# Patient Record
Sex: MALE | Race: WHITE | NOT HISPANIC OR LATINO | URBAN - METROPOLITAN AREA
[De-identification: names, ages, dates, MRNs, and addresses within clinical notes are randomized per-mention and may not be internally consistent; named-entity substitution may affect disease eponyms.]

---

## 2017-12-14 PROBLEM — Z00.00 ENCOUNTER FOR PREVENTIVE HEALTH EXAMINATION: Status: ACTIVE | Noted: 2017-12-14

## 2018-05-17 ENCOUNTER — EMERGENCY (EMERGENCY)
Facility: HOSPITAL | Age: 48
LOS: 0 days | Discharge: HOME | End: 2018-05-17
Attending: EMERGENCY MEDICINE
Payer: MEDICAID

## 2018-05-17 VITALS
WEIGHT: 129.85 LBS | HEART RATE: 82 BPM | SYSTOLIC BLOOD PRESSURE: 131 MMHG | HEIGHT: 70 IN | DIASTOLIC BLOOD PRESSURE: 94 MMHG | TEMPERATURE: 99 F

## 2018-05-17 VITALS
HEART RATE: 69 BPM | TEMPERATURE: 98 F | SYSTOLIC BLOOD PRESSURE: 116 MMHG | RESPIRATION RATE: 16 BRPM | OXYGEN SATURATION: 99 % | DIASTOLIC BLOOD PRESSURE: 85 MMHG

## 2018-05-17 DIAGNOSIS — K50.919 CROHN'S DISEASE, UNSPECIFIED, WITH UNSPECIFIED COMPLICATIONS: ICD-10-CM

## 2018-05-17 DIAGNOSIS — F43.21 ADJUSTMENT DISORDER WITH DEPRESSED MOOD: ICD-10-CM

## 2018-05-17 LAB
ALBUMIN SERPL ELPH-MCNC: 3.4 G/DL — SIGNIFICANT CHANGE UP (ref 3.3–5)
ALP SERPL-CCNC: 95 U/L — SIGNIFICANT CHANGE UP (ref 40–120)
ALT FLD-CCNC: 58 U/L — SIGNIFICANT CHANGE UP (ref 12–78)
ANION GAP SERPL CALC-SCNC: 7 MMOL/L — SIGNIFICANT CHANGE UP (ref 5–17)
APAP SERPL-MCNC: < 2 UG/ML (ref 10–30)
APPEARANCE UR: CLEAR — SIGNIFICANT CHANGE UP
APTT BLD: 28.3 SEC — SIGNIFICANT CHANGE UP (ref 27.5–37.4)
AST SERPL-CCNC: 147 U/L — HIGH (ref 15–37)
BILIRUB SERPL-MCNC: 1 MG/DL — SIGNIFICANT CHANGE UP (ref 0.2–1.2)
BILIRUB UR-MCNC: NEGATIVE — SIGNIFICANT CHANGE UP
BUN SERPL-MCNC: 9 MG/DL — SIGNIFICANT CHANGE UP (ref 7–23)
CALCIUM SERPL-MCNC: 8.7 MG/DL — SIGNIFICANT CHANGE UP (ref 8.5–10.1)
CHLORIDE SERPL-SCNC: 102 MMOL/L — SIGNIFICANT CHANGE UP (ref 96–108)
CK MB CFR SERPL CALC: 4.6 NG/ML — HIGH (ref 0.5–3.6)
CO2 SERPL-SCNC: 28 MMOL/L — SIGNIFICANT CHANGE UP (ref 22–31)
COLOR SPEC: YELLOW — SIGNIFICANT CHANGE UP
CREAT SERPL-MCNC: 0.71 MG/DL — SIGNIFICANT CHANGE UP (ref 0.5–1.3)
DIFF PNL FLD: ABNORMAL
ETHANOL SERPL-MCNC: <10 MG/DL — SIGNIFICANT CHANGE UP (ref 0–10)
GLUCOSE SERPL-MCNC: 101 MG/DL — HIGH (ref 70–99)
GLUCOSE UR QL: NEGATIVE MG/DL — SIGNIFICANT CHANGE UP
HCT VFR BLD CALC: 41.3 % — SIGNIFICANT CHANGE UP (ref 39–50)
HGB BLD-MCNC: 14.7 G/DL — SIGNIFICANT CHANGE UP (ref 13–17)
INR BLD: 0.96 RATIO — SIGNIFICANT CHANGE UP (ref 0.88–1.16)
KETONES UR-MCNC: ABNORMAL
LEUKOCYTE ESTERASE UR-ACNC: ABNORMAL
MCHC RBC-ENTMCNC: 33 PG — SIGNIFICANT CHANGE UP (ref 27–34)
MCHC RBC-ENTMCNC: 35.6 GM/DL — SIGNIFICANT CHANGE UP (ref 32–36)
MCV RBC AUTO: 92.6 FL — SIGNIFICANT CHANGE UP (ref 80–100)
NITRITE UR-MCNC: NEGATIVE — SIGNIFICANT CHANGE UP
NRBC # BLD: 0 /100 WBCS — SIGNIFICANT CHANGE UP (ref 0–0)
PCP SPEC-MCNC: SIGNIFICANT CHANGE UP
PH UR: 7 — SIGNIFICANT CHANGE UP (ref 5–8)
PLATELET # BLD AUTO: 169 K/UL — SIGNIFICANT CHANGE UP (ref 150–400)
POTASSIUM SERPL-MCNC: 3.9 MMOL/L — SIGNIFICANT CHANGE UP (ref 3.5–5.3)
POTASSIUM SERPL-SCNC: 3.9 MMOL/L — SIGNIFICANT CHANGE UP (ref 3.5–5.3)
PROT SERPL-MCNC: 7.1 GM/DL — SIGNIFICANT CHANGE UP (ref 6–8.3)
PROT UR-MCNC: NEGATIVE MG/DL — SIGNIFICANT CHANGE UP
PROTHROM AB SERPL-ACNC: 10.5 SEC — SIGNIFICANT CHANGE UP (ref 9.8–12.7)
RBC # BLD: 4.46 M/UL — SIGNIFICANT CHANGE UP (ref 4.2–5.8)
RBC # FLD: 13.1 % — SIGNIFICANT CHANGE UP (ref 10.3–14.5)
SALICYLATES SERPL-MCNC: 3.4 MG/DL — SIGNIFICANT CHANGE UP (ref 2.8–20)
SODIUM SERPL-SCNC: 137 MMOL/L — SIGNIFICANT CHANGE UP (ref 135–145)
SP GR SPEC: 1 — LOW (ref 1.01–1.02)
TROPONIN I SERPL-MCNC: <.015 NG/ML — SIGNIFICANT CHANGE UP (ref 0.01–0.04)
TSH SERPL-MCNC: 1.69 UU/ML — SIGNIFICANT CHANGE UP (ref 0.36–3.74)
UROBILINOGEN FLD QL: NEGATIVE MG/DL — SIGNIFICANT CHANGE UP
WBC # BLD: 7.72 K/UL — SIGNIFICANT CHANGE UP (ref 3.8–10.5)
WBC # FLD AUTO: 7.72 K/UL — SIGNIFICANT CHANGE UP (ref 3.8–10.5)

## 2018-05-17 PROCEDURE — 71045 X-RAY EXAM CHEST 1 VIEW: CPT | Mod: 26

## 2018-05-17 PROCEDURE — 90792 PSYCH DIAG EVAL W/MED SRVCS: CPT | Mod: GC,GT

## 2018-05-17 PROCEDURE — 99284 EMERGENCY DEPT VISIT MOD MDM: CPT | Mod: 25

## 2018-05-17 RX ORDER — MORPHINE SULFATE 50 MG/1
4 CAPSULE, EXTENDED RELEASE ORAL ONCE
Qty: 0 | Refills: 0 | Status: DISCONTINUED | OUTPATIENT
Start: 2018-05-17 | End: 2018-05-17

## 2018-05-17 RX ORDER — MESALAMINE 400 MG
4 TABLET, DELAYED RELEASE (ENTERIC COATED) ORAL
Qty: 480 | Refills: 0 | OUTPATIENT
Start: 2018-05-17 | End: 2018-06-15

## 2018-05-17 RX ORDER — SODIUM CHLORIDE 9 MG/ML
1000 INJECTION INTRAMUSCULAR; INTRAVENOUS; SUBCUTANEOUS ONCE
Qty: 0 | Refills: 0 | Status: COMPLETED | OUTPATIENT
Start: 2018-05-17 | End: 2018-05-17

## 2018-05-17 RX ORDER — ACETAMINOPHEN 500 MG
2 TABLET ORAL
Qty: 40 | Refills: 0 | OUTPATIENT
Start: 2018-05-17 | End: 2018-05-21

## 2018-05-17 RX ORDER — DIAZEPAM 5 MG
1 TABLET ORAL
Qty: 9 | Refills: 0 | OUTPATIENT
Start: 2018-05-17 | End: 2018-05-19

## 2018-05-17 RX ORDER — PANTOPRAZOLE SODIUM 20 MG/1
8 TABLET, DELAYED RELEASE ORAL
Qty: 80 | Refills: 0 | Status: DISCONTINUED | OUTPATIENT
Start: 2018-05-17 | End: 2018-05-17

## 2018-05-17 RX ORDER — MESALAMINE 400 MG
250 TABLET, DELAYED RELEASE (ENTERIC COATED) ORAL ONCE
Qty: 0 | Refills: 0 | Status: COMPLETED | OUTPATIENT
Start: 2018-05-17 | End: 2018-05-17

## 2018-05-17 RX ORDER — MESALAMINE 400 MG
250 TABLET, DELAYED RELEASE (ENTERIC COATED) ORAL ONCE
Qty: 0 | Refills: 0 | Status: DISCONTINUED | OUTPATIENT
Start: 2018-05-17 | End: 2018-05-17

## 2018-05-17 RX ADMIN — Medication 125 MILLIGRAM(S): at 13:29

## 2018-05-17 RX ADMIN — MORPHINE SULFATE 4 MILLIGRAM(S): 50 CAPSULE, EXTENDED RELEASE ORAL at 13:26

## 2018-05-17 RX ADMIN — Medication 250 MILLIGRAM(S): at 13:33

## 2018-05-17 RX ADMIN — SODIUM CHLORIDE 1000 MILLILITER(S): 9 INJECTION INTRAMUSCULAR; INTRAVENOUS; SUBCUTANEOUS at 09:54

## 2018-05-17 RX ADMIN — Medication 30 MILLILITER(S): at 09:55

## 2018-05-17 RX ADMIN — MORPHINE SULFATE 4 MILLIGRAM(S): 50 CAPSULE, EXTENDED RELEASE ORAL at 14:23

## 2018-05-17 RX ADMIN — PANTOPRAZOLE SODIUM 10 MG/HR: 20 TABLET, DELAYED RELEASE ORAL at 10:03

## 2018-05-17 NOTE — ED BEHAVIORAL HEALTH ASSESSMENT NOTE - DESCRIPTION
Calm and cooperative Crohn's disease Moved from [place] to [place] in 04/2017.  Currently single, recently breaking up with his girlfriend (one month ago, who remains in [place]).  Former emergency medical technician, now unemployed, yet seeking work in construction.  Lives with his mother in [place].  He describes his relationship with his mother as "good" yet his relationship with his sisters as estranged

## 2018-05-17 NOTE — ED ADULT NURSE REASSESSMENT NOTE - NS ED NURSE REASSESS COMMENT FT1
patient getting very upset stated " I want to live " , patient stated " I have a ringing in my head " Dr Marko amaro Md call by bed side evaluating patient at this time  , awaiting for orders

## 2018-05-17 NOTE — ED PROVIDER NOTE - PHYSICAL EXAMINATION
Vitals: WNL  Gen: AAOx3, NAD, sitting comfortably in stretcher, calm, cooperative, non-toxic  Head: ncat, perrla, eomi b/l  Neck: supple, no lymphadenopathy, no midline deviation  Heart: rrr, no m/r/g  Lungs: CTA b/l, no rales/ronchi/wheezes  Abd: soft, nontender, non-distended, no rebound or guarding  Ext: no clubbing/cyanosis/edema  Neuro: sensation and muscle strength intact b/l, steady gait, CN2-12 intact b/l

## 2018-05-17 NOTE — ED ADULT NURSE NOTE - OBJECTIVE STATEMENT
patient A&Ox3 c/o of abdominal pain and bilateral shoulder pain , started yesterday , patient stated " I had chest pain yesterday but no chest pain now " patient denied difficulty breathing , c/o of headache , c/o of nausea, patient denied SI/HI at this time , but stated he had SI three days ago , patient place under 1:1 observation , place on gown , place on cardiac monitor

## 2018-05-17 NOTE — ED PROVIDER NOTE - MEDICAL DECISION MAKING DETAILS
48 yo M with multiple complaints, r/o sequelae of overdose, medical screening, then psych consult  -basic labs, etoh, trop, ckmb, drug screen, lipase, asa and tylenol levels, coags, tsh, ua/cx, ekg, monitor, iv line NS hydration, 1:1 obs

## 2018-05-17 NOTE — ED ADULT NURSE REASSESSMENT NOTE - NS ED NURSE REASSESS COMMENT FT1
patient A&Ox3 in no acute distress, patient was clear by psych ,discharge as orders heplock remove left ER self ambulated with mother on his side , as per Md patient do not need ct scan at this time

## 2018-05-17 NOTE — ED PROVIDER NOTE - PROGRESS NOTE DETAILS
pt. complaining of worsening abd pain and headache, wants meds for crohn's and pain meds, will oblige and will order CT brain and abd/pel Results reported to patient--grossly benign, pt. cleared medically and psychiatrically   Pt. reports feeling better after meds  pt. agrees to f/u with primary care outpt. as well as outpt. psych, will give F/u for GI and care of Crohn's   pt. understands to return to ED if symptoms worsen; will d/c

## 2018-05-17 NOTE — ED BEHAVIORAL HEALTH ASSESSMENT NOTE - RISK ASSESSMENT
Risk factors: single marital status, race, unemployment, limited income, substance use, recent social loss, recent suicide attempt    Protective factors: Motivated to seek and engage in treatment, future oriented, housing stability, the presence of a social and familial support system

## 2018-05-17 NOTE — ED PROVIDER NOTE - OBJECTIVE STATEMENT
48 yo M with body aches and pains, ringing in the ears, s/p suicide attempt 3 nights prior.  Pt. took a "handful" of various pills including nortriptyline, tramadol, other unknown pills--but he denies aspirin/tylenol.  He was very depressed about recent breakup with ex-girlfriend, who had an  and didn't tell him about it.  Pt. says he woke up dizzy the next day with palpitations--he hasn't felt right the last few days.  He says anxiety has gotten much worse over the last month and he also has panic attacks.  His Crohn's is also out of control; he stopped taking his meds for it 6 months ago.  No other complaints.   ROS: negative for fever, cough, headache, shortness of breath, nausea, vomiting, diarrhea, rash, paresthesia, and weakness.   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

## 2018-05-17 NOTE — ED ADULT TRIAGE NOTE - CHIEF COMPLAINT QUOTE
Pt complaining of chest pain with palpitations, chills, ringing in the ears, pain in the shoulders bilaterally. Hx of Crohn's

## 2018-05-17 NOTE — ED BEHAVIORAL HEALTH ASSESSMENT NOTE - HPI (INCLUDE ILLNESS QUALITY, SEVERITY, DURATION, TIMING, CONTEXT, MODIFYING FACTORS, ASSOCIATED SIGNS AND SYMPTOMS)
47 year old single,  male, without formal past psychiatric history, a history of Crohns disease, self-presenting to the emergency room with a chief complaint of chest pain, yet on ED depression screen he revealed making an overdose on medications two days prior.  He reports an approximate two month history of depressed mood, with variable associated symptoms of anhedonia, guilt (for speaking negatively about his ex-girlfriend), decreased energy, and difficulty falling/staying asleep, depressive/anxious ruminations that when most severe, manifest as panic attacks (associated with chest pain, shortness of breath, and acute anxiety - he said the chest pain he presented with was secondary to anxiety).  He attributes his depression to two psychosocial stressors.  The first is the recent breakup with his ex-girlfriend, within the last month, in the setting of his move from [place] (where he lived prior to [place], and where they dated) and her recent  (within the last month).  He said they broke up initially in 2017, yet dated "off and on" until definitively breaking up last month.  Within this time period, she became pregnant (2018).  He said their communication quality declined since she became pregnant, culminating in her not informing him that she was getting an .  He said he found out approximately two weeks ago.  The duration of his depression was in largely to the declining quality of there communication, which he said was due to her reduced frequency of calling him.  Since the , he's felt depressed, because he thought they were going to raise a child together.  Another stressor is his current unemployment.  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  Another stressor is an active Crohns flair, which he said has been present for approximately two weeks, managed by only over the counter analgesics.  Additionally, his frequency of drinking alcohol has increased from approximately 2-3 beers, once per week, to 2-3 beers daily, used as a sleep aide.  He said that two days ago, in the setting of depressive ruminations, he drank 3/4th a bottle of liquor (151) and experienced a blackout.  When he woke up, he found an empty bottle of tramadol, and he believes he made an overdose.  He said he did not seek medical attention until today, when in the setting of Crohn's related abdominal pain and what he believes as anxiety related chest pain, he sought care in the emergency room.  He acknowledges feeling currently depressed yet adamantly denies suicidal ideation, intent, or plan.  He endorses future orientation, stating that he intends on remaining friends with his ex-girlfriend, eliciting emotional support from his [place] friends and mother, coping with his depressive symptoms by engaging in hobbies (e.g. playing the drums), and he said he would accept a referral to establish outpatient psychiatric treatment.  On this evaluation he denied other depressive symptoms, denies manic or psychotic symptoms, denies ideations to harm others, and denies other substance use.  We discussed treatment recommendations and he engaged readily in safety planning.  He identified various non-self harming coping strategies, people whom he can seek emotional support from, and he expressed awareness of crisis resources that he can engage in should he encounter recurrent suicidal ideation. 47 year old single,  male, without formal past psychiatric history, a history of Crohns disease, self-presenting to the emergency room with a chief complaint of chest pain, yet on ED depression screen he revealed making an overdose on medications two days prior.  He reports an approximate two month history of depressed mood, with variable associated symptoms of anhedonia, guilt (for speaking negatively about his ex-girlfriend), decreased energy, and difficulty falling/staying asleep, depressive/anxious ruminations that when most severe, manifest as panic attacks (associated with chest pain, shortness of breath, and acute anxiety - he said the chest pain he presented with was secondary to anxiety).  He attributes his depression to two psychosocial stressors.  The first is the recent breakup with his ex-girlfriend, within the last month, in the setting of his move from [place] (where he lived prior to [place], and where they dated) and her recent  (within the last month).  He said they broke up initially in 2017, yet dated "off and on" until definitively breaking up last month.  Within this time period, she became pregnant (2018).  He said their communication quality declined since she became pregnant, culminating in her not informing him that she was getting an .  He said he found out approximately two weeks ago.  The duration of his depression was in largely to the declining quality of there communication, which he said was due to her reduced frequency of calling him.  Since the , he's felt depressed, because he thought they were going to raise a child together.  Another stressor is his current unemployment.  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  Another stressor is an active Crohns flair, which he said has been present for approximately two weeks, managed by only over the counter analgesics.  Additionally, his frequency of drinking alcohol has increased from approximately 2-3 beers, once per week, to 2-3 beers daily, used as a sleep aide.  He said that two days ago, in the setting of depressive ruminations, he drank 3/4th a bottle of liquor (151) and experienced a blackout.  When he woke up, he found an empty bottle of tramadol, and he believes he made an overdose.  He said he did not seek medical attention until today, when in the setting of Crohn's related abdominal pain and what he believes as anxiety related chest pain, he sought care in the emergency room.  He acknowledges feeling currently depressed yet adamantly denies suicidal ideation, intent, or plan.  He endorses future orientation, stating that he intends on remaining friends with his ex-girlfriend, eliciting emotional support from his [place] friends and mother, coping with his depressive symptoms by engaging in hobbies (e.g. playing the drums), and he said he would accept a referral to establish outpatient psychiatric treatment.  On this evaluation he denied other depressive symptoms, denies manic or psychotic symptoms, denies ideations to harm others, and denies other substance use.  We discussed treatment recommendations and he engaged readily in safety planning.  He identified various non-self harming coping strategies, people whom he can seek emotional support from, and he expressed awareness of crisis resources that he can engage in should he encounter recurrent suicidal ideation.      An attempt was made to obtain collateral history from his mother (Cinda Smith [phone]) yet she was unavailable, therefore a voicemail message was left requesting a return call. 47 year old single,  male, without formal past psychiatric history, denies a history of self-harm, suicide attempts, substance use disorder, history of trauma, violence, or psychotropic medication use, endorses a history of Crohns disease, self-presenting to the emergency room with a chief complaint of chest pain, yet on ED depression screen he revealed making an overdose on medications two days prior.  He reports an approximate two month history of depressed mood, with variable associated symptoms of anhedonia, guilt (for speaking negatively about his ex-girlfriend), decreased energy, and difficulty falling/staying asleep, depressive/anxious ruminations that when most severe, manifest as panic attacks (associated with chest pain, shortness of breath, and acute anxiety - he said the chest pain he presented with was secondary to anxiety).  He attributes his depression to two psychosocial stressors.  The first is the recent breakup with his ex-girlfriend, within the last month, in the setting of his move from [place] (where he lived prior to [place], and where they dated) and her recent  (within the last month).  He said they broke up initially in 2017, yet dated "off and on" until definitively breaking up last month.  Within this time period, she became pregnant (2018).  He said their communication quality declined since she became pregnant, culminating in her not informing him that she was getting an .  He said he found out approximately two weeks ago.  The duration of his depression was in largely to the declining quality of there communication, which he said was due to her reduced frequency of calling him.  Since the , he's felt depressed, because he thought they were going to raise a child together.  Another stressor is his current unemployment.  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  Another stressor is an active Crohns flair, which he said has been present for approximately two weeks, managed by only over the counter analgesics.  Additionally, his frequency of drinking alcohol has increased from approximately 2-3 beers, once per week, to 2-3 beers daily, used as a sleep aide.  He said that two days ago, in the setting of depressive ruminations, he drank 3/4th a bottle of liquor (151) and experienced a blackout.  When he woke up, he found an empty bottle of tramadol, and he believes he made an overdose.  He said he did not seek medical attention until today, when in the setting of Crohn's related abdominal pain and what he believes as anxiety related chest pain, he sought care in the emergency room.  He acknowledges feeling currently depressed yet adamantly denies suicidal ideation, intent, or plan.  He endorses future orientation, stating that he intends on remaining friends with his ex-girlfriend, eliciting emotional support from his [place] friends and mother, coping with his depressive symptoms by engaging in hobbies (e.g. playing the drums), and he said he would accept a referral to establish outpatient psychiatric treatment.  On this evaluation he denied other depressive symptoms, denies manic or psychotic symptoms, denies ideations to harm others, and denies other substance use.  We discussed treatment recommendations and he engaged readily in safety planning.  He identified various non-self harming coping strategies, people whom he can seek emotional support from, and he expressed awareness of crisis resources that he can engage in should he encounter recurrent suicidal ideation.      An attempt was made to obtain collateral history from his mother (Cinda Smith [phone]) yet she was unavailable, therefore a voicemail message was left requesting a return call.

## 2018-05-17 NOTE — ED PROVIDER NOTE - INTERPRETATION
EKG performed in ED, NSR at _, No acute ischemic changes, normal intervals EKG performed in ED, NSR at 76, No acute ischemic changes, normal intervals

## 2018-05-17 NOTE — ED BEHAVIORAL HEALTH ASSESSMENT NOTE - SUMMARY
47 year old single,  male, without formal past psychiatric history, denies a history of self-harm, suicide attempts, substance use disorder, history of trauma, violence, or psychotropic medication use, endorses a history of Crohns disease, self-presenting to the emergency room with a chief complaint of chest pain, yet on ED depression screen he revealed making an overdose on medications two days prior.     On assessment he endorses depressed mood, resolution of anhedonia, and intermittently experiences associated symptoms of guilt (for arguments with his ex-girlfriend), decreased energy, and sleep quality, denying suicidal ideation, or any ideations, intent, or plan to harm himself.  He is easily engaged in assessment and safety planning, and doesn't demonstrates signs/symptoms suggestive of acute active mood instability, thought disorder, or psychosis.  His presentation is consistent with adjustment disorder with depressed mood, in the setting of active psychosocial stressors and acute medical illness (crohn's flair).  Emergency psychiatric admission is not indicated at this time, yet he would benefit from the establishment of outpatient psychiatric treatment and stabilization of his acute medical illness.

## 2018-05-17 NOTE — ED BEHAVIORAL HEALTH ASSESSMENT NOTE - DETAILS
patient self-presented Alcohol use disorder (father) emotional trauma (father disowned and disinherited him prior to his death two years ago)

## 2018-05-17 NOTE — ED BEHAVIORAL HEALTH ASSESSMENT NOTE - OTHER PAST PSYCHIATRIC HISTORY (INCLUDE DETAILS REGARDING ONSET, COURSE OF ILLNESS, INPATIENT/OUTPATIENT TREATMENT)
denies prior psychiatric history denies a history of inpatient admissions, current psychiatric outpatient treatment, self-harm, suicide attempts, substance use disorder, history of violence, or psychotropic medication use.  He endorses prior emotional trauma (his father who  two years ago, disowned and disinherited him).

## 2018-05-18 DIAGNOSIS — F43.21 ADJUSTMENT DISORDER WITH DEPRESSED MOOD: ICD-10-CM

## 2018-05-18 DIAGNOSIS — R07.9 CHEST PAIN, UNSPECIFIED: ICD-10-CM

## 2018-05-18 DIAGNOSIS — R45.851 SUICIDAL IDEATIONS: ICD-10-CM

## 2018-05-18 DIAGNOSIS — K50.919 CROHN'S DISEASE, UNSPECIFIED, WITH UNSPECIFIED COMPLICATIONS: ICD-10-CM

## 2018-07-19 ENCOUNTER — INPATIENT (INPATIENT)
Facility: HOSPITAL | Age: 48
LOS: 8 days | Discharge: HOME | End: 2018-07-28
Attending: INTERNAL MEDICINE | Admitting: INTERNAL MEDICINE
Payer: MEDICAID

## 2018-07-19 VITALS
HEIGHT: 70 IN | HEART RATE: 95 BPM | OXYGEN SATURATION: 100 % | SYSTOLIC BLOOD PRESSURE: 152 MMHG | WEIGHT: 130.07 LBS | DIASTOLIC BLOOD PRESSURE: 103 MMHG | TEMPERATURE: 98 F | RESPIRATION RATE: 18 BRPM

## 2018-07-19 PROCEDURE — 99285 EMERGENCY DEPT VISIT HI MDM: CPT | Mod: 25

## 2018-07-20 DIAGNOSIS — S83.519A SPRAIN OF ANTERIOR CRUCIATE LIGAMENT OF UNSPECIFIED KNEE, INITIAL ENCOUNTER: Chronic | ICD-10-CM

## 2018-07-20 DIAGNOSIS — K50.90 CROHN'S DISEASE, UNSPECIFIED, WITHOUT COMPLICATIONS: ICD-10-CM

## 2018-07-20 LAB
ALBUMIN SERPL ELPH-MCNC: 4 G/DL — SIGNIFICANT CHANGE UP (ref 3.3–5)
ALP SERPL-CCNC: 101 U/L — SIGNIFICANT CHANGE UP (ref 40–120)
ALT FLD-CCNC: 30 U/L — SIGNIFICANT CHANGE UP (ref 12–78)
AMYLASE P1 CFR SERPL: 65 U/L — SIGNIFICANT CHANGE UP (ref 25–115)
ANION GAP SERPL CALC-SCNC: 11 MMOL/L — SIGNIFICANT CHANGE UP (ref 5–17)
AST SERPL-CCNC: 28 U/L — SIGNIFICANT CHANGE UP (ref 15–37)
BASOPHILS # BLD AUTO: 0.05 K/UL — SIGNIFICANT CHANGE UP (ref 0–0.2)
BASOPHILS NFR BLD AUTO: 0.4 % — SIGNIFICANT CHANGE UP (ref 0–2)
BILIRUB SERPL-MCNC: 0.4 MG/DL — SIGNIFICANT CHANGE UP (ref 0.2–1.2)
BUN SERPL-MCNC: 7 MG/DL — SIGNIFICANT CHANGE UP (ref 7–23)
CALCIUM SERPL-MCNC: 9.4 MG/DL — SIGNIFICANT CHANGE UP (ref 8.5–10.1)
CHLORIDE SERPL-SCNC: 99 MMOL/L — SIGNIFICANT CHANGE UP (ref 96–108)
CO2 SERPL-SCNC: 27 MMOL/L — SIGNIFICANT CHANGE UP (ref 22–31)
CREAT SERPL-MCNC: 0.83 MG/DL — SIGNIFICANT CHANGE UP (ref 0.5–1.3)
EOSINOPHIL # BLD AUTO: 0.05 K/UL — SIGNIFICANT CHANGE UP (ref 0–0.5)
EOSINOPHIL NFR BLD AUTO: 0.4 % — SIGNIFICANT CHANGE UP (ref 0–6)
GLUCOSE SERPL-MCNC: 96 MG/DL — SIGNIFICANT CHANGE UP (ref 70–99)
HCT VFR BLD CALC: 46.6 % — SIGNIFICANT CHANGE UP (ref 39–50)
HGB BLD-MCNC: 16.4 G/DL — SIGNIFICANT CHANGE UP (ref 13–17)
IMM GRANULOCYTES NFR BLD AUTO: 0.3 % — SIGNIFICANT CHANGE UP (ref 0–1.5)
LACTATE SERPL-SCNC: 2 MMOL/L — SIGNIFICANT CHANGE UP (ref 0.7–2)
LIDOCAIN IGE QN: 64 U/L — LOW (ref 73–393)
LYMPHOCYTES # BLD AUTO: 17.5 % — SIGNIFICANT CHANGE UP (ref 13–44)
LYMPHOCYTES # BLD AUTO: 2.13 K/UL — SIGNIFICANT CHANGE UP (ref 1–3.3)
MCHC RBC-ENTMCNC: 32.7 PG — SIGNIFICANT CHANGE UP (ref 27–34)
MCHC RBC-ENTMCNC: 35.2 GM/DL — SIGNIFICANT CHANGE UP (ref 32–36)
MCV RBC AUTO: 93 FL — SIGNIFICANT CHANGE UP (ref 80–100)
MONOCYTES # BLD AUTO: 0.94 K/UL — HIGH (ref 0–0.9)
MONOCYTES NFR BLD AUTO: 7.7 % — SIGNIFICANT CHANGE UP (ref 2–14)
NEUTROPHILS # BLD AUTO: 8.97 K/UL — HIGH (ref 1.8–7.4)
NEUTROPHILS NFR BLD AUTO: 73.7 % — SIGNIFICANT CHANGE UP (ref 43–77)
NRBC # BLD: 0 /100 WBCS — SIGNIFICANT CHANGE UP (ref 0–0)
PLATELET # BLD AUTO: 283 K/UL — SIGNIFICANT CHANGE UP (ref 150–400)
POTASSIUM SERPL-MCNC: 3.8 MMOL/L — SIGNIFICANT CHANGE UP (ref 3.5–5.3)
POTASSIUM SERPL-SCNC: 3.8 MMOL/L — SIGNIFICANT CHANGE UP (ref 3.5–5.3)
PROT SERPL-MCNC: 8.2 GM/DL — SIGNIFICANT CHANGE UP (ref 6–8.3)
RBC # BLD: 5.01 M/UL — SIGNIFICANT CHANGE UP (ref 4.2–5.8)
RBC # FLD: 13.1 % — SIGNIFICANT CHANGE UP (ref 10.3–14.5)
SODIUM SERPL-SCNC: 137 MMOL/L — SIGNIFICANT CHANGE UP (ref 135–145)
WBC # BLD: 12.18 K/UL — HIGH (ref 3.8–10.5)
WBC # FLD AUTO: 12.18 K/UL — HIGH (ref 3.8–10.5)

## 2018-07-20 PROCEDURE — 74177 CT ABD & PELVIS W/CONTRAST: CPT | Mod: 26

## 2018-07-20 RX ORDER — ONDANSETRON 8 MG/1
4 TABLET, FILM COATED ORAL ONCE
Qty: 0 | Refills: 0 | Status: COMPLETED | OUTPATIENT
Start: 2018-07-20 | End: 2018-07-20

## 2018-07-20 RX ORDER — MESALAMINE 400 MG
1000 TABLET, DELAYED RELEASE (ENTERIC COATED) ORAL
Qty: 0 | Refills: 0 | Status: DISCONTINUED | OUTPATIENT
Start: 2018-07-20 | End: 2018-07-28

## 2018-07-20 RX ORDER — SODIUM CHLORIDE 9 MG/ML
1000 INJECTION, SOLUTION INTRAVENOUS
Qty: 0 | Refills: 0 | Status: DISCONTINUED | OUTPATIENT
Start: 2018-07-20 | End: 2018-07-28

## 2018-07-20 RX ORDER — HYDROCORTISONE 20 MG
100 TABLET ORAL EVERY 8 HOURS
Qty: 0 | Refills: 0 | Status: DISCONTINUED | OUTPATIENT
Start: 2018-07-20 | End: 2018-07-25

## 2018-07-20 RX ORDER — ACETAMINOPHEN 500 MG
650 TABLET ORAL EVERY 6 HOURS
Qty: 0 | Refills: 0 | Status: DISCONTINUED | OUTPATIENT
Start: 2018-07-20 | End: 2018-07-28

## 2018-07-20 RX ORDER — ONDANSETRON 8 MG/1
4 TABLET, FILM COATED ORAL EVERY 8 HOURS
Qty: 0 | Refills: 0 | Status: DISCONTINUED | OUTPATIENT
Start: 2018-07-20 | End: 2018-07-28

## 2018-07-20 RX ORDER — MORPHINE SULFATE 50 MG/1
6 CAPSULE, EXTENDED RELEASE ORAL ONCE
Qty: 0 | Refills: 0 | Status: DISCONTINUED | OUTPATIENT
Start: 2018-07-20 | End: 2018-07-20

## 2018-07-20 RX ORDER — ONDANSETRON 8 MG/1
4 TABLET, FILM COATED ORAL EVERY 8 HOURS
Qty: 0 | Refills: 0 | Status: DISCONTINUED | OUTPATIENT
Start: 2018-07-20 | End: 2018-07-20

## 2018-07-20 RX ORDER — SODIUM CHLORIDE 9 MG/ML
1000 INJECTION INTRAMUSCULAR; INTRAVENOUS; SUBCUTANEOUS ONCE
Qty: 0 | Refills: 0 | Status: COMPLETED | OUTPATIENT
Start: 2018-07-20 | End: 2018-07-20

## 2018-07-20 RX ORDER — MORPHINE SULFATE 50 MG/1
2 CAPSULE, EXTENDED RELEASE ORAL EVERY 6 HOURS
Qty: 0 | Refills: 0 | Status: DISCONTINUED | OUTPATIENT
Start: 2018-07-20 | End: 2018-07-26

## 2018-07-20 RX ORDER — PANTOPRAZOLE SODIUM 20 MG/1
40 TABLET, DELAYED RELEASE ORAL DAILY
Qty: 0 | Refills: 0 | Status: DISCONTINUED | OUTPATIENT
Start: 2018-07-20 | End: 2018-07-28

## 2018-07-20 RX ADMIN — Medication 1000 MILLIGRAM(S): at 17:52

## 2018-07-20 RX ADMIN — ONDANSETRON 4 MILLIGRAM(S): 8 TABLET, FILM COATED ORAL at 11:43

## 2018-07-20 RX ADMIN — MORPHINE SULFATE 2 MILLIGRAM(S): 50 CAPSULE, EXTENDED RELEASE ORAL at 20:01

## 2018-07-20 RX ADMIN — SODIUM CHLORIDE 100 MILLILITER(S): 9 INJECTION, SOLUTION INTRAVENOUS at 09:25

## 2018-07-20 RX ADMIN — MORPHINE SULFATE 6 MILLIGRAM(S): 50 CAPSULE, EXTENDED RELEASE ORAL at 04:00

## 2018-07-20 RX ADMIN — SODIUM CHLORIDE 1000 MILLILITER(S): 9 INJECTION INTRAMUSCULAR; INTRAVENOUS; SUBCUTANEOUS at 03:23

## 2018-07-20 RX ADMIN — MORPHINE SULFATE 2 MILLIGRAM(S): 50 CAPSULE, EXTENDED RELEASE ORAL at 11:43

## 2018-07-20 RX ADMIN — SODIUM CHLORIDE 100 MILLILITER(S): 9 INJECTION, SOLUTION INTRAVENOUS at 23:19

## 2018-07-20 RX ADMIN — Medication 1000 MILLIGRAM(S): at 12:00

## 2018-07-20 RX ADMIN — MORPHINE SULFATE 2 MILLIGRAM(S): 50 CAPSULE, EXTENDED RELEASE ORAL at 19:29

## 2018-07-20 RX ADMIN — MORPHINE SULFATE 2 MILLIGRAM(S): 50 CAPSULE, EXTENDED RELEASE ORAL at 12:00

## 2018-07-20 RX ADMIN — MORPHINE SULFATE 6 MILLIGRAM(S): 50 CAPSULE, EXTENDED RELEASE ORAL at 03:24

## 2018-07-20 RX ADMIN — ONDANSETRON 4 MILLIGRAM(S): 8 TABLET, FILM COATED ORAL at 03:23

## 2018-07-20 RX ADMIN — PANTOPRAZOLE SODIUM 40 MILLIGRAM(S): 20 TABLET, DELAYED RELEASE ORAL at 12:01

## 2018-07-20 RX ADMIN — Medication 100 MILLIGRAM(S): at 21:49

## 2018-07-20 RX ADMIN — Medication 40 MILLIGRAM(S): at 13:20

## 2018-07-20 RX ADMIN — Medication 1000 MILLIGRAM(S): at 23:14

## 2018-07-20 NOTE — H&P ADULT - NSHPLABSRESULTS_GEN_ALL_CORE
16.4   12.18 )-----------( 283      ( 20 Jul 2018 01:27 )             46.6     07-20    137  |  99  |  7   ----------------------------<  96  3.8   |  27  |  0.83    Ca    9.4      20 Jul 2018 01:27    TPro  8.2  /  Alb  4.0  /  TBili  0.4  /  DBili  x   /  AST  28  /  ALT  30  /  AlkPhos  101  07-20        CAPILLARY BLOOD GLUCOSE                Urine Culture:      Blood Culture:

## 2018-07-20 NOTE — PATIENT PROFILE ADULT. - FAMILY HISTORY
Father  Still living? Unknown  Family history of hypertension in father, Age at diagnosis: Age Unknown  Family history of colon cancer in father, Age at diagnosis: Age Unknown  Family history of COPD (chronic obstructive pulmonary disease), Age at diagnosis: Age Unknown     Mother  Still living? Unknown  Family history of hypertension in father, Age at diagnosis: Age Unknown  Family history of diabetes mellitus in maternal grandmother, Age at diagnosis: Age Unknown     Grandparent  Still living? Unknown  Family history of hypertension in father, Age at diagnosis: Age Unknown  Family history of diabetes mellitus in maternal grandmother, Age at diagnosis: Age Unknown

## 2018-07-20 NOTE — ED PROVIDER NOTE - CARE PLAN
Principal Discharge DX:	Crohn's disease without complication, unspecified gastrointestinal tract location

## 2018-07-20 NOTE — ED PROVIDER NOTE - MEDICAL DECISION MAKING DETAILS
Patient with abdominal pain, feels that he is having an acute Crohn's Flare.  VSS.  Labs, CT imaging reviewed, no findings which warrant immediate attention however patient still with pain and vomiting. Patient is to be admitted to the hospital and the case was discussed with the admitting physician.  Any changes in plan, additional imaging/labs, and further work up will be at the discretion of the admitting physician.

## 2018-07-20 NOTE — H&P ADULT - ASSESSMENT
Patient presents to the ED for what he believes is a Crohn's flare.  Patient reports pain in abdomen with nausea, vomiting, and bloody stool worsening over the last week.  Says that he has not been able to hold down any food or fluids or take his home medications.  AAOx3, normal insight, no S/H TIP, no command hallucinations.  Follows with Dr. Russ in GI.  Start back on [place]. GI consult.

## 2018-07-20 NOTE — CONSULT NOTE ADULT - SUBJECTIVE AND OBJECTIVE BOX
Chief Complaint:  Patient is a 47y old  Male who presents with a chief complaint of Pain abdomen with N/V (20 Jul 2018 15:20)      HPI:  · HPI   Patient presents to the ED for what he believes is a Crohn's flare.  Patient reports pain in abdomen with nausea, vomiting, and bloody stool worsening over the last week.  Says that he has not been able to hold down any food or fluids or take his home medications.  AAOx3, normal insight, no S/H TIP, no command hallucinations.  Follows with Dr. Russ in GI.    Relevant PMHx/SHx/SOCHx/FAMH:  Barretts esophagus, Crohns Disease, depression, colon resection  +Smoker, denies use of other illict drugs or h/o alchol abuse   ROS: No fever/chills, No headache/photophobia/eye pain/changes in vision, No ear pain/sore throat/dysphagia, No chest pain/palpitations, no SOB/cough/wheeze/stridor, no dysuria/frequency/discharge, No neck/back pain, no rash, no changes in neurological status/function. (20 Jul 2018 15:20)      PMH/PSH:PAST MEDICAL & SURGICAL HISTORY:  Alfaro's esophagus without dysplasia  Depression, unspecified depression type  Crohn's disease with complication, unspecified gastrointestinal tract location  ACL (anterior cruciate ligament) tear      Allergies:  No Known Allergies      Medications:  acetaminophen    Suspension 650 milliGRAM(s) Oral every 6 hours PRN  dextrose 5% + sodium chloride 0.9%. 1000 milliLiter(s) IV Continuous <Continuous>  mesalamine ER Capsule 1000 milliGRAM(s) Oral four times a day  methylPREDNISolone sodium succinate Injectable 40 milliGRAM(s) IV Push every 8 hours  morphine  - Injectable 2 milliGRAM(s) IV Push every 6 hours PRN  ondansetron Injectable 4 milliGRAM(s) IV Push every 8 hours PRN  pantoprazole  Injectable 40 milliGRAM(s) IV Push daily      Review of Systems:    General:  No weight loss, fevers, chills, night sweats, fatigue,   Eyes:  Good vision, no reported pain  ENT:  No sore throat, pain, runny nose, dysphagia  CV:  No pain, palpitations, hypo/hypertension  Resp:  No dyspnea, cough, tachypnea, wheezing  GI:  +pain, No nausea, No vomiting, No diarrhea, No constipation, No pruritis, No rectal bleeding, No tarry stools, No dysphagia,  :  No pain, bleeding, incontinence, nocturia  Muscle:  No pain, weakness  Breast:  No pain, abscess, mass, discharge  Neuro:  No weakness, tingling, memory problems  Psych:  No fatigue, insomnia, mood problems, depression  Endocrine:  No polyuria, polydipsia, cold/heat intolerance  Heme:  No petechiae, ecchymosis, easy bruisability  Skin:  No rash, tattoos, scars, edema    Relevant Family History:   FAMILY HISTORY:  Family history of diabetes mellitus in maternal grandmother (Mother, Grandparent)  Family history of COPD (chronic obstructive pulmonary disease) (Father)  Family history of colon cancer in father (Father)  Family history of hypertension in father (Father, Mother, Grandparent)      Relevant Social History: Alcohol ( -) , Tobacco ( -) , Illicit drugs (- )     Physical Exam:    Vital Signs:  Vital Signs Last 24 Hrs  T(C): 36.9 (20 Jul 2018 15:30), Max: 36.9 (20 Jul 2018 15:30)  T(F): 98.4 (20 Jul 2018 15:30), Max: 98.4 (20 Jul 2018 15:30)  HR: 80 (20 Jul 2018 15:30) (68 - 95)  BP: 120/70 (20 Jul 2018 15:30) (120/70 - 152/103)  BP(mean): --  RR: 18 (20 Jul 2018 15:30) (16 - 18)  SpO2: 98% (20 Jul 2018 15:30) (95% - 100%)  Daily Height in cm: 177.8 (20 Jul 2018 15:30)    Daily     General:  Appears stated age, well-groomed, well-nourished, no distress  HEENT:  NC/AT,  conjunctivae clear and pink, no thyromegaly, nodules, adenopathy, no JVD  Chest:  Full & symmetric excursion, no increased effort, breath sounds clear  Cardiovascular:  Regular rhythm, S1, S2, no murmur/rub/S3/S4, no abdominal bruit, no edema  Abdomen:  Soft, non-tender, non-distended, normoactive bowel sounds,  no masses , no hepatosplenomegaly, no signs of chronic liver disease  Extremities:  no cyanosis, clubbing or edema  Skin:  No rash/erythema/ecchymoses/petechiae/wounds/abscess/warm/dry  Neuro/Psych:  Alert, oriented, no asterixis, no tremor, no encephalopathy    Laboratory:                          16.4   12.18 )-----------( 283      ( 20 Jul 2018 01:27 )             46.6     07-20    137  |  99  |  7   ----------------------------<  96  3.8   |  27  |  0.83    Ca    9.4      20 Jul 2018 01:27    TPro  8.2  /  Alb  4.0  /  TBili  0.4  /  DBili  x   /  AST  28  /  ALT  30  /  AlkPhos  101  07-20    LIVER FUNCTIONS - ( 20 Jul 2018 01:27 )  Alb: 4.0 g/dL / Pro: 8.2 gm/dL / ALK PHOS: 101 U/L / ALT: 30 U/L / AST: 28 U/L / GGT: x               Amylase Serum65 U/L      Lipase serum64 U/L<L>      Intake and Output    07-20-18 @ 07:01  -  07-20-18 @ 17:45  --------------------------------------------------------  IN: 0 mL / OUT: 325 mL / NET: -325 mL    Imaging:  EXAM:  CT ABDOMEN AND PELVIS IC                        PROCEDURE DATE:  07/20/2018    INTERPRETATION:  CLINICAL INFORMATION: Crohn's flare.    COMPARISON: CT abdomen and pelvis 5/4/2011    PROCEDURE:   CT of the Abdomen and Pelvis was performed with intravenous contrast.   Intravenous contrast: 94 ml Omnipaque 350. 6 ml discarded.  Oral contrast: None.  Sagittal and coronal reformats were performed.    FINDINGS:    LOWER CHEST: Within normal limits.    LIVER: Within normal limits.  BILE DUCTS: Normal caliber.  GALLBLADDER: Within normal limits.  SPLEEN: Within normal limits.  PANCREAS: Within normal limits.  ADRENALS: Within normal limits.  KIDNEYS/URETERS: Small right renal cyst.    BLADDER: Within normal limits.  REPRODUCTIVE ORGANS: The prostate is within normal limits.     BOWEL: No bowel obstruction. Appendix normal.  PERITONEUM: No ascites.  VESSELS:  Within normal limits.  RETROPERITONEUM: No lymphadenopathy.    ABDOMINAL WALL: Within normal limits.  BONES: 11 mm degenerative anterolisthesis of L5 on S1..    IMPRESSION: No bowel obstruction or evidence for active bowel   inflammation.      MAGDI PETERSON M.D., ATTENDING RADIOLOGIST  This document has been electronically signed. Jul 20 2018  2:48AM

## 2018-07-20 NOTE — H&P ADULT - PMH
Alfaro's esophagus without dysplasia    Crohn's disease with complication, unspecified gastrointestinal tract location    Depression, unspecified depression type

## 2018-07-20 NOTE — H&P ADULT - HISTORY OF PRESENT ILLNESS
· HPI   Patient presents to the ED for what he believes is a Crohn's flare.  Patient reports pain in abdomen with nausea, vomiting, and bloody stool worsening over the last week.  Says that he has not been able to hold down any food or fluids or take his home medications.  AAOx3, normal insight, no S/H TIP, no command hallucinations.  Follows with Dr. Russ in GI.    Relevant PMHx/SHx/SOCHx/FAMH:  Barretts esophagus, Crohns Disease, depression, colon resection  +Smoker, denies use of other illict drugs or h/o alchol abuse   ROS: No fever/chills, No headache/photophobia/eye pain/changes in vision, No ear pain/sore throat/dysphagia, No chest pain/palpitations, no SOB/cough/wheeze/stridor, no dysuria/frequency/discharge, No neck/back pain, no rash, no changes in neurological status/function.

## 2018-07-20 NOTE — H&P ADULT - NSHPPHYSICALEXAM_GEN_ALL_CORE
GENERAL: NAD, well-groomed, Thin built  HEAD:  Atraumatic, Normocephalic  EYES: EOMI, PERRLA, conjunctiva and sclera clear  ENMT:  Moist mucous membranes, Good dentition, No lesions  NECK: Supple, No JVD, Normal thyroid  NERVOUS SYSTEM:  Alert & Oriented X3, Good concentration; Motor Strength 5/5 B/L upper and lower extremities; DTRs 2+ intact and symmetric  CHEST/LUNG: Clear to percussion bilaterally; No rales, rhonchi, wheezing, or rubs  HEART: Regular rate and rhythm; No murmurs, rubs, or gallops  ABDOMEN: Soft,Diffusley tender, Nondistended; Bowel sounds present  EXTREMITIES:  2+ Peripheral Pulses, No clubbing, cyanosis, or edema  LYMPH: No lymphadenopathy noted  SKIN: No rashes or lesions

## 2018-07-20 NOTE — ED PROVIDER NOTE - OBJECTIVE STATEMENT
Pertinent PMH/PSH/FHx/SHx and Review of Systems contained within:  Patient presents to the ED for what he believes is a Crohn's flare.  Patient reports pain in abdomen with nausea, vomiting, and bloody stool worsening over the last week.  Says that he has not been able to hold down any food or fluids or take his home medications.  AAOx3, normal insight, no S/H TIP, no command hallucinations.  Follows with Dr. Russ in GI.     Relevant PMHx/SHx/SOCHx/FAMH:  Barretts esophagus, Crohns Disease, depression, colon resection  +Smoker, denies use of other illict drugs or h/o alchol abuse    ROS: No fever/chills, No headache/photophobia/eye pain/changes in vision, No ear pain/sore throat/dysphagia, No chest pain/palpitations, no SOB/cough/wheeze/stridor, no dysuria/frequency/discharge, No neck/back pain, no rash, no changes in neurological status/function.

## 2018-07-21 DIAGNOSIS — K22.70 BARRETT'S ESOPHAGUS WITHOUT DYSPLASIA: ICD-10-CM

## 2018-07-21 RX ADMIN — SODIUM CHLORIDE 100 MILLILITER(S): 9 INJECTION, SOLUTION INTRAVENOUS at 15:17

## 2018-07-21 RX ADMIN — MORPHINE SULFATE 2 MILLIGRAM(S): 50 CAPSULE, EXTENDED RELEASE ORAL at 14:05

## 2018-07-21 RX ADMIN — Medication 1000 MILLIGRAM(S): at 17:42

## 2018-07-21 RX ADMIN — MORPHINE SULFATE 2 MILLIGRAM(S): 50 CAPSULE, EXTENDED RELEASE ORAL at 21:32

## 2018-07-21 RX ADMIN — Medication 1000 MILLIGRAM(S): at 13:48

## 2018-07-21 RX ADMIN — MORPHINE SULFATE 2 MILLIGRAM(S): 50 CAPSULE, EXTENDED RELEASE ORAL at 06:00

## 2018-07-21 RX ADMIN — MORPHINE SULFATE 2 MILLIGRAM(S): 50 CAPSULE, EXTENDED RELEASE ORAL at 21:22

## 2018-07-21 RX ADMIN — Medication 1000 MILLIGRAM(S): at 05:11

## 2018-07-21 RX ADMIN — Medication 100 MILLIGRAM(S): at 05:11

## 2018-07-21 RX ADMIN — Medication 100 MILLIGRAM(S): at 13:50

## 2018-07-21 RX ADMIN — Medication 100 MILLIGRAM(S): at 21:26

## 2018-07-21 RX ADMIN — MORPHINE SULFATE 2 MILLIGRAM(S): 50 CAPSULE, EXTENDED RELEASE ORAL at 13:47

## 2018-07-21 RX ADMIN — PANTOPRAZOLE SODIUM 40 MILLIGRAM(S): 20 TABLET, DELAYED RELEASE ORAL at 12:05

## 2018-07-21 RX ADMIN — SODIUM CHLORIDE 100 MILLILITER(S): 9 INJECTION, SOLUTION INTRAVENOUS at 23:52

## 2018-07-21 RX ADMIN — MORPHINE SULFATE 2 MILLIGRAM(S): 50 CAPSULE, EXTENDED RELEASE ORAL at 05:11

## 2018-07-21 RX ADMIN — Medication 1000 MILLIGRAM(S): at 23:50

## 2018-07-21 NOTE — PROGRESS NOTE ADULT - PROBLEM SELECTOR PLAN 1
solucortef 100mg TID  advance diet to soft if tolerating then oral steroids and f/u with dr Russ for future biologic therapy

## 2018-07-22 DIAGNOSIS — F32.9 MAJOR DEPRESSIVE DISORDER, SINGLE EPISODE, UNSPECIFIED: ICD-10-CM

## 2018-07-22 RX ORDER — ESCITALOPRAM OXALATE 10 MG/1
10 TABLET, FILM COATED ORAL DAILY
Qty: 0 | Refills: 0 | Status: DISCONTINUED | OUTPATIENT
Start: 2018-07-22 | End: 2018-07-28

## 2018-07-22 RX ADMIN — Medication 1000 MILLIGRAM(S): at 17:09

## 2018-07-22 RX ADMIN — MORPHINE SULFATE 2 MILLIGRAM(S): 50 CAPSULE, EXTENDED RELEASE ORAL at 13:21

## 2018-07-22 RX ADMIN — MORPHINE SULFATE 2 MILLIGRAM(S): 50 CAPSULE, EXTENDED RELEASE ORAL at 22:00

## 2018-07-22 RX ADMIN — ONDANSETRON 4 MILLIGRAM(S): 8 TABLET, FILM COATED ORAL at 21:13

## 2018-07-22 RX ADMIN — Medication 100 MILLIGRAM(S): at 05:12

## 2018-07-22 RX ADMIN — MORPHINE SULFATE 2 MILLIGRAM(S): 50 CAPSULE, EXTENDED RELEASE ORAL at 07:55

## 2018-07-22 RX ADMIN — ONDANSETRON 4 MILLIGRAM(S): 8 TABLET, FILM COATED ORAL at 05:15

## 2018-07-22 RX ADMIN — MORPHINE SULFATE 2 MILLIGRAM(S): 50 CAPSULE, EXTENDED RELEASE ORAL at 07:39

## 2018-07-22 RX ADMIN — PANTOPRAZOLE SODIUM 40 MILLIGRAM(S): 20 TABLET, DELAYED RELEASE ORAL at 11:23

## 2018-07-22 RX ADMIN — Medication 1000 MILLIGRAM(S): at 05:12

## 2018-07-22 RX ADMIN — Medication 1000 MILLIGRAM(S): at 23:39

## 2018-07-22 RX ADMIN — SODIUM CHLORIDE 100 MILLILITER(S): 9 INJECTION, SOLUTION INTRAVENOUS at 21:17

## 2018-07-22 RX ADMIN — ONDANSETRON 4 MILLIGRAM(S): 8 TABLET, FILM COATED ORAL at 13:10

## 2018-07-22 RX ADMIN — MORPHINE SULFATE 2 MILLIGRAM(S): 50 CAPSULE, EXTENDED RELEASE ORAL at 21:13

## 2018-07-22 RX ADMIN — SODIUM CHLORIDE 100 MILLILITER(S): 9 INJECTION, SOLUTION INTRAVENOUS at 11:25

## 2018-07-22 RX ADMIN — ESCITALOPRAM OXALATE 10 MILLIGRAM(S): 10 TABLET, FILM COATED ORAL at 16:45

## 2018-07-22 RX ADMIN — Medication 100 MILLIGRAM(S): at 13:10

## 2018-07-22 RX ADMIN — MORPHINE SULFATE 2 MILLIGRAM(S): 50 CAPSULE, EXTENDED RELEASE ORAL at 13:35

## 2018-07-22 RX ADMIN — Medication 100 MILLIGRAM(S): at 22:39

## 2018-07-22 RX ADMIN — Medication 1000 MILLIGRAM(S): at 12:42

## 2018-07-22 NOTE — PROGRESS NOTE ADULT - PROBLEM SELECTOR PLAN 1
solucortef 100mg TID  advance diet to soft if tolerating then oral steroids and f/u with dr Russ for future biologic therapy.

## 2018-07-23 DIAGNOSIS — B37.81 CANDIDAL ESOPHAGITIS: ICD-10-CM

## 2018-07-23 RX ORDER — FLUCONAZOLE 150 MG/1
100 TABLET ORAL ONCE
Qty: 0 | Refills: 0 | Status: COMPLETED | OUTPATIENT
Start: 2018-07-23 | End: 2018-07-23

## 2018-07-23 RX ORDER — FLUCONAZOLE 150 MG/1
TABLET ORAL
Qty: 0 | Refills: 0 | Status: DISCONTINUED | OUTPATIENT
Start: 2018-07-23 | End: 2018-07-26

## 2018-07-23 RX ORDER — FLUCONAZOLE 150 MG/1
100 TABLET ORAL EVERY 24 HOURS
Qty: 0 | Refills: 0 | Status: DISCONTINUED | OUTPATIENT
Start: 2018-07-24 | End: 2018-07-26

## 2018-07-23 RX ADMIN — ONDANSETRON 4 MILLIGRAM(S): 8 TABLET, FILM COATED ORAL at 21:49

## 2018-07-23 RX ADMIN — Medication 100 MILLIGRAM(S): at 05:42

## 2018-07-23 RX ADMIN — ONDANSETRON 4 MILLIGRAM(S): 8 TABLET, FILM COATED ORAL at 05:42

## 2018-07-23 RX ADMIN — SODIUM CHLORIDE 100 MILLILITER(S): 9 INJECTION, SOLUTION INTRAVENOUS at 11:21

## 2018-07-23 RX ADMIN — Medication 1000 MILLIGRAM(S): at 17:47

## 2018-07-23 RX ADMIN — MORPHINE SULFATE 2 MILLIGRAM(S): 50 CAPSULE, EXTENDED RELEASE ORAL at 23:07

## 2018-07-23 RX ADMIN — Medication 100 MILLIGRAM(S): at 15:39

## 2018-07-23 RX ADMIN — FLUCONAZOLE 50 MILLIGRAM(S): 150 TABLET ORAL at 15:39

## 2018-07-23 RX ADMIN — PANTOPRAZOLE SODIUM 40 MILLIGRAM(S): 20 TABLET, DELAYED RELEASE ORAL at 12:00

## 2018-07-23 RX ADMIN — Medication 100 MILLIGRAM(S): at 21:49

## 2018-07-23 RX ADMIN — Medication 1000 MILLIGRAM(S): at 23:07

## 2018-07-23 RX ADMIN — Medication 1000 MILLIGRAM(S): at 05:42

## 2018-07-23 RX ADMIN — MORPHINE SULFATE 2 MILLIGRAM(S): 50 CAPSULE, EXTENDED RELEASE ORAL at 23:50

## 2018-07-23 RX ADMIN — MORPHINE SULFATE 2 MILLIGRAM(S): 50 CAPSULE, EXTENDED RELEASE ORAL at 11:35

## 2018-07-23 RX ADMIN — ESCITALOPRAM OXALATE 10 MILLIGRAM(S): 10 TABLET, FILM COATED ORAL at 12:00

## 2018-07-23 RX ADMIN — Medication 1000 MILLIGRAM(S): at 11:15

## 2018-07-23 RX ADMIN — MORPHINE SULFATE 2 MILLIGRAM(S): 50 CAPSULE, EXTENDED RELEASE ORAL at 11:21

## 2018-07-23 NOTE — DIETITIAN INITIAL EVALUATION ADULT. - PERTINENT MEDS FT
escitalopram , mesalamine ER , pantoprazole , hydrocortisone sodium succinate injectable, ondansetron prn for N/V q 6 hours , morphine injectable prn q 6 hours severe pain, IVF dextrose 5%+ sodium chloride 0.9% @ 100 ml/hr

## 2018-07-23 NOTE — PROGRESS NOTE ADULT - PROBLEM SELECTOR PLAN 1
tolerating soft diet will start  oral steroids today will taper over next few weeks  prednisone 40mgx 5 days ,  30mg x 7 days 20 mg x 7 days and 10 mg 7 days  and f/u with dr Russ for future biologic therapy. tolerating full liquid diet will start  oral steroids today will taper over next few weeks  prednisone 40mgx 5 days ,  30mg x 7 days 20 mg x 7 days and 10 mg 7 days  and f/u with dr Russ for future biologic therapy.

## 2018-07-23 NOTE — DIETITIAN INITIAL EVALUATION ADULT. - ENERGY NEEDS
Height (cm): 177.8 (07-20)  Weight (kg): 57 (07-20)  BMI (kg/m2): 18 (07-20)  IBW: 75.2 kg       % IBW: 75%            UBW:              %UBW.  wt. gain of 2.5 kg/5.5 LBS(4.38%)noted since adm, no edema noted, wt. gain most likely related to IV hydration Height (cm): 177.8 (07-20)  Weight (kg): 57 (07-20)  BMI (kg/m2): 18 (07-20)  IBW: 75.2 kg       % IBW: 75%            UBW: 66.2 kg            %UBW: 86%   wt. gain of 2.5 kg/5.5 LBS(4.38%)noted since adm, no edema noted, wt. gain most likely related to IV hydration

## 2018-07-23 NOTE — CHART NOTE - NSCHARTNOTEFT_GEN_A_CORE
Upon Nutritional Assessment by the Registered Dietitian your patient was determined to meet criteria / has evidence of the following diagnosis/diagnoses:          [ ]  Mild Protein Calorie Malnutrition        [x ]  Moderate Protein Calorie Malnutrition        [ ] Severe Protein Calorie Malnutrition        [ ] Unspecified Protein Calorie Malnutrition        [x ] Underweight / BMI <19        [ ] Morbid Obesity / BMI > 40      Findings as based on:  •  Comprehensive nutrition assessment and consultation  •  Calorie counts (nutrient intake analysis)  •  Food acceptance and intake status from observations by staff  •  Follow up  •  Patient education  •  Intervention secondary to interdisciplinary rounds  •   concerns      Treatment:    The following diet has been recommended:  Recommend add Ensure Clear 8 oz 3x/day (720 wu, 24 gm pro) and advance diet when appropriate to low fiber diet.  Recommend add multivitamin c minerals daily   Recommend PPN/TPN if pt unable to tolerate oral feeding         PROVIDER Section:     By signing this assessment you are acknowledging and agree with the diagnosis/diagnoses assigned by the Registered Dietitian    Comments:

## 2018-07-23 NOTE — DIETITIAN INITIAL EVALUATION ADULT. - FACTORS AFF FOOD INTAKE
pain/abdominal pain, N/V for a week PTA reported PTA/persistent nausea/vomiting persistent nausea/abdominal pain, N/V for a week PTA reported PTA/pain/vomiting/persistent lack of appetite

## 2018-07-23 NOTE — DIETITIAN INITIAL EVALUATION ADULT. - NS AS NUTRI INTERV ED CONTENT
Purpose of the nutrition education/Educate pt on diet progression as per GI tolerance to low fiber diet

## 2018-07-23 NOTE — DIETITIAN INITIAL EVALUATION ADULT. - OTHER INFO
Pt seen due to BMI<19.  Pt reports not feeling hungry for weeks PTA.  As per pt, he stops eating when he has Crohn's flare ups.  Pt reports hx /dx of Crohn' s x 5 years.  Pt reports some intolerance to protein-energy supplements which he has tried like muscle milk.  Pt is willing to try Ensure Clear/ Ensure Enlive.  Pt reports some intolerance to milk, able to tolerate other dairy foods ie, cheese.  Pt reports dislike to lactose reduced products.  Pt c c/o abdominal pain, recommend Ensure Clear at present.  Pt reports dx of candidiasis PTA.

## 2018-07-23 NOTE — DIETITIAN INITIAL EVALUATION ADULT. - PHYSICAL APPEARANCE
BMI=18.0(07-20), Nutrition focused physical exam conducted; Subcutaneous fat Exam;  [ moderate ]  Orbital fat pads region,  [mild  ]Buccal fat region,  [moderate  ]triceps region, [ moderate ]ribs region.  Muscle Exam; [mild  ]temples region, [ moderate ]clavicle region, [ moderate ]shoulder region, [ mild ]Scapula region, [ moderate ]Interosseous region, [  moderate]thigh region, [ moderate ]Calf region/underweight

## 2018-07-24 LAB — CRP SERPL-MCNC: <0.1 MG/DL — SIGNIFICANT CHANGE UP (ref 0–0.4)

## 2018-07-24 RX ADMIN — PANTOPRAZOLE SODIUM 40 MILLIGRAM(S): 20 TABLET, DELAYED RELEASE ORAL at 12:46

## 2018-07-24 RX ADMIN — Medication 1000 MILLIGRAM(S): at 17:27

## 2018-07-24 RX ADMIN — MORPHINE SULFATE 2 MILLIGRAM(S): 50 CAPSULE, EXTENDED RELEASE ORAL at 09:14

## 2018-07-24 RX ADMIN — Medication 100 MILLIGRAM(S): at 13:47

## 2018-07-24 RX ADMIN — Medication 100 MILLIGRAM(S): at 21:04

## 2018-07-24 RX ADMIN — SODIUM CHLORIDE 100 MILLILITER(S): 9 INJECTION, SOLUTION INTRAVENOUS at 02:24

## 2018-07-24 RX ADMIN — Medication 1000 MILLIGRAM(S): at 05:00

## 2018-07-24 RX ADMIN — Medication 100 MILLIGRAM(S): at 05:00

## 2018-07-24 RX ADMIN — Medication 1000 MILLIGRAM(S): at 12:46

## 2018-07-24 RX ADMIN — FLUCONAZOLE 50 MILLIGRAM(S): 150 TABLET ORAL at 13:47

## 2018-07-24 RX ADMIN — MORPHINE SULFATE 2 MILLIGRAM(S): 50 CAPSULE, EXTENDED RELEASE ORAL at 09:30

## 2018-07-24 RX ADMIN — Medication 1000 MILLIGRAM(S): at 23:09

## 2018-07-24 RX ADMIN — ONDANSETRON 4 MILLIGRAM(S): 8 TABLET, FILM COATED ORAL at 09:14

## 2018-07-24 RX ADMIN — ESCITALOPRAM OXALATE 10 MILLIGRAM(S): 10 TABLET, FILM COATED ORAL at 12:46

## 2018-07-24 RX ADMIN — SODIUM CHLORIDE 100 MILLILITER(S): 9 INJECTION, SOLUTION INTRAVENOUS at 23:09

## 2018-07-25 RX ADMIN — MORPHINE SULFATE 2 MILLIGRAM(S): 50 CAPSULE, EXTENDED RELEASE ORAL at 01:02

## 2018-07-25 RX ADMIN — Medication 100 MILLIGRAM(S): at 05:09

## 2018-07-25 RX ADMIN — Medication 1000 MILLIGRAM(S): at 11:14

## 2018-07-25 RX ADMIN — MORPHINE SULFATE 2 MILLIGRAM(S): 50 CAPSULE, EXTENDED RELEASE ORAL at 13:00

## 2018-07-25 RX ADMIN — Medication 1000 MILLIGRAM(S): at 23:24

## 2018-07-25 RX ADMIN — SODIUM CHLORIDE 100 MILLILITER(S): 9 INJECTION, SOLUTION INTRAVENOUS at 15:01

## 2018-07-25 RX ADMIN — MORPHINE SULFATE 2 MILLIGRAM(S): 50 CAPSULE, EXTENDED RELEASE ORAL at 12:42

## 2018-07-25 RX ADMIN — Medication 40 MILLIGRAM(S): at 15:02

## 2018-07-25 RX ADMIN — MORPHINE SULFATE 2 MILLIGRAM(S): 50 CAPSULE, EXTENDED RELEASE ORAL at 23:28

## 2018-07-25 RX ADMIN — MORPHINE SULFATE 2 MILLIGRAM(S): 50 CAPSULE, EXTENDED RELEASE ORAL at 02:00

## 2018-07-25 RX ADMIN — ESCITALOPRAM OXALATE 10 MILLIGRAM(S): 10 TABLET, FILM COATED ORAL at 11:14

## 2018-07-25 RX ADMIN — Medication 1000 MILLIGRAM(S): at 17:17

## 2018-07-25 RX ADMIN — Medication 1000 MILLIGRAM(S): at 05:09

## 2018-07-25 RX ADMIN — ONDANSETRON 4 MILLIGRAM(S): 8 TABLET, FILM COATED ORAL at 12:39

## 2018-07-25 RX ADMIN — FLUCONAZOLE 50 MILLIGRAM(S): 150 TABLET ORAL at 15:02

## 2018-07-25 RX ADMIN — PANTOPRAZOLE SODIUM 40 MILLIGRAM(S): 20 TABLET, DELAYED RELEASE ORAL at 11:14

## 2018-07-25 NOTE — PROGRESS NOTE ADULT - PROBLEM SELECTOR PLAN 2
CRP normal  Steroids changed to PO. taper after 5 days  Has appointment for Colonoscopy next month as outpatient

## 2018-07-26 LAB
ANION GAP SERPL CALC-SCNC: 9 MMOL/L — SIGNIFICANT CHANGE UP (ref 5–17)
BUN SERPL-MCNC: 7 MG/DL — SIGNIFICANT CHANGE UP (ref 7–23)
CALCIUM SERPL-MCNC: 8.1 MG/DL — LOW (ref 8.5–10.1)
CHLORIDE SERPL-SCNC: 103 MMOL/L — SIGNIFICANT CHANGE UP (ref 96–108)
CO2 SERPL-SCNC: 30 MMOL/L — SIGNIFICANT CHANGE UP (ref 22–31)
CREAT SERPL-MCNC: 0.84 MG/DL — SIGNIFICANT CHANGE UP (ref 0.5–1.3)
GLUCOSE SERPL-MCNC: 110 MG/DL — HIGH (ref 70–99)
HCT VFR BLD CALC: 38.5 % — LOW (ref 39–50)
HGB BLD-MCNC: 13.1 G/DL — SIGNIFICANT CHANGE UP (ref 13–17)
MCHC RBC-ENTMCNC: 32.7 PG — SIGNIFICANT CHANGE UP (ref 27–34)
MCHC RBC-ENTMCNC: 34 GM/DL — SIGNIFICANT CHANGE UP (ref 32–36)
MCV RBC AUTO: 96 FL — SIGNIFICANT CHANGE UP (ref 80–100)
NRBC # BLD: 0 /100 WBCS — SIGNIFICANT CHANGE UP (ref 0–0)
PLATELET # BLD AUTO: 198 K/UL — SIGNIFICANT CHANGE UP (ref 150–400)
POTASSIUM SERPL-MCNC: 2.7 MMOL/L — CRITICAL LOW (ref 3.5–5.3)
POTASSIUM SERPL-SCNC: 2.7 MMOL/L — CRITICAL LOW (ref 3.5–5.3)
RBC # BLD: 4.01 M/UL — LOW (ref 4.2–5.8)
RBC # FLD: 12.8 % — SIGNIFICANT CHANGE UP (ref 10.3–14.5)
SODIUM SERPL-SCNC: 142 MMOL/L — SIGNIFICANT CHANGE UP (ref 135–145)
WBC # BLD: 8.74 K/UL — SIGNIFICANT CHANGE UP (ref 3.8–10.5)
WBC # FLD AUTO: 8.74 K/UL — SIGNIFICANT CHANGE UP (ref 3.8–10.5)

## 2018-07-26 RX ORDER — FLUCONAZOLE 150 MG/1
100 TABLET ORAL DAILY
Qty: 0 | Refills: 0 | Status: DISCONTINUED | OUTPATIENT
Start: 2018-07-26 | End: 2018-07-28

## 2018-07-26 RX ORDER — POTASSIUM CHLORIDE 20 MEQ
40 PACKET (EA) ORAL EVERY 4 HOURS
Qty: 0 | Refills: 0 | Status: DISCONTINUED | OUTPATIENT
Start: 2018-07-26 | End: 2018-07-26

## 2018-07-26 RX ORDER — POTASSIUM CHLORIDE 20 MEQ
40 PACKET (EA) ORAL EVERY 4 HOURS
Qty: 0 | Refills: 0 | Status: COMPLETED | OUTPATIENT
Start: 2018-07-26 | End: 2018-07-26

## 2018-07-26 RX ORDER — POTASSIUM CHLORIDE 20 MEQ
10 PACKET (EA) ORAL
Qty: 0 | Refills: 0 | Status: COMPLETED | OUTPATIENT
Start: 2018-07-26 | End: 2018-07-26

## 2018-07-26 RX ADMIN — MORPHINE SULFATE 2 MILLIGRAM(S): 50 CAPSULE, EXTENDED RELEASE ORAL at 10:32

## 2018-07-26 RX ADMIN — Medication 100 MILLIEQUIVALENT(S): at 11:56

## 2018-07-26 RX ADMIN — Medication 40 MILLIEQUIVALENT(S): at 10:06

## 2018-07-26 RX ADMIN — MORPHINE SULFATE 2 MILLIGRAM(S): 50 CAPSULE, EXTENDED RELEASE ORAL at 22:14

## 2018-07-26 RX ADMIN — FLUCONAZOLE 100 MILLIGRAM(S): 150 TABLET ORAL at 14:48

## 2018-07-26 RX ADMIN — MORPHINE SULFATE 2 MILLIGRAM(S): 50 CAPSULE, EXTENDED RELEASE ORAL at 23:00

## 2018-07-26 RX ADMIN — ESCITALOPRAM OXALATE 10 MILLIGRAM(S): 10 TABLET, FILM COATED ORAL at 11:24

## 2018-07-26 RX ADMIN — SODIUM CHLORIDE 100 MILLILITER(S): 9 INJECTION, SOLUTION INTRAVENOUS at 03:04

## 2018-07-26 RX ADMIN — Medication 1000 MILLIGRAM(S): at 06:03

## 2018-07-26 RX ADMIN — SODIUM CHLORIDE 100 MILLILITER(S): 9 INJECTION, SOLUTION INTRAVENOUS at 13:00

## 2018-07-26 RX ADMIN — Medication 1000 MILLIGRAM(S): at 11:25

## 2018-07-26 RX ADMIN — Medication 1000 MILLIGRAM(S): at 17:50

## 2018-07-26 RX ADMIN — MORPHINE SULFATE 2 MILLIGRAM(S): 50 CAPSULE, EXTENDED RELEASE ORAL at 15:22

## 2018-07-26 RX ADMIN — Medication 40 MILLIEQUIVALENT(S): at 14:49

## 2018-07-26 RX ADMIN — MORPHINE SULFATE 2 MILLIGRAM(S): 50 CAPSULE, EXTENDED RELEASE ORAL at 00:05

## 2018-07-26 RX ADMIN — MORPHINE SULFATE 2 MILLIGRAM(S): 50 CAPSULE, EXTENDED RELEASE ORAL at 15:45

## 2018-07-26 RX ADMIN — PANTOPRAZOLE SODIUM 40 MILLIGRAM(S): 20 TABLET, DELAYED RELEASE ORAL at 12:50

## 2018-07-26 RX ADMIN — ONDANSETRON 4 MILLIGRAM(S): 8 TABLET, FILM COATED ORAL at 09:27

## 2018-07-26 RX ADMIN — Medication 40 MILLIGRAM(S): at 06:03

## 2018-07-26 RX ADMIN — MORPHINE SULFATE 2 MILLIGRAM(S): 50 CAPSULE, EXTENDED RELEASE ORAL at 09:27

## 2018-07-26 RX ADMIN — Medication 100 MILLIEQUIVALENT(S): at 11:01

## 2018-07-26 RX ADMIN — Medication 100 MILLIEQUIVALENT(S): at 10:06

## 2018-07-27 RX ADMIN — SODIUM CHLORIDE 100 MILLILITER(S): 9 INJECTION, SOLUTION INTRAVENOUS at 00:07

## 2018-07-27 RX ADMIN — Medication 40 MILLIGRAM(S): at 06:06

## 2018-07-27 RX ADMIN — Medication 1000 MILLIGRAM(S): at 06:06

## 2018-07-27 RX ADMIN — ESCITALOPRAM OXALATE 10 MILLIGRAM(S): 10 TABLET, FILM COATED ORAL at 09:36

## 2018-07-27 RX ADMIN — SODIUM CHLORIDE 100 MILLILITER(S): 9 INJECTION, SOLUTION INTRAVENOUS at 21:24

## 2018-07-27 RX ADMIN — SODIUM CHLORIDE 100 MILLILITER(S): 9 INJECTION, SOLUTION INTRAVENOUS at 11:37

## 2018-07-27 RX ADMIN — PANTOPRAZOLE SODIUM 40 MILLIGRAM(S): 20 TABLET, DELAYED RELEASE ORAL at 11:30

## 2018-07-27 RX ADMIN — FLUCONAZOLE 100 MILLIGRAM(S): 150 TABLET ORAL at 11:30

## 2018-07-27 RX ADMIN — Medication 1000 MILLIGRAM(S): at 17:32

## 2018-07-27 RX ADMIN — Medication 1000 MILLIGRAM(S): at 23:08

## 2018-07-27 RX ADMIN — Medication 1000 MILLIGRAM(S): at 11:30

## 2018-07-27 RX ADMIN — Medication 1000 MILLIGRAM(S): at 00:24

## 2018-07-27 NOTE — PROGRESS NOTE ADULT - PROBLEM SELECTOR PLAN 2
CRP normal  Steroids changed to PO. taper after 5 days  Has appointment for Colonoscopy next month as outpatient.

## 2018-07-28 ENCOUNTER — TRANSCRIPTION ENCOUNTER (OUTPATIENT)
Age: 48
End: 2018-07-28

## 2018-07-28 VITALS
SYSTOLIC BLOOD PRESSURE: 104 MMHG | HEART RATE: 78 BPM | TEMPERATURE: 98 F | RESPIRATION RATE: 16 BRPM | OXYGEN SATURATION: 95 % | DIASTOLIC BLOOD PRESSURE: 72 MMHG

## 2018-07-28 LAB
ANION GAP SERPL CALC-SCNC: 9 MMOL/L — SIGNIFICANT CHANGE UP (ref 5–17)
BUN SERPL-MCNC: 11 MG/DL — SIGNIFICANT CHANGE UP (ref 7–23)
CALCIUM SERPL-MCNC: 8.2 MG/DL — LOW (ref 8.5–10.1)
CHLORIDE SERPL-SCNC: 102 MMOL/L — SIGNIFICANT CHANGE UP (ref 96–108)
CO2 SERPL-SCNC: 30 MMOL/L — SIGNIFICANT CHANGE UP (ref 22–31)
CREAT SERPL-MCNC: 0.83 MG/DL — SIGNIFICANT CHANGE UP (ref 0.5–1.3)
GLUCOSE SERPL-MCNC: 85 MG/DL — SIGNIFICANT CHANGE UP (ref 70–99)
HCT VFR BLD CALC: 42.9 % — SIGNIFICANT CHANGE UP (ref 39–50)
HGB BLD-MCNC: 14.3 G/DL — SIGNIFICANT CHANGE UP (ref 13–17)
MCHC RBC-ENTMCNC: 32.6 PG — SIGNIFICANT CHANGE UP (ref 27–34)
MCHC RBC-ENTMCNC: 33.3 GM/DL — SIGNIFICANT CHANGE UP (ref 32–36)
MCV RBC AUTO: 97.7 FL — SIGNIFICANT CHANGE UP (ref 80–100)
NRBC # BLD: 0 /100 WBCS — SIGNIFICANT CHANGE UP (ref 0–0)
PLATELET # BLD AUTO: 217 K/UL — SIGNIFICANT CHANGE UP (ref 150–400)
POTASSIUM SERPL-MCNC: 3.9 MMOL/L — SIGNIFICANT CHANGE UP (ref 3.5–5.3)
POTASSIUM SERPL-SCNC: 3.9 MMOL/L — SIGNIFICANT CHANGE UP (ref 3.5–5.3)
RBC # BLD: 4.39 M/UL — SIGNIFICANT CHANGE UP (ref 4.2–5.8)
RBC # FLD: 13.2 % — SIGNIFICANT CHANGE UP (ref 10.3–14.5)
SODIUM SERPL-SCNC: 141 MMOL/L — SIGNIFICANT CHANGE UP (ref 135–145)
WBC # BLD: 8.64 K/UL — SIGNIFICANT CHANGE UP (ref 3.8–10.5)
WBC # FLD AUTO: 8.64 K/UL — SIGNIFICANT CHANGE UP (ref 3.8–10.5)

## 2018-07-28 RX ORDER — OMEPRAZOLE 10 MG/1
1 CAPSULE, DELAYED RELEASE ORAL
Qty: 0 | Refills: 0 | COMMUNITY

## 2018-07-28 RX ORDER — OMEPRAZOLE 10 MG/1
1 CAPSULE, DELAYED RELEASE ORAL
Qty: 30 | Refills: 0 | OUTPATIENT
Start: 2018-07-28 | End: 2018-08-26

## 2018-07-28 RX ORDER — FLUCONAZOLE 150 MG/1
1 TABLET ORAL
Qty: 10 | Refills: 0 | OUTPATIENT
Start: 2018-07-28 | End: 2018-08-06

## 2018-07-28 RX ORDER — ONDANSETRON 8 MG/1
1 TABLET, FILM COATED ORAL
Qty: 42 | Refills: 0 | OUTPATIENT
Start: 2018-07-28 | End: 2018-08-10

## 2018-07-28 RX ORDER — ESCITALOPRAM OXALATE 10 MG/1
1 TABLET, FILM COATED ORAL
Qty: 30 | Refills: 0 | OUTPATIENT
Start: 2018-07-28 | End: 2018-08-26

## 2018-07-28 RX ORDER — ESCITALOPRAM OXALATE 10 MG/1
1 TABLET, FILM COATED ORAL
Qty: 0 | Refills: 0 | COMMUNITY

## 2018-07-28 RX ADMIN — Medication 1000 MILLIGRAM(S): at 11:32

## 2018-07-28 RX ADMIN — ESCITALOPRAM OXALATE 10 MILLIGRAM(S): 10 TABLET, FILM COATED ORAL at 09:30

## 2018-07-28 RX ADMIN — Medication 40 MILLIGRAM(S): at 05:26

## 2018-07-28 RX ADMIN — SODIUM CHLORIDE 100 MILLILITER(S): 9 INJECTION, SOLUTION INTRAVENOUS at 07:42

## 2018-07-28 RX ADMIN — FLUCONAZOLE 100 MILLIGRAM(S): 150 TABLET ORAL at 12:43

## 2018-07-28 RX ADMIN — Medication 1000 MILLIGRAM(S): at 05:27

## 2018-07-28 RX ADMIN — PANTOPRAZOLE SODIUM 40 MILLIGRAM(S): 20 TABLET, DELAYED RELEASE ORAL at 11:32

## 2018-07-28 NOTE — PROGRESS NOTE ADULT - PROVIDER SPECIALTY LIST ADULT
Gastroenterology
Internal Medicine

## 2018-07-28 NOTE — PROGRESS NOTE ADULT - PROBLEM SELECTOR PROBLEM 2
Alfaro's esophagus without dysplasia
Crohn's disease without complication, unspecified gastrointestinal tract location

## 2018-07-28 NOTE — DISCHARGE NOTE ADULT - PATIENT PORTAL LINK FT
You can access the [facility] Patient Portal, offered by [facility], by registering with the following website: [email or web address]

## 2018-07-28 NOTE — DISCHARGE NOTE ADULT - OTHER SIGNIFICANT FINDINGS
< from: CT Abdomen and Pelvis w/ IV Cont (07.20.18 @ 02:23) >    IMPRESSION: No bowel obstruction or evidence for active bowel   inflammation.    < end of copied text >

## 2018-07-28 NOTE — DISCHARGE NOTE ADULT - CARE PROVIDERS DIRECT ADDRESSES
[email or web address]

## 2018-07-28 NOTE — DISCHARGE NOTE ADULT - MEDICATION SUMMARY - MEDICATIONS TO STOP TAKING
I will STOP taking the medications listed below when I get home from the hospital:    Valium 5 mg oral tablet  -- 1 tab(s) by mouth 3 times a day prn anxiety MDD:3 tabs  -- Caution federal law prohibits the transfer of this drug to any person other  than the person for whom it was prescribed.  Do not take this drug if you are pregnant.  May cause drowsiness.  Alcohol may intensify this effect.  Use care when operating dangerous machinery.

## 2018-07-28 NOTE — PROGRESS NOTE ADULT - PROBLEM SELECTOR PROBLEM 1
Crohn's disease without complication, unspecified gastrointestinal tract location
Candida esophagitis
Crohn's disease without complication, unspecified gastrointestinal tract location

## 2018-07-28 NOTE — DISCHARGE NOTE ADULT - HOSPITAL COURSE
Patient presents to the ED for what he believes is a Crohn's flare.  Patient reports pain in abdomen with nausea, vomiting, and bloody stool worsening over the last week.  Says that he has not been able to hold down any food or fluids or take his home medications.  AAOx3, normal insight, no S/H TIP, no command hallucinations.  Follows with Dr. Russ in GI.  Start back on Pantasa. GI consult.  07/21/18 ; GI consult noted. On Solu cortef and Pentasa. Continue monitoring for Pain.  07/22/18 : Continues to have pain and nausea. Continue  Pentasa and steroids. GI follow up.  07/23/18 : N/V since this AM. Pt. had EGD by Dr. Marie on 07/05/18, diagnosed as Candida esophagitis. Was prescribed Diflucan which he never filled. Will treat with IV Diflucan. GI f/U. On steroids and Pentasa.  07/24/18 : GI f/u noted. Started on Rifaximin. On IV Flucanazole.  07/25/18 : Continue present. GI f/u.  07/26/18 ; Feels better. Still has the pain. On prednisone. Change Diflucan to oral. GI F/U.  07/28/18 : Pain abdomen off and on,.C/O being constipated. discharge planning.     Problem/Plan - 1:  ·  Problem: Crohn's disease without complication, unspecified gastrointestinal tract location.  Plan: Pentasa, Steroid, Rifaximin.      Problem/Plan - 2:  ·  Problem: Alfaro's esophagus without dysplasia.  Plan: PPI.      Problem/Plan - 3:  ·  Problem: Depression, unspecified depression type.  Plan: Lexapro.      Problem/Plan - 4:  ·  Problem: Candida esophagitis.  Plan: Diflucan.

## 2018-07-28 NOTE — DISCHARGE NOTE ADULT - CARE PLAN
Principal Discharge DX:	Crohn's disease with complication, unspecified gastrointestinal tract location  Goal:	Pain free  Assessment and plan of treatment:	Continue meds  Secondary Diagnosis:	Alfaro's esophagus without dysplasia  Assessment and plan of treatment:	PPI  Secondary Diagnosis:	Candida esophagitis  Assessment and plan of treatment:	Diflucan  Secondary Diagnosis:	IBD (inflammatory bowel disease)  Secondary Diagnosis:	Depression, unspecified depression type  Assessment and plan of treatment:	Lexapro

## 2018-07-28 NOTE — PROGRESS NOTE ADULT - SUBJECTIVE AND OBJECTIVE BOX
Patient is a 47y old  Male who presents with a chief complaint of Pain abdomen with N/V (20 Jul 2018 15:20)      INTERVAL HPI/OVERNIGHT EVENTS:    MEDICATIONS  (STANDING):  dextrose 5% + sodium chloride 0.9%. 1000 milliLiter(s) (100 mL/Hr) IV Continuous <Continuous>  escitalopram 10 milliGRAM(s) Oral daily  fluconAZOLE IVPB 100 milliGRAM(s) IV Intermittent every 24 hours  fluconAZOLE IVPB      mesalamine ER Capsule 1000 milliGRAM(s) Oral four times a day  pantoprazole  Injectable 40 milliGRAM(s) IV Push daily  potassium chloride    Tablet ER 40 milliEquivalent(s) Oral every 4 hours  predniSONE   Tablet 40 milliGRAM(s) Oral daily  rifaximin 550 milliGRAM(s) Oral two times a day    MEDICATIONS  (PRN):  acetaminophen    Suspension 650 milliGRAM(s) Oral every 6 hours PRN For Temp greater than 38.5 C (101.3 F)  morphine  - Injectable 2 milliGRAM(s) IV Push every 6 hours PRN Severe Pain (7 - 10)  ondansetron Injectable 4 milliGRAM(s) IV Push every 8 hours PRN Nausea      Allergies    No Known Allergies    Intolerances        REVIEW OF SYSTEMS:  CONSTITUTIONAL: No fever, weight loss, or fatigue  EYES: No eye pain, visual disturbances, or discharge  ENMT:  No difficulty hearing, tinnitus, vertigo; No sinus or throat pain  NECK: No pain or stiffness  BREASTS: No pain, masses, or nipple discharge  RESPIRATORY: No cough, wheezing, chills or hemoptysis; No shortness of breath  CARDIOVASCULAR: No chest pain, palpitations, dizziness, or leg swelling  GASTROINTESTINAL: No abdominal or epigastric pain. No nausea, vomiting, or hematemesis; No diarrhea or constipation. No melena or hematochezia.  GENITOURINARY: No dysuria, frequency, hematuria, or incontinence  NEUROLOGICAL: No headaches, memory loss, loss of strength, numbness, or tremors  SKIN: No itching, burning, rashes, or lesions   LYMPH NODES: No enlarged glands  ENDOCRINE: No heat or cold intolerance; No hair loss  MUSCULOSKELETAL: No joint pain or swelling; No muscle, back, or extremity pain  PSYCHIATRIC: No depression, anxiety, mood swings, or difficulty sleeping  HEME/LYMPH: No easy bruising, or bleeding gums  ALLERGY AND IMMUNOLOGIC: No hives or eczema    Vital Signs Last 24 Hrs  T(C): 36.5 (26 Jul 2018 10:58), Max: 36.8 (25 Jul 2018 17:35)  T(F): 97.7 (26 Jul 2018 10:58), Max: 98.2 (25 Jul 2018 17:35)  HR: 75 (26 Jul 2018 10:58) (61 - 75)  BP: 113/70 (26 Jul 2018 10:58) (107/72 - 134/86)  BP(mean): --  RR: 16 (26 Jul 2018 10:58) (16 - 18)  SpO2: 95% (26 Jul 2018 10:58) (95% - 98%)    PHYSICAL EXAM:  GENERAL: NAD, well-groomed, well-developed  HEAD:  Atraumatic, Normocephalic  EYES: EOMI, PERRL, conjunctiva and sclera clear  ENMT:  Moist mucous membranes, Good dentition, No lesions  NECK: Supple, No JVD, Normal thyroid  NERVOUS SYSTEM:  Alert & Oriented X3, Good concentration; Motor Strength 5/5 B/L upper and lower extremities; DTRs 2+ intact and symmetric  CHEST/LUNG: Clear to percussion bilaterally; No rales, rhonchi, wheezing, or rubs  HEART: Regular rate and rhythm; No murmurs, rubs, or gallops  ABDOMEN: Soft, Nontender, Nondistended; Bowel sounds present  EXTREMITIES:  2+ Peripheral Pulses, No clubbing, cyanosis, or edema  LYMPH: No lymphadenopathy noted  SKIN: No rashes or lesions    LABS:                        13.1   8.74  )-----------( 198      ( 26 Jul 2018 07:49 )             38.5     07-26    142  |  103  |  7   ----------------------------<  110<H>  2.7<LL>   |  30  |  0.84    Ca    8.1<L>      26 Jul 2018 07:49          CAPILLARY BLOOD GLUCOSE                        RADIOLOGY & ADDITIONAL TESTS:    Imaging Personally Reviewed:  [ ] YES  [ ] NO    Consultant(s) Notes Reviewed:  [ ] YES  [ ] NO    Care Discussed with Consultants/Other Providers [ ] YES  [ ] NO    PROBLEMS:  IZZY DISEASE WITHOUT COMPLICATION  ABDOMINAL PAIN WITH DIZZINESS  Crohn's disease without complication, unspecified gastrointestinal tract location  Candida esophagitis  Depression, unspecified depression type  Alfaro's esophagus without dysplasia  Crohn's disease without complication, unspecified gastrointestinal tract location      Care discussed with family,         [  ]   yes  [  ]  No    imp:    stable[ ]    unstable[  ]     improving [   ]       unchanged  [  ]                Plans:  Continue present plans  [  ]               New consult [  ]   specialty  .......               order judit[  ]    test name.                  Discharge Planning  [  ]
Coverage for Dr Munson    Pt seen at 10AM this morning - feeling much better  no BM but otherwise feeling good      MEDICATIONS  (STANDING):  dextrose 5% + sodium chloride 0.9%. 1000 milliLiter(s) (100 mL/Hr) IV Continuous <Continuous>  escitalopram 10 milliGRAM(s) Oral daily  fluconAZOLE   Tablet 100 milliGRAM(s) Oral daily  mesalamine ER Capsule 1000 milliGRAM(s) Oral four times a day  pantoprazole  Injectable 40 milliGRAM(s) IV Push daily  predniSONE   Tablet 40 milliGRAM(s) Oral daily    MEDICATIONS  (PRN):  acetaminophen    Suspension 650 milliGRAM(s) Oral every 6 hours PRN For Temp greater than 38.5 C (101.3 F)  ondansetron Injectable 4 milliGRAM(s) IV Push every 8 hours PRN Nausea      Allergies    No Known Allergies    Intolerances        Vital Signs Last 24 Hrs  T(C): 36.4 (28 Jul 2018 12:08), Max: 37.1 (27 Jul 2018 16:15)  T(F): 97.5 (28 Jul 2018 12:08), Max: 98.8 (27 Jul 2018 16:15)  HR: 78 (28 Jul 2018 12:08) (61 - 78)  BP: 104/72 (28 Jul 2018 12:08) (104/72 - 124/82)  BP(mean): --  RR: 16 (28 Jul 2018 12:08) (15 - 16)  SpO2: 95% (28 Jul 2018 12:08) (94% - 98%)    PHYSICAL EXAM:  General: NAD.  CVS: S1, S2  Chest: air entry bilaterally present  Abd: BS present, soft, non-tender      LABS:                        14.3   8.64  )-----------( 217      ( 28 Jul 2018 08:34 )             42.9     07-28    141  |  102  |  11  ----------------------------<  85  3.9   |  30  |  0.83    Ca    8.2<L>      28 Jul 2018 08:34      Agree with d/c plan  f/u with Dr Munson in office
Gastroenterolgy  Patient seen and examined bedside resting comfortably.  No complaints offered.   C/O lower abdominal pain  Denies nausea and vomiting. Tolerating diet.  Normal flatus    T(F): 98.7 (07-25-18 @ 11:48), Max: 98.7 (07-25-18 @ 11:48)  HR: 60 (07-25-18 @ 05:04) (60 - 66)  BP: 136/87 (07-25-18 @ 11:48) (107/71 - 137/89)  RR: 18 (07-25-18 @ 11:48) (16 - 18)  SpO2: 96% (07-25-18 @ 11:48) (96% - 98%)  Wt(kg): --  CAPILLARY BLOOD GLUCOSE    PHYSICAL EXAM:  General: NAD, WDWN.   Neuro:  Alert and responsive  HEENT: NCAT, EOMI, conjunctiva clear  CV: +S1+S2 regular rate and rhythm  Lung: clear to ausculation bilaterally, respirations nonlabored, good inspiratory effort  Abdomen: soft, NonTender, No distention Normal active BS  Extremities: no cyanosis, clubbing or edema    LABS:    I&O's Detail    24 Jul 2018 07:01  -  25 Jul 2018 07:00  --------------------------------------------------------  IN:    dextrose 5% + sodium chloride 0.9%.: 1200 mL    Oral Fluid: 517 mL  Total IN: 1717 mL    OUT:    Voided: 1000 mL  Total OUT: 1000 mL    Total NET: 717 mL
Gastroenterolgy  Patient seen and examined bedside resting comfortably.  Unable to hold food down   No abdominal pain        T(F): 97.8 (07-24-18 @ 05:00), Max: 98.1 (07-23-18 @ 18:29)  HR: 64 (07-24-18 @ 05:00) (59 - 70)  BP: 104/71 (07-24-18 @ 05:00) (104/71 - 143/93)  RR: 16 (07-24-18 @ 05:00) (16 - 18)  SpO2: 97% (07-24-18 @ 05:00) (97% - 98%)  Wt(kg): --  CAPILLARY BLOOD GLUCOSE        PHYSICAL EXAM:  General: NAD, WDWN.   Neuro:  Alert and responsive  HEENT: NCAT, EOMI, conjunctiva clear  CV: +S1+S2 regular rate and rhythm  Lung: clear to ausculation bilaterally, respirations nonlabored, good inspiratory effort  Abdomen: soft, NonTender, No distention Normal active BS  Extremities: no cyanosis, clubbing or edema    LABS:              I&O's Detail    23 Jul 2018 07:01  -  24 Jul 2018 07:00  --------------------------------------------------------  IN:    dextrose 5% + sodium chloride 0.9%.: 1200 mL    Oral Fluid: 777 mL  Total IN: 1977 mL    OUT:    Voided: 1450 mL  Total OUT: 1450 mL    Total NET: 527 mL
Gastroenterology  Patient seen and examined bedside resting comfortably.  No complaints offered.   No abdominal pain  Denies nausea and vomiting. Tolerating diet.  Normal flatus/BM.     T(F): 98 (07-23-18 @ 10:54), Max: 98 (07-23-18 @ 10:54)  HR: 70 (07-23-18 @ 10:54) (58 - 72)  BP: 113/67 (07-23-18 @ 10:54) (113/67 - 132/84)  RR: 16 (07-23-18 @ 10:54) (16 - 16)  SpO2: 98% (07-23-18 @ 10:54) (95% - 98%)  Wt(kg): --  CAPILLARY BLOOD GLUCOSE          PHYSICAL EXAM:  General: NAD, WDWN.   Neuro:  Alert & oriented x 3  HEENT: NCAT, EOMI, conjunctiva clear  CV: +S1+S2 regular rate and rhythm  Lung: clear to ausculation bilaterally, respirations nonlabored, good inspiratory effort  Abdomen: soft, NonTender, No distention Normal active BS  Extremities: no cyanosis, clubbing or edema    LABS:              I&O's Detail    22 Jul 2018 07:01  -  23 Jul 2018 07:00  --------------------------------------------------------  IN:    dextrose 5% + sodium chloride 0.9%.: 2400 mL  Total IN: 2400 mL    OUT:  Total OUT: 0 mL    Total NET: 2400 mL      23 Jul 2018 07:01  -  23 Jul 2018 11:17  --------------------------------------------------------  IN:    Oral Fluid: 240 mL  Total IN: 240 mL    OUT:  Total OUT: 0 mL    Total NET: 240 mL
Gastroenterology  Patient seen and examined bedside resting comfortably.  No complaints offered.   No abdominal pain  Denies nausea and vomiting. Tolerating soft  diet.  Normal flatus/BM.     T(F): 97.3 (07-27-18 @ 06:38), Max: 98.2 (07-26-18 @ 16:46)  HR: 64 (07-27-18 @ 06:38) (64 - 75)  BP: 113/76 (07-27-18 @ 06:38) (113/70 - 128/84)  RR: 16 (07-27-18 @ 06:38) (15 - 16)  SpO2: 97% (07-27-18 @ 06:38) (95% - 98%)  Wt(kg): --  CAPILLARY BLOOD GLUCOSE          PHYSICAL EXAM:  General: NAD, WDWN.   Neuro:  Alert & oriented x 3  HEENT: NCAT, EOMI, conjunctiva clear  CV: +S1+S2 regular rate and rhythm  Lung: clear to ausculation bilaterally, respirations nonlabored, good inspiratory effort  Abdomen: soft, NonTender, No distention Normal active BS  Extremities: no cyanosis, clubbing or edema    LABS:                        13.1   8.74  )-----------( 198      ( 26 Jul 2018 07:49 )             38.5     07-26    142  |  103  |  7   ----------------------------<  110<H>  2.7<LL>   |  30  |  0.84    Ca    8.1<L>      26 Jul 2018 07:49          I&O's Detail    26 Jul 2018 07:01  -  27 Jul 2018 07:00  --------------------------------------------------------  IN:    dextrose 5% + sodium chloride 0.9%.: 1100 mL    IV PiggyBack: 300 mL    Oral Fluid: 240 mL  Total IN: 1640 mL    OUT:    Voided: 4300 mL  Total OUT: 4300 mL    Total NET: -2660 mL        07-26 @ 07:49    142 | 103 | 7  /8.1 | -- | --  _______________________/  2.7 | 30 | 0.84                           \par
Gastroenterology  Patient seen and examined bedside resting comfortably.  No complaints offered.   No abdominal pain  but hears gurgling   Denies nausea and vomiting. Tolerating diet.  Normal flatus/BM.     T(F): 97.9 (07-22-18 @ 10:42), Max: 98.3 (07-21-18 @ 16:33)  HR: 72 (07-22-18 @ 10:42) (58 - 72)  BP: 108/71 (07-22-18 @ 10:42) (94/54 - 134/87)  RR: 16 (07-22-18 @ 10:42) (16 - 18)  SpO2: 97% (07-22-18 @ 10:42) (97% - 98%)  Wt(kg): --  CAPILLARY BLOOD GLUCOSE          PHYSICAL EXAM:  General: NAD, WDWN.   Neuro:  Alert & oriented x 3  HEENT: NCAT, EOMI, conjunctiva clear  CV: +S1+S2 regular rate and rhythm  Lung: clear to ausculation bilaterally, respirations nonlabored, good inspiratory effort  Abdomen: soft, NonTender, No distention Normal active BS  Extremities: no cyanosis, clubbing or edema    LABS:              I&O's Detail    21 Jul 2018 07:01  -  22 Jul 2018 07:00  --------------------------------------------------------  IN:    dextrose 5% + sodium chloride 0.9%.: 2400 mL    Oral Fluid: 720 mL  Total IN: 3120 mL    OUT:  Total OUT: 0 mL    Total NET: 3120 mL
Gastroenterology  Patient seen and examined bedside resting comfortably.  No complaints offered.   No abdominal pain but hears gurgling   Denies nausea and vomiting. Tolerating full diet.  +flatus/ no BM.     T(F): 97.6 (07-21-18 @ 11:06), Max: 98.4 (07-20-18 @ 15:30)  HR: 67 (07-21-18 @ 11:06) (59 - 80)  BP: 121/77 (07-21-18 @ 11:06) (120/70 - 128/85)  RR: 16 (07-21-18 @ 11:06) (16 - 18)  SpO2: 100% (07-21-18 @ 11:06) (98% - 100%)  Wt(kg): --  CAPILLARY BLOOD GLUCOSE          PHYSICAL EXAM:  General: NAD, WDWN.   Neuro:  Alert & oriented x 3  HEENT: NCAT, EOMI, conjunctiva clear  CV: +S1+S2 regular rate and rhythm  Lung: clear to ausculation bilaterally, respirations nonlabored, good inspiratory effort  Abdomen: soft, NonTender, No distention Normal active BS  Extremities: no cyanosis, clubbing or edema    LABS:                        16.4   12.18 )-----------( 283      ( 20 Jul 2018 01:27 )             46.6     07-20    137  |  99  |  7   ----------------------------<  96  3.8   |  27  |  0.83    Ca    9.4      20 Jul 2018 01:27    TPro  8.2  /  Alb  4.0  /  TBili  0.4  /  DBili  x   /  AST  28  /  ALT  30  /  AlkPhos  101  07-20    LIVER FUNCTIONS - ( 20 Jul 2018 01:27 )  Alb: 4.0 g/dL / Pro: 8.2 gm/dL / ALK PHOS: 101 U/L / ALT: 30 U/L / AST: 28 U/L / GGT: x             I&O's Detail    20 Jul 2018 07:01  -  21 Jul 2018 07:00  --------------------------------------------------------  IN:    dextrose 5% + sodium chloride 0.9%.: 1200 mL    Oral Fluid: 337 mL  Total IN: 1537 mL    OUT:    Voided: 325 mL  Total OUT: 325 mL    Total NET: 1212 mL      21 Jul 2018 07:01  -  21 Jul 2018 14:25  --------------------------------------------------------  IN:    Oral Fluid: 720 mL  Total IN: 720 mL    OUT:  Total OUT: 0 mL    Total NET: 720 mL        07-20 @ 01:27    137 | 99 | 7  /9.4 | -- | --  _______________________/  3.8 | 27 | 0.83                           \par   Amylase, Serum Total: 65 U/L (07-20 @ 01:27)
Gastroenterology  Patient seen and examined bedside resting comfortably.  No complaints offered. Abdominal pain is well controlled and improved.  Denies nausea and vomiting. Tolerating soft diet.  Normal flatus/BM.     T(F): 97.7 (07-26-18 @ 10:58), Max: 98.2 (07-25-18 @ 17:35)  HR: 75 (07-26-18 @ 10:58) (61 - 75)  BP: 113/70 (07-26-18 @ 10:58) (107/72 - 134/86)  RR: 16 (07-26-18 @ 10:58) (16 - 18)  SpO2: 95% (07-26-18 @ 10:58) (95% - 98%)  Wt(kg): --  CAPILLARY BLOOD GLUCOSE          PHYSICAL EXAM:  General: NAD, WDWN.   Neuro:  Alert & responsive  HEENT: NCAT, EOMI, conjunctiva clear  CV: +S1+S2 regular rate and rhythm  Lung: clear to ausculation bilaterally, respirations nonlabored, good inspiratory effort  Abdomen: soft, Non tender, No Distension. Normal active BS  Extremities: no pedal edema or calf tenderness noted     LABS:                        13.1   8.74  )-----------( 198      ( 26 Jul 2018 07:49 )             38.5     07-26    142  |  103  |  7   ----------------------------<  110<H>  2.7<LL>   |  30  |  0.84    Ca    8.1<L>      26 Jul 2018 07:49          I&O's Detail    25 Jul 2018 07:01  -  26 Jul 2018 07:00  --------------------------------------------------------  IN:    dextrose 5% + sodium chloride 0.9%.: 1200 mL  Total IN: 1200 mL    OUT:    Voided: 4000 mL  Total OUT: 4000 mL    Total NET: -2800 mL
Patient is a 47y old  Male who presents with a chief complaint of Pain abdomen with N/V (20 Jul 2018 15:20)      INTERVAL HPI/OVERNIGHT EVENTS:    MEDICATIONS  (STANDING):  dextrose 5% + sodium chloride 0.9%. 1000 milliLiter(s) (100 mL/Hr) IV Continuous <Continuous>  escitalopram 10 milliGRAM(s) Oral daily  fluconAZOLE   Tablet 100 milliGRAM(s) Oral daily  mesalamine ER Capsule 1000 milliGRAM(s) Oral four times a day  pantoprazole  Injectable 40 milliGRAM(s) IV Push daily  predniSONE   Tablet 40 milliGRAM(s) Oral daily    MEDICATIONS  (PRN):  acetaminophen    Suspension 650 milliGRAM(s) Oral every 6 hours PRN For Temp greater than 38.5 C (101.3 F)  ondansetron Injectable 4 milliGRAM(s) IV Push every 8 hours PRN Nausea      Allergies    No Known Allergies    Intolerances        REVIEW OF SYSTEMS:  CONSTITUTIONAL: No fever, weight loss, or fatigue  EYES: No eye pain, visual disturbances, or discharge  ENMT:  No difficulty hearing, tinnitus, vertigo; No sinus or throat pain  NECK: No pain or stiffness  BREASTS: No pain, masses, or nipple discharge  RESPIRATORY: No cough, wheezing, chills or hemoptysis; No shortness of breath  CARDIOVASCULAR: No chest pain, palpitations, dizziness, or leg swelling  GASTROINTESTINAL: No abdominal or epigastric pain. No nausea, vomiting, or hematemesis; No diarrhea o constipation. No melena or hematochezia.  GENITOURINARY: No dysuria, frequency, hematuria, or incontinence  NEUROLOGICAL: No headaches, memory loss, loss of strength, numbness, or tremors  SKIN: No itching, burning, rashes, or lesions   LYMPH NODES: No enlarged glands  ENDOCRINE: No heat or cold intolerance; No hair loss  MUSCULOSKELETAL: No joint pain or swelling; No muscle, back, or extremity pain  PSYCHIATRIC: No depression, anxiety, mood swings, or difficulty sleeping  HEME/LYMPH: No easy bruising, or bleeding gums  ALLERGY AND IMMUNOLOGIC: No hives or eczema    Vital Signs Last 24 Hrs  T(C): 36.2 (28 Jul 2018 05:01), Max: 37.1 (27 Jul 2018 16:15)  T(F): 97.2 (28 Jul 2018 05:01), Max: 98.8 (27 Jul 2018 16:15)  HR: 61 (28 Jul 2018 05:01) (61 - 76)  BP: 110/82 (28 Jul 2018 05:01) (108/69 - 124/82)  BP(mean): --  RR: 16 (28 Jul 2018 05:01) (15 - 16)  SpO2: 94% (28 Jul 2018 05:01) (94% - 98%)    PHYSICAL EXAM:  GENERAL: NAD, well-groomed, well-developed  HEAD:  Atraumatic, Normocephalic  EYES: EOMI, PERRL, conjunctiva and sclera clear  ENMT:  Moist mucous membranes, Good dentition, No lesions  NECK: Supple, No JVD, Normal thyroid  NERVOUS SYSTEM:  Alert & Oriented X3, Good concentration; Motor Strength 5/5 B/L upper and lower extremities; DTRs 2+ intact and symmetric  CHEST/LUNG: Clear to percussion bilaterally; No rales, rhonchi, wheezing, or rubs  HEART: Regular rate and rhythm; No murmurs, rubs, or gallops  ABDOMEN: Soft, Nontender, Nondistended; Bowel sounds present  EXTREMITIES:  2+ Peripheral Pulses, No clubbing, cyanosis, or edema  LYMPH: No lymphadenopathy noted  SKIN: No rashes or lesions    LABS:                        14.3   8.64  )-----------( 217      ( 28 Jul 2018 08:34 )             42.9     07-28    141  |  102  |  11  ----------------------------<  85  3.9   |  30  |  0.83    Ca    8.2<L>      28 Jul 2018 08:34          CAPILLARY BLOOD GLUCOSE                        RADIOLOGY & ADDITIONAL TESTS:    Imaging Personally Reviewed:  [ ] YES  [ ] NO    Consultant(s) Notes Reviewed:  [ ] YES  [ ] NO    Care Discussed with Consultants/Other Providers [ ] YES  [ ] NO    PROBLEMS:  IZZY DISEASE WITHOUT COMPLICATION  ABDOMINAL PAIN WITH DIZZINESS  Crohn's disease without complication, unspecified gastrointestinal tract location  Candida esophagitis  Depression, unspecified depression type  Alfaro's esophagus without dysplasia  Crohn's disease without complication, unspecified gastrointestinal tract location      Care discussed with family,         [  ]   yes  [  ]  No    imp:    stable[ ]    unstable[  ]     improving [   ]       unchanged  [  ]                Plans:  Continue present plans  [  ]               New consult [  ]   specialty  .......               order judit[  ]    test name.                  Discharge Planning  [  ]
Patient is a 47y old  Male who presents with a chief complaint of Pain abdomen with N/V (20 Jul 2018 15:20)      INTERVAL HPI/OVERNIGHT EVENTS:    MEDICATIONS  (STANDING):  dextrose 5% + sodium chloride 0.9%. 1000 milliLiter(s) (100 mL/Hr) IV Continuous <Continuous>  hydrocortisone sodium succinate Injectable 100 milliGRAM(s) IV Push every 8 hours  mesalamine ER Capsule 1000 milliGRAM(s) Oral four times a day  pantoprazole  Injectable 40 milliGRAM(s) IV Push daily    MEDICATIONS  (PRN):  acetaminophen    Suspension 650 milliGRAM(s) Oral every 6 hours PRN For Temp greater than 38.5 C (101.3 F)  morphine  - Injectable 2 milliGRAM(s) IV Push every 6 hours PRN Severe Pain (7 - 10)  ondansetron Injectable 4 milliGRAM(s) IV Push every 8 hours PRN Nausea      Allergies    No Known Allergies    Intolerances        REVIEW OF SYSTEMS:  CONSTITUTIONAL: No fever, weight loss, or fatigue  EYES: No eye pain, visual disturbances, or discharge  ENMT:  No difficulty hearing, tinnitus, vertigo; No sinus or throat pain  NECK: No pain or stiffness  BREASTS: No pain, masses, or nipple discharge  RESPIRATORY: No cough, wheezing, chills or hemoptysis; No shortness of breath  CARDIOVASCULAR: No chest pain, palpitations, dizziness, or leg swelling  GASTROINTESTINAL: No abdominal or epigastric pain. No nausea, vomiting, or hematemesis; No diarrhea or constipation. No melena or hematochezia.  GENITOURINARY: No dysuria, frequency, hematuria, or incontinence  NEUROLOGICAL: No headaches, memory loss, loss of strength, numbness, or tremors  SKIN: No itching, burning, rashes, or lesions   LYMPH NODES: No enlarged glands  ENDOCRINE: No heat or cold intolerance; No hair loss  MUSCULOSKELETAL: No joint pain or swelling; No muscle, back, or extremity pain  PSYCHIATRIC: No depression, anxiety, mood swings, or difficulty sleeping  HEME/LYMPH: No easy bruising, or bleeding gums  ALLERGY AND IMMUNOLOGIC: No hives or eczema    Vital Signs Last 24 Hrs  T(C): 36.6 (22 Jul 2018 10:42), Max: 36.8 (21 Jul 2018 16:33)  T(F): 97.9 (22 Jul 2018 10:42), Max: 98.3 (21 Jul 2018 16:33)  HR: 72 (22 Jul 2018 10:42) (58 - 72)  BP: 108/71 (22 Jul 2018 10:42) (94/54 - 134/87)  BP(mean): --  RR: 16 (22 Jul 2018 10:42) (16 - 18)  SpO2: 97% (22 Jul 2018 10:42) (97% - 98%)    PHYSICAL EXAM:  GENERAL: NAD, well-groomed, well-developed  HEAD:  Atraumatic, Normocephalic  EYES: EOMI, PERRL, conjunctiva and sclera clear  ENMT:  Moist mucous membranes, Good dentition, No lesions  NECK: Supple, No JVD, Normal thyroid  NERVOUS SYSTEM:  Alert & Oriented X3, Good concentration; Motor Strength 5/5 B/L upper and lower extremities; DTRs 2+ intact and symmetric  CHEST/LUNG: Clear to percussion bilaterally; No rales, rhonchi, wheezing, or rubs  HEART: Regular rate and rhythm; No murmurs, rubs, or gallops  ABDOMEN: Soft, Nontender, Nondistended; Bowel sounds present  EXTREMITIES:  2+ Peripheral Pulses, No clubbing, cyanosis, or edema  LYMPH: No lymphadenopathy noted  SKIN: No rashes or lesions    LABS:              CAPILLARY BLOOD GLUCOSE                        RADIOLOGY & ADDITIONAL TESTS:    Imaging Personally Reviewed:  [ ] YES  [ ] NO    Consultant(s) Notes Reviewed:  [ ] YES  [ ] NO    Care Discussed with Consultants/Other Providers [ ] YES  [ ] NO    PROBLEMS:  IZZY DISEASE WITHOUT COMPLICATION  ABDOMINAL PAIN WITH DIZZINESS  Crohn's disease without complication, unspecified gastrointestinal tract location  Alfaro's esophagus without dysplasia  Crohn's disease without complication, unspecified gastrointestinal tract location      Care discussed with family,         [  ]   yes  [  ]  No    imp:    stable[ ]    unstable[  ]     improving [   ]       unchanged  [  ]                Plans:  Continue present plans  [  ]               New consult [  ]   specialty  .......               order judit[  ]    test name.                  Discharge Planning  [  ]
Patient is a 47y old  Male who presents with a chief complaint of Pain abdomen with N/V (20 Jul 2018 15:20)      INTERVAL HPI/OVERNIGHT EVENTS: C/O pain abdomen.    MEDICATIONS  (STANDING):  dextrose 5% + sodium chloride 0.9%. 1000 milliLiter(s) (100 mL/Hr) IV Continuous <Continuous>  hydrocortisone sodium succinate Injectable 100 milliGRAM(s) IV Push every 8 hours  mesalamine ER Capsule 1000 milliGRAM(s) Oral four times a day  pantoprazole  Injectable 40 milliGRAM(s) IV Push daily    MEDICATIONS  (PRN):  acetaminophen    Suspension 650 milliGRAM(s) Oral every 6 hours PRN For Temp greater than 38.5 C (101.3 F)  morphine  - Injectable 2 milliGRAM(s) IV Push every 6 hours PRN Severe Pain (7 - 10)  ondansetron Injectable 4 milliGRAM(s) IV Push every 8 hours PRN Nausea      Allergies    No Known Allergies    Intolerances        REVIEW OF SYSTEMS:  CONSTITUTIONAL: No fever, weight loss, or fatigue  EYES: No eye pain, visual disturbances, or discharge  ENMT:  No difficulty hearing, tinnitus, vertigo; No sinus or throat pain  NECK: No pain or stiffness  BREASTS: No pain, masses, or nipple discharge  RESPIRATORY: No cough, wheezing, chills or hemoptysis; No shortness of breath  CARDIOVASCULAR: No chest pain, palpitations, dizziness, or leg swelling  GASTROINTESTINAL: Diffuse abdominal pain. No nausea, vomiting, or hematemesis; No diarrhea or constipation. No melena or hematochezia.  GENITOURINARY: No dysuria, frequency, hematuria, or incontinence  NEUROLOGICAL: No headaches, memory loss, loss of strength, numbness, or tremors  SKIN: No itching, burning, rashes, or lesions   LYMPH NODES: No enlarged glands  ENDOCRINE: No heat or cold intolerance; No hair loss  MUSCULOSKELETAL: No joint pain or swelling; No muscle, back, or extremity pain  PSYCHIATRIC: No depression, anxiety, mood swings, or difficulty sleeping  HEME/LYMPH: No easy bruising, or bleeding gums  ALLERGY AND IMMUNOLOGIC: No hives or eczema    Vital Signs Last 24 Hrs  T(C): 36.4 (21 Jul 2018 11:06), Max: 36.9 (20 Jul 2018 15:30)  T(F): 97.6 (21 Jul 2018 11:06), Max: 98.4 (20 Jul 2018 15:30)  HR: 67 (21 Jul 2018 11:06) (59 - 80)  BP: 121/77 (21 Jul 2018 11:06) (120/70 - 147/96)  BP(mean): --  RR: 16 (21 Jul 2018 11:06) (16 - 18)  SpO2: 100% (21 Jul 2018 11:06) (95% - 100%)    PHYSICAL EXAM:  GENERAL: NAD, well-groomed, well-developed  HEAD:  Atraumatic, Normocephalic  EYES: EOMI, PERRL, conjunctiva and sclera clear  ENMT:  Moist mucous membranes, Good dentition, No lesions  NECK: Supple, No JVD, Normal thyroid  NERVOUS SYSTEM:  Alert & Oriented X 3, Good concentration; Motor Strength 5/5 B/L upper and lower extremities; DTRs 2+ intact and symmetric  CHEST/LUNG: Clear to percussion bilaterally; No rales, rhonchi, wheezing, or rubs  HEART: Regular rate and rhythm; No murmurs, rubs, or gallops  ABDOMEN: Soft,  difuse tenderness. No rebound., Nondistended; Bowel sounds present  EXTREMITIES:  2+ Peripheral Pulses, No clubbing, cyanosis, or edema  LYMPH: No lymphadenopathy noted  SKIN: No rashes or lesions    LABS:                        16.4   12.18 )-----------( 283      ( 20 Jul 2018 01:27 )             46.6     07-20    137  |  99  |  7   ----------------------------<  96  3.8   |  27  |  0.83    Ca    9.4      20 Jul 2018 01:27    TPro  8.2  /  Alb  4.0  /  TBili  0.4  /  DBili  x   /  AST  28  /  ALT  30  /  AlkPhos  101  07-20        CAPILLARY BLOOD GLUCOSE      RADIOLOGY & ADDITIONAL TESTS:    Imaging Personally Reviewed:  [ ] YES  [ ] NO    Consultant(s) Notes Reviewed:  [ x] YES  [ ] NO    Care Discussed with Consultants/Other Providers [ ] YES  [ ] NO    PROBLEMS:  IZZY DISEASE WITHOUT COMPLICATION  ABDOMINAL PAIN WITH DIZZINESS  Crohn's disease without complication, unspecified gastrointestinal tract location  Crohn's disease without complication, unspecified gastrointestinal tract location      Care discussed with family,         [  ]   yes  [  ]  No    imp:    stable[ ]    unstable[  ]     improving [   ]       unchanged  [  ]                Plans:  Continue present plans  [  ]               New consult [  ]   specialty  .......               order judit[  ]    test name.                  Discharge Planning  [  ]
Patient is a 47y old  Male who presents with a chief complaint of Pain abdomen with N/V (20 Jul 2018 15:20)      INTERVAL HPI/OVERNIGHT EVENTS: N/V since this AM.     MEDICATIONS  (STANDING):  dextrose 5% + sodium chloride 0.9%. 1000 milliLiter(s) (100 mL/Hr) IV Continuous <Continuous>  escitalopram 10 milliGRAM(s) Oral daily  hydrocortisone sodium succinate Injectable 100 milliGRAM(s) IV Push every 8 hours  mesalamine ER Capsule 1000 milliGRAM(s) Oral four times a day  pantoprazole  Injectable 40 milliGRAM(s) IV Push daily    MEDICATIONS  (PRN):  acetaminophen    Suspension 650 milliGRAM(s) Oral every 6 hours PRN For Temp greater than 38.5 C (101.3 F)  morphine  - Injectable 2 milliGRAM(s) IV Push every 6 hours PRN Severe Pain (7 - 10)  ondansetron Injectable 4 milliGRAM(s) IV Push every 8 hours PRN Nausea      Allergies    No Known Allergies    Intolerances        REVIEW OF SYSTEMS:  CONSTITUTIONAL: No fever, weight loss, or fatigue  EYES: No eye pain, visual disturbances, or discharge  ENMT:  No difficulty hearing, tinnitus, vertigo; No sinus or throat pain  NECK: No pain or stiffness  BREASTS: No pain, masses, or nipple discharge  RESPIRATORY: No cough, wheezing, chills or hemoptysis; No shortness of breath  CARDIOVASCULAR: No chest pain, palpitations, dizziness, or leg swelling  GASTROINTESTINAL :LLQ pain, N/V since AM.  GENITOURINARY: No dysuria, frequency, hematuria, or incontinence  NEUROLOGICAL: No headaches, memory loss, loss of strength, numbness, or tremors  SKIN: No itching, burning, rashes, or lesions   LYMPH NODES: No enlarged glands  ENDOCRINE: No heat or cold intolerance; No hair loss  MUSCULOSKELETAL: No joint pain or swelling; No muscle, back, or extremity pain  PSYCHIATRIC: No depression, anxiety, mood swings, or difficulty sleeping  HEME/LYMPH: No easy bruising, or bleeding gums  ALLERGY AND IMMUNOLOGIC: No hives or eczema    Vital Signs Last 24 Hrs  T(C): 36.7 (23 Jul 2018 10:54), Max: 36.7 (23 Jul 2018 10:54)  T(F): 98 (23 Jul 2018 10:54), Max: 98 (23 Jul 2018 10:54)  HR: 70 (23 Jul 2018 10:54) (58 - 72)  BP: 113/67 (23 Jul 2018 10:54) (113/67 - 132/84)  BP(mean): --  RR: 16 (23 Jul 2018 10:54) (16 - 16)  SpO2: 98% (23 Jul 2018 10:54) (95% - 98%)    PHYSICAL EXAM:  GENERAL: NAD, well-groomed, well-developed  HEAD:  Atraumatic, Normocephalic  EYES: EOMI, PERRL, conjunctiva and sclera clear  ENMT:  Moist mucous membranes, Good dentition, No lesions  NECK: Supple, No JVD, Normal thyroid  NERVOUS SYSTEM:  Alert & Oriented X 3, Good concentration; Motor Strength 5/5 B/L upper and lower extremities; DTRs 2+ intact and symmetric  CHEST/LUNG: Clear to percussion bilaterally; No rales, rhonchi, wheezing, or rubs  HEART: Regular rate and rhythm; No murmurs, rubs, or gallops  ABDOMEN: Soft, LLQ tenderness. Hyperactive bowel sounds.  EXTREMITIES:  2+ Peripheral Pulses, No clubbing, cyanosis, or edema  LYMPH: No lymphadenopathy noted  SKIN: No rashes or lesions    LABS:              CAPILLARY BLOOD GLUCOSE        RADIOLOGY & ADDITIONAL TESTS:  < from: CT Abdomen and Pelvis w/ IV Cont (07.20.18 @ 02:23) >  IMPRESSION: No bowel obstruction or evidence for active bowel   inflammation.    < end of copied text >    Imaging Personally Reviewed:  [x ] YES  [ ] NO    Consultant(s) Notes Reviewed:  [x ] YES  [ ] NO    Care Discussed with Consultants/Other Providers [ ] YES  [ ] NO    PROBLEMS:  IZZY DISEASE WITHOUT COMPLICATION  ABDOMINAL PAIN WITH DIZZINESS  Crohn's disease without complication, unspecified gastrointestinal tract location  Depression, unspecified depression type  Alfaro's esophagus without dysplasia  Crohn's disease without complication, unspecified gastrointestinal tract location      Care discussed with family,         [  ]   yes  [  ]  No    imp:    stable[ ]    unstable[  ]     improving [   ]       unchanged  [  ]                Plans:  Continue present plans  [  ]               New consult [  ]   specialty  .......               order judit[  ]    test name.                  Discharge Planning  [  ]
Patient is a 47y old  Male who presents with a chief complaint of Pain abdomen with N/V (20 Jul 2018 15:20)      INTERVAL HPI/OVERNIGHT EVENTS: Nausea present. No Vomiting so far.    MEDICATIONS  (STANDING):  dextrose 5% + sodium chloride 0.9%. 1000 milliLiter(s) (100 mL/Hr) IV Continuous <Continuous>  escitalopram 10 milliGRAM(s) Oral daily  fluconAZOLE IVPB 100 milliGRAM(s) IV Intermittent every 24 hours  fluconAZOLE IVPB      hydrocortisone sodium succinate Injectable 100 milliGRAM(s) IV Push every 8 hours  mesalamine ER Capsule 1000 milliGRAM(s) Oral four times a day  pantoprazole  Injectable 40 milliGRAM(s) IV Push daily  rifaximin 550 milliGRAM(s) Oral two times a day    MEDICATIONS  (PRN):  acetaminophen    Suspension 650 milliGRAM(s) Oral every 6 hours PRN For Temp greater than 38.5 C (101.3 F)  morphine  - Injectable 2 milliGRAM(s) IV Push every 6 hours PRN Severe Pain (7 - 10)  ondansetron Injectable 4 milliGRAM(s) IV Push every 8 hours PRN Nausea      Allergies    No Known Allergies    Intolerances        REVIEW OF SYSTEMS:  CONSTITUTIONAL: No fever, weight loss, or fatigue  EYES: No eye pain, visual disturbances, or discharge  ENMT:  No difficulty hearing, tinnitus, vertigo; No sinus or throat pain  NECK: No pain or stiffness  BREASTS: No pain, masses, or nipple discharge  RESPIRATORY: No cough, wheezing, chills or hemoptysis; No shortness of breath  CARDIOVASCULAR: No chest pain, palpitations, dizziness, or leg swelling  GASTROINTESTINAL: No abdominal or epigastric pain. No nausea, vomiting, or hematemesis; No diarrhea or constipation. No melena or hematochezia.  GENITOURINARY: No dysuria, frequency, hematuria, or incontinence  NEUROLOGICAL: No headaches, memory loss, loss of strength, numbness, or tremors  SKIN: No itching, burning, rashes, or lesions   LYMPH NODES: No enlarged glands  ENDOCRINE: No heat or cold intolerance; No hair loss  MUSCULOSKELETAL: No joint pain or swelling; No muscle, back, or extremity pain  PSYCHIATRIC: No depression, anxiety, mood swings, or difficulty sleeping  HEME/LYMPH: No easy bruising, or bleeding gums  ALLERGY AND IMMUNOLOGIC: No hives or eczema    Vital Signs Last 24 Hrs  T(C): 36.3 (24 Jul 2018 11:19), Max: 36.7 (23 Jul 2018 18:29)  T(F): 97.3 (24 Jul 2018 11:19), Max: 98.1 (23 Jul 2018 18:29)  HR: 80 (24 Jul 2018 11:19) (59 - 80)  BP: 142/79 (24 Jul 2018 11:19) (104/71 - 143/93)  BP(mean): --  RR: 16 (24 Jul 2018 11:19) (16 - 18)  SpO2: 97% (24 Jul 2018 11:19) (97% - 98%)    PHYSICAL EXAM:  GENERAL: NAD, well-groomed, well-developed  HEAD:  Atraumatic, Normocephalic  EYES: EOMI, PERRL, conjunctiva and sclera clear  ENMT:  Moist mucous membranes, Good dentition, No lesions  NECK: Supple, No JVD, Normal thyroid  NERVOUS SYSTEM:  Alert & Oriented X3, Good concentration; Motor Strength 5/5 B/L upper and lower extremities; DTRs 2+ intact and symmetric  CHEST/LUNG: Clear to percussion bilaterally; No rales, rhonchi, wheezing, or rubs  HEART: Regular rate and rhythm; No murmurs, rubs, or gallops  ABDOMEN: Soft, Nontender, Nondistended; Bowel sounds present  EXTREMITIES:  2+ Peripheral Pulses, No clubbing, cyanosis, or edema  LYMPH: No lymphadenopathy noted  SKIN: No rashes or lesions    LABS:        CAPILLARY BLOOD GLUCOSE          RADIOLOGY & ADDITIONAL TESTS:    Imaging Personally Reviewed:  [ ] YES  [ ] NO    Consultant(s) Notes Reviewed:  [ ] YES  [ ] NO    Care Discussed with Consultants/Other Providers [ ] YES  [ ] NO    PROBLEMS:  IZZY DISEASE WITHOUT COMPLICATION  ABDOMINAL PAIN WITH DIZZINESS  Crohn's disease without complication, unspecified gastrointestinal tract location  Candida esophagitis  Depression, unspecified depression type  Alfaro's esophagus without dysplasia  Crohn's disease without complication, unspecified gastrointestinal tract location      Care discussed with family,         [  ]   yes  [  ]  No    imp:    stable[ ]    unstable[  ]     improving [   ]       unchanged  [  ]                Plans:  Continue present plans  [  ]               New consult [  ]   specialty  .......               order judit[  ]    test name.                  Discharge Planning  [  ]
Patient is a 47y old  Male who presents with a chief complaint of Pain abdomen with N/V (20 Jul 2018 15:20)      INTERVAL HPI/OVERNIGHT EVENTS: Pt. was asymptomatic untill this AM, when he developed severe pain across the abdomen. No vomiting so far. As per nursing, morphine not helping with the pain. Pain level 13/10.    MEDICATIONS  (STANDING):  dextrose 5% + sodium chloride 0.9%. 1000 milliLiter(s) (100 mL/Hr) IV Continuous <Continuous>  escitalopram 10 milliGRAM(s) Oral daily  fluconAZOLE IVPB 100 milliGRAM(s) IV Intermittent every 24 hours  fluconAZOLE IVPB      hydrocortisone sodium succinate Injectable 100 milliGRAM(s) IV Push every 8 hours  mesalamine ER Capsule 1000 milliGRAM(s) Oral four times a day  pantoprazole  Injectable 40 milliGRAM(s) IV Push daily  rifaximin 550 milliGRAM(s) Oral two times a day    MEDICATIONS  (PRN):  acetaminophen    Suspension 650 milliGRAM(s) Oral every 6 hours PRN For Temp greater than 38.5 C (101.3 F)  morphine  - Injectable 2 milliGRAM(s) IV Push every 6 hours PRN Severe Pain (7 - 10)  ondansetron Injectable 4 milliGRAM(s) IV Push every 8 hours PRN Nausea      Allergies    No Known Allergies    Intolerances        REVIEW OF SYSTEMS:  CONSTITUTIONAL: No fever, weight loss, or fatigue  EYES: No eye pain, visual disturbances, or discharge  ENMT:  No difficulty hearing, tinnitus, vertigo; No sinus or throat pain  NECK: No pain or stiffness  BREASTS: No pain, masses, or nipple discharge  RESPIRATORY: No cough, wheezing, chills or hemoptysis; No shortness of breath  CARDIOVASCULAR: No chest pain, palpitations, dizziness, or leg swelling  GASTROINTESTINAL: No abdominal or epigastric pain. No nausea, vomiting, or hematemesis; No diarrhea or constipation. No melena or hematochezia.  GENITOURINARY: No dysuria, frequency, hematuria, or incontinence  NEUROLOGICAL: No headaches, memory loss, loss of strength, numbness, or tremors  SKIN: No itching, burning, rashes, or lesions   LYMPH NODES: No enlarged glands  ENDOCRINE: No heat or cold intolerance; No hair loss  MUSCULOSKELETAL: No joint pain or swelling; No muscle, back, or extremity pain  PSYCHIATRIC: No depression, anxiety, mood swings, or difficulty sleeping  HEME/LYMPH: No easy bruising, or bleeding gums  ALLERGY AND IMMUNOLOGIC: No hives or eczema    Vital Signs Last 24 Hrs  T(C): 36.2 (25 Jul 2018 05:04), Max: 36.8 (24 Jul 2018 16:32)  T(F): 97.2 (25 Jul 2018 05:04), Max: 98.3 (24 Jul 2018 16:32)  HR: 60 (25 Jul 2018 05:04) (60 - 80)  BP: 107/71 (25 Jul 2018 05:04) (107/71 - 142/79)  BP(mean): --  RR: 18 (25 Jul 2018 05:04) (16 - 18)  SpO2: 98% (25 Jul 2018 05:04) (96% - 98%)    PHYSICAL EXAM:  GENERAL: NAD, well-groomed, well-developed  HEAD:  Atraumatic, Normocephalic  EYES: EOMI, PERRL, conjunctiva and sclera clear  ENMT:  Moist mucous membranes, Good dentition, No lesions  NECK: Supple, No JVD, Normal thyroid  NERVOUS SYSTEM:  Alert & Oriented X3, Good concentration; Motor Strength 5/5 B/L upper and lower extremities; DTRs 2+ intact and symmetric  CHEST/LUNG: Clear to percussion bilaterally; No rales, rhonchi, wheezing, or rubs  HEART: Regular rate and rhythm; No murmurs, rubs, or gallops  ABDOMEN: Soft, Tender diffusely,  Nondistended; Bowel sounds present  EXTREMITIES:  2+ Peripheral Pulses, No clubbing, cyanosis, or edema  LYMPH: No lymphadenopathy noted  SKIN: No rashes or lesions    LABS:              CAPILLARY BLOOD GLUCOSE                        RADIOLOGY & ADDITIONAL TESTS:    Imaging Personally Reviewed:  [ ] YES  [ ] NO    Consultant(s) Notes Reviewed:  [ ] YES  [ ] NO    Care Discussed with Consultants/Other Providers [ ] YES  [ ] NO    PROBLEMS:  IZZY DISEASE WITHOUT COMPLICATION  ABDOMINAL PAIN WITH DIZZINESS  Crohn's disease without complication, unspecified gastrointestinal tract location  Candida esophagitis  Depression, unspecified depression type  Alfaro's esophagus without dysplasia  Crohn's disease without complication, unspecified gastrointestinal tract location      Care discussed with family,         [  ]   yes  [  ]  No    imp:    stable[ ]    unstable[  ]     improving [   ]       unchanged  [  ]                Plans:  Continue present plans  [  ]               New consult [  ]   specialty  .......               order judit[  ]    test name.                  Discharge Planning  [  ]

## 2018-07-28 NOTE — PROGRESS NOTE ADULT - PROBLEM SELECTOR PROBLEM 3
Depression, unspecified depression type
Alfaro's esophagus without dysplasia
Depression, unspecified depression type

## 2018-07-28 NOTE — DISCHARGE NOTE ADULT - CARE PROVIDER_API CALL
Dewayne Munson), Medicine  [address] [facility]  [address] [place] [address]  Phone: [phone]  Fax: [phone]

## 2018-07-28 NOTE — DISCHARGE NOTE ADULT - MEDICATION SUMMARY - MEDICATIONS TO TAKE
I will START or STAY ON the medications listed below when I get home from the hospital:    mesalamine 250 mg oral capsule, extended release  -- 4 cap(s) by mouth 4 times a day   -- Swallow whole.  Do not crush.    -- Indication: For Crohn's disease with complication, unspecified gastrointestinal tract location    predniSONE 20 mg oral tablet  -- 2 tab(s) by mouth once a day  -- Indication: For Crohn's disease with complication, unspecified gastrointestinal tract location    predniSONE 20 mg oral tablet  -- 2 tab(s) by mouth once a day  -- Indication: For Dul=plicate    Percocet 5/325 oral tablet  -- 1 tab(s) by mouth every 6 hours MDD:3, as needed for breakthrough pain  -- Caution federal law prohibits the transfer of this drug to any person other  than the person for whom it was prescribed.  May cause drowsiness.  Alcohol may intensify this effect.  Use care when operating dangerous machinery.  This prescription cannot be refilled.  This product contains acetaminophen.  Do not use  with any other product containing acetaminophen to prevent possible liver damage.  Using more of this medication than prescribed may cause serious breathing problems.    -- Indication: For Pain    Tylenol 325 mg oral capsule  -- 2 cap(s) by mouth every 6 hours, as needed for pain  -- Indication: For Pain    Lexapro 10 mg oral tablet  -- 1 tab(s) by mouth once a day  -- Indication: For Depression, unspecified depression type    ondansetron 4 mg oral tablet  -- 1 tab(s) by mouth every 8 hours, As Needed  -- Indication: For N/V    fluconazole 100 mg oral tablet  -- 1 tab(s) by mouth once a day  -- Indication: For Candida esophagitis    rifAXIMin 550 mg oral tablet  -- 1 tab(s) by mouth 2 times a day  -- Indication: For IBD    omeprazole 40 mg oral delayed release capsule  -- 1 cap(s) by mouth once a day   -- Indication: For Alfaro's esophagus without dysplasia

## 2018-07-28 NOTE — DISCHARGE NOTE ADULT - SECONDARY DIAGNOSIS.
Candida esophagitis IBD (inflammatory bowel disease) Depression, unspecified depression type Alfaro's esophagus without dysplasia

## 2018-07-28 NOTE — PROGRESS NOTE ADULT - ASSESSMENT
Patient presents to the ED for what he believes is a Crohn's flare.  Patient reports pain in abdomen with nausea, vomiting, and bloody stool worsening over the last week.  Says that he has not been able to hold down any food or fluids or take his home medications.  AAOx3, normal insight, no S/H TIP, no command hallucinations.  Follows with Dr. Russ in GI.  Start back on Pantasa. GI consult.  07/21/18 ; GI consult noted. On Solu cortef and Pentasa. Continue monitoring for Pain.  07/22/18 : Continues to have pain and nausea. Continue  Pentasa and steroids. GI follow up.  07/23/18 : N/V since this AM. Pt. had EGD by Dr. Marie on 07/05/18, diagnosed as Candida esophagitis. Was prescribed Diflucan which he never filled. Will treat with IV Diflucan. GI f/U. On steroids and Pentasa.  07/24/18 : GI f/u noted. Started on Rifaximin. On IV Flucanazole.  07/25/18 : Continue present. GI f/u.  07/26/18 ; Feels better. Still has the pain. On prednisone. Change Diflucan to oral. GI F/U.
46 yo wm unable to eat.H/O candida esophagitis dxed by  upper gastrointestinal endoscopy. H/o Crohns disease .CT shows no obstruction or active inflammation. WBC elevated to 12.
46 yo wm unable to eat.H/O candida esophagitis dxed by  upper gastrointestinal endoscopy. H/o Crohns disease .CT shows no obstruction or active inflammation. elevated wbc likely secondary to steroids
46 yo wm unable to eat.H/O candida esophagitis dxed by  upper gastrointestinal endoscopy. H/o Crohns disease .CT shows no obstruction or active inflammation. elevated wbc likely secondary to steroids
48 yo wm unable to eat.H/O candida esophagitis dxed by  upper gastrointestinal endoscopy. H/o Crohns disease .CT shows no obstruction or active inflammation. elevated wbc likely secondary to steroids
CROHNS disease exacerbation
Patient presents to the ED for what he believes is a Crohn's flare.  Patient reports pain in abdomen with nausea, vomiting, and bloody stool worsening over the last week.  Says that he has not been able to hold down any food or fluids or take his home medications.  AAOx3, normal insight, no S/H TIP, no command hallucinations.  Follows with Dr. Russ in GI.  Start back on Pantasa. GI consult.  07/21/18 ; GI consult noted. On Solu cortef and Pentasa. Continue monitoring for Pain.
Patient presents to the ED for what he believes is a Crohn's flare.  Patient reports pain in abdomen with nausea, vomiting, and bloody stool worsening over the last week.  Says that he has not been able to hold down any food or fluids or take his home medications.  AAOx3, normal insight, no S/H TIP, no command hallucinations.  Follows with Dr. Russ in GI.  Start back on Pantasa. GI consult.  07/21/18 ; GI consult noted. On Solu cortef and Pentasa. Continue monitoring for Pain.  07/22/18 : Continues to have pain and nausea. Continue  Pentasa and steroids. GI follow up.
Patient presents to the ED for what he believes is a Crohn's flare.  Patient reports pain in abdomen with nausea, vomiting, and bloody stool worsening over the last week.  Says that he has not been able to hold down any food or fluids or take his home medications.  AAOx3, normal insight, no S/H TIP, no command hallucinations.  Follows with Dr. Russ in GI.  Start back on Pantasa. GI consult.  07/21/18 ; GI consult noted. On Solu cortef and Pentasa. Continue monitoring for Pain.  07/22/18 : Continues to have pain and nausea. Continue  Pentasa and steroids. GI follow up.  07/23/18 : N/V since this AM. Pt. had EGD by Dr. Marie on 07/05/18, diagnosed as Candida esophagitis. Was prescribed Diflucan which he never filled. Will treat with IV Diflucan. GI f/U. On steroids and Pentasa.
Patient presents to the ED for what he believes is a Crohn's flare.  Patient reports pain in abdomen with nausea, vomiting, and bloody stool worsening over the last week.  Says that he has not been able to hold down any food or fluids or take his home medications.  AAOx3, normal insight, no S/H TIP, no command hallucinations.  Follows with Dr. Russ in GI.  Start back on Pantasa. GI consult.  07/21/18 ; GI consult noted. On Solu cortef and Pentasa. Continue monitoring for Pain.  07/22/18 : Continues to have pain and nausea. Continue  Pentasa and steroids. GI follow up.  07/23/18 : N/V since this AM. Pt. had EGD by Dr. Marie on 07/05/18, diagnosed as Candida esophagitis. Was prescribed Diflucan which he never filled. Will treat with IV Diflucan. GI f/U. On steroids and Pentasa.  07/24/18 : GI f/u noted. Started on Rifaximin. On IV Flucanazole.
Patient presents to the ED for what he believes is a Crohn's flare.  Patient reports pain in abdomen with nausea, vomiting, and bloody stool worsening over the last week.  Says that he has not been able to hold down any food or fluids or take his home medications.  AAOx3, normal insight, no S/H TIP, no command hallucinations.  Follows with Dr. Russ in GI.  Start back on Pantasa. GI consult.  07/21/18 ; GI consult noted. On Solu cortef and Pentasa. Continue monitoring for Pain.  07/22/18 : Continues to have pain and nausea. Continue  Pentasa and steroids. GI follow up.  07/23/18 : N/V since this AM. Pt. had EGD by Dr. Marie on 07/05/18, diagnosed as Candida esophagitis. Was prescribed Diflucan which he never filled. Will treat with IV Diflucan. GI f/U. On steroids and Pentasa.  07/24/18 : GI f/u noted. Started on Rifaximin. On IV Flucanazole.  07/25/18 : Continue present. GI f/u.
Patient presents to the ED for what he believes is a Crohn's flare.  Patient reports pain in abdomen with nausea, vomiting, and bloody stool worsening over the last week.  Says that he has not been able to hold down any food or fluids or take his home medications.  AAOx3, normal insight, no S/H TIP, no command hallucinations.  Follows with Dr. Russ in GI.  Start back on Pantasa. GI consult.  07/21/18 ; GI consult noted. On Solu cortef and Pentasa. Continue monitoring for Pain.  07/22/18 : Continues to have pain and nausea. Continue  Pentasa and steroids. GI follow up.  07/23/18 : N/V since this AM. Pt. had EGD by Dr. Marie on 07/05/18, diagnosed as Candida esophagitis. Was prescribed Diflucan which he never filled. Will treat with IV Diflucan. GI f/U. On steroids and Pentasa.  07/24/18 : GI f/u noted. Started on Rifaximin. On IV Flucanazole.  07/25/18 : Continue present. GI f/u.  07/26/18 ; Feels better. Still has the pain. On prednisone. Change Diflucan to oral. GI F/U.  07/28/18 : Pain abdomen off and on,.C/O being constipated. discharge planning.

## 2018-08-02 DIAGNOSIS — B37.81 CANDIDAL ESOPHAGITIS: ICD-10-CM

## 2018-08-02 DIAGNOSIS — Z82.49 FAMILY HISTORY OF ISCHEMIC HEART DISEASE AND OTHER DISEASES OF THE CIRCULATORY SYSTEM: ICD-10-CM

## 2018-08-02 DIAGNOSIS — Z82.5 FAMILY HISTORY OF ASTHMA AND OTHER CHRONIC LOWER RESPIRATORY DISEASES: ICD-10-CM

## 2018-08-02 DIAGNOSIS — E44.0 MODERATE PROTEIN-CALORIE MALNUTRITION: ICD-10-CM

## 2018-08-02 DIAGNOSIS — K50.90 CROHN'S DISEASE, UNSPECIFIED, WITHOUT COMPLICATIONS: ICD-10-CM

## 2018-08-02 DIAGNOSIS — Z80.0 FAMILY HISTORY OF MALIGNANT NEOPLASM OF DIGESTIVE ORGANS: ICD-10-CM

## 2018-08-02 DIAGNOSIS — F32.9 MAJOR DEPRESSIVE DISORDER, SINGLE EPISODE, UNSPECIFIED: ICD-10-CM

## 2018-08-02 DIAGNOSIS — K22.70 BARRETT'S ESOPHAGUS WITHOUT DYSPLASIA: ICD-10-CM

## 2018-08-02 DIAGNOSIS — Z83.3 FAMILY HISTORY OF DIABETES MELLITUS: ICD-10-CM

## 2018-08-02 DIAGNOSIS — F17.200 NICOTINE DEPENDENCE, UNSPECIFIED, UNCOMPLICATED: ICD-10-CM

## 2018-08-02 DIAGNOSIS — K59.00 CONSTIPATION, UNSPECIFIED: ICD-10-CM

## 2018-08-27 ENCOUNTER — INPATIENT (INPATIENT)
Facility: HOSPITAL | Age: 48
LOS: 1 days | Discharge: HOME | End: 2018-08-29
Attending: INTERNAL MEDICINE | Admitting: INTERNAL MEDICINE
Payer: MEDICAID

## 2018-08-27 VITALS
RESPIRATION RATE: 22 BRPM | SYSTOLIC BLOOD PRESSURE: 139 MMHG | TEMPERATURE: 99 F | DIASTOLIC BLOOD PRESSURE: 113 MMHG | OXYGEN SATURATION: 97 % | WEIGHT: 130.07 LBS | HEIGHT: 70 IN | HEART RATE: 87 BPM

## 2018-08-27 DIAGNOSIS — S83.519A SPRAIN OF ANTERIOR CRUCIATE LIGAMENT OF UNSPECIFIED KNEE, INITIAL ENCOUNTER: Chronic | ICD-10-CM

## 2018-08-27 DIAGNOSIS — F32.9 MAJOR DEPRESSIVE DISORDER, SINGLE EPISODE, UNSPECIFIED: ICD-10-CM

## 2018-08-27 DIAGNOSIS — R10.9 UNSPECIFIED ABDOMINAL PAIN: ICD-10-CM

## 2018-08-27 DIAGNOSIS — K50.919 CROHN'S DISEASE, UNSPECIFIED, WITH UNSPECIFIED COMPLICATIONS: ICD-10-CM

## 2018-08-27 PROBLEM — K22.70 BARRETT'S ESOPHAGUS WITHOUT DYSPLASIA: Chronic | Status: ACTIVE | Noted: 2018-07-20

## 2018-08-27 LAB
ALBUMIN SERPL ELPH-MCNC: 3.4 G/DL — SIGNIFICANT CHANGE UP (ref 3.3–5)
ALP SERPL-CCNC: 129 U/L — HIGH (ref 40–120)
ALT FLD-CCNC: 30 U/L — SIGNIFICANT CHANGE UP (ref 12–78)
ANION GAP SERPL CALC-SCNC: 12 MMOL/L — SIGNIFICANT CHANGE UP (ref 5–17)
APPEARANCE UR: CLEAR — SIGNIFICANT CHANGE UP
AST SERPL-CCNC: 27 U/L — SIGNIFICANT CHANGE UP (ref 15–37)
BASOPHILS # BLD AUTO: 0.04 K/UL — SIGNIFICANT CHANGE UP (ref 0–0.2)
BASOPHILS NFR BLD AUTO: 0.4 % — SIGNIFICANT CHANGE UP (ref 0–2)
BILIRUB SERPL-MCNC: 0.4 MG/DL — SIGNIFICANT CHANGE UP (ref 0.2–1.2)
BILIRUB UR-MCNC: NEGATIVE — SIGNIFICANT CHANGE UP
BUN SERPL-MCNC: 6 MG/DL — LOW (ref 7–23)
CALCIUM SERPL-MCNC: 7.9 MG/DL — LOW (ref 8.5–10.1)
CHLORIDE SERPL-SCNC: 103 MMOL/L — SIGNIFICANT CHANGE UP (ref 96–108)
CO2 SERPL-SCNC: 26 MMOL/L — SIGNIFICANT CHANGE UP (ref 22–31)
COLOR SPEC: YELLOW — SIGNIFICANT CHANGE UP
CREAT SERPL-MCNC: 0.97 MG/DL — SIGNIFICANT CHANGE UP (ref 0.5–1.3)
DIFF PNL FLD: ABNORMAL
EOSINOPHIL # BLD AUTO: 0.06 K/UL — SIGNIFICANT CHANGE UP (ref 0–0.5)
EOSINOPHIL NFR BLD AUTO: 0.7 % — SIGNIFICANT CHANGE UP (ref 0–6)
GLUCOSE SERPL-MCNC: 94 MG/DL — SIGNIFICANT CHANGE UP (ref 70–99)
GLUCOSE UR QL: NEGATIVE MG/DL — SIGNIFICANT CHANGE UP
HCT VFR BLD CALC: 44.1 % — SIGNIFICANT CHANGE UP (ref 39–50)
HGB BLD-MCNC: 15.6 G/DL — SIGNIFICANT CHANGE UP (ref 13–17)
IMM GRANULOCYTES NFR BLD AUTO: 0.2 % — SIGNIFICANT CHANGE UP (ref 0–1.5)
KETONES UR-MCNC: NEGATIVE — SIGNIFICANT CHANGE UP
LEUKOCYTE ESTERASE UR-ACNC: NEGATIVE — SIGNIFICANT CHANGE UP
LIDOCAIN IGE QN: 97 U/L — SIGNIFICANT CHANGE UP (ref 73–393)
LYMPHOCYTES # BLD AUTO: 2.56 K/UL — SIGNIFICANT CHANGE UP (ref 1–3.3)
LYMPHOCYTES # BLD AUTO: 28.8 % — SIGNIFICANT CHANGE UP (ref 13–44)
MCHC RBC-ENTMCNC: 33.1 PG — SIGNIFICANT CHANGE UP (ref 27–34)
MCHC RBC-ENTMCNC: 35.4 GM/DL — SIGNIFICANT CHANGE UP (ref 32–36)
MCV RBC AUTO: 93.6 FL — SIGNIFICANT CHANGE UP (ref 80–100)
MONOCYTES # BLD AUTO: 1.01 K/UL — HIGH (ref 0–0.9)
MONOCYTES NFR BLD AUTO: 11.4 % — SIGNIFICANT CHANGE UP (ref 2–14)
NEUTROPHILS # BLD AUTO: 5.2 K/UL — SIGNIFICANT CHANGE UP (ref 1.8–7.4)
NEUTROPHILS NFR BLD AUTO: 58.5 % — SIGNIFICANT CHANGE UP (ref 43–77)
NITRITE UR-MCNC: NEGATIVE — SIGNIFICANT CHANGE UP
PH UR: 8 — SIGNIFICANT CHANGE UP (ref 5–8)
PLATELET # BLD AUTO: 280 K/UL — SIGNIFICANT CHANGE UP (ref 150–400)
POTASSIUM SERPL-MCNC: 3.6 MMOL/L — SIGNIFICANT CHANGE UP (ref 3.5–5.3)
POTASSIUM SERPL-SCNC: 3.6 MMOL/L — SIGNIFICANT CHANGE UP (ref 3.5–5.3)
PROT SERPL-MCNC: 7.2 GM/DL — SIGNIFICANT CHANGE UP (ref 6–8.3)
PROT UR-MCNC: 30 MG/DL
RBC # BLD: 4.71 M/UL — SIGNIFICANT CHANGE UP (ref 4.2–5.8)
RBC # FLD: 12.6 % — SIGNIFICANT CHANGE UP (ref 10.3–14.5)
SODIUM SERPL-SCNC: 141 MMOL/L — SIGNIFICANT CHANGE UP (ref 135–145)
SP GR SPEC: 1.01 — SIGNIFICANT CHANGE UP (ref 1.01–1.02)
UROBILINOGEN FLD QL: NEGATIVE MG/DL — SIGNIFICANT CHANGE UP
WBC # BLD: 8.89 K/UL — SIGNIFICANT CHANGE UP (ref 3.8–10.5)
WBC # FLD AUTO: 8.89 K/UL — SIGNIFICANT CHANGE UP (ref 3.8–10.5)

## 2018-08-27 PROCEDURE — 99284 EMERGENCY DEPT VISIT MOD MDM: CPT | Mod: 25

## 2018-08-27 PROCEDURE — 74177 CT ABD & PELVIS W/CONTRAST: CPT | Mod: 26

## 2018-08-27 PROCEDURE — 93010 ELECTROCARDIOGRAM REPORT: CPT

## 2018-08-27 RX ORDER — MORPHINE SULFATE 50 MG/1
4 CAPSULE, EXTENDED RELEASE ORAL ONCE
Qty: 0 | Refills: 0 | Status: DISCONTINUED | OUTPATIENT
Start: 2018-08-27 | End: 2018-08-27

## 2018-08-27 RX ORDER — ONDANSETRON 8 MG/1
4 TABLET, FILM COATED ORAL EVERY 6 HOURS
Qty: 0 | Refills: 0 | Status: DISCONTINUED | OUTPATIENT
Start: 2018-08-27 | End: 2018-08-29

## 2018-08-27 RX ORDER — NICOTINE POLACRILEX 2 MG
1 GUM BUCCAL DAILY
Qty: 0 | Refills: 0 | Status: DISCONTINUED | OUTPATIENT
Start: 2018-08-27 | End: 2018-08-29

## 2018-08-27 RX ORDER — ENOXAPARIN SODIUM 100 MG/ML
40 INJECTION SUBCUTANEOUS EVERY 24 HOURS
Qty: 0 | Refills: 0 | Status: DISCONTINUED | OUTPATIENT
Start: 2018-08-27 | End: 2018-08-29

## 2018-08-27 RX ORDER — ONDANSETRON 8 MG/1
4 TABLET, FILM COATED ORAL ONCE
Qty: 0 | Refills: 0 | Status: COMPLETED | OUTPATIENT
Start: 2018-08-27 | End: 2018-08-27

## 2018-08-27 RX ORDER — SODIUM CHLORIDE 9 MG/ML
2000 INJECTION INTRAMUSCULAR; INTRAVENOUS; SUBCUTANEOUS ONCE
Qty: 0 | Refills: 0 | Status: COMPLETED | OUTPATIENT
Start: 2018-08-27 | End: 2018-08-27

## 2018-08-27 RX ORDER — NICOTINE POLACRILEX 2 MG
4 GUM BUCCAL EVERY 8 HOURS
Qty: 0 | Refills: 0 | Status: DISCONTINUED | OUTPATIENT
Start: 2018-08-27 | End: 2018-08-27

## 2018-08-27 RX ORDER — OXYCODONE AND ACETAMINOPHEN 5; 325 MG/1; MG/1
1 TABLET ORAL EVERY 6 HOURS
Qty: 0 | Refills: 0 | Status: DISCONTINUED | OUTPATIENT
Start: 2018-08-27 | End: 2018-08-29

## 2018-08-27 RX ORDER — SODIUM CHLORIDE 9 MG/ML
1000 INJECTION INTRAMUSCULAR; INTRAVENOUS; SUBCUTANEOUS ONCE
Qty: 0 | Refills: 0 | Status: COMPLETED | OUTPATIENT
Start: 2018-08-27 | End: 2018-08-27

## 2018-08-27 RX ORDER — ESCITALOPRAM OXALATE 10 MG/1
10 TABLET, FILM COATED ORAL DAILY
Qty: 0 | Refills: 0 | Status: DISCONTINUED | OUTPATIENT
Start: 2018-08-27 | End: 2018-08-29

## 2018-08-27 RX ORDER — PANTOPRAZOLE SODIUM 20 MG/1
40 TABLET, DELAYED RELEASE ORAL DAILY
Qty: 0 | Refills: 0 | Status: DISCONTINUED | OUTPATIENT
Start: 2018-08-27 | End: 2018-08-29

## 2018-08-27 RX ORDER — NICOTINE POLACRILEX 2 MG
4 GUM BUCCAL EVERY 8 HOURS
Qty: 0 | Refills: 0 | Status: DISCONTINUED | OUTPATIENT
Start: 2018-08-27 | End: 2018-08-29

## 2018-08-27 RX ORDER — MORPHINE SULFATE 50 MG/1
2 CAPSULE, EXTENDED RELEASE ORAL ONCE
Qty: 0 | Refills: 0 | Status: DISCONTINUED | OUTPATIENT
Start: 2018-08-27 | End: 2018-08-27

## 2018-08-27 RX ADMIN — SODIUM CHLORIDE 1000 MILLILITER(S): 9 INJECTION INTRAMUSCULAR; INTRAVENOUS; SUBCUTANEOUS at 06:22

## 2018-08-27 RX ADMIN — MORPHINE SULFATE 4 MILLIGRAM(S): 50 CAPSULE, EXTENDED RELEASE ORAL at 03:29

## 2018-08-27 RX ADMIN — OXYCODONE AND ACETAMINOPHEN 1 TABLET(S): 5; 325 TABLET ORAL at 09:39

## 2018-08-27 RX ADMIN — Medication 1 PATCH: at 14:24

## 2018-08-27 RX ADMIN — MORPHINE SULFATE 2 MILLIGRAM(S): 50 CAPSULE, EXTENDED RELEASE ORAL at 06:25

## 2018-08-27 RX ADMIN — OXYCODONE AND ACETAMINOPHEN 1 TABLET(S): 5; 325 TABLET ORAL at 17:50

## 2018-08-27 RX ADMIN — Medication 100 MILLIGRAM(S): at 11:59

## 2018-08-27 RX ADMIN — MORPHINE SULFATE 4 MILLIGRAM(S): 50 CAPSULE, EXTENDED RELEASE ORAL at 06:22

## 2018-08-27 RX ADMIN — MORPHINE SULFATE 4 MILLIGRAM(S): 50 CAPSULE, EXTENDED RELEASE ORAL at 03:21

## 2018-08-27 RX ADMIN — MORPHINE SULFATE 4 MILLIGRAM(S): 50 CAPSULE, EXTENDED RELEASE ORAL at 00:48

## 2018-08-27 RX ADMIN — PANTOPRAZOLE SODIUM 40 MILLIGRAM(S): 20 TABLET, DELAYED RELEASE ORAL at 11:59

## 2018-08-27 RX ADMIN — ENOXAPARIN SODIUM 40 MILLIGRAM(S): 100 INJECTION SUBCUTANEOUS at 23:14

## 2018-08-27 RX ADMIN — SODIUM CHLORIDE 2000 MILLILITER(S): 9 INJECTION INTRAMUSCULAR; INTRAVENOUS; SUBCUTANEOUS at 05:26

## 2018-08-27 RX ADMIN — Medication 100 MILLIGRAM(S): at 23:13

## 2018-08-27 RX ADMIN — ESCITALOPRAM OXALATE 10 MILLIGRAM(S): 10 TABLET, FILM COATED ORAL at 11:59

## 2018-08-27 RX ADMIN — Medication 100 MILLIGRAM(S): at 17:23

## 2018-08-27 RX ADMIN — ONDANSETRON 4 MILLIGRAM(S): 8 TABLET, FILM COATED ORAL at 02:07

## 2018-08-27 RX ADMIN — ONDANSETRON 4 MILLIGRAM(S): 8 TABLET, FILM COATED ORAL at 20:39

## 2018-08-27 RX ADMIN — OXYCODONE AND ACETAMINOPHEN 1 TABLET(S): 5; 325 TABLET ORAL at 23:14

## 2018-08-27 RX ADMIN — SODIUM CHLORIDE 2000 MILLILITER(S): 9 INJECTION INTRAMUSCULAR; INTRAVENOUS; SUBCUTANEOUS at 03:21

## 2018-08-27 RX ADMIN — OXYCODONE AND ACETAMINOPHEN 1 TABLET(S): 5; 325 TABLET ORAL at 17:20

## 2018-08-27 RX ADMIN — SODIUM CHLORIDE 2000 MILLILITER(S): 9 INJECTION INTRAMUSCULAR; INTRAVENOUS; SUBCUTANEOUS at 00:48

## 2018-08-27 RX ADMIN — ONDANSETRON 4 MILLIGRAM(S): 8 TABLET, FILM COATED ORAL at 03:21

## 2018-08-27 RX ADMIN — ONDANSETRON 4 MILLIGRAM(S): 8 TABLET, FILM COATED ORAL at 14:24

## 2018-08-27 RX ADMIN — ONDANSETRON 4 MILLIGRAM(S): 8 TABLET, FILM COATED ORAL at 07:50

## 2018-08-27 RX ADMIN — Medication 125 MILLIGRAM(S): at 02:07

## 2018-08-27 NOTE — H&P ADULT - ASSESSMENT
IMPROVE VTE Individual Risk Assessment    RISK                                                                Points    [  ] Previous VTE                                                  3    [  ] Thrombophilia                                               2    [  ] Lower limb paralysis                                      2        (unable to hold up >15 seconds)      [  ] Current Cancer                                              2         (within 6 months)    [  ] Immobilization > 24 hrs                                1    [  ] ICU/CCU stay > 24 hours                              1     [  ] Age > 60                                                      1    IMPROVE VTE Score ____0_____    Admitted for N/V and pain abdomen. Had colonoscopy done recently, report pending. Will have GI follow the patient. Start on solu medrol IV.

## 2018-08-27 NOTE — ED ADULT NURSE NOTE - NSIMPLEMENTINTERV_GEN_ALL_ED
Implemented All Universal Safety Interventions:  [place] to call system. Call bell, personal items and telephone within reach. Instruct patient to call for assistance. Room bathroom lighting operational. Non-slip footwear when patient is off stretcher. Physically safe environment: no spills, clutter or unnecessary equipment. Stretcher in lowest position, wheels locked, appropriate side rails in place.

## 2018-08-27 NOTE — ED PROVIDER NOTE - PHYSICAL EXAMINATION
Gen: uncomfortable, non toxic.   HEENT: Mucous membranes moist, pink conjunctivae, EOMI  CV: RRR, nl s1/s2.  Resp: CTAB, normal rate and effort  GI: generalized abd ttp/voluntary guarding, no rigidity. more ttp on left.  : No CVAT  Neuro: A&O x 3, moving all 4 extremities  MSK: No spine or joint tenderness to palpation  Skin: No rashes. intact and perfused.

## 2018-08-27 NOTE — H&P ADULT - HISTORY OF PRESENT ILLNESS
· HPI : 46 y/o male hx crohn's disease, barretts esophagus, recent admission for crohn's flare last montha nd diagnosed with candida esophagitis s/p several weeks of diflucan that pt finished c/o 5 days of generalized abd pain more on left, with vomiting and diarrhea including some bloody stool yesterday, all typical of his flares but states this is a more severe flare. Has some mild weakness and some back discomfort. No fever, cp, sob or other symptoms.    ROS: No fever/chills. No eye pain/changes in vision, No ear pain/sore throat/dysphagia, No chest pain/palpitations. No SOB/cough/.   No headache. No Dizziness.    No rashes or breaks in skin. No numbness/tingling/weakness.

## 2018-08-27 NOTE — H&P ADULT - NSHPLABSRESULTS_GEN_ALL_CORE
15.6   8.89  )-----------( 280      ( 27 Aug 2018 01:00 )             44.1         141  |  103  |  6<L>  ----------------------------<  94  3.6   |  26  |  0.97    Ca    7.9<L>      27 Aug 2018 01:00    TPro  7.2  /  Alb  3.4  /  TBili  0.4  /  DBili  x   /  AST  27  /  ALT  30  /  AlkPhos  129<H>        Urinalysis Basic - ( 27 Aug 2018 03:32 )    Color: Yellow / Appearance: Clear / S.015 / pH: x  Gluc: x / Ketone: Negative  / Bili: Negative / Urobili: Negative mg/dL   Blood: x / Protein: 30 mg/dL / Nitrite: Negative   Leuk Esterase: Negative / RBC: 3-5 /HPF / WBC x   Sq Epi: x / Non Sq Epi: x / Bacteria: x      CAPILLARY BLOOD GLUCOSE                Urine Culture:      Blood Culture:    < from: CT Abdomen and Pelvis w/ IV Cont (18 @ 02:11) >      IMPRESSION:    No bowel obstruction or inflammation.      < end of copied text >

## 2018-08-27 NOTE — ED ADULT TRIAGE NOTE - CHIEF COMPLAINT QUOTE
BIBA,  pt c/o abd pain, n/v/d since tuesday,  blood in stool yesterday.   "no oral intake since tuesday",  PMH - Crohn's disease

## 2018-08-27 NOTE — H&P ADULT - NSHPPHYSICALEXAM_GEN_ALL_CORE
GENERAL: NAD, well-groomed, well-developed  HEAD:  Atraumatic, Normocephalic  EYES: EOMI, PERRLA, conjunctiva and sclera clear  ENMT:  Moist mucous membranes, Good dentition, No lesions  NECK: Supple, No JVD, Normal thyroid  NERVOUS SYSTEM:  Alert & Oriented X3, Good concentration; Motor Strength 5/5 B/L upper and lower extremities; DTRs 2+ intact and symmetric  CHEST/LUNG: Clear to percussion bilaterally; No rales, rhonchi, wheezing, or rubs  HEART: Regular rate and rhythm; No murmurs, rubs, or gallops  ABDOMEN: Soft, Diffuse tenderness. BS pos.  EXTREMITIES:  2+ Peripheral Pulses, No clubbing, cyanosis, or edema  LYMPH: No lymphadenopathy noted  SKIN: No rashes or lesions

## 2018-08-27 NOTE — ED PROVIDER NOTE - OBJECTIVE STATEMENT
48 y/o male hx crohn's disease, barretts esophagus, recent admission for crohn's flare last montha nd diagnosed with candida esophagitis s/p several weeks of diflucan that pt finished c/o 5 days of generalized abd pain more on left, with vomiting and diarrhea including some bloody stool yesterday, all typical of his flares but states this is a more severe flare. Has some mild weakness and some back discomfort. No fever, cp, sob or other symptoms.     ROS: No fever/chills. No eye pain/changes in vision, No ear pain/sore throat/dysphagia, No chest pain/palpitations. No SOB/cough/.   No headache. No Dizziness.    No rashes or breaks in skin. No numbness/tingling/weakness.

## 2018-08-27 NOTE — ED PROVIDER NOTE - MEDICAL DECISION MAKING DETAILS
likely crohn's flare, r/o abscess. ct abd/pel, labs, solumedrol and symptom control, hydration. likely admission for fluids, crohn's management and GI.

## 2018-08-28 ENCOUNTER — TRANSCRIPTION ENCOUNTER (OUTPATIENT)
Age: 48
End: 2018-08-28

## 2018-08-28 DIAGNOSIS — K58.9 IRRITABLE BOWEL SYNDROME WITHOUT DIARRHEA: ICD-10-CM

## 2018-08-28 DIAGNOSIS — K22.70 BARRETT'S ESOPHAGUS WITHOUT DYSPLASIA: ICD-10-CM

## 2018-08-28 LAB
ALBUMIN SERPL ELPH-MCNC: 3.4 G/DL — SIGNIFICANT CHANGE UP (ref 3.3–5)
ALP SERPL-CCNC: 118 U/L — SIGNIFICANT CHANGE UP (ref 40–120)
ALT FLD-CCNC: 29 U/L — SIGNIFICANT CHANGE UP (ref 12–78)
ANION GAP SERPL CALC-SCNC: 9 MMOL/L — SIGNIFICANT CHANGE UP (ref 5–17)
AST SERPL-CCNC: 30 U/L — SIGNIFICANT CHANGE UP (ref 15–37)
BILIRUB SERPL-MCNC: 0.7 MG/DL — SIGNIFICANT CHANGE UP (ref 0.2–1.2)
BUN SERPL-MCNC: 9 MG/DL — SIGNIFICANT CHANGE UP (ref 7–23)
CALCIUM SERPL-MCNC: 8.8 MG/DL — SIGNIFICANT CHANGE UP (ref 8.5–10.1)
CHLORIDE SERPL-SCNC: 101 MMOL/L — SIGNIFICANT CHANGE UP (ref 96–108)
CO2 SERPL-SCNC: 30 MMOL/L — SIGNIFICANT CHANGE UP (ref 22–31)
CREAT SERPL-MCNC: 0.9 MG/DL — SIGNIFICANT CHANGE UP (ref 0.5–1.3)
CULTURE RESULTS: NO GROWTH — SIGNIFICANT CHANGE UP
GLUCOSE SERPL-MCNC: 135 MG/DL — HIGH (ref 70–99)
HCT VFR BLD CALC: 43 % — SIGNIFICANT CHANGE UP (ref 39–50)
HGB BLD-MCNC: 14.7 G/DL — SIGNIFICANT CHANGE UP (ref 13–17)
MCHC RBC-ENTMCNC: 32.7 PG — SIGNIFICANT CHANGE UP (ref 27–34)
MCHC RBC-ENTMCNC: 34.2 GM/DL — SIGNIFICANT CHANGE UP (ref 32–36)
MCV RBC AUTO: 95.6 FL — SIGNIFICANT CHANGE UP (ref 80–100)
NRBC # BLD: 0 /100 WBCS — SIGNIFICANT CHANGE UP (ref 0–0)
PLATELET # BLD AUTO: 240 K/UL — SIGNIFICANT CHANGE UP (ref 150–400)
POTASSIUM SERPL-MCNC: 4.4 MMOL/L — SIGNIFICANT CHANGE UP (ref 3.5–5.3)
POTASSIUM SERPL-SCNC: 4.4 MMOL/L — SIGNIFICANT CHANGE UP (ref 3.5–5.3)
PROT SERPL-MCNC: 7 GM/DL — SIGNIFICANT CHANGE UP (ref 6–8.3)
RBC # BLD: 4.5 M/UL — SIGNIFICANT CHANGE UP (ref 4.2–5.8)
RBC # FLD: 12.4 % — SIGNIFICANT CHANGE UP (ref 10.3–14.5)
SODIUM SERPL-SCNC: 140 MMOL/L — SIGNIFICANT CHANGE UP (ref 135–145)
SPECIMEN SOURCE: SIGNIFICANT CHANGE UP
WBC # BLD: 11.55 K/UL — HIGH (ref 3.8–10.5)
WBC # FLD AUTO: 11.55 K/UL — HIGH (ref 3.8–10.5)

## 2018-08-28 RX ORDER — MESALAMINE 400 MG
1000 TABLET, DELAYED RELEASE (ENTERIC COATED) ORAL
Qty: 0 | Refills: 0 | Status: DISCONTINUED | OUTPATIENT
Start: 2018-08-28 | End: 2018-08-29

## 2018-08-28 RX ORDER — MESALAMINE 400 MG
1600 TABLET, DELAYED RELEASE (ENTERIC COATED) ORAL
Qty: 0 | Refills: 0 | Status: DISCONTINUED | OUTPATIENT
Start: 2018-08-28 | End: 2018-08-28

## 2018-08-28 RX ORDER — LANOLIN ALCOHOL/MO/W.PET/CERES
3 CREAM (GRAM) TOPICAL AT BEDTIME
Qty: 0 | Refills: 0 | Status: DISCONTINUED | OUTPATIENT
Start: 2018-08-28 | End: 2018-08-29

## 2018-08-28 RX ADMIN — OXYCODONE AND ACETAMINOPHEN 1 TABLET(S): 5; 325 TABLET ORAL at 00:14

## 2018-08-28 RX ADMIN — OXYCODONE AND ACETAMINOPHEN 1 TABLET(S): 5; 325 TABLET ORAL at 20:00

## 2018-08-28 RX ADMIN — Medication 100 MILLIGRAM(S): at 17:20

## 2018-08-28 RX ADMIN — OXYCODONE AND ACETAMINOPHEN 1 TABLET(S): 5; 325 TABLET ORAL at 13:07

## 2018-08-28 RX ADMIN — Medication 1000 MILLIGRAM(S): at 17:20

## 2018-08-28 RX ADMIN — ESCITALOPRAM OXALATE 10 MILLIGRAM(S): 10 TABLET, FILM COATED ORAL at 11:11

## 2018-08-28 RX ADMIN — ONDANSETRON 4 MILLIGRAM(S): 8 TABLET, FILM COATED ORAL at 19:30

## 2018-08-28 RX ADMIN — Medication 100 MILLIGRAM(S): at 11:11

## 2018-08-28 RX ADMIN — Medication 1000 MILLIGRAM(S): at 23:18

## 2018-08-28 RX ADMIN — OXYCODONE AND ACETAMINOPHEN 1 TABLET(S): 5; 325 TABLET ORAL at 14:07

## 2018-08-28 RX ADMIN — Medication 3 MILLIGRAM(S): at 22:44

## 2018-08-28 RX ADMIN — ONDANSETRON 4 MILLIGRAM(S): 8 TABLET, FILM COATED ORAL at 13:04

## 2018-08-28 RX ADMIN — Medication 100 MILLIGRAM(S): at 23:19

## 2018-08-28 RX ADMIN — OXYCODONE AND ACETAMINOPHEN 1 TABLET(S): 5; 325 TABLET ORAL at 19:30

## 2018-08-28 RX ADMIN — OXYCODONE AND ACETAMINOPHEN 1 TABLET(S): 5; 325 TABLET ORAL at 05:41

## 2018-08-28 RX ADMIN — OXYCODONE AND ACETAMINOPHEN 1 TABLET(S): 5; 325 TABLET ORAL at 06:41

## 2018-08-28 RX ADMIN — Medication 1 PATCH: at 11:11

## 2018-08-28 RX ADMIN — PANTOPRAZOLE SODIUM 40 MILLIGRAM(S): 20 TABLET, DELAYED RELEASE ORAL at 11:11

## 2018-08-28 RX ADMIN — ENOXAPARIN SODIUM 40 MILLIGRAM(S): 100 INJECTION SUBCUTANEOUS at 21:18

## 2018-08-28 RX ADMIN — Medication 100 MILLIGRAM(S): at 05:41

## 2018-08-28 RX ADMIN — ONDANSETRON 4 MILLIGRAM(S): 8 TABLET, FILM COATED ORAL at 05:41

## 2018-08-28 NOTE — DISCHARGE NOTE ADULT - CARE PROVIDER_API CALL
Dr. Ramsey,   PMD  Phone: (   )    -  Fax: (   )    -    Dewayne Munson (MD), [facility]  [address] [place] [address]  Phone: [phone]  Fax: [phone] Dewayne Munson (MD), Medicine  [address] [place] [address]  Phone: [phone]  Fax: [phone]    Kevan Brownlee (), Family Medicine  [address] [place] [address]  Phone: [phone]  Fax: [phone]

## 2018-08-28 NOTE — DISCHARGE NOTE ADULT - CARE PLAN
Principal Discharge DX:	Intractable abdominal pain  Goal:	Resolved  Assessment and plan of treatment:	Workup negative for Crohn's flare  Tolerating regular diet  Follow up with PMD, GI  Secondary Diagnosis:	Depression, unspecified depression type  Assessment and plan of treatment:	Stable  Continue medications as prescribed  Secondary Diagnosis:	Crohn's disease with complication, unspecified gastrointestinal tract location  Assessment and plan of treatment:	Stable  Continue medications as prescribed

## 2018-08-28 NOTE — DISCHARGE NOTE ADULT - PLAN OF CARE
Resolved Workup negative for Crohn's flare  Tolerating regular diet  Follow up with PMD, GI Stable  Continue medications as prescribed

## 2018-08-28 NOTE — CONSULT NOTE ADULT - ASSESSMENT
ABDOMINAL PAIN ? ETIOLOGY likely Irritable Bowel syndrome   no current evidence of Crohn's ( active or quiescent

## 2018-08-28 NOTE — DISCHARGE NOTE ADULT - NSTOBACCONEVERSMOKERY/N_GEN_A
Patient is a 61 year old male with a history of HFrEF 10-15% due to ischemic cardiomyopathy, s/p AICD, ?atrial fibrillation, hypertension, diabetes mellitus type 2, gout, and CKD for whom nephrology was called for [facility] from [facility] requiring hemodialysis. Yes

## 2018-08-28 NOTE — DISCHARGE NOTE ADULT - PROVIDER TOKENS
FREE:[LAST:[Dr. Ramsey],PHONE:[(   )    -],FAX:[(   )    -],ADDRESS:[PMD]],TOKEN:'[id number]' TOKEN:'[id number]',TOKEN:'[id number]'

## 2018-08-28 NOTE — DISCHARGE NOTE ADULT - SECONDARY DIAGNOSIS.
Depression, unspecified depression type Crohn's disease with complication, unspecified gastrointestinal tract location

## 2018-08-28 NOTE — DISCHARGE NOTE ADULT - NS AS DC FOLLOWUP STROKE INST
Take your medications exactly as prescribed. Having your pain under control will help increase activity, improve deep breathing and coughing and prevent complications like pneumonia and blood clots in your legs. Some of the common side effects of pain medications are nausea, vomiting, itching, rash and upset stomach. Contact your doctor if you develop these or any other unusual systoms. Eat a diet rich in fiber and drink plenty of oral fluids. Use other pain management methods like, cold and warm applications, elevation of affected body part, listening to music, watching TV, yoga, etc.

## 2018-08-28 NOTE — DISCHARGE NOTE ADULT - MEDICATION SUMMARY - MEDICATIONS TO STOP TAKING
I will STOP taking the medications listed below when I get home from the hospital:    predniSONE 20 mg oral tablet  -- 2 tab(s) by mouth once a day    fluconazole 100 mg oral tablet  -- 1 tab(s) by mouth once a day    ondansetron 4 mg oral tablet  -- 1 tab(s) by mouth every 8 hours, As Needed    predniSONE 20 mg oral tablet  -- 2 tab(s) by mouth once a day    Percocet 5/325 oral tablet  -- 1 tab(s) by mouth every 6 hours MDD:3, as needed for breakthrough pain  -- Caution federal law prohibits the transfer of this drug to any person other  than the person for whom it was prescribed.  May cause drowsiness.  Alcohol may intensify this effect.  Use care when operating dangerous machinery.  This prescription cannot be refilled.  This product contains acetaminophen.  Do not use  with any other product containing acetaminophen to prevent possible liver damage.  Using more of this medication than prescribed may cause serious breathing problems. I will STOP taking the medications listed below when I get home from the hospital:    predniSONE 20 mg oral tablet  -- 2 tab(s) by mouth once a day    fluconazole 100 mg oral tablet  -- 1 tab(s) by mouth once a day    ondansetron 4 mg oral tablet  -- 1 tab(s) by mouth every 8 hours, As Needed    predniSONE 20 mg oral tablet  -- 2 tab(s) by mouth once a day

## 2018-08-28 NOTE — CONSULT NOTE ADULT - SUBJECTIVE AND OBJECTIVE BOX
Chief Complaint:  Patient is a 47y old  Male who presents with a chief complaint of N/V Abdominal pain (28 Aug 2018 12:05)      HPI:  · HPI : 48 y/o male hx crohn's disease, barretts esophagus, recent admission for crohn's flare last montha nd diagnosed with candida esophagitis s/p several weeks of diflucan that pt finished c/o 5 days of generalized abd pain more on left, with vomiting and diarrhea including some bloody stool yesterday, all typical of his flares but states this is a more severe flare. Has some mild weakness and some back discomfort. No fever, cp, sob or other symptoms.    ROS: No fever/chills. No eye pain/changes in vision, No ear pain/sore throat/dysphagia, No chest pain/palpitations. No SOB/cough/.   No headache. No Dizziness.    No rashes or breaks in skin. No numbness/tingling/weakness. (27 Aug 2018 08:05)  s/p COLONOSCOPY with DR Russ Aug 9th  Negative for Crohns by pathology     PMH/PSH:PAST MEDICAL & SURGICAL HISTORY:  Alfaro's esophagus without dysplasia  Depression, unspecified depression type  Crohn's disease with complication, unspecified gastrointestinal tract location  ACL (anterior cruciate ligament) tear      Allergies:  No Known Allergies      Medications:  enoxaparin Injectable 40 milliGRAM(s) SubCutaneous every 24 hours  escitalopram 10 milliGRAM(s) Oral daily  mesalamine ER Capsule 1000 milliGRAM(s) Oral four times a day  methylPREDNISolone sodium succinate Injectable 100 milliGRAM(s) IV Push every 6 hours  nicotine  Polacrilex Gum 4 milliGRAM(s) Oral every 8 hours PRN  nicotine -  14 mG/24Hr(s) Patch 1 patch Transdermal daily  ondansetron Injectable 4 milliGRAM(s) IV Push every 6 hours PRN  oxyCODONE    5 mG/acetaminophen 325 mG 1 Tablet(s) Oral every 6 hours PRN  pantoprazole  Injectable 40 milliGRAM(s) IV Push daily  rifaximin 550 milliGRAM(s) Oral two times a day      Review of Systems:    General:  No weight loss, fevers, chills, night sweats, fatigue,   Eyes:  Good vision, no reported pain  ENT:  No sore throat, pain, runny nose, dysphagia  CV:  No pain, palpitations, hypo/hypertension  Resp:  No dyspnea, cough, tachypnea, wheezing  GI:  +pain, No nausea, No vomiting, No diarrhea, No constipation, No pruritis, No rectal bleeding, No tarry stools, No dysphagia,  :  No pain, bleeding, incontinence, nocturia  Muscle:  No pain, weakness  Breast:  No pain, abscess, mass, discharge  Neuro:  No weakness, tingling, memory problems  Psych:  No fatigue, insomnia, mood problems, depression  Endocrine:  No polyuria, polydipsia, cold/heat intolerance  Heme:  No petechiae, ecchymosis, easy bruisability  Skin:  No rash, tattoos, scars, edema    Relevant Family History:   FAMILY HISTORY:  Family history of diabetes mellitus in maternal grandmother (Mother, Grandparent)  Family history of COPD (chronic obstructive pulmonary disease) (Father)  Family history of colon cancer in father (Father)  Family history of hypertension in father (Father, Mother, Grandparent)      Relevant Social History: Alcohol ( -) , Tobacco ( -) , Illicit drugs (- )     Physical Exam:    Vital Signs:  Vital Signs Last 24 Hrs  T(C): 36.4 (28 Aug 2018 11:30), Max: 37.1 (27 Aug 2018 17:28)  T(F): 97.5 (28 Aug 2018 11:30), Max: 98.7 (27 Aug 2018 17:28)  HR: 87 (28 Aug 2018 11:30) (72 - 87)  BP: 137/84 (28 Aug 2018 11:30) (124/80 - 142/91)  BP(mean): --  RR: 18 (28 Aug 2018 11:30) (18 - 18)  SpO2: 95% (28 Aug 2018 11:30) (95% - 98%)  Daily     Daily Weight in k.6 (28 Aug 2018 05:39)    General:  Appears stated age, well-groomed, well-nourished, no distress  HEENT:  NC/AT,  conjunctivae clear and pink, no thyromegaly, nodules, adenopathy, no JVD, anicteric sclera  Chest:  Full & symmetric excursion, no increased effort, breath sounds clear  Cardiovascular:  Regular rhythm, S1, S2, no murmur/rub/S3/S4, no abdominal bruit, no edema  Abdomen:  Soft, non tender, non distended, normoactive bowel sounds,  no masses , no hepatosplenomegaly, no signs of chronic liver disease  Extremities:  no cyanosis, clubbing or edema  Skin:  No rash/erythema/ecchymoses/petechiae/wounds/abscess/warm/dry  Neuro/Psych:  Alert, oriented, no asterixis, no tremor, no encephalopathy  Rectal Guaiac (-)   Laboratory:                          14.7   11.55 )-----------( 240      ( 28 Aug 2018 06:21 )             43.0         140  |  101  |  9   ----------------------------<  135<H>  4.4   |  30  |  0.90    Ca    8.8      28 Aug 2018 06:21    TPro  7.0  /  Alb  3.4  /  TBili  0.7  /  DBili  x   /  AST  30  /  ALT  29  /  AlkPhos  118      LIVER FUNCTIONS - ( 28 Aug 2018 06:21 )  Alb: 3.4 g/dL / Pro: 7.0 gm/dL / ALK PHOS: 118 U/L / ALT: 29 U/L / AST: 30 U/L / GGT: x             Urinalysis Basic - ( 27 Aug 2018 03:32 )    Color: Yellow / Appearance: Clear / S.015 / pH: x  Gluc: x / Ketone: Negative  / Bili: Negative / Urobili: Negative mg/dL   Blood: x / Protein: 30 mg/dL / Nitrite: Negative   Leuk Esterase: Negative / RBC: 3-5 /HPF / WBC x   Sq Epi: x / Non Sq Epi: x / Bacteria: x        Lipase serum97 U/L       Intake and Output    18 @ 07:18 @ 07:00  --------------------------------------------------------  IN: 690 mL / OUT: 400 mL / NET: 290 mL    18 @ 07:01  -  18 @ 16:18  --------------------------------------------------------  IN: 420 mL / OUT: 0 mL / NET: 420 mL        Imaging:  EXAM:  CT ABDOMEN AND PELVIS IC                        PROCEDURE DATE:  2018    INTERPRETATION:  CT ABDOMEN AND PELVIS WITH CONTRAST    INDICATION: Abdominal pain. Possible Crohn's flare.    TECHNIQUE: Contrast enhanced CT of the abdomen and pelvis.     90 mL of Omnipaque 350 contrast material was injected IV.    COMPARISON: 2018     FINDINGS:    Lower Thorax: No consolidation or effusion.        Liver: No suspicious lesions.      Biliary: No dilatation.      Spleen: No suspicious lesions.      Pancreas: No inflammatory changes or ductal dilatation.      Adrenals: Normal.      Kidneys: No hydronephrosis or solid mass.      Vessels: Normal caliber.        GI tract: No evidence of small bowel obstruction. No wall thickening or   inflammatory changes.        Peritoneum/retroperitoneum and mesentery: No free air. No organized fluid   collection. No adenopathy.        Pelvic organs/Bladder: No pelvic masses. Bladder is normal.        Abdominal wall: Unremarkable.  Bones and soft tissues: No destructive lesion. Grade 1 anterolisthesis of   L5 on S1.    IMPRESSION:    No bowel obstruction or inflammation.      SAGRARIO CONCEPCION M.D., ATTENDING RADIOLOGIST  This document has been electronically signed. Aug 27 2018  2:23AM

## 2018-08-28 NOTE — DISCHARGE NOTE ADULT - MEDICATION SUMMARY - MEDICATIONS TO TAKE
I will START or STAY ON the medications listed below when I get home from the hospital:    mesalamine 250 mg oral capsule, extended release  -- 4 cap(s) by mouth 4 times a day   -- Swallow whole.  Do not crush.    -- Indication: For IBD    Tylenol 325 mg oral capsule  -- 2 cap(s) by mouth every 6 hours, as needed for pain  -- Indication: For Pain    Lexapro 10 mg oral tablet  -- 1 tab(s) by mouth once a day  -- Indication: For Depression, unspecified depression type    dicyclomine 10 mg oral capsule  -- 1 cap(s) by mouth 2 times a day (before meals)  -- Indication: For IBD    rifAXIMin 550 mg oral tablet  -- 1 tab(s) by mouth 2 times a day  -- Indication: For IBD    omeprazole 40 mg oral delayed release capsule  -- 1 cap(s) by mouth once a day   -- Indication: For esophagitis I will START or STAY ON the medications listed below when I get home from the hospital:    mesalamine 250 mg oral capsule, extended release  -- 4 cap(s) by mouth 4 times a day   -- Swallow whole.  Do not crush.    -- Indication: For IBD    Tylenol 325 mg oral capsule  -- 2 cap(s) by mouth every 6 hours, as needed for pain  -- Indication: For Pain    oxyCODONE-acetaminophen 5 mg-325 mg oral tablet  -- 1 tab(s) by mouth every 6 hours, As needed, Moderate Pain (4 - 6) MDD:4  -- Indication: For Pain    Lexapro 10 mg oral tablet  -- 1 tab(s) by mouth once a day  -- Indication: For Depression, unspecified depression type    dicyclomine 10 mg oral capsule  -- 1 cap(s) by mouth 2 times a day (before meals)  -- Indication: For IBD    rifAXIMin 550 mg oral tablet  -- 1 tab(s) by mouth 2 times a day  -- Indication: For IBD    omeprazole 40 mg oral delayed release capsule  -- 1 cap(s) by mouth once a day   -- Indication: For esophagitis

## 2018-08-28 NOTE — DISCHARGE NOTE ADULT - CARE PROVIDERS DIRECT ADDRESSES
,DirectAddress_Unknown,[email or web address]

## 2018-08-28 NOTE — DISCHARGE NOTE ADULT - NSTOBACCOHOTLINE_GEN_A_CS
[facility] Smokers Quitline ([phone]) [facility] Smokers Quitline ([phone])

## 2018-08-29 VITALS
TEMPERATURE: 98 F | DIASTOLIC BLOOD PRESSURE: 94 MMHG | OXYGEN SATURATION: 98 % | SYSTOLIC BLOOD PRESSURE: 136 MMHG | HEART RATE: 86 BPM | RESPIRATION RATE: 16 BRPM

## 2018-08-29 DIAGNOSIS — R10.9 UNSPECIFIED ABDOMINAL PAIN: ICD-10-CM

## 2018-08-29 DIAGNOSIS — R10.31 RIGHT LOWER QUADRANT PAIN: ICD-10-CM

## 2018-08-29 LAB
ALBUMIN SERPL ELPH-MCNC: 3.2 G/DL — LOW (ref 3.3–5)
ALP SERPL-CCNC: 102 U/L — SIGNIFICANT CHANGE UP (ref 40–120)
ALT FLD-CCNC: 31 U/L — SIGNIFICANT CHANGE UP (ref 12–78)
ANION GAP SERPL CALC-SCNC: 10 MMOL/L — SIGNIFICANT CHANGE UP (ref 5–17)
AST SERPL-CCNC: 30 U/L — SIGNIFICANT CHANGE UP (ref 15–37)
BILIRUB SERPL-MCNC: 0.5 MG/DL — SIGNIFICANT CHANGE UP (ref 0.2–1.2)
BUN SERPL-MCNC: 11 MG/DL — SIGNIFICANT CHANGE UP (ref 7–23)
CALCIUM SERPL-MCNC: 8.6 MG/DL — SIGNIFICANT CHANGE UP (ref 8.5–10.1)
CHLORIDE SERPL-SCNC: 103 MMOL/L — SIGNIFICANT CHANGE UP (ref 96–108)
CO2 SERPL-SCNC: 29 MMOL/L — SIGNIFICANT CHANGE UP (ref 22–31)
CREAT SERPL-MCNC: 0.86 MG/DL — SIGNIFICANT CHANGE UP (ref 0.5–1.3)
GLUCOSE SERPL-MCNC: 118 MG/DL — HIGH (ref 70–99)
HCT VFR BLD CALC: 42.8 % — SIGNIFICANT CHANGE UP (ref 39–50)
HGB BLD-MCNC: 14.5 G/DL — SIGNIFICANT CHANGE UP (ref 13–17)
MCHC RBC-ENTMCNC: 32.7 PG — SIGNIFICANT CHANGE UP (ref 27–34)
MCHC RBC-ENTMCNC: 33.9 GM/DL — SIGNIFICANT CHANGE UP (ref 32–36)
MCV RBC AUTO: 96.6 FL — SIGNIFICANT CHANGE UP (ref 80–100)
NRBC # BLD: 0 /100 WBCS — SIGNIFICANT CHANGE UP (ref 0–0)
PLATELET # BLD AUTO: 226 K/UL — SIGNIFICANT CHANGE UP (ref 150–400)
POTASSIUM SERPL-MCNC: 4.3 MMOL/L — SIGNIFICANT CHANGE UP (ref 3.5–5.3)
POTASSIUM SERPL-SCNC: 4.3 MMOL/L — SIGNIFICANT CHANGE UP (ref 3.5–5.3)
PROT SERPL-MCNC: 6.5 GM/DL — SIGNIFICANT CHANGE UP (ref 6–8.3)
RBC # BLD: 4.43 M/UL — SIGNIFICANT CHANGE UP (ref 4.2–5.8)
RBC # FLD: 12.5 % — SIGNIFICANT CHANGE UP (ref 10.3–14.5)
SODIUM SERPL-SCNC: 142 MMOL/L — SIGNIFICANT CHANGE UP (ref 135–145)
WBC # BLD: 12.62 K/UL — HIGH (ref 3.8–10.5)
WBC # FLD AUTO: 12.62 K/UL — HIGH (ref 3.8–10.5)

## 2018-08-29 RX ADMIN — Medication 100 MILLIGRAM(S): at 05:52

## 2018-08-29 RX ADMIN — Medication 100 MILLIGRAM(S): at 12:01

## 2018-08-29 RX ADMIN — Medication 1000 MILLIGRAM(S): at 12:02

## 2018-08-29 RX ADMIN — OXYCODONE AND ACETAMINOPHEN 1 TABLET(S): 5; 325 TABLET ORAL at 05:59

## 2018-08-29 RX ADMIN — Medication 1 PATCH: at 12:02

## 2018-08-29 RX ADMIN — ESCITALOPRAM OXALATE 10 MILLIGRAM(S): 10 TABLET, FILM COATED ORAL at 12:01

## 2018-08-29 RX ADMIN — Medication 1 PATCH: at 12:12

## 2018-08-29 RX ADMIN — OXYCODONE AND ACETAMINOPHEN 1 TABLET(S): 5; 325 TABLET ORAL at 11:59

## 2018-08-29 RX ADMIN — OXYCODONE AND ACETAMINOPHEN 1 TABLET(S): 5; 325 TABLET ORAL at 06:28

## 2018-08-29 RX ADMIN — PANTOPRAZOLE SODIUM 40 MILLIGRAM(S): 20 TABLET, DELAYED RELEASE ORAL at 12:00

## 2018-08-29 RX ADMIN — Medication 10 MILLIGRAM(S): at 06:03

## 2018-08-29 RX ADMIN — Medication 1000 MILLIGRAM(S): at 05:52

## 2018-08-29 RX ADMIN — OXYCODONE AND ACETAMINOPHEN 1 TABLET(S): 5; 325 TABLET ORAL at 12:30

## 2018-08-29 NOTE — PROGRESS NOTE ADULT - PROBLEM SELECTOR PROBLEM 1
Crohn's disease with complication, unspecified gastrointestinal tract location
Abdominal pain
Crohn's disease with complication, unspecified gastrointestinal tract location

## 2018-08-29 NOTE — PROGRESS NOTE ADULT - PROBLEM SELECTOR PROBLEM 2
Depression, unspecified depression type
Depression, unspecified depression type
Right lower quadrant abdominal pain

## 2018-08-29 NOTE — PROGRESS NOTE ADULT - ASSESSMENT
IMPROVE VTE Individual Risk Assessment    RISK                                                                Points    [  ] Previous VTE                                                  3    [  ] Thrombophilia                                               2    [  ] Lower limb paralysis                                      2        (unable to hold up >15 seconds)      [  ] Current Cancer                                              2         (within 6 months)    [  ] Immobilization > 24 hrs                                1    [  ] ICU/CCU stay > 24 hours                              1     [  ] Age > 60                                                      1    IMPROVE VTE Score ____0_____    Admitted for N/V and pain abdomen. Had colonoscopy done recently, report pending. Will have GI follow the patient. Start on solu medrol IV.   08/28/18 : No vomiting. Start back on Pentasa and Xinafax.
46 yo wm with abdominal pain. Recent colonoscopy negative for active Crohn's  disease, Normal CT
IMPROVE VTE Individual Risk Assessment    RISK                                                                Points    [  ] Previous VTE                                                  3    [  ] Thrombophilia                                               2    [  ] Lower limb paralysis                                      2        (unable to hold up >15 seconds)      [  ] Current Cancer                                              2         (within 6 months)    [  ] Immobilization > 24 hrs                                1    [  ] ICU/CCU stay > 24 hours                              1     [  ] Age > 60                                                      1    IMPROVE VTE Score ____0_____    Admitted for N/V and pain abdomen. Had colonoscopy done recently, report pending. Will have GI follow the patient. Start on solu medrol IV.   08/28/18 : No vomiting. Start back on Pentasa and Xinafax.  08/29/18 : GI consult noted. Started on Bentyl with pain relief. Does not want to eat because the food is not tasty enough. Cleared by GI for discharge. Discharge home, F/U with GI and PMD.

## 2018-08-29 NOTE — PROGRESS NOTE ADULT - PROBLEM SELECTOR PLAN 1
Solumedrol. Mesalamine when able to take PO meds. GI consult
No acute findings noted. Advance diet and continue antispasmotics
Solumedrol. Mesalamine when able to take PO meds. GI consult

## 2018-08-29 NOTE — PROGRESS NOTE ADULT - SUBJECTIVE AND OBJECTIVE BOX
Patient is a 47y old  Male who presents with a chief complaint of N/V Abdominal pain (28 Aug 2018 12:05)      INTERVAL HPI/OVERNIGHT EVENTS: Still has the pain LLQ. Nausea no vomiting    MEDICATIONS  (STANDING):  enoxaparin Injectable 40 milliGRAM(s) SubCutaneous every 24 hours  escitalopram 10 milliGRAM(s) Oral daily  methylPREDNISolone sodium succinate Injectable 100 milliGRAM(s) IV Push every 6 hours  nicotine -  14 mG/24Hr(s) Patch 1 patch Transdermal daily  pantoprazole  Injectable 40 milliGRAM(s) IV Push daily    MEDICATIONS  (PRN):  nicotine  Polacrilex Gum 4 milliGRAM(s) Oral every 8 hours PRN breakthrough cravings  ondansetron Injectable 4 milliGRAM(s) IV Push every 6 hours PRN Nausea and/or Vomiting  oxyCODONE    5 mG/acetaminophen 325 mG 1 Tablet(s) Oral every 6 hours PRN Moderate Pain (4 - 6)      Allergies    No Known Allergies    Intolerances        REVIEW OF SYSTEMS:  CONSTITUTIONAL: No fever, weight loss, or fatigue  EYES: No eye pain, visual disturbances, or discharge  ENMT:  No difficulty hearing, tinnitus, vertigo; No sinus or throat pain  NECK: No pain or stiffness  BREASTS: No pain, masses, or nipple discharge  RESPIRATORY: No cough, wheezing, chills or hemoptysis; No shortness of breath  CARDIOVASCULAR: No chest pain, palpitations, dizziness, or leg swelling  GASTROINTESTINAL: No abdominal or epigastric pain. No nausea, vomiting, or hematemesis; No diarrhea or constipation. No melena or hematochezia.  GENITOURINARY: No dysuria, frequency, hematuria, or incontinence  NEUROLOGICAL: No headaches, memory loss, loss of strength, numbness, or tremors  SKIN: No itching, burning, rashes, or lesions   LYMPH NODES: No enlarged glands  ENDOCRINE: No heat or cold intolerance; No hair loss  MUSCULOSKELETAL: No joint pain or swelling; No muscle, back, or extremity pain  PSYCHIATRIC: No depression, anxiety, mood swings, or difficulty sleeping  HEME/LYMPH: No easy bruising, or bleeding gums  ALLERGY AND IMMUNOLOGIC: No hives or eczema    Vital Signs Last 24 Hrs  T(C): 36.4 (28 Aug 2018 11:30), Max: 37.1 (27 Aug 2018 17:28)  T(F): 97.5 (28 Aug 2018 11:30), Max: 98.7 (27 Aug 2018 17:28)  HR: 87 (28 Aug 2018 11:30) (72 - 87)  BP: 137/84 (28 Aug 2018 11:30) (124/80 - 142/91)  BP(mean): --  RR: 18 (28 Aug 2018 11:30) (18 - 18)  SpO2: 95% (28 Aug 2018 11:30) (95% - 98%)    PHYSICAL EXAM:  GENERAL: NAD, well-groomed, well-developed  HEAD:  Atraumatic, Normocephalic  EYES: EOMI, PERRL, conjunctiva and sclera clear  ENMT:  Moist mucous membranes, Good dentition, No lesions  NECK: Supple, No JVD, Normal thyroid  NERVOUS SYSTEM:  Alert & Oriented X3, Good concentration; Motor Strength 5/5 B/L upper and lower extremities; DTRs 2+ intact and symmetric  CHEST/LUNG: Clear to percussion bilaterally; No rales, rhonchi, wheezing, or rubs  HEART: Regular rate and rhythm; No murmurs, rubs, or gallops  ABDOMEN: Soft, LLQ tenderness, Nondistended; Bowel sounds present  EXTREMITIES:  2+ Peripheral Pulses, No clubbing, cyanosis, or edema  LYMPH: No lymphadenopathy noted  SKIN: No rashes or lesions    LABS:                        14.7   11.55 )-----------( 240      ( 28 Aug 2018 06:21 )             43.0         140  |  101  |  9   ----------------------------<  135<H>  4.4   |  30  |  0.90    Ca    8.8      28 Aug 2018 06:21    TPro  7.0  /  Alb  3.4  /  TBili  0.7  /  DBili  x   /  AST  30  /  ALT  29  /  AlkPhos  118        Urinalysis Basic - ( 27 Aug 2018 03:32 )    Color: Yellow / Appearance: Clear / S.015 / pH: x  Gluc: x / Ketone: Negative  / Bili: Negative / Urobili: Negative mg/dL   Blood: x / Protein: 30 mg/dL / Nitrite: Negative   Leuk Esterase: Negative / RBC: 3-5 /HPF / WBC x   Sq Epi: x / Non Sq Epi: x / Bacteria: x      CAPILLARY BLOOD GLUCOSE          Culture - Urine (collected 18 @ 08:45)  Source: .Urine Clean Catch (Midstream)  Final Report (18 @ 07:18):    No growth          RADIOLOGY & ADDITIONAL TESTS:  < from: CT Abdomen and Pelvis w/ IV Cont (18 @ 02:11) >  IMPRESSION:    No bowel obstruction or inflammation.        < end of copied text >    Imaging Personally Reviewed:  [x ] YES  [ ] NO    Consultant(s) Notes Reviewed:  [ ] YES  [ ] NO    Care Discussed with Consultants/Other Providers [ ] YES  [ ] NO    PROBLEMS:  INTRACTABLE ABDOMINAL PAIN  ABDOMINAL PAIN  Intractable abdominal pain  Intractable abdominal pain  Depression, unspecified depression type  Crohn's disease with complication, unspecified gastrointestinal tract location      Care discussed with family,         [  ]   yes  [  ]  No    imp:    stable[ ]    unstable[  ]     improving [   ]       unchanged  [  ]                Plans:  Continue present plans  [  ]               New consult [  ]   specialty  .......               order judit[  ]    test name.                  Discharge Planning  [  ]
Gastroenterology  Patient seen and examined bedside resting comfortably.  c/o abdominal pain in rlq   pain better with dicyclomine  Denies nausea and vomiting. Tolerating diet.  Normal flatus/BM.     T(F): 97.2 (08-29-18 @ 05:26), Max: 97.5 (08-28-18 @ 11:30)  HR: 76 (08-29-18 @ 05:26) (73 - 87)  BP: 134/89 (08-29-18 @ 05:26) (125/82 - 150/99)  RR: 18 (08-29-18 @ 05:26) (18 - 18)  SpO2: 98% (08-29-18 @ 05:26) (95% - 98%)  Wt(kg): --  CAPILLARY BLOOD GLUCOSE        PHYSICAL EXAM:  General: NAD, WDWN.   Neuro:  Alert and responsive  HEENT: NCAT, EOMI, conjunctiva clear  CV: +S1+S2 regular rate and rhythm  Lung: clear to ausculation bilaterally, respirations nonlabored, good inspiratory effort  Abdomen: soft, NonTender, No distention Normal active BS  Extremities: no cyanosis, clubbing or edema    LABS:                        14.5   12.62 )-----------( 226      ( 29 Aug 2018 06:27 )             42.8     08-29    142  |  103  |  11  ----------------------------<  118<H>  4.3   |  29  |  0.86    Ca    8.6      29 Aug 2018 06:27    TPro  6.5  /  Alb  3.2<L>  /  TBili  0.5  /  DBili  x   /  AST  30  /  ALT  31  /  AlkPhos  102  08-29    LIVER FUNCTIONS - ( 29 Aug 2018 06:27 )  Alb: 3.2 g/dL / Pro: 6.5 gm/dL / ALK PHOS: 102 U/L / ALT: 31 U/L / AST: 30 U/L / GGT: x             I&O's Detail    28 Aug 2018 07:01  -  29 Aug 2018 07:00  --------------------------------------------------------  IN:    Oral Fluid: 660 mL  Total IN: 660 mL    OUT:    Voided: 400 mL  Total OUT: 400 mL    Total NET: 260 mL        08-29 @ 06:27    142 | 103 | 11  /8.6 | -- | --  _______________________/  4.3 | 29 | 0.86                           \par
Patient is a 47y old  Male who presents with a chief complaint of N/V Abdominal pain (28 Aug 2018 12:05)      INTERVAL HPI/OVERNIGHT EVENTS: Feels better    MEDICATIONS  (STANDING):  dicyclomine 10 milliGRAM(s) Oral two times a day before meals  enoxaparin Injectable 40 milliGRAM(s) SubCutaneous every 24 hours  escitalopram 10 milliGRAM(s) Oral daily  mesalamine ER Capsule 1000 milliGRAM(s) Oral four times a day  methylPREDNISolone sodium succinate Injectable 100 milliGRAM(s) IV Push every 6 hours  nicotine -  14 mG/24Hr(s) Patch 1 patch Transdermal daily  pantoprazole  Injectable 40 milliGRAM(s) IV Push daily  rifaximin 550 milliGRAM(s) Oral two times a day    MEDICATIONS  (PRN):  melatonin 3 milliGRAM(s) Oral at bedtime PRN Sleep  nicotine  Polacrilex Gum 4 milliGRAM(s) Oral every 8 hours PRN breakthrough cravings  ondansetron Injectable 4 milliGRAM(s) IV Push every 6 hours PRN Nausea and/or Vomiting  oxyCODONE    5 mG/acetaminophen 325 mG 1 Tablet(s) Oral every 6 hours PRN Moderate Pain (4 - 6)      Allergies    No Known Allergies    Intolerances        REVIEW OF SYSTEMS:  CONSTITUTIONAL: No fever, weight loss, or fatigue  EYES: No eye pain, visual disturbances, or discharge  ENMT:  No difficulty hearing, tinnitus, vertigo; No sinus or throat pain  NECK: No pain or stiffness  BREASTS: No pain, masses, or nipple discharge  RESPIRATORY: No cough, wheezing, chills or hemoptysis; No shortness of breath  CARDIOVASCULAR: No chest pain, palpitations, dizziness, or leg swelling  GASTROINTESTINAL: No abdominal or epigastric pain. No nausea, vomiting, or hematemesis; No diarrhea or constipation. No melena or hematochezia.  GENITOURINARY: No dysuria, frequency, hematuria, or incontinence  NEUROLOGICAL: No headaches, memory loss, loss of strength, numbness, or tremors  SKIN: No itching, burning, rashes, or lesions   LYMPH NODES: No enlarged glands  ENDOCRINE: No heat or cold intolerance; No hair loss  MUSCULOSKELETAL: No joint pain or swelling; No muscle, back, or extremity pain  PSYCHIATRIC: No depression, anxiety, mood swings, or difficulty sleeping  HEME/LYMPH: No easy bruising, or bleeding gums  ALLERGY AND IMMUNOLOGIC: No hives or eczema    Vital Signs Last 24 Hrs  T(C): 36.2 (29 Aug 2018 05:26), Max: 36.2 (28 Aug 2018 17:29)  T(F): 97.2 (29 Aug 2018 05:26), Max: 97.2 (28 Aug 2018 17:29)  HR: 76 (29 Aug 2018 05:26) (73 - 79)  BP: 134/89 (29 Aug 2018 05:26) (125/82 - 150/99)  BP(mean): --  RR: 18 (29 Aug 2018 05:26) (18 - 18)  SpO2: 98% (29 Aug 2018 05:26) (95% - 98%)    PHYSICAL EXAM:  GENERAL: NAD, well-groomed, well-developed  HEAD:  Atraumatic, Normocephalic  EYES: EOMI, PERRL, conjunctiva and sclera clear  ENMT:  Moist mucous membranes, Good dentition, No lesions  NECK: Supple, No JVD, Normal thyroid  NERVOUS SYSTEM:  Alert & Oriented X3, Good concentration; Motor Strength 5/5 B/L upper and lower extremities; DTRs 2+ intact and symmetric  CHEST/LUNG: Clear to percussion bilaterally; No rales, rhonchi, wheezing, or rubs  HEART: Regular rate and rhythm; No murmurs, rubs, or gallops  ABDOMEN: Soft, Nontender, Nondistended; Bowel sounds present  EXTREMITIES:  2+ Peripheral Pulses, No clubbing, cyanosis, or edema  LYMPH: No lymphadenopathy noted  SKIN: No rashes or lesions    LABS:                        14.5   12.62 )-----------( 226      ( 29 Aug 2018 06:27 )             42.8     08-29    142  |  103  |  11  ----------------------------<  118<H>  4.3   |  29  |  0.86    Ca    8.6      29 Aug 2018 06:27    TPro  6.5  /  Alb  3.2<L>  /  TBili  0.5  /  DBili  x   /  AST  30  /  ALT  31  /  AlkPhos  102  08-29        CAPILLARY BLOOD GLUCOSE    Culture - Urine (collected 08-27-18 @ 08:45)  Source: .Urine Clean Catch (Midstream)  Final Report (08-28-18 @ 07:18):    No growth  RADIOLOGY & ADDITIONAL TESTS:  < from: CT Abdomen and Pelvis w/ IV Cont (08.27.18 @ 02:11) >  IMPRESSION:    No bowel obstruction or inflammation.        < end of copied text >    Imaging Personally Reviewed:  [ ] YES  [ ] NO    Consultant(s) Notes Reviewed:  [ ] YES  [ ] NO    Care Discussed with Consultants/Other Providers [ ] YES  [ ] NO    PROBLEMS:  INTRACTABLE ABDOMINAL PAIN  ABDOMINAL PAIN  Intractable abdominal pain  Right lower quadrant abdominal pain  Abdominal pain  Irritable bowel syndrome, unspecified type  Alfaro's esophagus without dysplasia  Intractable abdominal pain  Depression, unspecified depression type  Crohn's disease with complication, unspecified gastrointestinal tract location      Care discussed with family,         [  ]   yes  [  ]  No    imp:    stable[ ]    unstable[  ]     improving [   ]       unchanged  [  ]                Plans:  Continue present plans  [  ]               New consult [  ]   specialty  .......               order judit[  ]    test name.                  Discharge Planning  [  ]

## 2018-09-04 DIAGNOSIS — Z79.1 LONG TERM (CURRENT) USE OF NON-STEROIDAL ANTI-INFLAMMATORIES (NSAID): ICD-10-CM

## 2018-09-04 DIAGNOSIS — Z79.891 LONG TERM (CURRENT) USE OF OPIATE ANALGESIC: ICD-10-CM

## 2018-09-04 DIAGNOSIS — Z79.2 LONG TERM (CURRENT) USE OF ANTIBIOTICS: ICD-10-CM

## 2018-09-04 DIAGNOSIS — F32.9 MAJOR DEPRESSIVE DISORDER, SINGLE EPISODE, UNSPECIFIED: ICD-10-CM

## 2018-09-04 DIAGNOSIS — K50.918 CROHN'S DISEASE, UNSPECIFIED, WITH OTHER COMPLICATION: ICD-10-CM

## 2018-09-04 DIAGNOSIS — K22.719 BARRETT'S ESOPHAGUS WITH DYSPLASIA, UNSPECIFIED: ICD-10-CM

## 2018-09-04 DIAGNOSIS — K58.9 IRRITABLE BOWEL SYNDROME WITHOUT DIARRHEA: ICD-10-CM

## 2018-09-04 DIAGNOSIS — R10.9 UNSPECIFIED ABDOMINAL PAIN: ICD-10-CM

## 2018-09-25 ENCOUNTER — INPATIENT (INPATIENT)
Facility: HOSPITAL | Age: 48
LOS: 2 days | Discharge: HOME | End: 2018-09-28
Attending: INTERNAL MEDICINE | Admitting: INTERNAL MEDICINE
Payer: MEDICAID

## 2018-09-25 VITALS
HEIGHT: 70 IN | TEMPERATURE: 98 F | OXYGEN SATURATION: 98 % | WEIGHT: 119.93 LBS | DIASTOLIC BLOOD PRESSURE: 108 MMHG | HEART RATE: 116 BPM | SYSTOLIC BLOOD PRESSURE: 152 MMHG | RESPIRATION RATE: 18 BRPM

## 2018-09-25 DIAGNOSIS — S83.519A SPRAIN OF ANTERIOR CRUCIATE LIGAMENT OF UNSPECIFIED KNEE, INITIAL ENCOUNTER: Chronic | ICD-10-CM

## 2018-09-25 DIAGNOSIS — E44.0 MODERATE PROTEIN-CALORIE MALNUTRITION: ICD-10-CM

## 2018-09-25 LAB
ALBUMIN SERPL ELPH-MCNC: 4.2 G/DL — SIGNIFICANT CHANGE UP (ref 3.3–5)
ALP SERPL-CCNC: 117 U/L — SIGNIFICANT CHANGE UP (ref 40–120)
ALT FLD-CCNC: 30 U/L — SIGNIFICANT CHANGE UP (ref 12–78)
ANION GAP SERPL CALC-SCNC: 12 MMOL/L — SIGNIFICANT CHANGE UP (ref 5–17)
AST SERPL-CCNC: 23 U/L — SIGNIFICANT CHANGE UP (ref 15–37)
BASOPHILS # BLD AUTO: 0.06 K/UL — SIGNIFICANT CHANGE UP (ref 0–0.2)
BASOPHILS NFR BLD AUTO: 0.4 % — SIGNIFICANT CHANGE UP (ref 0–2)
BILIRUB SERPL-MCNC: 0.5 MG/DL — SIGNIFICANT CHANGE UP (ref 0.2–1.2)
BUN SERPL-MCNC: 9 MG/DL — SIGNIFICANT CHANGE UP (ref 7–23)
CALCIUM SERPL-MCNC: 9 MG/DL — SIGNIFICANT CHANGE UP (ref 8.5–10.1)
CHLORIDE SERPL-SCNC: 101 MMOL/L — SIGNIFICANT CHANGE UP (ref 96–108)
CO2 SERPL-SCNC: 28 MMOL/L — SIGNIFICANT CHANGE UP (ref 22–31)
CREAT SERPL-MCNC: 0.98 MG/DL — SIGNIFICANT CHANGE UP (ref 0.5–1.3)
EOSINOPHIL # BLD AUTO: 0.05 K/UL — SIGNIFICANT CHANGE UP (ref 0–0.5)
EOSINOPHIL NFR BLD AUTO: 0.3 % — SIGNIFICANT CHANGE UP (ref 0–6)
GLUCOSE SERPL-MCNC: 91 MG/DL — SIGNIFICANT CHANGE UP (ref 70–99)
HCT VFR BLD CALC: 47.7 % — SIGNIFICANT CHANGE UP (ref 39–50)
HGB BLD-MCNC: 16.7 G/DL — SIGNIFICANT CHANGE UP (ref 13–17)
IMM GRANULOCYTES NFR BLD AUTO: 0.3 % — SIGNIFICANT CHANGE UP (ref 0–1.5)
LIDOCAIN IGE QN: 58 U/L — LOW (ref 73–393)
LYMPHOCYTES # BLD AUTO: 13.3 % — SIGNIFICANT CHANGE UP (ref 13–44)
LYMPHOCYTES # BLD AUTO: 2.02 K/UL — SIGNIFICANT CHANGE UP (ref 1–3.3)
MCHC RBC-ENTMCNC: 32.9 PG — SIGNIFICANT CHANGE UP (ref 27–34)
MCHC RBC-ENTMCNC: 35 GM/DL — SIGNIFICANT CHANGE UP (ref 32–36)
MCV RBC AUTO: 93.9 FL — SIGNIFICANT CHANGE UP (ref 80–100)
MONOCYTES # BLD AUTO: 1.24 K/UL — HIGH (ref 0–0.9)
MONOCYTES NFR BLD AUTO: 8.2 % — SIGNIFICANT CHANGE UP (ref 2–14)
NEUTROPHILS # BLD AUTO: 11.72 K/UL — HIGH (ref 1.8–7.4)
NEUTROPHILS NFR BLD AUTO: 77.5 % — HIGH (ref 43–77)
NRBC # BLD: 0 /100 WBCS — SIGNIFICANT CHANGE UP (ref 0–0)
PLATELET # BLD AUTO: 347 K/UL — SIGNIFICANT CHANGE UP (ref 150–400)
POTASSIUM SERPL-MCNC: 3.8 MMOL/L — SIGNIFICANT CHANGE UP (ref 3.5–5.3)
POTASSIUM SERPL-SCNC: 3.8 MMOL/L — SIGNIFICANT CHANGE UP (ref 3.5–5.3)
PROT SERPL-MCNC: 8.6 GM/DL — HIGH (ref 6–8.3)
RBC # BLD: 5.08 M/UL — SIGNIFICANT CHANGE UP (ref 4.2–5.8)
RBC # FLD: 13.2 % — SIGNIFICANT CHANGE UP (ref 10.3–14.5)
SODIUM SERPL-SCNC: 141 MMOL/L — SIGNIFICANT CHANGE UP (ref 135–145)
WBC # BLD: 15.14 K/UL — HIGH (ref 3.8–10.5)
WBC # FLD AUTO: 15.14 K/UL — HIGH (ref 3.8–10.5)

## 2018-09-25 PROCEDURE — 99285 EMERGENCY DEPT VISIT HI MDM: CPT

## 2018-09-25 NOTE — ED ADULT NURSE NOTE - OBJECTIVE STATEMENT
47M aaox4 ambulatory with h/o Crohns dse and Barretts esophagus p/w c/o Nausea and vomiting accompanied by abdominal pain, reports last drink of alcohol was 3 days ago.

## 2018-09-25 NOTE — ED ADULT NURSE NOTE - NSIMPLEMENTINTERV_GEN_ALL_ED
Implemented All Universal Safety Interventions:  [place] to call system. Call bell, personal items and telephone within reach. Instruct patient to call for assistance. Room bathroom lighting operational. Non-slip footwear when patient is off stretcher. Physically safe environment: no spills, clutter or unnecessary equipment. Stretcher in lowest position, wheels locked, appropriate side rails in place.

## 2018-09-25 NOTE — ED ADULT TRIAGE NOTE - CHIEF COMPLAINT QUOTE
c/o abdominal pain with n/v x 2 days, states hememesis noted last vomiting episode this evening, hx crohns

## 2018-09-26 ENCOUNTER — TRANSCRIPTION ENCOUNTER (OUTPATIENT)
Age: 48
End: 2018-09-26

## 2018-09-26 DIAGNOSIS — R10.9 UNSPECIFIED ABDOMINAL PAIN: ICD-10-CM

## 2018-09-26 DIAGNOSIS — R11.10 VOMITING, UNSPECIFIED: ICD-10-CM

## 2018-09-26 DIAGNOSIS — K58.9 IRRITABLE BOWEL SYNDROME WITHOUT DIARRHEA: ICD-10-CM

## 2018-09-26 DIAGNOSIS — K22.70 BARRETT'S ESOPHAGUS WITHOUT DYSPLASIA: ICD-10-CM

## 2018-09-26 DIAGNOSIS — F32.9 MAJOR DEPRESSIVE DISORDER, SINGLE EPISODE, UNSPECIFIED: ICD-10-CM

## 2018-09-26 LAB
ALBUMIN SERPL ELPH-MCNC: 3.5 G/DL — SIGNIFICANT CHANGE UP (ref 3.3–5)
ALBUMIN SERPL ELPH-MCNC: 3.8 G/DL — SIGNIFICANT CHANGE UP (ref 3.3–5)
ALP SERPL-CCNC: 107 U/L — SIGNIFICANT CHANGE UP (ref 40–120)
ALP SERPL-CCNC: 114 U/L — SIGNIFICANT CHANGE UP (ref 40–120)
ALT FLD-CCNC: 26 U/L — SIGNIFICANT CHANGE UP (ref 12–78)
ALT FLD-CCNC: 26 U/L — SIGNIFICANT CHANGE UP (ref 12–78)
AMYLASE P1 CFR SERPL: 55 U/L — SIGNIFICANT CHANGE UP (ref 25–115)
ANION GAP SERPL CALC-SCNC: 8 MMOL/L — SIGNIFICANT CHANGE UP (ref 5–17)
ANION GAP SERPL CALC-SCNC: 9 MMOL/L — SIGNIFICANT CHANGE UP (ref 5–17)
AST SERPL-CCNC: 22 U/L — SIGNIFICANT CHANGE UP (ref 15–37)
AST SERPL-CCNC: 23 U/L — SIGNIFICANT CHANGE UP (ref 15–37)
BASOPHILS # BLD AUTO: 0.04 K/UL — SIGNIFICANT CHANGE UP (ref 0–0.2)
BASOPHILS NFR BLD AUTO: 0.3 % — SIGNIFICANT CHANGE UP (ref 0–2)
BILIRUB SERPL-MCNC: 0.5 MG/DL — SIGNIFICANT CHANGE UP (ref 0.2–1.2)
BILIRUB SERPL-MCNC: 1 MG/DL — SIGNIFICANT CHANGE UP (ref 0.2–1.2)
BUN SERPL-MCNC: 8 MG/DL — SIGNIFICANT CHANGE UP (ref 7–23)
BUN SERPL-MCNC: 8 MG/DL — SIGNIFICANT CHANGE UP (ref 7–23)
CALCIUM SERPL-MCNC: 7.8 MG/DL — LOW (ref 8.5–10.1)
CALCIUM SERPL-MCNC: 8.1 MG/DL — LOW (ref 8.5–10.1)
CHLORIDE SERPL-SCNC: 104 MMOL/L — SIGNIFICANT CHANGE UP (ref 96–108)
CHLORIDE SERPL-SCNC: 105 MMOL/L — SIGNIFICANT CHANGE UP (ref 96–108)
CO2 SERPL-SCNC: 27 MMOL/L — SIGNIFICANT CHANGE UP (ref 22–31)
CO2 SERPL-SCNC: 28 MMOL/L — SIGNIFICANT CHANGE UP (ref 22–31)
CREAT SERPL-MCNC: 0.86 MG/DL — SIGNIFICANT CHANGE UP (ref 0.5–1.3)
CREAT SERPL-MCNC: 0.94 MG/DL — SIGNIFICANT CHANGE UP (ref 0.5–1.3)
EOSINOPHIL # BLD AUTO: 0.04 K/UL — SIGNIFICANT CHANGE UP (ref 0–0.5)
EOSINOPHIL NFR BLD AUTO: 0.3 % — SIGNIFICANT CHANGE UP (ref 0–6)
GLUCOSE SERPL-MCNC: 80 MG/DL — SIGNIFICANT CHANGE UP (ref 70–99)
GLUCOSE SERPL-MCNC: 91 MG/DL — SIGNIFICANT CHANGE UP (ref 70–99)
HCT VFR BLD CALC: 41.9 % — SIGNIFICANT CHANGE UP (ref 39–50)
HCT VFR BLD CALC: 42.8 % — SIGNIFICANT CHANGE UP (ref 39–50)
HGB BLD-MCNC: 14.6 G/DL — SIGNIFICANT CHANGE UP (ref 13–17)
HGB BLD-MCNC: 15.4 G/DL — SIGNIFICANT CHANGE UP (ref 13–17)
IMM GRANULOCYTES NFR BLD AUTO: 0.3 % — SIGNIFICANT CHANGE UP (ref 0–1.5)
LACTATE SERPL-SCNC: 1.4 MMOL/L — SIGNIFICANT CHANGE UP (ref 0.7–2)
LIDOCAIN IGE QN: 63 U/L — LOW (ref 73–393)
LYMPHOCYTES # BLD AUTO: 16.6 % — SIGNIFICANT CHANGE UP (ref 13–44)
LYMPHOCYTES # BLD AUTO: 2.38 K/UL — SIGNIFICANT CHANGE UP (ref 1–3.3)
MAGNESIUM SERPL-MCNC: 1.6 MG/DL — SIGNIFICANT CHANGE UP (ref 1.6–2.6)
MCHC RBC-ENTMCNC: 33.1 PG — SIGNIFICANT CHANGE UP (ref 27–34)
MCHC RBC-ENTMCNC: 33.7 PG — SIGNIFICANT CHANGE UP (ref 27–34)
MCHC RBC-ENTMCNC: 34.8 GM/DL — SIGNIFICANT CHANGE UP (ref 32–36)
MCHC RBC-ENTMCNC: 36 GM/DL — SIGNIFICANT CHANGE UP (ref 32–36)
MCV RBC AUTO: 93.7 FL — SIGNIFICANT CHANGE UP (ref 80–100)
MCV RBC AUTO: 95 FL — SIGNIFICANT CHANGE UP (ref 80–100)
MONOCYTES # BLD AUTO: 1.2 K/UL — HIGH (ref 0–0.9)
MONOCYTES NFR BLD AUTO: 8.4 % — SIGNIFICANT CHANGE UP (ref 2–14)
NEUTROPHILS # BLD AUTO: 10.62 K/UL — HIGH (ref 1.8–7.4)
NEUTROPHILS NFR BLD AUTO: 74.1 % — SIGNIFICANT CHANGE UP (ref 43–77)
NRBC # BLD: 0 /100 WBCS — SIGNIFICANT CHANGE UP (ref 0–0)
NRBC # BLD: 0 /100 WBCS — SIGNIFICANT CHANGE UP (ref 0–0)
PHOSPHATE SERPL-MCNC: 2.8 MG/DL — SIGNIFICANT CHANGE UP (ref 2.5–4.5)
PLATELET # BLD AUTO: 263 K/UL — SIGNIFICANT CHANGE UP (ref 150–400)
PLATELET # BLD AUTO: 284 K/UL — SIGNIFICANT CHANGE UP (ref 150–400)
POTASSIUM SERPL-MCNC: 3.8 MMOL/L — SIGNIFICANT CHANGE UP (ref 3.5–5.3)
POTASSIUM SERPL-MCNC: 4 MMOL/L — SIGNIFICANT CHANGE UP (ref 3.5–5.3)
POTASSIUM SERPL-SCNC: 3.8 MMOL/L — SIGNIFICANT CHANGE UP (ref 3.5–5.3)
POTASSIUM SERPL-SCNC: 4 MMOL/L — SIGNIFICANT CHANGE UP (ref 3.5–5.3)
PROT SERPL-MCNC: 6.9 GM/DL — SIGNIFICANT CHANGE UP (ref 6–8.3)
PROT SERPL-MCNC: 7.1 GM/DL — SIGNIFICANT CHANGE UP (ref 6–8.3)
RBC # BLD: 4.41 M/UL — SIGNIFICANT CHANGE UP (ref 4.2–5.8)
RBC # BLD: 4.57 M/UL — SIGNIFICANT CHANGE UP (ref 4.2–5.8)
RBC # FLD: 13.1 % — SIGNIFICANT CHANGE UP (ref 10.3–14.5)
RBC # FLD: 13.2 % — SIGNIFICANT CHANGE UP (ref 10.3–14.5)
SODIUM SERPL-SCNC: 140 MMOL/L — SIGNIFICANT CHANGE UP (ref 135–145)
SODIUM SERPL-SCNC: 141 MMOL/L — SIGNIFICANT CHANGE UP (ref 135–145)
WBC # BLD: 11.34 K/UL — HIGH (ref 3.8–10.5)
WBC # BLD: 14.33 K/UL — HIGH (ref 3.8–10.5)
WBC # FLD AUTO: 11.34 K/UL — HIGH (ref 3.8–10.5)
WBC # FLD AUTO: 14.33 K/UL — HIGH (ref 3.8–10.5)

## 2018-09-26 PROCEDURE — 74177 CT ABD & PELVIS W/CONTRAST: CPT | Mod: 26

## 2018-09-26 PROCEDURE — 90792 PSYCH DIAG EVAL W/MED SRVCS: CPT

## 2018-09-26 PROCEDURE — 71045 X-RAY EXAM CHEST 1 VIEW: CPT | Mod: 26

## 2018-09-26 RX ORDER — ESCITALOPRAM OXALATE 10 MG/1
10 TABLET, FILM COATED ORAL DAILY
Qty: 0 | Refills: 0 | Status: DISCONTINUED | OUTPATIENT
Start: 2018-09-26 | End: 2018-09-28

## 2018-09-26 RX ORDER — PANTOPRAZOLE SODIUM 20 MG/1
40 TABLET, DELAYED RELEASE ORAL DAILY
Qty: 0 | Refills: 0 | Status: DISCONTINUED | OUTPATIENT
Start: 2018-09-26 | End: 2018-09-28

## 2018-09-26 RX ORDER — NICOTINE POLACRILEX 2 MG
4 GUM BUCCAL EVERY 8 HOURS
Qty: 0 | Refills: 0 | Status: DISCONTINUED | OUTPATIENT
Start: 2018-09-26 | End: 2018-09-28

## 2018-09-26 RX ORDER — SODIUM CHLORIDE 9 MG/ML
2000 INJECTION INTRAMUSCULAR; INTRAVENOUS; SUBCUTANEOUS ONCE
Qty: 0 | Refills: 0 | Status: COMPLETED | OUTPATIENT
Start: 2018-09-26 | End: 2018-09-26

## 2018-09-26 RX ORDER — MESALAMINE 400 MG
1600 TABLET, DELAYED RELEASE (ENTERIC COATED) ORAL
Qty: 0 | Refills: 0 | Status: DISCONTINUED | OUTPATIENT
Start: 2018-09-26 | End: 2018-09-26

## 2018-09-26 RX ORDER — SODIUM CHLORIDE 9 MG/ML
1000 INJECTION INTRAMUSCULAR; INTRAVENOUS; SUBCUTANEOUS
Qty: 0 | Refills: 0 | Status: DISCONTINUED | OUTPATIENT
Start: 2018-09-26 | End: 2018-09-28

## 2018-09-26 RX ORDER — ONDANSETRON 8 MG/1
4 TABLET, FILM COATED ORAL ONCE
Qty: 0 | Refills: 0 | Status: COMPLETED | OUTPATIENT
Start: 2018-09-26 | End: 2018-09-26

## 2018-09-26 RX ORDER — ONDANSETRON 8 MG/1
8 TABLET, FILM COATED ORAL EVERY 8 HOURS
Qty: 0 | Refills: 0 | Status: DISCONTINUED | OUTPATIENT
Start: 2018-09-26 | End: 2018-09-28

## 2018-09-26 RX ORDER — MORPHINE SULFATE 50 MG/1
6 CAPSULE, EXTENDED RELEASE ORAL ONCE
Qty: 0 | Refills: 0 | Status: DISCONTINUED | OUTPATIENT
Start: 2018-09-26 | End: 2018-09-26

## 2018-09-26 RX ORDER — DIPHENHYDRAMINE HCL 50 MG
25 CAPSULE ORAL ONCE
Qty: 0 | Refills: 0 | Status: COMPLETED | OUTPATIENT
Start: 2018-09-26 | End: 2018-09-26

## 2018-09-26 RX ORDER — MESALAMINE 400 MG
1000 TABLET, DELAYED RELEASE (ENTERIC COATED) ORAL
Qty: 0 | Refills: 0 | Status: DISCONTINUED | OUTPATIENT
Start: 2018-09-26 | End: 2018-09-28

## 2018-09-26 RX ORDER — MORPHINE SULFATE 50 MG/1
2 CAPSULE, EXTENDED RELEASE ORAL EVERY 4 HOURS
Qty: 0 | Refills: 0 | Status: DISCONTINUED | OUTPATIENT
Start: 2018-09-26 | End: 2018-09-28

## 2018-09-26 RX ORDER — MORPHINE SULFATE 50 MG/1
1 CAPSULE, EXTENDED RELEASE ORAL ONCE
Qty: 0 | Refills: 0 | Status: DISCONTINUED | OUTPATIENT
Start: 2018-09-26 | End: 2018-09-26

## 2018-09-26 RX ORDER — NICOTINE POLACRILEX 2 MG
1 GUM BUCCAL DAILY
Qty: 0 | Refills: 0 | Status: DISCONTINUED | OUTPATIENT
Start: 2018-09-26 | End: 2018-09-28

## 2018-09-26 RX ORDER — LANOLIN ALCOHOL/MO/W.PET/CERES
3 CREAM (GRAM) TOPICAL ONCE
Qty: 0 | Refills: 0 | Status: COMPLETED | OUTPATIENT
Start: 2018-09-26 | End: 2018-09-26

## 2018-09-26 RX ORDER — POLYETHYLENE GLYCOL 3350 17 G/17G
17 POWDER, FOR SOLUTION ORAL DAILY
Qty: 0 | Refills: 0 | Status: DISCONTINUED | OUTPATIENT
Start: 2018-09-26 | End: 2018-09-28

## 2018-09-26 RX ADMIN — MORPHINE SULFATE 6 MILLIGRAM(S): 50 CAPSULE, EXTENDED RELEASE ORAL at 00:50

## 2018-09-26 RX ADMIN — ESCITALOPRAM OXALATE 10 MILLIGRAM(S): 10 TABLET, FILM COATED ORAL at 13:48

## 2018-09-26 RX ADMIN — Medication 1000 MILLIGRAM(S): at 13:47

## 2018-09-26 RX ADMIN — ONDANSETRON 4 MILLIGRAM(S): 8 TABLET, FILM COATED ORAL at 00:47

## 2018-09-26 RX ADMIN — Medication 3 MILLIGRAM(S): at 23:59

## 2018-09-26 RX ADMIN — Medication 1000 MILLIGRAM(S): at 23:59

## 2018-09-26 RX ADMIN — SODIUM CHLORIDE 75 MILLILITER(S): 9 INJECTION INTRAMUSCULAR; INTRAVENOUS; SUBCUTANEOUS at 06:41

## 2018-09-26 RX ADMIN — Medication 1000 MILLIGRAM(S): at 19:08

## 2018-09-26 RX ADMIN — ONDANSETRON 8 MILLIGRAM(S): 8 TABLET, FILM COATED ORAL at 22:22

## 2018-09-26 RX ADMIN — Medication 25 MILLIGRAM(S): at 19:07

## 2018-09-26 RX ADMIN — MORPHINE SULFATE 2 MILLIGRAM(S): 50 CAPSULE, EXTENDED RELEASE ORAL at 22:56

## 2018-09-26 RX ADMIN — SODIUM CHLORIDE 2000 MILLILITER(S): 9 INJECTION INTRAMUSCULAR; INTRAVENOUS; SUBCUTANEOUS at 00:51

## 2018-09-26 RX ADMIN — MORPHINE SULFATE 2 MILLIGRAM(S): 50 CAPSULE, EXTENDED RELEASE ORAL at 14:05

## 2018-09-26 RX ADMIN — MORPHINE SULFATE 2 MILLIGRAM(S): 50 CAPSULE, EXTENDED RELEASE ORAL at 13:54

## 2018-09-26 RX ADMIN — PANTOPRAZOLE SODIUM 40 MILLIGRAM(S): 20 TABLET, DELAYED RELEASE ORAL at 13:47

## 2018-09-26 RX ADMIN — ONDANSETRON 8 MILLIGRAM(S): 8 TABLET, FILM COATED ORAL at 13:53

## 2018-09-26 RX ADMIN — Medication 10 MILLIGRAM(S): at 19:09

## 2018-09-26 RX ADMIN — Medication 1 PATCH: at 19:09

## 2018-09-26 RX ADMIN — MORPHINE SULFATE 1 MILLIGRAM(S): 50 CAPSULE, EXTENDED RELEASE ORAL at 08:12

## 2018-09-26 RX ADMIN — MORPHINE SULFATE 2 MILLIGRAM(S): 50 CAPSULE, EXTENDED RELEASE ORAL at 23:15

## 2018-09-26 RX ADMIN — ONDANSETRON 8 MILLIGRAM(S): 8 TABLET, FILM COATED ORAL at 04:31

## 2018-09-26 RX ADMIN — MORPHINE SULFATE 1 MILLIGRAM(S): 50 CAPSULE, EXTENDED RELEASE ORAL at 07:49

## 2018-09-26 NOTE — DISCHARGE NOTE ADULT - PATIENT PORTAL LINK FT
You can access the [facility] Portal, offered by [facility], by registering with the following website: [email or web address]

## 2018-09-26 NOTE — BEHAVIORAL HEALTH ASSESSMENT NOTE - DETAILS
Alcohol use disorder (father) emotional trauma (father disowned and disinherited him prior to his death two years ago) tinnitus b/l abd pain and vomiting x 3 days

## 2018-09-26 NOTE — DISCHARGE NOTE ADULT - MEDICATION SUMMARY - MEDICATIONS TO TAKE
I will START or STAY ON the medications listed below when I get home from the hospital:    mesalamine 250 mg oral capsule, extended release  -- 4 cap(s) by mouth 4 times a day   -- Swallow whole.  Do not crush.    -- Indication: For Irritable bowel syndrome    oxyCODONE-acetaminophen 5 mg-325 mg oral tablet  -- 1 tab(s) by mouth every 6 hours, As needed, Moderate Pain (4 - 6) MDD:4  -- Indication: For Intractable abdominal pain    Lexapro 10 mg oral tablet  -- 1 tab(s) by mouth once a day  -- Indication: For Depression, unspecified depression type    ondansetron 4 mg oral tablet  -- 1 tab(s) by mouth every 4 hours x 3 days MDD:3 prn  -- Indication: For Intractable vomiting    dicyclomine 10 mg oral capsule  -- 1 cap(s) by mouth 2 times a day (before meals)  -- Indication: For Irritable bowel syndrome    rifAXIMin 550 mg oral tablet  -- 1 tab(s) by mouth 2 times a day  -- Indication: For Irritable bowel syndrome    omeprazole 40 mg oral delayed release capsule  -- 1 cap(s) by mouth once a day   -- Indication: For GERD

## 2018-09-26 NOTE — BEHAVIORAL HEALTH ASSESSMENT NOTE - SUMMARY
47 year old single, white male w/ PPHx of depression x 8 months, occasional panic attacks, with 1 suicide attempt last May via pill ingestion, no past psych hospitalizations or family hx, PMHx of Crohn's disease presents to ED c/o vomiting x3 days. Psychiatry consulted for depression.   Pt endorses h/o depressed mood, anhedonia, guilt, and anergia after his girlfriend had an  against his wishes 8 months ago and they broke up 6 months later. Pt attempted suicide in May via pill ingestion s/p alcohol binge, which he interrupted himself by telling his mother and going to the hospital, which he was medically hospitalized for w/ resulting tinnitus b/l, but was not admitted psychiatrically. Pt endorses h/o insomnia x years w/ trouble falling and staying asleep. Pt has h/o chronic pain 2/2 crohn's which he is frequently hospitalized for.  Pt endorses that his mood has improved, his anhedonia has subsided, and he has more energy x 1-2 months now and is currently managed by a psychiatric NP and a  ([phone]) who provide therapy and prescribe him lexapro 10mg qd which he has been taking x 1 month and believes has improved his mood and given him more energy. Admits continues sleep problems for which he takes melatonin 10mg qhs. 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 He is future oriented and wants to get out of the hospital.  Denies any current low mood, anhedonia, difficulty concentrating, appetite changes, anergia, psychomotor retardation, auditory/visual hallucinations, delusions, paranoia, or current SI/HI/I/P.     No acute depression or suicidality. Continue on lexapro 10mg PO qd, d/c as per medical team w/ outpt psychiatric f/u.

## 2018-09-26 NOTE — CONSULT NOTE ADULT - ASSESSMENT
48 yo wm with abdominal pain. No pathology supporting Crohn's disease though he did have a SBO attributed to an areain the Ileum not normally seen in Crohns. 2015. Upper endoscopy showed Barretts but no lesions that would cause recurrent vomiting. Not clear whether he ever received Remicade but that does not mean he had Crohns disease.

## 2018-09-26 NOTE — ED PROVIDER NOTE - PHYSICAL EXAMINATION
Gen: Alert, moderate distress, ill appearing  Head: NC, AT, PERRL, EOMI, normal lids/conjunctiva  ENT: normal hearing, patent oropharynx without erythema/exudate, uvula midline, dry mucus membranes  Neck: +supple, no tenderness/meningismus/JVD, +Trachea midline  Pulm: Bilateral BS, normal resp effort, no wheeze/stridor/retractions  CV: tachycardia, no M/R/G, +dist pulses  Abd: soft, +midabdominal tenderness, ND, +BS, no palpable masses  Mskel: no edema/erythema/cyanosis  Skin: no rash, warm/dry  Neuro: AAOx3, no apparent sensory/motor deficits, coordination intact

## 2018-09-26 NOTE — ED PROVIDER NOTE - MEDICAL DECISION MAKING DETAILS
Patient with Crohn's flare.  VSS.  labs, CT findings reviewed.  Patient still with PO intolerance and intractable abdominal pain.  Dr. Russ GI to be paged in the morning.  Patient is to be admitted to the hospital and the case was discussed with the admitting physician.  Any changes in plan, additional imaging/labs, and further work up will be at the discretion of the admitting physician.

## 2018-09-26 NOTE — BEHAVIORAL HEALTH ASSESSMENT NOTE - NSBHCHARTREVIEWVS_PSY_A_CORE FT
Vital Signs Last 24 Hrs  T(C): 36.8 (26 Sep 2018 09:06), Max: 36.9 (25 Sep 2018 21:56)  T(F): 98.2 (26 Sep 2018 09:06), Max: 98.5 (25 Sep 2018 21:56)  HR: 85 (26 Sep 2018 09:06) (72 - 116)  BP: 143/102 (26 Sep 2018 09:06) (133/95 - 152/108)  BP(mean): --  RR: 17 (26 Sep 2018 09:06) (16 - 18)  SpO2: 98% (26 Sep 2018 09:06) (95% - 98%)

## 2018-09-26 NOTE — DISCHARGE NOTE ADULT - CARE PROVIDER_API CALL
Dewayne Munson), Medicine  [address] [facility]  [address] [place] [address]  Phone: [phone]  Fax: [phone]

## 2018-09-26 NOTE — DISCHARGE NOTE ADULT - PRINCIPAL DIAGNOSIS
Crohn's disease without complication, unspecified gastrointestinal tract location Intractable vomiting

## 2018-09-26 NOTE — BEHAVIORAL HEALTH ASSESSMENT NOTE - NSBHSUICPROTECTFACT_PSY_A_CORE
Future oriented/Engaged in work or school/Supportive social network or family/Fear of death or dying due to pain/suffering/Positive therapeutic relationships/Identifies reasons for living

## 2018-09-26 NOTE — H&P ADULT - NSHPPHYSICALEXAM_GEN_ALL_CORE
PHYSICAL EXAM:    GENERAL: NAD, well-groomed, well-developed  HEAD:  Atraumatic, Normocephalic  EYES: EOMI, PERRLA, conjunctiva and sclera clear  ENMT: No tonsillar erythema, exudates, or enlargement; Moist mucous membranes, , No lesions  NECK: Supple, No JVD, Normal thyroid  NERVOUS SYSTEM:  Alert & Oriented X4, ; Motor Strength 5/5 B/L upper and lower extremities; DTRs 2+ intact and symmetric  CHEST/LUNG: Clear  bilaterally; No rales, rhonchi, wheezing, or rubs  HEART: Regular rate and rhythm; No murmurs, rubs, or gallops  ABDOMEN: Soft,  tenderness  LLQ  no  guarding  no  rebound Nondistended; no  masses Bowel sounds present  EXTREMITIES:  + Peripheral Pulses, No clubbing, cyanosis, or edema  LYMPH: No lymphadenopathy noted   RECTAL: deferred    SKIN: No rashes or lesions

## 2018-09-26 NOTE — DISCHARGE NOTE ADULT - CARE PLAN
Principal Discharge DX:	Crohn's disease without complication, unspecified gastrointestinal tract location  Goal:	Stable  Assessment and plan of treatment:	CT negative  Secondary Diagnosis:	Alfaro's esophagus without dysplasia  Assessment and plan of treatment:	Follow up with GI  Secondary Diagnosis:	Depression, unspecified depression type  Assessment and plan of treatment:	Stable  Continue medications as prescribed  Secondary Diagnosis:	Other irritable bowel syndrome  Assessment and plan of treatment:	Stable  Continue medications as prescribed  Secondary Diagnosis:	Intractable vomiting  Assessment and plan of treatment:	Resolved Principal Discharge DX:	Intractable vomiting  Goal:	Stable  Assessment and plan of treatment:	CT negative  Secondary Diagnosis:	Alfaro's esophagus without dysplasia  Assessment and plan of treatment:	Follow up with GI  Secondary Diagnosis:	Depression, unspecified depression type  Assessment and plan of treatment:	Stable  Continue medications as prescribed  Secondary Diagnosis:	Other irritable bowel syndrome  Assessment and plan of treatment:	Stable  Continue medications as prescribed  Secondary Diagnosis:	Intractable abdominal pain  Assessment and plan of treatment:	Resolved

## 2018-09-26 NOTE — BEHAVIORAL HEALTH ASSESSMENT NOTE - NSBHCHARTREVIEWLAB_PSY_A_CORE FT
14.6   11.34 )-----------( 263      ( 26 Sep 2018 07:28 )             41.9   09-26    141  |  105  |  8   ----------------------------<  80  3.8   |  28  |  0.94    Ca    7.8<L>      26 Sep 2018 07:28  Phos  2.8     09-26  Mg     1.6     09-26    TPro  6.9  /  Alb  3.5  /  TBili  1.0  /  DBili  x   /  AST  22  /  ALT  26  /  AlkPhos  114  09-26

## 2018-09-26 NOTE — DISCHARGE NOTE ADULT - HOSPITAL COURSE
patient  with  hx  oc  Crohn's  recurrant  episodes  of  abd  pain  presents  with  abd  pain  vomiting x  3  days   evaluated  in  ER  continued  with  symptoms  in  spite  of treatement.  admitted  for  further  w/u  and  treatment     Patient symptoms controlled, medically optimized, and stable for discharge. Patient to follow up with PMD and GI. patient  with  hx    recurrant  episodes  of  abd  pain  presents  with  abd  pain  vomiting x  3  days   evaluated  in  ER  continued  with  symptoms  in  spite  of treatement.  admitted  for  further  w/u  and  treatment     Patient symptoms controlled, medically optimized, and stable for discharge. Patient to follow up with PMD and GI.  no  clincal  evidence  ot  Crohn's

## 2018-09-26 NOTE — H&P ADULT - HISTORY OF PRESENT ILLNESS
patient  with  hx  oc  Crohn's  recurrant  episodes  of  abd  pain  presents  with  abd  pain  vomiting x  3  days   evaluated  in  ER  continued  with  symptoms  in  spite  of treatement.  admitted  for  further  w/u  and  treatment

## 2018-09-26 NOTE — CONSULT NOTE ADULT - SUBJECTIVE AND OBJECTIVE BOX
Chief Complaint:  Patient is a 47y old  Male who presents with a chief complaint of intractable  vomiting abd  pain      HPI:  patient  claims  hx  of  Crohn's , recurrent  episodes  of  abd  pain  presents  with  abd  pain  vomiting x  3  days   evaluated  in  ER  continued  with  symptoms  in  spite  of treatement.  Had Laparoscopy 3/15 for sbo showing an inflammatory area several cm from the TI .No pathology  avaiable. Prior colonoscopies negative for Crohn's Terminal ileum on each colonoscopy normal. Prior EGD shows  Alfaro's .      PMH/PSH:PAST MEDICAL & SURGICAL HISTORY:  Alfaro's esophagus without dysplasia  Depression, unspecified depression type  Suspected Crohn's disease with complication, unspecified gastrointestinal tract location  ACL (anterior cruciate ligament) tear      Allergies:  No Known Allergies      Medications:  dicyclomine 10 milliGRAM(s) Oral two times a day before meals  escitalopram 10 milliGRAM(s) Oral daily  mesalamine ER Capsule 1000 milliGRAM(s) Oral four times a day  morphine  - Injectable 2 milliGRAM(s) IV Push every 4 hours PRN  ondansetron Injectable 8 milliGRAM(s) IV Push every 8 hours PRN  pantoprazole  Injectable 40 milliGRAM(s) IV Push daily  polyethylene glycol 3350 17 Gram(s) Oral daily PRN  sodium chloride 0.9%. 1000 milliLiter(s) IV Continuous <Continuous>      REVIEW OF SYSTEMS:    CONSTITUTIONAL: No weakness, fevers or chills  EYES/ENT: No visual changes;  No vertigo or throat pain   NECK: No pain or stiffness  RESPIRATORY: No cough, wheezing, hemoptysis; No shortness of breath  CARDIOVASCULAR: No chest pain or palpitations  GASTROINTESTINAL: No abdominal or epigastric pain. No nausea, vomiting, or hematemesis; No diarrhea or constipation. No melena or hematochezia.  GENITOURINARY: No dysuria, frequency or hematuria  NEUROLOGICAL: No numbness or weakness  SKIN: No itching, burning, rashes, or lesions   All other review of systems is negative unless indicated above.  Relevant Family History:   FAMILY HISTORY:  Family history of diabetes mellitus in maternal grandmother (Mother, Grandparent)  Family history of COPD (chronic obstructive pulmonary disease) (Father)  Family history of colon cancer in father (Father)  Family history of hypertension in father (Father, Mother, Grandparent)      Relevant Social History:  Denies ETOH or tobacco history    Physical Exam:    Vital Signs:  Vital Signs Last 24 Hrs  T(C): 36.8 (26 Sep 2018 09:06), Max: 36.9 (25 Sep 2018 21:56)  T(F): 98.2 (26 Sep 2018 09:06), Max: 98.5 (25 Sep 2018 21:56)  HR: 85 (26 Sep 2018 09:06) (72 - 116)  BP: 143/102 (26 Sep 2018 09:06) (133/95 - 152/108)  BP(mean): --  RR: 17 (26 Sep 2018 09:06) (16 - 18)  SpO2: 98% (26 Sep 2018 09:06) (95% - 98%)  Daily Height in cm: 177.8 (25 Sep 2018 21:56)    Daily Weight in k.5 (26 Sep 2018 09:06)    Constitutional: WDWN resting comfortably in bed; NAD  HEENT: NC/AT, PERRL, EOMI, anicteric sclera, no nasal discharge; uvula midline, no oropharyngeal erythema or exudates  Neck: supple; no JVD or thyromegaly  Respiratory: CTA B/L; no W/R/R, no retractions  Cardiac: +S1/S2; RRR; no M/R/G; PMI non-displaced  Gastrointestinal: soft, NT/ND; no rebound or guarding; +BS   Extremities: , no clubbing or cyanosis; no peripheral edema  Musculoskeletal:  no joint swelling, tenderness or erythema  Vascular: 2+ radial, femoral, DP/PT pulses B/L  Skin: warm, dry and intact; no rashes, wounds, or scars  Neurologic: AAOx3; CNS grossly intact; no focal deficits no asterixis, no tremor, no encephalopathy    Laboratory:                          14.6   11.34 )-----------( 263      ( 26 Sep 2018 07:28 )             41.9         141  |  105  |  8   ----------------------------<  80  3.8   |  28  |  0.94    Ca    7.8<L>      26 Sep 2018 07:28  Phos  2.8       Mg     1.6         TPro  6.9  /  Alb  3.5  /  TBili  1.0  /  DBili  x   /  AST  22  /  ALT  26  /  AlkPhos  114      LIVER FUNCTIONS - ( 26 Sep 2018 07:28 )  Alb: 3.5 g/dL / Pro: 6.9 gm/dL / ALK PHOS: 114 U/L / ALT: 26 U/L / AST: 22 U/L / GGT: x               Amylase Serum55 U/L      Lipase serum63 U/L<L>       Ammonia--  Amylase Serum--      Lipase serum58 U/L<L>                   Imaging:  < from: CT Abdomen and Pelvis w/ IV Cont (18 @ 01:11) >  EXAM:  CT ABDOMEN AND PELVIS IC                            PROCEDURE DATE:  2018          INTERPRETATION:  CT ABDOMEN AND PELVIS DATED 2018.    CLINICAL INFORMATION:  Abdominal pain.    TECHNIQUE:  Axial CT images through the abdomen andpelvis are acquired   with administration of intravenous contrast. Images are reformatted in   the sagittal and coronal planes. 80cc of Omnipaque 350 were administered   without event.  20cc were discarded.    The study is correlated with a prior exam from 2018.    FINDINGS:    The lung bases are clear.    There is mild diffuse hepatic steatosis and a subcentimeter   low-attenuation lesion in the left hepatic lobe which is too small to   characterize. The gallbladder is normally distended. There are no   radiodense gallstones. There is no biliary ductal dilatation. The   pancreas, spleen, adrenal glands, and kidneys are unremarkable. The   urinary bladder is unremarkable in appearance. The prostate gland and   seminal vesicles are unremarkable.    There is no bowel obstruction, overt bowel wall thickening, or mesenteric   inflammatory change. A normal appendix is identified. There is no   pneumoperitoneum, ascites, or loculated collection.    There is no significant abdominal or pelvic lymphadenopathy. The   abdominal aorta is normal in caliber. There is mild atherosclerotic   calcification of the aortoiliac tree.    The osseous structures are unremarkable apart from bilateral   spondylolysis at L5 with grade 1 spondylolisthesis and degenerative   changes at L5-S1.    IMPRESSION:    No acute findings on CT the abdomen and pelvis explain patient's   abdominal pain.    < end of copied text >

## 2018-09-26 NOTE — CHART NOTE - NSCHARTNOTEFT_GEN_A_CORE
Medicine Hospitalist PA    Followed up on list, and saw that no bridge orders were placed. So pt was seen and examined c/o abdominal pain. Located left lower quadrant, states similar to his Crohn's flare ups. Follows up with Dr. Russ in GI.  Unable to tolerate PO for the last 3 days, complaining of vomiting, and dark tarry stools with constipation. Otherwise denies cp, sob, palpitations, headache, blurry vision.     Vital Signs Last 24 Hrs  T(C): 36.9 (26 Sep 2018 03:39), Max: 36.9 (25 Sep 2018 21:56)  T(F): 98.4 (26 Sep 2018 03:39), Max: 98.5 (25 Sep 2018 21:56)  HR: 94 (26 Sep 2018 03:39) (94 - 116)  BP: 133/95 (26 Sep 2018 03:39) (133/95 - 152/108)  RR: 16 (26 Sep 2018 03:39) (16 - 18)  SpO2: 95% (26 Sep 2018 03:39) (95% - 98%)    General: awake, alert & oriented x 3, NAD  HEENT: EOM intact, ERICA  CV: S1 S2  Resp: Good air entry b/l, no wheezing, no rhonchi  Abd: Soft, LLQ mild TTP, BS+  Ext: pulses grossly intact  Neuro: good finger , no facial droop     A/P: 47YOM with PMHx of Alfaro's esophagus, Crohn's disease and depression now with Crohn flare.   - Admit to Med/Surg  - NPO  - GI Consult Dr Montgomery's  - Zofran PRN  - Fecal Occult  - Labs  - Protonix  - IVF  - CT abd negative for acute findings  - Continue to monitor    Dr. Alcazar to do H&P as per ED attending.   Dr. Parkinson aware. Medicine Hospitalist PA    Followed up on list, and saw that no bridge orders were placed. So pt was seen and examined c/o abdominal pain. Located left lower quadrant, states similar to his Crohn's flare ups. Follows up with Dr. Russ in GI.  Unable to tolerate PO for the last 3 days, complaining of vomiting, and dark tarry stools with constipation. Otherwise denies cp, sob, palpitations, headache, blurry vision.     Vital Signs Last 24 Hrs  T(C): 36.9 (26 Sep 2018 03:39), Max: 36.9 (25 Sep 2018 21:56)  T(F): 98.4 (26 Sep 2018 03:39), Max: 98.5 (25 Sep 2018 21:56)  HR: 94 (26 Sep 2018 03:39) (94 - 116)  BP: 133/95 (26 Sep 2018 03:39) (133/95 - 152/108)  RR: 16 (26 Sep 2018 03:39) (16 - 18)  SpO2: 95% (26 Sep 2018 03:39) (95% - 98%)    General: awake, alert & oriented x 3, NAD  HEENT: EOM intact, ERICA  CV: S1 S2  Resp: Good air entry b/l, no wheezing, no rhonchi  Abd: Soft, LLQ mild TTP, BS+  Ext: pulses grossly intact  Neuro: good finger , no facial droop     A/P: 47YOM with PMHx of Alfaro's esophagus, Crohn's disease and depression now with Crohn flare.   - Admit to Med/Surg  - NPO  - GI Consult Dr Montgomery's  - Zofran PRN  - Fecal Occult  - Labs  - Protonix  - IVF  - CT abd negative for acute findings  - Continue to monitor    Dr. Alcazar to do H&P as per ED attending.   Dr. Parkinson aware.    D/w Dr. Alcazar, on the way.

## 2018-09-26 NOTE — DISCHARGE NOTE ADULT - CARE PROVIDERS DIRECT ADDRESSES
[email or web address]

## 2018-09-26 NOTE — H&P ADULT - ASSESSMENT
IMPROVE VTE Individual Risk Assessment    RISK                                                                Points    [  ] Previous VTE                                                  3    [  ] Thrombophilia                                               2    [  ] Lower limb paralysis                                      2        (unable to hold up >15 seconds)      [  ] Current Cancer                                              2         (within 6 months)    [  ] Immobilization > 24 hrs                                1    [  ] ICU/CCU stay > 24 hours                              1    [  ] Age > 60                                                      1    IMPROVE VTE Score ______0___

## 2018-09-26 NOTE — DISCHARGE NOTE ADULT - MEDICATION SUMMARY - MEDICATIONS TO STOP TAKING
I will STOP taking the medications listed below when I get home from the hospital:    Tylenol 325 mg oral capsule  -- 2 cap(s) by mouth every 6 hours, as needed for pain
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
## 2018-09-26 NOTE — ED PROVIDER NOTE - CARE PLAN
Principal Discharge DX:	Crohn's disease without complication, unspecified gastrointestinal tract location  Secondary Diagnosis:	Intractable abdominal pain

## 2018-09-26 NOTE — DISCHARGE NOTE ADULT - SECONDARY DIAGNOSIS.
Alfaro's esophagus without dysplasia Depression, unspecified depression type Other irritable bowel syndrome Intractable vomiting Intractable abdominal pain

## 2018-09-26 NOTE — ED PROVIDER NOTE - OBJECTIVE STATEMENT
Pertinent PMH/PSH/FHx/SHx and Review of Systems contained within:  Patient presents to the ED for abdominal pain.  Patient believes that he is having a Crohn's flare.  Follows with Dr. Russ in GI.  Unable to tolerate PO for the last 3 days, complaining of nonbloody vomiting, denies any diarrhea, has diffuse abdominal pain.  Says that he has been compliant with his medications.      ROS: No fever/chills, No headache/photophobia/eye pain/changes in vision, No ear pain/sore throat/dysphagia, No chest pain/palpitations, no SOB/cough/wheeze/stridor, No melena, no dysuria/frequency/discharge, No neck/back pain, no rash, no changes in neurological status/function.

## 2018-09-26 NOTE — H&P ADULT - NSHPLABSRESULTS_GEN_ALL_CORE
LABS:                        14.6   11.34 )-----------( 263      ( 26 Sep 2018 07:28 )             41.9     09-26    140  |  104  |  8   ----------------------------<  91  4.0   |  27  |  0.86    Ca    8.1<L>      26 Sep 2018 00:56    TPro  7.1  /  Alb  3.8  /  TBili  0.5  /  DBili  x   /  AST  23  /  ALT  26  /  AlkPhos  107  09-26        < from: CT Abdomen and Pelvis w/ IV Cont (09.26.18 @ 01:11) >    EXAM:  CT ABDOMEN AND PELVIS IC                            PROCEDURE DATE:  09/26/2018          INTERPRETATION:  CT ABDOMEN AND PELVIS DATED 9/26/2018.    CLINICAL INFORMATION:  Abdominal pain.    TECHNIQUE:  Axial CT images through the abdomen andpelvis are acquired   with administration of intravenous contrast. Images are reformatted in   the sagittal and coronal planes. 80cc of Omnipaque 350 were administered   without event.  20cc were discarded.    The study is correlated with a prior exam from 8/27/2018.    FINDINGS:    The lung bases are clear.    There is mild diffuse hepatic steatosis and a subcentimeter   low-attenuation lesion in the left hepatic lobe which is too small to   characterize. The gallbladder is normally distended. There are no   radiodense gallstones. There is no biliary ductal dilatation. The   pancreas, spleen, adrenal glands, and kidneys are unremarkable. The   urinary bladder is unremarkable in appearance. The prostate gland and   seminal vesicles are unremarkable.    There is no bowel obstruction, overt bowel wall thickening, or mesenteric   inflammatory change. A normal appendix is identified. There is no   pneumoperitoneum, ascites, or loculated collection.    There is no significant abdominal or pelvic lymphadenopathy. The   abdominal aorta is normal in caliber. There is mild atherosclerotic   calcification of the aortoiliac tree.    The osseous structures are unremarkable apart from bilateral   spondylolysis at L5 with grade 1 spondylolisthesis and degenerative   changes at L5-S1.    IMPRESSION:    No acute findings on CT the abdomen and pelvis explain patient's   abdominal pain.                  < end of copied text >

## 2018-09-26 NOTE — BEHAVIORAL HEALTH ASSESSMENT NOTE - RISK ASSESSMENT
Risk factors: single marital status, race, unemployment, limited income, recent social loss, recent suicide attempt    Protective factors: Motivated to seek and engage in treatment, future oriented, housing stability, the presence of a social and familial support system

## 2018-09-27 LAB
ALBUMIN SERPL ELPH-MCNC: 2.9 G/DL — LOW (ref 3.3–5)
ALP SERPL-CCNC: 115 U/L — SIGNIFICANT CHANGE UP (ref 40–120)
ALT FLD-CCNC: 24 U/L — SIGNIFICANT CHANGE UP (ref 12–78)
ANION GAP SERPL CALC-SCNC: 7 MMOL/L — SIGNIFICANT CHANGE UP (ref 5–17)
AST SERPL-CCNC: 32 U/L — SIGNIFICANT CHANGE UP (ref 15–37)
BILIRUB SERPL-MCNC: 1.2 MG/DL — SIGNIFICANT CHANGE UP (ref 0.2–1.2)
BUN SERPL-MCNC: 5 MG/DL — LOW (ref 7–23)
CALCIUM SERPL-MCNC: 8.2 MG/DL — LOW (ref 8.5–10.1)
CHLORIDE SERPL-SCNC: 107 MMOL/L — SIGNIFICANT CHANGE UP (ref 96–108)
CO2 SERPL-SCNC: 28 MMOL/L — SIGNIFICANT CHANGE UP (ref 22–31)
CREAT SERPL-MCNC: 0.89 MG/DL — SIGNIFICANT CHANGE UP (ref 0.5–1.3)
CRP SERPL-MCNC: 0.85 MG/DL — HIGH (ref 0–0.4)
ERYTHROCYTE [SEDIMENTATION RATE] IN BLOOD: 7 MM/HR — SIGNIFICANT CHANGE UP (ref 0–15)
GLUCOSE SERPL-MCNC: 70 MG/DL — SIGNIFICANT CHANGE UP (ref 70–99)
HCT VFR BLD CALC: 39.1 % — SIGNIFICANT CHANGE UP (ref 39–50)
HGB BLD-MCNC: 13.4 G/DL — SIGNIFICANT CHANGE UP (ref 13–17)
MCHC RBC-ENTMCNC: 33.4 PG — SIGNIFICANT CHANGE UP (ref 27–34)
MCHC RBC-ENTMCNC: 34.3 GM/DL — SIGNIFICANT CHANGE UP (ref 32–36)
MCV RBC AUTO: 97.5 FL — SIGNIFICANT CHANGE UP (ref 80–100)
NRBC # BLD: 0 /100 WBCS — SIGNIFICANT CHANGE UP (ref 0–0)
PLATELET # BLD AUTO: 207 K/UL — SIGNIFICANT CHANGE UP (ref 150–400)
POTASSIUM SERPL-MCNC: 3.6 MMOL/L — SIGNIFICANT CHANGE UP (ref 3.5–5.3)
POTASSIUM SERPL-SCNC: 3.6 MMOL/L — SIGNIFICANT CHANGE UP (ref 3.5–5.3)
PREALB SERPL-MCNC: 29 MG/DL — SIGNIFICANT CHANGE UP (ref 20–40)
PROT SERPL-MCNC: 5.9 GM/DL — LOW (ref 6–8.3)
RBC # BLD: 4.01 M/UL — LOW (ref 4.2–5.8)
RBC # FLD: 13 % — SIGNIFICANT CHANGE UP (ref 10.3–14.5)
SODIUM SERPL-SCNC: 142 MMOL/L — SIGNIFICANT CHANGE UP (ref 135–145)
WBC # BLD: 6.46 K/UL — SIGNIFICANT CHANGE UP (ref 3.8–10.5)
WBC # FLD AUTO: 6.46 K/UL — SIGNIFICANT CHANGE UP (ref 3.8–10.5)

## 2018-09-27 RX ADMIN — ONDANSETRON 8 MILLIGRAM(S): 8 TABLET, FILM COATED ORAL at 11:05

## 2018-09-27 RX ADMIN — Medication 1 PATCH: at 12:24

## 2018-09-27 RX ADMIN — Medication 10 MILLIGRAM(S): at 06:06

## 2018-09-27 RX ADMIN — ESCITALOPRAM OXALATE 10 MILLIGRAM(S): 10 TABLET, FILM COATED ORAL at 11:05

## 2018-09-27 RX ADMIN — Medication 1000 MILLIGRAM(S): at 06:06

## 2018-09-27 RX ADMIN — MORPHINE SULFATE 2 MILLIGRAM(S): 50 CAPSULE, EXTENDED RELEASE ORAL at 11:22

## 2018-09-27 RX ADMIN — SODIUM CHLORIDE 75 MILLILITER(S): 9 INJECTION INTRAMUSCULAR; INTRAVENOUS; SUBCUTANEOUS at 17:05

## 2018-09-27 RX ADMIN — Medication 1000 MILLIGRAM(S): at 17:05

## 2018-09-27 RX ADMIN — MORPHINE SULFATE 2 MILLIGRAM(S): 50 CAPSULE, EXTENDED RELEASE ORAL at 11:07

## 2018-09-27 RX ADMIN — PANTOPRAZOLE SODIUM 40 MILLIGRAM(S): 20 TABLET, DELAYED RELEASE ORAL at 11:05

## 2018-09-27 RX ADMIN — Medication 10 MILLIGRAM(S): at 17:05

## 2018-09-27 RX ADMIN — MORPHINE SULFATE 2 MILLIGRAM(S): 50 CAPSULE, EXTENDED RELEASE ORAL at 22:00

## 2018-09-27 RX ADMIN — MORPHINE SULFATE 2 MILLIGRAM(S): 50 CAPSULE, EXTENDED RELEASE ORAL at 21:34

## 2018-09-27 RX ADMIN — Medication 1000 MILLIGRAM(S): at 11:06

## 2018-09-27 RX ADMIN — SODIUM CHLORIDE 75 MILLILITER(S): 9 INJECTION INTRAMUSCULAR; INTRAVENOUS; SUBCUTANEOUS at 06:06

## 2018-09-27 RX ADMIN — ONDANSETRON 8 MILLIGRAM(S): 8 TABLET, FILM COATED ORAL at 21:34

## 2018-09-27 NOTE — DIETITIAN INITIAL EVALUATION ADULT. - OTHER INFO
Pt seen for nutrition consult (low BMI); Pt PMHx indicated Crohn's disease, however, recent GI notes indicate no evidence of disease; Pt denies C/D or chewing/swallowing difficulty; Wt loss as noted reported by patient; When Pt is feeling well, reports consuming high protein/high calorie diet in order to gain/maintain weight and retain lean muscle mass; Medical chart indicates ETOH abuse and history of psychiatric problems

## 2018-09-27 NOTE — DIETITIAN INITIAL EVALUATION ADULT. - NS FNS REASON FOR WEIGHT CHANG
altered GI function (specify)/decreased po intake/Pt reported intermittent episodes of nausea/vomiting, abdominal pain

## 2018-09-27 NOTE — PROGRESS NOTE ADULT - SUBJECTIVE AND OBJECTIVE BOX
INTERVAL HPI/OVERNIGHT EVENTS:        REVIEW OF SYSTEMS:  CONSTITUTIONAL:  continues  with  abd  pain  nausea    NECK: No pain or stiffnes  RESPIRATORY: No SOB   CARDIOVASCULAR: No chest pain, palpitations, dizziness   NEUROLOGICAL: No headaches, no  blurry  vision no  dizziness  SKIN: No itching,   MUSCULOSKELETAL: No pain    MEDICATION:  dicyclomine 10 milliGRAM(s) Oral two times a day before meals  escitalopram 10 milliGRAM(s) Oral daily  LORazepam   Injectable 2 milliGRAM(s) IV Push every 2 hours PRN  LORazepam   Injectable 2 milliGRAM(s) IV Push every 1 hour PRN  mesalamine ER Capsule 1000 milliGRAM(s) Oral four times a day  morphine  - Injectable 2 milliGRAM(s) IV Push every 4 hours PRN  nicotine  Polacrilex Gum 4 milliGRAM(s) Oral every 8 hours PRN  nicotine -  14 mG/24Hr(s) Patch 1 patch Transdermal daily  ondansetron Injectable 8 milliGRAM(s) IV Push every 8 hours PRN  pantoprazole  Injectable 40 milliGRAM(s) IV Push daily  polyethylene glycol 3350 17 Gram(s) Oral daily PRN  sodium chloride 0.9%. 1000 milliLiter(s) IV Continuous <Continuous>    Vital Signs Last 24 Hrs  T(C): 36.8 (27 Sep 2018 05:00), Max: 36.8 (27 Sep 2018 05:00)  T(F): 98.2 (27 Sep 2018 05:00), Max: 98.2 (27 Sep 2018 05:00)  HR: 72 (27 Sep 2018 05:00) (69 - 75)  BP: 125/85 (27 Sep 2018 05:00) (123/79 - 133/70)  BP(mean): --  RR: 18 (27 Sep 2018 05:00) (17 - 18)  SpO2: 95% (27 Sep 2018 05:00) (95% - 98%)    PHYSICAL EXAM:  GENERAL: NAD, well-groomed, well-developed  EYES:  conjunctiva and sclera clear  ENMT:  Moist mucous membranes,   NECK: Supple, No JVD, Normal thyroid  NERVOUS SYSTEM:  Alert oriented   no  focal  deficits;   CHEST/LUNG: Clear    HEART: Regular rate and rhythm; No murmurs, rubs, or gallops  ABDOMEN: Soft,mild  difuuse  upper  abd  tenderness, Nondistended; Bowel sounds present  EXTREMITIES:  no  edema no  tenderness  SKIN: No rashes   LABS:                        13.4   6.46  )-----------( 207      ( 27 Sep 2018 06:37 )             39.1     09-27    142  |  107  |  5<L>  ----------------------------<  70  3.6   |  28  |  0.89    Ca    8.2<L>      27 Sep 2018 06:37  Phos  2.8     09-26  Mg     1.6     09-26    TPro  5.9<L>  /  Alb  2.9<L>  /  TBili  1.2  /  DBili  x   /  AST  32  /  ALT  24  /  AlkPhos  115  09-27        CAPILLARY BLOOD GLUCOSE          RADIOLOGY & ADDITIONAL TESTS:    Imaging reports  Personally Reviewed:  [x ] YES  [ ] NO    Consultant(s) Notes Reviewed:  [x ] YES  [ ] NO    Care Discussed with Consultants/Other Providers [x ] YES  [ ] NO  Problem/Plan - 1:  ·  Problem: Intractable vomiting.  Plan: ivf  zofran.     Problem/Plan - 2:  ·  Problem: Intractable abdominal pain.  Plan: morphine  NPO  except  for  meds.     Problem/Plan - 3:  ·  Problem: Depression, unspecified depression type.  Plan: paxil. for  psychiatry  eval    Problem/Plan - 4:  ·  Problem: Irritable bowel syndrome.  Plan: bentyl.     Problem/Plan - 5:  ·  Problem: Alfaro's esophagus without dysplasia.  Plan: protonix.

## 2018-09-27 NOTE — DIETITIAN INITIAL EVALUATION ADULT. - ENERGY NEEDS
Height (cm): 177.8 (09-25)  Weight (kg): 54.4 (09-25)  BMI (kg/m2): 17.2 (09-25)  IBW: 75.5 +/- 10%    % IBW: 81%  UBW: 65kg   % UBW: 94.3%

## 2018-09-27 NOTE — PROGRESS NOTE ADULT - ASSESSMENT
46 yo wm with abdominal pain. No pathology supporting Crohn's disease though he did have a SBO attributed to an areain the Ileum not normally seen in Crohns. 2015. Upper endoscopy showed Barretts but no lesions that would cause recurrent vomiting. Not clear whether he ever received Remicade but that does not mean he had Crohns disease.

## 2018-09-27 NOTE — CHART NOTE - NSCHARTNOTEFT_GEN_A_CORE
Upon Nutritional Assessment by the Registered Dietitian your patient was determined to meet criteria / has evidence of the following diagnosis/diagnoses:          [ ]  Mild Protein Calorie Malnutrition        [X ]  Moderate Protein Calorie Malnutrition        [ ] Severe Protein Calorie Malnutrition        [ ] Unspecified Protein Calorie Malnutrition        [ ] Underweight / BMI <19        [ ] Morbid Obesity / BMI > 40      Findings as based on:  •  Comprehensive nutrition assessment and consultation  •  Calorie counts (nutrient intake analysis)  •  Food acceptance and intake status from observations by staff  •  Follow up  •  Patient education  •  Intervention secondary to interdisciplinary rounds  •   concerns      Treatment:    The following diet has been recommended:  Pt currently on Full Liquid diet add Ensure Enlive x 2/day (provides 700 kcal, 40 g protein) for additional nutrition support.   As medically appropriate, advance to solid diet; recommend Low Fiber c Ensure Enlive x 2/day.        PROVIDER Section:     By signing this assessment you are acknowledging and agree with the diagnosis/diagnoses assigned by the Registered Dietitian    Comments:

## 2018-09-27 NOTE — DIETITIAN INITIAL EVALUATION ADULT. - NS AS NUTRI INTERV MEDICAL AND FOOD SUPPLEMENTS
Commercial beverage/As medically appropriate, add ensure enlive 2x/day (provides 700kcal, 40g PRO) Commercial beverage/Add ensure enlive 2x/day (provides 700kcal, 40g PRO)

## 2018-09-27 NOTE — PROGRESS NOTE ADULT - SUBJECTIVE AND OBJECTIVE BOX
Gastroenterology  Patient seen and examined bedside resting comfortably.  No complaints offered.   c/o abdominal pain  Denies nausea and vomiting. Tolerating diet.  Normal flatus/BM.     T(F): 98.2 (09-27-18 @ 05:00), Max: 98.2 (09-26-18 @ 09:06)  HR: 72 (09-27-18 @ 05:00) (69 - 85)  BP: 125/85 (09-27-18 @ 05:00) (123/79 - 143/102)  RR: 18 (09-27-18 @ 05:00) (17 - 18)  SpO2: 95% (09-27-18 @ 05:00) (95% - 98%)  Wt(kg): --  CAPILLARY BLOOD GLUCOSE          PHYSICAL EXAM:  General: NAD, WDWN.   Neuro:  Alert & oriented x 3  HEENT: NCAT, EOMI, conjunctiva clear  CV: +S1+S2 regular rate and rhythm  Lung: clear to ausculation bilaterally, respirations nonlabored, good inspiratory effort  Abdomen: soft, flat Tender, No distention Normal active BS  Extremities: no cyanosis, clubbing or edema    LABS:                        13.4   6.46  )-----------( 207      ( 27 Sep 2018 06:37 )             39.1     09-27    142  |  107  |  5<L>  ----------------------------<  70  3.6   |  28  |  0.89    Ca    8.2<L>      27 Sep 2018 06:37  Phos  2.8     09-26  Mg     1.6     09-26    TPro  5.9<L>  /  Alb  2.9<L>  /  TBili  1.2  /  DBili  x   /  AST  32  /  ALT  24  /  AlkPhos  115  09-27    LIVER FUNCTIONS - ( 27 Sep 2018 06:37 )  Alb: 2.9 g/dL / Pro: 5.9 gm/dL / ALK PHOS: 115 U/L / ALT: 24 U/L / AST: 32 U/L / GGT: x             I&O's Detail    26 Sep 2018 07:01  -  27 Sep 2018 07:00  --------------------------------------------------------  IN:    Oral Fluid: 510 mL    sodium chloride 0.9%.: 900 mL  Total IN: 1410 mL    OUT:    Voided: 1700 mL  Total OUT: 1700 mL    Total NET: -290 mL        09-27 @ 06:37    142 | 107 | 5  /8.2 | -- | --  _______________________/  3.6 | 28 | 0.89                           \par   Amylase, Serum Total: 55 U/L (09-26 @ 00:56)

## 2018-09-27 NOTE — DIETITIAN INITIAL EVALUATION ADULT. - PERTINENT LABORATORY DATA
09-27 Na 142 mmol/L Glu 70 mg/dL K+ 3.6 mmol/L Cr  0.89 mg/dL BUN 5 mg/dL<L> Phos n/a   Alb 2.9 g/dL<L> PAB n/a   Hgb 13.4 g/dL Hct 39.1 %

## 2018-09-27 NOTE — DIETITIAN INITIAL EVALUATION ADULT. - PHYSICAL APPEARANCE
BMI: 19.4; No edema noted; Nutrition focused physical exam conducted; Subcutaneous fat loss: [WDL] orbital fat pads region, [WDL] buccal fat region, [mild] triceps region,  [mild] ribs region.  Muscle wasting: [mild] temples region, [WDL] clavicle region, [WDL] shoulder region, [WDL] scapula region, [WDL] interosseous region,  [mild] thigh region, [mild] calf region/well nourished

## 2018-09-28 VITALS
RESPIRATION RATE: 18 BRPM | TEMPERATURE: 98 F | OXYGEN SATURATION: 97 % | SYSTOLIC BLOOD PRESSURE: 119 MMHG | HEART RATE: 66 BPM | DIASTOLIC BLOOD PRESSURE: 62 MMHG

## 2018-09-28 DIAGNOSIS — K58.8 OTHER IRRITABLE BOWEL SYNDROME: ICD-10-CM

## 2018-09-28 LAB
HCT VFR BLD CALC: 41.1 % — SIGNIFICANT CHANGE UP (ref 39–50)
HGB BLD-MCNC: 13.9 G/DL — SIGNIFICANT CHANGE UP (ref 13–17)
MCHC RBC-ENTMCNC: 32.8 PG — SIGNIFICANT CHANGE UP (ref 27–34)
MCHC RBC-ENTMCNC: 33.8 GM/DL — SIGNIFICANT CHANGE UP (ref 32–36)
MCV RBC AUTO: 96.9 FL — SIGNIFICANT CHANGE UP (ref 80–100)
NRBC # BLD: 0 /100 WBCS — SIGNIFICANT CHANGE UP (ref 0–0)
PLATELET # BLD AUTO: 209 K/UL — SIGNIFICANT CHANGE UP (ref 150–400)
RBC # BLD: 4.24 M/UL — SIGNIFICANT CHANGE UP (ref 4.2–5.8)
RBC # FLD: 13 % — SIGNIFICANT CHANGE UP (ref 10.3–14.5)
WBC # BLD: 6.57 K/UL — SIGNIFICANT CHANGE UP (ref 3.8–10.5)
WBC # FLD AUTO: 6.57 K/UL — SIGNIFICANT CHANGE UP (ref 3.8–10.5)

## 2018-09-28 PROCEDURE — 99232 SBSQ HOSP IP/OBS MODERATE 35: CPT

## 2018-09-28 RX ORDER — ONDANSETRON 8 MG/1
1 TABLET, FILM COATED ORAL
Qty: 18 | Refills: 0 | OUTPATIENT
Start: 2018-09-28 | End: 2018-09-30

## 2018-09-28 RX ADMIN — Medication 1000 MILLIGRAM(S): at 06:14

## 2018-09-28 RX ADMIN — ESCITALOPRAM OXALATE 10 MILLIGRAM(S): 10 TABLET, FILM COATED ORAL at 12:25

## 2018-09-28 RX ADMIN — Medication 1000 MILLIGRAM(S): at 12:25

## 2018-09-28 RX ADMIN — PANTOPRAZOLE SODIUM 40 MILLIGRAM(S): 20 TABLET, DELAYED RELEASE ORAL at 12:25

## 2018-09-28 RX ADMIN — ONDANSETRON 8 MILLIGRAM(S): 8 TABLET, FILM COATED ORAL at 08:48

## 2018-09-28 RX ADMIN — Medication 10 MILLIGRAM(S): at 06:14

## 2018-09-28 RX ADMIN — Medication 1 PATCH: at 12:25

## 2018-09-28 RX ADMIN — Medication 1 PATCH: at 12:00

## 2018-09-28 RX ADMIN — MORPHINE SULFATE 2 MILLIGRAM(S): 50 CAPSULE, EXTENDED RELEASE ORAL at 08:48

## 2018-09-28 RX ADMIN — SODIUM CHLORIDE 75 MILLILITER(S): 9 INJECTION INTRAMUSCULAR; INTRAVENOUS; SUBCUTANEOUS at 06:14

## 2018-09-28 RX ADMIN — Medication 1000 MILLIGRAM(S): at 00:03

## 2018-09-28 NOTE — PROGRESS NOTE BEHAVIORAL HEALTH - SUMMARY
47 year old single, white male w/ PPHx of depression x 8 months, occasional panic attacks, with 1 suicide attempt last May via pill ingestion, no past psych hospitalizations or family hx, PMHx of Crohn's disease presents to ED c/o vomiting x3 days. Psychiatry consulted for depression.   Pt endorses h/o depressed mood, anhedonia, guilt, and anergia after his girlfriend had an  against his wishes 8 months ago and they broke up 6 months later. Pt attempted suicide in May via pill ingestion s/p alcohol binge, which he interrupted himself by telling his mother and going to the hospital, which he was medically hospitalized for but was not admitted psychiatrically. Pt endorses h/o insomnia and chronic pain x years.   Pt endorses that his mood has improved, his anhedonia has subsided, and he has more energy x 1-2 months since put on lexapro and is currently managed by a psychiatric NP. Admits continues sleep problems for which he takes melatonin 10mg qhs. Endorses several protective factors and is future oriented. Denies any current low mood, anhedonia, difficulty concentrating, appetite changes, anergia, psychomotor retardation, auditory/visual hallucinations, delusions, paranoia, or current SI/HI/I/P.

## 2018-09-28 NOTE — PROGRESS NOTE BEHAVIORAL HEALTH - NSBHFUPINTERVALHXFT_PSY_A_CORE
Pt lying comfortably in bed. No significant events overnight. Pt expresses that he believes his abd will return upon d/c but is future oriented and has made Doctor/s appointments to f/u upon d/c to manage his GI discomfort as well as his depression. Pt is not acutely depressed, denies sx of psychosis, depression, SI/HI/I/P.

## 2018-09-28 NOTE — PROGRESS NOTE ADULT - REASON FOR ADMISSION
intractable  vomiting abd  pain  crohn's disease

## 2018-09-28 NOTE — PROGRESS NOTE ADULT - SUBJECTIVE AND OBJECTIVE BOX
Gastroenterology  Patient seen and examined bedside resting comfortably.  No complaints offered.   c/o abdominal pain  Denies nausea and vomiting. Tolerating diet.  Normal flatus/BM.     T(F): 97 (09-28-18 @ 05:07), Max: 98.8 (09-27-18 @ 17:10)  HR: 73 (09-28-18 @ 05:07) (72 - 78)  BP: 114/69 (09-28-18 @ 05:07) (114/69 - 143/95)  RR: 18 (09-28-18 @ 05:07) (17 - 18)  SpO2: 97% (09-28-18 @ 05:07) (96% - 100%)  Wt(kg): --  CAPILLARY BLOOD GLUCOSE          PHYSICAL EXAM:  General: NAD, WDWN.   Neuro:  Alert & oriented x 3  HEENT: NCAT, EOMI, conjunctiva clear  CV: +S1+S2 regular rate and rhythm  Lung: clear to ausculation bilaterally, respirations nonlabored, good inspiratory effort  Abdomen: soft, Non Tender, No distention Normal active BS  Extremities: no cyanosis, clubbing or edema    LABS:                        13.9   6.57  )-----------( 209      ( 28 Sep 2018 07:26 )             41.1     09-27    142  |  107  |  5<L>  ----------------------------<  70  3.6   |  28  |  0.89    Ca    8.2<L>      27 Sep 2018 06:37    TPro  5.9<L>  /  Alb  2.9<L>  /  TBili  1.2  /  DBili  x   /  AST  32  /  ALT  24  /  AlkPhos  115  09-27    LIVER FUNCTIONS - ( 27 Sep 2018 06:37 )  Alb: 2.9 g/dL / Pro: 5.9 gm/dL / ALK PHOS: 115 U/L / ALT: 24 U/L / AST: 32 U/L / GGT: x             I&O's Detail    27 Sep 2018 07:01  -  28 Sep 2018 07:00  --------------------------------------------------------  IN:    Oral Fluid: 360 mL  Total IN: 360 mL    OUT:  Total OUT: 0 mL    Total NET: 360 mL        09-27 @ 06:37    142 | 107 | 5  /8.2 | -- | --  _______________________/  3.6 | 28 | 0.89                           \par

## 2018-09-28 NOTE — PROGRESS NOTE BEHAVIORAL HEALTH - NSBHCHARTREVIEWVS_PSY_A_CORE FT
Vital Signs Last 24 Hrs  T(C): 36.1 (28 Sep 2018 05:07), Max: 37.1 (27 Sep 2018 17:10)  T(F): 97 (28 Sep 2018 05:07), Max: 98.8 (27 Sep 2018 17:10)  HR: 73 (28 Sep 2018 05:07) (73 - 78)  BP: 114/69 (28 Sep 2018 05:07) (114/69 - 125/86)  BP(mean): --  RR: 18 (28 Sep 2018 05:07) (17 - 18)  SpO2: 97% (28 Sep 2018 05:07) (97% - 100%)

## 2018-09-28 NOTE — PROGRESS NOTE BEHAVIORAL HEALTH - NSBHFUPSUICINTERVALFT_PSY_A_CORE
suicide attempt 6 months ago via pill ingestion but has not had suicidal ideation since and believes Lexapro treats his mood well.

## 2018-09-28 NOTE — PROGRESS NOTE ADULT - SUBJECTIVE AND OBJECTIVE BOX
INTERVAL HPI/OVERNIGHT EVENTS:        REVIEW OF SYSTEMS:  CONSTITUTIONAL:  c/o  "reflux  pain  "  after  am  meds  was  able  to  tolerate  breakfest    NECK: No pain or stiffnes  RESPIRATORY: No SOB   CARDIOVASCULAR: No chest pain, palpitations, dizziness,   GASTROINTESTINAL: No abdominal pain. No nausea, vomiting,   NEUROLOGICAL: No headaches, no  blurry  vision no  dizziness  SKIN: No itching,   MUSCULOSKELETAL: No pain    MEDICATION:  dicyclomine 10 milliGRAM(s) Oral two times a day before meals  escitalopram 10 milliGRAM(s) Oral daily  LORazepam   Injectable 2 milliGRAM(s) IV Push every 2 hours PRN  LORazepam   Injectable 2 milliGRAM(s) IV Push every 1 hour PRN  mesalamine ER Capsule 1000 milliGRAM(s) Oral four times a day  morphine  - Injectable 2 milliGRAM(s) IV Push every 4 hours PRN  nicotine  Polacrilex Gum 4 milliGRAM(s) Oral every 8 hours PRN  nicotine -  14 mG/24Hr(s) Patch 1 patch Transdermal daily  ondansetron Injectable 8 milliGRAM(s) IV Push every 8 hours PRN  pantoprazole  Injectable 40 milliGRAM(s) IV Push daily  polyethylene glycol 3350 17 Gram(s) Oral daily PRN  sodium chloride 0.9%. 1000 milliLiter(s) IV Continuous <Continuous>    Vital Signs Last 24 Hrs  T(C): 36.1 (28 Sep 2018 05:07), Max: 37.1 (27 Sep 2018 17:10)  T(F): 97 (28 Sep 2018 05:07), Max: 98.8 (27 Sep 2018 17:10)  HR: 73 (28 Sep 2018 05:07) (72 - 78)  BP: 114/69 (28 Sep 2018 05:07) (114/69 - 143/95)  BP(mean): --  RR: 18 (28 Sep 2018 05:07) (17 - 18)  SpO2: 97% (28 Sep 2018 05:07) (96% - 100%)    PHYSICAL EXAM:  GENERAL: NAD, well-groomed, well-developed  EYES:  conjunctiva and sclera clear  ENMT:  Moist mucous membranes,   NECK: Supple, No JVD, Normal thyroid  NERVOUS SYSTEM:  Alert oriented   no  focal  deficits;   CHEST/LUNG: Clear    HEART: Regular rate and rhythm; No murmurs, rubs, or gallops  ABDOMEN: Soft, Nontender, Nondistended; Bowel sounds present  EXTREMITIES:  no  edema no  tenderness  SKIN: No rashes   LABS:                        13.9   6.57  )-----------( 209      ( 28 Sep 2018 07:26 )             41.1     09-27    142  |  107  |  5<L>  ----------------------------<  70  3.6   |  28  |  0.89    Ca    8.2<L>      27 Sep 2018 06:37    TPro  5.9<L>  /  Alb  2.9<L>  /  TBili  1.2  /  DBili  x   /  AST  32  /  ALT  24  /  AlkPhos  115  09-27        CAPILLARY BLOOD GLUCOSE          RADIOLOGY & ADDITIONAL TESTS:    Imaging reports  Personally Reviewed:  [ x] YES  [ ] NO    Consultant(s) Notes Reviewed:  [x ] YES  [ ] NO    Care Discussed with Consultants/Other Providers [ x] YES  [ ] NO  Problem/Plan - 1:  ·  Problem: Intractable vomiting.  Plan:   resolved  discharge  pt    Problem/Plan - 2:  ·  Problem: Intractable abdominal pain.  Plan: morphine  NPO  except  for  meds.     Problem/Plan - 3:  ·  Problem: Depression, unspecified depression type.  Plan: paxil.     Problem/Plan - 4:  ·  Problem: Irritable bowel syndrome.  Plan: bentyl. mesalamine    Problem/Plan - 5:  ·  Problem: Alfaro's esophagus without dysplasia.  Plan: protonix.   discharge  patient  home  to  follow  with  GI  and  PMD

## 2018-10-09 DIAGNOSIS — Z83.3 FAMILY HISTORY OF DIABETES MELLITUS: ICD-10-CM

## 2018-10-09 DIAGNOSIS — K21.9 GASTRO-ESOPHAGEAL REFLUX DISEASE WITHOUT ESOPHAGITIS: ICD-10-CM

## 2018-10-09 DIAGNOSIS — Z80.0 FAMILY HISTORY OF MALIGNANT NEOPLASM OF DIGESTIVE ORGANS: ICD-10-CM

## 2018-10-09 DIAGNOSIS — K22.70 BARRETT'S ESOPHAGUS WITHOUT DYSPLASIA: ICD-10-CM

## 2018-10-09 DIAGNOSIS — K58.8 OTHER IRRITABLE BOWEL SYNDROME: ICD-10-CM

## 2018-10-09 DIAGNOSIS — Z82.49 FAMILY HISTORY OF ISCHEMIC HEART DISEASE AND OTHER DISEASES OF THE CIRCULATORY SYSTEM: ICD-10-CM

## 2018-10-09 DIAGNOSIS — R11.10 VOMITING, UNSPECIFIED: ICD-10-CM

## 2018-10-09 DIAGNOSIS — Z82.5 FAMILY HISTORY OF ASTHMA AND OTHER CHRONIC LOWER RESPIRATORY DISEASES: ICD-10-CM

## 2018-10-09 DIAGNOSIS — K50.90 CROHN'S DISEASE, UNSPECIFIED, WITHOUT COMPLICATIONS: ICD-10-CM

## 2018-10-09 DIAGNOSIS — F32.9 MAJOR DEPRESSIVE DISORDER, SINGLE EPISODE, UNSPECIFIED: ICD-10-CM

## 2019-01-14 ENCOUNTER — EMERGENCY (EMERGENCY)
Facility: HOSPITAL | Age: 49
LOS: 0 days | Discharge: HOME | End: 2019-01-14
Attending: EMERGENCY MEDICINE
Payer: MEDICAID

## 2019-01-14 VITALS
DIASTOLIC BLOOD PRESSURE: 96 MMHG | SYSTOLIC BLOOD PRESSURE: 147 MMHG | RESPIRATION RATE: 18 BRPM | OXYGEN SATURATION: 100 % | HEART RATE: 74 BPM | TEMPERATURE: 97 F

## 2019-01-14 VITALS
DIASTOLIC BLOOD PRESSURE: 88 MMHG | SYSTOLIC BLOOD PRESSURE: 121 MMHG | HEART RATE: 73 BPM | OXYGEN SATURATION: 95 % | WEIGHT: 134.48 LBS | HEIGHT: 70 IN | TEMPERATURE: 99 F | RESPIRATION RATE: 18 BRPM

## 2019-01-14 DIAGNOSIS — K22.70 BARRETT'S ESOPHAGUS WITHOUT DYSPLASIA: ICD-10-CM

## 2019-01-14 DIAGNOSIS — R10.9 UNSPECIFIED ABDOMINAL PAIN: ICD-10-CM

## 2019-01-14 DIAGNOSIS — S83.519A SPRAIN OF ANTERIOR CRUCIATE LIGAMENT OF UNSPECIFIED KNEE, INITIAL ENCOUNTER: Chronic | ICD-10-CM

## 2019-01-14 DIAGNOSIS — F32.9 MAJOR DEPRESSIVE DISORDER, SINGLE EPISODE, UNSPECIFIED: ICD-10-CM

## 2019-01-14 DIAGNOSIS — K50.90 CROHN'S DISEASE, UNSPECIFIED, WITHOUT COMPLICATIONS: ICD-10-CM

## 2019-01-14 LAB
ALBUMIN SERPL ELPH-MCNC: 3.7 G/DL — SIGNIFICANT CHANGE UP (ref 3.3–5)
ALP SERPL-CCNC: 81 U/L — SIGNIFICANT CHANGE UP (ref 40–120)
ALT FLD-CCNC: 22 U/L — SIGNIFICANT CHANGE UP (ref 12–78)
AMYLASE P1 CFR SERPL: 54 U/L — SIGNIFICANT CHANGE UP (ref 25–115)
ANION GAP SERPL CALC-SCNC: 8 MMOL/L — SIGNIFICANT CHANGE UP (ref 5–17)
APTT BLD: 32.6 SEC — SIGNIFICANT CHANGE UP (ref 28.5–37)
AST SERPL-CCNC: 23 U/L — SIGNIFICANT CHANGE UP (ref 15–37)
BASOPHILS # BLD AUTO: 0.04 K/UL — SIGNIFICANT CHANGE UP (ref 0–0.2)
BASOPHILS NFR BLD AUTO: 0.5 % — SIGNIFICANT CHANGE UP (ref 0–2)
BILIRUB SERPL-MCNC: 0.5 MG/DL — SIGNIFICANT CHANGE UP (ref 0.2–1.2)
BLD GP AB SCN SERPL QL: SIGNIFICANT CHANGE UP
BUN SERPL-MCNC: 8 MG/DL — SIGNIFICANT CHANGE UP (ref 7–23)
CALCIUM SERPL-MCNC: 8.9 MG/DL — SIGNIFICANT CHANGE UP (ref 8.5–10.1)
CHLORIDE SERPL-SCNC: 104 MMOL/L — SIGNIFICANT CHANGE UP (ref 96–108)
CO2 SERPL-SCNC: 27 MMOL/L — SIGNIFICANT CHANGE UP (ref 22–31)
CREAT SERPL-MCNC: 0.9 MG/DL — SIGNIFICANT CHANGE UP (ref 0.5–1.3)
EOSINOPHIL # BLD AUTO: 0.09 K/UL — SIGNIFICANT CHANGE UP (ref 0–0.5)
EOSINOPHIL NFR BLD AUTO: 1.1 % — SIGNIFICANT CHANGE UP (ref 0–6)
GLUCOSE SERPL-MCNC: 82 MG/DL — SIGNIFICANT CHANGE UP (ref 70–99)
HCT VFR BLD CALC: 43.8 % — SIGNIFICANT CHANGE UP (ref 39–50)
HGB BLD-MCNC: 15.1 G/DL — SIGNIFICANT CHANGE UP (ref 13–17)
IMM GRANULOCYTES NFR BLD AUTO: 0.2 % — SIGNIFICANT CHANGE UP (ref 0–1.5)
INR BLD: 0.99 RATIO — SIGNIFICANT CHANGE UP (ref 0.88–1.16)
LACTATE SERPL-SCNC: 0.8 MMOL/L — SIGNIFICANT CHANGE UP (ref 0.7–2)
LIDOCAIN IGE QN: 61 U/L — LOW (ref 73–393)
LYMPHOCYTES # BLD AUTO: 1.61 K/UL — SIGNIFICANT CHANGE UP (ref 1–3.3)
LYMPHOCYTES # BLD AUTO: 18.9 % — SIGNIFICANT CHANGE UP (ref 13–44)
MCHC RBC-ENTMCNC: 32.5 PG — SIGNIFICANT CHANGE UP (ref 27–34)
MCHC RBC-ENTMCNC: 34.5 GM/DL — SIGNIFICANT CHANGE UP (ref 32–36)
MCV RBC AUTO: 94.2 FL — SIGNIFICANT CHANGE UP (ref 80–100)
MONOCYTES # BLD AUTO: 0.93 K/UL — HIGH (ref 0–0.9)
MONOCYTES NFR BLD AUTO: 10.9 % — SIGNIFICANT CHANGE UP (ref 2–14)
NEUTROPHILS # BLD AUTO: 5.81 K/UL — SIGNIFICANT CHANGE UP (ref 1.8–7.4)
NEUTROPHILS NFR BLD AUTO: 68.4 % — SIGNIFICANT CHANGE UP (ref 43–77)
NRBC # BLD: 0 /100 WBCS — SIGNIFICANT CHANGE UP (ref 0–0)
PLATELET # BLD AUTO: 254 K/UL — SIGNIFICANT CHANGE UP (ref 150–400)
POTASSIUM SERPL-MCNC: 4.3 MMOL/L — SIGNIFICANT CHANGE UP (ref 3.5–5.3)
POTASSIUM SERPL-SCNC: 4.3 MMOL/L — SIGNIFICANT CHANGE UP (ref 3.5–5.3)
PROT SERPL-MCNC: 7.4 GM/DL — SIGNIFICANT CHANGE UP (ref 6–8.3)
PROTHROM AB SERPL-ACNC: 11.1 SEC — SIGNIFICANT CHANGE UP (ref 10–12.9)
RBC # BLD: 4.65 M/UL — SIGNIFICANT CHANGE UP (ref 4.2–5.8)
RBC # FLD: 13.1 % — SIGNIFICANT CHANGE UP (ref 10.3–14.5)
SODIUM SERPL-SCNC: 139 MMOL/L — SIGNIFICANT CHANGE UP (ref 135–145)
WBC # BLD: 8.5 K/UL — SIGNIFICANT CHANGE UP (ref 3.8–10.5)
WBC # FLD AUTO: 8.5 K/UL — SIGNIFICANT CHANGE UP (ref 3.8–10.5)

## 2019-01-14 PROCEDURE — 71045 X-RAY EXAM CHEST 1 VIEW: CPT | Mod: 26

## 2019-01-14 PROCEDURE — 74177 CT ABD & PELVIS W/CONTRAST: CPT | Mod: 26

## 2019-01-14 PROCEDURE — 99285 EMERGENCY DEPT VISIT HI MDM: CPT

## 2019-01-14 RX ORDER — IOHEXOL 300 MG/ML
30 INJECTION, SOLUTION INTRAVENOUS ONCE
Qty: 0 | Refills: 0 | Status: COMPLETED | OUTPATIENT
Start: 2019-01-14 | End: 2019-01-14

## 2019-01-14 RX ORDER — ONDANSETRON 8 MG/1
1 TABLET, FILM COATED ORAL
Qty: 14 | Refills: 0 | OUTPATIENT
Start: 2019-01-14 | End: 2019-01-20

## 2019-01-14 RX ORDER — FAMOTIDINE 10 MG/ML
20 INJECTION INTRAVENOUS ONCE
Qty: 0 | Refills: 0 | Status: DISCONTINUED | OUTPATIENT
Start: 2019-01-14 | End: 2019-01-14

## 2019-01-14 RX ORDER — FAMOTIDINE 10 MG/ML
20 INJECTION INTRAVENOUS ONCE
Qty: 0 | Refills: 0 | Status: COMPLETED | OUTPATIENT
Start: 2019-01-14 | End: 2019-01-14

## 2019-01-14 RX ORDER — MORPHINE SULFATE 50 MG/1
4 CAPSULE, EXTENDED RELEASE ORAL ONCE
Qty: 0 | Refills: 0 | Status: DISCONTINUED | OUTPATIENT
Start: 2019-01-14 | End: 2019-01-14

## 2019-01-14 RX ORDER — ONDANSETRON 8 MG/1
4 TABLET, FILM COATED ORAL ONCE
Qty: 0 | Refills: 0 | Status: COMPLETED | OUTPATIENT
Start: 2019-01-14 | End: 2019-01-14

## 2019-01-14 RX ADMIN — IOHEXOL 30 MILLILITER(S): 300 INJECTION, SOLUTION INTRAVENOUS at 14:00

## 2019-01-14 RX ADMIN — MORPHINE SULFATE 4 MILLIGRAM(S): 50 CAPSULE, EXTENDED RELEASE ORAL at 13:42

## 2019-01-14 RX ADMIN — ONDANSETRON 4 MILLIGRAM(S): 8 TABLET, FILM COATED ORAL at 13:42

## 2019-01-14 RX ADMIN — FAMOTIDINE 20 MILLIGRAM(S): 10 INJECTION INTRAVENOUS at 13:42

## 2019-01-14 NOTE — ED PROVIDER NOTE - MEDICAL DECISION MAKING DETAILS
Patient with crohn's disease with abdominal pain Patient with crohn's disease with abdominal pain; case discussed with Dr. Munson, patient without history of crohn's disease, patient to f/u as outpatient

## 2019-01-14 NOTE — ED ADULT NURSE NOTE - NSIMPLEMENTINTERV_GEN_ALL_ED
Implemented All Fall Risk Interventions:  [place] to call system. Call bell, personal items and telephone within reach. Instruct patient to call for assistance. Room bathroom lighting operational. Non-slip footwear when patient is off stretcher. Physically safe environment: no spills, clutter or unnecessary equipment. Stretcher in lowest position, wheels locked, appropriate side rails in place. Provide visual cue, wrist band, yellow gown, etc. Monitor gait and stability. Monitor for mental status changes and reorient to person, place, and time. Review medications for side effects contributing to fall risk. Reinforce activity limits and safety measures with patient and family.

## 2019-01-14 NOTE — ED ADULT TRIAGE NOTE - CHIEF COMPLAINT QUOTE
left upper abdominal pain with nausea, left shoulder pains radiating down to left hand with pins and needles to fingers, sob with exertion on and off and heart burns. H/O acid reflux and Crohn's disease.

## 2019-01-14 NOTE — ED PROVIDER NOTE - CONSTITUTIONAL, MLM
normal... Well appearing, well nourished, awake, alert, oriented to person, place, time/situation and in no apparent distress, thin

## 2019-01-14 NOTE — ED PROVIDER NOTE - OBJECTIVE STATEMENT
47 yo male with history of Crohn's disease x5 years, s/p ex-lap 5 years ago presents with abdominal pain for two weeks. Pain 10/10 and located in LUQ. Patient with decreased po intake. Denies fever, chills, diarrhea. +flatulence.

## 2019-03-16 ENCOUNTER — INPATIENT (INPATIENT)
Facility: HOSPITAL | Age: 49
LOS: 4 days | Discharge: HOME | End: 2019-03-21
Attending: INTERNAL MEDICINE | Admitting: INTERNAL MEDICINE
Payer: MEDICAID

## 2019-03-16 VITALS
DIASTOLIC BLOOD PRESSURE: 117 MMHG | RESPIRATION RATE: 22 BRPM | WEIGHT: 128.09 LBS | OXYGEN SATURATION: 100 % | HEART RATE: 109 BPM | TEMPERATURE: 98 F | HEIGHT: 70 IN | SYSTOLIC BLOOD PRESSURE: 136 MMHG

## 2019-03-16 DIAGNOSIS — K50.90 CROHN'S DISEASE, UNSPECIFIED, WITHOUT COMPLICATIONS: ICD-10-CM

## 2019-03-16 DIAGNOSIS — S83.519A SPRAIN OF ANTERIOR CRUCIATE LIGAMENT OF UNSPECIFIED KNEE, INITIAL ENCOUNTER: Chronic | ICD-10-CM

## 2019-03-16 LAB
ALBUMIN SERPL ELPH-MCNC: 4.1 G/DL — SIGNIFICANT CHANGE UP (ref 3.3–5)
ALP SERPL-CCNC: 92 U/L — SIGNIFICANT CHANGE UP (ref 40–120)
ALT FLD-CCNC: 75 U/L — SIGNIFICANT CHANGE UP (ref 12–78)
ANION GAP SERPL CALC-SCNC: 10 MMOL/L — SIGNIFICANT CHANGE UP (ref 5–17)
AST SERPL-CCNC: 141 U/L — HIGH (ref 15–37)
BASOPHILS # BLD AUTO: 0.05 K/UL — SIGNIFICANT CHANGE UP (ref 0–0.2)
BASOPHILS NFR BLD AUTO: 0.5 % — SIGNIFICANT CHANGE UP (ref 0–2)
BILIRUB SERPL-MCNC: 1.9 MG/DL — HIGH (ref 0.2–1.2)
BUN SERPL-MCNC: 12 MG/DL — SIGNIFICANT CHANGE UP (ref 7–23)
CALCIUM SERPL-MCNC: 9.2 MG/DL — SIGNIFICANT CHANGE UP (ref 8.5–10.1)
CHLORIDE SERPL-SCNC: 97 MMOL/L — SIGNIFICANT CHANGE UP (ref 96–108)
CO2 SERPL-SCNC: 26 MMOL/L — SIGNIFICANT CHANGE UP (ref 22–31)
CREAT SERPL-MCNC: 1.19 MG/DL — SIGNIFICANT CHANGE UP (ref 0.5–1.3)
EOSINOPHIL # BLD AUTO: 0.16 K/UL — SIGNIFICANT CHANGE UP (ref 0–0.5)
EOSINOPHIL NFR BLD AUTO: 1.6 % — SIGNIFICANT CHANGE UP (ref 0–6)
GLUCOSE SERPL-MCNC: 92 MG/DL — SIGNIFICANT CHANGE UP (ref 70–99)
HCT VFR BLD CALC: 44.4 % — SIGNIFICANT CHANGE UP (ref 39–50)
HGB BLD-MCNC: 15.5 G/DL — SIGNIFICANT CHANGE UP (ref 13–17)
IMM GRANULOCYTES NFR BLD AUTO: 0.3 % — SIGNIFICANT CHANGE UP (ref 0–1.5)
LACTATE SERPL-SCNC: 2 MMOL/L — SIGNIFICANT CHANGE UP (ref 0.7–2)
LIDOCAIN IGE QN: 75 U/L — SIGNIFICANT CHANGE UP (ref 73–393)
LYMPHOCYTES # BLD AUTO: 1.36 K/UL — SIGNIFICANT CHANGE UP (ref 1–3.3)
LYMPHOCYTES # BLD AUTO: 13.6 % — SIGNIFICANT CHANGE UP (ref 13–44)
MCHC RBC-ENTMCNC: 32.2 PG — SIGNIFICANT CHANGE UP (ref 27–34)
MCHC RBC-ENTMCNC: 34.9 GM/DL — SIGNIFICANT CHANGE UP (ref 32–36)
MCV RBC AUTO: 92.1 FL — SIGNIFICANT CHANGE UP (ref 80–100)
MONOCYTES # BLD AUTO: 1.15 K/UL — HIGH (ref 0–0.9)
MONOCYTES NFR BLD AUTO: 11.5 % — SIGNIFICANT CHANGE UP (ref 2–14)
NEUTROPHILS # BLD AUTO: 7.22 K/UL — SIGNIFICANT CHANGE UP (ref 1.8–7.4)
NEUTROPHILS NFR BLD AUTO: 72.5 % — SIGNIFICANT CHANGE UP (ref 43–77)
NRBC # BLD: 0 /100 WBCS — SIGNIFICANT CHANGE UP (ref 0–0)
PLATELET # BLD AUTO: 294 K/UL — SIGNIFICANT CHANGE UP (ref 150–400)
POTASSIUM SERPL-MCNC: 3.9 MMOL/L — SIGNIFICANT CHANGE UP (ref 3.5–5.3)
POTASSIUM SERPL-SCNC: 3.9 MMOL/L — SIGNIFICANT CHANGE UP (ref 3.5–5.3)
PROT SERPL-MCNC: 8.2 GM/DL — SIGNIFICANT CHANGE UP (ref 6–8.3)
RBC # BLD: 4.82 M/UL — SIGNIFICANT CHANGE UP (ref 4.2–5.8)
RBC # FLD: 13.1 % — SIGNIFICANT CHANGE UP (ref 10.3–14.5)
SODIUM SERPL-SCNC: 133 MMOL/L — LOW (ref 135–145)
WBC # BLD: 9.97 K/UL — SIGNIFICANT CHANGE UP (ref 3.8–10.5)
WBC # FLD AUTO: 9.97 K/UL — SIGNIFICANT CHANGE UP (ref 3.8–10.5)

## 2019-03-16 PROCEDURE — 99285 EMERGENCY DEPT VISIT HI MDM: CPT

## 2019-03-16 PROCEDURE — 74177 CT ABD & PELVIS W/CONTRAST: CPT | Mod: 26

## 2019-03-16 RX ORDER — IOHEXOL 300 MG/ML
30 INJECTION, SOLUTION INTRAVENOUS ONCE
Qty: 0 | Refills: 0 | Status: COMPLETED | OUTPATIENT
Start: 2019-03-16 | End: 2019-03-16

## 2019-03-16 RX ORDER — ENOXAPARIN SODIUM 100 MG/ML
40 INJECTION SUBCUTANEOUS EVERY 24 HOURS
Qty: 0 | Refills: 0 | Status: DISCONTINUED | OUTPATIENT
Start: 2019-03-16 | End: 2019-03-21

## 2019-03-16 RX ORDER — NICOTINE POLACRILEX 2 MG
1 GUM BUCCAL DAILY
Qty: 0 | Refills: 0 | Status: DISCONTINUED | OUTPATIENT
Start: 2019-03-16 | End: 2019-03-21

## 2019-03-16 RX ORDER — ONDANSETRON 8 MG/1
4 TABLET, FILM COATED ORAL EVERY 6 HOURS
Qty: 0 | Refills: 0 | Status: DISCONTINUED | OUTPATIENT
Start: 2019-03-16 | End: 2019-03-21

## 2019-03-16 RX ORDER — MORPHINE SULFATE 50 MG/1
2 CAPSULE, EXTENDED RELEASE ORAL EVERY 6 HOURS
Qty: 0 | Refills: 0 | Status: DISCONTINUED | OUTPATIENT
Start: 2019-03-16 | End: 2019-03-21

## 2019-03-16 RX ORDER — METOCLOPRAMIDE HCL 10 MG
10 TABLET ORAL ONCE
Qty: 0 | Refills: 0 | Status: COMPLETED | OUTPATIENT
Start: 2019-03-16 | End: 2019-03-16

## 2019-03-16 RX ORDER — ONDANSETRON 8 MG/1
8 TABLET, FILM COATED ORAL ONCE
Qty: 0 | Refills: 0 | Status: COMPLETED | OUTPATIENT
Start: 2019-03-16 | End: 2019-03-16

## 2019-03-16 RX ORDER — PANTOPRAZOLE SODIUM 20 MG/1
40 TABLET, DELAYED RELEASE ORAL DAILY
Qty: 0 | Refills: 0 | Status: DISCONTINUED | OUTPATIENT
Start: 2019-03-16 | End: 2019-03-21

## 2019-03-16 RX ORDER — MORPHINE SULFATE 50 MG/1
4 CAPSULE, EXTENDED RELEASE ORAL ONCE
Qty: 0 | Refills: 0 | Status: DISCONTINUED | OUTPATIENT
Start: 2019-03-16 | End: 2019-03-16

## 2019-03-16 RX ORDER — SODIUM CHLORIDE 9 MG/ML
2000 INJECTION INTRAMUSCULAR; INTRAVENOUS; SUBCUTANEOUS ONCE
Qty: 0 | Refills: 0 | Status: COMPLETED | OUTPATIENT
Start: 2019-03-16 | End: 2019-03-16

## 2019-03-16 RX ORDER — SODIUM CHLORIDE 9 MG/ML
1000 INJECTION, SOLUTION INTRAVENOUS
Qty: 0 | Refills: 0 | Status: DISCONTINUED | OUTPATIENT
Start: 2019-03-16 | End: 2019-03-21

## 2019-03-16 RX ADMIN — ENOXAPARIN SODIUM 40 MILLIGRAM(S): 100 INJECTION SUBCUTANEOUS at 13:26

## 2019-03-16 RX ADMIN — Medication 1 PATCH: at 20:02

## 2019-03-16 RX ADMIN — Medication 40 MILLIGRAM(S): at 21:32

## 2019-03-16 RX ADMIN — Medication 10 MILLIGRAM(S): at 12:10

## 2019-03-16 RX ADMIN — SODIUM CHLORIDE 9999 MILLILITER(S): 9 INJECTION INTRAMUSCULAR; INTRAVENOUS; SUBCUTANEOUS at 09:51

## 2019-03-16 RX ADMIN — Medication 1 TABLET(S): at 15:25

## 2019-03-16 RX ADMIN — Medication 1 PATCH: at 17:47

## 2019-03-16 RX ADMIN — Medication 125 MILLIGRAM(S): at 12:32

## 2019-03-16 RX ADMIN — ONDANSETRON 8 MILLIGRAM(S): 8 TABLET, FILM COATED ORAL at 09:54

## 2019-03-16 RX ADMIN — MORPHINE SULFATE 4 MILLIGRAM(S): 50 CAPSULE, EXTENDED RELEASE ORAL at 09:54

## 2019-03-16 RX ADMIN — MORPHINE SULFATE 2 MILLIGRAM(S): 50 CAPSULE, EXTENDED RELEASE ORAL at 21:32

## 2019-03-16 RX ADMIN — MORPHINE SULFATE 2 MILLIGRAM(S): 50 CAPSULE, EXTENDED RELEASE ORAL at 21:50

## 2019-03-16 RX ADMIN — SODIUM CHLORIDE 80 MILLILITER(S): 9 INJECTION, SOLUTION INTRAVENOUS at 13:11

## 2019-03-16 RX ADMIN — PANTOPRAZOLE SODIUM 40 MILLIGRAM(S): 20 TABLET, DELAYED RELEASE ORAL at 13:24

## 2019-03-16 RX ADMIN — IOHEXOL 30 MILLILITER(S): 300 INJECTION, SOLUTION INTRAVENOUS at 09:58

## 2019-03-16 RX ADMIN — Medication 10 MILLIGRAM(S): at 17:47

## 2019-03-16 RX ADMIN — MORPHINE SULFATE 2 MILLIGRAM(S): 50 CAPSULE, EXTENDED RELEASE ORAL at 15:23

## 2019-03-16 RX ADMIN — SODIUM CHLORIDE 2000 MILLILITER(S): 9 INJECTION INTRAMUSCULAR; INTRAVENOUS; SUBCUTANEOUS at 11:08

## 2019-03-16 RX ADMIN — MORPHINE SULFATE 4 MILLIGRAM(S): 50 CAPSULE, EXTENDED RELEASE ORAL at 09:58

## 2019-03-16 NOTE — H&P ADULT - HISTORY OF PRESENT ILLNESS
49yo, WM with PMH of Crohn's disease not on meds, presenting due to persistent diffuse abd pain, spasms.  Associated with difficulty tolerating oral intake for past 3-4 days, unable to keep liquid or solid down.  Also notes n/v.   Denies CP, SOB, cough, palpitation, diarrhea,  fever, chills, weakness ,dysuria.

## 2019-03-16 NOTE — ED PROVIDER NOTE - OBJECTIVE STATEMENT
48 year old male with PMH of Crohn's disease not on meds, presenting due to persistent diffuse abd pain, difficulty tolerating oral intake for past 3-4 days, unable to keep liquid or solid down - otherwise no fever/chills no diarrhea.

## 2019-03-16 NOTE — H&P ADULT - NSICDXFAMILYHX_GEN_ALL_CORE_FT
FAMILY HISTORY:  Father  Still living? Unknown  Family history of colon cancer in father, Age at diagnosis: Age Unknown  Family history of COPD (chronic obstructive pulmonary disease), Age at diagnosis: Age Unknown  Family history of hypertension in father, Age at diagnosis: Age Unknown    Mother  Still living? Unknown  Family history of diabetes mellitus in maternal grandmother, Age at diagnosis: Age Unknown  Family history of hypertension in father, Age at diagnosis: Age Unknown    Grandparent  Still living? Unknown  Family history of diabetes mellitus in maternal grandmother, Age at diagnosis: Age Unknown  Family history of hypertension in father, Age at diagnosis: Age Unknown

## 2019-03-16 NOTE — H&P ADULT - NSICDXPASTMEDICALHX_GEN_ALL_CORE_FT
PAST MEDICAL HISTORY:  Alfaro's esophagus without dysplasia     Crohn's disease with complication, unspecified gastrointestinal tract location     Depression, unspecified depression type

## 2019-03-16 NOTE — ED PROVIDER NOTE - CLINICAL SUMMARY MEDICAL DECISION MAKING FREE TEXT BOX
pt with Crohn's exacerbation  will admit for further care as pt without ability to tolerate PO intake - will admit under Dr. Machuca and Dr. Russ notified for consultation.

## 2019-03-16 NOTE — ED ADULT NURSE NOTE - NSIMPLEMENTINTERV_GEN_ALL_ED
Implemented All Fall Risk Interventions:  [place] to call system. Call bell, personal items and telephone within reach. Instruct patient to call for assistance. Room bathroom lighting operational. Non-slip footwear when patient is off stretcher. Physically safe environment: no spills, clutter or unnecessary equipment. Stretcher in lowest position, wheels locked, appropriate side rails in place. Provide visual cue, wrist band, yellow gown, etc. Monitor gait and stability. Monitor for mental status changes and reorient to person, place, and time. Review medications for side effects contributing to fall risk. Reinforce activity limits and safety measures with patient and family.

## 2019-03-16 NOTE — H&P ADULT - NSHPLABSRESULTS_GEN_ALL_CORE
LABS:                        15.5   9.97  )-----------( 294      ( 16 Mar 2019 09:51 )             44.4     03-16    133<L>  |  97  |  12  ----------------------------<  92  3.9   |  26  |  1.19    Ca    9.2      16 Mar 2019 09:51    TPro  8.2  /  Alb  4.1  /  TBili  1.9<H>  /  DBili  x   /  AST  141<H>  /  ALT  75  /  AlkPhos  92  03-16        CAPILLARY BLOOD GLUCOSE        < from: CT Abdomen and Pelvis w/ Oral Cont and w/ IV Cont (03.16.19 @ 11:50) >    COMPARISON: January 14, 2019.    PROCEDURE:   CT of the Abdomen and Pelvis was performed with intravenous contrast.   Intravenous contrast: 96 mL Omnipaque 350. 4 mL discarded.  Oral contrast: positive contrast was administered.  Sagittal and coronal reformats were performed.    FINDINGS:    LOWER CHEST: Within normal limits.    LIVER: Within normal limits.  BILE DUCTS: Normal caliber.   GALLBLADDER: Within normal limits.  SPLEEN: Within normal limits.  PANCREAS: Within normal limits.  ADRENALS: Within normal limits.  KIDNEYS/URETERS: Within normal limits.     BLADDER: Within normal limits.  REPRODUCTIVE ORGANS: The prostate is enlarged.    BOWEL: No bowel obstruction. Normal appendix.    PERITONEUM: No ascites.  VESSELS:  Within normal limits.  RETROPERITONEUM: No lymphadenopathy.    ABDOMINAL WALL: Small left inguinal hernia containing unobstructed   sigmoid colon.  BONES: Degenerative changes of the spine. Bilateral L5 pars defects with   grade 1 anterolisthesis of L5 on S1.    IMPRESSION: No acute intra-abdominal pathology.            < end of copied text >

## 2019-03-16 NOTE — ED ADULT NURSE NOTE - OBJECTIVE STATEMENT
patient A&Ox3 in no acute distress c/o of abdominal pain started 5 days ago , c/o of N/V , c/o of constipation , c/o of epigastric pain , c/o of lower back pain , started 2 days ago , denied dysuria , no blood in urine , denied chest pain , c/o of dizziness and SOB , Md aware , EKG was done , patient had a fall 3 days ago , denied hitting head no loc

## 2019-03-16 NOTE — ED ADULT TRIAGE NOTE - CHIEF COMPLAINT QUOTE
Patient reports "Major abdominal pain,  I think it is a Crohn's attack; difficultly breathing, and cramping legs."  + nausea, vomiting denies diarrhea. Patient reports a history of Alfaro esophagus. Pain for 3-4 days

## 2019-03-16 NOTE — H&P ADULT - NSICDXPROBLEM_GEN_ALL_CORE_FT
PROBLEM DIAGNOSES  Problem: Exacerbation of Crohn's disease, without complications  Assessment and Plan: GI consult  IVF  IV Steroids   Monitoring

## 2019-03-16 NOTE — ED ADULT NURSE REASSESSMENT NOTE - NS ED NURSE REASSESS COMMENT FT1
patient sleeping at this time , easy to arouse , no pain no N/V at this time give report to Mason Bradley

## 2019-03-16 NOTE — H&P ADULT - NSHPPHYSICALEXAM_GEN_ALL_CORE
PHYSICAL EXAM:  GENERAL: NAD, well-groomed, well-developed  HEAD:  Atraumatic, Normocephalic  EYES: EOMI, PERRLA, conjunctiva and sclera clear  ENMT: No tonsillar erythema, exudates, or enlargement; Moist mucous membranes, No lesions  NECK: Supple, No JVD, Normal thyroid  NERVOUS SYSTEM:  Alert & Oriented X3; Motor Strength 5/5 B/L upper and lower extremities; DTRs 2+ intact and symmetric  CHEST/LUNG: Clear bilaterally; No rales, rhonchi, wheezing, or rubs  HEART: Regular rate and rhythm; No murmurs, rubs, or gallops  ABDOMEN: Soft,  Diffuse tenderness, Nondistended; Bowel sounds present  EXTREMITIES:  2+ Peripheral Pulses, No clubbing, cyanosis, or edema  LYMPH: No lymphadenopathy noted  SKIN: No rashes or lesions

## 2019-03-17 LAB
ANION GAP SERPL CALC-SCNC: 6 MMOL/L — SIGNIFICANT CHANGE UP (ref 5–17)
BASOPHILS # BLD AUTO: 0 K/UL — SIGNIFICANT CHANGE UP (ref 0–0.2)
BASOPHILS NFR BLD AUTO: 0 % — SIGNIFICANT CHANGE UP (ref 0–2)
BUN SERPL-MCNC: 8 MG/DL — SIGNIFICANT CHANGE UP (ref 7–23)
CALCIUM SERPL-MCNC: 8.2 MG/DL — LOW (ref 8.5–10.1)
CHLORIDE SERPL-SCNC: 106 MMOL/L — SIGNIFICANT CHANGE UP (ref 96–108)
CO2 SERPL-SCNC: 26 MMOL/L — SIGNIFICANT CHANGE UP (ref 22–31)
CREAT SERPL-MCNC: 0.74 MG/DL — SIGNIFICANT CHANGE UP (ref 0.5–1.3)
EOSINOPHIL # BLD AUTO: 0 K/UL — SIGNIFICANT CHANGE UP (ref 0–0.5)
EOSINOPHIL NFR BLD AUTO: 0 % — SIGNIFICANT CHANGE UP (ref 0–6)
GLUCOSE SERPL-MCNC: 122 MG/DL — HIGH (ref 70–99)
HCT VFR BLD CALC: 38.1 % — LOW (ref 39–50)
HGB BLD-MCNC: 12.8 G/DL — LOW (ref 13–17)
IMM GRANULOCYTES NFR BLD AUTO: 0.4 % — SIGNIFICANT CHANGE UP (ref 0–1.5)
LYMPHOCYTES # BLD AUTO: 0.83 K/UL — LOW (ref 1–3.3)
LYMPHOCYTES # BLD AUTO: 10.7 % — LOW (ref 13–44)
MCHC RBC-ENTMCNC: 32.2 PG — SIGNIFICANT CHANGE UP (ref 27–34)
MCHC RBC-ENTMCNC: 33.6 GM/DL — SIGNIFICANT CHANGE UP (ref 32–36)
MCV RBC AUTO: 96 FL — SIGNIFICANT CHANGE UP (ref 80–100)
MONOCYTES # BLD AUTO: 0.56 K/UL — SIGNIFICANT CHANGE UP (ref 0–0.9)
MONOCYTES NFR BLD AUTO: 7.2 % — SIGNIFICANT CHANGE UP (ref 2–14)
NEUTROPHILS # BLD AUTO: 6.34 K/UL — SIGNIFICANT CHANGE UP (ref 1.8–7.4)
NEUTROPHILS NFR BLD AUTO: 81.7 % — HIGH (ref 43–77)
NRBC # BLD: 0 /100 WBCS — SIGNIFICANT CHANGE UP (ref 0–0)
PLATELET # BLD AUTO: 216 K/UL — SIGNIFICANT CHANGE UP (ref 150–400)
POTASSIUM SERPL-MCNC: 3.9 MMOL/L — SIGNIFICANT CHANGE UP (ref 3.5–5.3)
POTASSIUM SERPL-SCNC: 3.9 MMOL/L — SIGNIFICANT CHANGE UP (ref 3.5–5.3)
RBC # BLD: 3.97 M/UL — LOW (ref 4.2–5.8)
RBC # FLD: 13.1 % — SIGNIFICANT CHANGE UP (ref 10.3–14.5)
SODIUM SERPL-SCNC: 138 MMOL/L — SIGNIFICANT CHANGE UP (ref 135–145)
WBC # BLD: 7.76 K/UL — SIGNIFICANT CHANGE UP (ref 3.8–10.5)
WBC # FLD AUTO: 7.76 K/UL — SIGNIFICANT CHANGE UP (ref 3.8–10.5)

## 2019-03-17 RX ORDER — LANOLIN ALCOHOL/MO/W.PET/CERES
3 CREAM (GRAM) TOPICAL ONCE
Qty: 0 | Refills: 0 | Status: COMPLETED | OUTPATIENT
Start: 2019-03-17 | End: 2019-03-17

## 2019-03-17 RX ADMIN — PANTOPRAZOLE SODIUM 40 MILLIGRAM(S): 20 TABLET, DELAYED RELEASE ORAL at 13:25

## 2019-03-17 RX ADMIN — Medication 40 MILLIGRAM(S): at 22:00

## 2019-03-17 RX ADMIN — Medication 1 PATCH: at 13:20

## 2019-03-17 RX ADMIN — MORPHINE SULFATE 2 MILLIGRAM(S): 50 CAPSULE, EXTENDED RELEASE ORAL at 23:15

## 2019-03-17 RX ADMIN — SODIUM CHLORIDE 80 MILLILITER(S): 9 INJECTION, SOLUTION INTRAVENOUS at 05:40

## 2019-03-17 RX ADMIN — Medication 40 MILLIGRAM(S): at 13:36

## 2019-03-17 RX ADMIN — Medication 1 PATCH: at 13:24

## 2019-03-17 RX ADMIN — ENOXAPARIN SODIUM 40 MILLIGRAM(S): 100 INJECTION SUBCUTANEOUS at 13:25

## 2019-03-17 RX ADMIN — MORPHINE SULFATE 2 MILLIGRAM(S): 50 CAPSULE, EXTENDED RELEASE ORAL at 09:21

## 2019-03-17 RX ADMIN — MORPHINE SULFATE 2 MILLIGRAM(S): 50 CAPSULE, EXTENDED RELEASE ORAL at 17:20

## 2019-03-17 RX ADMIN — ONDANSETRON 4 MILLIGRAM(S): 8 TABLET, FILM COATED ORAL at 08:58

## 2019-03-17 RX ADMIN — Medication 40 MILLIGRAM(S): at 05:39

## 2019-03-17 RX ADMIN — Medication 1 PATCH: at 22:03

## 2019-03-17 RX ADMIN — ONDANSETRON 4 MILLIGRAM(S): 8 TABLET, FILM COATED ORAL at 23:15

## 2019-03-17 RX ADMIN — Medication 1 TABLET(S): at 13:24

## 2019-03-17 RX ADMIN — MORPHINE SULFATE 2 MILLIGRAM(S): 50 CAPSULE, EXTENDED RELEASE ORAL at 16:57

## 2019-03-17 RX ADMIN — Medication 10 MILLIGRAM(S): at 17:01

## 2019-03-17 RX ADMIN — Medication 10 MILLIGRAM(S): at 08:27

## 2019-03-17 RX ADMIN — ONDANSETRON 4 MILLIGRAM(S): 8 TABLET, FILM COATED ORAL at 16:57

## 2019-03-17 RX ADMIN — Medication 1 PATCH: at 08:24

## 2019-03-17 RX ADMIN — SODIUM CHLORIDE 80 MILLILITER(S): 9 INJECTION, SOLUTION INTRAVENOUS at 16:57

## 2019-03-17 RX ADMIN — Medication 3 MILLIGRAM(S): at 23:15

## 2019-03-17 RX ADMIN — MORPHINE SULFATE 2 MILLIGRAM(S): 50 CAPSULE, EXTENDED RELEASE ORAL at 08:57

## 2019-03-17 RX ADMIN — MORPHINE SULFATE 2 MILLIGRAM(S): 50 CAPSULE, EXTENDED RELEASE ORAL at 23:30

## 2019-03-17 NOTE — PROGRESS NOTE ADULT - NSICDXPROBLEM_GEN_ALL_CORE_FT
PROBLEM DIAGNOSES  Problem: Exacerbation of Crohn's disease, without complications  Assessment and Plan: GI consult  IVF  IV Steroids   Continue IV Zofran prn  Continue Clear liquid diet.  Monitoring

## 2019-03-18 DIAGNOSIS — K58.9 IRRITABLE BOWEL SYNDROME WITHOUT DIARRHEA: ICD-10-CM

## 2019-03-18 DIAGNOSIS — K22.70 BARRETT'S ESOPHAGUS WITHOUT DYSPLASIA: ICD-10-CM

## 2019-03-18 LAB
ANION GAP SERPL CALC-SCNC: 4 MMOL/L — LOW (ref 5–17)
BUN SERPL-MCNC: 6 MG/DL — LOW (ref 7–23)
CALCIUM SERPL-MCNC: 8.3 MG/DL — LOW (ref 8.5–10.1)
CHLORIDE SERPL-SCNC: 109 MMOL/L — HIGH (ref 96–108)
CO2 SERPL-SCNC: 28 MMOL/L — SIGNIFICANT CHANGE UP (ref 22–31)
CREAT SERPL-MCNC: 0.83 MG/DL — SIGNIFICANT CHANGE UP (ref 0.5–1.3)
GLUCOSE SERPL-MCNC: 122 MG/DL — HIGH (ref 70–99)
HCT VFR BLD CALC: 37.5 % — LOW (ref 39–50)
HGB BLD-MCNC: 12.3 G/DL — LOW (ref 13–17)
MCHC RBC-ENTMCNC: 31.8 PG — SIGNIFICANT CHANGE UP (ref 27–34)
MCHC RBC-ENTMCNC: 32.8 GM/DL — SIGNIFICANT CHANGE UP (ref 32–36)
MCV RBC AUTO: 96.9 FL — SIGNIFICANT CHANGE UP (ref 80–100)
NRBC # BLD: 0 /100 WBCS — SIGNIFICANT CHANGE UP (ref 0–0)
PLATELET # BLD AUTO: 204 K/UL — SIGNIFICANT CHANGE UP (ref 150–400)
POTASSIUM SERPL-MCNC: 4 MMOL/L — SIGNIFICANT CHANGE UP (ref 3.5–5.3)
POTASSIUM SERPL-SCNC: 4 MMOL/L — SIGNIFICANT CHANGE UP (ref 3.5–5.3)
RBC # BLD: 3.87 M/UL — LOW (ref 4.2–5.8)
RBC # FLD: 13 % — SIGNIFICANT CHANGE UP (ref 10.3–14.5)
SODIUM SERPL-SCNC: 141 MMOL/L — SIGNIFICANT CHANGE UP (ref 135–145)
WBC # BLD: 9.53 K/UL — SIGNIFICANT CHANGE UP (ref 3.8–10.5)
WBC # FLD AUTO: 9.53 K/UL — SIGNIFICANT CHANGE UP (ref 3.8–10.5)

## 2019-03-18 RX ORDER — DOCUSATE SODIUM 100 MG
100 CAPSULE ORAL
Qty: 0 | Refills: 0 | Status: DISCONTINUED | OUTPATIENT
Start: 2019-03-18 | End: 2019-03-21

## 2019-03-18 RX ADMIN — Medication 1 PATCH: at 13:33

## 2019-03-18 RX ADMIN — PANTOPRAZOLE SODIUM 40 MILLIGRAM(S): 20 TABLET, DELAYED RELEASE ORAL at 13:04

## 2019-03-18 RX ADMIN — Medication 1 PATCH: at 08:28

## 2019-03-18 RX ADMIN — MORPHINE SULFATE 2 MILLIGRAM(S): 50 CAPSULE, EXTENDED RELEASE ORAL at 23:28

## 2019-03-18 RX ADMIN — SODIUM CHLORIDE 80 MILLILITER(S): 9 INJECTION, SOLUTION INTRAVENOUS at 06:41

## 2019-03-18 RX ADMIN — Medication 5 MILLIGRAM(S): at 14:48

## 2019-03-18 RX ADMIN — Medication 100 MILLIGRAM(S): at 14:48

## 2019-03-18 RX ADMIN — MORPHINE SULFATE 2 MILLIGRAM(S): 50 CAPSULE, EXTENDED RELEASE ORAL at 07:03

## 2019-03-18 RX ADMIN — Medication 1 PATCH: at 13:05

## 2019-03-18 RX ADMIN — MORPHINE SULFATE 2 MILLIGRAM(S): 50 CAPSULE, EXTENDED RELEASE ORAL at 13:08

## 2019-03-18 RX ADMIN — MORPHINE SULFATE 2 MILLIGRAM(S): 50 CAPSULE, EXTENDED RELEASE ORAL at 14:11

## 2019-03-18 RX ADMIN — Medication 1 TABLET(S): at 13:04

## 2019-03-18 RX ADMIN — MORPHINE SULFATE 2 MILLIGRAM(S): 50 CAPSULE, EXTENDED RELEASE ORAL at 06:42

## 2019-03-18 RX ADMIN — Medication 10 MILLIGRAM(S): at 17:48

## 2019-03-18 RX ADMIN — MORPHINE SULFATE 2 MILLIGRAM(S): 50 CAPSULE, EXTENDED RELEASE ORAL at 23:50

## 2019-03-18 RX ADMIN — Medication 40 MILLIGRAM(S): at 06:42

## 2019-03-18 RX ADMIN — ENOXAPARIN SODIUM 40 MILLIGRAM(S): 100 INJECTION SUBCUTANEOUS at 13:05

## 2019-03-18 RX ADMIN — ONDANSETRON 4 MILLIGRAM(S): 8 TABLET, FILM COATED ORAL at 06:42

## 2019-03-18 RX ADMIN — ONDANSETRON 4 MILLIGRAM(S): 8 TABLET, FILM COATED ORAL at 23:47

## 2019-03-18 RX ADMIN — Medication 10 MILLIGRAM(S): at 08:26

## 2019-03-18 RX ADMIN — ONDANSETRON 4 MILLIGRAM(S): 8 TABLET, FILM COATED ORAL at 16:16

## 2019-03-18 NOTE — CONSULT NOTE ADULT - SUBJECTIVE AND OBJECTIVE BOX
Chief Complaint:  Patient is a 48y old  Male who presents with a chief complaint of Abdominal pain. (18 Mar 2019 11:06)      HPI:  49yo, WM with PMH of Crohn's disease not on meds, presenting due to persistent diffuse abd pain, spasms.  Associated with difficulty tolerating oral intake for past 3-4 days, unable to keep liquid or solid down.  Also notes n/v.   Denies CP, SOB, cough, palpitation, diarrhea,  fever, chills, weakness ,dysuria. (16 Mar 2019 15:56) We were asked to evaluate patient for above         PMH/PSH:PAST MEDICAL & SURGICAL HISTORY:  Alfaro's esophagus without dysplasia  Depression, unspecified depression type  Crohn's disease with complication, unspecified gastrointestinal tract location( NO CLINICAL EVIDENCE AFTER REPEAT WORK UP)  ACL (anterior cruciate ligament) tear      Allergies:  No Known Allergies      Medications:  dextrose 5% + sodium chloride 0.45%. 1000 milliLiter(s) IV Continuous <Continuous>  dicyclomine 10 milliGRAM(s) Oral two times a day before meals  enoxaparin Injectable 40 milliGRAM(s) SubCutaneous every 24 hours  methylPREDNISolone sodium succinate Injectable 40 milliGRAM(s) IV Push daily  morphine  - Injectable 2 milliGRAM(s) IV Push every 6 hours PRN  multivitamin 1 Tablet(s) Oral daily  nicotine -  14 mG/24Hr(s) Patch 1 patch Transdermal daily  ondansetron Injectable 4 milliGRAM(s) IV Push every 6 hours PRN  pantoprazole  Injectable 40 milliGRAM(s) IV Push daily      Review of Systems:    General:  No weight loss, fevers, chills, night sweats, fatigue,   Eyes:  Good vision, no reported pain  ENT:  No sore throat, pain, runny nose, dysphagia  CV:  No pain, palpitations, hypo/hypertension  Resp:  No dyspnea, cough, tachypnea, wheezing  GI:  +pain, No nausea, No vomiting, No diarrhea, No constipation, No pruritis, No rectal bleeding, No tarry stools, No dysphagia,  :  No pain, bleeding, incontinence, nocturia  Muscle:  No pain, weakness  Breast:  No pain, abscess, mass, discharge  Neuro:  No weakness, tingling, memory problems  Psych:  No fatigue, insomnia, mood problems, depression  Endocrine:  No polyuria, polydipsia, cold/heat intolerance  Heme:  No petechiae, ecchymosis, easy bruisability  Skin:  No rash, tattoos, scars, edema    Relevant Family History:   FAMILY HISTORY:  Family history of diabetes mellitus in maternal grandmother (Mother, Grandparent)  Family history of COPD (chronic obstructive pulmonary disease) (Father)  Family history of colon cancer in father (Father)  Family history of hypertension in father (Father, Mother, Grandparent)      Relevant Social History: Alcohol ( -) , Tobacco ( -) , Illicit drugs (- )     Physical Exam:    Vital Signs:  Vital Signs Last 24 Hrs  T(C): 36.1 (18 Mar 2019 05:58), Max: 36.6 (17 Mar 2019 17:13)  T(F): 97 (18 Mar 2019 05:58), Max: 97.9 (17 Mar 2019 23:34)  HR: 66 (18 Mar 2019 05:58) (64 - 94)  BP: 118/79 (18 Mar 2019 05:58) (106/64 - 123/70)  BP(mean): --  RR: 18 (18 Mar 2019 05:58) (18 - 18)  SpO2: 100% (18 Mar 2019 05:58) (93% - 100%)  Daily     Daily Weight in k.5 (18 Mar 2019 05:58)    General:  Appears stated age, well-groomed, well-nourished, no distress  HEENT:  NC/AT,  conjunctivae clear and pink, no thyromegaly, nodules, adenopathy, no JVD, anicteric sclera  Chest:  Full & symmetric excursion, no increased effort, breath sounds clear  Cardiovascular:  Regular rhythm, S1, S2, no murmur/rub/S3/S4, no abdominal bruit, no edema  Abdomen:  Soft, non tender, non distended, normoactive bowel sounds,  no masses , no hepatosplenomegaly, no signs of chronic liver disease  Extremities:  no cyanosis, clubbing or edema  Skin:  No rash/erythema/ecchymoses/petechiae/wounds/abscess/warm/dry  Neuro/Psych:  Alert, oriented, no asterixis, no tremor, no encephalopathy    Laboratory:                          12.3   9.53  )-----------( 204      ( 18 Mar 2019 06:25 )             37.5     03-18    141  |  109<H>  |  6<L>  ----------------------------<  122<H>  4.0   |  28  |  0.83    Ca    8.3<L>      18 Mar 2019 06:25                Intake and Output    19 @ 07:01  -  19 @ 07:00  --------------------------------------------------------  IN: 1920 mL / OUT: 2800 mL / NET: -880 mL        Imaging:

## 2019-03-18 NOTE — CONSULT NOTE ADULT - ASSESSMENT
46 yo wm with abdominal pain. No pathology supporting Crohn's disease though he did have a SBO attributed to an areain the Ileum not normally seen in Crohns. 2015. Upper endoscopy showed Barretts but no lesions that would cause recurrent vomiting. No evidence of  Crohns disease.

## 2019-03-18 NOTE — CHART NOTE - NSCHARTNOTEFT_GEN_A_CORE
[facility]    HPI:  47yo, WM with PMH of Crohn's disease not on meds, presenting due to persistent diffuse abd pain, spasms.  Associated with difficulty tolerating oral intake for past 3-4 days, unable to keep liquid or solid down.  Also notes n/v.   Denies CP, SOB, cough, palpitation, diarrhea,  fever, chills, weakness ,dysuria. (16 Mar 2019 15:56)    Patient seen and examined at bedside. Patient states that he began having medical problems after his SBO 5 years ago, and has been in and out of hospitals since. He is complaining of difficulty swallowing solids, but that he can drink the ensure protein shakes. Patient complaining of constipation, that he is without bowel movement for 7 days, and states that he feels he comes to the hospital gets IV fluids, gets better, then gets discharged and gets worse. Discussed importance of following up with GI Dr. Montgomery's out patient for EGD and discussed in depth the risk of smoking cigarettes and the importance of quitting tobacco to preserve his life. Patient states he understands, but it is difficult.    - Start stool softeners and laxatives  - Encourage PO intake  - Add Ensure to diet  - DC planning tomorrow, and Follow up with GI [facility]    HPI:  49yo, WM with PMH of Crohn's disease not on meds, presenting due to persistent diffuse abd pain, spasms.  Associated with difficulty tolerating oral intake for past 3-4 days, unable to keep liquid or solid down.  Also notes n/v.   Denies CP, SOB, cough, palpitation, diarrhea,  fever, chills, weakness ,dysuria. (16 Mar 2019 15:56)    Patient seen and examined at bedside. Patient states that he began having medical problems after his SBO 5 years ago, and has been in and out of hospitals since. He is complaining of difficulty swallowing solids, but that he can drink the ensure protein shakes. Patient complaining of constipation, that he is without bowel movement for 7 days, and states that he feels he comes to the hospital gets IV fluids, gets better, then gets discharged and gets worse. Discussed importance of following up with GI Dr. Montgomery's out patient for EGD and discussed in depth the risk of smoking cigarettes and the importance of quitting tobacco to preserve his life. Patient states he understands, but it is difficult. Additionally, patient noted that he has troubles financially and that a lock has been placed on his door, that he doesn't know where he will go once he gets discharged.    - Start stool softeners and laxatives  - Encourage PO intake  - Add Ensure to diet  - Social work consult before discharge  - DC planning tomorrow, and Follow up with GI

## 2019-03-18 NOTE — CONSULT NOTE ADULT - NSICDXPROBLEM_GEN_ALL_CORE_FT
PROBLEM DIAGNOSES  Problem: Irritable bowel syndrome (IBS)  Recommendation: prn bentyl     Problem: Alfaro's esophagus without dysplasia  Recommendation: follow up for EGD as outpatient   PATIENT HAS NO EVIDENCE OF COHNS DISEASE AND REQUIRES NO TREATMENT AS SUCH

## 2019-03-19 ENCOUNTER — TRANSCRIPTION ENCOUNTER (OUTPATIENT)
Age: 49
End: 2019-03-19

## 2019-03-19 DIAGNOSIS — K50.90 CROHN'S DISEASE, UNSPECIFIED, WITHOUT COMPLICATIONS: ICD-10-CM

## 2019-03-19 DIAGNOSIS — R13.10 DYSPHAGIA, UNSPECIFIED: ICD-10-CM

## 2019-03-19 DIAGNOSIS — K59.00 CONSTIPATION, UNSPECIFIED: ICD-10-CM

## 2019-03-19 RX ORDER — ONDANSETRON 8 MG/1
1 TABLET, FILM COATED ORAL
Qty: 14 | Refills: 0
Start: 2019-03-19 | End: 2019-03-25

## 2019-03-19 RX ORDER — NICOTINE POLACRILEX 2 MG
1 GUM BUCCAL
Qty: 30 | Refills: 0
Start: 2019-03-19 | End: 2019-04-17

## 2019-03-19 RX ORDER — DIPHENHYDRAMINE HYDROCHLORIDE AND LIDOCAINE HYDROCHLORIDE AND ALUMINUM HYDROXIDE AND MAGNESIUM HYDRO
5 KIT
Qty: 0 | Refills: 0 | Status: DISCONTINUED | OUTPATIENT
Start: 2019-03-19 | End: 2019-03-21

## 2019-03-19 RX ADMIN — Medication 100 MILLIGRAM(S): at 06:20

## 2019-03-19 RX ADMIN — Medication 5 MILLIGRAM(S): at 07:06

## 2019-03-19 RX ADMIN — SODIUM CHLORIDE 80 MILLILITER(S): 9 INJECTION, SOLUTION INTRAVENOUS at 17:49

## 2019-03-19 RX ADMIN — MORPHINE SULFATE 2 MILLIGRAM(S): 50 CAPSULE, EXTENDED RELEASE ORAL at 22:14

## 2019-03-19 RX ADMIN — Medication 10 MILLIGRAM(S): at 09:30

## 2019-03-19 RX ADMIN — Medication 5 MILLIGRAM(S): at 22:23

## 2019-03-19 RX ADMIN — DIPHENHYDRAMINE HYDROCHLORIDE AND LIDOCAINE HYDROCHLORIDE AND ALUMINUM HYDROXIDE AND MAGNESIUM HYDRO 5 MILLILITER(S): KIT at 17:49

## 2019-03-19 RX ADMIN — MORPHINE SULFATE 2 MILLIGRAM(S): 50 CAPSULE, EXTENDED RELEASE ORAL at 06:13

## 2019-03-19 RX ADMIN — Medication 40 MILLIGRAM(S): at 06:21

## 2019-03-19 RX ADMIN — Medication 1 PATCH: at 22:05

## 2019-03-19 RX ADMIN — Medication 10 MILLIGRAM(S): at 16:49

## 2019-03-19 RX ADMIN — MORPHINE SULFATE 2 MILLIGRAM(S): 50 CAPSULE, EXTENDED RELEASE ORAL at 22:29

## 2019-03-19 RX ADMIN — PANTOPRAZOLE SODIUM 40 MILLIGRAM(S): 20 TABLET, DELAYED RELEASE ORAL at 12:58

## 2019-03-19 RX ADMIN — ENOXAPARIN SODIUM 40 MILLIGRAM(S): 100 INJECTION SUBCUTANEOUS at 12:57

## 2019-03-19 RX ADMIN — Medication 1 TABLET(S): at 12:58

## 2019-03-19 RX ADMIN — Medication 1 PATCH: at 13:00

## 2019-03-19 RX ADMIN — ONDANSETRON 4 MILLIGRAM(S): 8 TABLET, FILM COATED ORAL at 22:14

## 2019-03-19 RX ADMIN — Medication 100 MILLIGRAM(S): at 17:48

## 2019-03-19 RX ADMIN — Medication 1 PATCH: at 12:58

## 2019-03-19 NOTE — DISCHARGE NOTE PROVIDER - NSDCCPGOAL_GEN_ALL_CORE_FT
To get better and follow your care plan as instructed.    Please follow up with your primary care physician regarding your hospitalization in 1 week

## 2019-03-19 NOTE — DISCHARGE NOTE PROVIDER - NSDCCPCAREPLAN_GEN_ALL_CORE_FT
PRINCIPAL DISCHARGE DIAGNOSIS  Diagnosis: Alfaro's esophagus without dysplasia  Assessment and Plan of Treatment: GI followup for EGD      SECONDARY DISCHARGE DIAGNOSES  Diagnosis: Irritable bowel syndrome (IBS)  Assessment and Plan of Treatment: Medcation    Diagnosis: Exacerbation of Crohn's disease, without complications  Assessment and Plan of Treatment: RULED OUT

## 2019-03-19 NOTE — DISCHARGE NOTE PROVIDER - HOSPITAL COURSE
49yo, WM with PMH of Crohn's disease not on meds, presenting due to persistent diffuse abd pain, spasms.  Associated with difficulty tolerating oral intake for past 3-4 days, unable to keep liquid or solid down.  Also notes n/v.   Denies CP, SOB, cough, palpitation, diarrhea,  fever, chills, weakness ,dysuria.             Admitted for Crohn exacerbation, received IVF, steroids, pain management, seen by GI, noted;    No pathology supporting Crohn's disease though he did have a SBO attributed to an area in the Ileum not normally seen in Crohns. 2015. Upper endoscopy showed Barretts but no lesions that would cause recurrent vomiting. No evidence of  Crohns disease. Notes IBS and Alfaro Esophagitis.

## 2019-03-19 NOTE — PROGRESS NOTE ADULT - NSICDXPROBLEM_GEN_ALL_CORE_FT
PROBLEM DIAGNOSES  Problem: Odynophagia  Assessment and Plan: Start magic mouthwash    Problem: Irritable bowel syndrome (IBS)  Assessment and Plan: Continue Bentyl    Problem: Alfaro's esophagus without dysplasia  Assessment and Plan: Continue protonix, outpatient EGD    Problem: Constipation  Assessment and Plan: Prn suppository    Problem: Exacerbation of Crohn's disease, without complications  Assessment and Plan: PROBLEM DIAGNOSES  Problem: Odynophagia  Assessment and Plan: Start magic mouthwash    Problem: Irritable bowel syndrome (IBS)  Assessment and Plan: Continue Bentyl    Problem: Alfaro's esophagus without dysplasia  Assessment and Plan: Continue protonix, outpatient EGD    Problem: Constipation  Assessment and Plan: Prn suppository    Problem: Exacerbation of Crohn's disease, without complications  Assessment and Plan:     Problem: Exacerbation of Crohn's disease, without complications  Assessment and Plan: Ruled out, no evidence of crohns disease

## 2019-03-20 DIAGNOSIS — K58.9 IRRITABLE BOWEL SYNDROME WITHOUT DIARRHEA: ICD-10-CM

## 2019-03-20 RX ORDER — POLYETHYLENE GLYCOL 3350 17 G/17G
17 POWDER, FOR SOLUTION ORAL DAILY
Qty: 0 | Refills: 0 | Status: DISCONTINUED | OUTPATIENT
Start: 2019-03-20 | End: 2019-03-21

## 2019-03-20 RX ORDER — LACTULOSE 10 G/15ML
10 SOLUTION ORAL
Qty: 0 | Refills: 0 | Status: DISCONTINUED | OUTPATIENT
Start: 2019-03-20 | End: 2019-03-21

## 2019-03-20 RX ADMIN — MORPHINE SULFATE 2 MILLIGRAM(S): 50 CAPSULE, EXTENDED RELEASE ORAL at 22:34

## 2019-03-20 RX ADMIN — PANTOPRAZOLE SODIUM 40 MILLIGRAM(S): 20 TABLET, DELAYED RELEASE ORAL at 11:59

## 2019-03-20 RX ADMIN — MORPHINE SULFATE 2 MILLIGRAM(S): 50 CAPSULE, EXTENDED RELEASE ORAL at 07:37

## 2019-03-20 RX ADMIN — MORPHINE SULFATE 2 MILLIGRAM(S): 50 CAPSULE, EXTENDED RELEASE ORAL at 23:32

## 2019-03-20 RX ADMIN — Medication 1 PATCH: at 11:57

## 2019-03-20 RX ADMIN — ONDANSETRON 4 MILLIGRAM(S): 8 TABLET, FILM COATED ORAL at 22:04

## 2019-03-20 RX ADMIN — DIPHENHYDRAMINE HYDROCHLORIDE AND LIDOCAINE HYDROCHLORIDE AND ALUMINUM HYDROXIDE AND MAGNESIUM HYDRO 5 MILLILITER(S): KIT at 18:11

## 2019-03-20 RX ADMIN — Medication 100 MILLIGRAM(S): at 18:11

## 2019-03-20 RX ADMIN — Medication 1 PATCH: at 12:00

## 2019-03-20 RX ADMIN — LACTULOSE 10 GRAM(S): 10 SOLUTION ORAL at 11:59

## 2019-03-20 RX ADMIN — Medication 1 PATCH: at 19:42

## 2019-03-20 RX ADMIN — POLYETHYLENE GLYCOL 3350 17 GRAM(S): 17 POWDER, FOR SOLUTION ORAL at 11:58

## 2019-03-20 RX ADMIN — DIPHENHYDRAMINE HYDROCHLORIDE AND LIDOCAINE HYDROCHLORIDE AND ALUMINUM HYDROXIDE AND MAGNESIUM HYDRO 5 MILLILITER(S): KIT at 05:35

## 2019-03-20 RX ADMIN — Medication 10 MILLIGRAM(S): at 16:23

## 2019-03-20 RX ADMIN — LACTULOSE 10 GRAM(S): 10 SOLUTION ORAL at 18:12

## 2019-03-20 RX ADMIN — Medication 40 MILLIGRAM(S): at 05:35

## 2019-03-20 RX ADMIN — MORPHINE SULFATE 2 MILLIGRAM(S): 50 CAPSULE, EXTENDED RELEASE ORAL at 05:47

## 2019-03-20 RX ADMIN — Medication 10 MILLIGRAM(S): at 09:16

## 2019-03-20 RX ADMIN — ONDANSETRON 4 MILLIGRAM(S): 8 TABLET, FILM COATED ORAL at 05:47

## 2019-03-20 RX ADMIN — Medication 1 TABLET(S): at 11:58

## 2019-03-20 RX ADMIN — SODIUM CHLORIDE 80 MILLILITER(S): 9 INJECTION, SOLUTION INTRAVENOUS at 05:34

## 2019-03-20 NOTE — PROGRESS NOTE ADULT - ATTENDING COMMENTS
Advancing diet  Tapering steroids   Discharge planning  Continue current care and treatment.
Continue current care and treatment.
For discharge, waiting for Shelter approval

## 2019-03-21 ENCOUNTER — TRANSCRIPTION ENCOUNTER (OUTPATIENT)
Age: 49
End: 2019-03-21

## 2019-03-21 VITALS
RESPIRATION RATE: 16 BRPM | HEART RATE: 81 BPM | SYSTOLIC BLOOD PRESSURE: 112 MMHG | TEMPERATURE: 98 F | OXYGEN SATURATION: 95 % | DIASTOLIC BLOOD PRESSURE: 70 MMHG

## 2019-03-21 RX ORDER — POLYETHYLENE GLYCOL 3350 17 G/17G
17 POWDER, FOR SOLUTION ORAL
Qty: 0 | Refills: 0 | DISCHARGE
Start: 2019-03-21

## 2019-03-21 RX ADMIN — Medication 10 MILLIGRAM(S): at 06:39

## 2019-03-21 RX ADMIN — Medication 1 PATCH: at 07:53

## 2019-03-21 RX ADMIN — DIPHENHYDRAMINE HYDROCHLORIDE AND LIDOCAINE HYDROCHLORIDE AND ALUMINUM HYDROXIDE AND MAGNESIUM HYDRO 5 MILLILITER(S): KIT at 06:38

## 2019-03-21 RX ADMIN — LACTULOSE 10 GRAM(S): 10 SOLUTION ORAL at 06:39

## 2019-03-21 RX ADMIN — Medication 100 MILLIGRAM(S): at 06:39

## 2019-03-21 RX ADMIN — Medication 40 MILLIGRAM(S): at 06:39

## 2019-03-21 NOTE — PROGRESS NOTE ADULT - ASSESSMENT
48 yo wm with abdominal pain. No pathology supporting Crohn's disease though he did have a SBO attributed to an areain the Ileum not normally seen in Crohns. 2015. Upper endoscopy showed Barretts but no lesions that would cause recurrent vomiting. No evidence of  Crohns disease. Now with c/o odynophagia
Admitted for Crohn's exacerbation

## 2019-03-21 NOTE — PROGRESS NOTE ADULT - PROBLEM SELECTOR PROBLEM 4
R/O Exacerbation of Crohn's disease, without complications
R/O Exacerbation of Crohn's disease, without complications

## 2019-03-21 NOTE — PROGRESS NOTE ADULT - PROBLEM SELECTOR PROBLEM 1
Irritable bowel syndrome without diarrhea

## 2019-03-21 NOTE — DISCHARGE NOTE NURSING/CASE MANAGEMENT/SOCIAL WORK - NSDCPEEMAIL_GEN_ALL_CORE
[facility] for Tobacco Control email [email or web address]

## 2019-03-21 NOTE — PROGRESS NOTE ADULT - PROBLEM SELECTOR PROBLEM 2
Alfaro's esophagus without dysplasia

## 2019-03-21 NOTE — DISCHARGE NOTE NURSING/CASE MANAGEMENT/SOCIAL WORK - NSDCDPATPORTLINK_GEN_ALL_CORE
You can access the [facility], offered by [facility], by registering with the following website: [email or web address]

## 2019-03-21 NOTE — PROGRESS NOTE ADULT - PROVIDER SPECIALTY LIST ADULT
Gastroenterology
Gastroenterology
Internal Medicine
Gastroenterology

## 2019-03-21 NOTE — DISCHARGE NOTE NURSING/CASE MANAGEMENT/SOCIAL WORK - NSDCPEWEB_GEN_ALL_CORE
[place] website --- [email or web address] for Tobacco Control website --- [email or web address]

## 2019-03-21 NOTE — DISCHARGE NOTE NURSING/CASE MANAGEMENT/SOCIAL WORK - NSTRANSFERBELONGINGSDISPO_GEN_A_NUR
Emergency Department    [address] [place] [address]    Phone:  [phone]    Fax:  [phone]                                       Bradley Quintero   MRN: [id number]    Department:   Emergency Department   Date of Visit:  9/18/2018           After Visit Summary Signature Page     I have received my discharge instructions, and my questions have been answered. I have discussed any challenges I see with this plan with the nurse or doctor.    ..........................................................................................................................................  Patient/Patient Representative Signature      ..........................................................................................................................................  Patient Representative Print Name and Relationship to Patient    ..................................................               ................................................  Date                                   Time    ..........................................................................................................................................  Reviewed by Signature/Title    ...................................................              ..............................................  Date                                               Time          22EPIC Rev 08/18         with patient/not applicable

## 2019-03-26 DIAGNOSIS — K58.9 IRRITABLE BOWEL SYNDROME WITHOUT DIARRHEA: ICD-10-CM

## 2019-03-26 DIAGNOSIS — K56.609 UNSPECIFIED INTESTINAL OBSTRUCTION, UNSPECIFIED AS TO PARTIAL VERSUS COMPLETE OBSTRUCTION: ICD-10-CM

## 2019-03-26 DIAGNOSIS — K59.00 CONSTIPATION, UNSPECIFIED: ICD-10-CM

## 2019-03-26 DIAGNOSIS — F32.9 MAJOR DEPRESSIVE DISORDER, SINGLE EPISODE, UNSPECIFIED: ICD-10-CM

## 2019-03-26 DIAGNOSIS — K22.70 BARRETT'S ESOPHAGUS WITHOUT DYSPLASIA: ICD-10-CM

## 2019-03-26 DIAGNOSIS — N40.0 BENIGN PROSTATIC HYPERPLASIA WITHOUT LOWER URINARY TRACT SYMPTOMS: ICD-10-CM

## 2019-05-01 ENCOUNTER — OUTPATIENT (OUTPATIENT)
Dept: OUTPATIENT SERVICES | Facility: HOSPITAL | Age: 49
LOS: 1 days | End: 2019-05-01

## 2019-05-01 DIAGNOSIS — S83.519A SPRAIN OF ANTERIOR CRUCIATE LIGAMENT OF UNSPECIFIED KNEE, INITIAL ENCOUNTER: Chronic | ICD-10-CM

## 2019-05-23 DIAGNOSIS — Z71.89 OTHER SPECIFIED COUNSELING: ICD-10-CM

## 2019-06-20 ENCOUNTER — INPATIENT (INPATIENT)
Facility: HOSPITAL | Age: 49
LOS: 1 days | Discharge: HOME | End: 2019-06-22
Attending: INTERNAL MEDICINE | Admitting: INTERNAL MEDICINE
Payer: MEDICAID

## 2019-06-20 VITALS
DIASTOLIC BLOOD PRESSURE: 96 MMHG | RESPIRATION RATE: 18 BRPM | HEART RATE: 104 BPM | SYSTOLIC BLOOD PRESSURE: 148 MMHG | WEIGHT: 134.92 LBS | HEIGHT: 70 IN | OXYGEN SATURATION: 98 % | TEMPERATURE: 98 F

## 2019-06-20 DIAGNOSIS — S83.519A SPRAIN OF ANTERIOR CRUCIATE LIGAMENT OF UNSPECIFIED KNEE, INITIAL ENCOUNTER: Chronic | ICD-10-CM

## 2019-06-20 PROCEDURE — 99285 EMERGENCY DEPT VISIT HI MDM: CPT

## 2019-06-20 RX ORDER — PANTOPRAZOLE SODIUM 20 MG/1
40 TABLET, DELAYED RELEASE ORAL ONCE
Refills: 0 | Status: COMPLETED | OUTPATIENT
Start: 2019-06-20 | End: 2019-06-20

## 2019-06-20 RX ORDER — SODIUM CHLORIDE 9 MG/ML
1000 INJECTION INTRAMUSCULAR; INTRAVENOUS; SUBCUTANEOUS ONCE
Refills: 0 | Status: COMPLETED | OUTPATIENT
Start: 2019-06-20 | End: 2019-06-20

## 2019-06-20 RX ORDER — ONDANSETRON 8 MG/1
4 TABLET, FILM COATED ORAL ONCE
Refills: 0 | Status: COMPLETED | OUTPATIENT
Start: 2019-06-20 | End: 2019-06-21

## 2019-06-20 NOTE — ED ADULT NURSE NOTE - OBJECTIVE STATEMENT
pt received to bed 5 c/o LLQ abdominal pain starting 6 days ago. c/o n/v and blood in stool starting 2 days ago. pt states "my stool was red and dark."  pt states "I had about 12 ox of schnapps today thinking it would help the pain, but it made it worse."  abdomen tender to touch. c/o increased frequency with urination, dysuria, decreased eating and drinking, unintended weight loss over the past month. hx of Crohn's, and spasms in abdomen. denies: headache, dizzy, fever, chills. chest pain, burning on urination.  pt states "I drank alcohol today because I was feeling depressed, I see a psychiatrist weekly, last visit 1 week ago. but I would like to see a  here." denies: SI/HI. pt states he vaped marijuana about 2 week ago.

## 2019-06-20 NOTE — ED ADULT TRIAGE NOTE - CHIEF COMPLAINT QUOTE
c/o left lower abdominal pain with vomiting x 2 today bloody stools x 2 days hx chron' s states chrons exacerbation states drank 1 glass of schnapps today with symptoms worse after

## 2019-06-20 NOTE — ED ADULT NURSE NOTE - NSIMPLEMENTINTERV_GEN_ALL_ED
Implemented All Universal Safety Interventions:  [place] to call system. Call bell, personal items and telephone within reach. Instruct patient to call for assistance. Room bathroom lighting operational. Non-slip footwear when patient is off stretcher. Physically safe environment: no spills, clutter or unnecessary equipment. Stretcher in lowest position, wheels locked, appropriate side rails in place.

## 2019-06-20 NOTE — ED ADULT NURSE NOTE - ED STAT RN HANDOFF DETAILS 3
Endorsed to holding nurse. Federico Wood Pt is alert and oriented on monitor NSR. Admit to telemetry pending bed assignment

## 2019-06-20 NOTE — ED ADULT NURSE NOTE - ED STAT RN HANDOFF DETAILS 2
Received report from off going nurse. Pt is admitted to telemetry service orders and bed assignment pending

## 2019-06-21 DIAGNOSIS — K92.2 GASTROINTESTINAL HEMORRHAGE, UNSPECIFIED: ICD-10-CM

## 2019-06-21 DIAGNOSIS — Z29.9 ENCOUNTER FOR PROPHYLACTIC MEASURES, UNSPECIFIED: ICD-10-CM

## 2019-06-21 DIAGNOSIS — K22.70 BARRETT'S ESOPHAGUS WITHOUT DYSPLASIA: ICD-10-CM

## 2019-06-21 DIAGNOSIS — K58.1 IRRITABLE BOWEL SYNDROME WITH CONSTIPATION: ICD-10-CM

## 2019-06-21 LAB
ALBUMIN SERPL ELPH-MCNC: 3.3 G/DL — SIGNIFICANT CHANGE UP (ref 3.3–5)
ALP SERPL-CCNC: 76 U/L — SIGNIFICANT CHANGE UP (ref 40–120)
ALT FLD-CCNC: 20 U/L — SIGNIFICANT CHANGE UP (ref 12–78)
ANION GAP SERPL CALC-SCNC: 7 MMOL/L — SIGNIFICANT CHANGE UP (ref 5–17)
APTT BLD: 29.9 SEC — SIGNIFICANT CHANGE UP (ref 28.5–37)
AST SERPL-CCNC: 25 U/L — SIGNIFICANT CHANGE UP (ref 15–37)
BASOPHILS # BLD AUTO: 0.05 K/UL — SIGNIFICANT CHANGE UP (ref 0–0.2)
BASOPHILS NFR BLD AUTO: 0.6 % — SIGNIFICANT CHANGE UP (ref 0–2)
BILIRUB SERPL-MCNC: 0.3 MG/DL — SIGNIFICANT CHANGE UP (ref 0.2–1.2)
BLD GP AB SCN SERPL QL: SIGNIFICANT CHANGE UP
BUN SERPL-MCNC: 7 MG/DL — SIGNIFICANT CHANGE UP (ref 7–23)
CALCIUM SERPL-MCNC: 8.1 MG/DL — LOW (ref 8.5–10.1)
CHLORIDE SERPL-SCNC: 110 MMOL/L — HIGH (ref 96–108)
CO2 SERPL-SCNC: 27 MMOL/L — SIGNIFICANT CHANGE UP (ref 22–31)
CREAT SERPL-MCNC: 0.93 MG/DL — SIGNIFICANT CHANGE UP (ref 0.5–1.3)
EOSINOPHIL # BLD AUTO: 0.24 K/UL — SIGNIFICANT CHANGE UP (ref 0–0.5)
EOSINOPHIL NFR BLD AUTO: 2.7 % — SIGNIFICANT CHANGE UP (ref 0–6)
GLUCOSE SERPL-MCNC: 80 MG/DL — SIGNIFICANT CHANGE UP (ref 70–99)
HCT VFR BLD CALC: 42.1 % — SIGNIFICANT CHANGE UP (ref 39–50)
HCT VFR BLD CALC: 43.2 % — SIGNIFICANT CHANGE UP (ref 39–50)
HGB BLD-MCNC: 14.3 G/DL — SIGNIFICANT CHANGE UP (ref 13–17)
HGB BLD-MCNC: 14.8 G/DL — SIGNIFICANT CHANGE UP (ref 13–17)
IMM GRANULOCYTES NFR BLD AUTO: 0.2 % — SIGNIFICANT CHANGE UP (ref 0–1.5)
INR BLD: 0.97 RATIO — SIGNIFICANT CHANGE UP (ref 0.88–1.16)
LACTATE SERPL-SCNC: 0.9 MMOL/L — SIGNIFICANT CHANGE UP (ref 0.7–2)
LIDOCAIN IGE QN: 69 U/L — LOW (ref 73–393)
LYMPHOCYTES # BLD AUTO: 2.56 K/UL — SIGNIFICANT CHANGE UP (ref 1–3.3)
LYMPHOCYTES # BLD AUTO: 29.3 % — SIGNIFICANT CHANGE UP (ref 13–44)
MCHC RBC-ENTMCNC: 32.1 PG — SIGNIFICANT CHANGE UP (ref 27–34)
MCHC RBC-ENTMCNC: 32.6 PG — SIGNIFICANT CHANGE UP (ref 27–34)
MCHC RBC-ENTMCNC: 34 GM/DL — SIGNIFICANT CHANGE UP (ref 32–36)
MCHC RBC-ENTMCNC: 34.3 GM/DL — SIGNIFICANT CHANGE UP (ref 32–36)
MCV RBC AUTO: 94.6 FL — SIGNIFICANT CHANGE UP (ref 80–100)
MCV RBC AUTO: 95.2 FL — SIGNIFICANT CHANGE UP (ref 80–100)
MONOCYTES # BLD AUTO: 0.75 K/UL — SIGNIFICANT CHANGE UP (ref 0–0.9)
MONOCYTES NFR BLD AUTO: 8.6 % — SIGNIFICANT CHANGE UP (ref 2–14)
NEUTROPHILS # BLD AUTO: 5.13 K/UL — SIGNIFICANT CHANGE UP (ref 1.8–7.4)
NEUTROPHILS NFR BLD AUTO: 58.6 % — SIGNIFICANT CHANGE UP (ref 43–77)
NRBC # BLD: 0 /100 WBCS — SIGNIFICANT CHANGE UP (ref 0–0)
NRBC # BLD: 0 /100 WBCS — SIGNIFICANT CHANGE UP (ref 0–0)
PLATELET # BLD AUTO: 229 K/UL — SIGNIFICANT CHANGE UP (ref 150–400)
PLATELET # BLD AUTO: 241 K/UL — SIGNIFICANT CHANGE UP (ref 150–400)
POTASSIUM SERPL-MCNC: 4.4 MMOL/L — SIGNIFICANT CHANGE UP (ref 3.5–5.3)
POTASSIUM SERPL-SCNC: 4.4 MMOL/L — SIGNIFICANT CHANGE UP (ref 3.5–5.3)
PROT SERPL-MCNC: 6.7 GM/DL — SIGNIFICANT CHANGE UP (ref 6–8.3)
PROTHROM AB SERPL-ACNC: 10.9 SEC — SIGNIFICANT CHANGE UP (ref 10–12.9)
RBC # BLD: 4.45 M/UL — SIGNIFICANT CHANGE UP (ref 4.2–5.8)
RBC # BLD: 4.54 M/UL — SIGNIFICANT CHANGE UP (ref 4.2–5.8)
RBC # FLD: 12.6 % — SIGNIFICANT CHANGE UP (ref 10.3–14.5)
RBC # FLD: 12.8 % — SIGNIFICANT CHANGE UP (ref 10.3–14.5)
SODIUM SERPL-SCNC: 144 MMOL/L — SIGNIFICANT CHANGE UP (ref 135–145)
WBC # BLD: 8.16 K/UL — SIGNIFICANT CHANGE UP (ref 3.8–10.5)
WBC # BLD: 8.75 K/UL — SIGNIFICANT CHANGE UP (ref 3.8–10.5)
WBC # FLD AUTO: 8.16 K/UL — SIGNIFICANT CHANGE UP (ref 3.8–10.5)
WBC # FLD AUTO: 8.75 K/UL — SIGNIFICANT CHANGE UP (ref 3.8–10.5)

## 2019-06-21 PROCEDURE — 99223 1ST HOSP IP/OBS HIGH 75: CPT

## 2019-06-21 PROCEDURE — 74174 CTA ABD&PLVS W/CONTRAST: CPT | Mod: 26

## 2019-06-21 PROCEDURE — 74178 CT ABD&PLV WO CNTR FLWD CNTR: CPT | Mod: 26

## 2019-06-21 RX ORDER — MESALAMINE 400 MG
1000 TABLET, DELAYED RELEASE (ENTERIC COATED) ORAL
Refills: 0 | Status: DISCONTINUED | OUTPATIENT
Start: 2019-06-21 | End: 2019-06-21

## 2019-06-21 RX ORDER — PANTOPRAZOLE SODIUM 20 MG/1
40 TABLET, DELAYED RELEASE ORAL
Refills: 0 | Status: DISCONTINUED | OUTPATIENT
Start: 2019-06-21 | End: 2019-06-22

## 2019-06-21 RX ORDER — SODIUM CHLORIDE 9 MG/ML
1000 INJECTION INTRAMUSCULAR; INTRAVENOUS; SUBCUTANEOUS
Refills: 0 | Status: DISCONTINUED | OUTPATIENT
Start: 2019-06-21 | End: 2019-06-22

## 2019-06-21 RX ORDER — NYSTATIN CREAM 100000 [USP'U]/G
1 CREAM TOPICAL
Refills: 0 | Status: DISCONTINUED | OUTPATIENT
Start: 2019-06-21 | End: 2019-06-22

## 2019-06-21 RX ORDER — SODIUM CHLORIDE 9 MG/ML
1000 INJECTION INTRAMUSCULAR; INTRAVENOUS; SUBCUTANEOUS ONCE
Refills: 0 | Status: COMPLETED | OUTPATIENT
Start: 2019-06-21 | End: 2019-06-21

## 2019-06-21 RX ORDER — MESALAMINE 400 MG
250 TABLET, DELAYED RELEASE (ENTERIC COATED) ORAL
Refills: 0 | Status: DISCONTINUED | OUTPATIENT
Start: 2019-06-21 | End: 2019-06-21

## 2019-06-21 RX ORDER — MORPHINE SULFATE 50 MG/1
2 CAPSULE, EXTENDED RELEASE ORAL EVERY 6 HOURS
Refills: 0 | Status: DISCONTINUED | OUTPATIENT
Start: 2019-06-21 | End: 2019-06-22

## 2019-06-21 RX ORDER — NICOTINE POLACRILEX 2 MG
1 GUM BUCCAL DAILY
Refills: 0 | Status: DISCONTINUED | OUTPATIENT
Start: 2019-06-21 | End: 2019-06-22

## 2019-06-21 RX ORDER — ONDANSETRON 8 MG/1
4 TABLET, FILM COATED ORAL EVERY 6 HOURS
Refills: 0 | Status: DISCONTINUED | OUTPATIENT
Start: 2019-06-21 | End: 2019-06-22

## 2019-06-21 RX ADMIN — PANTOPRAZOLE SODIUM 40 MILLIGRAM(S): 20 TABLET, DELAYED RELEASE ORAL at 10:22

## 2019-06-21 RX ADMIN — SODIUM CHLORIDE 75 MILLILITER(S): 9 INJECTION INTRAMUSCULAR; INTRAVENOUS; SUBCUTANEOUS at 10:37

## 2019-06-21 RX ADMIN — MORPHINE SULFATE 2 MILLIGRAM(S): 50 CAPSULE, EXTENDED RELEASE ORAL at 17:35

## 2019-06-21 RX ADMIN — ONDANSETRON 4 MILLIGRAM(S): 8 TABLET, FILM COATED ORAL at 16:54

## 2019-06-21 RX ADMIN — PANTOPRAZOLE SODIUM 40 MILLIGRAM(S): 20 TABLET, DELAYED RELEASE ORAL at 17:33

## 2019-06-21 RX ADMIN — ONDANSETRON 4 MILLIGRAM(S): 8 TABLET, FILM COATED ORAL at 00:42

## 2019-06-21 RX ADMIN — SODIUM CHLORIDE 1000 MILLILITER(S): 9 INJECTION INTRAMUSCULAR; INTRAVENOUS; SUBCUTANEOUS at 05:31

## 2019-06-21 RX ADMIN — SODIUM CHLORIDE 1000 MILLILITER(S): 9 INJECTION INTRAMUSCULAR; INTRAVENOUS; SUBCUTANEOUS at 07:00

## 2019-06-21 RX ADMIN — ONDANSETRON 4 MILLIGRAM(S): 8 TABLET, FILM COATED ORAL at 10:22

## 2019-06-21 RX ADMIN — PANTOPRAZOLE SODIUM 40 MILLIGRAM(S): 20 TABLET, DELAYED RELEASE ORAL at 00:42

## 2019-06-21 RX ADMIN — SODIUM CHLORIDE 1000 MILLILITER(S): 9 INJECTION INTRAMUSCULAR; INTRAVENOUS; SUBCUTANEOUS at 01:42

## 2019-06-21 RX ADMIN — MORPHINE SULFATE 2 MILLIGRAM(S): 50 CAPSULE, EXTENDED RELEASE ORAL at 22:53

## 2019-06-21 RX ADMIN — MORPHINE SULFATE 2 MILLIGRAM(S): 50 CAPSULE, EXTENDED RELEASE ORAL at 23:05

## 2019-06-21 RX ADMIN — Medication 10 MILLIGRAM(S): at 17:33

## 2019-06-21 RX ADMIN — MORPHINE SULFATE 2 MILLIGRAM(S): 50 CAPSULE, EXTENDED RELEASE ORAL at 10:20

## 2019-06-21 RX ADMIN — MORPHINE SULFATE 2 MILLIGRAM(S): 50 CAPSULE, EXTENDED RELEASE ORAL at 16:54

## 2019-06-21 RX ADMIN — SODIUM CHLORIDE 1000 MILLILITER(S): 9 INJECTION INTRAMUSCULAR; INTRAVENOUS; SUBCUTANEOUS at 00:42

## 2019-06-21 RX ADMIN — NYSTATIN CREAM 1 APPLICATION(S): 100000 CREAM TOPICAL at 17:33

## 2019-06-21 RX ADMIN — MORPHINE SULFATE 2 MILLIGRAM(S): 50 CAPSULE, EXTENDED RELEASE ORAL at 11:39

## 2019-06-21 RX ADMIN — Medication 1 PATCH: at 12:02

## 2019-06-21 RX ADMIN — Medication 1 PATCH: at 18:11

## 2019-06-21 RX ADMIN — ONDANSETRON 4 MILLIGRAM(S): 8 TABLET, FILM COATED ORAL at 22:51

## 2019-06-21 RX ADMIN — SODIUM CHLORIDE 75 MILLILITER(S): 9 INJECTION INTRAMUSCULAR; INTRAVENOUS; SUBCUTANEOUS at 22:22

## 2019-06-21 NOTE — H&P ADULT - NSHPPHYSICALEXAM_GEN_ALL_CORE
GENERAL: NAD, well-groomed, well-developed  HEAD:  Atraumatic, Normocephalic  EYES: EOMI, PERRLA, conjunctiva and sclera clear  ENMT: Moist mucous membranes, Good dentition, No lesions  NECK: Supple, No JVD, Normal thyroid  NERVOUS SYSTEM:  Alert & Oriented X3, Good concentration; Motor Strength 5/5 B/L upper and lower extremities; DTRs 2+ intact and symmetric  CHEST/LUNG: Clear to ascultation bilaterally; No rales, rhonchi, wheezing, or rubs  HEART: Regular rate and rhythm; No murmurs, rubs, or gallops  ABDOMEN: Soft, LLQ tenderness, Nondistended; Bowel sounds present  EXTREMITIES:  2+ Peripheral Pulses, No clubbing, cyanosis, or edema  LYMPH: No lymphadenopathy   SKIN: Right upper thigh skin fungal infection

## 2019-06-21 NOTE — ED PROVIDER NOTE - OBJECTIVE STATEMENT
Pertinent PMH/PSH/FHx/SHx and Review of Systems contained within:  47 yo m with pmh fo croh's presents in ED c/o abdominal pain associated with bloody stool tonight. No aggravating or relieving factors, No fever/chills, No photophobia/eye pain/changes in vision, No ear pain/sore throat/dysphagia, No chest pain/palpitations, no SOB/cough/wheeze/stridor, No N/V/D, no dysuria/frequency/discharge, No neck/back pain, no rash, no changes in neurological status/function.

## 2019-06-21 NOTE — CONSULT NOTE ADULT - ASSESSMENT
48 yo wm with abdominal pain.  WITHOUT EVIDENCE OF CROHN'S disease  Irritable Bowel syndrome likely    GERD with Alfaro's

## 2019-06-21 NOTE — CONSULT NOTE ADULT - SUBJECTIVE AND OBJECTIVE BOX
Chief Complaint:  Patient is a 48y old  Male who presents with a chief complaint of Bloody stools. (21 Jun 2019 09:34)      HPI:  47 y/o Male Smoker with PMH of Alfaro's Esophagus, Irritable Bowel Syndrome and ?Chron's Disease presents with Abdominal pain and Bloody stools. Pt states that he had been constipated for about 8 days so he began to take lactulose. After taking Lactulose he began to have intermittent stabbing 10/10 Left lower quadrant abdominal pain. When he started having bowel movements he first noticed large bright red blood followed by black Tarry stools and then bright red blood again. He admits to indigestion with cough and belching. He admits to nausea/vomiting, poor appetite and 25 lb weight loss in last 4 months. He denies Fever, coughing, dysuria and diarrhea. No Cp, SOB or palpitations. (21 Jun 2019 09:34)      PMH/PSH:PAST MEDICAL & SURGICAL HISTORY:  Alfaro's esophagus without dysplasia  Depression, unspecified depression type  (NO CLINICAL EVIDENCE AFTER NUMEROUS REPEAT WORK UP) Crohn's disease with complication, unspecified gastrointestinal tract location  ACL (anterior cruciate ligament) tear      Allergies:  No Known Allergies      Medications:  dicyclomine 10 milliGRAM(s) Oral two times a day before meals  morphine  - Injectable 2 milliGRAM(s) IV Push every 6 hours PRN  nicotine  14 mG/24 Hr(s) Transdermal Patch - Peds 1 Patch Transdermal daily  nystatin Ointment 1 Application(s) Topical two times a day  ondansetron Injectable 4 milliGRAM(s) IV Push every 6 hours PRN  pantoprazole  Injectable 40 milliGRAM(s) IV Push two times a day  sodium chloride 0.9%. 1000 milliLiter(s) IV Continuous <Continuous>      Review of Systems:    General:  No weight loss, fevers, chills, night sweats, fatigue,   Eyes:  Good vision, no reported pain  ENT:  No sore throat, pain, runny nose, dysphagia  CV:  No pain, palpitations, hypo/hypertension  Resp:  No dyspnea, cough, tachypnea, wheezing  GI:  No pain, No nausea, No vomiting, No diarrhea, No constipation, No pruritis, No rectal bleeding, No tarry stools, No dysphagia,  :  No pain, bleeding, incontinence, nocturia  Muscle:  No pain, weakness  Breast:  No pain, abscess, mass, discharge  Neuro:  No weakness, tingling, memory problems  Psych:  No fatigue, insomnia, mood problems, depression  Endocrine:  No polyuria, polydipsia, cold/heat intolerance  Heme:  No petechiae, ecchymosis, easy bruisability  Skin:  No rash, tattoos, scars, edema    Relevant Family History:   FAMILY HISTORY:  Family history of diabetes mellitus in maternal grandmother (Mother, Grandparent)  Family history of COPD (chronic obstructive pulmonary disease) (Father)  Family history of colon cancer in father (Father)  Family history of hypertension in father (Father, Mother, Grandparent)      Relevant Social History: Alcohol ( -) , Tobacco ( -) , Illicit drugs (- )     Physical Exam:    Vital Signs:  Vital Signs Last 24 Hrs  T(C): 36.9 (21 Jun 2019 10:04), Max: 36.9 (21 Jun 2019 10:04)  T(F): 98.5 (21 Jun 2019 10:04), Max: 98.5 (21 Jun 2019 10:04)  HR: 80 (21 Jun 2019 10:04) (70 - 104)  BP: 136/72 (21 Jun 2019 10:04) (95/73 - 148/96)  BP(mean): --  RR: 17 (21 Jun 2019 10:04) (15 - 22)  SpO2: 98% (21 Jun 2019 10:04) (94% - 98%)  Daily Height in cm: 177.8 (20 Jun 2019 20:32)    Daily     General:  Appears stated age, well-groomed, well-nourished, no distress  HEENT:  NC/AT,  conjunctivae clear and pink, no thyromegaly, nodules, adenopathy, no JVD, anicteric sclera  Chest:  Full & symmetric excursion, no increased effort, breath sounds clear  Cardiovascular:  Regular rhythm, S1, S2, no murmur/rub/S3/S4, no abdominal bruit, no edema  Abdomen:  Soft, non tender, non distended, normoactive bowel sounds,  no masses , no hepatosplenomegaly, no signs of chronic liver disease  Extremities:  no cyanosis, clubbing or edema  Skin:  No rash/erythema/ecchymoses/petechiae/wounds/abscess/warm/dry  Neuro/Psych:  Alert, oriented, no asterixis, no tremor, no encephalopathy    Laboratory:                          14.8   8.16  )-----------( 229      ( 21 Jun 2019 09:52 )             43.2     06-21    144  |  110<H>  |  7   ----------------------------<  80  4.4   |  27  |  0.93    Ca    8.1<L>      21 Jun 2019 00:58    TPro  6.7  /  Alb  3.3  /  TBili  0.3  /  DBili  x   /  AST  25  /  ALT  20  /  AlkPhos  76  06-21    LIVER FUNCTIONS - ( 21 Jun 2019 00:58 )  Alb: 3.3 g/dL / Pro: 6.7 gm/dL / ALK PHOS: 76 U/L / ALT: 20 U/L / AST: 25 U/L / GGT: x           PT/INR - ( 21 Jun 2019 00:58 )   PT: 10.9 sec;   INR: 0.97 ratio         PTT - ( 21 Jun 2019 00:58 )  PTT:29.9 sec      Lipase serum 69 U/L<L>         Intake and Output    06-20-19 @ 07:01  -  06-21-19 @ 07:00  --------------------------------------------------------  IN: 1000 mL / OUT: 0 mL / NET: 1000 mL        Imaging:

## 2019-06-21 NOTE — H&P ADULT - PROBLEM SELECTOR PLAN 1
Hemoglobin Stable   CT abdomen: Diverticular disease, no active bleeding.   Continue with IV protonix, NPO, IVF, IV Zofran   serial H/H monitoring, transfuse pRBC prn   f/u GI consult

## 2019-06-21 NOTE — H&P ADULT - NSHPLABSRESULTS_GEN_ALL_CORE
14.3   8.75  )-----------( 241      ( 21 Jun 2019 00:58 )             42.1     06-21    144  |  110<H>  |  7   ----------------------------<  80  4.4   |  27  |  0.93    Ca    8.1<L>      21 Jun 2019 00:58    TPro  6.7  /  Alb  3.3  /  TBili  0.3  /  DBili  x   /  AST  25  /  ALT  20  /  AlkPhos  76  06-21    PT/INR - ( 21 Jun 2019 00:58 )   PT: 10.9 sec;   INR: 0.97 ratio         PTT - ( 21 Jun 2019 00:58 )  PTT:29.9 sec    < from: CT Angio Abdomen and Pelvis w/ IV Cont (06.21.19 @ 03:03) >    FINDINGS:    LOWER CHEST: Within normal limits.    LIVER: Subcentimeter hypodensity in the left hepatic lobe.  BILE DUCTS: Normal caliber.  GALLBLADDER: Within normal limits.  SPLEEN: Within normal limits.  PANCREAS: Within normal limits.  ADRENALS: Within normal limits.  KIDNEYS/URETERS: Within normal limits.    BLADDER: Within normal limits.  REPRODUCTIVE ORGANS: Prostate gland is mildly enlarged..    BOWEL: No bowel obstruction. Scattered colonic diverticulosis. Appendix   is normal. Moderate amount of stool throughout the colon.  PERITONEUM: No ascites.  VESSELS:  Within normal limits.  RETROPERITONEUM: No lymphadenopathy.    ABDOMINAL WALL: Within normal limits.  BONES: Bilateral L5 spondylolysis resulting in anterolisthesis of L5 on   S1.    IMPRESSION:     Scattered colonic diverticulosis. No active GI bleed identified.    Moderate amount of stool throughout the colon.    < end of copied text >

## 2019-06-21 NOTE — H&P ADULT - NSHPREVIEWOFSYSTEMS_GEN_ALL_CORE
REVIEW OF SYSTEMS:    CONSTITUTIONAL: No fever, NO generalized weakness/Fatigue, positive weight loss  EYES: No eye pain, visual disturbances, or discharge  ENMT:  No difficulty hearing, tinnitus, vertigo; No sinus or throat pain  NECK: No pain or stiffness  RESPIRATORY: positive cough, No shortness of breath, wheezing, sputum or hemoptysis   CARDIOVASCULAR: No chest pain, palpitations, or leg swelling  GASTROINTESTINAL: Positive abdominal pain,  nausea, vomiting, constipation, melena, and hematochezia, NO diarrhea   GENITOURINARY: No dysuria, frequency, hematuria, or incontinence  SKIN: "ringworms"  MUSCULOSKELETAL: chronic left knee pain, No swelling; No muscle, back, or extremity pain  HEME/LYMPH: No easy bruising, or bleeding gums  NEUROLOGICAL: No headaches, Dizziness, memory loss, loss of strength, numbness, or tremors  PSYCHIATRIC: No depression, anxiety, mood swings, or difficulty sleeping

## 2019-06-22 ENCOUNTER — EMERGENCY (EMERGENCY)
Facility: HOSPITAL | Age: 49
LOS: 0 days | Discharge: ANOTHER HOSPITAL | End: 2019-06-23
Attending: EMERGENCY MEDICINE
Payer: MEDICAID

## 2019-06-22 ENCOUNTER — TRANSCRIPTION ENCOUNTER (OUTPATIENT)
Age: 49
End: 2019-06-22

## 2019-06-22 VITALS
SYSTOLIC BLOOD PRESSURE: 115 MMHG | TEMPERATURE: 98 F | OXYGEN SATURATION: 95 % | HEART RATE: 74 BPM | RESPIRATION RATE: 17 BRPM | DIASTOLIC BLOOD PRESSURE: 84 MMHG

## 2019-06-22 VITALS
SYSTOLIC BLOOD PRESSURE: 141 MMHG | TEMPERATURE: 98 F | HEIGHT: 70 IN | RESPIRATION RATE: 17 BRPM | WEIGHT: 134.92 LBS | HEART RATE: 91 BPM | DIASTOLIC BLOOD PRESSURE: 88 MMHG | OXYGEN SATURATION: 100 %

## 2019-06-22 DIAGNOSIS — F32.9 MAJOR DEPRESSIVE DISORDER, SINGLE EPISODE, UNSPECIFIED: ICD-10-CM

## 2019-06-22 DIAGNOSIS — F10.129 ALCOHOL ABUSE WITH INTOXICATION, UNSPECIFIED: ICD-10-CM

## 2019-06-22 DIAGNOSIS — F33.2 MAJOR DEPRESSIVE DISORDER, RECURRENT SEVERE WITHOUT PSYCHOTIC FEATURES: ICD-10-CM

## 2019-06-22 DIAGNOSIS — K22.70 BARRETT'S ESOPHAGUS WITHOUT DYSPLASIA: ICD-10-CM

## 2019-06-22 DIAGNOSIS — K50.90 CROHN'S DISEASE, UNSPECIFIED, WITHOUT COMPLICATIONS: ICD-10-CM

## 2019-06-22 DIAGNOSIS — S83.519A SPRAIN OF ANTERIOR CRUCIATE LIGAMENT OF UNSPECIFIED KNEE, INITIAL ENCOUNTER: Chronic | ICD-10-CM

## 2019-06-22 DIAGNOSIS — R45.851 SUICIDAL IDEATIONS: ICD-10-CM

## 2019-06-22 LAB
ALBUMIN SERPL ELPH-MCNC: 3.8 G/DL — SIGNIFICANT CHANGE UP (ref 3.3–5)
ALP SERPL-CCNC: 87 U/L — SIGNIFICANT CHANGE UP (ref 40–120)
ALT FLD-CCNC: 25 U/L — SIGNIFICANT CHANGE UP (ref 12–78)
AMPHET UR-MCNC: NEGATIVE — SIGNIFICANT CHANGE UP
ANION GAP SERPL CALC-SCNC: 10 MMOL/L — SIGNIFICANT CHANGE UP (ref 5–17)
ANION GAP SERPL CALC-SCNC: 6 MMOL/L — SIGNIFICANT CHANGE UP (ref 5–17)
APAP SERPL-MCNC: < 2 UG/ML (ref 10–30)
AST SERPL-CCNC: 22 U/L — SIGNIFICANT CHANGE UP (ref 15–37)
BARBITURATES UR SCN-MCNC: NEGATIVE — SIGNIFICANT CHANGE UP
BASOPHILS # BLD AUTO: 0.05 K/UL — SIGNIFICANT CHANGE UP (ref 0–0.2)
BASOPHILS NFR BLD AUTO: 0.5 % — SIGNIFICANT CHANGE UP (ref 0–2)
BENZODIAZ UR-MCNC: NEGATIVE — SIGNIFICANT CHANGE UP
BILIRUB SERPL-MCNC: 0.3 MG/DL — SIGNIFICANT CHANGE UP (ref 0.2–1.2)
BUN SERPL-MCNC: 8 MG/DL — SIGNIFICANT CHANGE UP (ref 7–23)
BUN SERPL-MCNC: 9 MG/DL — SIGNIFICANT CHANGE UP (ref 7–23)
CALCIUM SERPL-MCNC: 8.2 MG/DL — LOW (ref 8.5–10.1)
CALCIUM SERPL-MCNC: 8.9 MG/DL — SIGNIFICANT CHANGE UP (ref 8.5–10.1)
CHLORIDE SERPL-SCNC: 108 MMOL/L — SIGNIFICANT CHANGE UP (ref 96–108)
CHLORIDE SERPL-SCNC: 109 MMOL/L — HIGH (ref 96–108)
CO2 SERPL-SCNC: 24 MMOL/L — SIGNIFICANT CHANGE UP (ref 22–31)
CO2 SERPL-SCNC: 27 MMOL/L — SIGNIFICANT CHANGE UP (ref 22–31)
COCAINE METAB.OTHER UR-MCNC: NEGATIVE — SIGNIFICANT CHANGE UP
CREAT SERPL-MCNC: 0.97 MG/DL — SIGNIFICANT CHANGE UP (ref 0.5–1.3)
CREAT SERPL-MCNC: 1.11 MG/DL — SIGNIFICANT CHANGE UP (ref 0.5–1.3)
EOSINOPHIL # BLD AUTO: 0.12 K/UL — SIGNIFICANT CHANGE UP (ref 0–0.5)
EOSINOPHIL NFR BLD AUTO: 1.2 % — SIGNIFICANT CHANGE UP (ref 0–6)
ETHANOL SERPL-MCNC: 206 MG/DL — HIGH (ref 0–10)
GLUCOSE SERPL-MCNC: 119 MG/DL — HIGH (ref 70–99)
GLUCOSE SERPL-MCNC: 74 MG/DL — SIGNIFICANT CHANGE UP (ref 70–99)
HCT VFR BLD CALC: 40.6 % — SIGNIFICANT CHANGE UP (ref 39–50)
HCT VFR BLD CALC: 45.4 % — SIGNIFICANT CHANGE UP (ref 39–50)
HGB BLD-MCNC: 13.7 G/DL — SIGNIFICANT CHANGE UP (ref 13–17)
HGB BLD-MCNC: 15.5 G/DL — SIGNIFICANT CHANGE UP (ref 13–17)
IMM GRANULOCYTES NFR BLD AUTO: 0.4 % — SIGNIFICANT CHANGE UP (ref 0–1.5)
LIDOCAIN IGE QN: 57 U/L — LOW (ref 73–393)
LYMPHOCYTES # BLD AUTO: 1.84 K/UL — SIGNIFICANT CHANGE UP (ref 1–3.3)
LYMPHOCYTES # BLD AUTO: 18 % — SIGNIFICANT CHANGE UP (ref 13–44)
MCHC RBC-ENTMCNC: 31.9 PG — SIGNIFICANT CHANGE UP (ref 27–34)
MCHC RBC-ENTMCNC: 31.9 PG — SIGNIFICANT CHANGE UP (ref 27–34)
MCHC RBC-ENTMCNC: 33.7 GM/DL — SIGNIFICANT CHANGE UP (ref 32–36)
MCHC RBC-ENTMCNC: 34.1 GM/DL — SIGNIFICANT CHANGE UP (ref 32–36)
MCV RBC AUTO: 93.4 FL — SIGNIFICANT CHANGE UP (ref 80–100)
MCV RBC AUTO: 94.6 FL — SIGNIFICANT CHANGE UP (ref 80–100)
METHADONE UR-MCNC: NEGATIVE — SIGNIFICANT CHANGE UP
MONOCYTES # BLD AUTO: 0.63 K/UL — SIGNIFICANT CHANGE UP (ref 0–0.9)
MONOCYTES NFR BLD AUTO: 6.2 % — SIGNIFICANT CHANGE UP (ref 2–14)
NEUTROPHILS # BLD AUTO: 7.56 K/UL — HIGH (ref 1.8–7.4)
NEUTROPHILS NFR BLD AUTO: 73.7 % — SIGNIFICANT CHANGE UP (ref 43–77)
NRBC # BLD: 0 /100 WBCS — SIGNIFICANT CHANGE UP (ref 0–0)
NRBC # BLD: 0 /100 WBCS — SIGNIFICANT CHANGE UP (ref 0–0)
OPIATES UR-MCNC: NEGATIVE — SIGNIFICANT CHANGE UP
PCP SPEC-MCNC: SIGNIFICANT CHANGE UP
PCP UR-MCNC: NEGATIVE — SIGNIFICANT CHANGE UP
PLATELET # BLD AUTO: 213 K/UL — SIGNIFICANT CHANGE UP (ref 150–400)
PLATELET # BLD AUTO: 268 K/UL — SIGNIFICANT CHANGE UP (ref 150–400)
POTASSIUM SERPL-MCNC: 3.9 MMOL/L — SIGNIFICANT CHANGE UP (ref 3.5–5.3)
POTASSIUM SERPL-MCNC: 3.9 MMOL/L — SIGNIFICANT CHANGE UP (ref 3.5–5.3)
POTASSIUM SERPL-SCNC: 3.9 MMOL/L — SIGNIFICANT CHANGE UP (ref 3.5–5.3)
POTASSIUM SERPL-SCNC: 3.9 MMOL/L — SIGNIFICANT CHANGE UP (ref 3.5–5.3)
PROT SERPL-MCNC: 7.4 GM/DL — SIGNIFICANT CHANGE UP (ref 6–8.3)
RBC # BLD: 4.29 M/UL — SIGNIFICANT CHANGE UP (ref 4.2–5.8)
RBC # BLD: 4.86 M/UL — SIGNIFICANT CHANGE UP (ref 4.2–5.8)
RBC # FLD: 12.6 % — SIGNIFICANT CHANGE UP (ref 10.3–14.5)
RBC # FLD: 12.8 % — SIGNIFICANT CHANGE UP (ref 10.3–14.5)
SALICYLATES SERPL-MCNC: 5 MG/DL — SIGNIFICANT CHANGE UP (ref 2.8–20)
SODIUM SERPL-SCNC: 142 MMOL/L — SIGNIFICANT CHANGE UP (ref 135–145)
SODIUM SERPL-SCNC: 142 MMOL/L — SIGNIFICANT CHANGE UP (ref 135–145)
THC UR QL: NEGATIVE — SIGNIFICANT CHANGE UP
WBC # BLD: 10.24 K/UL — SIGNIFICANT CHANGE UP (ref 3.8–10.5)
WBC # BLD: 7.43 K/UL — SIGNIFICANT CHANGE UP (ref 3.8–10.5)
WBC # FLD AUTO: 10.24 K/UL — SIGNIFICANT CHANGE UP (ref 3.8–10.5)
WBC # FLD AUTO: 7.43 K/UL — SIGNIFICANT CHANGE UP (ref 3.8–10.5)

## 2019-06-22 PROCEDURE — 93010 ELECTROCARDIOGRAM REPORT: CPT

## 2019-06-22 PROCEDURE — 99239 HOSP IP/OBS DSCHRG MGMT >30: CPT

## 2019-06-22 PROCEDURE — 99285 EMERGENCY DEPT VISIT HI MDM: CPT

## 2019-06-22 RX ORDER — MESALAMINE 400 MG
4 TABLET, DELAYED RELEASE (ENTERIC COATED) ORAL
Qty: 0 | Refills: 0 | DISCHARGE

## 2019-06-22 RX ORDER — OXYCODONE AND ACETAMINOPHEN 5; 325 MG/1; MG/1
1 TABLET ORAL ONCE
Refills: 0 | Status: DISCONTINUED | OUTPATIENT
Start: 2019-06-22 | End: 2019-06-22

## 2019-06-22 RX ORDER — NICOTINE POLACRILEX 2 MG
1 GUM BUCCAL
Qty: 30 | Refills: 0
Start: 2019-06-22 | End: 2019-07-21

## 2019-06-22 RX ORDER — OMEPRAZOLE 10 MG/1
1 CAPSULE, DELAYED RELEASE ORAL
Qty: 0 | Refills: 0 | DISCHARGE

## 2019-06-22 RX ORDER — OMEPRAZOLE 10 MG/1
1 CAPSULE, DELAYED RELEASE ORAL
Qty: 30 | Refills: 0
Start: 2019-06-22 | End: 2019-07-21

## 2019-06-22 RX ORDER — SODIUM CHLORIDE 9 MG/ML
1000 INJECTION INTRAMUSCULAR; INTRAVENOUS; SUBCUTANEOUS ONCE
Refills: 0 | Status: COMPLETED | OUTPATIENT
Start: 2019-06-22 | End: 2019-06-22

## 2019-06-22 RX ORDER — MESALAMINE 400 MG
4 TABLET, DELAYED RELEASE (ENTERIC COATED) ORAL
Qty: 480 | Refills: 0
Start: 2019-06-22 | End: 2019-07-21

## 2019-06-22 RX ORDER — DOCUSATE SODIUM 100 MG
1 CAPSULE ORAL
Qty: 90 | Refills: 0
Start: 2019-06-22 | End: 2019-07-21

## 2019-06-22 RX ORDER — LACTULOSE 10 G/15ML
20 SOLUTION ORAL ONCE
Refills: 0 | Status: COMPLETED | OUTPATIENT
Start: 2019-06-22 | End: 2019-06-22

## 2019-06-22 RX ADMIN — Medication 1 PATCH: at 11:24

## 2019-06-22 RX ADMIN — ONDANSETRON 4 MILLIGRAM(S): 8 TABLET, FILM COATED ORAL at 08:34

## 2019-06-22 RX ADMIN — PANTOPRAZOLE SODIUM 40 MILLIGRAM(S): 20 TABLET, DELAYED RELEASE ORAL at 07:33

## 2019-06-22 RX ADMIN — SODIUM CHLORIDE 1000 MILLILITER(S): 9 INJECTION INTRAMUSCULAR; INTRAVENOUS; SUBCUTANEOUS at 21:19

## 2019-06-22 RX ADMIN — Medication 10 MILLIGRAM(S): at 07:32

## 2019-06-22 RX ADMIN — OXYCODONE AND ACETAMINOPHEN 1 TABLET(S): 5; 325 TABLET ORAL at 10:08

## 2019-06-22 RX ADMIN — Medication 1 PATCH: at 07:40

## 2019-06-22 RX ADMIN — Medication 1 PATCH: at 11:25

## 2019-06-22 RX ADMIN — OXYCODONE AND ACETAMINOPHEN 1 TABLET(S): 5; 325 TABLET ORAL at 09:08

## 2019-06-22 RX ADMIN — NYSTATIN CREAM 1 APPLICATION(S): 100000 CREAM TOPICAL at 07:31

## 2019-06-22 NOTE — DISCHARGE NOTE PROVIDER - CARE PROVIDERS DIRECT ADDRESSES
[email or web address]

## 2019-06-22 NOTE — PROGRESS NOTE ADULT - ASSESSMENT
46 y/o wm with abdominal pain. No pathology supporting Crohn's disease though he did have a SBO attributed to an areain the Ileum not normally seen in Crohns. 2015. Upper endoscopy showed Barretts but no lesions that would cause recurrent vomiting. No evidence of  Crohns disease. Now with c/o odynophagia

## 2019-06-22 NOTE — DISCHARGE NOTE NURSING/CASE MANAGEMENT/SOCIAL WORK - NSDCPEEMAIL_GEN_ALL_CORE
[facility] for Tobacco Control email [email or web address]

## 2019-06-22 NOTE — DISCHARGE NOTE NURSING/CASE MANAGEMENT/SOCIAL WORK - NSDCPEWEB_GEN_ALL_CORE
[facility] for Tobacco Control website --- [email or web address] website --- [email or web address]

## 2019-06-22 NOTE — DISCHARGE NOTE NURSING/CASE MANAGEMENT/SOCIAL WORK - NSDCDPATPORTLINK_GEN_ALL_CORE
You can access the [facility] Patient Portal, offered by [facility], by registering with the following website: [email or web address]

## 2019-06-22 NOTE — ED ADULT NURSE NOTE - NSIMPLEMENTINTERV_GEN_ALL_ED
Implemented All Fall Risk Interventions:  [place] to call system. Call bell, personal items and telephone within reach. Instruct patient to call for assistance. Room bathroom lighting operational. Non-slip footwear when patient is off stretcher. Physically safe environment: no spills, clutter or unnecessary equipment. Stretcher in lowest position, wheels locked, appropriate side rails in place. Provide visual cue, wrist band, yellow gown, etc. Monitor gait and stability. Monitor for mental status changes and reorient to person, place, and time. Review medications for side effects contributing to fall risk. Reinforce activity limits and safety measures with patient and family.

## 2019-06-22 NOTE — ED PROVIDER NOTE - PROGRESS NOTE DETAILS
pt has been clinically sober since 7am, walking in straight line doing well other than complaining of some cramping discomfort to abd which is typical of his usual Crohn's disease - given dose of mesalamine and dicyclomine and tramadol.

## 2019-06-22 NOTE — DISCHARGE NOTE PROVIDER - NSDCCPCAREPLAN_GEN_ALL_CORE_FT
PRINCIPAL DISCHARGE DIAGNOSIS  Diagnosis: UGIB (upper gastrointestinal bleed)  Assessment and Plan of Treatment: Hemoglobin stable, no active bleeding.   Follow up with Gastroenterology with Dr. Munson   Outpatient EGD recommended.      SECONDARY DISCHARGE DIAGNOSES  Diagnosis: Fungal skin infection  Assessment and Plan of Treatment: Resolved with Nystatin ointment   No further intervention required.    Diagnosis: Alfaro's esophagus without dysplasia  Assessment and Plan of Treatment: continue with Omeprazole    Diagnosis: Irritable bowel syndrome with constipation  Assessment and Plan of Treatment: Irritable bowel syndrome with constipation  continue with Bentyl, Pentasa and colace.   Follow up with Gastroenterology    Diagnosis: Tobacco use  Assessment and Plan of Treatment: Nicotine patch  [facility] smoking cessation: [phone]  Smoking cessation is recommended

## 2019-06-22 NOTE — ED ADULT NURSE NOTE - ED STAT RN HANDOFF DETAILS
Report received from RN at 5am. Assessment available on [facility]. will continue to monitor

## 2019-06-22 NOTE — DISCHARGE NOTE PROVIDER - HOSPITAL COURSE
47 y/o Male Smoker with PMH of Alfaro's Esophagus, and Irritable Bowel Syndrome presents with Abdominal pain and Bloody stools. Pt states that he had been constipated for about 8 days so he began to take lactulose. After taking Lactulose he began to have intermittent stabbing 10/10 Left lower quadrant abdominal pain. When he started having bowel movements he first noticed large bright red blood followed by black Tarry stools and then bright red blood again. He admits to indigestion with cough and belching. He admits to nausea/vomiting, poor appetite and 25 lb weight loss in last 4 months. He denies Fever, coughing, dysuria and diarrhea. No Cp, SOB or palpitations.         CT abdomen/pelvis: No GI bleeding, moderate stools.         The patient was admitted to medical bed. Gastroenterology was consulted and followed the patient. The patient was given IVF and Protonix. No bleeding was witness during hospitalization and  the patient's hemoglobin remained stable. The patient is recommended to have an EGD; will be discharged to follow up with outpatient Gastroenterology.

## 2019-06-22 NOTE — ED PROVIDER NOTE - PHYSICAL EXAMINATION
Gen: Alert, crying  Head: NC, AT, PERRL, EOMI, normal lids/conjunctiva  ENT: normal hearing, patent oropharynx without erythema/exudate, uvula midline  Neck: +supple, no tenderness/meningismus/JVD, +Trachea midline  Pulm: Bilateral BS, normal resp effort, no wheeze/stridor/retractions  CV: RRR, no M/R/G, +dist pulses  Abd: soft, NT/ND, +BS, no palpable masses  Mskel: no edema/erythema/cyanosis  Skin: no rash, warm/dry  Neuro: AAOx3, no apparent sensory/motor deficits, coordination intact

## 2019-06-22 NOTE — ED ADULT NURSE NOTE - OBJECTIVE STATEMENT
PT CAME IN BY EMS AS ALCOHOL INTOX AND STATES HE WAS D/C FROM HERE AROUND 2PM FOR CHRON'S DISEASE AND WENT TO A DINER AND STARTED DRINKING AND THE  WERE CALLED AND HE WAS BROUGHT HERE.

## 2019-06-22 NOTE — ED ADULT TRIAGE NOTE - CHIEF COMPLAINT QUOTE
intoxicated , found behind a close dinner, denies S/I H /I, as per ems pt wants to take pills and kill himself

## 2019-06-22 NOTE — ED PROVIDER NOTE - OBJECTIVE STATEMENT
Pertinent PMH/PSH/FHx/SHx and Review of Systems contained within:  Patient presents to the ED for suicidal ideation. Patient was recent admission here for Crohns, discharged today at 2 pm, says that he started drinking although denies history of alcoholism.  Called mother and girlfriend, stated that he wanted to die.  Had been drinking with intent of self harm, says that he wanted to overdose on his meds and alcohol, but someone had called 911.  Has 3 prior suicide attempts.  Denies additional drug or med ingestion.  Patient is alert and oriented x3.     ROS: No fever/chills, No headache/photophobia/eye pain/changes in vision, No ear pain/sore throat/dysphagia, No chest pain/palpitations, no SOB/cough/wheeze/stridor, No abdominal pain, No N/V/D/melena, no dysuria/frequency/discharge, No neck/back pain, no rash, no changes in neurological status/function.

## 2019-06-22 NOTE — ED PROVIDER NOTE - CLINICAL SUMMARY MEDICAL DECISION MAKING FREE TEXT BOX
Patient with suicidal ideation and threats.  VSS.  Assessed by psych and will require admission per discussion with family due to active attempts.  Patient is disagreeing at this time because he has no access to his laptop.  Patient's mother tried to convince patient to sign in voluntarily but he adamnataly refuses.  Morning meds ordered.  Patient will be admitted voluntarily.  PEnding bed search on sign out to Dr. Gomez this morning. Patient with suicidal ideation and threats.  VSS.  Assessed by psych and will require admission per discussion with family due to active attempts.  Patient is disagreeing at this time because he has no access to his laptop.  Patient's mother tried to convince patient to sign in voluntarily but he adamnataly refuses.  Morning meds ordered.  Patient will be admitted involuntarily.  Requested reevalaution by psych prior to transfer, they will be speaking with patient again.  PEnding bed search on sign out to Dr. Gomez this morning.

## 2019-06-22 NOTE — PROGRESS NOTE ADULT - SUBJECTIVE AND OBJECTIVE BOX
Gastroenterology  Patient seen and examined bedside resting comfortably.  C/O vague Lower abdominal pain  Denies nausea and vomiting. Tolerating diet.  Normal flatus/BM.     T(F): 96.8 (06-22-19 @ 04:53), Max: 97.4 (06-21-19 @ 16:59)  HR: 66 (06-22-19 @ 04:53) (64 - 66)  BP: 98/53 (06-22-19 @ 04:53) (98/53 - 102/69)  RR: 16 (06-22-19 @ 04:53) (16 - 17)  SpO2: 97% (06-22-19 @ 04:53) (95% - 97%)  Wt(kg): --  CAPILLARY BLOOD GLUCOSE      PHYSICAL EXAM:  General: NAD, WDWN.   Neuro:  Alert & responsive  HEENT: NCAT, EOMI, conjunctiva clear  CV: +S1+S2 regular rate and rhythm  Lung: clear to ausculation bilaterally, respirations nonlabored, good inspiratory effort  Abdomen: soft, Non tender, No Distension. Normal active BS  Extremities: no pedal edema or calf tenderness noted     LABS:                        13.7   7.43  )-----------( 213      ( 22 Jun 2019 07:10 )             40.6     06-22    142  |  109<H>  |  9   ----------------------------<  74  3.9   |  27  |  0.97    Ca    8.2<L>      22 Jun 2019 07:10    TPro  6.7  /  Alb  3.3  /  TBili  0.3  /  DBili  x   /  AST  25  /  ALT  20  /  AlkPhos  76  06-21    LIVER FUNCTIONS - ( 21 Jun 2019 00:58 )  Alb: 3.3 g/dL / Pro: 6.7 gm/dL / ALK PHOS: 76 U/L / ALT: 20 U/L / AST: 25 U/L / GGT: x           PT/INR - ( 21 Jun 2019 00:58 )   PT: 10.9 sec;   INR: 0.97 ratio         PTT - ( 21 Jun 2019 00:58 )  PTT:29.9 sec  I&O's Detail    21 Jun 2019 07:01  -  22 Jun 2019 07:00  --------------------------------------------------------  IN:    Oral Fluid: 420 mL    sodium chloride 0.9%.: 900 mL  Total IN: 1320 mL    OUT:    Voided: 350 mL  Total OUT: 350 mL    Total NET: 970 mL

## 2019-06-22 NOTE — DISCHARGE NOTE PROVIDER - CARE PROVIDER_API CALL
Dewayne Munson)  Medicine  [address], [address]  [place] [address]  Phone: [phone]  Fax: [phone]  Follow Up Time: 1 week

## 2019-06-23 VITALS
DIASTOLIC BLOOD PRESSURE: 82 MMHG | RESPIRATION RATE: 18 BRPM | TEMPERATURE: 98 F | OXYGEN SATURATION: 98 % | SYSTOLIC BLOOD PRESSURE: 135 MMHG | HEART RATE: 76 BPM

## 2019-06-23 DIAGNOSIS — R69 ILLNESS, UNSPECIFIED: ICD-10-CM

## 2019-06-23 DIAGNOSIS — F33.2 MAJOR DEPRESSIVE DISORDER, RECURRENT SEVERE WITHOUT PSYCHOTIC FEATURES: ICD-10-CM

## 2019-06-23 PROCEDURE — 90792 PSYCH DIAG EVAL W/MED SRVCS: CPT | Mod: GT

## 2019-06-23 RX ORDER — TRAZODONE HCL 50 MG
50 TABLET ORAL ONCE
Refills: 0 | Status: COMPLETED | OUTPATIENT
Start: 2019-06-23 | End: 2019-06-23

## 2019-06-23 RX ORDER — PANTOPRAZOLE SODIUM 20 MG/1
40 TABLET, DELAYED RELEASE ORAL
Refills: 0 | Status: DISCONTINUED | OUTPATIENT
Start: 2019-06-23 | End: 2019-06-23

## 2019-06-23 RX ORDER — TRAMADOL HYDROCHLORIDE 50 MG/1
50 TABLET ORAL ONCE
Refills: 0 | Status: DISCONTINUED | OUTPATIENT
Start: 2019-06-23 | End: 2019-06-23

## 2019-06-23 RX ORDER — ONDANSETRON 8 MG/1
4 TABLET, FILM COATED ORAL ONCE
Refills: 0 | Status: COMPLETED | OUTPATIENT
Start: 2019-06-23 | End: 2019-06-23

## 2019-06-23 RX ORDER — MESALAMINE 400 MG
1000 TABLET, DELAYED RELEASE (ENTERIC COATED) ORAL ONCE
Refills: 0 | Status: COMPLETED | OUTPATIENT
Start: 2019-06-23 | End: 2019-06-23

## 2019-06-23 RX ADMIN — TRAMADOL HYDROCHLORIDE 50 MILLIGRAM(S): 50 TABLET ORAL at 13:47

## 2019-06-23 RX ADMIN — ONDANSETRON 4 MILLIGRAM(S): 8 TABLET, FILM COATED ORAL at 08:36

## 2019-06-23 RX ADMIN — Medication 1000 MILLIGRAM(S): at 13:47

## 2019-06-23 RX ADMIN — PANTOPRAZOLE SODIUM 40 MILLIGRAM(S): 20 TABLET, DELAYED RELEASE ORAL at 08:36

## 2019-06-23 RX ADMIN — Medication 50 MILLIGRAM(S): at 08:36

## 2019-06-23 RX ADMIN — Medication 1000 MILLIGRAM(S): at 08:36

## 2019-06-23 RX ADMIN — Medication 20 MILLIGRAM(S): at 13:47

## 2019-06-23 NOTE — ED BEHAVIORAL HEALTH ASSESSMENT NOTE - DESCRIPTION
3yr old  male, , father of one 23 yr old daughter he has no contact with, unemployed, homeless Pre-Hospital Course:    Per EMS (Source of information – EMS or PD run sheets, 2nd hand information given from EMS/PD to Triage/Charge/Primary RN. ) bystander activated the call and they found pt. behind a closed dinner intoxicated and reporting to them he wanted to take pills and kill himself. No issues during transportation.    Pt in ED for ~8 hrs prior to Telepsych consult at 4:04. Per Triage RN, provider (verbally conveyed to primary RN Vidya) current nurse received pt. a few minutes ago. Pt arrived at ~19:44 dressed appropriately for the weather, with fair hygiene/grooming. Pt was compliant with good cooperation for entire process of wanding/search/ and gowning and was escorted to the  area with no issues. Nothing of note in pt’s belongings. RN reports patient provided both urine specimen and routine bloodwork willingly. Pt arrived intoxicated with a BAL of 206 at 21:06. Pt has not requested any food and/or drink.  As per RN, pt has not been agitated or irritable during his stay. Pt has spent his time sleeping in the hospital with no one at bedside. Pt’s eye contact is good, pt’s speech volume/rate/articulation is normal and clear. Pt’s affect is euthymic and his thought process is logical and linear. Pt reports to RN/provider that he was discharged from [facility] at 2am for Crohn’s disease and began to drink at a dinner and someone called the police on him and he was brought back to the ED. Pt reported he had been drinking with the intent of self-harm stated, “he wanted to OD on his meds and alcohol but someone called 911.”  Unknown if pt. continues to endorse SI and pt. denied HI and any delusions. RN states he is A/Ox 3 and does not appear cognitively impaired. Per RN, pt. has not required psychiatric or medical medications or any security interventions. Pt has no visitors at bedside. Pt is placed on a 1:1 observation and in a private room ready for telepsychiatry evaluation. crohns, barretts esophagus

## 2019-06-23 NOTE — ED BEHAVIORAL HEALTH ASSESSMENT NOTE - REASON FOR REFERRAL
patient was BIB EMS after he called his mother telling her he was suicidal and was planning on overdosing on all his medications

## 2019-06-23 NOTE — ED BEHAVIORAL HEALTH ASSESSMENT NOTE - RISK ASSESSMENT
Acute Suicide Risk    ( X ) High    (  ) Moderate    (  ) Low    (  ) Unable to determine    Rationale: see below         Elevated Chronic Risk    ( x ) Yes , patient is a mid age  male, , no family support, and has binge alcohol drinking which all increase his risk for suicide.    Details ___________    (  ) No   ___________         Safety Plan    Details: patient to be admitted to inpatient psychiatry on an involuntary basis.    [ x ] Safety plan discussed with patient    [x  ] Education provided regarding environmental safety / lethal means restriction    [ x ] Provision of National Suicide Prevention Lifeline 2-079-257-TALK (9408)

## 2019-06-23 NOTE — ED BEHAVIORAL HEALTH ASSESSMENT NOTE - HPI (INCLUDE ILLNESS QUALITY, SEVERITY, DURATION, TIMING, CONTEXT, MODIFYING FACTORS, ASSOCIATED SIGNS AND SYMPTOMS)
Patient is a 48 yr old  male, , unemployed, with a history of depression, chrons,, barretts esophagus, who was BIB EMS for suicidal ideation with plan to OD on his medications.   Patient was recently admitted to the hospital recently for crohns and was discharged on 6/22/19. He reports that he was given a follow up appointment with GI and was told to go to [place] so he can hind a shelter to stay at. reports that he was very upset because of that and started feeling very depressed and decided to drink alcohol. After he was already intoxicated he called his mother who lives in [place] and his girlfriend and told them that he was planning on overdosing on all his medication and ending his life and his mother called the police who found him and brought him to the hospital for an evaluation.   During evaluation patient reported that he has been homeless for the past 3 months, has been disabled, lost his job and has nothing to live for. He reported his mother is the only person from his family who talks to him sometimes but she lives in [place], His 3 sisters don't talk to him and his 23 yr old daughter who is giving birth next month wants nothing to do with him. Reports he has not been sleeping well, he has poor concentration, poor motivation, poor energy, lack of interest and has been having suicidal ideations. He states : " I just want to give up". He has lost 45 lbs in the past couple of months and has been feeling very weak. States that he started to see a psychiatrist and therapist about a year ago for the first time. He was given initially Lexapro which he took for a couple months but was not helpful and his psychiatrist changed hinm to trazodone which also has not been helpful. He reports in 4/19 he had one SA when he OD on medications when he was intoxicated and ended up in the hospital but was not admitted. He than tried this one more time but he just fell asleep and woke up a day later and did not seek medical attention. He also started to starve himself for 3 weeks in an attempt to end his life but it also did not work. He just feels "numb" and does not know what to do. Patient initially was agreeable with inpatient psychiatric admission but than asked for his personal belongings so he can have his wallet and his laptop on the unit and when writer explained that he wont be able to have these on the unit patient stated he did not want to be admitted anymore. He reported that nothing will change anyway so he should be let go. Stated he wont harm himself and he will just be fine. Patient reported writer should call his mother and ask her for more information. Writer did try to contact patients mother, but she did not  and a message was left.  Mother called back writer and reported that patient has been depressed for over a year. He has tried to end his life multiple times and has not been successful. He feels that everything he does backfires to him and that none cares about him. He is not able to work because of the pain he is experiencing from Crohns and has been trying to get disability but has not been able to because one doctor says he has the disease and another does not and no one will sign off on his disability papers. He has tried at least twice to overdose on medications [place] out on the way there. Mother agrees that he needs help and support and is not safe at this time to be alone in the community.

## 2019-06-23 NOTE — ED ADULT NURSE REASSESSMENT NOTE - NS ED NURSE REASSESS COMMENT FT1
1100 = pt spoken by tele psych became agitated when told he needs admission to in patient psych . spoken to by md Gomez and was abler to calm pt down . Transfer initiated .

## 2019-06-23 NOTE — ED ADULT NURSE REASSESSMENT NOTE - NS ED NURSE REASSESS COMMENT FT1
received pt awake , alert ox3 , calm . denies having any thoughts of hurting or killing self at this time . states " im not drunk anymore so I don't have thoughts anymore " . continued on enhanced  supervision .

## 2019-06-23 NOTE — ED BEHAVIORAL HEALTH ASSESSMENT NOTE - SUICIDE RISK FACTORS
Substance abuse/dependence/Hopelessness/Anhedonia/Chronic pain or acute medical issue/Access to means (pills, firearms, etc.)

## 2019-06-23 NOTE — ED BEHAVIORAL HEALTH ASSESSMENT NOTE - SUMMARY
Patient is a 48 yr old  male, , unemployed, with a history of depression, chrons,, barretts esophagus, who was BIB EMS for suicidal ideation with plan to OD on his medications.   Patient was recently admitted to the hospital recently for crohns and was discharged on 6/22/19. He reports that he was given a follow up appointment with GI and was told to go to [place] so he can hind a shelter to stay at. reports that he was very upset because of that and started feeling very depressed and decided to drink alcohol. After he was already intoxicated he called his mother who lives in [place] and his girlfriend and told them that he was planning on overdosing on all his medication and ending his life and his mother called the police who found him and brought him to the hospital for an evaluation. Patient initially agreeable with inpatient psychiatric admission but than after told he was unable to have his laptop with him in the unit he became angry and wanted to leave the ED. Spoke with patients mother who also agrees that patient is not safe and requires psychiatric treatment.

## 2019-06-23 NOTE — ED BEHAVIORAL HEALTH ASSESSMENT NOTE - NS ED BHA PLAN PSYCHIATRIC ISSUES2 FT
defer restarting psychiatric medication to primary team; prn haldol 5mg/ativan 2mg/benadryl 50mg q8hr for acute agitation

## 2019-06-23 NOTE — ED BEHAVIORAL HEALTH ASSESSMENT NOTE - DETAILS
abdominal pain alternating constipation/loose stools spoke with ED attending involuntary admisison daughter will give birth next month but wants nothing to do with him Patient has tried to overdose on his medications twice and he has also tried to starve himself

## 2019-06-23 NOTE — ED BEHAVIORAL HEALTH ASSESSMENT NOTE - NS ED BHA PLAN MEDICAL ISSUES2 FT
defer standing medication to primary team; check lipids, HbA1C; patient has history of Crohn's disease

## 2019-06-23 NOTE — ED BEHAVIORAL HEALTH ASSESSMENT NOTE - NS ED BHA PLAN SUBSTANCE ISSUES2 FT
given hx of alcohol abuse/use, will order CIWA q 4 hours, vitals q 4 hours, Symptom-triggered Ativan 2mg q 4 hours for CIWA score over 8. Provide Folic Acid 1mg qd, Multivitamin with minerals qd, Thiamine 1 tablet qd x 3 days.

## 2019-06-24 NOTE — ED PROVIDER NOTE - PSYCHIATRIC, MLM
Statement Selected
Alert and oriented to person, place, time/situation. normal mood and affect. no apparent risk to self or others.

## 2019-06-28 DIAGNOSIS — F12.99 CANNABIS USE, UNSPECIFIED WITH UNSPECIFIED CANNABIS-INDUCED DISORDER: ICD-10-CM

## 2019-06-28 DIAGNOSIS — K59.00 CONSTIPATION, UNSPECIFIED: ICD-10-CM

## 2019-06-28 DIAGNOSIS — K21.9 GASTRO-ESOPHAGEAL REFLUX DISEASE WITHOUT ESOPHAGITIS: ICD-10-CM

## 2019-06-28 DIAGNOSIS — B36.9 SUPERFICIAL MYCOSIS, UNSPECIFIED: ICD-10-CM

## 2019-06-28 DIAGNOSIS — F17.210 NICOTINE DEPENDENCE, CIGARETTES, UNCOMPLICATED: ICD-10-CM

## 2019-06-28 DIAGNOSIS — K22.70 BARRETT'S ESOPHAGUS WITHOUT DYSPLASIA: ICD-10-CM

## 2019-06-28 DIAGNOSIS — F32.9 MAJOR DEPRESSIVE DISORDER, SINGLE EPISODE, UNSPECIFIED: ICD-10-CM

## 2019-06-28 DIAGNOSIS — K58.1 IRRITABLE BOWEL SYNDROME WITH CONSTIPATION: ICD-10-CM

## 2019-06-28 DIAGNOSIS — K57.91 DIVERTICULOSIS OF INTESTINE, PART UNSPECIFIED, WITHOUT PERFORATION OR ABSCESS WITH BLEEDING: ICD-10-CM

## 2019-06-28 DIAGNOSIS — R63.4 ABNORMAL WEIGHT LOSS: ICD-10-CM

## 2019-08-15 ENCOUNTER — EMERGENCY (EMERGENCY)
Facility: HOSPITAL | Age: 49
LOS: 0 days | Discharge: HOME | End: 2019-08-16
Attending: EMERGENCY MEDICINE
Payer: MEDICAID

## 2019-08-15 VITALS
TEMPERATURE: 98 F | RESPIRATION RATE: 17 BRPM | HEIGHT: 70 IN | SYSTOLIC BLOOD PRESSURE: 120 MMHG | WEIGHT: 139.99 LBS | DIASTOLIC BLOOD PRESSURE: 78 MMHG | HEART RATE: 91 BPM | OXYGEN SATURATION: 100 %

## 2019-08-15 DIAGNOSIS — K59.00 CONSTIPATION, UNSPECIFIED: ICD-10-CM

## 2019-08-15 DIAGNOSIS — R30.0 DYSURIA: ICD-10-CM

## 2019-08-15 DIAGNOSIS — K50.90 CROHN'S DISEASE, UNSPECIFIED, WITHOUT COMPLICATIONS: ICD-10-CM

## 2019-08-15 DIAGNOSIS — S83.519A SPRAIN OF ANTERIOR CRUCIATE LIGAMENT OF UNSPECIFIED KNEE, INITIAL ENCOUNTER: Chronic | ICD-10-CM

## 2019-08-15 DIAGNOSIS — R10.9 UNSPECIFIED ABDOMINAL PAIN: ICD-10-CM

## 2019-08-15 DIAGNOSIS — F32.9 MAJOR DEPRESSIVE DISORDER, SINGLE EPISODE, UNSPECIFIED: ICD-10-CM

## 2019-08-15 PROCEDURE — 99285 EMERGENCY DEPT VISIT HI MDM: CPT

## 2019-08-15 NOTE — ED PROVIDER NOTE - PHYSICAL EXAMINATION
Gen: Alert, NAD  Head: NC, AT, EOMI, normal lids/conjunctiva  ENT: normal hearing, patent oropharynx, MMM  Neck: supple, no tenderness/meningismus, FROM, Trachea midline  Pulm: Bilateral clear BS, normal resp effort, no wheeze/stridor/retractions  CV: RRR, no M/R/G, +dist pulses  Abd: soft, +mild L sided TTP, ND, +BS, no guarding/rebound tenderness, no CVAT  Mskel: no edema/erythema/cyanosis  Skin: no rash  Neuro: no sensory/motor deficits

## 2019-08-15 NOTE — ED PROVIDER NOTE - CLINICAL SUMMARY MEDICAL DECISION MAKING FREE TEXT BOX
Pt in NAD, VSS.  Labs & imaging neg for acute pathology.  Discussed results and outcome of testing with the patient, given copy as well.  Patient advised to please follow up with their primary care doctor within the next 24 hours and return to the Emergency Department for worsening symptoms or any other concerns.  Patient advised that their doctor may call  to follow up on the specific results of the tests performed today in the emergency department.

## 2019-08-15 NOTE — ED ADULT TRIAGE NOTE - CHIEF COMPLAINT QUOTE
left upper abdominal pain , also c/o bladder discomfort just came from [place] by bus, pain x 2 days, also c/o constipation. LBM Monday

## 2019-08-16 VITALS
OXYGEN SATURATION: 97 % | SYSTOLIC BLOOD PRESSURE: 115 MMHG | RESPIRATION RATE: 16 BRPM | TEMPERATURE: 98 F | HEART RATE: 68 BPM | DIASTOLIC BLOOD PRESSURE: 70 MMHG

## 2019-08-16 LAB
ALBUMIN SERPL ELPH-MCNC: 3.5 G/DL — SIGNIFICANT CHANGE UP (ref 3.3–5)
ALP SERPL-CCNC: 81 U/L — SIGNIFICANT CHANGE UP (ref 40–120)
ALT FLD-CCNC: 17 U/L — SIGNIFICANT CHANGE UP (ref 12–78)
ANION GAP SERPL CALC-SCNC: 5 MMOL/L — SIGNIFICANT CHANGE UP (ref 5–17)
APPEARANCE UR: CLEAR — SIGNIFICANT CHANGE UP
APTT BLD: 30.2 SEC — SIGNIFICANT CHANGE UP (ref 27.5–36.3)
AST SERPL-CCNC: 22 U/L — SIGNIFICANT CHANGE UP (ref 15–37)
BACTERIA # UR AUTO: NEGATIVE — SIGNIFICANT CHANGE UP
BASOPHILS # BLD AUTO: 0.04 K/UL — SIGNIFICANT CHANGE UP (ref 0–0.2)
BASOPHILS NFR BLD AUTO: 0.5 % — SIGNIFICANT CHANGE UP (ref 0–2)
BILIRUB SERPL-MCNC: 0.5 MG/DL — SIGNIFICANT CHANGE UP (ref 0.2–1.2)
BILIRUB UR-MCNC: NEGATIVE — SIGNIFICANT CHANGE UP
BLD GP AB SCN SERPL QL: SIGNIFICANT CHANGE UP
BUN SERPL-MCNC: 9 MG/DL — SIGNIFICANT CHANGE UP (ref 7–23)
CALCIUM SERPL-MCNC: 8.7 MG/DL — SIGNIFICANT CHANGE UP (ref 8.5–10.1)
CHLORIDE SERPL-SCNC: 107 MMOL/L — SIGNIFICANT CHANGE UP (ref 96–108)
CO2 SERPL-SCNC: 30 MMOL/L — SIGNIFICANT CHANGE UP (ref 22–31)
COLOR SPEC: YELLOW — SIGNIFICANT CHANGE UP
CREAT SERPL-MCNC: 1.01 MG/DL — SIGNIFICANT CHANGE UP (ref 0.5–1.3)
DIFF PNL FLD: NEGATIVE — SIGNIFICANT CHANGE UP
EOSINOPHIL # BLD AUTO: 0.27 K/UL — SIGNIFICANT CHANGE UP (ref 0–0.5)
EOSINOPHIL NFR BLD AUTO: 3.5 % — SIGNIFICANT CHANGE UP (ref 0–6)
EPI CELLS # UR: NEGATIVE — SIGNIFICANT CHANGE UP
GLUCOSE SERPL-MCNC: 87 MG/DL — SIGNIFICANT CHANGE UP (ref 70–99)
GLUCOSE UR QL: NEGATIVE MG/DL — SIGNIFICANT CHANGE UP
HCT VFR BLD CALC: 41.9 % — SIGNIFICANT CHANGE UP (ref 39–50)
HGB BLD-MCNC: 14.3 G/DL — SIGNIFICANT CHANGE UP (ref 13–17)
IMM GRANULOCYTES NFR BLD AUTO: 0.1 % — SIGNIFICANT CHANGE UP (ref 0–1.5)
INR BLD: 0.97 RATIO — SIGNIFICANT CHANGE UP (ref 0.88–1.16)
KETONES UR-MCNC: NEGATIVE — SIGNIFICANT CHANGE UP
LACTATE SERPL-SCNC: 0.6 MMOL/L — LOW (ref 0.7–2)
LEUKOCYTE ESTERASE UR-ACNC: ABNORMAL
LIDOCAIN IGE QN: 78 U/L — SIGNIFICANT CHANGE UP (ref 73–393)
LYMPHOCYTES # BLD AUTO: 2.73 K/UL — SIGNIFICANT CHANGE UP (ref 1–3.3)
LYMPHOCYTES # BLD AUTO: 35.3 % — SIGNIFICANT CHANGE UP (ref 13–44)
MCHC RBC-ENTMCNC: 32.8 PG — SIGNIFICANT CHANGE UP (ref 27–34)
MCHC RBC-ENTMCNC: 34.1 GM/DL — SIGNIFICANT CHANGE UP (ref 32–36)
MCV RBC AUTO: 96.1 FL — SIGNIFICANT CHANGE UP (ref 80–100)
MONOCYTES # BLD AUTO: 0.81 K/UL — SIGNIFICANT CHANGE UP (ref 0–0.9)
MONOCYTES NFR BLD AUTO: 10.5 % — SIGNIFICANT CHANGE UP (ref 2–14)
NEUTROPHILS # BLD AUTO: 3.88 K/UL — SIGNIFICANT CHANGE UP (ref 1.8–7.4)
NEUTROPHILS NFR BLD AUTO: 50.1 % — SIGNIFICANT CHANGE UP (ref 43–77)
NITRITE UR-MCNC: NEGATIVE — SIGNIFICANT CHANGE UP
NRBC # BLD: 0 /100 WBCS — SIGNIFICANT CHANGE UP (ref 0–0)
PH UR: 6 — SIGNIFICANT CHANGE UP (ref 5–8)
PLATELET # BLD AUTO: 248 K/UL — SIGNIFICANT CHANGE UP (ref 150–400)
POTASSIUM SERPL-MCNC: 4.4 MMOL/L — SIGNIFICANT CHANGE UP (ref 3.5–5.3)
POTASSIUM SERPL-SCNC: 4.4 MMOL/L — SIGNIFICANT CHANGE UP (ref 3.5–5.3)
PROT SERPL-MCNC: 6.8 GM/DL — SIGNIFICANT CHANGE UP (ref 6–8.3)
PROT UR-MCNC: NEGATIVE MG/DL — SIGNIFICANT CHANGE UP
PROTHROM AB SERPL-ACNC: 10.8 SEC — SIGNIFICANT CHANGE UP (ref 10–12.9)
RBC # BLD: 4.36 M/UL — SIGNIFICANT CHANGE UP (ref 4.2–5.8)
RBC # FLD: 13.2 % — SIGNIFICANT CHANGE UP (ref 10.3–14.5)
RBC CASTS # UR COMP ASSIST: SIGNIFICANT CHANGE UP /HPF (ref 0–4)
SODIUM SERPL-SCNC: 142 MMOL/L — SIGNIFICANT CHANGE UP (ref 135–145)
SP GR SPEC: 1 — LOW (ref 1.01–1.02)
UROBILINOGEN FLD QL: NEGATIVE MG/DL — SIGNIFICANT CHANGE UP
WBC # BLD: 7.74 K/UL — SIGNIFICANT CHANGE UP (ref 3.8–10.5)
WBC # FLD AUTO: 7.74 K/UL — SIGNIFICANT CHANGE UP (ref 3.8–10.5)
WBC UR QL: SIGNIFICANT CHANGE UP

## 2019-08-16 PROCEDURE — 74177 CT ABD & PELVIS W/CONTRAST: CPT | Mod: 26

## 2019-08-16 RX ORDER — SODIUM CHLORIDE 9 MG/ML
1000 INJECTION INTRAMUSCULAR; INTRAVENOUS; SUBCUTANEOUS ONCE
Refills: 0 | Status: COMPLETED | OUTPATIENT
Start: 2019-08-16 | End: 2019-08-16

## 2019-08-16 RX ORDER — MORPHINE SULFATE 50 MG/1
2 CAPSULE, EXTENDED RELEASE ORAL ONCE
Refills: 0 | Status: DISCONTINUED | OUTPATIENT
Start: 2019-08-16 | End: 2019-08-16

## 2019-08-16 RX ADMIN — SODIUM CHLORIDE 1000 MILLILITER(S): 9 INJECTION INTRAMUSCULAR; INTRAVENOUS; SUBCUTANEOUS at 01:26

## 2019-08-16 RX ADMIN — MORPHINE SULFATE 2 MILLIGRAM(S): 50 CAPSULE, EXTENDED RELEASE ORAL at 02:56

## 2019-08-16 RX ADMIN — SODIUM CHLORIDE 2000 MILLILITER(S): 9 INJECTION INTRAMUSCULAR; INTRAVENOUS; SUBCUTANEOUS at 04:58

## 2019-08-16 RX ADMIN — SODIUM CHLORIDE 1000 MILLILITER(S): 9 INJECTION INTRAMUSCULAR; INTRAVENOUS; SUBCUTANEOUS at 05:43

## 2019-08-16 RX ADMIN — SODIUM CHLORIDE 1000 MILLILITER(S): 9 INJECTION INTRAMUSCULAR; INTRAVENOUS; SUBCUTANEOUS at 00:26

## 2019-08-16 RX ADMIN — MORPHINE SULFATE 2 MILLIGRAM(S): 50 CAPSULE, EXTENDED RELEASE ORAL at 02:41

## 2019-08-16 NOTE — ED ADULT NURSE NOTE - NSIMPLEMENTINTERV_GEN_ALL_ED
Implemented All Universal Safety Interventions:  [place] to call system. Call bell, personal items and telephone within reach. Instruct patient to call for assistance. Room bathroom lighting operational. Non-slip footwear when patient is off stretcher. Physically safe environment: no spills, clutter or unnecessary equipment. Stretcher in lowest position, wheels locked, appropriate side rails in place.

## 2019-08-16 NOTE — ED ADULT NURSE NOTE - CAS EDN DISCHARGE INTERVENTIONS
December 8, 2018     Patient: Danny Mann   YOB: 1965   Date of Visit: 12/8/2018       To Whom it May Concern:    Danny Mann was seen in my clinic on 12/8/2018 at 9:40 am. He may return to work 12/10/18 with restrictions of: no lifting greater than 20 lbs, no pulling/ pushing greater than 20 lbs. No above the shoulder lifting. He will have a re-evaluation 1/12/19  If you have any questions or concerns, please don't hesitate to call.       Sincerely,         Jg Herrera, CNP IV discontinued, cath removed intact

## 2019-08-16 NOTE — ED ADULT NURSE NOTE - OBJECTIVE STATEMENT
pt received to bed 28 c/o pain in ULQ of abdomen. hx crohns disease. ULQ tender upon palpation. denies: chest pain, SOB, dizzy, headache, n/v/d, recent bowel movement, blood in stool, dysuria, burning on urination. pt states he was on a bus 2 days ago and was nauseous.

## 2019-08-17 LAB
CULTURE RESULTS: SIGNIFICANT CHANGE UP
SPECIMEN SOURCE: SIGNIFICANT CHANGE UP

## 2020-01-07 ENCOUNTER — INPATIENT (INPATIENT)
Facility: HOSPITAL | Age: 50
LOS: 14 days | Discharge: HOME | End: 2020-01-22
Attending: INTERNAL MEDICINE | Admitting: INTERNAL MEDICINE
Payer: MEDICAID

## 2020-01-07 VITALS
OXYGEN SATURATION: 95 % | TEMPERATURE: 98 F | SYSTOLIC BLOOD PRESSURE: 124 MMHG | HEART RATE: 78 BPM | DIASTOLIC BLOOD PRESSURE: 78 MMHG | RESPIRATION RATE: 19 BRPM | WEIGHT: 136.03 LBS | HEIGHT: 70 IN

## 2020-01-07 DIAGNOSIS — S83.519A SPRAIN OF ANTERIOR CRUCIATE LIGAMENT OF UNSPECIFIED KNEE, INITIAL ENCOUNTER: Chronic | ICD-10-CM

## 2020-01-07 LAB
ALBUMIN SERPL ELPH-MCNC: 3.7 G/DL — SIGNIFICANT CHANGE UP (ref 3.3–5)
ALP SERPL-CCNC: 84 U/L — SIGNIFICANT CHANGE UP (ref 40–120)
ALT FLD-CCNC: 31 U/L — SIGNIFICANT CHANGE UP (ref 12–78)
ANION GAP SERPL CALC-SCNC: 8 MMOL/L — SIGNIFICANT CHANGE UP (ref 5–17)
APTT BLD: 30.8 SEC — SIGNIFICANT CHANGE UP (ref 27.5–36.3)
AST SERPL-CCNC: 27 U/L — SIGNIFICANT CHANGE UP (ref 15–37)
BASOPHILS # BLD AUTO: 0.04 K/UL — SIGNIFICANT CHANGE UP (ref 0–0.2)
BASOPHILS NFR BLD AUTO: 0.4 % — SIGNIFICANT CHANGE UP (ref 0–2)
BILIRUB SERPL-MCNC: 0.5 MG/DL — SIGNIFICANT CHANGE UP (ref 0.2–1.2)
BUN SERPL-MCNC: 9 MG/DL — SIGNIFICANT CHANGE UP (ref 7–23)
CALCIUM SERPL-MCNC: 8.9 MG/DL — SIGNIFICANT CHANGE UP (ref 8.5–10.1)
CHLORIDE SERPL-SCNC: 108 MMOL/L — SIGNIFICANT CHANGE UP (ref 96–108)
CO2 SERPL-SCNC: 25 MMOL/L — SIGNIFICANT CHANGE UP (ref 22–31)
CREAT SERPL-MCNC: 0.86 MG/DL — SIGNIFICANT CHANGE UP (ref 0.5–1.3)
EOSINOPHIL # BLD AUTO: 0.09 K/UL — SIGNIFICANT CHANGE UP (ref 0–0.5)
EOSINOPHIL NFR BLD AUTO: 0.9 % — SIGNIFICANT CHANGE UP (ref 0–6)
GLUCOSE SERPL-MCNC: 82 MG/DL — SIGNIFICANT CHANGE UP (ref 70–99)
HCT VFR BLD CALC: 43.8 % — SIGNIFICANT CHANGE UP (ref 39–50)
HGB BLD-MCNC: 15.1 G/DL — SIGNIFICANT CHANGE UP (ref 13–17)
IMM GRANULOCYTES NFR BLD AUTO: 0.3 % — SIGNIFICANT CHANGE UP (ref 0–1.5)
INR BLD: 0.97 RATIO — SIGNIFICANT CHANGE UP (ref 0.88–1.16)
LYMPHOCYTES # BLD AUTO: 1.77 K/UL — SIGNIFICANT CHANGE UP (ref 1–3.3)
LYMPHOCYTES # BLD AUTO: 17 % — SIGNIFICANT CHANGE UP (ref 13–44)
MCHC RBC-ENTMCNC: 33 PG — SIGNIFICANT CHANGE UP (ref 27–34)
MCHC RBC-ENTMCNC: 34.5 GM/DL — SIGNIFICANT CHANGE UP (ref 32–36)
MCV RBC AUTO: 95.8 FL — SIGNIFICANT CHANGE UP (ref 80–100)
MONOCYTES # BLD AUTO: 0.74 K/UL — SIGNIFICANT CHANGE UP (ref 0–0.9)
MONOCYTES NFR BLD AUTO: 7.1 % — SIGNIFICANT CHANGE UP (ref 2–14)
NEUTROPHILS # BLD AUTO: 7.75 K/UL — HIGH (ref 1.8–7.4)
NEUTROPHILS NFR BLD AUTO: 74.3 % — SIGNIFICANT CHANGE UP (ref 43–77)
NRBC # BLD: 0 /100 WBCS — SIGNIFICANT CHANGE UP (ref 0–0)
NT-PROBNP SERPL-SCNC: 68 PG/ML — SIGNIFICANT CHANGE UP (ref 0–125)
PLATELET # BLD AUTO: 265 K/UL — SIGNIFICANT CHANGE UP (ref 150–400)
POTASSIUM SERPL-MCNC: 4.1 MMOL/L — SIGNIFICANT CHANGE UP (ref 3.5–5.3)
POTASSIUM SERPL-SCNC: 4.1 MMOL/L — SIGNIFICANT CHANGE UP (ref 3.5–5.3)
PROT SERPL-MCNC: 6.9 GM/DL — SIGNIFICANT CHANGE UP (ref 6–8.3)
PROTHROM AB SERPL-ACNC: 10.8 SEC — SIGNIFICANT CHANGE UP (ref 10–12.9)
RBC # BLD: 4.57 M/UL — SIGNIFICANT CHANGE UP (ref 4.2–5.8)
RBC # FLD: 12.6 % — SIGNIFICANT CHANGE UP (ref 10.3–14.5)
SODIUM SERPL-SCNC: 141 MMOL/L — SIGNIFICANT CHANGE UP (ref 135–145)
TROPONIN I SERPL-MCNC: <.015 NG/ML — SIGNIFICANT CHANGE UP (ref 0.01–0.04)
TSH SERPL-MCNC: 1.22 UIU/ML — SIGNIFICANT CHANGE UP (ref 0.36–3.74)
WBC # BLD: 10.42 K/UL — SIGNIFICANT CHANGE UP (ref 3.8–10.5)
WBC # FLD AUTO: 10.42 K/UL — SIGNIFICANT CHANGE UP (ref 3.8–10.5)

## 2020-01-07 PROCEDURE — 99285 EMERGENCY DEPT VISIT HI MDM: CPT

## 2020-01-07 PROCEDURE — 88312 SPECIAL STAINS GROUP 1: CPT | Mod: 26

## 2020-01-07 PROCEDURE — 88305 TISSUE EXAM BY PATHOLOGIST: CPT | Mod: 26

## 2020-01-07 PROCEDURE — 99222 1ST HOSP IP/OBS MODERATE 55: CPT

## 2020-01-07 PROCEDURE — 71250 CT THORAX DX C-: CPT | Mod: 26

## 2020-01-07 PROCEDURE — 93010 ELECTROCARDIOGRAM REPORT: CPT

## 2020-01-07 PROCEDURE — 99223 1ST HOSP IP/OBS HIGH 75: CPT

## 2020-01-07 PROCEDURE — 71045 X-RAY EXAM CHEST 1 VIEW: CPT | Mod: 26

## 2020-01-07 RX ORDER — ALPRAZOLAM 0.25 MG
0.25 TABLET ORAL THREE TIMES A DAY
Refills: 0 | Status: DISCONTINUED | OUTPATIENT
Start: 2020-01-07 | End: 2020-01-07

## 2020-01-07 RX ORDER — ONDANSETRON 8 MG/1
4 TABLET, FILM COATED ORAL
Refills: 0 | Status: DISCONTINUED | OUTPATIENT
Start: 2020-01-07 | End: 2020-01-10

## 2020-01-07 RX ORDER — RISPERIDONE 4 MG/1
1 TABLET ORAL
Qty: 0 | Refills: 0 | DISCHARGE

## 2020-01-07 RX ORDER — OXYBUTYNIN CHLORIDE 5 MG
5 TABLET ORAL
Refills: 0 | Status: DISCONTINUED | OUTPATIENT
Start: 2020-01-07 | End: 2020-01-22

## 2020-01-07 RX ORDER — TAMSULOSIN HYDROCHLORIDE 0.4 MG/1
0.8 CAPSULE ORAL AT BEDTIME
Refills: 0 | Status: DISCONTINUED | OUTPATIENT
Start: 2020-01-07 | End: 2020-01-22

## 2020-01-07 RX ORDER — TRAZODONE HCL 50 MG
1 TABLET ORAL
Qty: 0 | Refills: 0 | DISCHARGE

## 2020-01-07 RX ORDER — PANTOPRAZOLE SODIUM 20 MG/1
40 TABLET, DELAYED RELEASE ORAL
Refills: 0 | Status: DISCONTINUED | OUTPATIENT
Start: 2020-01-07 | End: 2020-01-07

## 2020-01-07 RX ORDER — ONDANSETRON 8 MG/1
4 TABLET, FILM COATED ORAL ONCE
Refills: 0 | Status: COMPLETED | OUTPATIENT
Start: 2020-01-07 | End: 2020-01-07

## 2020-01-07 RX ORDER — NICOTINE POLACRILEX 2 MG
1 GUM BUCCAL DAILY
Refills: 0 | Status: DISCONTINUED | OUTPATIENT
Start: 2020-01-07 | End: 2020-01-22

## 2020-01-07 RX ORDER — ALPRAZOLAM 0.25 MG
0.25 TABLET ORAL ONCE
Refills: 0 | Status: DISCONTINUED | OUTPATIENT
Start: 2020-01-07 | End: 2020-01-07

## 2020-01-07 RX ORDER — LANOLIN ALCOHOL/MO/W.PET/CERES
6 CREAM (GRAM) TOPICAL AT BEDTIME
Refills: 0 | Status: DISCONTINUED | OUTPATIENT
Start: 2020-01-07 | End: 2020-01-07

## 2020-01-07 RX ORDER — ALPRAZOLAM 0.25 MG
2 TABLET ORAL DAILY
Refills: 0 | Status: DISCONTINUED | OUTPATIENT
Start: 2020-01-07 | End: 2020-01-09

## 2020-01-07 RX ORDER — ENOXAPARIN SODIUM 100 MG/ML
40 INJECTION SUBCUTANEOUS DAILY
Refills: 0 | Status: COMPLETED | OUTPATIENT
Start: 2020-01-07 | End: 2020-01-12

## 2020-01-07 RX ORDER — MESALAMINE 400 MG
1000 TABLET, DELAYED RELEASE (ENTERIC COATED) ORAL
Refills: 0 | Status: DISCONTINUED | OUTPATIENT
Start: 2020-01-07 | End: 2020-01-22

## 2020-01-07 RX ORDER — ACETAMINOPHEN 500 MG
650 TABLET ORAL EVERY 6 HOURS
Refills: 0 | Status: DISCONTINUED | OUTPATIENT
Start: 2020-01-07 | End: 2020-01-22

## 2020-01-07 RX ORDER — PANTOPRAZOLE SODIUM 20 MG/1
40 TABLET, DELAYED RELEASE ORAL
Refills: 0 | Status: DISCONTINUED | OUTPATIENT
Start: 2020-01-07 | End: 2020-01-22

## 2020-01-07 RX ORDER — LACTULOSE 10 G/15ML
30 SOLUTION ORAL DAILY
Refills: 0 | Status: DISCONTINUED | OUTPATIENT
Start: 2020-01-07 | End: 2020-01-17

## 2020-01-07 RX ADMIN — Medication 10 MILLIGRAM(S): at 22:20

## 2020-01-07 RX ADMIN — Medication 10 MILLIGRAM(S): at 22:36

## 2020-01-07 RX ADMIN — ONDANSETRON 4 MILLIGRAM(S): 8 TABLET, FILM COATED ORAL at 15:41

## 2020-01-07 RX ADMIN — Medication 0.25 MILLIGRAM(S): at 20:19

## 2020-01-07 NOTE — ED PROVIDER NOTE - OBJECTIVE STATEMENT
50 yo male with PMH depression, anxiety, advanced Barretts, Crohn's, presents to ED for evaluation of chest pain, shoulder pain  x 1 weeks. Chest pain - pressure, worsened with changes in position, occasionally sharp, radiating into left shoulder and left upper back pain. Denies shortness of breath, vomiting, lightheadedness. +nausea, improved with zofran. Denies fevers, chills, cough. Denies recent travel, immobilization, surgery.   Sx: small bowel resection 2014  Urology: Dr Pineda    PMD: Dr. Brownlee

## 2020-01-07 NOTE — ED ADULT NURSE NOTE - OBJECTIVE STATEMENT
50 yo male  presents to ED for evaluation of chest pain, shoulder pain  x 1 weeks. Chest pain - pressure, worsened with changes in position, occasionally sharp, radiating into left shoulder and left upper back pain. Denies shortness of breath, vomiting, lightheadedness. +nausea, improved with zofran.

## 2020-01-07 NOTE — CONSULT NOTE ADULT - ASSESSMENT
49 yr old male w advanced Alfaro, Crohn disease on Pentasa, IBS c/o L sided chest pain w radiation to  back      #chest pain  #RUL spiculated lesion w cavity: neoplastic vs inflammatory or infectious  1. admit to med  2. TB precautions as pt lives in homeless shelter  3. sputum for AFB  4. quantiferon  5. labs for histo    GI diseases  #Barretts  1. omeprazole therapeutic to protonix 40 mg    #Pt states crohn  1. pentasa 250 mg 4 tabs    #IBS  1. bentyl      #OAB  1. tolte  2. flomax 0.8 mg    #anxiety/depression      #Tobacco use  allergic to patches prescribed in hospital  denied effectiveness of lozenge or gum          IMPROVE VTE Individual Risk Assessment    RISK                                                                Points    [  ] Previous VTE                                                  3    [  ] Thrombophilia                                               2    [  ] Lower limb paralysis                                      2        (unable to hold up >15 seconds)      [  ] Current Cancer                                              2         (within 6 months)    [  ] Immobilization > 24 hrs                                1    [  ] ICU/CCU stay > 24 hours                              1    [  ] Age > 60                                                      1    IMPROVE VTE Score _____0____    IMPROVE Score 0-1: Low Risk, No VTE prophylaxis required for most patients, encourage ambulation.   IMPROVE Score 2-3: At risk, pharmacologic VTE prophylaxis is indicated for most patients (in the absence of a contraindication)  IMPROVE Score > or = 4: High Risk, pharmacologic VTE prophylaxis is indicated for most patients (in the absence of a contraindication) 49 yr old male w advanced Alfaro, Crohn disease on Pentasa, IBS c/o L sided chest pain w radiation to  back      #chest pain  #RUL spiculated lesion w cavity: neoplastic vs inflammatory or infectious  1. admit to med  2. TB precautions as pt lives in homeless shelter  3. sputum for AFB  4. quantiferon  5. labs for histo    GI diseases  #Barretts  1. omeprazole therapeutic to protonix 40 mg    #Pt states crohn  1. pentasa 250 mg 4 tabs; need to verify    #IBS  1. bentyl 10 mg BID      #OAB  1. tolte interch w oxybutynin 5 mg   2. flomax 0.8 mg    #anxiety/depression  1. buspierone 10 mg BID  2. xanax 0.25 mg TID prn  3. pt states risperidone 10 mg BID;  I cannot verify      #Tobacco use  allergic to patches prescribed in hospital  denied effectiveness of lozenge or gum          IMPROVE VTE Individual Risk Assessment    RISK                                                                Points    [  ] Previous VTE                                                  3    [  ] Thrombophilia                                               2    [  ] Lower limb paralysis                                      2        (unable to hold up >15 seconds)      [  ] Current Cancer                                              2         (within 6 months)    [  ] Immobilization > 24 hrs                                1    [  ] ICU/CCU stay > 24 hours                              1    [  ] Age > 60                                                      1    IMPROVE VTE Score _____0____    IMPROVE Score 0-1: Low Risk, No VTE prophylaxis required for most patients, encourage ambulation.   IMPROVE Score 2-3: At risk, pharmacologic VTE prophylaxis is indicated for most patients (in the absence of a contraindication)  IMPROVE Score > or = 4: High Risk, pharmacologic VTE prophylaxis is indicated for most patients (in the absence of a contraindication)

## 2020-01-07 NOTE — ED PROVIDER NOTE - NS ED ROS FT
Constitutional: no fevers or chills  Cardiac: no palpitations, +chest pain  Lungs: no shortness of breath, wheezes  Abd: no abd pain, nausea, vomiting, diarrhea  Genitourinary: no dysuria, increased urinary frequency, hematuria  Neurology: no sensorimotor deficits, + dizziness, no headache, no visual changes  Skin: no rashes  All other ROS negative except as per HPI

## 2020-01-07 NOTE — ED ADULT NURSE REASSESSMENT NOTE - NS ED NURSE REASSESS COMMENT FT1
pt expressing desire to leave AMA, I listened to patients concerns and escalated to Assistant Nurse Manager Alexandre Arnett. [facility] doctor page.  Dr. Juan Miguel Mclean on unit and called to bedside

## 2020-01-07 NOTE — ED ADULT TRIAGE NOTE - CHIEF COMPLAINT QUOTE
patient BIBA c/o of chest pain , c/o of abdominal pain , c/o of  N/V , denied headache , patient denied difficulty breathing , patient last drink 2 days ago , EMS give patietn Aspirin 81 x2 and Nitro ,

## 2020-01-07 NOTE — H&P ADULT - PROBLEM SELECTOR PLAN 1
R/o TB, Pulm on board - sending sputums for AFB, Will call ID in the [facility]  Isolation precautions, QuantiFeron drawn, results pending

## 2020-01-07 NOTE — H&P ADULT - ASSESSMENT
49M with multiple comorbidities presents for eval of L sided chest pain, pleuritic in nature.  CT shows 4x2.5 spiculated mass with central cavity.  Seen by pulm, will admit to floor.  r/o TB.

## 2020-01-07 NOTE — H&P ADULT - NSHPLABSRESULTS_GEN_ALL_CORE
Recent Vitals  T(C): 36.7 (01-07-20 @ 23:56), Max: 36.7 (01-07-20 @ 15:46)  HR: 61 (01-07-20 @ 23:56) (61 - 82)  BP: 111/82 (01-07-20 @ 23:56) (111/82 - 124/78)  RR: 26 (01-07-20 @ 23:56) (19 - 26)  SpO2: 97% (01-07-20 @ 23:56) (95% - 97%)                        15.1   10.42 )-----------( 265      ( 07 Jan 2020 13:24 )             43.8     01-07    141  |  108  |  9   ----------------------------<  82  4.1   |  25  |  0.86    Ca    8.9      07 Jan 2020 13:24    TPro  6.9  /  Alb  3.7  /  TBili  0.5  /  DBili  x   /  AST  27  /  ALT  31  /  AlkPhos  84  01-07    PT/INR - ( 07 Jan 2020 13:24 )   PT: 10.8 sec;   INR: 0.97 ratio         PTT - ( 07 Jan 2020 13:24 )  PTT:30.8 sec  LIVER FUNCTIONS - ( 07 Jan 2020 13:24 )  Alb: 3.7 g/dL / Pro: 6.9 gm/dL / ALK PHOS: 84 U/L / ALT: 31 U/L / AST: 27 U/L / GGT: x               Home Medications:  busPIRone 10 mg oral tablet: 1 tab(s) orally 2 times a day (07 Jan 2020 20:24)  Flomax 0.4 mg oral capsule: 2 cap(s) orally once a day (07 Jan 2020 20:24)  risperiDONE: 10 milligram(s) orally 2 times a day (07 Jan 2020 20:24)  tolterodine 2 mg oral tablet: orally once a day (at bedtime) (07 Jan 2020 20:24)

## 2020-01-07 NOTE — CONSULT NOTE ADULT - ASSESSMENT
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     #Cavitary lung lesion - ddx includes infectious and neoplastic processes inflammatory less likely  -- would isolate patient (airborne) and r/o for TB with AFB x 3, check quant gold  -- patient traveled to [place] in august - would check for Histoplasma   -- given hx of vomiting/reflux/poor dental condition and immunosuppression- lung abscess is also possible, however I would have expected patient to exhibit more s/s of systemic infection  -- neoplastic process such as squamous cell ca (which commonly cavitate is also a concerning possibility)  -- once TB is ruled out, would recommend IR guided bx - would send for cultures (bacterial, AFB and fungal), cytology, cell count with diff  -- check for HIV, aspergillus galactomannan    #Emphysema - on CT chest   -- will need formal PFTs  -- would start Spiriva and Albuterol prn for now    #Smoker - discussed smoking cessation. Not interested in NRT. Can start Chantix

## 2020-01-07 NOTE — ED PROVIDER NOTE - PHYSICAL EXAMINATION
Gen: no acute distress, well appearing, awake, alert and oriented x 3  Cardiac: regular rate and rhythm, +S1S2  Pulm: Clear to auscultation bilaterally  Abd: soft, nondistended, no guarding  Back: neg CVA ttp, nontender spine  Extremity: no edema, no deformity, warm and well perfused, FROM all extremities    Neuro: awake, alert, oriented x 3, sensorimotor intact

## 2020-01-07 NOTE — H&P ADULT - HISTORY OF PRESENT ILLNESS
49M with a PMH of Barretts Disease, Crohn's on Pentasya, IBS presents to ED for L sided chest pain.  Patient severely agitated and anxious when seen.  Reports that the pain is worse upon deep breaths, sometimes radiates to his back/ L shoulder.  Reports chronic cough but attributes that to his smoking history.  Also reports lost weight.  No hx of TB however has been living in homeless shelters for 8 months.  Severely anxious about continuing his medications.  Reportedly had a PPD done in August that was negative.

## 2020-01-07 NOTE — CONSULT NOTE ADULT - SUBJECTIVE AND OBJECTIVE BOX
CHIEF COMPLAINT: chest pain    HPI:  48 yo with Crohn's disease (on Pentasa) and Alfaro's esophagus presents with one week history of left sided chest pain radiating to the back.   On imaging, was found to have RUL cavitary mass with irregular borders. Pulmonary was consulted.   Overall, patient reports not feeling well for the past 6 months or so - has malaise, fatigue, wt loss, he is not sure if he has fevers, denies night sweats. He has cough for the past several months, worse in am, productive of greenish sputum. Denies recent dx of PNA. He has very poor dentation and notes frequent episodes of vomiting/reflux. Patient currently lives in a homeless shelter since August, denies known exposures to TB, reports negative PPD in the past. He traveled to [place] in August but has not been out of the country in the past 20 years.     PAST MEDICAL & SURGICAL HISTORY:  Alfaro's esophagus without dysplasia  Depression, unspecified depression type  Crohn's disease with complication, unspecified gastrointestinal tract location  ACL (anterior cruciate ligament) tear      FAMILY HISTORY:  Family history of diabetes mellitus in maternal grandmother (Mother, Grandparent)  Family history of COPD (chronic obstructive pulmonary disease) (Father)  Family history of colon cancer in father (Father)  Family history of hypertension in father (Father, Mother, Grandparent)      SOCIAL HISTORY:  Lives in a homeless shelter  current smoker, smoked 1 ppd since age 19    Allergies    No Known Allergies    Intolerances        HOME MEDICATIONS:  mesalamine    REVIEW OF SYSTEMS:    CONSTITUTIONAL: wt loss, fatigue  HEENT: chronic postnasal gtt  SKIN:  No rash or itching.  CARDIOVASCULAR:  chest pain  RESPIRATORY: +cough, sputum, no SOB  GASTROINTESTINAL:  Crohn's, reflux  GENITOURINARY:  BPH  NEUROLOGICAL:  No headache, dizziness, syncope.   MUSCULOSKELETAL:  No muscle, back pain, joint pain or stiffness.  HEMATOLOGIC:  No anemia, bleeding or bruising.  PSYCHIATRIC:  depression  ENDOCRINOLOGIC:  No reports of sweating, cold or heat intolerance. No polyuria or polydipsia.      [x ] All other systems negative  [ ] Unable to assess ROS because ________    OBJECTIVE:  ICU Vital Signs Last 24 Hrs  T(C): 36.7 (07 Jan 2020 15:46), Max: 36.7 (07 Jan 2020 15:46)  T(F): 98 (07 Jan 2020 15:46), Max: 98 (07 Jan 2020 15:46)  HR: 82 (07 Jan 2020 15:46) (78 - 82)  BP: 119/80 (07 Jan 2020 15:46) (119/80 - 124/78)  BP(mean): --  ABP: --  ABP(mean): --  RR: 19 (07 Jan 2020 15:46) (19 - 19)  SpO2: 96% (07 Jan 2020 15:46) (95% - 96%)        CAPILLARY BLOOD GLUCOSE          PHYSICAL EXAM:  General:  No apparent distress  HEENT: Normocephalic, atraumatic.  No sinus tenderness. Mucous membranes are moist. POOR DENTATION  Lymph Nodes: No cervical, axillar, supra- or infra-clavicular lymphadenopathy appreciated  Neck: Supple. No JVD  Respiratory: poor air movement, No wheezing, crackles, rhonchi appreciated   Cardiovascular: Regular rate and rhythm. S1, S2. No murmurs, rubs of gallops appreciated.   Abdomen: Soft, non-tender, non-distended.   Extremities: No lower extremity edema.  Skin: Warm, dry, no rash.   Neurological: CNII-XII grossly intact.   Psychiatry: appropriate mood and affect.     HOSPITAL MEDICATIONS:  MEDICATIONS  (STANDING):    MEDICATIONS  (PRN):      LABS:                        15.1   10.42 )-----------( 265      ( 07 Jan 2020 13:24 )             43.8     01-07    141  |  108  |  9   ----------------------------<  82  4.1   |  25  |  0.86    Ca    8.9      07 Jan 2020 13:24    TPro  6.9  /  Alb  3.7  /  TBili  0.5  /  DBili  x   /  AST  27  /  ALT  31  /  AlkPhos  84  01-07    PT/INR - ( 07 Jan 2020 13:24 )   PT: 10.8 sec;   INR: 0.97 ratio         PTT - ( 07 Jan 2020 13:24 )  PTT:30.8 sec      RADIOLOGY:  [ x] Reviewed and interpreted by me    < from: CT Chest No Cont (01.07.20 @ 15:04) >  4.4 x 2.5 cm right upper lobe irregular spiculated mass with central cavitation. Finding may represent a neoplasm surrounding a bulla or cavitary infection such as tuberculosis. Recommend pulmonary consultation.    Emphysema.    Stable mild nodular thickening of the left adrenal gland.    < end of copied text >

## 2020-01-07 NOTE — CONSULT NOTE ADULT - SUBJECTIVE AND OBJECTIVE BOX
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 Kem Jung  Urology: Dr Pineda    PAST MEDICAL HX  Alfaro's esophagus without dysplasia  Depression, unspecified depression type  Crohn's disease with complication, unspecified gastrointestinal tract location(NO CLINICAL EVIDENCE AFTER NUMEROUS REPEAT WORK UP) No pathology supporting Crohn's disease though  he did have a SBO attributed to an area in the Ileum not normally seen in  Crohns. 2015.   GI bleed   IBS irritable bowel w constipation  OAB overactive bladder  ACL (anterior cruciate ligament) tear      FAMILY HX  Family history of diabetes mellitus in maternal grandmother (Mother, Grandparent)  Family history of COPD (chronic obstructive pulmonary disease) (Father)  Family history of colon cancer in father (Father)  Family history of hypertension in father (Father, Mother, Grandparent)      SOCIAL HX  Lives in a homeless shelter  current smoker, smoked 1 ppd since age 19    ROS  as above      Allergy to some adhesives      T(C): 36.7 (01-07-20 @ 15:46), Max: 36.7 (01-07-20 @ 15:46)  HR: 82 (01-07-20 @ 15:46) (78 - 82)  BP: 119/80 (01-07-20 @ 15:46) (119/80 - 124/78)  RR: 19 (01-07-20 @ 15:46) (19 - 19)  SpO2: 96% (01-07-20 @ 15:46) (95% - 96%)    PHYSICAL EXAM: evaluation precludes physical exam. Pertinent physical exam findings as per video conference with  teleNA at bedside is as follows:    pleasant male wearing surgical face mask  feels anxious given the result of CT scan and requests med    CARDIAC MARKERS ( 07 Jan 2020 13:24 )  <.015 ng/mL / x     / x     / x     / x                    15.1   10.42 )-----------( 265      ( 07 Jan 2020 13:24 )             43.8     07 Jan 2020 13:24    141    |  108    |  9      ----------------------------<  82     4.1     |  25     |  0.86     Ca    8.9        07 Jan 2020 13:24    TPro  6.9    /  Alb  3.7    /  TBili  0.5    /  DBili  x      /  AST  27     /  ALT  31     /  AlkPhos  84     07 Jan 2020 13:24    PT/INR - ( 07 Jan 2020 13:24 )   PT: 10.8 sec;   INR: 0.97 ratio         PTT - ( 07 Jan 2020 13:24 )  PTT:30.8 sec  CAPILLARY BLOOD GLUCOSE    LIVER FUNCTIONS - ( 07 Jan 2020 13:24 )  Alb: 3.7 g/dL / Pro: 6.9 gm/dL / ALK PHOS: 84 U/L / ALT: 31 U/L / AST: 27 U/L / GGT: x               RADIOLOGY    INTERPRETATION: CLINICAL INDICATION: 49 years Male with abn chest xray.     COMPARISON: CT abdomen and pelvis 1/14/2019     PROCEDURE:   CT of the Chest was performed without intravenous contrast.   Sagittal and coronal reformats were performed.       FINDINGS:     LUNGS AND AIRWAYS: Patent central airways. Moderate bilateral centrilobular   emphysema. 4.4 x 2.5 cm irregular spiculated mass in the apical segment of   the right upper lobes with a 1.1 cm central cavity. Mild bilateral lower   lobe dependent atelectasis.     PLEURA: No pleural effusion.     MEDIASTINUM AND DEAN: No lymphadenopathy. Thickened esophagus.     VESSELS: Within normal limits.     HEART: Heart size is normal. No pericardial effusion.     CHEST WALL AND LOWER NECK: Within normal limits.     VISUALIZED UPPER ABDOMEN: Mild nodular thickening of left adrenal gland,   unchanged from CT abdomen and pelvis 1/14/2019. Normal right adrenal gland.     BONES: Mild thoracic degenerative changes.     IMPRESSION:     4.4 x 2.5 cm right upper lobe irregular spiculated mass with central   cavitation. Finding may represent a neoplasm surrounding a bulla or cavitary   infection such as tuberculosis. Recommend pulmonary consultation.     Emphysema.     Stable mild nodular thickening of the left adrenal gland.     Findings were discussed with Dr. STEVEN PRATT 1/7/2020 3:46 PM by Dr. Chand with read back confirmation.         ***Please see the addendum at the top of this report. 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 Kem Jung  Urology: Dr Pineda    PAST MEDICAL HX  Alfaro's esophagus without dysplasia  Depression, unspecified depression type  Crohn's disease with complication, unspecified gastrointestinal tract location(NO CLINICAL EVIDENCE AFTER NUMEROUS REPEAT WORK UP) No pathology supporting Crohn's disease though  he did have a SBO attributed to an area in the Ileum not normally seen in Crohns. 2015.   GI bleed   IBS irritable bowel w constipation  OAB overactive bladder  ACL (anterior cruciate ligament) tear      FAMILY HX  Family history of diabetes mellitus in maternal grandmother (Mother, Grandparent)  Family history of COPD (chronic obstructive pulmonary disease) (Father)  Family history of colon cancer in father (Father)  Family history of hypertension in father (Father, Mother, Grandparent)      SOCIAL HX  Lives in a homeless shelter  current smoker, smoked 1 ppd since age 19    ROS  as above  GI states IBS and Crohn altho last GI note questioned dx; pt states still takes pentasa and eats mechan soft diet      Allergy to some adhesives      T(C): 36.7 (01-07-20 @ 15:46), Max: 36.7 (01-07-20 @ 15:46)  HR: 82 (01-07-20 @ 15:46) (78 - 82)  BP: 119/80 (01-07-20 @ 15:46) (119/80 - 124/78)  RR: 19 (01-07-20 @ 15:46) (19 - 19)  SpO2: 96% (01-07-20 @ 15:46) (95% - 96%)    PHYSICAL EXAM: evaluation precludes physical exam. Pertinent physical exam findings as per video conference with  teleNA at bedside is as follows:    pleasant male wearing surgical face mask  feels anxious given the result of CT scan and requests med    CARDIAC MARKERS ( 07 Jan 2020 13:24 )  <.015 ng/mL / x     / x     / x     / x                    15.1   10.42 )-----------( 265      ( 07 Jan 2020 13:24 )             43.8     07 Jan 2020 13:24    141    |  108    |  9      ----------------------------<  82     4.1     |  25     |  0.86     Ca    8.9        07 Jan 2020 13:24    TPro  6.9    /  Alb  3.7    /  TBili  0.5    /  DBili  x      /  AST  27     /  ALT  31     /  AlkPhos  84     07 Jan 2020 13:24    PT/INR - ( 07 Jan 2020 13:24 )   PT: 10.8 sec;   INR: 0.97 ratio         PTT - ( 07 Jan 2020 13:24 )  PTT:30.8 sec  CAPILLARY BLOOD GLUCOSE    LIVER FUNCTIONS - ( 07 Jan 2020 13:24 )  Alb: 3.7 g/dL / Pro: 6.9 gm/dL / ALK PHOS: 84 U/L / ALT: 31 U/L / AST: 27 U/L / GGT: x               RADIOLOGY    INTERPRETATION: CLINICAL INDICATION: 49 years Male with abn chest xray.     COMPARISON: CT abdomen and pelvis 1/14/2019     PROCEDURE:   CT of the Chest was performed without intravenous contrast.   Sagittal and coronal reformats were performed.       FINDINGS:     LUNGS AND AIRWAYS: Patent central airways. Moderate bilateral centrilobular   emphysema. 4.4 x 2.5 cm irregular spiculated mass in the apical segment of   the right upper lobes with a 1.1 cm central cavity. Mild bilateral lower   lobe dependent atelectasis.     PLEURA: No pleural effusion.     MEDIASTINUM AND DEAN: No lymphadenopathy. Thickened esophagus.     VESSELS: Within normal limits.     HEART: Heart size is normal. No pericardial effusion.     CHEST WALL AND LOWER NECK: Within normal limits.     VISUALIZED UPPER ABDOMEN: Mild nodular thickening of left adrenal gland,   unchanged from CT abdomen and pelvis 1/14/2019. Normal right adrenal gland.     BONES: Mild thoracic degenerative changes.     IMPRESSION:     4.4 x 2.5 cm right upper lobe irregular spiculated mass with central   cavitation. Finding may represent a neoplasm surrounding a bulla or cavitary   infection such as tuberculosis. Recommend pulmonary consultation.     Emphysema.     Stable mild nodular thickening of the left adrenal gland.     Findings were discussed with Dr. STEVEN PRATT 1/7/2020 3:46 PM by Dr. Chand with read back confirmation.         ***Please see the addendum at the top of this report. It may contain   additional important information or changes.****

## 2020-01-07 NOTE — CONSULT NOTE ADULT - ATTENDING COMMENTS
VTE should ambilate  Med limited; called [facility] listed on EMR  no meds filled x yr      Follow up  check EKG as I cannot view  please check script for pentasa and risperidone

## 2020-01-07 NOTE — ED PROVIDER NOTE - PROGRESS NOTE DETAILS
I discussed the case with pulmonologist, who recommends airborne precautions and admit for further workup

## 2020-01-08 DIAGNOSIS — K58.9 IRRITABLE BOWEL SYNDROME WITHOUT DIARRHEA: ICD-10-CM

## 2020-01-08 DIAGNOSIS — K50.919 CROHN'S DISEASE, UNSPECIFIED, WITH UNSPECIFIED COMPLICATIONS: ICD-10-CM

## 2020-01-08 DIAGNOSIS — F41.9 ANXIETY DISORDER, UNSPECIFIED: ICD-10-CM

## 2020-01-08 DIAGNOSIS — K22.70 BARRETT'S ESOPHAGUS WITHOUT DYSPLASIA: ICD-10-CM

## 2020-01-08 DIAGNOSIS — N32.81 OVERACTIVE BLADDER: ICD-10-CM

## 2020-01-08 DIAGNOSIS — R91.8 OTHER NONSPECIFIC ABNORMAL FINDING OF LUNG FIELD: ICD-10-CM

## 2020-01-08 DIAGNOSIS — F17.209 NICOTINE DEPENDENCE, UNSPECIFIED, WITH UNSPECIFIED NICOTINE-INDUCED DISORDERS: ICD-10-CM

## 2020-01-08 DIAGNOSIS — N40.0 BENIGN PROSTATIC HYPERPLASIA WITHOUT LOWER URINARY TRACT SYMPTOMS: ICD-10-CM

## 2020-01-08 PROCEDURE — 99221 1ST HOSP IP/OBS SF/LOW 40: CPT

## 2020-01-08 PROCEDURE — 99233 SBSQ HOSP IP/OBS HIGH 50: CPT

## 2020-01-08 PROCEDURE — 99232 SBSQ HOSP IP/OBS MODERATE 35: CPT

## 2020-01-08 RX ORDER — ONDANSETRON 8 MG/1
4 TABLET, FILM COATED ORAL ONCE
Refills: 0 | Status: COMPLETED | OUTPATIENT
Start: 2020-01-08 | End: 2020-01-10

## 2020-01-08 RX ORDER — IPRATROPIUM/ALBUTEROL SULFATE 18-103MCG
3 AEROSOL WITH ADAPTER (GRAM) INHALATION EVERY 6 HOURS
Refills: 0 | Status: DISCONTINUED | OUTPATIENT
Start: 2020-01-08 | End: 2020-01-22

## 2020-01-08 RX ORDER — TIOTROPIUM BROMIDE 18 UG/1
1 CAPSULE ORAL; RESPIRATORY (INHALATION) DAILY
Refills: 0 | Status: DISCONTINUED | OUTPATIENT
Start: 2020-01-08 | End: 2020-01-22

## 2020-01-08 RX ADMIN — TAMSULOSIN HYDROCHLORIDE 0.8 MILLIGRAM(S): 0.4 CAPSULE ORAL at 00:20

## 2020-01-08 RX ADMIN — TAMSULOSIN HYDROCHLORIDE 0.8 MILLIGRAM(S): 0.4 CAPSULE ORAL at 22:58

## 2020-01-08 RX ADMIN — Medication 5 MILLIGRAM(S): at 06:31

## 2020-01-08 RX ADMIN — Medication 5 MILLIGRAM(S): at 22:58

## 2020-01-08 RX ADMIN — PANTOPRAZOLE SODIUM 40 MILLIGRAM(S): 20 TABLET, DELAYED RELEASE ORAL at 18:22

## 2020-01-08 RX ADMIN — ONDANSETRON 4 MILLIGRAM(S): 8 TABLET, FILM COATED ORAL at 10:55

## 2020-01-08 RX ADMIN — PANTOPRAZOLE SODIUM 40 MILLIGRAM(S): 20 TABLET, DELAYED RELEASE ORAL at 06:31

## 2020-01-08 RX ADMIN — Medication 1000 MILLIGRAM(S): at 22:58

## 2020-01-08 RX ADMIN — ONDANSETRON 4 MILLIGRAM(S): 8 TABLET, FILM COATED ORAL at 18:23

## 2020-01-08 RX ADMIN — LACTULOSE 30 GRAM(S): 10 SOLUTION ORAL at 16:09

## 2020-01-08 RX ADMIN — Medication 10 MILLIGRAM(S): at 06:29

## 2020-01-08 RX ADMIN — Medication 10 MILLIGRAM(S): at 18:21

## 2020-01-08 RX ADMIN — Medication 1000 MILLIGRAM(S): at 18:21

## 2020-01-08 RX ADMIN — Medication 2 MILLIGRAM(S): at 01:03

## 2020-01-08 RX ADMIN — Medication 1 PATCH: at 01:07

## 2020-01-08 RX ADMIN — Medication 10 MILLIGRAM(S): at 18:19

## 2020-01-08 RX ADMIN — Medication 1000 MILLIGRAM(S): at 10:55

## 2020-01-08 RX ADMIN — ENOXAPARIN SODIUM 40 MILLIGRAM(S): 100 INJECTION SUBCUTANEOUS at 15:13

## 2020-01-08 RX ADMIN — Medication 1 PATCH: at 15:14

## 2020-01-08 RX ADMIN — Medication 1000 MILLIGRAM(S): at 15:13

## 2020-01-08 NOTE — PROGRESS NOTE ADULT - SUBJECTIVE AND OBJECTIVE BOX
Patient is a 49y old  Male who presents with a chief complaint of cp (08 Jan 2020 17:00)      OVERNIGHT EVENTS: chest pain      MEDICATIONS  (STANDING):  busPIRone 10 milliGRAM(s) Oral two times a day  dicyclomine 10 milliGRAM(s) Oral two times a day before meals  enoxaparin Injectable 40 milliGRAM(s) SubCutaneous daily  lactulose Syrup 30 Gram(s) Oral daily  mesalamine ER Capsule 1000 milliGRAM(s) Oral four times a day with meals  nicotine - 21 mG/24Hr(s) Patch 1 patch Transdermal daily  ondansetron    Tablet 4 milliGRAM(s) Oral two times a day  ondansetron Injectable 4 milliGRAM(s) IV Push once  oxybutynin 5 milliGRAM(s) Oral two times a day  pantoprazole    Tablet 40 milliGRAM(s) Oral two times a day  tamsulosin 0.8 milliGRAM(s) Oral at bedtime  tiotropium 18 MICROgram(s) Capsule 1 Capsule(s) Inhalation daily    MEDICATIONS  (PRN):  acetaminophen   Tablet .. 650 milliGRAM(s) Oral every 6 hours PRN Temp greater or equal to 38C (100.4F), Mild Pain (1 - 3)  albuterol/ipratropium for Nebulization. 3 milliLiter(s) Nebulizer every 6 hours PRN Shortness of Breath  ALPRAZolam 2 milliGRAM(s) Oral daily PRN anxiety      Allergies    adhesives (Rash)  No Known Drug Allergies    Intolerances        SUBJECTIVE: in bed in NAD, no acute events overnight     T(F): 97.4 (01-08-20 @ 08:09), Max: 98.7 (01-08-20 @ 03:22)  HR: 74 (01-08-20 @ 08:09) (61 - 74)  BP: 94/63 (01-08-20 @ 08:09) (94/63 - 111/82)  RR: 25 (01-08-20 @ 03:22) (25 - 26)  SpO2: 94% (01-08-20 @ 08:09) (94% - 97%)  Wt(kg): --    PHYSICAL EXAM:  GENERAL: NAD, well-groomed, well-developed  HEAD:  Atraumatic, Normocephalic  EYES: EOMI, PERRLA, conjunctiva and sclera clear  ENMT: No tonsillar erythema, exudates, or enlargement; Moist mucous membranes, Good dentition, No lesions  NECK: Supple, No JVD, Normal thyroid  CHEST/LUNG: Clear to  auscultation bilaterally; No rales, rhonchi, wheezing, or rubs  bilaterally  HEART: Regular rate and rhythm; No murmurs, rubs, or gallops  ABDOMEN: Soft, Nontender, Nondistended; Bowel sounds present  EXTREMITIES:  2+ Peripheral Pulses, No clubbing, cyanosis, or edema BL LE  SKIN: No rashes or lesions  NERVOUS SYSTEM:  Alert & Oriented X3, Good concentration; Motor Strength 5/5 B/L upper and lower extremities;   DTRs 2+ intact and symmetric, sensation intact BL    LABS:                                           15.1   10.42 )-----------( 265      ( 07 Jan 2020 13:24 )             43.8   01-07    141  |  108  |  9   ----------------------------<  82  4.1   |  25  |  0.86    Ca    8.9      07 Jan 2020 13:24    TPro  6.9  /  Alb  3.7  /  TBili  0.5  /  DBili  x   /  AST  27  /  ALT  31  /  AlkPhos  84  01-07    Cultures;   CAPILLARY BLOOD GLUCOSE        Lipid panel:     CARDIAC MARKERS ( 07 Jan 2020 13:24 )  <.015 ng/mL / x     / x     / x     / x            RADIOLOGY & ADDITIONAL TESTS:      Imaging Personally Reviewed:  [ x] YES      Consultant(s) Notes Reviewed:  [x ] YES     Care Discussed with [x ] Consultants [X ] Patient [x ] Family  [x ]    [x ]  Other; RN

## 2020-01-08 NOTE — PROGRESS NOTE ADULT - PROBLEM/PLAN-7
DISPLAY PLAN FREE TEXT Manual Repair Warning Statement: We plan on removing the manually selected variable below in favor of our much easier automatic structured text blocks found in the previous tab. We decided to do this to help make the flow better and give you the full power of structured data. Manual selection is never going to be ideal in our platform and I would encourage you to avoid using manual selection from this point on, especially since I will be sunsetting this feature. It is important that you do one of two things with the customized text below. First, you can save all of the text in a word file so you can have it for future reference. Second, transfer the text to the appropriate area in the Library tab. Lastly, if there is a flap or graft type which we do not have you need to let us know right away so I can add it in before the variable is hidden. No need to panic, we plan to give you roughly 6 months to make the change.

## 2020-01-08 NOTE — PROGRESS NOTE ADULT - PROBLEM SELECTOR PLAN 1
R/o TB, Pulm on board - sending sputums for AFB,   appreciate ID  consult   Isolation precautions, QuantiFeron drawn, results pending

## 2020-01-08 NOTE — CONSULT NOTE ADULT - SUBJECTIVE AND OBJECTIVE BOX
Infectious Diseases - Attending at Dr. Ya    HPI:  49M with a PMH of Barretts Disease, Crohn's on Pentasya, IBS presents to ED for L sided chest pain since 12/29/19.  Patient severely agitated and anxious when seen.  Reports that the pain is worse upon deep breaths, sometimes radiates to his back/ L shoulder.  Reports chronic cough but attributes that to his smoking history.  Also reports lost weight.  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  Severely anxious about continuing his medications.  Reportedly had a PPD done in August that was negative. (07 Jan 2020 23:54)      PAST MEDICAL & SURGICAL HISTORY:  Alfaro's esophagus without dysplasia  Depression, unspecified depression type  Crohn's disease with complication, unspecified gastrointestinal tract location  ACL (anterior cruciate ligament) tear      Allergies    adhesives (Rash)  No Known Drug Allergies    Intolerances        FAMILY HISTORY:  Family history of diabetes mellitus in maternal grandmother (Mother, Grandparent)  Family history of COPD (chronic obstructive pulmonary disease) (Father)  Family history of colon cancer in father (Father)  Family history of hypertension in father (Father, Mother, Grandparent)    SOCIAL HISTORY : smoker, lives in homeless shelter    REVIEW OF SYSTEMS:    Constitutional: No fever, weight loss or fatigue  Eyes: No eye pain, visual disturbances, or discharge  ENT:  No difficulty hearing, tinnitus, vertigo; No sinus or throat pain  Neck: No pain or stiffness  Respiratory: + occasional cough,  no wheezing, chills or hemoptysis  Cardiovascular: + chest pain, no  palpitations, shortness of breath, dizziness or leg swelling  Gastrointestinal: No abdominal or epigastric pain. No nausea, vomiting or hematemesis; No diarrhea or constipation. No melena or hematochezia.  Genitourinary: No dysuria, frequency, hematuria or incontinence  Neurological: No headaches, memory loss, loss of strength, numbness or tremors  Skin: No itching, burning, rashes or lesions       MEDICATIONS  (STANDING):  busPIRone 10 milliGRAM(s) Oral two times a day  dicyclomine 10 milliGRAM(s) Oral two times a day before meals  enoxaparin Injectable 40 milliGRAM(s) SubCutaneous daily  lactulose Syrup 30 Gram(s) Oral daily  mesalamine ER Capsule 1000 milliGRAM(s) Oral four times a day with meals  nicotine - 21 mG/24Hr(s) Patch 1 patch Transdermal daily  ondansetron    Tablet 4 milliGRAM(s) Oral two times a day  ondansetron Injectable 4 milliGRAM(s) IV Push once  oxybutynin 5 milliGRAM(s) Oral two times a day  pantoprazole    Tablet 40 milliGRAM(s) Oral two times a day  tamsulosin 0.8 milliGRAM(s) Oral at bedtime    MEDICATIONS  (PRN):  acetaminophen   Tablet .. 650 milliGRAM(s) Oral every 6 hours PRN Temp greater or equal to 38C (100.4F), Mild Pain (1 - 3)  ALPRAZolam 2 milliGRAM(s) Oral daily PRN anxiety      Vital Signs Last 24 Hrs  T(C): 36.3 (08 Jan 2020 08:09), Max: 37.1 (08 Jan 2020 03:22)  T(F): 97.4 (08 Jan 2020 08:09), Max: 98.7 (08 Jan 2020 03:22)  HR: 74 (08 Jan 2020 08:09) (61 - 74)  BP: 94/63 (08 Jan 2020 08:09) (94/63 - 111/82)  BP(mean): --  RR: 25 (08 Jan 2020 03:22) (25 - 26)  SpO2: 94% (08 Jan 2020 08:09) (94% - 97%)    PHYSICAL EXAM:    Constitutional: NAD, well-groomed, well-developed  HEENT: PERRLA, EOMI, Normal Hearing, MMM  Neck: No LAD, No JVD  Back: Normal spine flexure, No CVA tenderness  Respiratory: CTAB/L  Cardiovascular: S1 and S2, RRR, no M/G/R  Gastrointestinal: BS+, soft, NT/ND  Extremities: No peripheral edema  Vascular: 2+ peripheral pulses  Neurological: A/O x 3, no focal deficits  Skin: No rashes      LABS:                        15.1   10.42 )-----------( 265      ( 07 Jan 2020 13:24 )             43.8     01-07    141  |  108  |  9   ----------------------------<  82  4.1   |  25  |  0.86    Ca    8.9      07 Jan 2020 13:24    TPro  6.9  /  Alb  3.7  /  TBili  0.5  /  DBili  x   /  AST  27  /  ALT  31  /  AlkPhos  84  01-07    PT/INR - ( 07 Jan 2020 13:24 )   PT: 10.8 sec;   INR: 0.97 ratio         PTT - ( 07 Jan 2020 13:24 )  PTT:30.8 sec      MICROBIOLOGY:  RECENT CULTURES:        RADIOLOGY & ADDITIONAL STUDIES:    CT chest     IMPRESSION:     4.4 x 2.5 cm right upper lobe irregular spiculated mass with central cavitation. Finding may represent a neoplasm surrounding a bulla or cavitary infection such as tuberculosis. Recommend pulmonary consultation.    Emphysema.    Stable mild nodular thickening of the left adrenal gland.

## 2020-01-08 NOTE — CONSULT NOTE ADULT - ASSESSMENT
49M with a PMH of Alfaro's Disease, Crohn's on Pentasya, IBS presents to ED for L sided chest pain since 12/29/19.Seen with :  1.Right lung mass; Pt is on Pentasya (Immunomodulator ) and live sin homeless shelter so definitely will r/o TB ? fungal infection   as smoker if that is neg will need lung mass biopsy to r/o malignancy  AFBX3 with saline induction by RT ,pt not able to bring up phlegm  Quantiferon gold   airborne isolation     2.Abdo pain : h/o crohn's dis   says its chronic

## 2020-01-08 NOTE — PROGRESS NOTE ADULT - ASSESSMENT
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     #Cavitary lung lesion - ddx includes infectious (fungal vs TB) and neoplastic processes inflammatory less likely  --c/w airborne isolation. Will need sputum induction. f/u quant gold  -- patient traveled to [place] in august - would check for Histoplasma   -- given hx of vomiting/reflux/poor dental condition and immunosuppression- lung abscess is also possible, however I would have expected patient to exhibit more s/s of systemic infection. No need for systemic Abx at this time. Would check sputum cultures  -- neoplastic process such as squamous cell ca (which commonly cavitate is also a concerning possibility)  -- once TB is ruled out, would recommend IR guided bx - would send for cultures (bacterial, AFB and fungal), cytology, cell count with diff  -- also check for HIV, aspergillus galactomannan, histo    #Emphysema - on CT chest   -- will need formal PFTs  -- would start Spiriva and Albuterol prn for now    #Smoker - discussed smoking cessation.   -- c/w NRT    Thank you for this consult. I will follow with you. Please call my cell with any questions.     Ileana Disla MD  Cell: [phone]    [facility]//Pulmonary Medicine  [facility]; [place] [address]  tel: [phone]; fax: [phone]

## 2020-01-08 NOTE — PROGRESS NOTE ADULT - SUBJECTIVE AND OBJECTIVE BOX
Interval Events:  Patient seen and examined at bedside. Unable to bring up sputum.      REVIEW OF SYSTEMS:  Constitutional: no fever  CV:  no chest pain  Resp:  no cough  GI: crohn's      [x ] All other systems negative  [ ] Unable to assess ROS because ________    OBJECTIVE:  T(C): 36.3 (01-08-20 @ 08:09), Max: 37.1 (01-08-20 @ 03:22)  HR: 74 (01-08-20 @ 08:09) (61 - 74)  BP: 94/63 (01-08-20 @ 08:09) (94/63 - 111/82)  RR: 25 (01-08-20 @ 03:22) (25 - 26)  SpO2: 94% (01-08-20 @ 08:09) (94% - 97%)        PHYSICAL EXAM:  General: Well appearing Male. No apparent distress  HEENT:   Mucous membranes are moist.  Neck: Supple. No JVD  Respiratory:   Cardiovascular: RRR, no m/r/g  Abdomen: Soft, non-tender, non-distended.   Extremities: No lower extremity edema.  Skin: Warm, dry, no rash.   Neurological: CNII-XII grossly intact.   Psychiatry: appropriate mood and affect.     HOSPITAL MEDICATIONS:  MEDICATIONS  (STANDING):  busPIRone 10 milliGRAM(s) Oral two times a day  dicyclomine 10 milliGRAM(s) Oral two times a day before meals  enoxaparin Injectable 40 milliGRAM(s) SubCutaneous daily  lactulose Syrup 30 Gram(s) Oral daily  mesalamine ER Capsule 1000 milliGRAM(s) Oral four times a day with meals  nicotine - 21 mG/24Hr(s) Patch 1 patch Transdermal daily  ondansetron    Tablet 4 milliGRAM(s) Oral two times a day  ondansetron Injectable 4 milliGRAM(s) IV Push once  oxybutynin 5 milliGRAM(s) Oral two times a day  pantoprazole    Tablet 40 milliGRAM(s) Oral two times a day  tamsulosin 0.8 milliGRAM(s) Oral at bedtime    MEDICATIONS  (PRN):  acetaminophen   Tablet .. 650 milliGRAM(s) Oral every 6 hours PRN Temp greater or equal to 38C (100.4F), Mild Pain (1 - 3)  ALPRAZolam 2 milliGRAM(s) Oral daily PRN anxiety      LABS:                        15.1   10.42 )-----------( 265      ( 07 Jan 2020 13:24 )             43.8     01-07    141  |  108  |  9   ----------------------------<  82  4.1   |  25  |  0.86    Ca    8.9      07 Jan 2020 13:24    TPro  6.9  /  Alb  3.7  /  TBili  0.5  /  DBili  x   /  AST  27  /  ALT  31  /  AlkPhos  84  01-07    PT/INR - ( 07 Jan 2020 13:24 )   PT: 10.8 sec;   INR: 0.97 ratio         PTT - ( 07 Jan 2020 13:24 )  PTT:30.8 sec        RADIOLOGY:  [ x ] Reviewed and interpreted by me  < from: CT Chest No Cont (01.07.20 @ 15:04) >  4.4 x 2.5 cm right upper lobe irregular spiculated mass with central cavitation. Finding may represent a neoplasm surrounding a bulla or cavitary infection such as tuberculosis. Recommend pulmonary consultation.    Emphysema.    Stable mild nodular thickening of the left adrenal gland.    < end of copied text >

## 2020-01-09 LAB
ANION GAP SERPL CALC-SCNC: 6 MMOL/L — SIGNIFICANT CHANGE UP (ref 5–17)
BUN SERPL-MCNC: 16 MG/DL — SIGNIFICANT CHANGE UP (ref 7–23)
CALCIUM SERPL-MCNC: 8.7 MG/DL — SIGNIFICANT CHANGE UP (ref 8.5–10.1)
CHLORIDE SERPL-SCNC: 106 MMOL/L — SIGNIFICANT CHANGE UP (ref 96–108)
CO2 SERPL-SCNC: 28 MMOL/L — SIGNIFICANT CHANGE UP (ref 22–31)
CREAT SERPL-MCNC: 0.97 MG/DL — SIGNIFICANT CHANGE UP (ref 0.5–1.3)
GLUCOSE SERPL-MCNC: 83 MG/DL — SIGNIFICANT CHANGE UP (ref 70–99)
HCT VFR BLD CALC: 42.5 % — SIGNIFICANT CHANGE UP (ref 39–50)
HGB BLD-MCNC: 14.2 G/DL — SIGNIFICANT CHANGE UP (ref 13–17)
HIV 1+2 AB+HIV1 P24 AG SERPL QL IA: SIGNIFICANT CHANGE UP
MAGNESIUM SERPL-MCNC: 2 MG/DL — SIGNIFICANT CHANGE UP (ref 1.6–2.6)
MCHC RBC-ENTMCNC: 32.6 PG — SIGNIFICANT CHANGE UP (ref 27–34)
MCHC RBC-ENTMCNC: 33.4 GM/DL — SIGNIFICANT CHANGE UP (ref 32–36)
MCV RBC AUTO: 97.7 FL — SIGNIFICANT CHANGE UP (ref 80–100)
NRBC # BLD: 0 /100 WBCS — SIGNIFICANT CHANGE UP (ref 0–0)
PHOSPHATE SERPL-MCNC: 4 MG/DL — SIGNIFICANT CHANGE UP (ref 2.5–4.5)
PLATELET # BLD AUTO: 236 K/UL — SIGNIFICANT CHANGE UP (ref 150–400)
POTASSIUM SERPL-MCNC: 3.8 MMOL/L — SIGNIFICANT CHANGE UP (ref 3.5–5.3)
POTASSIUM SERPL-SCNC: 3.8 MMOL/L — SIGNIFICANT CHANGE UP (ref 3.5–5.3)
RBC # BLD: 4.35 M/UL — SIGNIFICANT CHANGE UP (ref 4.2–5.8)
RBC # FLD: 12.6 % — SIGNIFICANT CHANGE UP (ref 10.3–14.5)
SODIUM SERPL-SCNC: 140 MMOL/L — SIGNIFICANT CHANGE UP (ref 135–145)
WBC # BLD: 6.36 K/UL — SIGNIFICANT CHANGE UP (ref 3.8–10.5)
WBC # FLD AUTO: 6.36 K/UL — SIGNIFICANT CHANGE UP (ref 3.8–10.5)

## 2020-01-09 PROCEDURE — 99232 SBSQ HOSP IP/OBS MODERATE 35: CPT

## 2020-01-09 PROCEDURE — 99233 SBSQ HOSP IP/OBS HIGH 50: CPT

## 2020-01-09 RX ORDER — MORPHINE SULFATE 50 MG/1
2 CAPSULE, EXTENDED RELEASE ORAL EVERY 6 HOURS
Refills: 0 | Status: DISCONTINUED | OUTPATIENT
Start: 2020-01-09 | End: 2020-01-16

## 2020-01-09 RX ORDER — ONDANSETRON 8 MG/1
4 TABLET, FILM COATED ORAL EVERY 6 HOURS
Refills: 0 | Status: DISCONTINUED | OUTPATIENT
Start: 2020-01-09 | End: 2020-01-22

## 2020-01-09 RX ORDER — ALPRAZOLAM 0.25 MG
1 TABLET ORAL EVERY 8 HOURS
Refills: 0 | Status: DISCONTINUED | OUTPATIENT
Start: 2020-01-09 | End: 2020-01-16

## 2020-01-09 RX ADMIN — Medication 1 PATCH: at 01:00

## 2020-01-09 RX ADMIN — Medication 2 MILLIGRAM(S): at 00:02

## 2020-01-09 RX ADMIN — MORPHINE SULFATE 2 MILLIGRAM(S): 50 CAPSULE, EXTENDED RELEASE ORAL at 22:35

## 2020-01-09 RX ADMIN — TAMSULOSIN HYDROCHLORIDE 0.8 MILLIGRAM(S): 0.4 CAPSULE ORAL at 22:15

## 2020-01-09 RX ADMIN — Medication 1 PATCH: at 19:50

## 2020-01-09 RX ADMIN — Medication 1000 MILLIGRAM(S): at 09:24

## 2020-01-09 RX ADMIN — Medication 1 PATCH: at 12:40

## 2020-01-09 RX ADMIN — Medication 1000 MILLIGRAM(S): at 12:41

## 2020-01-09 RX ADMIN — LACTULOSE 30 GRAM(S): 10 SOLUTION ORAL at 13:46

## 2020-01-09 RX ADMIN — MORPHINE SULFATE 2 MILLIGRAM(S): 50 CAPSULE, EXTENDED RELEASE ORAL at 22:15

## 2020-01-09 RX ADMIN — PANTOPRAZOLE SODIUM 40 MILLIGRAM(S): 20 TABLET, DELAYED RELEASE ORAL at 06:24

## 2020-01-09 RX ADMIN — Medication 5 MILLIGRAM(S): at 06:27

## 2020-01-09 RX ADMIN — TIOTROPIUM BROMIDE 1 CAPSULE(S): 18 CAPSULE ORAL; RESPIRATORY (INHALATION) at 12:41

## 2020-01-09 RX ADMIN — Medication 1000 MILLIGRAM(S): at 22:15

## 2020-01-09 RX ADMIN — ENOXAPARIN SODIUM 40 MILLIGRAM(S): 100 INJECTION SUBCUTANEOUS at 12:41

## 2020-01-09 RX ADMIN — ONDANSETRON 4 MILLIGRAM(S): 8 TABLET, FILM COATED ORAL at 06:24

## 2020-01-09 RX ADMIN — Medication 10 MILLIGRAM(S): at 06:24

## 2020-01-09 RX ADMIN — Medication 3 MILLILITER(S): at 01:25

## 2020-01-09 RX ADMIN — Medication 10 MILLIGRAM(S): at 09:24

## 2020-01-09 NOTE — PROGRESS NOTE ADULT - SUBJECTIVE AND OBJECTIVE BOX
Interval Events:  Patient seen and examined at bedside. Still inable to bring up sputum.      REVIEW OF SYSTEMS:  Constitutional: no fever  CV:  no chest pain  Resp:  no cough  GI: crohn's      [x ] All other systems negative  [ ] Unable to assess ROS because ________    OBJECTIVE:    ICU Vital Signs Last 24 Hrs  T(C): 35.7 (09 Jan 2020 09:58), Max: 36.9 (08 Jan 2020 21:08)  T(F): 96.2 (09 Jan 2020 09:58), Max: 98.4 (08 Jan 2020 21:08)  HR: 72 (09 Jan 2020 09:58) (56 - 79)  BP: 104/61 (09 Jan 2020 09:58) (89/59 - 133/92)  BP(mean): --  ABP: --  ABP(mean): --  RR: 18 (09 Jan 2020 09:58) (16 - 18)  SpO2: 97% (09 Jan 2020 09:58) (94% - 98%)        PHYSICAL EXAM:  General: Well appearing Male. No apparent distress  HEENT:   Mucous membranes are moist.  Neck: Supple. No JVD  Respiratory: clear lungs  Cardiovascular: RRR, no m/r/g  Abdomen: Soft, non-tender, non-distended.   Extremities: No lower extremity edema.  Skin: Warm, dry, no rash.   Neurological: CNII-XII grossly intact.   Psychiatry: appropriate mood and affect.         HOSPITAL MEDICATIONS:  MEDICATIONS  (STANDING):  busPIRone 10 milliGRAM(s) Oral two times a day  dicyclomine 10 milliGRAM(s) Oral two times a day before meals  enoxaparin Injectable 40 milliGRAM(s) SubCutaneous daily  lactulose Syrup 30 Gram(s) Oral daily  mesalamine ER Capsule 1000 milliGRAM(s) Oral four times a day with meals  nicotine - 21 mG/24Hr(s) Patch 1 patch Transdermal daily  ondansetron    Tablet 4 milliGRAM(s) Oral two times a day  ondansetron Injectable 4 milliGRAM(s) IV Push once  oxybutynin 5 milliGRAM(s) Oral two times a day  pantoprazole    Tablet 40 milliGRAM(s) Oral two times a day  tamsulosin 0.8 milliGRAM(s) Oral at bedtime  tiotropium 18 MICROgram(s) Capsule 1 Capsule(s) Inhalation daily    MEDICATIONS  (PRN):  acetaminophen   Tablet .. 650 milliGRAM(s) Oral every 6 hours PRN Temp greater or equal to 38C (100.4F), Mild Pain (1 - 3)  albuterol/ipratropium for Nebulization. 3 milliLiter(s) Nebulizer every 6 hours PRN Shortness of Breath  ALPRAZolam 2 milliGRAM(s) Oral daily PRN anxiety      LABS:                        14.2   6.36  )-----------( 236      ( 09 Jan 2020 07:51 )             42.5     01-09    140  |  106  |  16  ----------------------------<  83  3.8   |  28  |  0.97    Ca    8.7      09 Jan 2020 07:51  Phos  4.0     01-09  Mg     2.0     01-09      RADIOLOGY:  [ x ] Reviewed and interpreted by me  < from: CT Chest No Cont (01.07.20 @ 15:04) >  4.4 x 2.5 cm right upper lobe irregular spiculated mass with central cavitation. Finding may represent a neoplasm surrounding a bulla or cavitary infection such as tuberculosis. Recommend pulmonary consultation.    Emphysema.    Stable mild nodular thickening of the left adrenal gland.    < end of copied text >

## 2020-01-09 NOTE — PROGRESS NOTE ADULT - ASSESSMENT
49M with a PMH of Alfaro's Disease, Crohn's on Pentasya, IBS presents to ED for L sided chest pain since 12/29/19.Seen with :  1.Right lung mass; Pt is on Pentasya (Immunomodulator ) and lives in homeless shelter so definitely will r/o TB ? fungal infection   as smoker if that is neg will need lung mass biopsy to r/o malignancy  pt unable to bring up phlegm for AFB even with sputum induction so will speak with IR about lung biopsy  pending fungal work up   Quantiferon gold pending  airborne isolation     2.Abdo pain : h/o crohn's dis   says its chronic

## 2020-01-09 NOTE — PROGRESS NOTE ADULT - SUBJECTIVE AND OBJECTIVE BOX
Patient is a 49y old  Male who presents with a chief complaint of cp (08 Jan 2020 17:00)      OVERNIGHT EVENTS: none    MEDICATIONS  (STANDING):  busPIRone 10 milliGRAM(s) Oral two times a day  dicyclomine 10 milliGRAM(s) Oral two times a day before meals  enoxaparin Injectable 40 milliGRAM(s) SubCutaneous daily  lactulose Syrup 30 Gram(s) Oral daily  mesalamine ER Capsule 1000 milliGRAM(s) Oral four times a day with meals  nicotine - 21 mG/24Hr(s) Patch 1 patch Transdermal daily  ondansetron    Tablet 4 milliGRAM(s) Oral two times a day  ondansetron Injectable 4 milliGRAM(s) IV Push once  oxybutynin 5 milliGRAM(s) Oral two times a day  pantoprazole    Tablet 40 milliGRAM(s) Oral two times a day  tamsulosin 0.8 milliGRAM(s) Oral at bedtime  tiotropium 18 MICROgram(s) Capsule 1 Capsule(s) Inhalation daily    MEDICATIONS  (PRN):  acetaminophen   Tablet .. 650 milliGRAM(s) Oral every 6 hours PRN Temp greater or equal to 38C (100.4F), Mild Pain (1 - 3)  albuterol/ipratropium for Nebulization. 3 milliLiter(s) Nebulizer every 6 hours PRN Shortness of Breath  ALPRAZolam 2 milliGRAM(s) Oral daily PRN anxiety    Allergies    adhesives (Rash)  No Known Drug Allergies    Intolerances        SUBJECTIVE: in bed in NAD, no acute events overnight     Vital Signs Last 24 Hrs  T(C): 36.7 (09 Jan 2020 17:37), Max: 36.9 (08 Jan 2020 21:08)  T(F): 98 (09 Jan 2020 17:37), Max: 98.4 (08 Jan 2020 21:08)  HR: 71 (09 Jan 2020 17:37) (56 - 79)  BP: 124/68 (09 Jan 2020 17:37) (89/59 - 133/92)  BP(mean): --  RR: 17 (09 Jan 2020 17:37) (16 - 18)  SpO2: 95% (09 Jan 2020 17:37) (94% - 98%)    PHYSICAL EXAM:  GENERAL: NAD, well-groomed, well-developed  HEAD:  Atraumatic, Normocephalic  EYES: EOMI, PERRLA, conjunctiva and sclera clear  ENMT: No tonsillar erythema, exudates, or enlargement; Moist mucous membranes, Good dentition, No lesions  NECK: Supple, No JVD, Normal thyroid  CHEST/LUNG: Clear to  auscultation bilaterally; No rales, rhonchi, wheezing, or rubs  bilaterally  HEART: Regular rate and rhythm; No murmurs, rubs, or gallops  ABDOMEN: Soft, Nontender, Nondistended; Bowel sounds present  EXTREMITIES:  2+ Peripheral Pulses, No clubbing, cyanosis, or edema BL LE  SKIN: No rashes or lesions  NERVOUS SYSTEM:  Alert & Oriented X3, Good concentration; Motor Strength 5/5 B/L upper and lower extremities;   DTRs 2+ intact and symmetric, sensation intact BL    LABS:                                          14.2   6.36  )-----------( 236      ( 09 Jan 2020 07:51 )             42.5   01-09    140  |  106  |  16  ----------------------------<  83  3.8   |  28  |  0.97    Ca    8.7      09 Jan 2020 07:51  Phos  4.0     01-09  Mg     2.0     01-09      Cultures;   CAPILLARY BLOOD GLUCOSE        Lipid panel:     CARDIAC MARKERS ( 07 Jan 2020 13:24 )  <.015 ng/mL / x     / x     / x     / x            RADIOLOGY & ADDITIONAL TESTS:      Imaging Personally Reviewed:  [ x] YES      Consultant(s) Notes Reviewed:  [x ] YES     Care Discussed with [x ] Consultants [X ] Patient [x ] Family  [x ]    [x ]  Other; RN

## 2020-01-09 NOTE — PROGRESS NOTE ADULT - SUBJECTIVE AND OBJECTIVE BOX
Patient is a 49y old  Male who presents with a chief complaint of cp (09 Jan 2020 17:42)      INTERVAL HPI / OVERNIGHT EVENTS: doing ok ,unable to bring up sputum    MEDICATIONS  (STANDING):  ALPRAZolam 1 milliGRAM(s) Oral every 8 hours  busPIRone 10 milliGRAM(s) Oral two times a day  dicyclomine 10 milliGRAM(s) Oral two times a day before meals  enoxaparin Injectable 40 milliGRAM(s) SubCutaneous daily  lactulose Syrup 30 Gram(s) Oral daily  mesalamine ER Capsule 1000 milliGRAM(s) Oral four times a day with meals  nicotine - 21 mG/24Hr(s) Patch 1 patch Transdermal daily  ondansetron    Tablet 4 milliGRAM(s) Oral two times a day  ondansetron Injectable 4 milliGRAM(s) IV Push once  oxybutynin 5 milliGRAM(s) Oral two times a day  pantoprazole    Tablet 40 milliGRAM(s) Oral two times a day  tamsulosin 0.8 milliGRAM(s) Oral at bedtime  tiotropium 18 MICROgram(s) Capsule 1 Capsule(s) Inhalation daily    MEDICATIONS  (PRN):  acetaminophen   Tablet .. 650 milliGRAM(s) Oral every 6 hours PRN Temp greater or equal to 38C (100.4F), Mild Pain (1 - 3)  albuterol/ipratropium for Nebulization. 3 milliLiter(s) Nebulizer every 6 hours PRN Shortness of Breath  ALPRAZolam 2 milliGRAM(s) Oral daily PRN anxiety  morphine  - Injectable 2 milliGRAM(s) IV Push every 6 hours PRN Severe Pain (7 - 10)  ondansetron Injectable 4 milliGRAM(s) IV Push every 6 hours PRN Nausea and/or Vomiting      Vital Signs Last 24 Hrs  T max :afebrile    Review of systems:  General : no fever /chills,fatigue  CVS : no chest pain, palpitations  Lungs : no shortness of breath,+ dry cough  GI : no abdominal pain,vomiting, diarrhea   : no dysuria,hematuria        PHYSICAL EXAM:  General :NAD  Constitutional:  well-groomed, well-developed  Respiratory: CTAB/L  Cardiovascular: S1 and S2, RRR, no M/G/R  Gastrointestinal: BS+, soft, NT/ND  Extremities: No peripheral edema  Vascular: 2+ peripheral pulses  Skin: No rashes      LABS:                        13.8   6.63  )-----------( 231      ( 10 Omid 2020 08:26 )             41.4     01-10    137  |  106  |  11  ----------------------------<  152<H>  3.5   |  26  |  1.02    Ca    8.2<L>      10 Omid 2020 08:26  Phos  3.2     01-10  Mg     2.0     01-10            MICROBIOLOGY:  RECENT CULTURES:        RADIOLOGY & ADDITIONAL STUDIES:

## 2020-01-09 NOTE — PROGRESS NOTE ADULT - ASSESSMENT
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     #Cavitary lung lesion - ddx includes infectious (fungal vs TB vs less likely lung abscess) and neoplastic processes inflammatory less likely  --c/w airborne isolation. Continue attempting sputum induction f/u quant gold  -- patient traveled to [place] in august - would check for Histoplasma   -- given hx of vomiting/reflux/poor dental condition and immunosuppression- lung abscess is also possible, however I would have expected patient to exhibit more s/s of systemic infection. No need for systemic Abx at this time. Would check sputum cultures  -- neoplastic process such as squamous cell ca (which commonly cavitate is also a concerning possibility)  -- since pt unable to produce sputum, will need IR guided bx - would send for cultures (bacterial, AFB and fungal), cytology, cell count with diff  -- f/u  aspergillus galactomannan, histo, qunt gold, hosto ag    #Emphysema - on CT chest   -- will need formal PFTs  -- c/w  Spiriva and Albuterol prn for now    #Smoker - discussed smoking cessation.   -- c/w NRT    Thank you for this consult. I will follow with you. Please call my cell with any questions.     Ileana Disla MD  Cell: [phone]    [facility]//Pulmonary Medicine  [facility]; [place] [address]  tel: [phone]; fax: [phone]

## 2020-01-09 NOTE — PROGRESS NOTE ADULT - PROBLEM SELECTOR PLAN 1
R/o TB, Pulm on board - sending sputums for AFB,   appreciate ID  consult   Isolation precautions, QuantiFeron drawn, results pending  resp tried to indue sputum unsuccessful and I contacted IR today for definitive biopsy

## 2020-01-10 DIAGNOSIS — K59.09 OTHER CONSTIPATION: ICD-10-CM

## 2020-01-10 DIAGNOSIS — R10.9 UNSPECIFIED ABDOMINAL PAIN: ICD-10-CM

## 2020-01-10 DIAGNOSIS — I95.9 HYPOTENSION, UNSPECIFIED: ICD-10-CM

## 2020-01-10 LAB
ANION GAP SERPL CALC-SCNC: 5 MMOL/L — SIGNIFICANT CHANGE UP (ref 5–17)
APTT BLD: 33.6 SEC — SIGNIFICANT CHANGE UP (ref 28.5–37)
BUN SERPL-MCNC: 11 MG/DL — SIGNIFICANT CHANGE UP (ref 7–23)
CALCIUM SERPL-MCNC: 8.2 MG/DL — LOW (ref 8.5–10.1)
CHLORIDE SERPL-SCNC: 106 MMOL/L — SIGNIFICANT CHANGE UP (ref 96–108)
CO2 SERPL-SCNC: 26 MMOL/L — SIGNIFICANT CHANGE UP (ref 22–31)
CREAT SERPL-MCNC: 1.02 MG/DL — SIGNIFICANT CHANGE UP (ref 0.5–1.3)
ERYTHROCYTE [SEDIMENTATION RATE] IN BLOOD: 4 MM/HR — SIGNIFICANT CHANGE UP (ref 0–20)
GALACTOMANNAN AG SERPL-ACNC: <0.5 INDEX — SIGNIFICANT CHANGE UP
GAMMA INTERFERON BACKGROUND BLD IA-ACNC: 0.02 IU/ML — SIGNIFICANT CHANGE UP
GLUCOSE SERPL-MCNC: 152 MG/DL — HIGH (ref 70–99)
HCT VFR BLD CALC: 41.4 % — SIGNIFICANT CHANGE UP (ref 39–50)
HGB BLD-MCNC: 13.8 G/DL — SIGNIFICANT CHANGE UP (ref 13–17)
INR BLD: 1.03 RATIO — SIGNIFICANT CHANGE UP (ref 0.88–1.16)
M TB IFN-G BLD-IMP: NEGATIVE — SIGNIFICANT CHANGE UP
M TB IFN-G CD4+ BCKGRND COR BLD-ACNC: -0.01 IU/ML — SIGNIFICANT CHANGE UP
M TB IFN-G CD4+CD8+ BCKGRND COR BLD-ACNC: -0.01 IU/ML — SIGNIFICANT CHANGE UP
MAGNESIUM SERPL-MCNC: 2 MG/DL — SIGNIFICANT CHANGE UP (ref 1.6–2.6)
MCHC RBC-ENTMCNC: 32.8 PG — SIGNIFICANT CHANGE UP (ref 27–34)
MCHC RBC-ENTMCNC: 33.3 GM/DL — SIGNIFICANT CHANGE UP (ref 32–36)
MCV RBC AUTO: 98.3 FL — SIGNIFICANT CHANGE UP (ref 80–100)
NRBC # BLD: 0 /100 WBCS — SIGNIFICANT CHANGE UP (ref 0–0)
PHOSPHATE SERPL-MCNC: 3.2 MG/DL — SIGNIFICANT CHANGE UP (ref 2.5–4.5)
PLATELET # BLD AUTO: 231 K/UL — SIGNIFICANT CHANGE UP (ref 150–400)
POTASSIUM SERPL-MCNC: 3.5 MMOL/L — SIGNIFICANT CHANGE UP (ref 3.5–5.3)
POTASSIUM SERPL-SCNC: 3.5 MMOL/L — SIGNIFICANT CHANGE UP (ref 3.5–5.3)
PROCALCITONIN SERPL-MCNC: 0.02 NG/ML — SIGNIFICANT CHANGE UP (ref 0.02–0.1)
PROTHROM AB SERPL-ACNC: 11.5 SEC — SIGNIFICANT CHANGE UP (ref 10–12.9)
QUANT TB PLUS MITOGEN MINUS NIL: >10 IU/ML — SIGNIFICANT CHANGE UP
RBC # BLD: 4.21 M/UL — SIGNIFICANT CHANGE UP (ref 4.2–5.8)
RBC # FLD: 12.6 % — SIGNIFICANT CHANGE UP (ref 10.3–14.5)
SODIUM SERPL-SCNC: 137 MMOL/L — SIGNIFICANT CHANGE UP (ref 135–145)
WBC # BLD: 6.63 K/UL — SIGNIFICANT CHANGE UP (ref 3.8–10.5)
WBC # FLD AUTO: 6.63 K/UL — SIGNIFICANT CHANGE UP (ref 3.8–10.5)

## 2020-01-10 PROCEDURE — 99233 SBSQ HOSP IP/OBS HIGH 50: CPT

## 2020-01-10 PROCEDURE — 99232 SBSQ HOSP IP/OBS MODERATE 35: CPT

## 2020-01-10 PROCEDURE — 99221 1ST HOSP IP/OBS SF/LOW 40: CPT

## 2020-01-10 RX ORDER — POLYETHYLENE GLYCOL 3350 17 G/17G
17 POWDER, FOR SOLUTION ORAL DAILY
Refills: 0 | Status: DISCONTINUED | OUTPATIENT
Start: 2020-01-10 | End: 2020-01-15

## 2020-01-10 RX ORDER — MULTIVIT WITH MIN/MFOLATE/K2 340-15/3 G
1 POWDER (GRAM) ORAL ONCE
Refills: 0 | Status: COMPLETED | OUTPATIENT
Start: 2020-01-10 | End: 2020-01-10

## 2020-01-10 RX ORDER — SODIUM CHLORIDE 9 MG/ML
500 INJECTION INTRAMUSCULAR; INTRAVENOUS; SUBCUTANEOUS ONCE
Refills: 0 | Status: COMPLETED | OUTPATIENT
Start: 2020-01-10 | End: 2020-01-10

## 2020-01-10 RX ADMIN — Medication 1 MILLIGRAM(S): at 06:28

## 2020-01-10 RX ADMIN — Medication 5 MILLIGRAM(S): at 06:27

## 2020-01-10 RX ADMIN — Medication 1 MILLIGRAM(S): at 14:32

## 2020-01-10 RX ADMIN — POLYETHYLENE GLYCOL 3350 17 GRAM(S): 17 POWDER, FOR SOLUTION ORAL at 23:13

## 2020-01-10 RX ADMIN — Medication 1 PATCH: at 12:05

## 2020-01-10 RX ADMIN — Medication 1000 MILLIGRAM(S): at 12:38

## 2020-01-10 RX ADMIN — Medication 5 MILLIGRAM(S): at 17:52

## 2020-01-10 RX ADMIN — Medication 1000 MILLIGRAM(S): at 17:51

## 2020-01-10 RX ADMIN — ONDANSETRON 4 MILLIGRAM(S): 8 TABLET, FILM COATED ORAL at 17:51

## 2020-01-10 RX ADMIN — Medication 10 MILLIGRAM(S): at 06:27

## 2020-01-10 RX ADMIN — TAMSULOSIN HYDROCHLORIDE 0.8 MILLIGRAM(S): 0.4 CAPSULE ORAL at 23:06

## 2020-01-10 RX ADMIN — SODIUM CHLORIDE 1000 MILLILITER(S): 9 INJECTION INTRAMUSCULAR; INTRAVENOUS; SUBCUTANEOUS at 18:53

## 2020-01-10 RX ADMIN — Medication 10 MILLIGRAM(S): at 17:52

## 2020-01-10 RX ADMIN — Medication 1000 MILLIGRAM(S): at 09:49

## 2020-01-10 RX ADMIN — Medication 1 PATCH: at 19:31

## 2020-01-10 RX ADMIN — PANTOPRAZOLE SODIUM 40 MILLIGRAM(S): 20 TABLET, DELAYED RELEASE ORAL at 06:27

## 2020-01-10 RX ADMIN — Medication 1 PATCH: at 12:39

## 2020-01-10 RX ADMIN — Medication 1000 MILLIGRAM(S): at 23:06

## 2020-01-10 RX ADMIN — Medication 1 PATCH: at 09:45

## 2020-01-10 RX ADMIN — ONDANSETRON 4 MILLIGRAM(S): 8 TABLET, FILM COATED ORAL at 09:48

## 2020-01-10 RX ADMIN — TIOTROPIUM BROMIDE 1 CAPSULE(S): 18 CAPSULE ORAL; RESPIRATORY (INHALATION) at 12:48

## 2020-01-10 RX ADMIN — SODIUM CHLORIDE 500 MILLILITER(S): 9 INJECTION INTRAMUSCULAR; INTRAVENOUS; SUBCUTANEOUS at 12:34

## 2020-01-10 RX ADMIN — PANTOPRAZOLE SODIUM 40 MILLIGRAM(S): 20 TABLET, DELAYED RELEASE ORAL at 17:52

## 2020-01-10 RX ADMIN — Medication 1 BOTTLE: at 16:24

## 2020-01-10 RX ADMIN — Medication 1 MILLIGRAM(S): at 00:13

## 2020-01-10 RX ADMIN — Medication 10 MILLIGRAM(S): at 16:24

## 2020-01-10 RX ADMIN — ONDANSETRON 4 MILLIGRAM(S): 8 TABLET, FILM COATED ORAL at 21:10

## 2020-01-10 RX ADMIN — Medication 1 MILLIGRAM(S): at 23:06

## 2020-01-10 RX ADMIN — LACTULOSE 30 GRAM(S): 10 SOLUTION ORAL at 12:37

## 2020-01-10 RX ADMIN — ENOXAPARIN SODIUM 40 MILLIGRAM(S): 100 INJECTION SUBCUTANEOUS at 12:41

## 2020-01-10 NOTE — CONSULT NOTE ADULT - ASSESSMENT
49M with a PMH of Barretts Disease, ?Crohn's on Pentasa, IBS presents to ED for L sided chest pain.     CT on presentation revealed 4.4 x 2.5 cm upper lobe irregular spiculated mass with central cavitation; malignancy, TB?    He reports chronic intermittent abdominal discomfort. Outpatient work-up has been negative for Crohns however he reports previous pathology about small bowel resection noted Crohns (was not able to obtain pathology). Work-up including EGD/colonoscopy otherwise negative for IBD.     Would recommend treatment of constipation as most likely etiology of abdominal discomfort is secondary to IBS and constipation.

## 2020-01-10 NOTE — PROGRESS NOTE ADULT - ASSESSMENT
50 yo smoker (>30 pack year hx) with Crohn's disease (on Pentasa), Alfaro's esophagus male who currently lives in homeless shelter admitted with RUL cavitary lesion.     #Cavitary lung lesion - ddx includes infectious (fungal vs TB vs less likely lung abscess) and neoplastic processes inflammatory less likely  --c/w airborne isolation. Continue attempting sputum induction f/u quant gold  -- patient traveled to [place] in august - would check for Histoplasma   -- given hx of vomiting/reflux/poor dental condition and immunosuppression- lung abscess is also possible, however I would have expected patient to exhibit more s/s of systemic infection. No need for systemic Abx at this time. Would check sputum cultures  -- neoplastic process such as squamous cell ca (which commonly cavitate is also a concerning possibility)  -- since pt unable to produce sputum, will need IR guided bx - would send for cultures (bacterial, AFB and fungal), cytology, cell count with diff  -- f/u  aspergillus galactomannan, histo, hosto ag    #Emphysema - on CT chest   -- will need formal PFTs  -- c/w  Spiriva and Albuterol prn for now    #Smoker - discussed smoking cessation.   -- c/w NRT    Thank you for this consult. I will follow with you. I am away till 1/21. Dr Dequan galvez.     Ileana Disla MD  Cell: [phone]    [facility]//Pulmonary Medicine  [facility]; [place] [address]  tel: [phone]; fax: [phone]

## 2020-01-10 NOTE — PROGRESS NOTE ADULT - SUBJECTIVE AND OBJECTIVE BOX
Patient is a 49y old  Male who presents with a chief complaint of cp (08 Jan 2020 17:00)      OVERNIGHT EVENTS: none    MEDICATIONS  (STANDING):  ALPRAZolam 1 milliGRAM(s) Oral every 8 hours  busPIRone 10 milliGRAM(s) Oral two times a day  dicyclomine 10 milliGRAM(s) Oral two times a day before meals  enoxaparin Injectable 40 milliGRAM(s) SubCutaneous daily  lactulose Syrup 30 Gram(s) Oral daily  mesalamine ER Capsule 1000 milliGRAM(s) Oral four times a day with meals  nicotine - 21 mG/24Hr(s) Patch 1 patch Transdermal daily  ondansetron    Tablet 4 milliGRAM(s) Oral two times a day  ondansetron Injectable 4 milliGRAM(s) IV Push once  oxybutynin 5 milliGRAM(s) Oral two times a day  pantoprazole    Tablet 40 milliGRAM(s) Oral two times a day  tamsulosin 0.8 milliGRAM(s) Oral at bedtime  tiotropium 18 MICROgram(s) Capsule 1 Capsule(s) Inhalation daily    MEDICATIONS  (PRN):  acetaminophen   Tablet .. 650 milliGRAM(s) Oral every 6 hours PRN Temp greater or equal to 38C (100.4F), Mild Pain (1 - 3)  albuterol/ipratropium for Nebulization. 3 milliLiter(s) Nebulizer every 6 hours PRN Shortness of Breath  ALPRAZolam 2 milliGRAM(s) Oral daily PRN anxiety  morphine  - Injectable 2 milliGRAM(s) IV Push every 6 hours PRN Severe Pain (7 - 10)  ondansetron Injectable 4 milliGRAM(s) IV Push every 6 hours PRN Nausea and/or Vomiting    Allergies    adhesives (Rash)  No Known Drug Allergies    Intolerances        SUBJECTIVE: in bed in NAD, no acute events overnight     Vital Signs Last 24 Hrs  T(C): 36.2 (10 Omid 2020 10:50), Max: 36.7 (09 Jan 2020 17:37)  T(F): 97.2 (10 Omid 2020 10:50), Max: 98 (09 Jan 2020 17:37)  HR: 56 (10 Omid 2020 10:50) (56 - 71)  BP: 88/57 (10 Omid 2020 10:50) (87/46 - 124/68)  BP(mean): 63 (10 Omid 2020 10:50) (63 - 63)  RR: 18 (10 Omid 2020 10:50) (17 - 18)  SpO2: 97% (10 Omid 2020 10:50) (95% - 100%)      PHYSICAL EXAM:  GENERAL: NAD, well-groomed, well-developed  HEAD:  Atraumatic, Normocephalic  EYES: EOMI, PERRLA, conjunctiva and sclera clear  ENMT: No tonsillar erythema, exudates, or enlargement; Moist mucous membranes, Good dentition, No lesions  NECK: Supple, No JVD, Normal thyroid  CHEST/LUNG: Clear to  auscultation bilaterally; No rales, rhonchi, wheezing, or rubs  bilaterally  HEART: Regular rate and rhythm; No murmurs, rubs, or gallops  ABDOMEN: Soft, Nontender, Nondistended; Bowel sounds present  EXTREMITIES:  2+ Peripheral Pulses, No clubbing, cyanosis, or edema BL LE  SKIN: No rashes or lesions  NERVOUS SYSTEM:  Alert & Oriented X3, Good concentration; Motor Strength 5/5 B/L upper and lower extremities;   DTRs 2+ intact and symmetric, sensation intact BL    LABS:                                                         13.8   6.63  )-----------( 231      ( 10 Omid 2020 08:26 )             41.4   01-10    137  |  106  |  11  ----------------------------<  152<H>  3.5   |  26  |  1.02    Ca    8.2<L>      10 Omid 2020 08:26  Phos  3.2     01-10  Mg     2.0     01-10      Cultures;   CAPILLARY BLOOD GLUCOSE        Lipid panel:     CARDIAC MARKERS ( 07 Jan 2020 13:24 )  <.015 ng/mL / x     / x     / x     / x            RADIOLOGY & ADDITIONAL TESTS:      Imaging Personally Reviewed:  [ x] YES      Consultant(s) Notes Reviewed:  [x ] YES     Care Discussed with [x ] Consultants [X ] Patient [x ] Family  [x ]    [x ]  Other; RN Patient is a 49y old  Male who presents with a chief complaint of cp (08 Jan 2020 17:00)      OVERNIGHT EVENTS: none    MEDICATIONS  (STANDING):  ALPRAZolam 1 milliGRAM(s) Oral every 8 hours  busPIRone 10 milliGRAM(s) Oral two times a day  dicyclomine 10 milliGRAM(s) Oral two times a day before meals  enoxaparin Injectable 40 milliGRAM(s) SubCutaneous daily  lactulose Syrup 30 Gram(s) Oral daily  mesalamine ER Capsule 1000 milliGRAM(s) Oral four times a day with meals  nicotine - 21 mG/24Hr(s) Patch 1 patch Transdermal daily  ondansetron    Tablet 4 milliGRAM(s) Oral two times a day  ondansetron Injectable 4 milliGRAM(s) IV Push once  oxybutynin 5 milliGRAM(s) Oral two times a day  pantoprazole    Tablet 40 milliGRAM(s) Oral two times a day  tamsulosin 0.8 milliGRAM(s) Oral at bedtime  tiotropium 18 MICROgram(s) Capsule 1 Capsule(s) Inhalation daily    MEDICATIONS  (PRN):  acetaminophen   Tablet .. 650 milliGRAM(s) Oral every 6 hours PRN Temp greater or equal to 38C (100.4F), Mild Pain (1 - 3)  albuterol/ipratropium for Nebulization. 3 milliLiter(s) Nebulizer every 6 hours PRN Shortness of Breath  ALPRAZolam 2 milliGRAM(s) Oral daily PRN anxiety  morphine  - Injectable 2 milliGRAM(s) IV Push every 6 hours PRN Severe Pain (7 - 10)  ondansetron Injectable 4 milliGRAM(s) IV Push every 6 hours PRN Nausea and/or Vomiting    Allergies    adhesives (Rash)  No Known Drug Allergies    Intolerances        SUBJECTIVE: in bed in NAD, no acute events overnight     Vital Signs Last 24 Hrs  T(C): 36.2 (10 Omid 2020 10:50), Max: 36.7 (09 Jan 2020 17:37)  T(F): 97.2 (10 Omid 2020 10:50), Max: 98 (09 Jan 2020 17:37)  HR: 56 (10 Omid 2020 10:50) (56 - 71)  BP: 88/57 (10 Omid 2020 10:50) (87/46 - 124/68)  BP(mean): 63 (10 Omid 2020 10:50) (63 - 63)  RR: 18 (10 Omid 2020 10:50) (17 - 18)  SpO2: 97% (10 Omid 2020 10:50) (95% - 100%)      PHYSICAL EXAM:  GENERAL: NAD, well-groomed, well-developed  HEAD:  Atraumatic, Normocephalic  EYES: EOMI, PERRLA, conjunctiva and sclera clear  ENMT: No tonsillar erythema, exudates, or enlargement; Moist mucous membranes, Good dentition, No lesions  NECK: Supple, No JVD, Normal thyroid  CHEST/LUNG: Clear to  auscultation bilaterally; No rales, rhonchi, wheezing, or rubs  bilaterally  HEART: Regular rate and rhythm; No murmurs, rubs, or gallops  ABDOMEN: Soft,  left lower quadrant , Nondistended; Bowel sounds present  EXTREMITIES:  2+ Peripheral Pulses, No clubbing, cyanosis, or edema BL LE  SKIN: No rashes or lesions  NERVOUS SYSTEM:  Alert & Oriented X3, Good concentration; Motor Strength 5/5 B/L upper and lower extremities;   DTRs 2+ intact and symmetric, sensation intact BL    LABS:                                                         13.8   6.63  )-----------( 231      ( 10 Omid 2020 08:26 )             41.4   01-10    137  |  106  |  11  ----------------------------<  152<H>  3.5   |  26  |  1.02    Ca    8.2<L>      10 Omid 2020 08:26  Phos  3.2     01-10  Mg     2.0     01-10      Cultures;   CAPILLARY BLOOD GLUCOSE        Lipid panel:     CARDIAC MARKERS ( 07 Jan 2020 13:24 )  <.015 ng/mL / x     / x     / x     / x            RADIOLOGY & ADDITIONAL TESTS:      Imaging Personally Reviewed:  [ x] YES      Consultant(s) Notes Reviewed:  [x ] YES     Care Discussed with [x ] Consultants [X ] Patient [x ] Family  [x ]    [x ]  Other; RN

## 2020-01-10 NOTE — PROGRESS NOTE ADULT - PROBLEM SELECTOR PLAN 1
R/o TB, Pulm on board - sending sputums for AFB,   appreciate ID  consult   Isolation precautions, QuantiFeron drawn, results pending  resp tried to induce sputum unsuccessful    for right fish biopsy by IR

## 2020-01-10 NOTE — CONSULT NOTE ADULT - SUBJECTIVE AND OBJECTIVE BOX
Chief Complaint:  Patient is a 49y old  Male who presents with a chief complaint of cp (10 Omid 2020 12:14)      HPI:  49M with a PMH of Barretts Disease, ?Crohn's on Pentasa, IBS presents to ED for L sided chest pain.  Patient severely agitated and anxious when seen.  Reports that the pain is worse upon deep breaths, sometimes radiates to his back/ L shoulder.  Reports chronic cough but attributes that to his smoking history.  Also reports lost weight.  No hx of TB however has been living in homeless shelters for 8 months.  Severely anxious about continuing his medications.  Reportedly had a PPD done in August that was negative. (2020 23:54). CT on presentation showed spiculated mass (see below), currently being worked up. He reports sharp stabbing abdominal discomfort, a few days prior to presentation. He notes that the pain improved a few hours after starting. He reports that currently there is a dull discomfort, 2/10. Of note he reports that pain started with gurgling after he reported significant "anxiety". He notes that pain improves with bowel movements. Of note abdominal pain did not bring him to the hospital due to chronic intermittent nature of discomfort. He denies any weight loss, fevers, chills, dysphagia, odynophagia, GI bleeding.       PMH/PSH:PAST MEDICAL & SURGICAL HISTORY:  Alfaro's esophagus without dysplasia  Depression, unspecified depression type  Crohn's disease with complication, unspecified gastrointestinal tract location  ACL (anterior cruciate ligament) tear      Allergies:  adhesives (Rash)  No Known Drug Allergies      Medications:  acetaminophen   Tablet .. 650 milliGRAM(s) Oral every 6 hours PRN  albuterol/ipratropium for Nebulization. 3 milliLiter(s) Nebulizer every 6 hours PRN  ALPRAZolam 1 milliGRAM(s) Oral every 8 hours  ALPRAZolam 2 milliGRAM(s) Oral daily PRN  busPIRone 10 milliGRAM(s) Oral two times a day  dicyclomine 10 milliGRAM(s) Oral two times a day before meals  enoxaparin Injectable 40 milliGRAM(s) SubCutaneous daily  lactulose Syrup 30 Gram(s) Oral daily  mesalamine ER Capsule 1000 milliGRAM(s) Oral four times a day with meals  morphine  - Injectable 2 milliGRAM(s) IV Push every 6 hours PRN  nicotine - 21 mG/24Hr(s) Patch 1 patch Transdermal daily  ondansetron    Tablet 4 milliGRAM(s) Oral two times a day  ondansetron Injectable 4 milliGRAM(s) IV Push once  ondansetron Injectable 4 milliGRAM(s) IV Push every 6 hours PRN  oxybutynin 5 milliGRAM(s) Oral two times a day  pantoprazole    Tablet 40 milliGRAM(s) Oral two times a day  sodium chloride 0.9% Bolus 500 milliLiter(s) IV Bolus once  tamsulosin 0.8 milliGRAM(s) Oral at bedtime  tiotropium 18 MICROgram(s) Capsule 1 Capsule(s) Inhalation daily      Review of Systems:    General:  No weight loss, fevers, chills, night sweats, fatigue,   Eyes:  Good vision, no reported pain  ENT:  No sore throat, pain, runny nose, dysphagia  CV:  No pain, palpitations, hypo/hypertension  Resp:  No dyspnea, cough, tachypnea, wheezing  GI:  as per HPI   :  No pain, bleeding, incontinence, nocturia  Muscle:  No pain, weakness  Breast:  No pain, abscess, mass, discharge  Neuro:  No weakness, tingling, memory problems  Psych:  No fatigue, insomnia, mood problems, depression  Endocrine:  No polyuria, polydipsia, cold/heat intolerance  Heme:  No petechiae, ecchymosis, easy bruisability  Skin:  No rash, tattoos, scars, edema    Relevant Family History:   FAMILY HISTORY:  Family history of diabetes mellitus in maternal grandmother (Mother, Grandparent)  Family history of COPD (chronic obstructive pulmonary disease) (Father)  Family history of colon cancer in father (Father)  Family history of hypertension in father (Father, Mother, Grandparent)      Relevant Social History: Alcohol ( -) , Tobacco ( -) , Illicit drugs (- )     Physical Exam:    Vital Signs:  Vital Signs Last 24 Hrs  T(C): 36.2 (10 Omid 2020 10:50), Max: 36.7 (2020 17:37)  T(F): 97.2 (10 Omid 2020 10:50), Max: 98 (2020 17:37)  HR: 56 (10 Omid 2020 10:50) (56 - 71)  BP: 88/57 (10 Omid 2020 10:50) (87/46 - 124/68)  BP(mean): 63 (10 Omid 2020 10:50) (63 - 63)  RR: 18 (10 Omid 2020 10:50) (17 - 18)  SpO2: 97% (10 Omid 2020 10:50) (95% - 100%)  Daily     Daily Weight in k.8 (10 Omid 2020 04:40)    General:  NAD  HEENT:  NC/AT,  conjunctivae clear and pink, no thyromegaly, nodules, adenopathy, no JVD, anicteric sclera  Chest:  Full & symmetric excursion, no increased effort, breath sounds clear  Cardiovascular:  Regular rhythm, S1, S2, no murmur/rub/S3/S4, no abdominal bruit, no edema  Abdomen:  Soft, non tender, non distended, normoactive bowel sounds,  no masses  Extremities:  no cyanosis, clubbing or edema  Skin:  No rash/erythema/ecchymoses/petechiae/wounds/abscess/warm/dry  Neuro/Psych:  Alert, oriented, no asterixis, no tremor, no encephalopathy    Laboratory:                          13.8   6.63  )-----------( 231      ( 10 Omid 2020 08:26 )             41.4     01-10    137  |  106  |  11  ----------------------------<  152<H>  3.5   |  26  |  1.02    Ca    8.2<L>      10 Omid 2020 08:26  Phos  3.2     01-10  Mg     2.0     10        PT/INR - ( 10 Omid 2020 08:26 )   PT: 11.5 sec;   INR: 1.03 ratio         PTT - ( 10 Omid 2020 08:26 )  PTT:33.6 sec        Intake and Output    20 @ 07:01  -  01-10-20 @ 07:00  --------------------------------------------------------  IN: 360 mL / OUT: 0 mL / NET: 360 mL        Imaging:  < from: CT Chest No Cont (20 @ 15:04) >    EXAM:  CT CHEST                            *** ADDENDUM 2020  ***    Add to   impression:    Thickened esophagus. Consider endoscopy.      *** END OF ADDENDUM 2020  ***      PROCEDURE DATE:  2020          INTERPRETATION:  CLINICALINDICATION: 49 years  Male with abn chest xray.     COMPARISON: CT abdomen and pelvis 2019    PROCEDURE:   CT of the Chest was performed without intravenous contrast.  Sagittal and coronal reformats were performed.      FINDINGS:    LUNGS AND AIRWAYS: Patent central airways.  Moderate bilateral centrilobular emphysema. 4.4 x 2.5 cm irregular spiculated mass in the apical segment of the right upper lobes with a 1.1 cm central cavity. Mild bilateral lower lobe dependent atelectasis.    PLEURA:No pleural effusion.    MEDIASTINUM AND DEAN: No lymphadenopathy. Thickened esophagus.    VESSELS: Within normal limits.    HEART: Heart size is normal. No pericardial effusion.    CHEST WALL AND LOWER NECK: Within normal limits.    VISUALIZED UPPER ABDOMEN: Mild nodular thickening of left adrenal gland, unchanged from CT abdomen and pelvis 2019. Normal right adrenal gland.    BONES: Mild thoracic degenerative changes.    IMPRESSION:     4.4 x 2.5 cm right upper lobe irregular spiculated mass with central cavitation. Finding may represent a neoplasm surrounding a bulla or cavitary infection such as tuberculosis. Recommend pulmonary consultation.    Emphysema.    Stable mild nodular thickening of the left adrenal gland.    Findings were discussed with Dr. STEVEN PRATT 2020 3:46 PM by Dr. Chand with read back confirmation.      ***Please see the addendum at the top of this report. It may contain additional important information or changes.****    ANASTASIYA CHAND M.D., ATTENDING RADIOLOGIST  This document has been electronically signed. 2020  3:49PM  Addend:  ANASTASIYA CHAND M.D., ATTENDING RADIOLOGIST  This addendum was electronically signed on: 2020  4:08PM.

## 2020-01-10 NOTE — PROGRESS NOTE ADULT - SUBJECTIVE AND OBJECTIVE BOX
Interval Events:  Patient seen and examined at bedside. Still inable to bring up sputum.      REVIEW OF SYSTEMS:  Constitutional: no fever  CV:  no chest pain  Resp:  no cough  GI: crohn's      [x ] All other systems negative  [ ] Unable to assess ROS because ________    OBJECTIVE:      ICU Vital Signs Last 24 Hrs  T(C): 36.2 (10 Omid 2020 10:50), Max: 36.7 (09 Jan 2020 17:37)  T(F): 97.2 (10 Omid 2020 10:50), Max: 98 (09 Jan 2020 17:37)  HR: 67 (10 Omid 2020 14:07) (56 - 71)  BP: 108/74 (10 Omid 2020 14:07) (87/46 - 124/68)  BP(mean): 63 (10 Omid 2020 10:50) (63 - 63)  ABP: --  ABP(mean): --  RR: 18 (10 Omid 2020 10:50) (17 - 18)  SpO2: 97% (10 Omid 2020 10:50) (95% - 100%)        01-09 @ 07:01  -  01-10 @ 07:00  --------------------------------------------------------  IN: 360 mL / OUT: 0 mL / NET: 360 mL      CAPILLARY BLOOD GLUCOSE              PHYSICAL EXAM:  General: Well appearing Male. No apparent distress  HEENT:   Mucous membranes are moist.  Neck: Supple. No JVD  Respiratory: clear lungs  Cardiovascular: RRR, no m/r/g  Abdomen: Soft, non-tender, non-distended.   Extremities: No lower extremity edema.  Skin: Warm, dry, no rash.   Neurological: CNII-XII grossly intact.   Psychiatry: appropriate mood and affect.         HOSPITAL MEDICATIONS:  MEDICATIONS  (STANDING):  ALPRAZolam 1 milliGRAM(s) Oral every 8 hours  busPIRone 10 milliGRAM(s) Oral two times a day  dicyclomine 10 milliGRAM(s) Oral two times a day before meals  enoxaparin Injectable 40 milliGRAM(s) SubCutaneous daily  lactulose Syrup 30 Gram(s) Oral daily  magnesium citrate Oral Solution 1 Bottle Oral once  mesalamine ER Capsule 1000 milliGRAM(s) Oral four times a day with meals  nicotine - 21 mG/24Hr(s) Patch 1 patch Transdermal daily  ondansetron    Tablet 4 milliGRAM(s) Oral two times a day  ondansetron Injectable 4 milliGRAM(s) IV Push once  oxybutynin 5 milliGRAM(s) Oral two times a day  pantoprazole    Tablet 40 milliGRAM(s) Oral two times a day  polyethylene glycol 3350 17 Gram(s) Oral daily  tamsulosin 0.8 milliGRAM(s) Oral at bedtime  tiotropium 18 MICROgram(s) Capsule 1 Capsule(s) Inhalation daily    MEDICATIONS  (PRN):  acetaminophen   Tablet .. 650 milliGRAM(s) Oral every 6 hours PRN Temp greater or equal to 38C (100.4F), Mild Pain (1 - 3)  albuterol/ipratropium for Nebulization. 3 milliLiter(s) Nebulizer every 6 hours PRN Shortness of Breath  ALPRAZolam 2 milliGRAM(s) Oral daily PRN anxiety  morphine  - Injectable 2 milliGRAM(s) IV Push every 6 hours PRN Severe Pain (7 - 10)  ondansetron Injectable 4 milliGRAM(s) IV Push every 6 hours PRN Nausea and/or Vomiting      LABS:                        13.8   6.63  )-----------( 231      ( 10 Omid 2020 08:26 )             41.4     01-10    137  |  106  |  11  ----------------------------<  152<H>  3.5   |  26  |  1.02    Ca    8.2<L>      10 Omid 2020 08:26  Phos  3.2     01-10  Mg     2.0     01-10      PT/INR - ( 10 Omid 2020 08:26 )   PT: 11.5 sec;   INR: 1.03 ratio         PTT - ( 10 Omid 2020 08:26 )  PTT:33.6 sec        Quant gold - negative      RADIOLOGY:  [ x ] Reviewed and interpreted by me  < from: CT Chest No Cont (01.07.20 @ 15:04) >  4.4 x 2.5 cm right upper lobe irregular spiculated mass with central cavitation. Finding may represent a neoplasm surrounding a bulla or cavitary infection such as tuberculosis. Recommend pulmonary consultation.    Emphysema.    Stable mild nodular thickening of the left adrenal gland.    < end of copied text >

## 2020-01-10 NOTE — CONSULT NOTE ADULT - PROBLEM SELECTOR RECOMMENDATION 9
-Ok to continue pentasa  -Close outpatient follow up (will likely recommend outpatient capsule endoscopy to evaluate for Crohns given negative work-up thus far)   -Hyoscyamine as needed   -Avoid NSAIDs -Ok to continue pentasa  -Continue dicyclomine   -Avoid NSAIDs  -Close outpatient follow up (will likely recommend outpatient capsule endoscopy to evaluate for Crohns given negative work-up thus far)

## 2020-01-10 NOTE — CONSULT NOTE ADULT - SUBJECTIVE AND OBJECTIVE BOX
Patient is a 49y old  Male who presents with a chief complaint of cp (10 Omid 2020 12:30)    IR consulted for right lung bx/aspiration  49M with a PMH of Barretts Disease, Crohn's on Pentasya, IBS presents to ED for L sided chest pain (found to have negative troponins).  Patient severely agitated and anxious when seen.  Reports that the pain is worse upon deep breaths, sometimes radiates to his back/ L shoulder.  Reports chronic cough but attributes that to his smoking history.  Also reports lost weight.  No hx of TB however has been living in homeless shelters for 8 months.  Severely anxious about continuing his medications.  Reportedly had a PPD done in August that was negative.  -CT Chest No Cont (01.07.20 @ 15:04) >  4.4 x 2.5 cm right upper lobe irregular spiculated mass with central cavitation.          PAST MEDICAL & SURGICAL HISTORY:  Alfaro's esophagus without dysplasia  Depression, unspecified depression type  Crohn's disease with complication, unspecified gastrointestinal tract location  ACL (anterior cruciate ligament) tear      Allergies    adhesives (Rash)  No Known Drug Allergies    Intolerances        MEDICATIONS  (STANDING):  ALPRAZolam 1 milliGRAM(s) Oral every 8 hours  busPIRone 10 milliGRAM(s) Oral two times a day  dicyclomine 10 milliGRAM(s) Oral two times a day before meals  enoxaparin Injectable 40 milliGRAM(s) SubCutaneous daily  lactulose Syrup 30 Gram(s) Oral daily  magnesium citrate Oral Solution 1 Bottle Oral once  mesalamine ER Capsule 1000 milliGRAM(s) Oral four times a day with meals  nicotine - 21 mG/24Hr(s) Patch 1 patch Transdermal daily  ondansetron    Tablet 4 milliGRAM(s) Oral two times a day  ondansetron Injectable 4 milliGRAM(s) IV Push once  oxybutynin 5 milliGRAM(s) Oral two times a day  pantoprazole    Tablet 40 milliGRAM(s) Oral two times a day  polyethylene glycol 3350 17 Gram(s) Oral daily  tamsulosin 0.8 milliGRAM(s) Oral at bedtime  tiotropium 18 MICROgram(s) Capsule 1 Capsule(s) Inhalation daily    MEDICATIONS  (PRN):  acetaminophen   Tablet .. 650 milliGRAM(s) Oral every 6 hours PRN Temp greater or equal to 38C (100.4F), Mild Pain (1 - 3)  albuterol/ipratropium for Nebulization. 3 milliLiter(s) Nebulizer every 6 hours PRN Shortness of Breath  ALPRAZolam 2 milliGRAM(s) Oral daily PRN anxiety  morphine  - Injectable 2 milliGRAM(s) IV Push every 6 hours PRN Severe Pain (7 - 10)  ondansetron Injectable 4 milliGRAM(s) IV Push every 6 hours PRN Nausea and/or Vomiting        SOCIAL HISTORY:    FAMILY HISTORY:  Family history of diabetes mellitus in maternal grandmother (Mother, Grandparent)  Family history of COPD (chronic obstructive pulmonary disease) (Father)  Family history of colon cancer in father (Father)  Family history of hypertension in father (Father, Mother, Grandparent)        PHYSICAL EXAM:    Vital Signs Last 24 Hrs  T(C): 36.2 (10 Omid 2020 10:50), Max: 36.7 (09 Jan 2020 17:37)  T(F): 97.2 (10 Omid 2020 10:50), Max: 98 (09 Jan 2020 17:37)  HR: 67 (10 Omid 2020 14:07) (56 - 71)  BP: 108/74 (10 Omid 2020 14:07) (87/46 - 124/68)  BP(mean): 63 (10 Omid 2020 10:50) (63 - 63)  RR: 18 (10 Omid 2020 10:50) (17 - 18)  SpO2: 97% (10 Omid 2020 10:50) (95% - 100%)    General:  Appears stated age, well-groomed, well-nourished, no distress  Lungs:  CTAB  Cardiovascular:  good S1, S2,   Abdomen:  Soft, non-tender, non-distended,   Extremities:  no calf tenderness/swelling b/l  Neuro/Psych:  A &O x 3    LABS:                        13.8   6.63  )-----------( 231      ( 10 Omid 2020 08:26 )             41.4     01-10    137  |  106  |  11  ----------------------------<  152<H>  3.5   |  26  |  1.02    Ca    8.2<L>      10 Omid 2020 08:26  Phos  3.2     01-10  Mg     2.0     01-10      PT/INR - ( 10 Omid 2020 08:26 )   PT: 11.5 sec;   INR: 1.03 ratio         PTT - ( 10 Omid 2020 08:26 )  PTT:33.6 sec      RADIOLOGY & ADDITIONAL STUDIES:  < from: CT Abdomen and Pelvis w/ IV Cont (08.16.19 @ 01:44) >  No acute intra-abdominal abnormality.    < end of copied text >

## 2020-01-10 NOTE — CONSULT NOTE ADULT - ASSESSMENT
50 yo smoker (>30 pack year hx) with Crohn's disease (on Pentasa), Alfaro's esophagus male who currently lives in homeless shelter admitted with RUL cavitary lesion.  Pt is a r/o TB  -IR consulted for right cavitary lesion bx/aspiration    Plan:  -will perform Monday with anesthesia, NPO midnight  -lovenox to be held starting Monday  -Consent obtained from patient

## 2020-01-11 LAB
ANION GAP SERPL CALC-SCNC: 6 MMOL/L — SIGNIFICANT CHANGE UP (ref 5–17)
BUN SERPL-MCNC: 9 MG/DL — SIGNIFICANT CHANGE UP (ref 7–23)
CALCIUM SERPL-MCNC: 8.5 MG/DL — SIGNIFICANT CHANGE UP (ref 8.5–10.1)
CHLORIDE SERPL-SCNC: 111 MMOL/L — HIGH (ref 96–108)
CO2 SERPL-SCNC: 26 MMOL/L — SIGNIFICANT CHANGE UP (ref 22–31)
CREAT SERPL-MCNC: 0.8 MG/DL — SIGNIFICANT CHANGE UP (ref 0.5–1.3)
GLUCOSE SERPL-MCNC: 79 MG/DL — SIGNIFICANT CHANGE UP (ref 70–99)
HCT VFR BLD CALC: 42.4 % — SIGNIFICANT CHANGE UP (ref 39–50)
HGB BLD-MCNC: 14.3 G/DL — SIGNIFICANT CHANGE UP (ref 13–17)
MAGNESIUM SERPL-MCNC: 2.4 MG/DL — SIGNIFICANT CHANGE UP (ref 1.6–2.6)
MCHC RBC-ENTMCNC: 32.6 PG — SIGNIFICANT CHANGE UP (ref 27–34)
MCHC RBC-ENTMCNC: 33.7 GM/DL — SIGNIFICANT CHANGE UP (ref 32–36)
MCV RBC AUTO: 96.8 FL — SIGNIFICANT CHANGE UP (ref 80–100)
NRBC # BLD: 0 /100 WBCS — SIGNIFICANT CHANGE UP (ref 0–0)
PHOSPHATE SERPL-MCNC: 3.1 MG/DL — SIGNIFICANT CHANGE UP (ref 2.5–4.5)
PLATELET # BLD AUTO: 235 K/UL — SIGNIFICANT CHANGE UP (ref 150–400)
POTASSIUM SERPL-MCNC: 3.6 MMOL/L — SIGNIFICANT CHANGE UP (ref 3.5–5.3)
POTASSIUM SERPL-SCNC: 3.6 MMOL/L — SIGNIFICANT CHANGE UP (ref 3.5–5.3)
RBC # BLD: 4.38 M/UL — SIGNIFICANT CHANGE UP (ref 4.2–5.8)
RBC # FLD: 12.5 % — SIGNIFICANT CHANGE UP (ref 10.3–14.5)
SODIUM SERPL-SCNC: 143 MMOL/L — SIGNIFICANT CHANGE UP (ref 135–145)
WBC # BLD: 8.03 K/UL — SIGNIFICANT CHANGE UP (ref 3.8–10.5)
WBC # FLD AUTO: 8.03 K/UL — SIGNIFICANT CHANGE UP (ref 3.8–10.5)

## 2020-01-11 PROCEDURE — 99232 SBSQ HOSP IP/OBS MODERATE 35: CPT

## 2020-01-11 PROCEDURE — 99233 SBSQ HOSP IP/OBS HIGH 50: CPT

## 2020-01-11 PROCEDURE — 74019 RADEX ABDOMEN 2 VIEWS: CPT | Mod: 26

## 2020-01-11 RX ORDER — SENNA PLUS 8.6 MG/1
2 TABLET ORAL AT BEDTIME
Refills: 0 | Status: DISCONTINUED | OUTPATIENT
Start: 2020-01-11 | End: 2020-01-22

## 2020-01-11 RX ORDER — SODIUM CHLORIDE 9 MG/ML
500 INJECTION INTRAMUSCULAR; INTRAVENOUS; SUBCUTANEOUS ONCE
Refills: 0 | Status: COMPLETED | OUTPATIENT
Start: 2020-01-11 | End: 2020-01-11

## 2020-01-11 RX ORDER — OXYCODONE AND ACETAMINOPHEN 5; 325 MG/1; MG/1
1 TABLET ORAL EVERY 6 HOURS
Refills: 0 | Status: DISCONTINUED | OUTPATIENT
Start: 2020-01-11 | End: 2020-01-16

## 2020-01-11 RX ORDER — MINERAL OIL
133 OIL (ML) MISCELLANEOUS ONCE
Refills: 0 | Status: DISCONTINUED | OUTPATIENT
Start: 2020-01-11 | End: 2020-01-11

## 2020-01-11 RX ORDER — SODIUM CHLORIDE 9 MG/ML
1000 INJECTION INTRAMUSCULAR; INTRAVENOUS; SUBCUTANEOUS
Refills: 0 | Status: DISCONTINUED | OUTPATIENT
Start: 2020-01-11 | End: 2020-01-21

## 2020-01-11 RX ORDER — MINERAL OIL
133 OIL (ML) MISCELLANEOUS ONCE
Refills: 0 | Status: COMPLETED | OUTPATIENT
Start: 2020-01-11 | End: 2020-01-11

## 2020-01-11 RX ADMIN — SENNA PLUS 2 TABLET(S): 8.6 TABLET ORAL at 22:06

## 2020-01-11 RX ADMIN — Medication 1000 MILLIGRAM(S): at 09:30

## 2020-01-11 RX ADMIN — SODIUM CHLORIDE 75 MILLILITER(S): 9 INJECTION INTRAMUSCULAR; INTRAVENOUS; SUBCUTANEOUS at 22:08

## 2020-01-11 RX ADMIN — PANTOPRAZOLE SODIUM 40 MILLIGRAM(S): 20 TABLET, DELAYED RELEASE ORAL at 17:17

## 2020-01-11 RX ADMIN — TAMSULOSIN HYDROCHLORIDE 0.8 MILLIGRAM(S): 0.4 CAPSULE ORAL at 22:07

## 2020-01-11 RX ADMIN — Medication 1 PATCH: at 07:44

## 2020-01-11 RX ADMIN — Medication 1 PATCH: at 12:08

## 2020-01-11 RX ADMIN — Medication 1 MILLIGRAM(S): at 23:32

## 2020-01-11 RX ADMIN — OXYCODONE AND ACETAMINOPHEN 1 TABLET(S): 5; 325 TABLET ORAL at 22:06

## 2020-01-11 RX ADMIN — Medication 133 MILLILITER(S): at 09:30

## 2020-01-11 RX ADMIN — Medication 10 MILLIGRAM(S): at 17:17

## 2020-01-11 RX ADMIN — MORPHINE SULFATE 2 MILLIGRAM(S): 50 CAPSULE, EXTENDED RELEASE ORAL at 10:51

## 2020-01-11 RX ADMIN — Medication 1000 MILLIGRAM(S): at 17:17

## 2020-01-11 RX ADMIN — Medication 10 MILLIGRAM(S): at 05:29

## 2020-01-11 RX ADMIN — Medication 1 MILLIGRAM(S): at 05:29

## 2020-01-11 RX ADMIN — Medication 5 MILLIGRAM(S): at 05:29

## 2020-01-11 RX ADMIN — MORPHINE SULFATE 2 MILLIGRAM(S): 50 CAPSULE, EXTENDED RELEASE ORAL at 10:39

## 2020-01-11 RX ADMIN — OXYCODONE AND ACETAMINOPHEN 1 TABLET(S): 5; 325 TABLET ORAL at 23:06

## 2020-01-11 RX ADMIN — Medication 1 PATCH: at 19:30

## 2020-01-11 RX ADMIN — Medication 1000 MILLIGRAM(S): at 22:07

## 2020-01-11 RX ADMIN — Medication 1 PATCH: at 12:26

## 2020-01-11 RX ADMIN — Medication 10 MILLIGRAM(S): at 05:28

## 2020-01-11 RX ADMIN — SODIUM CHLORIDE 500 MILLILITER(S): 9 INJECTION INTRAMUSCULAR; INTRAVENOUS; SUBCUTANEOUS at 20:24

## 2020-01-11 RX ADMIN — PANTOPRAZOLE SODIUM 40 MILLIGRAM(S): 20 TABLET, DELAYED RELEASE ORAL at 05:29

## 2020-01-11 RX ADMIN — ENOXAPARIN SODIUM 40 MILLIGRAM(S): 100 INJECTION SUBCUTANEOUS at 12:25

## 2020-01-11 RX ADMIN — Medication 1 MILLIGRAM(S): at 15:01

## 2020-01-11 RX ADMIN — Medication 1000 MILLIGRAM(S): at 12:24

## 2020-01-11 RX ADMIN — TIOTROPIUM BROMIDE 1 CAPSULE(S): 18 CAPSULE ORAL; RESPIRATORY (INHALATION) at 12:28

## 2020-01-11 RX ADMIN — Medication 10 MILLIGRAM(S): at 07:58

## 2020-01-11 RX ADMIN — Medication 5 MILLIGRAM(S): at 17:17

## 2020-01-11 NOTE — PROGRESS NOTE ADULT - ASSESSMENT
PLAN      LUNG MASS   1/9 Asperg galactomannan antig below 0.5 1/9 HIV n   1/14 ct ch 4.4 x 2.5 cm irregular spiculated mass r upper lobe with 1.1 cm central cavity   Most likely diagnosis clinically is lung ca Possibly squamous although squamous is usually central   Other possibilities include Tb Histo   Check qft gold test   needle bx planned for 1/13   COPD duoneb.4p (1/8) spiriva (1/8)   SMOKER Nicoderm 21 (1/7)   CROHNS Mescalamine 1.4 (1/7)   BPH tamsulosin .4 (1/7)       TIME SPENT Over 25 minutes aggregate care time spent on encounter; activities included   direct pt care, counseling and/or coordinating care reviewing notes, lab data/ imaging , discussion with multidisciplinary team/ pt /family. Risks, benefits, alternatives  discussed in detail.

## 2020-01-11 NOTE — PROGRESS NOTE ADULT - SUBJECTIVE AND OBJECTIVE BOX
REVIEW OF SYMPTOMS      Able to give ROS  Yes     RELIABLE No   CONSTITUTIONAL Weakness Yes  Chills No Vision changes No  ENDOCRINE No unexplained hair loss No heat or cold intolerance    ALLERGY No hives  Sore throat No   RESP Coughing blood no  Shortness of breath YES   NEURO No Headache  Confusion Pain neck No   CARDIAC No Chest pain No Palpitations   GI No Pain abdomen NO   Vomiting NO     PHYSICAL EXAM    HEENT Unremarkable PERRLA atraumatic   RESP Fair air entry EXP prolonged    Harsh breath sound Resp distres mild   CARDIAC S1 S2 No S3     NO JVD    ABDOMEN SOFT BS PRESENT NOT DISTENDED No hepatosplenomegaly PEDAL EDEMA present No calf tenderness  NO rash   GENERAL Not TOXIC looking    VITALS/LABS       1/11/2020 afeb 55 94/58 100%  1/11/2020 W 8 Hb 14.3 Plt 235 Na 143 K 3.6 CO2 26 Cr .8     PT DATA/BEST PRACTICE  ALLERGY adhesives  ADVANCED DIRECTIVE       Goals of care discussion  WT  62 (1/11/2020)    BMI  19 (1/11/2020)                                                                                                           HEAD OF BED ELEVATION Yes  DYSPHAGIA EVAL Ordered 1/11/2020   DIET   mech soft (1/10)                 DVT PROPHYLAXIS    lvnx 40 (1/7)            STRESS ULCER PROPHYLAXIS   protonix 40 (1/7)            INFECTION PPLX  ECHO                ABIO       MICROBIO     1/9 Asperg galactomannan antig below 0.5 1/9 HIV n    GLYCEMIC CONTROL    PT SUMMARY  48 yo smoker (>30 pack year hx) with Crohn's disease (on Pentasa), Alfaro's esophagus male who currently lives in homeless shelter admitted with RUL cavitary lesion  Has ho travel [place]

## 2020-01-11 NOTE — PROGRESS NOTE ADULT - PROBLEM SELECTOR PLAN 1
R/o TB, Pulm on board - attempted sending sputums for AF[employer],   appreciate ID  consult   Isolation precautions, QuantiFeron drawn, results pending  resp tried to induce sputum unsuccessful    for right lung biopsy by IR on monday

## 2020-01-11 NOTE — PROGRESS NOTE ADULT - PROBLEM SELECTOR PLAN 3
Pentasa 1000mg qid with meals   GI consult [facility]  reviewed pt known to them   continue with  supportive care

## 2020-01-11 NOTE — PROGRESS NOTE ADULT - SUBJECTIVE AND OBJECTIVE BOX
Patient is a 49y old  Male who presents with a chief complaint of cp (08 Jan 2020 17:00)      OVERNIGHT EVENTS: none    MEDICATIONS  (STANDING):  ALPRAZolam 1 milliGRAM(s) Oral every 8 hours  busPIRone 10 milliGRAM(s) Oral two times a day  dicyclomine 10 milliGRAM(s) Oral two times a day before meals  enoxaparin Injectable 40 milliGRAM(s) SubCutaneous daily  lactulose Syrup 30 Gram(s) Oral daily  mesalamine ER Capsule 1000 milliGRAM(s) Oral four times a day with meals  nicotine - 21 mG/24Hr(s) Patch 1 patch Transdermal daily  oxybutynin 5 milliGRAM(s) Oral two times a day  pantoprazole    Tablet 40 milliGRAM(s) Oral two times a day  polyethylene glycol 3350 17 Gram(s) Oral daily  senna 2 Tablet(s) Oral at bedtime  tamsulosin 0.8 milliGRAM(s) Oral at bedtime  tiotropium 18 MICROgram(s) Capsule 1 Capsule(s) Inhalation daily    MEDICATIONS  (PRN):  acetaminophen   Tablet .. 650 milliGRAM(s) Oral every 6 hours PRN Temp greater or equal to 38C (100.4F), Mild Pain (1 - 3)  albuterol/ipratropium for Nebulization. 3 milliLiter(s) Nebulizer every 6 hours PRN Shortness of Breath  ALPRAZolam 2 milliGRAM(s) Oral daily PRN anxiety  bisacodyl 5 milliGRAM(s) Oral every 12 hours PRN Constipation  bisacodyl Suppository 10 milliGRAM(s) Rectal daily PRN Constipation  morphine  - Injectable 2 milliGRAM(s) IV Push every 6 hours PRN Severe Pain (7 - 10)  ondansetron Injectable 4 milliGRAM(s) IV Push every 6 hours PRN Nausea and/or Vomiting    Allergies    adhesives (Rash)  No Known Drug Allergies    Intolerances        SUBJECTIVE: in bed in NAD, no acute events overnight     Vital Signs Last 24 Hrs  T(C): 35.9 (11 Jan 2020 10:16), Max: 35.9 (10 Omid 2020 15:57)  T(F): 96.6 (11 Jan 2020 10:16), Max: 96.7 (10 Omid 2020 15:57)  HR: 60 (11 Jan 2020 10:16) (55 - 71)  BP: 104/68 (11 Jan 2020 10:16) (89/58 - 108/74)  BP(mean): --  RR: 18 (11 Jan 2020 10:16) (16 - 18)  SpO2: 100% (11 Jan 2020 10:16) (95% - 100%)      PHYSICAL EXAM:  GENERAL: NAD, well-groomed, well-developed  HEAD:  Atraumatic, Normocephalic  EYES: EOMI, PERRLA, conjunctiva and sclera clear  ENMT: No tonsillar erythema, exudates, or enlargement; Moist mucous membranes, Good dentition, No lesions  NECK: Supple, No JVD, Normal thyroid  CHEST/LUNG: Clear to  auscultation bilaterally; No rales, rhonchi, wheezing, or rubs  bilaterally  HEART: Regular rate and rhythm; No murmurs, rubs, or gallops  ABDOMEN: Soft,  left lower quadrant pain , Nondistended; Bowel sounds present  EXTREMITIES:  2+ Peripheral Pulses, No clubbing, cyanosis, or edema BL LE  SKIN: No rashes or lesions  NERVOUS SYSTEM:  Alert & Oriented X3, Good concentration; Motor Strength 5/5 B/L upper and lower extremities;   DTRs 2+ intact and symmetric, sensation intact BL    LABS:                                                            14.3   8.03  )-----------( 235      ( 11 Jan 2020 07:57 )             42.4   01-11    143  |  111<H>  |  9   ----------------------------<  79  3.6   |  26  |  0.80    Ca    8.5      11 Jan 2020 07:57  Phos  3.1     01-11  Mg     2.4     01-11      Cultures;   CAPILLARY BLOOD GLUCOSE        Lipid panel:     CARDIAC MARKERS ( 07 Jan 2020 13:24 )  <.015 ng/mL / x     / x     / x     / x            RADIOLOGY & ADDITIONAL TESTS:      Imaging Personally Reviewed:  [ x] YES      Consultant(s) Notes Reviewed:  [x ] YES     Care Discussed with [x ] Consultants [X ] Patient [x ] Family  [x ]    [x ]  Other; RN

## 2020-01-12 DIAGNOSIS — R07.9 CHEST PAIN, UNSPECIFIED: ICD-10-CM

## 2020-01-12 LAB
ALBUMIN SERPL ELPH-MCNC: 3.1 G/DL — LOW (ref 3.3–5)
ALP SERPL-CCNC: 76 U/L — SIGNIFICANT CHANGE UP (ref 40–120)
ALT FLD-CCNC: 31 U/L — SIGNIFICANT CHANGE UP (ref 12–78)
ANION GAP SERPL CALC-SCNC: 7 MMOL/L — SIGNIFICANT CHANGE UP (ref 5–17)
ANION GAP SERPL CALC-SCNC: 9 MMOL/L — SIGNIFICANT CHANGE UP (ref 5–17)
AST SERPL-CCNC: 19 U/L — SIGNIFICANT CHANGE UP (ref 15–37)
BILIRUB SERPL-MCNC: 0.3 MG/DL — SIGNIFICANT CHANGE UP (ref 0.2–1.2)
BUN SERPL-MCNC: 9 MG/DL — SIGNIFICANT CHANGE UP (ref 7–23)
BUN SERPL-MCNC: 9 MG/DL — SIGNIFICANT CHANGE UP (ref 7–23)
CALCIUM SERPL-MCNC: 8.3 MG/DL — LOW (ref 8.5–10.1)
CALCIUM SERPL-MCNC: 8.4 MG/DL — LOW (ref 8.5–10.1)
CHLORIDE SERPL-SCNC: 109 MMOL/L — HIGH (ref 96–108)
CHLORIDE SERPL-SCNC: 109 MMOL/L — HIGH (ref 96–108)
CK MB BLD-MCNC: <1.3 % — SIGNIFICANT CHANGE UP (ref 0–3.5)
CK MB CFR SERPL CALC: <1 NG/ML — SIGNIFICANT CHANGE UP (ref 0.5–3.6)
CK SERPL-CCNC: 76 U/L — SIGNIFICANT CHANGE UP (ref 26–308)
CO2 SERPL-SCNC: 23 MMOL/L — SIGNIFICANT CHANGE UP (ref 22–31)
CO2 SERPL-SCNC: 25 MMOL/L — SIGNIFICANT CHANGE UP (ref 22–31)
CREAT SERPL-MCNC: 0.87 MG/DL — SIGNIFICANT CHANGE UP (ref 0.5–1.3)
CREAT SERPL-MCNC: 0.98 MG/DL — SIGNIFICANT CHANGE UP (ref 0.5–1.3)
GLUCOSE SERPL-MCNC: 80 MG/DL — SIGNIFICANT CHANGE UP (ref 70–99)
GLUCOSE SERPL-MCNC: 83 MG/DL — SIGNIFICANT CHANGE UP (ref 70–99)
HCT VFR BLD CALC: 41.8 % — SIGNIFICANT CHANGE UP (ref 39–50)
HGB BLD-MCNC: 14.2 G/DL — SIGNIFICANT CHANGE UP (ref 13–17)
INR BLD: 1.03 RATIO — SIGNIFICANT CHANGE UP (ref 0.88–1.16)
MAGNESIUM SERPL-MCNC: 1.8 MG/DL — SIGNIFICANT CHANGE UP (ref 1.6–2.6)
MCHC RBC-ENTMCNC: 32.8 PG — SIGNIFICANT CHANGE UP (ref 27–34)
MCHC RBC-ENTMCNC: 34 GM/DL — SIGNIFICANT CHANGE UP (ref 32–36)
MCV RBC AUTO: 96.5 FL — SIGNIFICANT CHANGE UP (ref 80–100)
NRBC # BLD: 0 /100 WBCS — SIGNIFICANT CHANGE UP (ref 0–0)
PHOSPHATE SERPL-MCNC: 3.4 MG/DL — SIGNIFICANT CHANGE UP (ref 2.5–4.5)
PLATELET # BLD AUTO: 210 K/UL — SIGNIFICANT CHANGE UP (ref 150–400)
POTASSIUM SERPL-MCNC: 3.7 MMOL/L — SIGNIFICANT CHANGE UP (ref 3.5–5.3)
POTASSIUM SERPL-MCNC: 4.4 MMOL/L — SIGNIFICANT CHANGE UP (ref 3.5–5.3)
POTASSIUM SERPL-SCNC: 3.7 MMOL/L — SIGNIFICANT CHANGE UP (ref 3.5–5.3)
POTASSIUM SERPL-SCNC: 4.4 MMOL/L — SIGNIFICANT CHANGE UP (ref 3.5–5.3)
PROT SERPL-MCNC: 6.2 GM/DL — SIGNIFICANT CHANGE UP (ref 6–8.3)
PROTHROM AB SERPL-ACNC: 11.5 SEC — SIGNIFICANT CHANGE UP (ref 10–12.9)
RBC # BLD: 4.33 M/UL — SIGNIFICANT CHANGE UP (ref 4.2–5.8)
RBC # FLD: 12.2 % — SIGNIFICANT CHANGE UP (ref 10.3–14.5)
SODIUM SERPL-SCNC: 141 MMOL/L — SIGNIFICANT CHANGE UP (ref 135–145)
SODIUM SERPL-SCNC: 141 MMOL/L — SIGNIFICANT CHANGE UP (ref 135–145)
TROPONIN I SERPL-MCNC: <.015 NG/ML — SIGNIFICANT CHANGE UP (ref 0.01–0.04)
TROPONIN I SERPL-MCNC: <.015 NG/ML — SIGNIFICANT CHANGE UP (ref 0.01–0.04)
WBC # BLD: 6.8 K/UL — SIGNIFICANT CHANGE UP (ref 3.8–10.5)
WBC # FLD AUTO: 6.8 K/UL — SIGNIFICANT CHANGE UP (ref 3.8–10.5)

## 2020-01-12 PROCEDURE — 99233 SBSQ HOSP IP/OBS HIGH 50: CPT

## 2020-01-12 PROCEDURE — 99232 SBSQ HOSP IP/OBS MODERATE 35: CPT

## 2020-01-12 PROCEDURE — 93010 ELECTROCARDIOGRAM REPORT: CPT | Mod: 76

## 2020-01-12 RX ADMIN — MORPHINE SULFATE 2 MILLIGRAM(S): 50 CAPSULE, EXTENDED RELEASE ORAL at 16:48

## 2020-01-12 RX ADMIN — SODIUM CHLORIDE 75 MILLILITER(S): 9 INJECTION INTRAMUSCULAR; INTRAVENOUS; SUBCUTANEOUS at 21:33

## 2020-01-12 RX ADMIN — ONDANSETRON 4 MILLIGRAM(S): 8 TABLET, FILM COATED ORAL at 21:41

## 2020-01-12 RX ADMIN — ENOXAPARIN SODIUM 40 MILLIGRAM(S): 100 INJECTION SUBCUTANEOUS at 11:35

## 2020-01-12 RX ADMIN — Medication 1000 MILLIGRAM(S): at 21:32

## 2020-01-12 RX ADMIN — Medication 1000 MILLIGRAM(S): at 08:19

## 2020-01-12 RX ADMIN — Medication 1 MILLIGRAM(S): at 21:32

## 2020-01-12 RX ADMIN — TAMSULOSIN HYDROCHLORIDE 0.8 MILLIGRAM(S): 0.4 CAPSULE ORAL at 21:32

## 2020-01-12 RX ADMIN — Medication 10 MILLIGRAM(S): at 16:48

## 2020-01-12 RX ADMIN — Medication 10 MILLIGRAM(S): at 08:19

## 2020-01-12 RX ADMIN — Medication 1 PATCH: at 11:33

## 2020-01-12 RX ADMIN — TIOTROPIUM BROMIDE 1 CAPSULE(S): 18 CAPSULE ORAL; RESPIRATORY (INHALATION) at 11:35

## 2020-01-12 RX ADMIN — OXYCODONE AND ACETAMINOPHEN 1 TABLET(S): 5; 325 TABLET ORAL at 12:40

## 2020-01-12 RX ADMIN — Medication 5 MILLIGRAM(S): at 16:49

## 2020-01-12 RX ADMIN — Medication 10 MILLIGRAM(S): at 05:53

## 2020-01-12 RX ADMIN — SODIUM CHLORIDE 75 MILLILITER(S): 9 INJECTION INTRAMUSCULAR; INTRAVENOUS; SUBCUTANEOUS at 11:41

## 2020-01-12 RX ADMIN — Medication 1000 MILLIGRAM(S): at 16:48

## 2020-01-12 RX ADMIN — Medication 1 PATCH: at 00:32

## 2020-01-12 RX ADMIN — Medication 1 PATCH: at 07:19

## 2020-01-12 RX ADMIN — PANTOPRAZOLE SODIUM 40 MILLIGRAM(S): 20 TABLET, DELAYED RELEASE ORAL at 16:49

## 2020-01-12 RX ADMIN — Medication 1 MILLIGRAM(S): at 08:19

## 2020-01-12 RX ADMIN — OXYCODONE AND ACETAMINOPHEN 1 TABLET(S): 5; 325 TABLET ORAL at 11:42

## 2020-01-12 RX ADMIN — OXYCODONE AND ACETAMINOPHEN 1 TABLET(S): 5; 325 TABLET ORAL at 21:40

## 2020-01-12 RX ADMIN — LACTULOSE 30 GRAM(S): 10 SOLUTION ORAL at 11:35

## 2020-01-12 RX ADMIN — Medication 5 MILLIGRAM(S): at 05:53

## 2020-01-12 RX ADMIN — POLYETHYLENE GLYCOL 3350 17 GRAM(S): 17 POWDER, FOR SOLUTION ORAL at 11:33

## 2020-01-12 RX ADMIN — PANTOPRAZOLE SODIUM 40 MILLIGRAM(S): 20 TABLET, DELAYED RELEASE ORAL at 05:53

## 2020-01-12 RX ADMIN — Medication 1 PATCH: at 11:36

## 2020-01-12 RX ADMIN — Medication 1 MILLIGRAM(S): at 13:54

## 2020-01-12 RX ADMIN — Medication 1000 MILLIGRAM(S): at 11:35

## 2020-01-12 RX ADMIN — OXYCODONE AND ACETAMINOPHEN 1 TABLET(S): 5; 325 TABLET ORAL at 22:40

## 2020-01-12 RX ADMIN — SENNA PLUS 2 TABLET(S): 8.6 TABLET ORAL at 21:32

## 2020-01-12 RX ADMIN — MORPHINE SULFATE 2 MILLIGRAM(S): 50 CAPSULE, EXTENDED RELEASE ORAL at 17:00

## 2020-01-12 RX ADMIN — SODIUM CHLORIDE 75 MILLILITER(S): 9 INJECTION INTRAMUSCULAR; INTRAVENOUS; SUBCUTANEOUS at 05:53

## 2020-01-12 RX ADMIN — Medication 10 MILLIGRAM(S): at 16:49

## 2020-01-12 NOTE — PROGRESS NOTE ADULT - SUBJECTIVE AND OBJECTIVE BOX
REVIEW OF SYMPTOMS      Able to give ROS  Yes     RELIABLE No   CONSTITUTIONAL Weakness Yes  Chills No Vision changes No  ENDOCRINE No unexplained hair loss No heat or cold intolerance    ALLERGY No hives  Sore throat No   RESP Coughing blood no  Shortness of breath YES   NEURO No Headache  Confusion Pain neck No   CARDIAC No Chest pain No Palpitations   GI No Pain abdomen NO   Vomiting NO     PHYSICAL EXAM    HEENT Unremarkable PERRLA atraumatic   RESP Fair air entry EXP prolonged    Harsh breath sound Resp distres mild   CARDIAC S1 S2 No S3     NO JVD    ABDOMEN SOFT BS PRESENT NOT DISTENDED No hepatosplenomegaly PEDAL EDEMA present No calf tenderness  NO rash   GENERAL Not TOXIC looking    VITALS/LABS       1/12/2020 afeb 56 105/71   1/12/2020 W 6.8 Hb 14.2 Plt 210 Na 141 K 4.4 CO2 23 Cr .9     PT DATA/BEST PRACTICE  ALLERGY adhesives  ADVANCED DIRECTIVE       Goals of care discussion  WT  62 (1/11/2020)    BMI  19 (1/11/2020)                                                                                                           HEAD OF BED ELEVATION Yes  DYSPHAGIA EVAL Ordered 1/11/2020   DIET   mech soft (1/10)                 DVT PROPHYLAXIS    lvnx 40 (1/7)            STRESS ULCER PROPHYLAXIS   protonix 40 (1/7)            INFECTION PPLX  ECHO                ABIO       MICROBIO     1/9 Asperg galactomannan antig below 0.5 1/9 HIV n    GLYCEMIC CONTROL  PROCEDURE                                                                PT SUMMARY  50 yo smoker (>30 pack year hx) with Crohn's disease (on Pentasa), Alfaro's esophagus male who currently lives in homeless shelter admitted with RUL cavitary lesion  Has ho travel [place]

## 2020-01-12 NOTE — PROGRESS NOTE ADULT - SUBJECTIVE AND OBJECTIVE BOX
Patient is a 49y old  Male who presents with a chief complaint of cp (08 Jan 2020 17:00)      OVERNIGHT EVENTS: none    MEDICATIONS  (STANDING):  ALPRAZolam 1 milliGRAM(s) Oral every 8 hours  busPIRone 10 milliGRAM(s) Oral two times a day  dicyclomine 10 milliGRAM(s) Oral two times a day before meals  enoxaparin Injectable 40 milliGRAM(s) SubCutaneous daily  lactulose Syrup 30 Gram(s) Oral daily  mesalamine ER Capsule 1000 milliGRAM(s) Oral four times a day with meals  nicotine - 21 mG/24Hr(s) Patch 1 patch Transdermal daily  oxybutynin 5 milliGRAM(s) Oral two times a day  pantoprazole    Tablet 40 milliGRAM(s) Oral two times a day  polyethylene glycol 3350 17 Gram(s) Oral daily  senna 2 Tablet(s) Oral at bedtime  sodium chloride 0.9%. 1000 milliLiter(s) (75 mL/Hr) IV Continuous <Continuous>  tamsulosin 0.8 milliGRAM(s) Oral at bedtime  tiotropium 18 MICROgram(s) Capsule 1 Capsule(s) Inhalation daily    MEDICATIONS  (PRN):  acetaminophen   Tablet .. 650 milliGRAM(s) Oral every 6 hours PRN Temp greater or equal to 38C (100.4F), Mild Pain (1 - 3)  albuterol/ipratropium for Nebulization. 3 milliLiter(s) Nebulizer every 6 hours PRN Shortness of Breath  ALPRAZolam 2 milliGRAM(s) Oral daily PRN anxiety  bisacodyl 5 milliGRAM(s) Oral every 12 hours PRN Constipation  bisacodyl Suppository 10 milliGRAM(s) Rectal daily PRN Constipation  morphine  - Injectable 2 milliGRAM(s) IV Push every 6 hours PRN Severe Pain (7 - 10)  ondansetron Injectable 4 milliGRAM(s) IV Push every 6 hours PRN Nausea and/or Vomiting  oxycodone    5 mG/acetaminophen 325 mG 1 Tablet(s) Oral every 6 hours PRN Moderate Pain (4 - 6)    Allergies    adhesives (Rash)  No Known Drug Allergies    Intolerances        SUBJECTIVE: in bed in NAD, no acute events overnight       Vital Signs Last 24 Hrs  T(C): 36.2 (12 Jan 2020 04:48), Max: 36.6 (11 Jan 2020 23:22)  T(F): 97.1 (12 Jan 2020 04:48), Max: 97.8 (11 Jan 2020 23:22)  HR: 60 (12 Jan 2020 04:48) (60 - 80)  BP: 91/47 (12 Jan 2020 04:48) (86/66 - 111/72)  BP(mean): --  RR: 18 (12 Jan 2020 04:48) (15 - 18)  SpO2: 98% (12 Jan 2020 04:48) (97% - 98%)      PHYSICAL EXAM:  GENERAL: NAD, well-groomed, well-developed  HEAD:  Atraumatic, Normocephalic  EYES: EOMI, PERRLA, conjunctiva and sclera clear  ENMT: No tonsillar erythema, exudates, or enlargement; Moist mucous membranes, Good dentition, No lesions  NECK: Supple, No JVD, Normal thyroid  CHEST/LUNG: Clear to  auscultation bilaterally; No rales, rhonchi, wheezing, or rubs  bilaterally  HEART: Regular rate and rhythm; No murmurs, rubs, or gallops  ABDOMEN: Soft,  left lower quadrant pain , Nondistended; Bowel sounds present  EXTREMITIES:  2+ Peripheral Pulses, No clubbing, cyanosis, or edema BL LE  SKIN: No rashes or lesions  NERVOUS SYSTEM:  Alert & Oriented X3, Good concentration; Motor Strength 5/5 B/L upper and lower extremities;   DTRs 2+ intact and symmetric, sensation intact BL    LABS:                                                            14.3   8.03  )-----------( 235      ( 11 Jan 2020 07:57 )             42.4   01-11    143  |  111<H>  |  9   ----------------------------<  79  3.6   |  26  |  0.80    Ca    8.5      11 Jan 2020 07:57  Phos  3.1     01-11  Mg     2.4     01-11      Cultures;   CAPILLARY BLOOD GLUCOSE        Lipid panel:     CARDIAC MARKERS ( 07 Jan 2020 13:24 )  <.015 ng/mL / x     / x     / x     / x            RADIOLOGY & ADDITIONAL TESTS:      Imaging Personally Reviewed:  [ x] YES      Consultant(s) Notes Reviewed:  [x ] YES     Care Discussed with [x ] Consultants [X ] Patient [x ] Family  [x ]    [x ]  Other; RN

## 2020-01-12 NOTE — PROGRESS NOTE ADULT - PROBLEM SELECTOR PLAN 10
c/o left side chest pain on and off for last 2 weeks but just telling me this ont [facility]   stat  telemetry   stat ekg, echo. cardiac enzymes, oxygen . check hgba1c and lipid , hold asa for now as going for lung biopsy in am for right lung mass  unless cardiac enzymes elvated  , give morphine , BP low normal will surely become hypotensive with nitro

## 2020-01-12 NOTE — PROGRESS NOTE ADULT - PROBLEM SELECTOR PLAN 1
R/o TB, Pulm on board - attempted sending sputums for [employer],   appreciate ID  consult   Isolation precautions, QuantiFeron  negative   resp tried to induce sputum unsuccessful    for right lung biopsy by IR on monday npo midnight R/o TB, Pulm on board - attempted sending sputums for [employer],   appreciate ID  consult   Isolation precautions, QuantiFeron, HIv and aspergillosis are all   negative   resp tried to induce sputum unsuccessful    for right lung biopsy by IR on monday npo midnight

## 2020-01-13 ENCOUNTER — RESULT REVIEW (OUTPATIENT)
Age: 50
End: 2020-01-13

## 2020-01-13 LAB
BLD GP AB SCN SERPL QL: SIGNIFICANT CHANGE UP
CHOLEST SERPL-MCNC: 153 MG/DL — SIGNIFICANT CHANGE UP (ref 10–199)
GAMMA INTERFERON BACKGROUND BLD IA-ACNC: 0.01 IU/ML — SIGNIFICANT CHANGE UP
GRAM STN FLD: SIGNIFICANT CHANGE UP
H CAPSUL AG SPEC-ACNC: SIGNIFICANT CHANGE UP
H CAPSUL AG UR QL IA: SIGNIFICANT CHANGE UP
HBA1C BLD-MCNC: 5.1 % — SIGNIFICANT CHANGE UP (ref 4–5.6)
HDLC SERPL-MCNC: 42 MG/DL — SIGNIFICANT CHANGE UP
LIPID PNL WITH DIRECT LDL SERPL: 83 MG/DL — SIGNIFICANT CHANGE UP
M TB IFN-G BLD-IMP: NEGATIVE — SIGNIFICANT CHANGE UP
M TB IFN-G CD4+ BCKGRND COR BLD-ACNC: 0 IU/ML — SIGNIFICANT CHANGE UP
M TB IFN-G CD4+CD8+ BCKGRND COR BLD-ACNC: 0 IU/ML — SIGNIFICANT CHANGE UP
QUANT TB PLUS MITOGEN MINUS NIL: 8.7 IU/ML — SIGNIFICANT CHANGE UP
SPECIMEN SOURCE: SIGNIFICANT CHANGE UP
TOTAL CHOLESTEROL/HDL RATIO MEASUREMENT: 3.6 RATIO — SIGNIFICANT CHANGE UP (ref 3.4–9.6)
TRIGL SERPL-MCNC: 138 MG/DL — SIGNIFICANT CHANGE UP (ref 10–149)
TROPONIN I SERPL-MCNC: <.015 NG/ML — SIGNIFICANT CHANGE UP (ref 0.01–0.04)

## 2020-01-13 PROCEDURE — 77012 CT SCAN FOR NEEDLE BIOPSY: CPT | Mod: 26

## 2020-01-13 PROCEDURE — 32405: CPT

## 2020-01-13 PROCEDURE — 99232 SBSQ HOSP IP/OBS MODERATE 35: CPT

## 2020-01-13 PROCEDURE — 71045 X-RAY EXAM CHEST 1 VIEW: CPT | Mod: 26

## 2020-01-13 PROCEDURE — 99233 SBSQ HOSP IP/OBS HIGH 50: CPT

## 2020-01-13 RX ORDER — SODIUM CHLORIDE 9 MG/ML
500 INJECTION INTRAMUSCULAR; INTRAVENOUS; SUBCUTANEOUS ONCE
Refills: 0 | Status: COMPLETED | OUTPATIENT
Start: 2020-01-13 | End: 2020-01-13

## 2020-01-13 RX ADMIN — TAMSULOSIN HYDROCHLORIDE 0.8 MILLIGRAM(S): 0.4 CAPSULE ORAL at 21:27

## 2020-01-13 RX ADMIN — SENNA PLUS 2 TABLET(S): 8.6 TABLET ORAL at 21:27

## 2020-01-13 RX ADMIN — Medication 1000 MILLIGRAM(S): at 17:30

## 2020-01-13 RX ADMIN — Medication 10 MILLIGRAM(S): at 07:02

## 2020-01-13 RX ADMIN — Medication 1 PATCH: at 07:45

## 2020-01-13 RX ADMIN — TIOTROPIUM BROMIDE 1 CAPSULE(S): 18 CAPSULE ORAL; RESPIRATORY (INHALATION) at 17:32

## 2020-01-13 RX ADMIN — Medication 5 MILLIGRAM(S): at 07:02

## 2020-01-13 RX ADMIN — Medication 1 MILLIGRAM(S): at 07:01

## 2020-01-13 RX ADMIN — Medication 1 PATCH: at 17:28

## 2020-01-13 RX ADMIN — Medication 1000 MILLIGRAM(S): at 21:27

## 2020-01-13 RX ADMIN — Medication 1 PATCH: at 19:58

## 2020-01-13 RX ADMIN — OXYCODONE AND ACETAMINOPHEN 1 TABLET(S): 5; 325 TABLET ORAL at 21:27

## 2020-01-13 RX ADMIN — MORPHINE SULFATE 2 MILLIGRAM(S): 50 CAPSULE, EXTENDED RELEASE ORAL at 17:51

## 2020-01-13 RX ADMIN — Medication 10 MILLIGRAM(S): at 17:31

## 2020-01-13 RX ADMIN — SODIUM CHLORIDE 500 MILLILITER(S): 9 INJECTION INTRAMUSCULAR; INTRAVENOUS; SUBCUTANEOUS at 05:44

## 2020-01-13 RX ADMIN — Medication 5 MILLIGRAM(S): at 17:31

## 2020-01-13 RX ADMIN — SODIUM CHLORIDE 75 MILLILITER(S): 9 INJECTION INTRAMUSCULAR; INTRAVENOUS; SUBCUTANEOUS at 17:51

## 2020-01-13 RX ADMIN — Medication 1 MILLIGRAM(S): at 21:27

## 2020-01-13 RX ADMIN — Medication 10 MILLIGRAM(S): at 17:30

## 2020-01-13 RX ADMIN — PANTOPRAZOLE SODIUM 40 MILLIGRAM(S): 20 TABLET, DELAYED RELEASE ORAL at 07:01

## 2020-01-13 RX ADMIN — Medication 1 PATCH: at 17:32

## 2020-01-13 RX ADMIN — PANTOPRAZOLE SODIUM 40 MILLIGRAM(S): 20 TABLET, DELAYED RELEASE ORAL at 17:31

## 2020-01-13 RX ADMIN — OXYCODONE AND ACETAMINOPHEN 1 TABLET(S): 5; 325 TABLET ORAL at 22:25

## 2020-01-13 RX ADMIN — Medication 10 MILLIGRAM(S): at 08:38

## 2020-01-13 NOTE — PROGRESS NOTE ADULT - PROBLEM SELECTOR PLAN 1
R/o TB, Pulm on board - attempted sending sputums for [employer],   appreciate ID  consult   Isolation precautions, QuantiFeron, HIv and aspergillosis are all negative   resp tried to induce sputum unsuccessful    for right lung biopsy by IR on  today f/u results

## 2020-01-13 NOTE — PROGRESS NOTE ADULT - PROBLEM SELECTOR PLAN 1
-Ok to continue pentasa  -Continue dicyclomine   -Avoid NSAIDs  -Close outpatient follow up (will likely recommend outpatient capsule endoscopy to evaluate for Crohns given negative work-up thus far).

## 2020-01-13 NOTE — SWALLOW BEDSIDE ASSESSMENT ADULT - SWALLOW EVAL: DIAGNOSIS
Pt presents with adequate oral musculature for feeding purposes. Pt demonstrated adequate mastication, AP transport, oral transit time with min lingual residue s/p swallow with regular solids. Pt with timely pharyngeal swallow trigger and adequate laryngeal elevation with no overt s/s of aspiration/penetration noted. D/t pt's dx of Crohn's Disease, pt was tolerating a soft solid diet PTA; recommend dysphagia 3 soft solids/thin liquids. No s/s of dysphagia.

## 2020-01-13 NOTE — PROGRESS NOTE ADULT - SUBJECTIVE AND OBJECTIVE BOX
Patient is a 49y old  Male who presents with a chief complaint of cp (13 Jan 2020 13:39)      INTERVAL HPI / OVERNIGHT EVENTS: doing ok ,pain in left chest still present       MEDICATIONS  (STANDING):  ALPRAZolam 1 milliGRAM(s) Oral every 8 hours  busPIRone 10 milliGRAM(s) Oral two times a day  dicyclomine 10 milliGRAM(s) Oral two times a day before meals  lactulose Syrup 30 Gram(s) Oral daily  mesalamine ER Capsule 1000 milliGRAM(s) Oral four times a day with meals  nicotine - 21 mG/24Hr(s) Patch 1 patch Transdermal daily  oxybutynin 5 milliGRAM(s) Oral two times a day  pantoprazole    Tablet 40 milliGRAM(s) Oral two times a day  polyethylene glycol 3350 17 Gram(s) Oral daily  senna 2 Tablet(s) Oral at bedtime  sodium chloride 0.9%. 1000 milliLiter(s) (75 mL/Hr) IV Continuous <Continuous>  tamsulosin 0.8 milliGRAM(s) Oral at bedtime  tiotropium 18 MICROgram(s) Capsule 1 Capsule(s) Inhalation daily    MEDICATIONS  (PRN):  acetaminophen   Tablet .. 650 milliGRAM(s) Oral every 6 hours PRN Temp greater or equal to 38C (100.4F), Mild Pain (1 - 3)  albuterol/ipratropium for Nebulization. 3 milliLiter(s) Nebulizer every 6 hours PRN Shortness of Breath  ALPRAZolam 2 milliGRAM(s) Oral daily PRN anxiety  bisacodyl 5 milliGRAM(s) Oral every 12 hours PRN Constipation  bisacodyl Suppository 10 milliGRAM(s) Rectal daily PRN Constipation  morphine  - Injectable 2 milliGRAM(s) IV Push every 6 hours PRN Severe Pain (7 - 10)  ondansetron Injectable 4 milliGRAM(s) IV Push every 6 hours PRN Nausea and/or Vomiting  oxycodone    5 mG/acetaminophen 325 mG 1 Tablet(s) Oral every 6 hours PRN Moderate Pain (4 - 6)      Vital Signs Last 24 Hrs  T(C): 35.8 (13 Jan 2020 14:14), Max: 36.3 (12 Jan 2020 23:30)  T(F): 96.5 (13 Jan 2020 14:14), Max: 97.4 (12 Jan 2020 23:30)  HR: 67 (13 Jan 2020 14:14) (56 - 76)  BP: 112/73 (13 Jan 2020 14:14) (86/60 - 112/76)  BP(mean): --  RR: 17 (13 Jan 2020 14:14) (16 - 18)  SpO2: 97% (13 Jan 2020 14:14) (94% - 98%)    Review of systems:  General : no fever /chills,fatigue  CVS :+ chest pain, no palpitations  Lungs : no shortness of breath, cough  GI : no abdominal pain,vomiting, diarrhea   : no dysuria,hematuria        PHYSICAL EXAM:  General :NAD  Constitutional:  well-groomed, well-developed  Respiratory: CTAB/L  Cardiovascular: S1 and S2, RRR, no M/G/R  Gastrointestinal: BS+, soft, NT/ND  Extremities: No peripheral edema  Vascular: 2+ peripheral pulses  Skin: No rashes      LABS:    ESR 4                        14.2   6.80  )-----------( 210      ( 12 Jan 2020 07:24 )             41.8     01-12    141  |  109<H>  |  9   ----------------------------<  80  4.4   |  23  |  0.98    Ca    8.3<L>      12 Jan 2020 15:45  Phos  3.4     01-12  Mg     1.8     01-12    TPro  6.2  /  Alb  3.1<L>  /  TBili  0.3  /  DBili  x   /  AST  19  /  ALT  31  /  AlkPhos  76  01-12    Quantiferon gold:  Neg      MICROBIOLOGY:  RECENT CULTURES:        RADIOLOGY & ADDITIONAL STUDIES:

## 2020-01-13 NOTE — PROGRESS NOTE ADULT - SUBJECTIVE AND OBJECTIVE BOX
Gastroenterology  Patient seen and examined bedside resting comfortably.  Reports intermittent abdominal discomfort and chest pain.   Currently denies any abdominal pain, nausea/vomiting.   Reports that he was very anxious, improved after meds.     T(F): 97.2 (01-13-20 @ 05:20), Max: 97.5 (01-12-20 @ 10:45)  HR: 68 (01-13-20 @ 05:20) (56 - 76)  BP: 96/56 (01-13-20 @ 06:28) (86/60 - 112/76)  RR: 17 (01-13-20 @ 05:20) (16 - 18)  SpO2: 94% (01-13-20 @ 05:20) (94% - 98%)  Wt(kg): --  CAPILLARY BLOOD GLUCOSE          PHYSICAL EXAM:  General: NAD, WDWN.   Neuro:  Alert & responsive  HEENT: NCAT, EOMI, conjunctiva clear  CV: +S1+S2 regular rate and rhythm  Lung: clear to ausculation bilaterally, respirations nonlabored, good inspiratory effort  Abdomen: soft, Non Tender, No distention Normal active BS  Extremities: no cyanosis, clubbing or edema    LABS:                        14.2   6.80  )-----------( 210      ( 12 Jan 2020 07:24 )             41.8     01-12    141  |  109<H>  |  9   ----------------------------<  80  4.4   |  23  |  0.98    Ca    8.3<L>      12 Jan 2020 15:45  Phos  3.4     01-12  Mg     1.8     01-12    TPro  6.2  /  Alb  3.1<L>  /  TBili  0.3  /  DBili  x   /  AST  19  /  ALT  31  /  AlkPhos  76  01-12    LIVER FUNCTIONS - ( 12 Jan 2020 07:24 )  Alb: 3.1 g/dL / Pro: 6.2 gm/dL / ALK PHOS: 76 U/L / ALT: 31 U/L / AST: 19 U/L / GGT: x           PT/INR - ( 12 Jan 2020 07:24 )   PT: 11.5 sec;   INR: 1.03 ratio           I&O's Detail    12 Jan 2020 07:01  -  13 Jan 2020 07:00  --------------------------------------------------------  IN:    Oral Fluid: 437 mL  Total IN: 437 mL    OUT:  Total OUT: 0 mL    Total NET: 437 mL        01-12 @ 15:45    141 | 109 | 9  /8.3 | 1.8 | --  _______________________/  4.4 | 23 | 0.98                           \par

## 2020-01-13 NOTE — PROGRESS NOTE ADULT - ASSESSMENT
49M with a PMH of Barretts Disease, ?Crohn's on Pentasa, IBS presents to ED for L sided chest pain.     CT on presentation revealed 4.4 x 2.5 cm upper lobe irregular spiculated mass with central cavitation; malignancy, TB?    He reports chronic intermittent abdominal discomfort. Outpatient work-up has been negative for Crohns however he reports previous pathology about small bowel resection noted Crohns (was not able to obtain pathology). Work-up including EGD/colonoscopy otherwise negative for IBD.     Would recommend treatment of constipation as most likely etiology of abdominal discomfort is secondary to IBS and constipation.     *Plan for lung biopsy today.

## 2020-01-13 NOTE — PROGRESS NOTE ADULT - ASSESSMENT
49M with a PMH of Alfaro's Disease, Crohn's on Pentasya, IBS presents to ED for L sided chest pain since 12/29/19.Seen with :  1.Right lung mass; Pt is on Pentasya (Immunomodulator ) and lives in homeless shelter so definitely will r/o TB ? fungal infection   as smoker if that is neg will need lung mass biopsy to r/o malignancy  f/u on IR FNA pathology and AFB results   cont airborne isolation till then though with quantiferon  gold neg less likely to be TB    2.left sided chest pain : CT chest on left appears normal     2.Abdo pain : h/o Crohn's dis   says its chronic

## 2020-01-13 NOTE — CHART NOTE - NSCHARTNOTEFT_GEN_A_CORE
Vascular & Interventional Radiology Post-Procedure Note    Pre-Procedure Diagnosis: Right lung mass  Post-Procedure Diagnosis: Same as pre.  Indications for Procedure: right lung mass    : Natanael  Assistant(s): na    Procedure Details/Findings: right lung mass s/p biopsy. moderate emphysema, small parenchymal hemorrhage  Access (if applicable): na    Complications: small parenchymal hemorrhage, no hemoptysis or airway compromise  Estimated Blood Loss: Minimal  Specimen: 2 cores in formalin. 2 cores in culture cup with aspirate. sent for gram stain culture, fungal, AFB, cell count  Contrast: none  Sedation: anesthesia  Patient Condition/Disposition: stable, 1 hour recovery, then floor    Plan: f/u labs

## 2020-01-13 NOTE — SWALLOW BEDSIDE ASSESSMENT ADULT - H & P REVIEW
yes/49M with a PMH of Barretts Disease, Crohn's on Pentasya, IBS presents to ED for L sided chest pain.  Patient severely agitated and anxious when seen.  Reports that the pain is worse upon deep breaths, sometimes radiates to his back/ L shoulder.  Reports chronic cough but attributes that to his smoking history.  Also reports lost weight.  No hx of TB however has been living in homeless shelters for 8 months.  Severely anxious about continuing his medications.  Reportedly had a PPD done in August that was negative.

## 2020-01-13 NOTE — SWALLOW BEDSIDE ASSESSMENT ADULT - ORAL PHASE
Within functional limits mild lingual stasis, cleared with liquid wash independently/Lingual stasis/Within functional limits

## 2020-01-13 NOTE — SWALLOW BEDSIDE ASSESSMENT ADULT - SLP GENERAL OBSERVATIONS
Pt seen bedside, alert and oriented x3, following multistep commands. Pt answered all autobiographical/orientation questions with good accuracy.

## 2020-01-13 NOTE — PROGRESS NOTE ADULT - SUBJECTIVE AND OBJECTIVE BOX
REVIEW OF SYMPTOMS      Able to give ROS  Yes     RELIABLE No   CONSTITUTIONAL Weakness Yes  Chills No Vision changes No  ENDOCRINE No unexplained hair loss No heat or cold intolerance    ALLERGY No hives  Sore throat No   RESP Coughing blood no  Shortness of breath YES   NEURO No Headache  Confusion Pain neck No   CARDIAC No Chest pain No Palpitations   GI No Pain abdomen NO   Vomiting NO     PHYSICAL EXAM    HEENT Unremarkable PERRLA atraumatic   RESP Fair air entry EXP prolonged    Harsh breath sound Resp distres mild   CARDIAC S1 S2 No S3     NO JVD    ABDOMEN SOFT BS PRESENT NOT DISTENDED No hepatosplenomegaly PEDAL EDEMA present No calf tenderness  NO rash   GENERAL Not TOXIC looking    VITALS/LABS       1/13/2020 96f 71 115/59   1/12/2020 afeb 56 105/71   1/12/2020 W 6.8 Hb 14.2 Plt 210 Na 141 K 4.4 CO2 23 Cr .9     PT DATA/BEST PRACTICE  ALLERGY adhesives  ADVANCED DIRECTIVE       Goals of care discussion  WT  62 (1/11/2020)    BMI  19 (1/11/2020)                                                                                                           HEAD OF BED ELEVATION Yes  DYSPHAGIA EVAL Ordered 1/11/2020   DIET   mech soft (1/10)                 DVT PROPHYLAXIS    lvnx 40 (1/7)            STRESS ULCER PROPHYLAXIS   protonix 40 (1/7)            INFECTION PPLX  ECHO                ABIO       MICROBIO     1/9 Asperg galactomannan antig below 0.5 1/9 HIV n    GLYCEMIC CONTROL  PROCEDURE                                                            1/13/2020 needle rul mass     PT SUMMARY  50 yo smoker (>30 pack year hx) with Crohn's disease (on Pentasa), Alfaro's esophagus male who currently lives in homeless shelter admitted with RUL cavitary lesion  Has ho travel [place]

## 2020-01-13 NOTE — PROGRESS NOTE ADULT - ASSESSMENT
PLAN      LUNG MASS   1/9 Asperg galactomannan antig below 0.5 1/9 HIV n   1/14 ct ch 4.4 x 2.5 cm irregular spiculated mass r upper lobe with 1.1 cm central cavity   Most likely diagnosis clinically is lung ca Possibly squamous although squamous is usually central   Other possibilities include Tb Histo   Check qft gold test   needle was done on 1/13   COPD duoneb.4p (1/8) spiriva (1/8)   SMOKER Nicoderm 21 (1/7)   CROHNS Mescalamine 1.4 (1/7)   BPH tamsulosin .4 (1/7)       TIME SPENT Over 25 minutes aggregate care time spent on encounter; activities included   direct pt care, counseling and/or coordinating care reviewing notes, lab data/ imaging , discussion with multidisciplinary team/ pt /family. Risks, benefits, alternatives  discussed in detail.

## 2020-01-13 NOTE — PROGRESS NOTE ADULT - SUBJECTIVE AND OBJECTIVE BOX
Patient is a 49y old  Male who presents with a chief complaint of cp (08 Jan 2020 17:00)      OVERNIGHT EVENTS: none    MEDICATIONS  (STANDING):  ALPRAZolam 1 milliGRAM(s) Oral every 8 hours  busPIRone 10 milliGRAM(s) Oral two times a day  dicyclomine 10 milliGRAM(s) Oral two times a day before meals  lactulose Syrup 30 Gram(s) Oral daily  mesalamine ER Capsule 1000 milliGRAM(s) Oral four times a day with meals  nicotine - 21 mG/24Hr(s) Patch 1 patch Transdermal daily  oxybutynin 5 milliGRAM(s) Oral two times a day  pantoprazole    Tablet 40 milliGRAM(s) Oral two times a day  polyethylene glycol 3350 17 Gram(s) Oral daily  senna 2 Tablet(s) Oral at bedtime  sodium chloride 0.9%. 1000 milliLiter(s) (75 mL/Hr) IV Continuous <Continuous>  tamsulosin 0.8 milliGRAM(s) Oral at bedtime  tiotropium 18 MICROgram(s) Capsule 1 Capsule(s) Inhalation daily    MEDICATIONS  (PRN):  acetaminophen   Tablet .. 650 milliGRAM(s) Oral every 6 hours PRN Temp greater or equal to 38C (100.4F), Mild Pain (1 - 3)  albuterol/ipratropium for Nebulization. 3 milliLiter(s) Nebulizer every 6 hours PRN Shortness of Breath  ALPRAZolam 2 milliGRAM(s) Oral daily PRN anxiety  bisacodyl 5 milliGRAM(s) Oral every 12 hours PRN Constipation  bisacodyl Suppository 10 milliGRAM(s) Rectal daily PRN Constipation  morphine  - Injectable 2 milliGRAM(s) IV Push every 6 hours PRN Severe Pain (7 - 10)  ondansetron Injectable 4 milliGRAM(s) IV Push every 6 hours PRN Nausea and/or Vomiting  oxycodone    5 mG/acetaminophen 325 mG 1 Tablet(s) Oral every 6 hours PRN Moderate Pain (4 - 6)      Allergies    adhesives (Rash)  No Known Drug Allergies    Intolerances        SUBJECTIVE: in bed in NAD, no acute events overnight       Vital Signs Last 24 Hrs  T(C): 36.2 (13 Jan 2020 05:20), Max: 36.3 (12 Jan 2020 23:30)  T(F): 97.2 (13 Jan 2020 05:20), Max: 97.4 (12 Jan 2020 23:30)  HR: 68 (13 Jan 2020 05:20) (56 - 76)  BP: 96/56 (13 Jan 2020 06:28) (86/60 - 112/76)  BP(mean): --  RR: 17 (13 Jan 2020 05:20) (16 - 18)  SpO2: 94% (13 Jan 2020 05:20) (94% - 98%)    PHYSICAL EXAM:  GENERAL: NAD, well-groomed, well-developed  HEAD:  Atraumatic, Normocephalic  EYES: EOMI, PERRLA, conjunctiva and sclera clear  ENMT: No tonsillar erythema, exudates, or enlargement; Moist mucous membranes, Good dentition, No lesions  NECK: Supple, No JVD, Normal thyroid  CHEST/LUNG: Clear to  auscultation bilaterally; No rales, rhonchi, wheezing, or rubs  bilaterally  HEART: Regular rate and rhythm; No murmurs, rubs, or gallops  ABDOMEN: Soft,  left lower quadrant pain , Nondistended; Bowel sounds present  EXTREMITIES:  2+ Peripheral Pulses, No clubbing, cyanosis, or edema BL LE  SKIN: No rashes or lesions  NERVOUS SYSTEM:  Alert & Oriented X3, Good concentration; Motor Strength 5/5 B/L upper and lower extremities;   DTRs 2+ intact and symmetric, sensation intact BL    LABS:                                                          14.2   6.80  )-----------( 210      ( 12 Jan 2020 07:24 )             41.8   01-12    141  |  109<H>  |  9   ----------------------------<  80  4.4   |  23  |  0.98    Ca    8.3<L>      12 Jan 2020 15:45  Phos  3.4     01-12  Mg     1.8     01-12    TPro  6.2  /  Alb  3.1<L>  /  TBili  0.3  /  DBili  x   /  AST  19  /  ALT  31  /  AlkPhos  76  01-12  Hemoglobin A1C, Whole Blood in AM (01.13.20 @ 10:06)    Hemoglobin A1C, Whole Blood: 5.1: Method: Immunoassay       Reference Range                4.0-5.6%       High risk (prediabetic)        5.7-6.4%       Diabetic, diagnostic             >=6.5%       ADA diabetic treatment goal       <7.0%  The Hemoglobin A1c testing is NGSP-certified.Reference ranges are based  upon the 2010 recommendations of  the American Diabetes Association.  Interpretation may vary for children  and adolescents. %        Cultures;   CAPILLARY BLOOD GLUCOSE        Lipid panel:     CARDIAC MARKERS ( 07 Jan 2020 13:24 )  <.015 ng/mL / x     / x     / x     / x            RADIOLOGY & ADDITIONAL TESTS:      Imaging Personally Reviewed:  [ x] YES      Consultant(s) Notes Reviewed:  [x ] YES     Care Discussed with [x ] Consultants [X ] Patient [x ] Family  [x ]    [x ]  Other; RN

## 2020-01-14 DIAGNOSIS — J95.811 POSTPROCEDURAL PNEUMOTHORAX: ICD-10-CM

## 2020-01-14 LAB
HISTONE AB SER-ACNC: 1.1 UNITS — HIGH (ref 0–0.9)
NIGHT BLUE STAIN TISS: SIGNIFICANT CHANGE UP
SPECIMEN SOURCE: SIGNIFICANT CHANGE UP

## 2020-01-14 PROCEDURE — 99232 SBSQ HOSP IP/OBS MODERATE 35: CPT

## 2020-01-14 PROCEDURE — 71046 X-RAY EXAM CHEST 2 VIEWS: CPT | Mod: 26

## 2020-01-14 PROCEDURE — 99233 SBSQ HOSP IP/OBS HIGH 50: CPT

## 2020-01-14 PROCEDURE — 73030 X-RAY EXAM OF SHOULDER: CPT | Mod: 26,LT

## 2020-01-14 RX ADMIN — SODIUM CHLORIDE 75 MILLILITER(S): 9 INJECTION INTRAMUSCULAR; INTRAVENOUS; SUBCUTANEOUS at 05:18

## 2020-01-14 RX ADMIN — LACTULOSE 30 GRAM(S): 10 SOLUTION ORAL at 13:10

## 2020-01-14 RX ADMIN — ONDANSETRON 4 MILLIGRAM(S): 8 TABLET, FILM COATED ORAL at 05:17

## 2020-01-14 RX ADMIN — Medication 1 PATCH: at 07:45

## 2020-01-14 RX ADMIN — PANTOPRAZOLE SODIUM 40 MILLIGRAM(S): 20 TABLET, DELAYED RELEASE ORAL at 05:18

## 2020-01-14 RX ADMIN — Medication 1 PATCH: at 13:10

## 2020-01-14 RX ADMIN — Medication 1000 MILLIGRAM(S): at 13:10

## 2020-01-14 RX ADMIN — OXYCODONE AND ACETAMINOPHEN 1 TABLET(S): 5; 325 TABLET ORAL at 13:25

## 2020-01-14 RX ADMIN — SODIUM CHLORIDE 75 MILLILITER(S): 9 INJECTION INTRAMUSCULAR; INTRAVENOUS; SUBCUTANEOUS at 21:38

## 2020-01-14 RX ADMIN — TAMSULOSIN HYDROCHLORIDE 0.8 MILLIGRAM(S): 0.4 CAPSULE ORAL at 21:38

## 2020-01-14 RX ADMIN — Medication 1000 MILLIGRAM(S): at 08:37

## 2020-01-14 RX ADMIN — Medication 5 MILLIGRAM(S): at 05:18

## 2020-01-14 RX ADMIN — TIOTROPIUM BROMIDE 1 CAPSULE(S): 18 CAPSULE ORAL; RESPIRATORY (INHALATION) at 17:44

## 2020-01-14 RX ADMIN — Medication 5 MILLIGRAM(S): at 17:44

## 2020-01-14 RX ADMIN — Medication 1 PATCH: at 21:14

## 2020-01-14 RX ADMIN — Medication 10 MILLIGRAM(S): at 17:44

## 2020-01-14 RX ADMIN — OXYCODONE AND ACETAMINOPHEN 1 TABLET(S): 5; 325 TABLET ORAL at 14:25

## 2020-01-14 RX ADMIN — Medication 1 MILLIGRAM(S): at 21:38

## 2020-01-14 RX ADMIN — Medication 10 MILLIGRAM(S): at 05:18

## 2020-01-14 RX ADMIN — PANTOPRAZOLE SODIUM 40 MILLIGRAM(S): 20 TABLET, DELAYED RELEASE ORAL at 17:44

## 2020-01-14 RX ADMIN — ONDANSETRON 4 MILLIGRAM(S): 8 TABLET, FILM COATED ORAL at 17:50

## 2020-01-14 RX ADMIN — Medication 1000 MILLIGRAM(S): at 17:44

## 2020-01-14 RX ADMIN — SENNA PLUS 2 TABLET(S): 8.6 TABLET ORAL at 21:38

## 2020-01-14 RX ADMIN — Medication 1 MILLIGRAM(S): at 05:18

## 2020-01-14 RX ADMIN — Medication 1 MILLIGRAM(S): at 13:25

## 2020-01-14 RX ADMIN — Medication 10 MILLIGRAM(S): at 08:37

## 2020-01-14 RX ADMIN — Medication 1000 MILLIGRAM(S): at 21:38

## 2020-01-14 NOTE — CONSULT NOTE ADULT - SUBJECTIVE AND OBJECTIVE BOX
48 yo with Crohn's disease (on Pentasa) and Alfaro's esophagus presents with one week history of left sided chest pain radiating to the back.   On imaging, was found to have RUL cavitary mass with irregular borders. Pulmonary was consulted.   Overall, patient reports not feeling well for the past 6 months or so - has malaise, fatigue, wt loss, he is not sure if he has fevers, denies night sweats. He has cough for the past several months, worse in am, productive of greenish sputum. Denies recent dx of PNA. He has very poor dentation and notes frequent episodes of vomiting/reflux. Patient currently lives in a homeless shelter since August, denies known exposures to TB, reports negative PPD in the past. He traveled to [place] in August but has not been out of the country in the past 20 years.     PAST MEDICAL & SURGICAL HISTORY:  Alfaro's esophagus without dysplasia  Depression, unspecified depression type  Crohn's disease with complication, unspecified gastrointestinal tract location  ACL (anterior cruciate ligament) tear      FAMILY HISTORY:  Family history of diabetes mellitus in maternal grandmother (Mother, Grandparent)  Family history of COPD (chronic obstructive pulmonary disease) (Father)  Family history of colon cancer in father (Father)  Family history of hypertension in father (Father, Mother, Grandparent)      SOCIAL HISTORY:  Lives in a homeless shelter  current smoker, smoked 1 ppd since age 19    Allergies    No Known Allergies    Intolerances        HOME MEDICATIONS:  mesalamine    REVIEW OF SYSTEMS:    CONSTITUTIONAL: wt loss, fatigue  HEENT: chronic postnasal gtt  SKIN:  No rash or itching.  CARDIOVASCULAR:  chest pain  RESPIRATORY: +cough, sputum, no SOB  GASTROINTESTINAL:  Crohn's, reflux  GENITOURINARY:  BPH  NEUROLOGICAL:  No headache, dizziness, syncope.   MUSCULOSKELETAL:  No muscle, back pain, joint pain or stiffness.  HEMATOLOGIC:  No anemia, bleeding or bruising.  PSYCHIATRIC:  depression  ENDOCRINOLOGIC:  No reports of sweating, cold or heat intolerance. No polyuria or polydipsia.      Vital Signs Last 24 Hrs  T(C): 36 (14 Jan 2020 05:10), Max: 36.1 (13 Jan 2020 23:17)  T(F): 96.8 (14 Jan 2020 05:10), Max: 97 (13 Jan 2020 23:17)  HR: 67 (14 Jan 2020 05:10) (66 - 71)  BP: 90/54 (14 Jan 2020 05:10) (90/54 - 115/59)  BP(mean): --  RR: 16 (14 Jan 2020 05:10) (15 - 17)  SpO2: 95% (14 Jan 2020 05:10) (95% - 99%)    General:  A & O x3, NAD  Lungs:  CTAB  Cardiovascular:  good S1, S2,   Abdomen:  Soft, non-tender, non-distended, normoactive bowel sounds, no HSM  Extremities:  no calf swelling/tenderness b/l        LABS:    01-12    141  |  109<H>  |  9   ----------------------------<  80  4.4   |  23  |  0.98    Ca    8.3<L>      12 Jan 2020 15:45  Mg     1.8     01-12            RADIOLOGY & ADDITIONAL STUDIES:  < from: Xray Chest 2 Views PA/Lat (01.14.20 @ 12:37) >  INTERPRETATION:  PA and lateral views of the chest. There are no prior studies available for comparison.     CLINICAL INFORMATION: Status post right lung biopsy, with increasing right-sided pain    FINDINGS: The heart size is normal. There is a small right apical pneumothorax. Left lung is within normal limits. Questionable trace right pleural effusion. Faint patchy opacities at the right lung apex. The bony structures are unremarkable.    IMPRESSION: New small right apical pneumothorax compared to 01/13/2020.

## 2020-01-14 NOTE — DIETITIAN INITIAL EVALUATION ADULT. - ENERGY NEEDS
Height (cm): 177.8 (01-08)  Weight (kg): 62.9 (01-08)  BMI (kg/m2): 19.9 (01-08)  IBW:  75.2 kg        % IBW:  83%           UBW: ?         %UBW: ?

## 2020-01-14 NOTE — PROGRESS NOTE ADULT - PROBLEM SELECTOR PLAN 1
R/o TB, Pulm on board - attempted sending sputums for AFB,   appreciate ID  consult   Isolation precautions, QuantiFeron, HIV, histoplasma and aspergillosis are all negative   resp tried to induce sputum unsuccessful    s/p  right lung biopsy by IR on  1/13/2020 f/u results

## 2020-01-14 NOTE — DIETITIAN INITIAL EVALUATION ADULT. - PROBLEM SELECTOR PLAN 1
R/o TB, Pulm on board - sending sputums for AFB, Will call ID in the [facility]  Isolation precautions, QuantiFeron drawn, results pending

## 2020-01-14 NOTE — DIETITIAN INITIAL EVALUATION ADULT. - OTHER INFO
Pt reports depressed oral intake PTA as he was living in homeless shelter & dislike to food, wt. loss reported, but pt was unable to give detailed wt. history.  Pt stated that he likes to drink Gatorade.  Pt requested Ensure Clear.  Pt does not eat chicken, c some intolerance to milk, and likes soups, food dislike reported, alternative menu choices explained.  Pt is on isolation, menu selection to be reviewed c pt over the phone.

## 2020-01-14 NOTE — PROGRESS NOTE ADULT - SUBJECTIVE AND OBJECTIVE BOX
Patient is a 49y old  Male who presents with a chief complaint of cp (08 Jan 2020 17:00)      OVERNIGHT EVENTS: none    MEDICATIONS  (STANDING):  ALPRAZolam 1 milliGRAM(s) Oral every 8 hours  busPIRone 10 milliGRAM(s) Oral two times a day  dicyclomine 10 milliGRAM(s) Oral two times a day before meals  lactulose Syrup 30 Gram(s) Oral daily  mesalamine ER Capsule 1000 milliGRAM(s) Oral four times a day with meals  nicotine - 21 mG/24Hr(s) Patch 1 patch Transdermal daily  oxybutynin 5 milliGRAM(s) Oral two times a day  pantoprazole    Tablet 40 milliGRAM(s) Oral two times a day  polyethylene glycol 3350 17 Gram(s) Oral daily  senna 2 Tablet(s) Oral at bedtime  sodium chloride 0.9%. 1000 milliLiter(s) (75 mL/Hr) IV Continuous <Continuous>  tamsulosin 0.8 milliGRAM(s) Oral at bedtime  tiotropium 18 MICROgram(s) Capsule 1 Capsule(s) Inhalation daily    MEDICATIONS  (PRN):  acetaminophen   Tablet .. 650 milliGRAM(s) Oral every 6 hours PRN Temp greater or equal to 38C (100.4F), Mild Pain (1 - 3)  albuterol/ipratropium for Nebulization. 3 milliLiter(s) Nebulizer every 6 hours PRN Shortness of Breath  ALPRAZolam 2 milliGRAM(s) Oral daily PRN anxiety  bisacodyl 5 milliGRAM(s) Oral every 12 hours PRN Constipation  bisacodyl Suppository 10 milliGRAM(s) Rectal daily PRN Constipation  morphine  - Injectable 2 milliGRAM(s) IV Push every 6 hours PRN Severe Pain (7 - 10)  ondansetron Injectable 4 milliGRAM(s) IV Push every 6 hours PRN Nausea and/or Vomiting  oxycodone    5 mG/acetaminophen 325 mG 1 Tablet(s) Oral every 6 hours PRN Moderate Pain (4 - 6)      Allergies    adhesives (Rash)  No Known Drug Allergies    Intolerances        SUBJECTIVE: in bed in NAD, no acute events overnight     Vital Signs Last 24 Hrs  T(C): 36 (14 Jan 2020 05:10), Max: 36.2 (13 Jan 2020 12:30)  T(F): 96.8 (14 Jan 2020 05:10), Max: 97.2 (13 Jan 2020 12:30)  HR: 67 (14 Jan 2020 05:10) (62 - 71)  BP: 90/54 (14 Jan 2020 05:10) (90/54 - 115/59)  BP(mean): --  RR: 16 (14 Jan 2020 05:10) (15 - 17)  SpO2: 95% (14 Jan 2020 05:10) (95% - 99%)    PHYSICAL EXAM:  GENERAL: NAD, well-groomed, well-developed  HEAD:  Atraumatic, Normocephalic  EYES: EOMI, PERRLA, conjunctiva and sclera clear  ENMT: No tonsillar erythema, exudates, or enlargement; Moist mucous membranes, Good dentition, No lesions  NECK: Supple, No JVD, Normal thyroid  CHEST/LUNG: Clear to  auscultation bilaterally; No rales, rhonchi, wheezing, or rubs  bilaterally  HEART: Regular rate and rhythm; No murmurs, rubs, or gallops  ABDOMEN: Soft,  left lower quadrant pain , Nondistended; Bowel sounds present  EXTREMITIES:  2+ Peripheral Pulses, No clubbing, cyanosis, or edema BL LE  SKIN: No rashes or lesions  NERVOUS SYSTEM:  Alert & Oriented X3, Good concentration; Motor Strength 5/5 B/L upper and lower extremities;   DTRs 2+ intact and symmetric, sensation intact BL    LABS:                                  Lipid Profile in AM (01.13.20 @ 13:51)    Total Cholesterol/HDL Ratio Measurement: 3.6 RATIO    Cholesterol, Serum: 153 mg/dL    Triglycerides, Serum: 138 mg/dL    HDL Cholesterol, Serum: 42: HDL Levels >/= 60 mg/dL are considered beneficial and a "negative" risk  factor.  Effective 08/15/2018: New reference range and interpretive comment. mg/dL    Direct LDL: 83: LDL Cholesterol (mg/dL) --- Interpretive Comment (for adults 18 and over)  Optimal LDL Level may vary based on clinical situation  Below 70                   Ideal for people at very high risk of heart  disease  Below 100                  Ideal for people at risk of heart disease  100 - 129                    Near [place]  130 - 159                    Borderline high  160 - 189                    High  190 and Above           Very high mg/dL                    Hemoglobin A1C, Whole Blood in AM (01.13.20 @ 10:06)    Hemoglobin A1C, Whole Blood: 5.1: Method: Immunoassay       Reference Range                4.0-5.6%       High risk (prediabetic)        5.7-6.4%       Diabetic, diagnostic             >=6.5%       ADA diabetic treatment goal       <7.0%  The Hemoglobin A1c testing is NGSP-certified.Reference ranges are based  upon the 2010 recommendations of  the American Diabetes Association.  Interpretation may vary for children  and adolescents. %        Cultures;   CAPILLARY BLOOD GLUCOSE        Lipid panel:     CARDIAC MARKERS ( 07 Jan 2020 13:24 )  <.015 ng/mL / x     / x     / x     / x            RADIOLOGY & ADDITIONAL TESTS:      Imaging Personally Reviewed:  [ x] YES      Consultant(s) Notes Reviewed:  [x ] YES     Care Discussed with [x ] Consultants [X ] Patient [x ] Family  [x ]    [x ]  Other; RN Patient is a 49y old  Male who presents with a chief complaint of cp (08 Jan 2020 17:00)      OVERNIGHT EVENTS: none    MEDICATIONS  (STANDING):  ALPRAZolam 1 milliGRAM(s) Oral every 8 hours  busPIRone 10 milliGRAM(s) Oral two times a day  dicyclomine 10 milliGRAM(s) Oral two times a day before meals  lactulose Syrup 30 Gram(s) Oral daily  mesalamine ER Capsule 1000 milliGRAM(s) Oral four times a day with meals  nicotine - 21 mG/24Hr(s) Patch 1 patch Transdermal daily  oxybutynin 5 milliGRAM(s) Oral two times a day  pantoprazole    Tablet 40 milliGRAM(s) Oral two times a day  polyethylene glycol 3350 17 Gram(s) Oral daily  senna 2 Tablet(s) Oral at bedtime  sodium chloride 0.9%. 1000 milliLiter(s) (75 mL/Hr) IV Continuous <Continuous>  tamsulosin 0.8 milliGRAM(s) Oral at bedtime  tiotropium 18 MICROgram(s) Capsule 1 Capsule(s) Inhalation daily    MEDICATIONS  (PRN):  acetaminophen   Tablet .. 650 milliGRAM(s) Oral every 6 hours PRN Temp greater or equal to 38C (100.4F), Mild Pain (1 - 3)  albuterol/ipratropium for Nebulization. 3 milliLiter(s) Nebulizer every 6 hours PRN Shortness of Breath  ALPRAZolam 2 milliGRAM(s) Oral daily PRN anxiety  bisacodyl 5 milliGRAM(s) Oral every 12 hours PRN Constipation  bisacodyl Suppository 10 milliGRAM(s) Rectal daily PRN Constipation  morphine  - Injectable 2 milliGRAM(s) IV Push every 6 hours PRN Severe Pain (7 - 10)  ondansetron Injectable 4 milliGRAM(s) IV Push every 6 hours PRN Nausea and/or Vomiting  oxycodone    5 mG/acetaminophen 325 mG 1 Tablet(s) Oral every 6 hours PRN Moderate Pain (4 - 6)      Allergies    adhesives (Rash)  No Known Drug Allergies    Intolerances        SUBJECTIVE: in bed in NAD, no acute events overnight     Vital Signs Last 24 Hrs  T(C): 36 (14 Jan 2020 05:10), Max: 36.2 (13 Jan 2020 12:30)  T(F): 96.8 (14 Jan 2020 05:10), Max: 97.2 (13 Jan 2020 12:30)  HR: 67 (14 Jan 2020 05:10) (62 - 71)  BP: 90/54 (14 Jan 2020 05:10) (90/54 - 115/59)  BP(mean): --  RR: 16 (14 Jan 2020 05:10) (15 - 17)  SpO2: 95% (14 Jan 2020 05:10) (95% - 99%)    PHYSICAL EXAM:  GENERAL: NAD, well-groomed, well-developed  HEAD:  Atraumatic, Normocephalic  EYES: EOMI, PERRLA, conjunctiva and sclera clear  ENMT: No tonsillar erythema, exudates, or enlargement; Moist mucous membranes, Good dentition, No lesions  NECK: Supple, No JVD, Normal thyroid  CHEST/LUNG: Clear to  auscultation bilaterally; No rales, rhonchi, wheezing, or rubs  bilaterally  HEART: Regular rate and rhythm; No murmurs, rubs, or gallops  ABDOMEN: Soft,  left lower quadrant pain , Nondistended; Bowel sounds present  EXTREMITIES:  2+ Peripheral Pulses, No clubbing, cyanosis, or edema BL LE  SKIN: No rashes or lesions  NERVOUS SYSTEM:  Alert & Oriented X3, Good concentration; Motor Strength 5/5 B/L upper and lower extremities;   DTRs 2+ intact and symmetric, sensation intact BL    LABS:                                  Lipid Profile in AM (01.13.20 @ 13:51)    Total Cholesterol/HDL Ratio Measurement: 3.6 RATIO    Cholesterol, Serum: 153 mg/dL    Triglycerides, Serum: 138 mg/dL    HDL Cholesterol, Serum: 42: HDL Levels >/= 60 mg/dL are considered beneficial and a "negative" risk  factor.  Effective 08/15/2018: New reference range and interpretive comment. mg/dL    Direct LDL: 83: LDL Cholesterol (mg/dL) --- Interpretive Comment (for adults 18 and over)  Optimal LDL Level may vary based on clinical situation  Below 70                   Ideal for people at very high risk of heart  disease  Below 100                  Ideal for people at risk of heart disease  100 - 129                    Near [place]  130 - 159                    Borderline high  160 - 189                    High  190 and Above           Very high mg/dL                    Hemoglobin A1C, Whole Blood in AM (01.13.20 @ 10:06)    Hemoglobin A1C, Whole Blood: 5.1: Method: Immunoassay       Reference Range                4.0-5.6%       High risk (prediabetic)        5.7-6.4%       Diabetic, diagnostic             >=6.5%       ADA diabetic treatment goal       <7.0%  The Hemoglobin A1c testing is NGSP-certified.Reference ranges are based  upon the 2010 recommendations of  the American Diabetes Association.  Interpretation may vary for children  and adolescents. %        Cultures;   CAPILLARY BLOOD GLUCOSE        Lipid panel:     CARDIAC MARKERS ( 07 Jan 2020 13:24 )  <.015 ng/mL / x     / x     / x     / x            RADIOLOGY & ADDITIONAL TESTS:      < from: Xray Shoulder Transcapula View, Left (01.14.20 @ 12:38) >  INTERPRETATION:  Left shoulder with transscapular view. Patient has pain with lifting arm behind back.    Total of 4 views obtained.    The shoulder itself is rather unremarkable.    There is a fairly significant old fracture deformity of the distal two third shaft of the clavicle with exuberant new bone formation that may relate to patient'ssymptoms.    Again noted is a right upper lobe mass medially just below the medial right clavicle which was recently biopsied.    IMPRESSION: Old left fracture deformity of the left clavicle possibly relating to patient's symptoms.      < from: Xray Chest 2 Views PA/Lat (01.14.20 @ 12:37) >    IMPRESSION: New small right apical pneumothorax compared to 01/13/2020.        < end of copied text >      < end of copied text >      Imaging Personally Reviewed:  [ x] YES      Consultant(s) Notes Reviewed:  [x ] YES     Care Discussed with [x ] Consultants [X ] Patient [x ] Family  [x ]    [x ]  Other; RN

## 2020-01-14 NOTE — PROGRESS NOTE ADULT - SUBJECTIVE AND OBJECTIVE BOX
Patient s/p right upper lobe lung lesion aspiration/biopsy.  Todays CXR shows small apical PTX.  Pt c/o right sided chest pain.  Insertion site is CDI.  Pt satting 95% on RA,  BP is low, nurse aware and will repeat.  No new labs today.  Specimen gram stain is showing no growth, cx, fungal, cytology, pathology and other labs still pending.  NEG AFB        PHYSICAL EXAM:    Vital Signs Last 24 Hrs  T(C): 36 (14 Jan 2020 05:10), Max: 36.1 (13 Jan 2020 23:17)  T(F): 96.8 (14 Jan 2020 05:10), Max: 97 (13 Jan 2020 23:17)  HR: 67 (14 Jan 2020 05:10) (66 - 71)  BP: 90/54 (14 Jan 2020 05:10) (90/54 - 115/59)  BP(mean): --  RR: 16 (14 Jan 2020 05:10) (15 - 17)  SpO2: 95% (14 Jan 2020 05:10) (95% - 99%)    General:  A & O x3, NAD  Lungs:  CTAB  Cardiovascular:  good S1, S2,   Abdomen:  Soft, non-tender, non-distended, normoactive bowel sounds, no HSM  Extremities:  no calf swelling/tenderness b/l        LABS:    01-12    141  |  109<H>  |  9   ----------------------------<  80  4.4   |  23  |  0.98    Ca    8.3<L>      12 Jan 2020 15:45  Mg     1.8     01-12            RADIOLOGY & ADDITIONAL STUDIES:  < from: Xray Chest 2 Views PA/Lat (01.14.20 @ 12:37) >  INTERPRETATION:  PA and lateral views of the chest. There are no prior studies available for comparison.     CLINICAL INFORMATION: Status post right lung biopsy, with increasing right-sided pain    FINDINGS: The heart size is normal. There is a small right apical pneumothorax. Left lung is within normal limits. Questionable trace right pleural effusion. Faint patchy opacities at the right lung apex. The bony structures are unremarkable.    IMPRESSION: New small right apical pneumothorax compared to 01/13/2020.    < end of copied text > Patient s/p right upper lobe lung lesion aspiration/biopsy.          PHYSICAL EXAM:    Vital Signs Last 24 Hrs  T(C): 36 (14 Jan 2020 05:10), Max: 36.1 (13 Jan 2020 23:17)  T(F): 96.8 (14 Jan 2020 05:10), Max: 97 (13 Jan 2020 23:17)  HR: 67 (14 Jan 2020 05:10) (66 - 71)  BP: 90/54 (14 Jan 2020 05:10) (90/54 - 115/59)  BP(mean): --  RR: 16 (14 Jan 2020 05:10) (15 - 17)  SpO2: 95% (14 Jan 2020 05:10) (95% - 99%)    General:  A & O x3, NAD  Lungs:  CTAB  Cardiovascular:  good S1, S2,   Abdomen:  Soft, non-tender, non-distended, normoactive bowel sounds, no HSM  Extremities:  no calf swelling/tenderness b/l        LABS:    01-12    141  |  109<H>  |  9   ----------------------------<  80  4.4   |  23  |  0.98    Ca    8.3<L>      12 Jan 2020 15:45  Mg     1.8     01-12            RADIOLOGY & ADDITIONAL STUDIES:  < from: Xray Chest 2 Views PA/Lat (01.14.20 @ 12:37) >  INTERPRETATION:  PA and lateral views of the chest. There are no prior studies available for comparison.     CLINICAL INFORMATION: Status post right lung biopsy, with increasing right-sided pain    FINDINGS: The heart size is normal. There is a small right apical pneumothorax. Left lung is within normal limits. Questionable trace right pleural effusion. Faint patchy opacities at the right lung apex. The bony structures are unremarkable.    IMPRESSION: New small right apical pneumothorax compared to 01/13/2020.    < end of copied text >

## 2020-01-14 NOTE — DIETITIAN INITIAL EVALUATION ADULT. - PHYSICAL APPEARANCE
other (specify)/BMI=19.9(01/09/20), no edema noted/underweight Nutrition focused physical exam conducted; Subcutaneous fat Exam;  [ Moderate  ]  Orbital fat pads region,  [ Moderate  ]Buccal fat region,  [ Mild  ]triceps region, [ Moderate  ]ribs region.  Muscle Exam; [  Moderate ]temples region, [ Moderate  ]clavicle region, [ Moderate   ]shoulder region, [ Mild  ]Scapula region, [ Mild  ]Interosseous region, [ Moderate  ]thigh region, [ Mild   ]Calf region

## 2020-01-14 NOTE — PROGRESS NOTE ADULT - ASSESSMENT
49M with a PMH of Barretts Disease, ?Crohn's on Pentasa, IBS presents to ED for L sided chest pain.     CT on presentation revealed 4.4 x 2.5 cm upper lobe irregular spiculated mass with central cavitation; malignancy, TB?    He reports chronic intermittent abdominal discomfort. Outpatient work-up has been negative for Crohns however he reports previous pathology about small bowel resection noted Crohns (was not able to obtain pathology). Work-up including EGD/colonoscopy otherwise negative for IBD. Would recommend treatment of constipation as most likely etiology of abdominal discomfort is secondary to IBS and constipation.     *S/p lung biopsy 1/13

## 2020-01-14 NOTE — DIETITIAN INITIAL EVALUATION ADULT. - PERTINENT MEDS FT
MEDICATIONS  (STANDING):  ALPRAZolam 1 milliGRAM(s) Oral every 8 hours  busPIRone 10 milliGRAM(s) Oral two times a day  dicyclomine 10 milliGRAM(s) Oral two times a day before meals  lactulose Syrup 30 Gram(s) Oral daily  mesalamine ER Capsule 1000 milliGRAM(s) Oral four times a day with meals  nicotine - 21 mG/24Hr(s) Patch 1 patch Transdermal daily  oxybutynin 5 milliGRAM(s) Oral two times a day  pantoprazole    Tablet 40 milliGRAM(s) Oral two times a day  polyethylene glycol 3350 17 Gram(s) Oral daily  senna 2 Tablet(s) Oral at bedtime  sodium chloride 0.9%. 1000 milliLiter(s) (75 mL/Hr) IV Continuous <Continuous>  tamsulosin 0.8 milliGRAM(s) Oral at bedtime  tiotropium 18 MICROgram(s) Capsule 1 Capsule(s) Inhalation daily    MEDICATIONS  (PRN):  acetaminophen   Tablet .. 650 milliGRAM(s) Oral every 6 hours PRN Temp greater or equal to 38C (100.4F), Mild Pain (1 - 3)  albuterol/ipratropium for Nebulization. 3 milliLiter(s) Nebulizer every 6 hours PRN Shortness of Breath  ALPRAZolam 2 milliGRAM(s) Oral daily PRN anxiety  bisacodyl 5 milliGRAM(s) Oral every 12 hours PRN Constipation  bisacodyl Suppository 10 milliGRAM(s) Rectal daily PRN Constipation  morphine  - Injectable 2 milliGRAM(s) IV Push every 6 hours PRN Severe Pain (7 - 10)  ondansetron Injectable 4 milliGRAM(s) IV Push every 6 hours PRN Nausea and/or Vomiting  oxycodone    5 mG/acetaminophen 325 mG 1 Tablet(s) Oral every 6 hours PRN Moderate Pain (4 - 6)

## 2020-01-14 NOTE — DIETITIAN INITIAL EVALUATION ADULT. - PERTINENT LABORATORY DATA
01-12 Na141 mmol/L Glu 80 mg/dL K+ 4.4 mmol/L Cr  0.98 mg/dL BUN 9 mg/dL 01-12 Phos 3.4 mg/dL 01-12 Alb 3.1 g/dL<L> 01-13 [id number] 5.1 % 01-13 Chol 153 mg/dL LDL 83 mg/dL HDL 42 mg/dL Trig 138 mg/dL01-12 ALT 31 U/L AST 19 U/L Alkaline Phosphatase 76 U/L

## 2020-01-14 NOTE — CONSULT NOTE ADULT - PROVIDER SPECIALTY LIST ADULT
Pulmonology
Infectious Disease
Intervent Radiology
TeleHospitalist
Thoracic Surgery
Gastroenterology

## 2020-01-14 NOTE — PROGRESS NOTE ADULT - PROBLEM SELECTOR PLAN 4
bentyl Pentasa 1000mg qid with meals   GI consult yanelys  reviewed pt known to them   continue with  supportive care

## 2020-01-14 NOTE — PROGRESS NOTE ADULT - ASSESSMENT
50 yo male ex-smoker s/p right upper lobe lung lesion aspiration/biopsy, now with resultant PTX 2/2 biopsy.     Pt c/o right sided chest pain.  Insertion site is CDI.  Pt satting 95% on RA,  BP is low, nurse aware and will repeat.  No new labs today.  Specimen gram stain is showing no growth, cx, fungal, cytology, pathology and other labs still pending.  NEG [employer]  Pt stable at this time    Plan  -CT surgery consulted  -Nurse to repeat blood pressure  -pain management  -f/u final specimen results 48 yo male ex-smoker s/p right upper lobe lung lesion aspiration/biopsy, now with resultant small apical PTX 2/2 biopsy.     Pt c/o right sided chest pain.  Insertion site is CDI.  Pt satting 95% on RA,  BP is low, nurse aware and will repeat.  No new labs today.  Specimen gram stain is showing no growth, cx, fungal, cytology, pathology and other labs still pending.  NEG [employer]  Pt stable at this time    Plan  -CT surgery consulted  -Nurse to repeat blood pressure  -pain management  -f/u final specimen results

## 2020-01-14 NOTE — PROGRESS NOTE ADULT - ASSESSMENT
PLAN      LUNG MASS   1/9 Asperg galactomannan antig below 0.5 1/9 HIV n   1/14 ct ch 4.4 x 2.5 cm irregular spiculated mass r upper lobe with 1.1 cm central cavity   Most likely diagnosis clinically is lung ca Possibly squamous although squamous is usually central   Other possibilities include Tb Histo   Check qft gold test   needle was done on 1/13   Susp for Tb is low Consider dc resp isolation if ID agrees  PNEUMOTHORAX   1/14/2020 CXR sm apical r pntx   Monitor serially   COPD duoneb.4p (1/8) spiriva (1/8)   SMOKER Nicoderm 21 (1/7)   CROHNS Mescalamine 1.4 (1/7)   BPH tamsulosin .4 (1/7)       TIME SPENT Over 25 minutes aggregate care time spent on encounter; activities included   direct pt care, counseling and/or coordinating care reviewing notes, lab data/ imaging , discussion with multidisciplinary team/ pt /family. Risks, benefits, alternatives  discussed in detail.

## 2020-01-14 NOTE — PROGRESS NOTE ADULT - SUBJECTIVE AND OBJECTIVE BOX
Gastroenterology  Patient seen and examined bedside resting comfortably.  No complaints offered.   S/p lung biopsy yesterday     T(F): 96.8 (01-14-20 @ 05:10), Max: 97.2 (01-13-20 @ 12:30)  HR: 67 (01-14-20 @ 05:10) (62 - 71)  BP: 90/54 (01-14-20 @ 05:10) (90/54 - 115/59)  RR: 16 (01-14-20 @ 05:10) (15 - 17)  SpO2: 95% (01-14-20 @ 05:10) (95% - 99%)  Wt(kg): --  CAPILLARY BLOOD GLUCOSE        PHYSICAL EXAM:  General: NAD, WDWN.   Neuro:  Alert & responsive  HEENT: NCAT, EOMI, conjunctiva clear  CV: +S1+S2 regular rate and rhythm  Lung: clear to ausculation bilaterally, respirations nonlabored, good inspiratory effort  Abdomen: soft, mild tenderness to deep palpation diffusely, No distention Normal active BS  Extremities: no cyanosis, clubbing or edema    LABS:    01-12    141  |  109<H>  |  9   ----------------------------<  80  4.4   |  23  |  0.98    Ca    8.3<L>      12 Jan 2020 15:45  Mg     1.8     01-12          I&O's Detail    13 Jan 2020 07:01  -  14 Jan 2020 07:00  --------------------------------------------------------  IN:    Oral Fluid: 100 mL    sodium chloride 0.9%.: 900 mL  Total IN: 1000 mL    OUT:  Total OUT: 0 mL    Total NET: 1000 mL        01-12 @ 15:45    141 | 109 | 9  /8.3 | 1.8 | --  _______________________/  4.4 | 23 | 0.98                           \par

## 2020-01-14 NOTE — CHART NOTE - NSCHARTNOTEFT_GEN_A_CORE
Upon Nutritional Assessment by the Registered Dietitian your patient was determined to meet criteria / has evidence of the following diagnosis/diagnoses:          [ ]  Mild Protein Calorie Malnutrition        [x ]  Moderate Protein Calorie Malnutrition        [ ] Severe Protein Calorie Malnutrition        [ ] Unspecified Protein Calorie Malnutrition        [ ] Underweight / BMI <19        [ ] Morbid Obesity / BMI > 40      Findings as based on:  •  Comprehensive nutrition assessment and consultation  •  Calorie counts (nutrient intake analysis)  •  Food acceptance and intake status from observations by staff  •  Follow up  •  Patient education  •  Intervention secondary to interdisciplinary rounds  •   concerns      Treatment:    The following diet has been recommended:  soft, Ensure Clear 8 oz 3x/day (720 wu, 24 gm pro) , Magic Cup(thickened supplement) 4 oz 2 x daily=580 calories, 18 grams protein       PROVIDER Section:     By signing this assessment you are acknowledging and agree with the diagnosis/diagnoses assigned by the Registered Dietitian    Comments:

## 2020-01-14 NOTE — PROGRESS NOTE ADULT - PROBLEM SELECTOR PROBLEM 4
Irritable bowel syndrome, unspecified type Crohn's disease with complication, unspecified gastrointestinal tract location

## 2020-01-14 NOTE — DIETITIAN INITIAL EVALUATION ADULT. - FACTORS AFF FOOD INTAKE
other (specify)/persistent constipation/persistent nausea/swallowing difficulty @ times, disliked mechanical soft diet, tolerating soft foods

## 2020-01-14 NOTE — PROGRESS NOTE ADULT - PROBLEM SELECTOR PROBLEM 3
Crohn's disease with complication, unspecified gastrointestinal tract location Alfaro's esophagus without dysplasia

## 2020-01-14 NOTE — PROGRESS NOTE ADULT - PROBLEM SELECTOR PLAN 10
c/o left side chest pain on and off for last 2 weeks but just telling me this on 1/12/2020   monitor on   telemetry    ekg noted wnl n acute st or t wave changes  f/u  echo   cardiac enzymes negative times 3   hgba1c wnl   lipid profile noted wnl    f/u shoulder xray may be musculoskeletal c/o left side chest pain on and off for last 2 weeks but just telling me this on 1/12/2020   monitor on   telemetry    ekg noted wnl n acute st or t wave changes  f/u  echo   cardiac enzymes negative times 3   hgba1c wnl   lipid profile noted wnl    shoulder xray  as above shows old left clavicle fracture

## 2020-01-14 NOTE — PROGRESS NOTE ADULT - SUBJECTIVE AND OBJECTIVE BOX
REVIEW OF SYMPTOMS      Able to give ROS  Yes     RELIABLE No   CONSTITUTIONAL Weakness Yes  Chills No Vision changes No  ENDOCRINE No unexplained hair loss No heat or cold intolerance    ALLERGY No hives  Sore throat No   RESP Coughing blood no  Shortness of breath YES   NEURO No Headache  Confusion Pain neck No   CARDIAC No Chest pain No Palpitations   GI No Pain abdomen NO   Vomiting NO     PHYSICAL EXAM    HEENT Unremarkable PERRLA atraumatic   RESP Fair air entry EXP prolonged    Harsh breath sound Resp distres mild   CARDIAC S1 S2 No S3     NO JVD    ABDOMEN SOFT BS PRESENT NOT DISTENDED No hepatosplenomegaly PEDAL EDEMA present No calf tenderness  NO rash   GENERAL Not TOXIC looking    VITALS/LABS       1/14/2020 afeb 67 90/58  1/13/2020 96f 71 115/59   1/12/2020 afeb 56 105/71   1/12/2020 W 6.8 Hb 14.2 Plt 210 Na 141 K 4.4 CO2 23 Cr .9     PT DATA/BEST PRACTICE  ALLERGY adhesives  ADVANCED DIRECTIVE       Goals of care discussion  WT  62 (1/11/2020)    BMI  19 (1/11/2020)                                                                                                           HEAD OF BED ELEVATION Yes  DYSPHAGIA EVAL Ordered 1/11/2020   DIET   mech soft (1/10)     IV F                                                 DVT PROPHYLAXIS    lvnx 40 (1/7)            STRESS ULCER PROPHYLAXIS   protonix 40 (1/7)            INFECTION PPLX  ECHO                ABIO       MICROBIO     1/9 Asperg galactomannan antig below 0.5 1/9 HIV n    GLYCEMIC CONTROL  PROCEDURE                                                            1/13/2020 needle rul mass     PT SUMMARY  50 yo smoker (>30 pack year hx) with Crohn's disease (on Pentasa), Alfaro's esophagus male who currently lives in homeless shelter admitted with RUL cavitary lesion  Has ho travel [place]

## 2020-01-14 NOTE — PROGRESS NOTE ADULT - PROBLEM SELECTOR PLAN 3
Pentasa 1000mg qid with meals   GI consult yanelys  reviewed pt known to them   continue with  supportive care PPI BID

## 2020-01-14 NOTE — DIETITIAN INITIAL EVALUATION ADULT. - ALTERNATE MEANS OF NUTRITION
+ Ensure Clear 8 oz 3x/day (720 wu, 24 gm pro) , Magic Cup(thickened supplement) 4 oz 2 x daily=580 calories, 18 grams protein

## 2020-01-15 PROCEDURE — 71045 X-RAY EXAM CHEST 1 VIEW: CPT | Mod: 26

## 2020-01-15 PROCEDURE — 99233 SBSQ HOSP IP/OBS HIGH 50: CPT

## 2020-01-15 PROCEDURE — 99232 SBSQ HOSP IP/OBS MODERATE 35: CPT

## 2020-01-15 RX ORDER — SENNA PLUS 8.6 MG/1
2 TABLET ORAL AT BEDTIME
Refills: 0 | Status: DISCONTINUED | OUTPATIENT
Start: 2020-01-15 | End: 2020-01-15

## 2020-01-15 RX ORDER — POLYETHYLENE GLYCOL 3350 17 G/17G
17 POWDER, FOR SOLUTION ORAL
Refills: 0 | Status: DISCONTINUED | OUTPATIENT
Start: 2020-01-15 | End: 2020-01-22

## 2020-01-15 RX ORDER — ENOXAPARIN SODIUM 100 MG/ML
40 INJECTION SUBCUTANEOUS DAILY
Refills: 0 | Status: DISCONTINUED | OUTPATIENT
Start: 2020-01-15 | End: 2020-01-22

## 2020-01-15 RX ORDER — SODIUM CHLORIDE 9 MG/ML
500 INJECTION INTRAMUSCULAR; INTRAVENOUS; SUBCUTANEOUS ONCE
Refills: 0 | Status: COMPLETED | OUTPATIENT
Start: 2020-01-15 | End: 2020-01-15

## 2020-01-15 RX ORDER — LACTULOSE 10 G/15ML
10 SOLUTION ORAL DAILY
Refills: 0 | Status: DISCONTINUED | OUTPATIENT
Start: 2020-01-15 | End: 2020-01-15

## 2020-01-15 RX ADMIN — Medication 5 MILLIGRAM(S): at 18:03

## 2020-01-15 RX ADMIN — SODIUM CHLORIDE 75 MILLILITER(S): 9 INJECTION INTRAMUSCULAR; INTRAVENOUS; SUBCUTANEOUS at 18:02

## 2020-01-15 RX ADMIN — ONDANSETRON 4 MILLIGRAM(S): 8 TABLET, FILM COATED ORAL at 13:31

## 2020-01-15 RX ADMIN — Medication 1 MILLIGRAM(S): at 13:31

## 2020-01-15 RX ADMIN — PANTOPRAZOLE SODIUM 40 MILLIGRAM(S): 20 TABLET, DELAYED RELEASE ORAL at 18:03

## 2020-01-15 RX ADMIN — OXYCODONE AND ACETAMINOPHEN 1 TABLET(S): 5; 325 TABLET ORAL at 19:07

## 2020-01-15 RX ADMIN — Medication 1 PATCH: at 13:22

## 2020-01-15 RX ADMIN — Medication 1 MILLIGRAM(S): at 22:54

## 2020-01-15 RX ADMIN — ONDANSETRON 4 MILLIGRAM(S): 8 TABLET, FILM COATED ORAL at 22:54

## 2020-01-15 RX ADMIN — SODIUM CHLORIDE 75 MILLILITER(S): 9 INJECTION INTRAMUSCULAR; INTRAVENOUS; SUBCUTANEOUS at 13:21

## 2020-01-15 RX ADMIN — Medication 1000 MILLIGRAM(S): at 13:25

## 2020-01-15 RX ADMIN — Medication 5 MILLIGRAM(S): at 06:33

## 2020-01-15 RX ADMIN — Medication 1000 MILLIGRAM(S): at 22:54

## 2020-01-15 RX ADMIN — PANTOPRAZOLE SODIUM 40 MILLIGRAM(S): 20 TABLET, DELAYED RELEASE ORAL at 06:33

## 2020-01-15 RX ADMIN — TIOTROPIUM BROMIDE 1 CAPSULE(S): 18 CAPSULE ORAL; RESPIRATORY (INHALATION) at 13:24

## 2020-01-15 RX ADMIN — ENOXAPARIN SODIUM 40 MILLIGRAM(S): 100 INJECTION SUBCUTANEOUS at 13:31

## 2020-01-15 RX ADMIN — OXYCODONE AND ACETAMINOPHEN 1 TABLET(S): 5; 325 TABLET ORAL at 18:09

## 2020-01-15 RX ADMIN — SODIUM CHLORIDE 1000 MILLILITER(S): 9 INJECTION INTRAMUSCULAR; INTRAVENOUS; SUBCUTANEOUS at 06:33

## 2020-01-15 RX ADMIN — OXYCODONE AND ACETAMINOPHEN 1 TABLET(S): 5; 325 TABLET ORAL at 09:15

## 2020-01-15 RX ADMIN — Medication 10 MILLIGRAM(S): at 18:03

## 2020-01-15 RX ADMIN — POLYETHYLENE GLYCOL 3350 17 GRAM(S): 17 POWDER, FOR SOLUTION ORAL at 13:22

## 2020-01-15 RX ADMIN — SODIUM CHLORIDE 75 MILLILITER(S): 9 INJECTION INTRAMUSCULAR; INTRAVENOUS; SUBCUTANEOUS at 08:20

## 2020-01-15 RX ADMIN — POLYETHYLENE GLYCOL 3350 17 GRAM(S): 17 POWDER, FOR SOLUTION ORAL at 18:03

## 2020-01-15 RX ADMIN — Medication 1000 MILLIGRAM(S): at 18:03

## 2020-01-15 RX ADMIN — Medication 10 MILLIGRAM(S): at 08:20

## 2020-01-15 RX ADMIN — OXYCODONE AND ACETAMINOPHEN 1 TABLET(S): 5; 325 TABLET ORAL at 08:19

## 2020-01-15 RX ADMIN — LACTULOSE 30 GRAM(S): 10 SOLUTION ORAL at 13:32

## 2020-01-15 RX ADMIN — SENNA PLUS 2 TABLET(S): 8.6 TABLET ORAL at 22:54

## 2020-01-15 RX ADMIN — TAMSULOSIN HYDROCHLORIDE 0.8 MILLIGRAM(S): 0.4 CAPSULE ORAL at 22:54

## 2020-01-15 RX ADMIN — Medication 1 PATCH: at 19:30

## 2020-01-15 RX ADMIN — Medication 10 MILLIGRAM(S): at 13:21

## 2020-01-15 RX ADMIN — Medication 1 PATCH: at 08:05

## 2020-01-15 RX ADMIN — Medication 1 PATCH: at 13:37

## 2020-01-15 RX ADMIN — Medication 1000 MILLIGRAM(S): at 08:20

## 2020-01-15 NOTE — PROGRESS NOTE ADULT - PROBLEM SELECTOR PLAN 1
- TB was ruled out,    - Isolation precautions: removed, QuantiFeron, HIV, histoplasma and aspergillosis are all negative   - s/p  right lung biopsy by IR on  1/13/2020    - F/u lung bx result.  - CTS is following.

## 2020-01-15 NOTE — PROGRESS NOTE ADULT - SUBJECTIVE AND OBJECTIVE BOX
Gastroenterology  Patient seen and examined bedside resting comfortably.  Reports constipation despite lactulose, miralax   Abdominal discomfort, relieved with bowel movements.     T(F): 97.2 (01-15-20 @ 11:20), Max: 98.5 (01-14-20 @ 17:34)  HR: 76 (01-15-20 @ 11:20) (63 - 78)  BP: 95/64 (01-15-20 @ 11:20) (88/56 - 98/69)  RR: 17 (01-15-20 @ 11:20) (15 - 17)  SpO2: 96% (01-15-20 @ 11:20) (96% - 99%)  Wt(kg): --  CAPILLARY BLOOD GLUCOSE        PHYSICAL EXAM:  General: NAD, WDWN.   Neuro:  Alert & responsive  HEENT: NCAT, EOMI, conjunctiva clear  CV: +S1+S2 regular rate and rhythm  Lung: clear to ausculation bilaterally, respirations nonlabored, good inspiratory effort  Abdomen: soft, Non Tender, No distention Normal active BS  Extremities: no cyanosis, clubbing or edema    LABS:              I&O's Detail    14 Jan 2020 07:01  -  15 Omid 2020 07:00  --------------------------------------------------------  IN:    Oral Fluid: 737 mL    Sodium Chloride 0.9% IV Bolus: 500 mL    sodium chloride 0.9%.: 900 mL  Total IN: 2137 mL    OUT:  Total OUT: 0 mL    Total NET: 2137 mL      15 Omid 2020 07:01  -  15 Omid 2020 15:51  --------------------------------------------------------  IN:    Oral Fluid: 480 mL  Total IN: 480 mL    OUT:  Total OUT: 0 mL    Total NET: 480 mL

## 2020-01-15 NOTE — PROGRESS NOTE ADULT - SUBJECTIVE AND OBJECTIVE BOX
INTERVAL HPI/OVERNIGHT EVENTS:  Pt. seen and examined at bedside resting comfortably. Patient denies current SOB, but admits to sob with exertion and some overnight, was placed on 3L NC, feeling "better" now. Admits to chest pain when he takes a deep breath.  Denies fever/chills, abdominal pain, N/V, dyspnea, cough, dizziness.     Vital Signs Last 24 Hrs  T(C): 36.2 (15 Omid 2020 04:50), Max: 36.9 (14 Jan 2020 17:34)  T(F): 97.2 (15 Omid 2020 04:50), Max: 98.5 (14 Jan 2020 17:34)  HR: 72 (15 Omid 2020 07:59) (64 - 86)  BP: 92/58 (15 Omid 2020 07:59) (88/56 - 116/83)  RR: 15 (15 Omid 2020 04:50) (15 - 18)  SpO2: 98% (15 Omid 2020 04:50) (96% - 99%)    PHYSICAL EXAM:    GENERAL: NAD, on supplemental O2 laying in bed (3L NC)  CHEST/LUNG: Clear to ausculation, bilaterally, good inspiratory effort.  Nonlabored respirations    HEART: S1S2, RRR  ABDOMEN: non distended, +BS, soft, non tender, no guarding  EXTREMITIES:  calf soft, non tender b/l. 2+ distal pulses b/l.     I&O's Detail    14 Jan 2020 07:01  -  15 Omid 2020 07:00  --------------------------------------------------------  IN:    Oral Fluid: 737 mL    Sodium Chloride 0.9% IV Bolus: 500 mL    sodium chloride 0.9%.: 900 mL  Total IN: 2137 mL    OUT:  Total OUT: 0 mL    Total NET: 2137 mL    LABS:    No new labs    Culture Results:   No growth (01-13 @ 17:08)  Culture Results:   Testing in progress (01-13 @ 17:08)    RADIOLOGY & ADDITIONAL STUDIES: CXR pending     Impression: 48yo Male with PMH Crohn's disease (on Pentasa) and Alfaro's esophagus presents with one week history of left sided chest pain radiating to the back.   On imaging, was found to have RUL cavitary mass with irregular borders s/p IR biopsy of RUL mass, now with small Right apical PTX.    Plan:  Serial CXRs, f/u AM CXR - pending   Supplemental O2 as needed, monitor vitals/O2 sats closely  Pulmonology follow up  continue medical management and supportive care per primary team  IR follow up  F/u Path  Will d/w Dr. Marley

## 2020-01-15 NOTE — PROGRESS NOTE ADULT - ASSESSMENT
49M with a PMH of Alfaro's Disease, Crohn's on Pentasya, IBS presents to ED for L sided chest pain since 12/29/19.Seen with :  1.Right lung mass; Pt is on Pentasya (Immunomodulator ) and lives in homeless shelter so definitely will r/o TB ? fungal infection   as smoker if that is neg will need lung mass biopsy to r/o malignancy  AFB  neg from tissue biopsy c/s and  QuantiFeron  gold neg less likely to be TB  f/u on IR FNA pathology  d/c airborne isolation    2.left sided chest pain : CT chest on left appears normal     2.Abdo pain : h/o Crohn's dis   says its chronic 49M with a PMH of Alfaro's Disease, Crohn's on Pentasya, IBS presents to ED for L sided chest pain since 12/29/19.Seen with :  1.Right lung mass; Pt is on Pentasya (Immunomodulator ) and lives in homeless shelter so definitely will r/o TB ? fungal infection   as smoker if that is neg will need lung mass biopsy to r/o malignancy  AFB  neg from tissue biopsy c/s and  QuantiFeron  gold neg less likely to be TB  f/u on IR FNA pathology  d/c airborne isolation    2.left sided chest pain : CXR shows pneumonothorax stable on repeat CXR     2.Abdo pain : h/o Crohn's dis   says its chronic

## 2020-01-15 NOTE — PROGRESS NOTE ADULT - PROBLEM SELECTOR PLAN 10
- c/o left side chest pain on and off for last 2 weeks.  - CE negative times 3  - hgba1c wnl  - lipid profile noted wnl  - shoulder xray  as above shows old left clavicle fracture

## 2020-01-15 NOTE — CHART NOTE - NSCHARTNOTEFT_GEN_A_CORE
CXR frrom this AM:    < from: Xray Chest 1 View- PORTABLE-Routine (01.15.20 @ 09:16) >      EXAM:  XR CHEST PORTABLE ROUTINE 1V                            PROCEDURE DATE:  01/15/2020          INTERPRETATION:  AP erect chest on January 15, 2020 at 9:02 AM. Patient is being followed for small right apical pneumothorax. 2 images.    Heart size is within normal limits. Hyperexpanded lungs consistent with COPD again noted.    Rather mild right apical pneumothorax again noted.    Chest is similar to January 14.    IMPRESSION: Persistent small right apical pneumothorax. COPD again noted.    ANTONIO GOMEZ M.D., ATTENDING RADIOLOGIST  This document has been electronically signed. Omid 15 2020  2:47PM                Discussed with Dr. Marley, via phone: repeat CXR in AM to ensure pneumo is stable & has not enlarged

## 2020-01-15 NOTE — PROGRESS NOTE ADULT - SUBJECTIVE AND OBJECTIVE BOX
PATIENT     REVIEW OF SYMPTOMS      Able to give ROS  NO     PHYSICAL EXAM    HEENT Unremarkable PERRLA atraumatic   RESP Fair air entry EXP prolonged    Harsh breath sound Resp distres mild   CARDIAC S1 S2 No S3     NO JVD    ABDOMEN SOFT BS PRESENT NOT DISTENDED No hepatosplenomegaly PEDAL EDEMA present No calf tenderness  NO rash   GENERAL Not TOXIC looking    VITALS/LABS       1/15/2020 afeb 76 95/64   1/14/2020 afeb 67 90/58  1/13/2020 96f 71 115/59   1/12/2020 afeb 56 105/71   1/12/2020 W 6.8 Hb 14.2 Plt 210 Na 141 K 4.4 CO2 23 Cr .9     PT DATA/BEST PRACTICE  ALLERGY adhesives  ADVANCED DIRECTIVE       Goals of care discussion  WT  62 (1/11/2020)    BMI  19 (1/11/2020)                                                                                                           HEAD OF BED ELEVATION Yes  DYSPHAGIA EVAL Ordered 1/11/2020   DIET   mech soft (1/10)                 DVT PROPHYLAXIS    lvnx 40 (1/7)            STRESS ULCER PROPHYLAXIS   protonix 40 (1/7)            INFECTION PPLX  ECHO  1/14/2020 ef 55%               ABIO       MICROBIO     1/9 Asperg galactomannan antig below 0.5 1/9 HIV n    GLYCEMIC CONTROL  PROCEDURE                                                            1/13/2020 needle rul mass     PT SUMMARY  50 yo smoker (>30 pack year hx) with Crohn's disease (on Pentasa), Alfaro's esophagus male who currently lives in homeless shelter admitted with RUL cavitary lesion  Has ho travel [place]

## 2020-01-15 NOTE — PROGRESS NOTE ADULT - PROBLEM SELECTOR PLAN 4
- Pentasa 1000mg qid with meals  - GI consult rieders  reviewed pt known to them   - continue with  supportive care

## 2020-01-15 NOTE — PROGRESS NOTE ADULT - PROBLEM SELECTOR PLAN 1
-Miralax increased to twice daily   -Lactulose daily  -Ok to continue pentasa  -Continue dicyclomine   -Avoid NSAIDs  -Close outpatient follow up (will likely recommend outpatient capsule endoscopy to evaluate for Crohns given negative work-up thus far).

## 2020-01-15 NOTE — PROGRESS NOTE ADULT - ASSESSMENT
PLAN      LUNG MASS   1/9 Asperg galactomannan antig below 0.5 1/9 HIV n   1/14 ct ch 4.4 x 2.5 cm irregular spiculated mass r upper lobe with 1.1 cm central cavity   Most likely diagnosis clinically is lung ca Possibly squamous although squamous is usually central   Other possibilities include Tb Histo   needle was done on 1/13   Await cyto  TB ISOLATION   1/12 QFT gold Tb test n   1/13 needle No afb seen   1/15/2020 Susp for Tb is low Consider dc resp isolation if ID agrees  PNEUMOTHORAX   1/15/2020 {ersistent small apical pntx   1/14/2020 CXR sm apical r pntx   Monitor serially   CTS on case   COPD duoneb.4p (1/8) spiriva (1/8)   SMOKER Nicoderm 21 (1/7) Counseled to stop   CROHNS Mescalamine 1.4 (1/7)   BPH tamsulosin .4 (1/7)       TIME SPENT Over 25 minutes aggregate care time spent on encounter; activities included   direct pt care, counseling and/or coordinating care reviewing notes, lab data/ imaging , discussion with multidisciplinary team/ pt /family. Risks, benefits, alternatives  discussed in detail.

## 2020-01-15 NOTE — PROGRESS NOTE ADULT - ASSESSMENT
49M with a PMH of Barretts Disease, ?Crohn's on Pentasa, IBS presents to ED for L sided chest pain.     CT on presentation revealed 4.4 x 2.5 cm upper lobe irregular spiculated mass with central cavitation; malignancy, TB?    He reports chronic intermittent abdominal discomfort. Outpatient work-up has been negative for Crohns however he reports previous pathology about small bowel resection noted Crohns (was not able to obtain pathology). Work-up including EGD/colonoscopy otherwise negative for IBD. Would recommend treatment of constipation as most likely etiology of abdominal discomfort is secondary to IBS and constipation.     *S/p lung biopsy 1/13, small apical ptx

## 2020-01-15 NOTE — PROGRESS NOTE ADULT - SUBJECTIVE AND OBJECTIVE BOX
Patient is a 49y old  Male who presents with a chief complaint of cp (15 Omid 2020 08:17), has no chest pain, no SOB.       OVERNIGHT EVENTS: none    MEDICATIONS  (STANDING):  ALPRAZolam 1 milliGRAM(s) Oral every 8 hours  busPIRone 10 milliGRAM(s) Oral two times a day  dicyclomine 10 milliGRAM(s) Oral two times a day before meals  lactulose Syrup 30 Gram(s) Oral daily  mesalamine ER Capsule 1000 milliGRAM(s) Oral four times a day with meals  nicotine - 21 mG/24Hr(s) Patch 1 patch Transdermal daily  oxybutynin 5 milliGRAM(s) Oral two times a day  pantoprazole    Tablet 40 milliGRAM(s) Oral two times a day  polyethylene glycol 3350 17 Gram(s) Oral daily  senna 2 Tablet(s) Oral at bedtime  sodium chloride 0.9%. 1000 milliLiter(s) (75 mL/Hr) IV Continuous <Continuous>  tamsulosin 0.8 milliGRAM(s) Oral at bedtime  tiotropium 18 MICROgram(s) Capsule 1 Capsule(s) Inhalation daily    MEDICATIONS  (PRN):  acetaminophen   Tablet .. 650 milliGRAM(s) Oral every 6 hours PRN Temp greater or equal to 38C (100.4F), Mild Pain (1 - 3)  albuterol/ipratropium for Nebulization. 3 milliLiter(s) Nebulizer every 6 hours PRN Shortness of Breath  bisacodyl 5 milliGRAM(s) Oral every 12 hours PRN Constipation  bisacodyl Suppository 10 milliGRAM(s) Rectal daily PRN Constipation  morphine  - Injectable 2 milliGRAM(s) IV Push every 6 hours PRN Severe Pain (7 - 10)  ondansetron Injectable 4 milliGRAM(s) IV Push every 6 hours PRN Nausea and/or Vomiting  oxycodone    5 mG/acetaminophen 325 mG 1 Tablet(s) Oral every 6 hours PRN Moderate Pain (4 - 6)        REVIEW OF SYSTEMS:  CONSTITUTIONAL: No fever, w   EYES: No eye pain,    ENMT:   No sinus or throat pain  NECK: No pain or stiffness  RESPIRATORY:   No shortness of breath  CARDIOVASCULAR: No chest pain,    GASTROINTESTINAL: No abdominal or epigastric pain.    GENITOURINARY: No dysuria, frequency, hematuria, or incontinence  NEUROLOGICAL: No headaches.  SKIN: No itching,       Vital Signs Last 24 Hrs  T(C): 36.2 (15 Omid 2020 11:20), Max: 36.9 (14 Jan 2020 17:34)  T(F): 97.2 (15 Omid 2020 11:20), Max: 98.5 (14 Jan 2020 17:34)  HR: 76 (15 Omid 2020 11:20) (64 - 86)  BP: 95/64 (15 Omid 2020 11:20) (88/56 - 116/83)  BP(mean): --  RR: 17 (15 Omid 2020 11:20) (15 - 18)  SpO2: 96% (15 Omid 2020 11:20) (96% - 99%)    PHYSICAL EXAM:  GENERAL: NAD, well-groomed, well-developed  HEAD:  Atraumatic, Normocephalic  EYES: EOMI, PERRLA, conjunctiva and sclera clear  ENMT: No tonsillar erythema, exudates, or enlargement; Moist mucous membranes   NECK: Supple, No JVD   NERVOUS SYSTEM:  Alert & Oriented X3,    CHEST/LUNG: Clear to auscultation  bilaterally; No rales, rhonchi, wheezing, or rubs  HEART: Regular rate and rhythm; No murmurs, rubs, or gallops  ABDOMEN: Soft, Nontender, Nondistended; Bowel sounds present  EXTREMITIES:  2+ Peripheral Pulses, No clubbing, cyanosis, or edema  LYMPH: No lymphadenopathy noted  SKIN: No rashes or lesions    LABS:             cardiac markers     CAPILLARY BLOOD GLUCOSE        Cultures    RADIOLOGY & ADDITIONAL TESTS:    Imaging Personally Reviewed:  [ ] YES  [ ] NO    Consultant(s) Notes Reviewed:  [x ] YES  [ ] NO    Care Discussed with Consultants/Other Providers [ ] YES  [ ] NO

## 2020-01-15 NOTE — PROGRESS NOTE ADULT - SUBJECTIVE AND OBJECTIVE BOX
Patient is a 49y old  Male who presents with a chief complaint of cp (15 Omid 2020 15:51)      INTERVAL HPI / OVERNIGHT EVENTS:    MEDICATIONS  (STANDING):  ALPRAZolam 1 milliGRAM(s) Oral every 8 hours  busPIRone 10 milliGRAM(s) Oral two times a day  dicyclomine 10 milliGRAM(s) Oral two times a day before meals  enoxaparin Injectable 40 milliGRAM(s) SubCutaneous daily  lactulose Syrup 30 Gram(s) Oral daily  mesalamine ER Capsule 1000 milliGRAM(s) Oral four times a day with meals  nicotine - 21 mG/24Hr(s) Patch 1 patch Transdermal daily  oxybutynin 5 milliGRAM(s) Oral two times a day  pantoprazole    Tablet 40 milliGRAM(s) Oral two times a day  polyethylene glycol 3350 17 Gram(s) Oral two times a day  senna 2 Tablet(s) Oral at bedtime  sodium chloride 0.9%. 1000 milliLiter(s) (75 mL/Hr) IV Continuous <Continuous>  tamsulosin 0.8 milliGRAM(s) Oral at bedtime  tiotropium 18 MICROgram(s) Capsule 1 Capsule(s) Inhalation daily    MEDICATIONS  (PRN):  acetaminophen   Tablet .. 650 milliGRAM(s) Oral every 6 hours PRN Temp greater or equal to 38C (100.4F), Mild Pain (1 - 3)  albuterol/ipratropium for Nebulization. 3 milliLiter(s) Nebulizer every 6 hours PRN Shortness of Breath  bisacodyl Suppository 10 milliGRAM(s) Rectal daily PRN Constipation  morphine  - Injectable 2 milliGRAM(s) IV Push every 6 hours PRN Severe Pain (7 - 10)  ondansetron Injectable 4 milliGRAM(s) IV Push every 6 hours PRN Nausea and/or Vomiting  oxycodone    5 mG/acetaminophen 325 mG 1 Tablet(s) Oral every 6 hours PRN Moderate Pain (4 - 6)      Vital Signs Last 24 Hrs  T(C): 36.2 (15 Omid 2020 11:20), Max: 36.9 (14 Jan 2020 17:34)  T(F): 97.2 (15 Omid 2020 11:20), Max: 98.5 (14 Jan 2020 17:34)  HR: 76 (15 Omid 2020 11:20) (63 - 78)  BP: 95/64 (15 Omid 2020 11:20) (88/56 - 98/69)  BP(mean): --  RR: 17 (15 Omid 2020 11:20) (15 - 17)  SpO2: 96% (15 Omid 2020 11:20) (96% - 99%)    Review of systems:  General : no fever /chills,fatigue  CVS : no chest pain, palpitations  Lungs : no shortness of breath, cough  GI : no abdominal pain,vomiting, diarrhea   : no dysuria,hematuria        PHYSICAL EXAM:  General :NAD  Constitutional:  well-groomed, well-developed  Respiratory: CTAB/L  Cardiovascular: S1 and S2, RRR, no M/G/R  Gastrointestinal: BS+, soft, NT/ND  Extremities: No peripheral edema  Vascular: 2+ peripheral pulses  Skin: No rashes      LABS:                MICROBIOLOGY:  RECENT CULTURES:  01-13 .Body Fluid XXXX   polymorphonuclear leukocytes seen  No organisms seen  by cytocentrifuge   No growth          RADIOLOGY & ADDITIONAL STUDIES: Patient is a 49y old  Male who presents with a chief complaint of cp (15 Omid 2020 15:51)      INTERVAL HPI / OVERNIGHT EVENTS: left sided chest pain continues    MEDICATIONS  (STANDING):  ALPRAZolam 1 milliGRAM(s) Oral every 8 hours  busPIRone 10 milliGRAM(s) Oral two times a day  dicyclomine 10 milliGRAM(s) Oral two times a day before meals  enoxaparin Injectable 40 milliGRAM(s) SubCutaneous daily  lactulose Syrup 30 Gram(s) Oral daily  mesalamine ER Capsule 1000 milliGRAM(s) Oral four times a day with meals  nicotine - 21 mG/24Hr(s) Patch 1 patch Transdermal daily  oxybutynin 5 milliGRAM(s) Oral two times a day  pantoprazole    Tablet 40 milliGRAM(s) Oral two times a day  polyethylene glycol 3350 17 Gram(s) Oral two times a day  senna 2 Tablet(s) Oral at bedtime  sodium chloride 0.9%. 1000 milliLiter(s) (75 mL/Hr) IV Continuous <Continuous>  tamsulosin 0.8 milliGRAM(s) Oral at bedtime  tiotropium 18 MICROgram(s) Capsule 1 Capsule(s) Inhalation daily    MEDICATIONS  (PRN):  acetaminophen   Tablet .. 650 milliGRAM(s) Oral every 6 hours PRN Temp greater or equal to 38C (100.4F), Mild Pain (1 - 3)  albuterol/ipratropium for Nebulization. 3 milliLiter(s) Nebulizer every 6 hours PRN Shortness of Breath  bisacodyl Suppository 10 milliGRAM(s) Rectal daily PRN Constipation  morphine  - Injectable 2 milliGRAM(s) IV Push every 6 hours PRN Severe Pain (7 - 10)  ondansetron Injectable 4 milliGRAM(s) IV Push every 6 hours PRN Nausea and/or Vomiting  oxycodone    5 mG/acetaminophen 325 mG 1 Tablet(s) Oral every 6 hours PRN Moderate Pain (4 - 6)      Vital Signs Last 24 Hrs  T(C): 36.2 (15 Omid 2020 11:20), Max: 36.9 (14 Jan 2020 17:34)  T(F): 97.2 (15 Omid 2020 11:20), Max: 98.5 (14 Jan 2020 17:34)  HR: 76 (15 Omid 2020 11:20) (63 - 78)  BP: 95/64 (15 Omid 2020 11:20) (88/56 - 98/69)  BP(mean): --  RR: 17 (15 Omid 2020 11:20) (15 - 17)  SpO2: 96% (15 Omid 2020 11:20) (96% - 99%)    Review of systems:  General : no fever /chills,fatigue  CVS : + chest pain, no palpitations  Lungs : no shortness of breath, cough  GI : no abdominal pain, vomiting, diarrhea   : no dysuria, hematuria        PHYSICAL EXAM:  General :NAD  Constitutional: well-groomed, well-developed  Respiratory: CTAB/L  Cardiovascular: S1 and S2, RRR, no M/G/R  Gastrointestinal: BS+, soft, NT/ND  Extremities: No peripheral edema  Vascular: 2+ peripheral pulses  Skin: No rashes      LABS:                MICROBIOLOGY:  RECENT CULTURES:  01-13 .Body Fluid XXXX   polymorphonuclear leukocytes seen  No organisms seen  by cytocentrifuge   No growth          RADIOLOGY & ADDITIONAL STUDIES:

## 2020-01-16 LAB
ANION GAP SERPL CALC-SCNC: 4 MMOL/L — LOW (ref 5–17)
BUN SERPL-MCNC: 10 MG/DL — SIGNIFICANT CHANGE UP (ref 7–23)
CALCIUM SERPL-MCNC: 8.9 MG/DL — SIGNIFICANT CHANGE UP (ref 8.5–10.1)
CHLORIDE SERPL-SCNC: 105 MMOL/L — SIGNIFICANT CHANGE UP (ref 96–108)
CO2 SERPL-SCNC: 31 MMOL/L — SIGNIFICANT CHANGE UP (ref 22–31)
CREAT SERPL-MCNC: 0.96 MG/DL — SIGNIFICANT CHANGE UP (ref 0.5–1.3)
GLUCOSE SERPL-MCNC: 121 MG/DL — HIGH (ref 70–99)
HCT VFR BLD CALC: 41.1 % — SIGNIFICANT CHANGE UP (ref 39–50)
HGB BLD-MCNC: 13.7 G/DL — SIGNIFICANT CHANGE UP (ref 13–17)
MCHC RBC-ENTMCNC: 32.8 PG — SIGNIFICANT CHANGE UP (ref 27–34)
MCHC RBC-ENTMCNC: 33.3 GM/DL — SIGNIFICANT CHANGE UP (ref 32–36)
MCV RBC AUTO: 98.3 FL — SIGNIFICANT CHANGE UP (ref 80–100)
NRBC # BLD: 0 /100 WBCS — SIGNIFICANT CHANGE UP (ref 0–0)
PLATELET # BLD AUTO: 215 K/UL — SIGNIFICANT CHANGE UP (ref 150–400)
POTASSIUM SERPL-MCNC: 4 MMOL/L — SIGNIFICANT CHANGE UP (ref 3.5–5.3)
POTASSIUM SERPL-SCNC: 4 MMOL/L — SIGNIFICANT CHANGE UP (ref 3.5–5.3)
RBC # BLD: 4.18 M/UL — LOW (ref 4.2–5.8)
RBC # FLD: 12.3 % — SIGNIFICANT CHANGE UP (ref 10.3–14.5)
SODIUM SERPL-SCNC: 140 MMOL/L — SIGNIFICANT CHANGE UP (ref 135–145)
WBC # BLD: 6.19 K/UL — SIGNIFICANT CHANGE UP (ref 3.8–10.5)
WBC # FLD AUTO: 6.19 K/UL — SIGNIFICANT CHANGE UP (ref 3.8–10.5)

## 2020-01-16 PROCEDURE — 99232 SBSQ HOSP IP/OBS MODERATE 35: CPT

## 2020-01-16 PROCEDURE — 99233 SBSQ HOSP IP/OBS HIGH 50: CPT

## 2020-01-16 PROCEDURE — 71045 X-RAY EXAM CHEST 1 VIEW: CPT | Mod: 26

## 2020-01-16 RX ORDER — ACETAMINOPHEN 500 MG
1000 TABLET ORAL ONCE
Refills: 0 | Status: COMPLETED | OUTPATIENT
Start: 2020-01-16 | End: 2020-01-16

## 2020-01-16 RX ORDER — MIDODRINE HYDROCHLORIDE 2.5 MG/1
5 TABLET ORAL THREE TIMES A DAY
Refills: 0 | Status: DISCONTINUED | OUTPATIENT
Start: 2020-01-16 | End: 2020-01-17

## 2020-01-16 RX ORDER — SODIUM CHLORIDE 9 MG/ML
500 INJECTION INTRAMUSCULAR; INTRAVENOUS; SUBCUTANEOUS ONCE
Refills: 0 | Status: COMPLETED | OUTPATIENT
Start: 2020-01-16 | End: 2020-01-16

## 2020-01-16 RX ADMIN — TAMSULOSIN HYDROCHLORIDE 0.8 MILLIGRAM(S): 0.4 CAPSULE ORAL at 22:53

## 2020-01-16 RX ADMIN — MIDODRINE HYDROCHLORIDE 5 MILLIGRAM(S): 2.5 TABLET ORAL at 17:18

## 2020-01-16 RX ADMIN — Medication 5 MILLIGRAM(S): at 05:56

## 2020-01-16 RX ADMIN — Medication 1000 MILLIGRAM(S): at 22:53

## 2020-01-16 RX ADMIN — PANTOPRAZOLE SODIUM 40 MILLIGRAM(S): 20 TABLET, DELAYED RELEASE ORAL at 05:57

## 2020-01-16 RX ADMIN — LACTULOSE 30 GRAM(S): 10 SOLUTION ORAL at 12:08

## 2020-01-16 RX ADMIN — Medication 10 MILLIGRAM(S): at 17:19

## 2020-01-16 RX ADMIN — ENOXAPARIN SODIUM 40 MILLIGRAM(S): 100 INJECTION SUBCUTANEOUS at 12:05

## 2020-01-16 RX ADMIN — Medication 400 MILLIGRAM(S): at 23:32

## 2020-01-16 RX ADMIN — Medication 1 PATCH: at 19:00

## 2020-01-16 RX ADMIN — ONDANSETRON 4 MILLIGRAM(S): 8 TABLET, FILM COATED ORAL at 09:13

## 2020-01-16 RX ADMIN — Medication 10 MILLIGRAM(S): at 09:05

## 2020-01-16 RX ADMIN — SODIUM CHLORIDE 75 MILLILITER(S): 9 INJECTION INTRAMUSCULAR; INTRAVENOUS; SUBCUTANEOUS at 05:57

## 2020-01-16 RX ADMIN — MIDODRINE HYDROCHLORIDE 5 MILLIGRAM(S): 2.5 TABLET ORAL at 05:57

## 2020-01-16 RX ADMIN — SENNA PLUS 2 TABLET(S): 8.6 TABLET ORAL at 22:53

## 2020-01-16 RX ADMIN — OXYCODONE AND ACETAMINOPHEN 1 TABLET(S): 5; 325 TABLET ORAL at 00:05

## 2020-01-16 RX ADMIN — Medication 1 MILLIGRAM(S): at 09:10

## 2020-01-16 RX ADMIN — Medication 5 MILLIGRAM(S): at 17:18

## 2020-01-16 RX ADMIN — POLYETHYLENE GLYCOL 3350 17 GRAM(S): 17 POWDER, FOR SOLUTION ORAL at 05:57

## 2020-01-16 RX ADMIN — MORPHINE SULFATE 2 MILLIGRAM(S): 50 CAPSULE, EXTENDED RELEASE ORAL at 16:02

## 2020-01-16 RX ADMIN — Medication 1 PATCH: at 12:09

## 2020-01-16 RX ADMIN — Medication 1000 MILLIGRAM(S): at 09:05

## 2020-01-16 RX ADMIN — Medication 1 MILLIGRAM(S): at 22:53

## 2020-01-16 RX ADMIN — MORPHINE SULFATE 2 MILLIGRAM(S): 50 CAPSULE, EXTENDED RELEASE ORAL at 16:18

## 2020-01-16 RX ADMIN — Medication 1 PATCH: at 12:05

## 2020-01-16 RX ADMIN — Medication 1000 MILLIGRAM(S): at 17:18

## 2020-01-16 RX ADMIN — OXYCODONE AND ACETAMINOPHEN 1 TABLET(S): 5; 325 TABLET ORAL at 01:05

## 2020-01-16 RX ADMIN — SODIUM CHLORIDE 75 MILLILITER(S): 9 INJECTION INTRAMUSCULAR; INTRAVENOUS; SUBCUTANEOUS at 17:21

## 2020-01-16 RX ADMIN — Medication 10 MILLIGRAM(S): at 09:10

## 2020-01-16 RX ADMIN — TIOTROPIUM BROMIDE 1 CAPSULE(S): 18 CAPSULE ORAL; RESPIRATORY (INHALATION) at 12:09

## 2020-01-16 RX ADMIN — Medication 1 PATCH: at 08:10

## 2020-01-16 RX ADMIN — POLYETHYLENE GLYCOL 3350 17 GRAM(S): 17 POWDER, FOR SOLUTION ORAL at 17:25

## 2020-01-16 RX ADMIN — MIDODRINE HYDROCHLORIDE 5 MILLIGRAM(S): 2.5 TABLET ORAL at 12:04

## 2020-01-16 RX ADMIN — Medication 1000 MILLIGRAM(S): at 12:04

## 2020-01-16 RX ADMIN — Medication 1 MILLIGRAM(S): at 14:44

## 2020-01-16 RX ADMIN — PANTOPRAZOLE SODIUM 40 MILLIGRAM(S): 20 TABLET, DELAYED RELEASE ORAL at 17:19

## 2020-01-16 RX ADMIN — SODIUM CHLORIDE 500 MILLILITER(S): 9 INJECTION INTRAMUSCULAR; INTRAVENOUS; SUBCUTANEOUS at 23:32

## 2020-01-16 NOTE — PROGRESS NOTE ADULT - SUBJECTIVE AND OBJECTIVE BOX
Patient is a 49y old  Male who presents with a chief complaint of cp (15 Omid 2020 18:19), no chest pain, no SOB.       OVERNIGHT EVENTS: none    MEDICATIONS  (STANDING):  ALPRAZolam 1 milliGRAM(s) Oral every 8 hours  busPIRone 10 milliGRAM(s) Oral two times a day  dicyclomine 10 milliGRAM(s) Oral two times a day before meals  enoxaparin Injectable 40 milliGRAM(s) SubCutaneous daily  lactulose Syrup 30 Gram(s) Oral daily  mesalamine ER Capsule 1000 milliGRAM(s) Oral four times a day with meals  midodrine. 5 milliGRAM(s) Oral three times a day  nicotine - 21 mG/24Hr(s) Patch 1 patch Transdermal daily  oxybutynin 5 milliGRAM(s) Oral two times a day  pantoprazole    Tablet 40 milliGRAM(s) Oral two times a day  polyethylene glycol 3350 17 Gram(s) Oral two times a day  senna 2 Tablet(s) Oral at bedtime  sodium chloride 0.9%. 1000 milliLiter(s) (75 mL/Hr) IV Continuous <Continuous>  tamsulosin 0.8 milliGRAM(s) Oral at bedtime  tiotropium 18 MICROgram(s) Capsule 1 Capsule(s) Inhalation daily    MEDICATIONS  (PRN):  acetaminophen   Tablet .. 650 milliGRAM(s) Oral every 6 hours PRN Temp greater or equal to 38C (100.4F), Mild Pain (1 - 3)  albuterol/ipratropium for Nebulization. 3 milliLiter(s) Nebulizer every 6 hours PRN Shortness of Breath  bisacodyl Suppository 10 milliGRAM(s) Rectal daily PRN Constipation  morphine  - Injectable 2 milliGRAM(s) IV Push every 6 hours PRN Severe Pain (7 - 10)  ondansetron Injectable 4 milliGRAM(s) IV Push every 6 hours PRN Nausea and/or Vomiting  oxycodone    5 mG/acetaminophen 325 mG 1 Tablet(s) Oral every 6 hours PRN Moderate Pain (4 - 6)        REVIEW OF SYSTEMS:  CONSTITUTIONAL: No fever, weight loss, or fatigue  EYES: No eye pain, visual disturbances, or discharge  ENMT:  No difficulty hearing, tinnitus, vertigo; No sinus or throat pain  NECK: No pain or stiffness  RESPIRATORY: No cough,    CARDIOVASCULAR: No chest pain, palpitations, dizziness, or leg swelling  GASTROINTESTINAL: No abdominal or epigastric pain.    GENITOURINARY: No dysuria, frequency, hematuria, or incontinence  NEUROLOGICAL: No headaches, memory loss, loss of strength, numbness, or tremors  SKIN: No itching, burning, rashes, or lesions      Vital Signs Last 24 Hrs  T(C): 35.9 (16 Jan 2020 10:49), Max: 36.6 (16 Jan 2020 04:55)  T(F): 96.7 (16 Jan 2020 10:49), Max: 97.8 (16 Jan 2020 04:55)  HR: 79 (16 Jan 2020 10:49) (60 - 79)  BP: 109/61 (16 Jan 2020 10:49) (89/59 - 109/61)  BP(mean): --  RR: 17 (16 Jan 2020 10:49) (15 - 18)  SpO2: 97% (16 Jan 2020 10:49) (92% - 98%)    PHYSICAL EXAM:  GENERAL: NAD,   well-developed  HEAD:  Atraumatic, Normocephalic  EYES: EOMI, PERRLA, conjunctiva and sclera clear  ENMT: No tonsillar erythema, exudates, or enlargement; Moist mucous membranes   NECK: Supple, No JVD   NERVOUS SYSTEM:  Alert & Oriented X3,    CHEST/LUNG: Clear to auscultation  bilaterally; No rales, rhonchi, wheezing, or rubs  HEART: Regular rate and rhythm; No murmurs, rubs, or gallops  ABDOMEN: Soft, Nontender, Nondistended; Bowel sounds present  EXTREMITIES:  2+ Peripheral Pulses, No clubbing, cyanosis, or edema  LYMPH: No lymphadenopathy noted  SKIN: No petechiae    LABS:                        13.7   6.19  )-----------( 215      ( 16 Jan 2020 07:28 )             41.1     01-16    140  |  105  |  10  ----------------------------<  121<H>  4.0   |  31  |  0.96    Ca    8.9      16 Jan 2020 07:28         cardiac markers     CAPILLARY BLOOD GLUCOSE        Cultures    RADIOLOGY & ADDITIONAL TESTS:    Imaging Personally Reviewed:  [ ] YES  [ ] NO    Consultant(s) Notes Reviewed:  [ x ] YES  [ ] NO    Care Discussed with Consultants/Other Providers [ ] YES  [ ] NO

## 2020-01-16 NOTE — PROGRESS NOTE ADULT - SUBJECTIVE AND OBJECTIVE BOX
REVIEW OF SYMPTOMS      Able to give ROS  Yes     RELIABLE No   CONSTITUTIONAL Weakness Yes  Chills No Vision changes No  ENDOCRINE No unexplained hair loss No heat or cold intolerance    ALLERGY No hives  Sore throat No   RESP Coughing blood no  Shortness of breath YES   NEURO No Headache  Confusion Pain neck No   CARDIAC No Chest pain No Palpitations   GI No Pain abdomen NO   Vomiting NO     PHYSICAL EXAM    HEENT Unremarkable PERRLA atraumatic   RESP Fair air entry EXP prolonged    Harsh breath sound Resp distres mild   CARDIAC S1 S2 No S3     NO JVD    ABDOMEN SOFT BS PRESENT NOT DISTENDED No hepatosplenomegaly PEDAL EDEMA present No calf tenderness  NO rash   GENERAL Not TOXIC looking    VITALS/LABS       1/16/2020 afeb 83 112/71   1/16/2020 W 6.1 Hb 13.7 Plt 219 Na 140 K 4 CO2 31 Cr .9     PT DATA/BEST PRACTICE  ALLERGY adhesives  ADVANCED DIRECTIVE       Goals of care discussion  WT  62 (1/11/2020)    BMI  19 (1/11/2020)                                                                                                           HEAD OF BED ELEVATION Yes  DYSPHAGIA EVAL Ordered 1/11/2020   DIET   mech soft (1/10)                 DVT PROPHYLAXIS    lvnx 40 (1/7)            STRESS ULCER PROPHYLAXIS   protonix 40 (1/7)            INFECTION PPLX  ECHO  1/14/2020 ef 55%               ABIO       MICROBIO     1/9 Asperg galactomannan antig below 0.5 1/9 HIV n    GLYCEMIC CONTROL  PROCEDURE                                                            1/13/2020 needle rul mass     PT SUMMARY  48 yo smoker (>30 pack year hx) with Crohn's disease (on Pentasa), Alfaro's esophagus male who currently lives in homeless shelter admitted with RUL cavitary lesion  Has ho travel [place]

## 2020-01-16 NOTE — PROGRESS NOTE ADULT - PROBLEM SELECTOR PLAN 1
- TB was ruled out,    - Isolation precautions: removed, QuantiFeron, HIV, histoplasma and aspergillosis are all negative   - s/p  right lung biopsy by IR on  1/13/2020    - CTS is following.  - Follow up lung bx result
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
## 2020-01-17 PROCEDURE — 99233 SBSQ HOSP IP/OBS HIGH 50: CPT

## 2020-01-17 PROCEDURE — 99232 SBSQ HOSP IP/OBS MODERATE 35: CPT

## 2020-01-17 PROCEDURE — 71045 X-RAY EXAM CHEST 1 VIEW: CPT | Mod: 26

## 2020-01-17 RX ORDER — ALPRAZOLAM 0.25 MG
0.5 TABLET ORAL ONCE
Refills: 0 | Status: DISCONTINUED | OUTPATIENT
Start: 2020-01-17 | End: 2020-01-17

## 2020-01-17 RX ORDER — LACTULOSE 10 G/15ML
20 SOLUTION ORAL
Refills: 0 | Status: DISCONTINUED | OUTPATIENT
Start: 2020-01-17 | End: 2020-01-22

## 2020-01-17 RX ORDER — LACTULOSE 10 G/15ML
30 SOLUTION ORAL
Refills: 0 | Status: DISCONTINUED | OUTPATIENT
Start: 2020-01-17 | End: 2020-01-17

## 2020-01-17 RX ORDER — MIDODRINE HYDROCHLORIDE 2.5 MG/1
10 TABLET ORAL THREE TIMES A DAY
Refills: 0 | Status: DISCONTINUED | OUTPATIENT
Start: 2020-01-17 | End: 2020-01-22

## 2020-01-17 RX ORDER — MORPHINE SULFATE 50 MG/1
2 CAPSULE, EXTENDED RELEASE ORAL EVERY 6 HOURS
Refills: 0 | Status: DISCONTINUED | OUTPATIENT
Start: 2020-01-17 | End: 2020-01-22

## 2020-01-17 RX ADMIN — Medication 10 MILLIGRAM(S): at 00:50

## 2020-01-17 RX ADMIN — Medication 10 MILLIGRAM(S): at 05:47

## 2020-01-17 RX ADMIN — MIDODRINE HYDROCHLORIDE 10 MILLIGRAM(S): 2.5 TABLET ORAL at 17:07

## 2020-01-17 RX ADMIN — MIDODRINE HYDROCHLORIDE 5 MILLIGRAM(S): 2.5 TABLET ORAL at 12:51

## 2020-01-17 RX ADMIN — SENNA PLUS 2 TABLET(S): 8.6 TABLET ORAL at 21:31

## 2020-01-17 RX ADMIN — Medication 10 MILLIGRAM(S): at 17:06

## 2020-01-17 RX ADMIN — Medication 1 PATCH: at 07:34

## 2020-01-17 RX ADMIN — Medication 1 PATCH: at 20:09

## 2020-01-17 RX ADMIN — MORPHINE SULFATE 2 MILLIGRAM(S): 50 CAPSULE, EXTENDED RELEASE ORAL at 02:42

## 2020-01-17 RX ADMIN — SODIUM CHLORIDE 75 MILLILITER(S): 9 INJECTION INTRAMUSCULAR; INTRAVENOUS; SUBCUTANEOUS at 17:11

## 2020-01-17 RX ADMIN — Medication 1000 MILLIGRAM(S): at 12:51

## 2020-01-17 RX ADMIN — Medication 1000 MILLIGRAM(S): at 09:10

## 2020-01-17 RX ADMIN — PANTOPRAZOLE SODIUM 40 MILLIGRAM(S): 20 TABLET, DELAYED RELEASE ORAL at 17:06

## 2020-01-17 RX ADMIN — TIOTROPIUM BROMIDE 1 CAPSULE(S): 18 CAPSULE ORAL; RESPIRATORY (INHALATION) at 12:52

## 2020-01-17 RX ADMIN — MORPHINE SULFATE 2 MILLIGRAM(S): 50 CAPSULE, EXTENDED RELEASE ORAL at 02:12

## 2020-01-17 RX ADMIN — Medication 1000 MILLIGRAM(S): at 17:07

## 2020-01-17 RX ADMIN — LACTULOSE 30 GRAM(S): 10 SOLUTION ORAL at 12:53

## 2020-01-17 RX ADMIN — Medication 1000 MILLIGRAM(S): at 21:28

## 2020-01-17 RX ADMIN — MIDODRINE HYDROCHLORIDE 5 MILLIGRAM(S): 2.5 TABLET ORAL at 05:47

## 2020-01-17 RX ADMIN — POLYETHYLENE GLYCOL 3350 17 GRAM(S): 17 POWDER, FOR SOLUTION ORAL at 05:47

## 2020-01-17 RX ADMIN — Medication 5 MILLIGRAM(S): at 05:47

## 2020-01-17 RX ADMIN — Medication 1 PATCH: at 12:56

## 2020-01-17 RX ADMIN — LACTULOSE 20 GRAM(S): 10 SOLUTION ORAL at 17:08

## 2020-01-17 RX ADMIN — Medication 5 MILLIGRAM(S): at 17:08

## 2020-01-17 RX ADMIN — ENOXAPARIN SODIUM 40 MILLIGRAM(S): 100 INJECTION SUBCUTANEOUS at 12:51

## 2020-01-17 RX ADMIN — Medication 0.5 MILLIGRAM(S): at 22:56

## 2020-01-17 RX ADMIN — Medication 1 PATCH: at 12:49

## 2020-01-17 RX ADMIN — PANTOPRAZOLE SODIUM 40 MILLIGRAM(S): 20 TABLET, DELAYED RELEASE ORAL at 05:47

## 2020-01-17 RX ADMIN — TAMSULOSIN HYDROCHLORIDE 0.8 MILLIGRAM(S): 0.4 CAPSULE ORAL at 21:32

## 2020-01-17 NOTE — PROGRESS NOTE ADULT - PROBLEM SELECTOR PLAN 4
- Pentasa 1000mg qid with meals  - GI eval noted, Dr Russ  - continue with  supportive care  - Close outpt follow up

## 2020-01-17 NOTE — PROGRESS NOTE ADULT - SUBJECTIVE AND OBJECTIVE BOX
Patient seen and examined at bedside lying comfortably on room air in no acute distress.    Pt reports occasional chest pain when he takes a deep breath, which has not changed over the last few days.  Pt denies shortness of breath, cough, sputum, fever, chills.      Vital Signs Last 24 Hrs  T(F): 96.8 (01-17-20 @ 05:01), Max: 97.8 (01-16-20 @ 16:20)  HR: 53 (01-17-20 @ 05:01)  BP: 90/58 (01-17-20 @ 05:01)  RR: 19 (01-17-20 @ 05:01)  SpO2: 95% (01-17-20 @ 05:01)        GENERAL: Alert, NAD  CHEST/LUNG: Clear to auscultation bilaterally, no wheezing, rales, or rhonchi.  Respirations nonlabored.  Good inspiratory effort b/l.    HEART: S1S2, Regular rate and rhythm;   ABDOMEN: + Bowel sounds, soft, Nontender, Nondistended  EXTREMITIES:  no calf tenderness, No edema    I&O's Detail    16 Jan 2020 07:01  -  17 Jan 2020 07:00  --------------------------------------------------------  IN:    Oral Fluid: 600 mL  Total IN: 600 mL    OUT:  Total OUT: 0 mL    Total NET: 600 mL          LABS:                        13.7   6.19  )-----------( 215      ( 16 Jan 2020 07:28 )             41.1     01-16    140  |  105  |  10  ----------------------------<  121<H>  4.0   |  31  |  0.96    Ca    8.9      16 Jan 2020 07:28  < from: Xray Chest 1 View- PORTABLE-Routine (01.16.20 @ 07:40) >    EXAM:  XR CHEST PORTABLE ROUTINE 1V                            PROCEDURE DATE:  01/16/2020          INTERPRETATION:  AP erect chest on January 16, 2020 at 6:58 AM. Patient is being followed for right pneumothorax. Heart size is within normal limits.    Hyperexpanded lungs consistent with COPD again noted.    Rather small right apical pneumothorax is similar to January 15.    Extensive posttraumatic deformity of the mid left clavicle again noted.    Study is similar to January 15.    IMPRESSION:Unchanged chest showing COPD and a small right apical pneumothorax.          ANTONIO GOMEZ M.D., ATTENDING RADIOLOGIST  This document has been electronically signed. Jan 16 2020 11:10AM    < end of copied text >    	    Impression: 50yo Male with PMH Crohn's disease (on Pentasa) and Alfaro's esophagus presents with one week history of left sided chest pain radiating to the back.   On imaging, was found to have RUL cavitary mass with irregular borders s/p IR biopsy of RUL mass, now with small Right apical PTX.  CXR yesterday showing "unchanged chest showing COPD and a small right apical pneumothorax."  Stable.      Plan:  -Supplemental O2 as needed, monitor vitals/O2 sats closely  -Awaiting pathology f/u   -Continue medical management and supportive care  -Will discuss with Dr. Marley Patient seen and examined at bedside lying comfortably on room air in no acute distress.    Pt reports occasional chest pain when he takes a deep breath, which has not changed over the last few days.  Pt denies shortness of breath, cough, sputum, fever, chills.      Vital Signs Last 24 Hrs  T(F): 96.8 (01-17-20 @ 05:01), Max: 97.8 (01-16-20 @ 16:20)  HR: 53 (01-17-20 @ 05:01)  BP: 90/58 (01-17-20 @ 05:01)  RR: 19 (01-17-20 @ 05:01)  SpO2: 95% (01-17-20 @ 05:01)        GENERAL: Alert, NAD  CHEST/LUNG: Clear to auscultation bilaterally, no wheezing, rales, or rhonchi.  Respirations nonlabored.  Good inspiratory effort b/l.    HEART: S1S2, Regular rate and rhythm;   ABDOMEN: + Bowel sounds, soft, Nontender, Nondistended  EXTREMITIES:  no calf tenderness, No edema    I&O's Detail    16 Jan 2020 07:01  -  17 Jan 2020 07:00  --------------------------------------------------------  IN:    Oral Fluid: 600 mL  Total IN: 600 mL    OUT:  Total OUT: 0 mL    Total NET: 600 mL          LABS:                        13.7   6.19  )-----------( 215      ( 16 Jan 2020 07:28 )             41.1     01-16    140  |  105  |  10  ----------------------------<  121<H>  4.0   |  31  |  0.96    Ca    8.9      16 Jan 2020 07:28  < from: Xray Chest 1 View- PORTABLE-Routine (01.16.20 @ 07:40) >    EXAM:  XR CHEST PORTABLE ROUTINE 1V                            PROCEDURE DATE:  01/16/2020          INTERPRETATION:  AP erect chest on January 16, 2020 at 6:58 AM. Patient is being followed for right pneumothorax. Heart size is within normal limits.    Hyperexpanded lungs consistent with COPD again noted.    Rather small right apical pneumothorax is similar to January 15.    Extensive posttraumatic deformity of the mid left clavicle again noted.    Study is similar to January 15.    IMPRESSION:Unchanged chest showing COPD and a small right apical pneumothorax.          ANTONIO GOMEZ M.D., ATTENDING RADIOLOGIST  This document has been electronically signed. Jan 16 2020 11:10AM    < end of copied text >    	    Impression: 48yo Male with PMH Crohn's disease (on Pentasa) and Alfaro's esophagus presents with one week history of left sided chest pain radiating to the back.   On imaging, was found to have RUL cavitary mass with irregular borders s/p IR biopsy of RUL mass, now with small Right apical PTX.  CXR yesterday showing "unchanged chest showing COPD and a small right apical pneumothorax."  Stable.      Plan:  -Supplemental O2 as needed, monitor vitals/O2 sats closely  -Awaiting pathology  -Continue medical management and supportive care  -Will discuss with Dr. Marley

## 2020-01-17 NOTE — PROGRESS NOTE ADULT - ASSESSMENT
cont rx cont rx          REVIEW OF SYMPTOMS      Able to give ROS  Yes     RELIABLE No   CONSTITUTIONAL Weakness Yes  Chills No Vision changes No  ENDOCRINE No unexplained hair loss No heat or cold intolerance    ALLERGY No hives  Sore throat No   RESP Coughing blood no  Shortness of breath YES   NEURO No Headache  Confusion Pain neck No   CARDIAC No Chest pain No Palpitations   GI No Pain abdomen NO   Vomiting NO     PHYSICAL EXAM    HEENT Unremarkable PERRLA atraumatic   RESP Fair air entry EXP prolonged    Harsh breath sound Resp distres mild   CARDIAC S1 S2 No S3     NO JVD    ABDOMEN SOFT BS PRESENT NOT DISTENDED No hepatosplenomegaly PEDAL EDEMA present No calf tenderness  NO rash   GENERAL Not TOXIC looking    VITALS/LABS       1/17/2020 afeb 78 107/70   1/16/2020 afeb 83 112/71   1/16/2020 W 6.1 Hb 13.7 Plt 219 Na 140 K 4 CO2 31 Cr .9     PT DATA/BEST PRACTICE  ALLERGY adhesives  ADVANCED DIRECTIVE       Goals of care discussion  WT  62 (1/11/2020)    BMI  19 (1/11/2020)                                                                                                           HEAD OF BED ELEVATION Yes  DYSPHAGIA EVAL Ordered 1/11/2020   DIET   mech soft (1/10)                 DVT PROPHYLAXIS    lvnx 40 (1/7)            STRESS ULCER PROPHYLAXIS   protonix 40 (1/7)            INFECTION PPLX  ECHO  1/14/2020 ef 55%               ABIO       MICROBIO     1/9 Asperg galactomannan antig below 0.5 1/9 HIV n    GLYCEMIC CONTROL  PROCEDURE                                                            1/13/2020 needle rul mass     PT SUMMARY  48 yo smoker (>30 pack year hx) with Crohn's disease (on Pentasa), Alfaro's esophagus male who currently lives in homeless shelter admitted with RUL cavitary lesion  Has ho travel [place]     PLAN      LUNG MASS   1/9 Asperg galactomannan antig below 0.5 1/9 HIV n   1/14 ct ch 4.4 x 2.5 cm irregular spiculated mass r upper lobe with 1.1 cm central cavity   Most likely diagnosis clinically is lung ca Possibly squamous although squamous is usually central   Other possibilities include Tb Histo   needle was done on 1/13   Await cyto  TB ISOLATION   1/12 QFT gold Tb test n   1/13 needle No afb seen   1/15/2020 Susp for Tb is low Consider dc resp isolation if ID agrees  PNEUMOTHORAX   1/17/2020 CXR decr in r apical pnx   1/15/2020 {ersistent small apical pntx   1/14/2020 CXR sm apical r pntx   Monitor serially   CTS on case   COPD duoneb.4p (1/8) spiriva (1/8)   SMOKER Nicoderm 21 (1/7) Counseled to stop   CROHNS Mescalamine 1.4 (1/7)   BPH tamsulosin .4 (1/7)       TIME SPENT Over 25 minutes aggregate care time spent on encounter; activities included   direct pt care, counseling and/or coordinating care reviewing notes, lab data/ imaging , discussion with multidisciplinary team/ pt /family. Risks, benefits, alternatives  discussed in detail.

## 2020-01-17 NOTE — PROGRESS NOTE ADULT - SUBJECTIVE AND OBJECTIVE BOX
Patient is a 49y old  Male who presents with a chief complaint of cp (17 Jan 2020 11:28), no chest pain, no SOB       OVERNIGHT EVENTS: none    MEDICATIONS  (STANDING):  busPIRone 10 milliGRAM(s) Oral two times a day  dicyclomine 10 milliGRAM(s) Oral two times a day before meals  enoxaparin Injectable 40 milliGRAM(s) SubCutaneous daily  lactulose Syrup 30 Gram(s) Oral daily  mesalamine ER Capsule 1000 milliGRAM(s) Oral four times a day with meals  midodrine. 5 milliGRAM(s) Oral three times a day  nicotine - 21 mG/24Hr(s) Patch 1 patch Transdermal daily  oxybutynin 5 milliGRAM(s) Oral two times a day  pantoprazole    Tablet 40 milliGRAM(s) Oral two times a day  polyethylene glycol 3350 17 Gram(s) Oral two times a day  senna 2 Tablet(s) Oral at bedtime  sodium chloride 0.9%. 1000 milliLiter(s) (75 mL/Hr) IV Continuous <Continuous>  tamsulosin 0.8 milliGRAM(s) Oral at bedtime  tiotropium 18 MICROgram(s) Capsule 1 Capsule(s) Inhalation daily    MEDICATIONS  (PRN):  acetaminophen   Tablet .. 650 milliGRAM(s) Oral every 6 hours PRN Temp greater or equal to 38C (100.4F), Mild Pain (1 - 3)  albuterol/ipratropium for Nebulization. 3 milliLiter(s) Nebulizer every 6 hours PRN Shortness of Breath  bisacodyl Suppository 10 milliGRAM(s) Rectal daily PRN Constipation  morphine  - Injectable 2 milliGRAM(s) IV Push every 6 hours PRN Severe Pain (7 - 10)  ondansetron Injectable 4 milliGRAM(s) IV Push every 6 hours PRN Nausea and/or Vomiting  oxycodone    5 mG/acetaminophen 325 mG 1 Tablet(s) Oral every 6 hours PRN Moderate Pain (4 - 6)      REVIEW OF SYSTEMS:  reviewed and negative.      Vital Signs Last 24 Hrs  T(C): 36.7 (17 Jan 2020 11:54), Max: 36.7 (17 Jan 2020 11:54)  T(F): 98 (17 Jan 2020 11:54), Max: 98 (17 Jan 2020 11:54)  HR: 78 (17 Jan 2020 11:54) (53 - 83)  BP: 107/73 (17 Jan 2020 11:54) (90/58 - 112/71)  BP(mean): --  RR: 18 (17 Jan 2020 11:54) (16 - 19)  SpO2: 96% (17 Jan 2020 11:54) (95% - 97%)    PHYSICAL EXAM:  GENERAL: NAD, well-groomed, well-developed  HEAD:  Atraumatic, Normocephalic  EYES: EOMI, PERRLA, conjunctiva and sclera clear  ENMT: No tonsillar erythema, exudates, or enlargement;    NECK: Supple, No JVD   NERVOUS SYSTEM:  Alert & Oriented X3, Good concentration;   CHEST/LUNG: Good air enty bilaterally; No rales, rhonchi, wheezing, or rubs  HEART: Regular rate and rhythm; No murmurs, rubs, or gallops  ABDOMEN: Soft, Nontender, Nondistended; Bowel sounds present  EXTREMITIES:  2+ Peripheral Pulses, No clubbing, cyanosis, or edema  LYMPH: No lymphadenopathy noted  SKIN: No rashes or lesions    LABS:                        13.7   6.19  )-----------( 215      ( 16 Jan 2020 07:28 )             41.1     01-16    140  |  105  |  10  ----------------------------<  121<H>  4.0   |  31  |  0.96    Ca    8.9      16 Jan 2020 07:28         cardiac markers     CAPILLARY BLOOD GLUCOSE        Cultures    RADIOLOGY & ADDITIONAL TESTS:    Imaging Personally Reviewed:  [ ] YES  [ ] NO    Consultant(s) Notes Reviewed:  [ ] YES  [ ] NO    Care Discussed with Consultants/Other Providers [ ] YES  [ ] NO

## 2020-01-17 NOTE — PROGRESS NOTE ADULT - PROBLEM SELECTOR PLAN 1
- TB was ruled out,    - QuantiFeron, HIV, histoplasma and aspergillosis are all negative.  - Isolation precautions: removed,    - s/p  right lung biopsy by IR on  1/13/2020    - CTS is following.  - Follow up lung bx result

## 2020-01-17 NOTE — PROGRESS NOTE ADULT - SUBJECTIVE AND OBJECTIVE BOX
Gastroenterology  Patient seen and examined bedside resting comfortably.  No complaints offered.   Reported painful firm bowel movement during the night. Denies any blood    T(F): 98 (01-17-20 @ 11:54), Max: 98 (01-17-20 @ 11:54)  HR: 78 (01-17-20 @ 11:54) (53 - 83)  BP: 107/73 (01-17-20 @ 11:54) (90/58 - 112/71)  RR: 18 (01-17-20 @ 11:54) (16 - 19)  SpO2: 96% (01-17-20 @ 11:54) (95% - 97%)  Wt(kg): --  CAPILLARY BLOOD GLUCOSE          PHYSICAL EXAM:  General: NAD, WDWN.   Neuro:  Alert & responsive  HEENT: NCAT, EOMI, conjunctiva clear  CV: +S1+S2 regular rate and rhythm  Lung: clear to ausculation bilaterally, respirations nonlabored, good inspiratory effort  Abdomen: soft, Non Tender, No distention Normal active BS  Extremities: no cyanosis, clubbing or edema    LABS:                        13.7   6.19  )-----------( 215      ( 16 Jan 2020 07:28 )             41.1     01-16    140  |  105  |  10  ----------------------------<  121<H>  4.0   |  31  |  0.96    Ca    8.9      16 Jan 2020 07:28          I&O's Detail    16 Jan 2020 07:01  -  17 Jan 2020 07:00  --------------------------------------------------------  IN:    Oral Fluid: 600 mL  Total IN: 600 mL    OUT:  Total OUT: 0 mL    Total NET: 600 mL        01-16 @ 07:28    140 | 105 | 10  /8.9 | -- | --  _______________________/  4.0 | 31 | 0.96                           \par

## 2020-01-17 NOTE — PROGRESS NOTE ADULT - PROBLEM SELECTOR PLAN 1
-Miralax twice daily   -Lactulose 20 g (10 g/15 ml) twice daily   -Ok to continue pentasa  -Continue dicyclomine   -Avoid NSAIDs  -Close outpatient follow up (will likely recommend outpatient capsule endoscopy to evaluate for Crohns given negative work-up thus far).

## 2020-01-17 NOTE — PROGRESS NOTE ADULT - SUBJECTIVE AND OBJECTIVE BOX
JEOVANY DAVE    S 1E 194 D    Patient is a 49y old  Male who presents with a chief complaint of cp (17 Jan 2020 15:16)       Allergies    adhesives (Rash)  No Known Drug Allergies    Intolerances        HPI:  49M with a PMH of Barretts Disease, Crohn's on Pentasya, IBS presents to ED for L sided chest pain.  Patient severely agitated and anxious when seen.  Reports that the pain is worse upon deep breaths, sometimes radiates to his back/ L shoulder.  Reports chronic cough but attributes that to his smoking history.  Also reports lost weight.  No hx of TB however has been living in homeless shelters for 8 months.  Severely anxious about continuing his medications.  Reportedly had a PPD done in August that was negative. (07 Jan 2020 23:54)      PAST MEDICAL & SURGICAL HISTORY:  Alfaro's esophagus without dysplasia  Depression, unspecified depression type  Crohn's disease with complication, unspecified gastrointestinal tract location  ACL (anterior cruciate ligament) tear      FAMILY HISTORY:  Family history of diabetes mellitus in maternal grandmother (Mother, Grandparent)  Family history of COPD (chronic obstructive pulmonary disease) (Father)  Family history of colon cancer in father (Father)  Family history of hypertension in father (Father, Mother, Grandparent)        MEDICATIONS   acetaminophen   Tablet .. 650 milliGRAM(s) Oral every 6 hours PRN  albuterol/ipratropium for Nebulization. 3 milliLiter(s) Nebulizer every 6 hours PRN  bisacodyl Suppository 10 milliGRAM(s) Rectal daily PRN  busPIRone 10 milliGRAM(s) Oral two times a day  dicyclomine 10 milliGRAM(s) Oral two times a day before meals  enoxaparin Injectable 40 milliGRAM(s) SubCutaneous daily  lactulose Syrup 20 Gram(s) Oral two times a day  mesalamine ER Capsule 1000 milliGRAM(s) Oral four times a day with meals  midodrine. 10 milliGRAM(s) Oral three times a day  morphine  - Injectable 2 milliGRAM(s) IV Push every 6 hours PRN  nicotine - 21 mG/24Hr(s) Patch 1 patch Transdermal daily  ondansetron Injectable 4 milliGRAM(s) IV Push every 6 hours PRN  oxybutynin 5 milliGRAM(s) Oral two times a day  oxycodone    5 mG/acetaminophen 325 mG 1 Tablet(s) Oral every 6 hours PRN  pantoprazole    Tablet 40 milliGRAM(s) Oral two times a day  polyethylene glycol 3350 17 Gram(s) Oral two times a day  senna 2 Tablet(s) Oral at bedtime  sodium chloride 0.9%. 1000 milliLiter(s) IV Continuous <Continuous>  tamsulosin 0.8 milliGRAM(s) Oral at bedtime  tiotropium 18 MICROgram(s) Capsule 1 Capsule(s) Inhalation daily      Vital Signs Last 24 Hrs  T(C): 36.7 (17 Jan 2020 11:54), Max: 36.7 (17 Jan 2020 11:54)  T(F): 98 (17 Jan 2020 11:54), Max: 98 (17 Jan 2020 11:54)  HR: 76 (17 Jan 2020 16:49) (53 - 78)  BP: 104/65 (17 Jan 2020 16:49) (90/58 - 108/76)  BP(mean): --  RR: 17 (17 Jan 2020 16:49) (16 - 19)  SpO2: 96% (17 Jan 2020 16:49) (95% - 97%)      01-16-20 @ 07:01  -  01-17-20 @ 07:00  --------------------------------------------------------  IN: 600 mL / OUT: 0 mL / NET: 600 mL            LABS:                        13.7   6.19  )-----------( 215      ( 16 Jan 2020 07:28 )             41.1     01-16    140  |  105  |  10  ----------------------------<  121<H>  4.0   |  31  |  0.96    Ca    8.9      16 Jan 2020 07:28                WBC:  WBC Count: 6.19 K/uL (01-16 @ 07:28)      MICROBIOLOGY:  RECENT CULTURES:  01-13 .Body Fluid XXXX   polymorphonuclear leukocytes seen  No organisms seen  by cytocentrifuge   No growth                    Sodium:  Sodium, Serum: 140 mmol/L (01-16 @ 07:28)      0.96 mg/dL 01-16 @ 07:28      Hemoglobin:  Hemoglobin: 13.7 g/dL (01-16 @ 07:28)      Platelets: Platelet Count - Automated: 215 K/uL (01-16 @ 07:28)              RADIOLOGY & ADDITIONAL STUDIES:

## 2020-01-18 LAB
CULTURE RESULTS: SIGNIFICANT CHANGE UP
GRAM STN FLD: SIGNIFICANT CHANGE UP
SPECIMEN SOURCE: SIGNIFICANT CHANGE UP

## 2020-01-18 PROCEDURE — 71045 X-RAY EXAM CHEST 1 VIEW: CPT | Mod: 26

## 2020-01-18 PROCEDURE — 99233 SBSQ HOSP IP/OBS HIGH 50: CPT

## 2020-01-18 PROCEDURE — 99232 SBSQ HOSP IP/OBS MODERATE 35: CPT

## 2020-01-18 RX ADMIN — Medication 1000 MILLIGRAM(S): at 22:10

## 2020-01-18 RX ADMIN — MIDODRINE HYDROCHLORIDE 10 MILLIGRAM(S): 2.5 TABLET ORAL at 11:37

## 2020-01-18 RX ADMIN — Medication 10 MILLIGRAM(S): at 06:29

## 2020-01-18 RX ADMIN — Medication 1 PATCH: at 07:37

## 2020-01-18 RX ADMIN — TIOTROPIUM BROMIDE 1 CAPSULE(S): 18 CAPSULE ORAL; RESPIRATORY (INHALATION) at 11:39

## 2020-01-18 RX ADMIN — Medication 1000 MILLIGRAM(S): at 17:03

## 2020-01-18 RX ADMIN — Medication 5 MILLIGRAM(S): at 06:29

## 2020-01-18 RX ADMIN — PANTOPRAZOLE SODIUM 40 MILLIGRAM(S): 20 TABLET, DELAYED RELEASE ORAL at 17:06

## 2020-01-18 RX ADMIN — Medication 10 MILLIGRAM(S): at 17:03

## 2020-01-18 RX ADMIN — ENOXAPARIN SODIUM 40 MILLIGRAM(S): 100 INJECTION SUBCUTANEOUS at 11:37

## 2020-01-18 RX ADMIN — SODIUM CHLORIDE 75 MILLILITER(S): 9 INJECTION INTRAMUSCULAR; INTRAVENOUS; SUBCUTANEOUS at 16:00

## 2020-01-18 RX ADMIN — Medication 1 PATCH: at 11:37

## 2020-01-18 RX ADMIN — SODIUM CHLORIDE 75 MILLILITER(S): 9 INJECTION INTRAMUSCULAR; INTRAVENOUS; SUBCUTANEOUS at 06:27

## 2020-01-18 RX ADMIN — MIDODRINE HYDROCHLORIDE 10 MILLIGRAM(S): 2.5 TABLET ORAL at 17:03

## 2020-01-18 RX ADMIN — Medication 1 PATCH: at 22:09

## 2020-01-18 RX ADMIN — TAMSULOSIN HYDROCHLORIDE 0.8 MILLIGRAM(S): 0.4 CAPSULE ORAL at 22:10

## 2020-01-18 RX ADMIN — Medication 5 MILLIGRAM(S): at 17:04

## 2020-01-18 RX ADMIN — Medication 1 PATCH: at 11:43

## 2020-01-18 RX ADMIN — MIDODRINE HYDROCHLORIDE 10 MILLIGRAM(S): 2.5 TABLET ORAL at 06:29

## 2020-01-18 RX ADMIN — Medication 1000 MILLIGRAM(S): at 11:37

## 2020-01-18 RX ADMIN — PANTOPRAZOLE SODIUM 40 MILLIGRAM(S): 20 TABLET, DELAYED RELEASE ORAL at 06:29

## 2020-01-18 NOTE — PROGRESS NOTE ADULT - SUBJECTIVE AND OBJECTIVE BOX
Patient seen and examined at bedside with no complaints.   Denies shortness of breath, chest pain, fever, chills, nausea, vomiting.       Vital Signs Last 24 Hrs  T(F): 98.2 (01-18-20 @ 16:37), Max: 98.2 (01-18-20 @ 16:37)  HR: 63 (01-18-20 @ 16:37)  BP: 110/75 (01-18-20 @ 16:37)  RR: 17 (01-18-20 @ 16:37)  SpO2: 95% (01-18-20 @ 16:37)        GENERAL: Alert, NAD  CHEST/LUNG: Clear to auscultation bilaterally, no wheezing, rales, or rhonchi, respirations nonlabored  HEART: S1S2, Regular rate and rhythm  ABDOMEN: + Bowel sounds, soft, Nontender, Nondistended  EXTREMITIES:  no calf tenderness, No edema    I&O's Detail    17 Jan 2020 07:01  -  18 Jan 2020 07:00  --------------------------------------------------------  IN:    Oral Fluid: 420 mL  Total IN: 420 mL    OUT:  Total OUT: 0 mL    Total NET: 420 mL      18 Jan 2020 07:01  -  18 Jan 2020 19:46  --------------------------------------------------------  IN:    Oral Fluid: 900 mL  Total IN: 900 mL    OUT:  Total OUT: 0 mL    Total NET: 900 mL      < from: Xray Chest 1 View-PORTABLE IMMEDIATE (01.18.20 @ 18:02) >  EXAM:  XR CHEST PORTABLE IMMED 1V                            PROCEDURE DATE:  01/18/2020          INTERPRETATION:  AP semierect chest on January 18, 2020 5:49 PM.    Heart size is within normal limits.    Posttraumatic deformity of the mid left clavicle again noted.    Vaguely defined irregular density over the right upper paramediastinal area is again noted unchanged.    On January 17 there was a tiny right apical pneumothorax.    This is not definitely seen at this time.    IMPRESSION: Right apical pneumothorax may have disappeared.        ANTONIO GOMEZ M.D., ATTENDING RADIOLOGIST  This document has been electronically signed. Jan 18 2020  6:05PM    Impression: 50 yo Male with PMH Crohn's disease (on Pentasa) and Alfaro's esophagus presents with one week history of left sided chest pain radiating to the back.   On imaging, was found to have RUL cavitary mass with irregular borders s/p IR biopsy of RUL mass, now with small Right apical PTX.  CXR today: "right apical pneumothorax may have disappeared."        Plan:  -Supplemental O2 as needed, monitor vitals/O2 sats closely  -Awaiting pathology  -Continue medical management and supportive care  -Will discuss with Dr. Marley

## 2020-01-18 NOTE — PROGRESS NOTE ADULT - SUBJECTIVE AND OBJECTIVE BOX
Patient is a 49y old  Male who presents with a chief complaint of cp (17 Jan 2020 16:59), he has no pain, no SOB.       OVERNIGHT EVENTS: none    MEDICATIONS  (STANDING):  busPIRone 10 milliGRAM(s) Oral two times a day  dicyclomine 10 milliGRAM(s) Oral two times a day before meals  enoxaparin Injectable 40 milliGRAM(s) SubCutaneous daily  lactulose Syrup 20 Gram(s) Oral two times a day  mesalamine ER Capsule 1000 milliGRAM(s) Oral four times a day with meals  midodrine. 10 milliGRAM(s) Oral three times a day  nicotine - 21 mG/24Hr(s) Patch 1 patch Transdermal daily  oxybutynin 5 milliGRAM(s) Oral two times a day  pantoprazole    Tablet 40 milliGRAM(s) Oral two times a day  polyethylene glycol 3350 17 Gram(s) Oral two times a day  senna 2 Tablet(s) Oral at bedtime  sodium chloride 0.9%. 1000 milliLiter(s) (75 mL/Hr) IV Continuous <Continuous>  tamsulosin 0.8 milliGRAM(s) Oral at bedtime  tiotropium 18 MICROgram(s) Capsule 1 Capsule(s) Inhalation daily    MEDICATIONS  (PRN):  acetaminophen   Tablet .. 650 milliGRAM(s) Oral every 6 hours PRN Temp greater or equal to 38C (100.4F), Mild Pain (1 - 3)  albuterol/ipratropium for Nebulization. 3 milliLiter(s) Nebulizer every 6 hours PRN Shortness of Breath  bisacodyl Suppository 10 milliGRAM(s) Rectal daily PRN Constipation  morphine  - Injectable 2 milliGRAM(s) IV Push every 6 hours PRN Severe Pain (7 - 10)  ondansetron Injectable 4 milliGRAM(s) IV Push every 6 hours PRN Nausea and/or Vomiting  oxycodone    5 mG/acetaminophen 325 mG 1 Tablet(s) Oral every 6 hours PRN Moderate Pain (4 - 6)        REVIEW OF SYSTEMS:  CONSTITUTIONAL: No fever, weight loss, or fatigue  EYES: No eye pain, visual disturbances, or discharge  ENMT:  No difficulty hearing, tinnitus, vertigo; No sinus or throat pain  NECK: No pain or stiffness  RESPIRATORY: No cough, wheezing, chills or hemoptysis; No shortness of breath  CARDIOVASCULAR: No chest pain, palpitations, dizziness, or leg swelling  GASTROINTESTINAL: No abdominal  pain.    GENITOURINARY: No dysuria.  NEUROLOGICAL: No headaches,   SKIN: No itching,        Vital Signs Last 24 Hrs  T(C): 36.4 (18 Jan 2020 11:11), Max: 36.8 (17 Jan 2020 16:49)  T(F): 97.5 (18 Jan 2020 11:11), Max: 98.2 (17 Jan 2020 16:49)  HR: 75 (18 Jan 2020 11:11) (63 - 76)  BP: 95/53 (18 Jan 2020 11:11) (94/52 - 108/67)  BP(mean): --  RR: 18 (18 Jan 2020 11:11) (16 - 18)  SpO2: 95% (18 Jan 2020 11:11) (95% - 98%)    PHYSICAL EXAM:  GENERAL: NAD, well-groomed, well-developed  HEAD:  Atraumatic, Normocephalic  EYES: EOMI, PERRLA, conjunctiva and sclera clear  ENMT: No tonsillar erythema, exudates, or enlargement; Moist mucous membranes   NECK: Supple, No JVD   NERVOUS SYSTEM:  Alert & Oriented X3, Good concentration; Motor Strength 5/5 B/L upper and lower extremities; DTRs 2+ intact and symmetric  CHEST/LUNG: Clear to auscultation  bilaterally; No rales, rhonchi, wheezing, or rubs  HEART: Regular rate and rhythm; No murmurs, rubs, or gallops  ABDOMEN: Soft, Nontender, Nondistended; Bowel sounds present  EXTREMITIES:  2+ Peripheral Pulses, No clubbing, cyanosis, or edema  LYMPH: No lymphadenopathy noted  SKIN: No rashes or lesions    LABS:             cardiac markers     CAPILLARY BLOOD GLUCOSE        Cultures    RADIOLOGY & ADDITIONAL TESTS:    Imaging Personally Reviewed:  [ ] YES  [ ] NO    Consultant(s) Notes Reviewed:  [ ] YES  [ ] NO    Care Discussed with Consultants/Other Providers [ ] YES  [ ] NO

## 2020-01-18 NOTE — PROGRESS NOTE ADULT - SUBJECTIVE AND OBJECTIVE BOX
JEOVANY ACOSTA    S [address]    Patient is a 49y old  Male who presents with a chief complaint of cp (18 Jan 2020 12:27)       Allergies    adhesives (Rash)  No Known Drug Allergies    Intolerances        HPI:  49M with a PMH of Barretts Disease, Crohn's on Pentasya, IBS presents to ED for L sided chest pain.  Patient severely agitated and anxious when seen.  Reports that the pain is worse upon deep breaths, sometimes radiates to his back/ L shoulder.  Reports chronic cough but attributes that to his smoking history.  Also reports lost weight.  No hx of TB however has been living in homeless shelters for 8 months.  Severely anxious about continuing his medications.  Reportedly had a PPD done in August that was negative. (07 Jan 2020 23:54)      PAST MEDICAL & SURGICAL HISTORY:  Alfaro's esophagus without dysplasia  Depression, unspecified depression type  Crohn's disease with complication, unspecified gastrointestinal tract location  ACL (anterior cruciate ligament) tear      FAMILY HISTORY:  Family history of diabetes mellitus in maternal grandmother (Mother, Grandparent)  Family history of COPD (chronic obstructive pulmonary disease) (Father)  Family history of colon cancer in father (Father)  Family history of hypertension in father (Father, Mother, Grandparent)        MEDICATIONS   acetaminophen   Tablet .. 650 milliGRAM(s) Oral every 6 hours PRN  albuterol/ipratropium for Nebulization. 3 milliLiter(s) Nebulizer every 6 hours PRN  bisacodyl Suppository 10 milliGRAM(s) Rectal daily PRN  busPIRone 10 milliGRAM(s) Oral two times a day  dicyclomine 10 milliGRAM(s) Oral two times a day before meals  enoxaparin Injectable 40 milliGRAM(s) SubCutaneous daily  lactulose Syrup 20 Gram(s) Oral two times a day  mesalamine ER Capsule 1000 milliGRAM(s) Oral four times a day with meals  midodrine. 10 milliGRAM(s) Oral three times a day  morphine  - Injectable 2 milliGRAM(s) IV Push every 6 hours PRN  nicotine - 21 mG/24Hr(s) Patch 1 patch Transdermal daily  ondansetron Injectable 4 milliGRAM(s) IV Push every 6 hours PRN  oxybutynin 5 milliGRAM(s) Oral two times a day  oxycodone    5 mG/acetaminophen 325 mG 1 Tablet(s) Oral every 6 hours PRN  pantoprazole    Tablet 40 milliGRAM(s) Oral two times a day  polyethylene glycol 3350 17 Gram(s) Oral two times a day  senna 2 Tablet(s) Oral at bedtime  sodium chloride 0.9%. 1000 milliLiter(s) IV Continuous <Continuous>  tamsulosin 0.8 milliGRAM(s) Oral at bedtime  tiotropium 18 MICROgram(s) Capsule 1 Capsule(s) Inhalation daily      Vital Signs Last 24 Hrs  T(C): 36.4 (18 Jan 2020 11:11), Max: 36.8 (17 Jan 2020 16:49)  T(F): 97.5 (18 Jan 2020 11:11), Max: 98.2 (17 Jan 2020 16:49)  HR: 75 (18 Jan 2020 11:11) (63 - 76)  BP: 95/53 (18 Jan 2020 11:11) (94/52 - 108/67)  BP(mean): --  RR: 18 (18 Jan 2020 11:11) (16 - 18)  SpO2: 95% (18 Jan 2020 11:11) (95% - 98%)      01-17-20 @ 07:01  -  01-18-20 @ 07:00  --------------------------------------------------------  IN: 420 mL / OUT: 0 mL / NET: 420 mL    01-18-20 @ 07:01  -  01-18-20 @ 13:04  --------------------------------------------------------  IN: 240 mL / OUT: 0 mL / NET: 240 mL            LABS:                    WBC:  WBC Count: 6.19 K/uL (01-16 @ 07:28)      MICROBIOLOGY:  RECENT CULTURES:  01-13 .Body Fluid XXXX   polymorphonuclear leukocytes seen  No organisms seen  by cytocentrifuge   No growth at 5 days                    Sodium:  Sodium, Serum: 140 mmol/L (01-16 @ 07:28)      0.96 mg/dL 01-16 @ 07:28      Hemoglobin:  Hemoglobin: 13.7 g/dL (01-16 @ 07:28)      Platelets: Platelet Count - Automated: 215 K/uL (01-16 @ 07:28)              RADIOLOGY & ADDITIONAL STUDIES:

## 2020-01-18 NOTE — PROGRESS NOTE ADULT - ASSESSMENT
cont rx cont rx          REVIEW OF SYMPTOMS      Able to give ROS  Yes     RELIABLE No   CONSTITUTIONAL Weakness Yes  Chills No Vision changes No  ENDOCRINE No unexplained hair loss No heat or cold intolerance    ALLERGY No hives  Sore throat No   RESP Coughing blood no  Shortness of breath YES   NEURO No Headache  Confusion Pain neck No   CARDIAC No Chest pain No Palpitations   GI No Pain abdomen NO   Vomiting NO     PHYSICAL EXAM    HEENT Unremarkable PERRLA atraumatic   RESP Fair air entry EXP prolonged    Harsh breath sound Resp distres mild   CARDIAC S1 S2 No S3     NO JVD    ABDOMEN SOFT BS PRESENT NOT DISTENDED No hepatosplenomegaly PEDAL EDEMA present No calf tenderness  NO rash   GENERAL Not TOXIC looking    VITALS/LABS       1/18/2020 afeb 63 110/70 17 95%    PT DATA/BEST PRACTICE  ALLERGY adhesives  ADVANCED DIRECTIVE       Goals of care discussion  WT  62 (1/11/2020)    BMI  19 (1/11/2020)                                                                                                           HEAD OF BED ELEVATION Yes  DYSPHAGIA EVAL Ordered 1/11/2020   DIET   mech soft (1/10)                 DVT PROPHYLAXIS    lvnx 40 (1/7)            STRESS ULCER PROPHYLAXIS   protonix 40 (1/7)            INFECTION PPLX  ECHO  1/14/2020 ef 55%               ABIO       MICROBIO     1/9 Asperg galactomannan antig below 0.5 1/9 HIV n    GLYCEMIC CONTROL  PROCEDURE                                                            1/13/2020 needle rul mass     PT SUMMARY  50 yo smoker (>30 pack year hx) with Crohn's disease (on Pentasa), Alfaro's esophagus male who currently lives in homeless shelter admitted with RUL cavitary lesion  Has ho travel [place]     PLAN      LUNG MASS   1/9 Asperg galactomannan antig below 0.5 1/9 HIV n   1/14 ct ch 4.4 x 2.5 cm irregular spiculated mass r upper lobe with 1.1 cm central cavity   Most likely diagnosis clinically is lung ca Possibly squamous although squamous is usually central   Other possibilities include Tb Histo   needle was done on 1/13   Await cyto  TB ISOLATION   1/12 QFT gold Tb test n   1/13 needle No afb seen   1/15/2020 Susp for Tb is low Consider dc resp isolation if ID agrees  PNEUMOTHORAX   1/17/2020 CXR decr in r apical pnx   1/15/2020 {ersistent small apical pntx   1/14/2020 CXR sm apical r pntx   Monitor serially   CTS on case   COPD duoneb.4p (1/8) spiriva (1/8)   SMOKER Nicoderm 21 (1/7) Counseled to stop   CROHNS Mescalamine 1.4 (1/7)   BPH tamsulosin .4 (1/7)       TIME SPENT Over 25 minutes aggregate care time spent on encounter; activities included   direct pt care, counseling and/or coordinating care reviewing notes, lab data/ imaging , discussion with multidisciplinary team/ pt /family. Risks, benefits, alternatives  discussed in detail.

## 2020-01-19 PROCEDURE — 99233 SBSQ HOSP IP/OBS HIGH 50: CPT

## 2020-01-19 PROCEDURE — 99232 SBSQ HOSP IP/OBS MODERATE 35: CPT

## 2020-01-19 RX ORDER — ALPRAZOLAM 0.25 MG
0.25 TABLET ORAL ONCE
Refills: 0 | Status: DISCONTINUED | OUTPATIENT
Start: 2020-01-19 | End: 2020-01-19

## 2020-01-19 RX ADMIN — Medication 1000 MILLIGRAM(S): at 17:59

## 2020-01-19 RX ADMIN — Medication 10 MILLIGRAM(S): at 06:33

## 2020-01-19 RX ADMIN — Medication 1000 MILLIGRAM(S): at 09:44

## 2020-01-19 RX ADMIN — Medication 1 PATCH: at 08:05

## 2020-01-19 RX ADMIN — TAMSULOSIN HYDROCHLORIDE 0.8 MILLIGRAM(S): 0.4 CAPSULE ORAL at 22:29

## 2020-01-19 RX ADMIN — Medication 0.25 MILLIGRAM(S): at 23:45

## 2020-01-19 RX ADMIN — Medication 5 MILLIGRAM(S): at 05:36

## 2020-01-19 RX ADMIN — SODIUM CHLORIDE 75 MILLILITER(S): 9 INJECTION INTRAMUSCULAR; INTRAVENOUS; SUBCUTANEOUS at 06:33

## 2020-01-19 RX ADMIN — Medication 5 MILLIGRAM(S): at 17:59

## 2020-01-19 RX ADMIN — TIOTROPIUM BROMIDE 1 CAPSULE(S): 18 CAPSULE ORAL; RESPIRATORY (INHALATION) at 12:00

## 2020-01-19 RX ADMIN — Medication 10 MILLIGRAM(S): at 16:47

## 2020-01-19 RX ADMIN — PANTOPRAZOLE SODIUM 40 MILLIGRAM(S): 20 TABLET, DELAYED RELEASE ORAL at 17:58

## 2020-01-19 RX ADMIN — PANTOPRAZOLE SODIUM 40 MILLIGRAM(S): 20 TABLET, DELAYED RELEASE ORAL at 05:36

## 2020-01-19 RX ADMIN — Medication 1000 MILLIGRAM(S): at 22:29

## 2020-01-19 RX ADMIN — Medication 1 PATCH: at 19:39

## 2020-01-19 RX ADMIN — Medication 1 PATCH: at 12:10

## 2020-01-19 RX ADMIN — MIDODRINE HYDROCHLORIDE 10 MILLIGRAM(S): 2.5 TABLET ORAL at 12:00

## 2020-01-19 RX ADMIN — MIDODRINE HYDROCHLORIDE 10 MILLIGRAM(S): 2.5 TABLET ORAL at 05:36

## 2020-01-19 RX ADMIN — Medication 1000 MILLIGRAM(S): at 12:00

## 2020-01-19 RX ADMIN — Medication 10 MILLIGRAM(S): at 05:36

## 2020-01-19 RX ADMIN — ENOXAPARIN SODIUM 40 MILLIGRAM(S): 100 INJECTION SUBCUTANEOUS at 12:02

## 2020-01-19 RX ADMIN — Medication 10 MILLIGRAM(S): at 17:59

## 2020-01-19 RX ADMIN — Medication 1 PATCH: at 11:59

## 2020-01-19 NOTE — PROGRESS NOTE ADULT - SUBJECTIVE AND OBJECTIVE BOX
Patient is a 49y old  Male who presents with a chief complaint of cp (18 Jan 2020 19:46), no SOB       OVERNIGHT EVENTS: none    MEDICATIONS  (STANDING):  busPIRone 10 milliGRAM(s) Oral two times a day  dicyclomine 10 milliGRAM(s) Oral two times a day before meals  enoxaparin Injectable 40 milliGRAM(s) SubCutaneous daily  lactulose Syrup 20 Gram(s) Oral two times a day  mesalamine ER Capsule 1000 milliGRAM(s) Oral four times a day with meals  midodrine. 10 milliGRAM(s) Oral three times a day  nicotine - 21 mG/24Hr(s) Patch 1 patch Transdermal daily  oxybutynin 5 milliGRAM(s) Oral two times a day  pantoprazole    Tablet 40 milliGRAM(s) Oral two times a day  polyethylene glycol 3350 17 Gram(s) Oral two times a day  senna 2 Tablet(s) Oral at bedtime  sodium chloride 0.9%. 1000 milliLiter(s) (75 mL/Hr) IV Continuous <Continuous>  tamsulosin 0.8 milliGRAM(s) Oral at bedtime  tiotropium 18 MICROgram(s) Capsule 1 Capsule(s) Inhalation daily    MEDICATIONS  (PRN):  acetaminophen   Tablet .. 650 milliGRAM(s) Oral every 6 hours PRN Temp greater or equal to 38C (100.4F), Mild Pain (1 - 3)  albuterol/ipratropium for Nebulization. 3 milliLiter(s) Nebulizer every 6 hours PRN Shortness of Breath  bisacodyl Suppository 10 milliGRAM(s) Rectal daily PRN Constipation  morphine  - Injectable 2 milliGRAM(s) IV Push every 6 hours PRN Severe Pain (7 - 10)  ondansetron Injectable 4 milliGRAM(s) IV Push every 6 hours PRN Nausea and/or Vomiting        REVIEW OF SYSTEMS:  CONSTITUTIONAL: No fever, weight loss, or fatigue  EYES: No eye pain, visual disturbances, or discharge  ENMT:  No difficulty hearing, tinnitus, vertigo; No sinus or throat pain  NECK: No pain or stiffness  RESPIRATORY: No cough, wheezing, chills or hemoptysis; No shortness of breath  CARDIOVASCULAR: No chest pain, palpitations, dizziness, or leg swelling  GASTROINTESTINAL: No abdominal   pain. No nausea, vomiting, or hematemesis;   GENITOURINARY: No dysuria, frequency, hematuria, or incontinence  NEUROLOGICAL: No headaches, memory loss, loss of strength, numbness, or tremors  SKIN: No itching, burning, rashes, or lesions      Vital Signs Last 24 Hrs  T(C): 36.3 (19 Jan 2020 11:46), Max: 36.8 (18 Jan 2020 16:37)  T(F): 97.3 (19 Jan 2020 11:46), Max: 98.2 (18 Jan 2020 16:37)  HR: 58 (19 Jan 2020 11:46) (56 - 63)  BP: 104/66 (19 Jan 2020 11:46) (97/63 - 116/77)  BP(mean): --  RR: 18 (19 Jan 2020 11:46) (17 - 19)  SpO2: 97% (19 Jan 2020 11:46) (94% - 99%)    PHYSICAL EXAM:  GENERAL: NAD, well-groomed, well-developed  HEAD:  Atraumatic, Normocephalic  EYES: EOMI, PERRLA, conjunctiva and sclera clear  ENMT: No tonsillar erythema, exudates, or enlargement; Moist mucous membranes   NECK: Supple, No JVD   NERVOUS SYSTEM:  Alert & Oriented X3, Good concentration; Motor Strength 5/5 B/L upper and lower extremities; DTRs 2+ intact and symmetric  CHEST/LUNG: Clear to auscultation  bilaterally; No rales, rhonchi, wheezing, or rubs  HEART: Regular rate and rhythm; No murmurs, rubs, or gallops  ABDOMEN: Soft, Nontender, Nondistended; Bowel sounds present  EXTREMITIES:  2+ Peripheral Pulses, No clubbing, cyanosis, or edema  LYMPH: No lymphadenopathy noted  SKIN: No rashes or lesions    LABS:             cardiac markers     CAPILLARY BLOOD GLUCOSE        Cultures    RADIOLOGY & ADDITIONAL TESTS:    Imaging Personally Reviewed:  [ ] YES  [ ] NO    Consultant(s) Notes Reviewed:  [ x] YES  [ ] NO    Care Discussed with Consultants/Other Providers [ ] YES  [ ] NO

## 2020-01-19 NOTE — PROGRESS NOTE ADULT - SUBJECTIVE AND OBJECTIVE BOX
JEOVANY ACOSTA    [address]    Patient is a 49y old  Male who presents with a chief complaint of cp (19 Jan 2020 13:52)       Allergies    adhesives (Rash)  No Known Drug Allergies    Intolerances        HPI:  49M with a PMH of Barretts Disease, Crohn's on Pentasya, IBS presents to ED for L sided chest pain.  Patient severely agitated and anxious when seen.  Reports that the pain is worse upon deep breaths, sometimes radiates to his back/ L shoulder.  Reports chronic cough but attributes that to his smoking history.  Also reports lost weight.  No hx of TB however has been living in homeless shelters for 8 months.  Severely anxious about continuing his medications.  Reportedly had a PPD done in August that was negative. (07 Jan 2020 23:54)      PAST MEDICAL & SURGICAL HISTORY:  Alfaro's esophagus without dysplasia  Depression, unspecified depression type  Crohn's disease with complication, unspecified gastrointestinal tract location  ACL (anterior cruciate ligament) tear      FAMILY HISTORY:  Family history of diabetes mellitus in maternal grandmother (Mother, Grandparent)  Family history of COPD (chronic obstructive pulmonary disease) (Father)  Family history of colon cancer in father (Father)  Family history of hypertension in father (Father, Mother, Grandparent)        MEDICATIONS   acetaminophen   Tablet .. 650 milliGRAM(s) Oral every 6 hours PRN  albuterol/ipratropium for Nebulization. 3 milliLiter(s) Nebulizer every 6 hours PRN  bisacodyl Suppository 10 milliGRAM(s) Rectal daily PRN  busPIRone 10 milliGRAM(s) Oral two times a day  dicyclomine 10 milliGRAM(s) Oral two times a day before meals  enoxaparin Injectable 40 milliGRAM(s) SubCutaneous daily  lactulose Syrup 20 Gram(s) Oral two times a day  mesalamine ER Capsule 1000 milliGRAM(s) Oral four times a day with meals  midodrine. 10 milliGRAM(s) Oral three times a day  morphine  - Injectable 2 milliGRAM(s) IV Push every 6 hours PRN  nicotine - 21 mG/24Hr(s) Patch 1 patch Transdermal daily  ondansetron Injectable 4 milliGRAM(s) IV Push every 6 hours PRN  oxybutynin 5 milliGRAM(s) Oral two times a day  pantoprazole    Tablet 40 milliGRAM(s) Oral two times a day  polyethylene glycol 3350 17 Gram(s) Oral two times a day  senna 2 Tablet(s) Oral at bedtime  sodium chloride 0.9%. 1000 milliLiter(s) IV Continuous <Continuous>  tamsulosin 0.8 milliGRAM(s) Oral at bedtime  tiotropium 18 MICROgram(s) Capsule 1 Capsule(s) Inhalation daily      Vital Signs Last 24 Hrs  T(C): 36.3 (19 Jan 2020 11:46), Max: 36.8 (18 Jan 2020 16:37)  T(F): 97.3 (19 Jan 2020 11:46), Max: 98.2 (18 Jan 2020 16:37)  HR: 58 (19 Jan 2020 11:46) (56 - 63)  BP: 104/66 (19 Jan 2020 11:46) (97/63 - 116/77)  BP(mean): --  RR: 18 (19 Jan 2020 11:46) (17 - 19)  SpO2: 97% (19 Jan 2020 11:46) (94% - 99%)      01-18-20 @ 07:01  -  01-19-20 @ 07:00  --------------------------------------------------------  IN: 1300 mL / OUT: 0 mL / NET: 1300 mL            LABS:                    WBC:  WBC Count: 6.19 K/uL (01-16 @ 07:28)      MICROBIOLOGY:  RECENT CULTURES:  01-13 .Body Fluid XXXX   polymorphonuclear leukocytes seen  No organisms seen  by cytocentrifuge   No growth at 5 days                    Sodium:  Sodium, Serum: 140 mmol/L (01-16 @ 07:28)      0.96 mg/dL 01-16 @ 07:28      Hemoglobin:  Hemoglobin: 13.7 g/dL (01-16 @ 07:28)      Platelets: Platelet Count - Automated: 215 K/uL (01-16 @ 07:28)              RADIOLOGY & ADDITIONAL STUDIES:

## 2020-01-19 NOTE — PROGRESS NOTE ADULT - ASSESSMENT
cont rx cont rx          REVIEW OF SYMPTOMS      Able to give ROS  Yes     RELIABLE No   CONSTITUTIONAL Weakness Yes  Chills No Vision changes No  ENDOCRINE No unexplained hair loss No heat or cold intolerance    ALLERGY No hives  Sore throat No   RESP Coughing blood no  Shortness of breath YES   NEURO No Headache  Confusion Pain neck No   CARDIAC No Chest pain No Palpitations   GI No Pain abdomen NO   Vomiting NO     PHYSICAL EXAM    HEENT Unremarkable PERRLA atraumatic   RESP Fair air entry EXP prolonged    Harsh breath sound Resp distres mild   CARDIAC S1 S2 No S3     NO JVD    ABDOMEN SOFT BS PRESENT NOT DISTENDED No hepatosplenomegaly PEDAL EDEMA present No calf tenderness  NO rash   GENERAL Not TOXIC looking    VITALS/LABS       1/19/2020 afeb 75 120/80     PT DATA/BEST PRACTICE  ALLERGY adhesives  ADVANCED DIRECTIVE       Goals of care discussion  WT  62 (1/11/2020)    BMI  19 (1/11/2020)                                                                                                           HEAD OF BED ELEVATION Yes  DYSPHAGIA EVAL Ordered 1/11/2020   DIET   mech soft (1/10)                 DVT PROPHYLAXIS    lvnx 40 (1/7)            STRESS ULCER PROPHYLAXIS   protonix 40 (1/7)            INFECTION PPLX  ECHO  1/14/2020 ef 55%               ABIO       MICROBIO     1/9 Asperg galactomannan antig below 0.5 1/9 HIV n    GLYCEMIC CONTROL  PROCEDURE                                                            1/13/2020 needle rul mass     PT SUMMARY  48 yo smoker (>30 pack year hx) with Crohn's disease (on Pentasa), Alfaro's esophagus male who currently lives in homeless shelter admitted with RUL cavitary lesion  Has ho travel [place]     PLAN      LUNG MASS   1/9 Asperg galactomannan antig below 0.5 1/9 HIV n   1/14 ct ch 4.4 x 2.5 cm irregular spiculated mass r upper lobe with 1.1 cm central cavity   Most likely diagnosis clinically is lung ca Possibly squamous although squamous is usually central   Other possibilities include Tb Histo   needle was done on 1/13   Await cyto  TB ISOLATION   1/12 QFT gold Tb test n   1/13 needle No afb seen   1/15/2020 Susp for Tb is low Consider dc resp isolation if ID agrees  Resp isolation dced 1/16  PNEUMOTHORAX   1/18 CXR r apical pntx resolvd   1/17/2020 CXR decr in r apical pnx   1/15/2020 {ersistent small apical pntx   1/14/2020 CXR sm apical r pntx   Monitor serially   CTS on case   COPD duoneb.4p (1/8) spiriva (1/8)   SMOKER Nicoderm 21 (1/7) Counseled to stop   CROHNS Mescalamine 1.4 (1/7)   BPH tamsulosin .4 (1/7)       TIME SPENT Over 25 minutes aggregate care time spent on encounter; activities included   direct pt care, counseling and/or coordinating care reviewing notes, lab data/ imaging , discussion with multidisciplinary team/ pt /family. Risks, benefits, alternatives  discussed in detail.

## 2020-01-19 NOTE — PROGRESS NOTE ADULT - PROBLEM SELECTOR PLAN 2
- TB was ruled out,    - QuantiFeron, HIV, histoplasma and aspergillosis are all negative.  - Isolation precautions: removed,    - s/p  right lung biopsy by IR on  1/13/2020    - CTS following.  - Follow up lung bx result, it is still pending.
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
## 2020-01-20 PROCEDURE — 99223 1ST HOSP IP/OBS HIGH 75: CPT

## 2020-01-20 PROCEDURE — 99232 SBSQ HOSP IP/OBS MODERATE 35: CPT

## 2020-01-20 RX ADMIN — Medication 1 PATCH: at 11:18

## 2020-01-20 RX ADMIN — Medication 1 PATCH: at 19:10

## 2020-01-20 RX ADMIN — Medication 10 MILLIGRAM(S): at 05:33

## 2020-01-20 RX ADMIN — SODIUM CHLORIDE 75 MILLILITER(S): 9 INJECTION INTRAMUSCULAR; INTRAVENOUS; SUBCUTANEOUS at 12:08

## 2020-01-20 RX ADMIN — Medication 5 MILLIGRAM(S): at 05:33

## 2020-01-20 RX ADMIN — Medication 1000 MILLIGRAM(S): at 11:18

## 2020-01-20 RX ADMIN — Medication 10 MILLIGRAM(S): at 17:06

## 2020-01-20 RX ADMIN — PANTOPRAZOLE SODIUM 40 MILLIGRAM(S): 20 TABLET, DELAYED RELEASE ORAL at 05:33

## 2020-01-20 RX ADMIN — Medication 1000 MILLIGRAM(S): at 21:56

## 2020-01-20 RX ADMIN — Medication 10 MILLIGRAM(S): at 06:29

## 2020-01-20 RX ADMIN — Medication 1000 MILLIGRAM(S): at 07:42

## 2020-01-20 RX ADMIN — PANTOPRAZOLE SODIUM 40 MILLIGRAM(S): 20 TABLET, DELAYED RELEASE ORAL at 17:06

## 2020-01-20 RX ADMIN — TAMSULOSIN HYDROCHLORIDE 0.8 MILLIGRAM(S): 0.4 CAPSULE ORAL at 21:57

## 2020-01-20 RX ADMIN — MIDODRINE HYDROCHLORIDE 10 MILLIGRAM(S): 2.5 TABLET ORAL at 17:06

## 2020-01-20 RX ADMIN — Medication 5 MILLIGRAM(S): at 17:06

## 2020-01-20 RX ADMIN — MIDODRINE HYDROCHLORIDE 10 MILLIGRAM(S): 2.5 TABLET ORAL at 05:33

## 2020-01-20 RX ADMIN — Medication 1000 MILLIGRAM(S): at 17:06

## 2020-01-20 RX ADMIN — Medication 1 PATCH: at 11:00

## 2020-01-20 RX ADMIN — LACTULOSE 20 GRAM(S): 10 SOLUTION ORAL at 05:33

## 2020-01-20 RX ADMIN — TIOTROPIUM BROMIDE 1 CAPSULE(S): 18 CAPSULE ORAL; RESPIRATORY (INHALATION) at 11:18

## 2020-01-20 RX ADMIN — MIDODRINE HYDROCHLORIDE 10 MILLIGRAM(S): 2.5 TABLET ORAL at 11:17

## 2020-01-20 RX ADMIN — ENOXAPARIN SODIUM 40 MILLIGRAM(S): 100 INJECTION SUBCUTANEOUS at 11:17

## 2020-01-20 RX ADMIN — Medication 1 PATCH: at 07:37

## 2020-01-20 NOTE — PROGRESS NOTE ADULT - PROBLEM SELECTOR PLAN 2
-Ok to continue pentasa  -Close outpatient follow up (will likely recommend outpatient capsule endoscopy to evaluate for Crohns given negative work-up thus far).

## 2020-01-20 NOTE — PROGRESS NOTE ADULT - SUBJECTIVE AND OBJECTIVE BOX
Patient is a 49y old  Male who presents with a chief complaint of cp (18 Jan 2020 19:46), no SOB       OVERNIGHT EVENTS: none    T(C): 36.7 (01-20-20 @ 11:16), Max: 36.7 (01-20-20 @ 11:16)  HR: 71 (01-20-20 @ 11:16) (64 - 71)  BP: 114/70 (01-20-20 @ 11:16) (93/64 - 114/70)  RR: 16 (01-20-20 @ 11:16) (16 - 18)  SpO2: 97% (01-20-20 @ 11:16) (96% - 97%)    MEDICATIONS  (STANDING):  busPIRone 10 milliGRAM(s) Oral two times a day  dicyclomine 10 milliGRAM(s) Oral two times a day before meals  enoxaparin Injectable 40 milliGRAM(s) SubCutaneous daily  lactulose Syrup 20 Gram(s) Oral two times a day  mesalamine ER Capsule 1000 milliGRAM(s) Oral four times a day with meals  midodrine. 10 milliGRAM(s) Oral three times a day  nicotine - 21 mG/24Hr(s) Patch 1 patch Transdermal daily  oxybutynin 5 milliGRAM(s) Oral two times a day  pantoprazole    Tablet 40 milliGRAM(s) Oral two times a day  polyethylene glycol 3350 17 Gram(s) Oral two times a day  senna 2 Tablet(s) Oral at bedtime  sodium chloride 0.9%. 1000 milliLiter(s) (75 mL/Hr) IV Continuous <Continuous>  tamsulosin 0.8 milliGRAM(s) Oral at bedtime  tiotropium 18 MICROgram(s) Capsule 1 Capsule(s) Inhalation daily    MEDICATIONS  (PRN):  acetaminophen   Tablet .. 650 milliGRAM(s) Oral every 6 hours PRN Temp greater or equal to 38C (100.4F), Mild Pain (1 - 3)  albuterol/ipratropium for Nebulization. 3 milliLiter(s) Nebulizer every 6 hours PRN Shortness of Breath  bisacodyl Suppository 10 milliGRAM(s) Rectal daily PRN Constipation  morphine  - Injectable 2 milliGRAM(s) IV Push every 6 hours PRN Severe Pain (7 - 10)  ondansetron Injectable 4 milliGRAM(s) IV Push every 6 hours PRN Nausea and/or Vomiting    REVIEW OF SYSTEMS:  CONSTITUTIONAL: No fever, weight loss, or fatigue  EYES: No eye pain, visual disturbances, or discharge  ENMT:  No difficulty hearing, tinnitus, vertigo; No sinus or throat pain  NECK: No pain or stiffness  RESPIRATORY: No cough, wheezing, chills or hemoptysis; No shortness of breath  CARDIOVASCULAR: No chest pain, palpitations, dizziness, or leg swelling  GASTROINTESTINAL: No abdominal   pain. No nausea, vomiting, or hematemesis;   GENITOURINARY: No dysuria, frequency, hematuria, or incontinence  NEUROLOGICAL: No headaches, memory loss, loss of strength, numbness, or tremors  SKIN: No itching, burning, rashes, or lesions      PHYSICAL EXAM:  GENERAL: NAD, well-groomed, well-developed  HEAD:  Atraumatic, Normocephalic  EYES: EOMI, conjunctiva and sclera clear  ENMT: No tonsillar erythema, exudates, or enlargement; Moist mucous membranes   NECK: Supple, No JVD   NERVOUS SYSTEM:  Alert & Oriented X3, Good concentration; Motor Strength 5/5 B/L upper and lower extremities; DTRs 2+ intact and symmetric  CHEST/LUNG: Clear to auscultation  bilaterally; No rales, rhonchi, wheezing, or rubs  HEART: Regular rate and rhythm; No murmurs, rubs, or gallops  ABDOMEN: Soft, Nontender, Nondistended; Bowel sounds present  EXTREMITIES:  2+ Peripheral Pulses, No clubbing, cyanosis, or edema                 cardiac markers     CAPILLARY BLOOD GLUCOSE        Cultures    RADIOLOGY & ADDITIONAL TESTS:    Imaging Personally Reviewed:  [ ] YES  [ ] NO    Consultant(s) Notes Reviewed:  [ x] YES  [ ] NO    Care Discussed with Consultants/Other Providers [ ] YES  [ ] NO

## 2020-01-20 NOTE — PROGRESS NOTE ADULT - ASSESSMENT
PT SUMMARY  50 yo smoker (>30 pack year hx) with Crohn's disease (on Pentasa), Alfaro's esophagus male who currently lives in homeless shelter admitted with RUL cavitary lesion  Has ho travel [place]     PLAN      LUNG MASS   1/9 Asperg galactomannan antig below 0.5 1/9 HIV n   1/14 ct ch 4.4 x 2.5 cm irregular spiculated mass r upper lobe with 1.1 cm central cavity   Most likely diagnosis clinically is lung ca Possibly squamous although squamous is usually central   Other possibilities include Tb Histo   needle was done on 1/13   Await cyto  TB ISOLATION   1/12 QFT gold Tb test n   1/13 needle No afb seen   1/15/2020 Susp for Tb is low Consider dc resp isolation if ID agrees  Resp isolation dced 1/16  PNEUMOTHORAX   1/18 CXR r apical pntx resolvd   1/17/2020 CXR decr in r apical pnx   1/15/2020 {ersistent small apical pntx   1/14/2020 CXR sm apical r pntx   Monitor serially   CTS on case   COPD duoneb.4p (1/8) spiriva (1/8)   SMOKER Nicoderm 21 (1/7) Counseled to stop   CROHNS Mescalamine 1.4 (1/7)   BPH tamsulosin .4 (1/7)       TIME SPENT Over 25 minutes aggregate care time spent on encounter; activities included   direct pt care, counseling and/or coordinating care reviewing notes, lab data/ imaging , discussion with multidisciplinary team/ pt /family. Risks, benefits, alternatives  discussed in detail.

## 2020-01-20 NOTE — PROGRESS NOTE ADULT - SUBJECTIVE AND OBJECTIVE BOX
Gastroenterology  Patient seen and examined bedside resting comfortably.  No complaints offered.   No abdominal pain  Denies nausea and vomiting. Tolerating diet.  +BM.     T(F): 96.9 (01-20-20 @ 04:45), Max: 97.4 (01-19-20 @ 23:45)  HR: 64 (01-20-20 @ 04:45) (57 - 75)  BP: 93/64 (01-20-20 @ 04:45) (93/64 - 127/87)  RR: 18 (01-20-20 @ 04:45) (17 - 18)  SpO2: 96% (01-20-20 @ 04:45) (93% - 97%)  Wt(kg): --  CAPILLARY BLOOD GLUCOSE          PHYSICAL EXAM:  General: NAD, WDWN.   Neuro:  Alert & responsive  HEENT: NCAT, EOMI, conjunctiva clear  CV: +S1+S2 regular rate and rhythm  Lung: clear to ausculation bilaterally, respirations nonlabored, good inspiratory effort  Abdomen: soft, Non Tender, No distention Normal active BS  Extremities: no cyanosis, clubbing or edema    LABS:              I&O's Detail    19 Jan 2020 07:01  -  20 Jan 2020 07:00  --------------------------------------------------------  IN:    Oral Fluid: 620 mL    sodium chloride 0.9%.: 900 mL  Total IN: 1520 mL    OUT:  Total OUT: 0 mL    Total NET: 1520 mL

## 2020-01-20 NOTE — PROGRESS NOTE ADULT - PROBLEM/PLAN-1
DISPLAY PLAN FREE TEXT [Vision Problems] : vision problems [Headache] : headache [Dizziness] : dizziness [Insomnia] : insomnia [Anxiety] : anxiety [Negative] : Musculoskeletal [Discharge] : no discharge [Itching] : no itching [Confusion] : no confusion

## 2020-01-20 NOTE — PROGRESS NOTE ADULT - SUBJECTIVE AND OBJECTIVE BOX
REVIEW OF SYMPTOMS      Able to give ROS  Yes     RELIABLE No   CONSTITUTIONAL Weakness Yes  Chills No Vision changes No  ENDOCRINE No unexplained hair loss No heat or cold intolerance    ALLERGY No hives  Sore throat No   RESP Coughing blood no  Shortness of breath YES   NEURO No Headache  Confusion Pain neck No   CARDIAC No Chest pain No Palpitations   GI No Pain abdomen NO   Vomiting NO     PHYSICAL EXAM    HEENT Unremarkable PERRLA atraumatic   RESP Fair air entry EXP prolonged    Harsh breath sound Resp distres mild   CARDIAC S1 S2 No S3     NO JVD    ABDOMEN SOFT BS PRESENT NOT DISTENDED No hepatosplenomegaly PEDAL EDEMA present No calf tenderness  NO rash   GENERAL Not TOXIC looking    VITALS/LABS       1/20/2020 afeb 69 119/79     PT DATA/BEST PRACTICE  ALLERGY adhesives  ADVANCED DIRECTIVE       Goals of care discussion  WT  62 (1/11/2020)    BMI  19 (1/11/2020)                                                                                                           HEAD OF BED ELEVATION Yes  DYSPHAGIA EVAL Ordered 1/11/2020   DIET   mech soft (1/10)                 DVT PROPHYLAXIS    lvnx 40 (1/7)            STRESS ULCER PROPHYLAXIS   protonix 40 (1/7)            INFECTION PPLX  ECHO  1/14/2020 ef 55%               ABIO       MICROBIO     1/9 Asperg galactomannan antig below 0.5 1/9 HIV n    GLYCEMIC CONTROL  PROCEDURE                                                            1/13/2020 needle rul mass

## 2020-01-20 NOTE — PROGRESS NOTE ADULT - PROBLEM SELECTOR PLAN 1
-Miralax twice daily   -Lactulose 20 g (10 g/15 ml) twice daily     -Continue dicyclomine   -Avoid NSAIDs

## 2020-01-21 ENCOUNTER — TRANSCRIPTION ENCOUNTER (OUTPATIENT)
Age: 50
End: 2020-01-21

## 2020-01-21 PROCEDURE — 99232 SBSQ HOSP IP/OBS MODERATE 35: CPT

## 2020-01-21 RX ORDER — POLYETHYLENE GLYCOL 3350 17 G/17G
17 POWDER, FOR SOLUTION ORAL
Qty: 300 | Refills: 0
Start: 2020-01-21 | End: 2020-02-04

## 2020-01-21 RX ORDER — SENNA PLUS 8.6 MG/1
2 TABLET ORAL
Qty: 0 | Refills: 0 | DISCHARGE
Start: 2020-01-21

## 2020-01-21 RX ORDER — TIOTROPIUM BROMIDE 18 UG/1
1 CAPSULE ORAL; RESPIRATORY (INHALATION)
Qty: 30 | Refills: 0
Start: 2020-01-21 | End: 2020-02-19

## 2020-01-21 RX ORDER — MIDODRINE HYDROCHLORIDE 2.5 MG/1
1 TABLET ORAL
Qty: 90 | Refills: 0
Start: 2020-01-21 | End: 2020-02-19

## 2020-01-21 RX ORDER — LACTULOSE 10 G/15ML
30 SOLUTION ORAL
Qty: 1000 | Refills: 0
Start: 2020-01-21 | End: 2020-02-04

## 2020-01-21 RX ORDER — RISPERIDONE 4 MG/1
10 TABLET ORAL
Qty: 0 | Refills: 0 | DISCHARGE

## 2020-01-21 RX ORDER — NICOTINE POLACRILEX 2 MG
1 GUM BUCCAL
Qty: 30 | Refills: 0
Start: 2020-01-21 | End: 2020-02-19

## 2020-01-21 RX ADMIN — Medication 5 MILLIGRAM(S): at 17:02

## 2020-01-21 RX ADMIN — Medication 5 MILLIGRAM(S): at 06:12

## 2020-01-21 RX ADMIN — Medication 1 PATCH: at 19:45

## 2020-01-21 RX ADMIN — Medication 1000 MILLIGRAM(S): at 21:24

## 2020-01-21 RX ADMIN — PANTOPRAZOLE SODIUM 40 MILLIGRAM(S): 20 TABLET, DELAYED RELEASE ORAL at 17:02

## 2020-01-21 RX ADMIN — TIOTROPIUM BROMIDE 1 CAPSULE(S): 18 CAPSULE ORAL; RESPIRATORY (INHALATION) at 11:50

## 2020-01-21 RX ADMIN — Medication 1 PATCH: at 17:07

## 2020-01-21 RX ADMIN — PANTOPRAZOLE SODIUM 40 MILLIGRAM(S): 20 TABLET, DELAYED RELEASE ORAL at 06:12

## 2020-01-21 RX ADMIN — Medication 1 PATCH: at 11:49

## 2020-01-21 RX ADMIN — Medication 10 MILLIGRAM(S): at 06:12

## 2020-01-21 RX ADMIN — Medication 1000 MILLIGRAM(S): at 08:23

## 2020-01-21 RX ADMIN — Medication 10 MILLIGRAM(S): at 17:01

## 2020-01-21 RX ADMIN — TAMSULOSIN HYDROCHLORIDE 0.8 MILLIGRAM(S): 0.4 CAPSULE ORAL at 21:24

## 2020-01-21 RX ADMIN — Medication 10 MILLIGRAM(S): at 17:02

## 2020-01-21 RX ADMIN — Medication 1000 MILLIGRAM(S): at 11:50

## 2020-01-21 RX ADMIN — Medication 1 PATCH: at 07:16

## 2020-01-21 RX ADMIN — MIDODRINE HYDROCHLORIDE 10 MILLIGRAM(S): 2.5 TABLET ORAL at 06:12

## 2020-01-21 RX ADMIN — MIDODRINE HYDROCHLORIDE 10 MILLIGRAM(S): 2.5 TABLET ORAL at 17:02

## 2020-01-21 RX ADMIN — MIDODRINE HYDROCHLORIDE 10 MILLIGRAM(S): 2.5 TABLET ORAL at 11:50

## 2020-01-21 RX ADMIN — Medication 1000 MILLIGRAM(S): at 17:01

## 2020-01-21 NOTE — PROGRESS NOTE ADULT - SUBJECTIVE AND OBJECTIVE BOX
Gastroenterology  Patient seen and examined bedside resting comfortably.  No complaints offered.   No abdominal pain  Denies nausea and vomiting. Tolerating diet.      T(F): 97.2 (01-21-20 @ 11:55), Max: 98.6 (01-20-20 @ 23:50)  HR: 68 (01-21-20 @ 11:55) (66 - 82)  BP: 124/78 (01-21-20 @ 11:55) (96/54 - 124/78)  RR: 18 (01-21-20 @ 11:55) (16 - 18)  SpO2: 97% (01-21-20 @ 11:55) (94% - 98%)  Wt(kg): --  CAPILLARY BLOOD GLUCOSE        PHYSICAL EXAM:  General: NAD, WDWN.   Neuro:  Alert & responsive  HEENT: NCAT, EOMI, conjunctiva clear  CV: +S1+S2 regular rate and rhythm  Lung: clear to ausculation bilaterally, respirations nonlabored, good inspiratory effort  Abdomen: soft, Non Tender, No distention Normal active BS  Extremities: no cyanosis, clubbing or edema    LABS:              I&O's Detail    20 Jan 2020 07:01  -  21 Jan 2020 07:00  --------------------------------------------------------  IN:    Oral Fluid: 240 mL    sodium chloride 0.9%: 900 mL  Total IN: 1140 mL    OUT:  Total OUT: 0 mL    Total NET: 1140 mL      21 Jan 2020 07:01  -  21 Jan 2020 15:33  --------------------------------------------------------  IN:    Oral Fluid: 480 mL  Total IN: 480 mL    OUT:  Total OUT: 0 mL    Total NET: 480 mL

## 2020-01-21 NOTE — PROGRESS NOTE ADULT - REASON FOR ADMISSION
cp

## 2020-01-21 NOTE — DISCHARGE NOTE PROVIDER - HOSPITAL COURSE
49M with a PMH of Barretts Disease, ?Crohn's on Pentasa, IBS presents to ED for L sided chest pain. CT on presentation revealed 4.4 x 2.5 cm upper lobe irregular spiculated mass with central cavitation; malignancy, TB?    TB ruled out, s/p lung mass, course complicated with pneumothorax s/p biopsy , CTS followed patient with serial cxr, clinical course stable.      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

## 2020-01-21 NOTE — PROGRESS NOTE ADULT - PROBLEM SELECTOR PLAN 1
-Miralax twice daily   -Lactulose 20 g (10 g/15 ml) twice daily     -Continue dicyclomine   -Avoid NSAIDs.

## 2020-01-21 NOTE — PROGRESS NOTE ADULT - PROBLEM SELECTOR PLAN 1
- TB was ruled out, QuantiFeron, HIV, histoplasma and aspergillosis are all negative.  - Isolation precautions: removed,    - s/p  right lung biopsy by IR on  1/13/2020 - - CTS following.  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   Patient might need VATS biopsy.   D/C planning to shelter.

## 2020-01-21 NOTE — PROGRESS NOTE ADULT - SUBJECTIVE AND OBJECTIVE BOX
CT Bx - benign    Final cultures pending    Agree to FU in office and if final results are nondiagnostic - consider VATS Bx

## 2020-01-21 NOTE — PROGRESS NOTE ADULT - SUBJECTIVE AND OBJECTIVE BOX
Patient is a 49y old  Male who presents with a chief complaint of cp (18 Jan 2020 19:46), no SOB       OVERNIGHT EVENTS: none    T(C): 36.3 (01-21-20 @ 16:09), Max: 36.3 (01-21-20 @ 16:09)  HR: 74 (01-21-20 @ 16:09) (68 - 74)  BP: 124/78 (01-21-20 @ 11:55) (124/78 - 124/78)  RR: 18 (01-21-20 @ 16:09) (18 - 18)  SpO2: 97% (01-21-20 @ 16:09) (97% - 97%)      MEDICATIONS  (STANDING):  busPIRone 10 milliGRAM(s) Oral two times a day  dicyclomine 10 milliGRAM(s) Oral two times a day before meals  enoxaparin Injectable 40 milliGRAM(s) SubCutaneous daily  lactulose Syrup 20 Gram(s) Oral two times a day  mesalamine ER Capsule 1000 milliGRAM(s) Oral four times a day with meals  midodrine. 10 milliGRAM(s) Oral three times a day  nicotine - 21 mG/24Hr(s) Patch 1 patch Transdermal daily  oxybutynin 5 milliGRAM(s) Oral two times a day  pantoprazole    Tablet 40 milliGRAM(s) Oral two times a day  polyethylene glycol 3350 17 Gram(s) Oral two times a day  senna 2 Tablet(s) Oral at bedtime  tamsulosin 0.8 milliGRAM(s) Oral at bedtime  tiotropium 18 MICROgram(s) Capsule 1 Capsule(s) Inhalation daily    MEDICATIONS  (PRN):  acetaminophen   Tablet .. 650 milliGRAM(s) Oral every 6 hours PRN Temp greater or equal to 38C (100.4F), Mild Pain (1 - 3)  albuterol/ipratropium for Nebulization. 3 milliLiter(s) Nebulizer every 6 hours PRN Shortness of Breath  bisacodyl Suppository 10 milliGRAM(s) Rectal daily PRN Constipation  morphine  - Injectable 2 milliGRAM(s) IV Push every 6 hours PRN Severe Pain (7 - 10)  ondansetron Injectable 4 milliGRAM(s) IV Push every 6 hours PRN Nausea and/or Vomiting      REVIEW OF SYSTEMS:  CONSTITUTIONAL: No fever, weight loss, or fatigue  EYES: No eye pain, visual disturbances, or discharge  ENMT:  No difficulty hearing, tinnitus, vertigo; No sinus or throat pain  NECK: No pain or stiffness  RESPIRATORY: No cough, wheezing, chills or hemoptysis; No shortness of breath  CARDIOVASCULAR: No chest pain, palpitations, dizziness, or leg swelling  GASTROINTESTINAL: No abdominal   pain. No nausea, vomiting, or hematemesis;   GENITOURINARY: No dysuria, frequency, hematuria, or incontinence  NEUROLOGICAL: No headaches, memory loss, loss of strength, numbness, or tremors  SKIN: No itching, burning, rashes, or lesions      PHYSICAL EXAM:  GENERAL: NAD, well-groomed, well-developed  HEAD:  Atraumatic, Normocephalic  EYES: EOMI, conjunctiva and sclera clear  NECK: Supple, No JVD   NERVOUS SYSTEM:  Alert & Oriented X3, Good concentration; Motor Strength 5/5 B/L upper and lower extremities; DTRs 2+ intact and symmetric  CHEST/LUNG: Clear to auscultation  bilaterally; No rales, rhonchi, wheezing, or rubs  HEART: Regular rate and rhythm; No murmurs, rubs, or gallops  ABDOMEN: Soft, Nontender, Nondistended; Bowel sounds present  EXTREMITIES:  2+ Peripheral Pulses, No clubbing, cyanosis, or edema    no new labs            cardiac markers     CAPILLARY BLOOD GLUCOSE        Cultures    RADIOLOGY & ADDITIONAL TESTS:    Imaging Personally Reviewed:  [ ] YES  [ ] NO    Consultant(s) Notes Reviewed:  [ x] YES  [ ] NO    Care Discussed with Consultants/Other Providers [ ] YES  [ ] NO

## 2020-01-21 NOTE — DISCHARGE NOTE PROVIDER - CARE PROVIDER_API CALL
Ileana Disla)  Internal Medicine; Pulmonary Disease  [facility] [place] [address]  Phone: [phone]  Fax: [phone]  Follow Up Time:     Valerio Marley)  Surgery; Thoracic Surgery  [address], [address]  [place] [address]  Phone: [phone]  Fax: [phone]  Follow Up Time:

## 2020-01-21 NOTE — PROGRESS NOTE ADULT - ASSESSMENT
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 S/p IR guided bx with pathology showing necrotizing granulomatous inflammation with negative GMS and PAS stains, cultures are still pending.    #Cavitary lung lesion - with biopsy showing  showing necrotizing granulomatous inflammation with negative GMS and PAS stains, cultures are still pending.  This can still be TB vs MAC vs underlying neoplastic process with surrounding areas of inflammation.  --would f/u final cultures, if remain negative will consider VATS  -- patient should follow up in the office for final results and further diagnostic plans    #Emphysema - on CT chest   -- will need formal PFTs  -- c/w  Spiriva and Albuterol prn    #Smoker - discussed smoking cessation.   -- c/w NRT  -- can start Chantix to aid with smoking cessation    Thank you for this consult. I will gladly follow up with this patient in my office.   Plan discussed with patient to agrees and understands importance of follow up.     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 S/p IR guided bx with pathology showing necrotizing granulomatous inflammation with negative GMS and PAS stains, cultures are still pending.    #Cavitary lung lesion - with biopsy showing  showing necrotizing granulomatous inflammation with negative GMS and PAS stains, cultures are still pending.  This can still be TB vs MAC vs underlying neoplastic process with surrounding areas of inflammation.  --would f/u final cultures, if remain negative will consider VATS  -- patient should follow up in the office for final results and further diagnostic plans    #Emphysema - on CT chest   -- will need formal PFTs  -- c/w  Spiriva and Albuterol prn    #Pneumothorax - post procedure, appears to have resolved    #Smoker - discussed smoking cessation.   -- c/w NRT  -- can start Chantix to aid with smoking cessation    Thank you for this consult. I will gladly follow up with this patient in my office.   Plan discussed with patient to agrees and understands importance of follow up.     Ileana Disla MD  Cell: [phone]    [facility]//Pulmonary Medicine  [address]; [place] [address]  tel: [phone]; fax: [phone]

## 2020-01-21 NOTE — PROGRESS NOTE ADULT - ASSESSMENT
49M with multiple comorbidities presents for eval of L sided chest pain, pleuritic in nature.  CT shows 4x2.5 spiculated mass with central cavity.  Seen by pulm, will admit to floor. TB  ruled out. .

## 2020-01-21 NOTE — DISCHARGE NOTE PROVIDER - NSDCCPCAREPLAN_GEN_ALL_CORE_FT
PRINCIPAL DISCHARGE DIAGNOSIS  Diagnosis: Lung mass  Assessment and Plan of Treatment: s/p biopsy, shows necrotizing inflamatory chnages, follow up with pulm.      SECONDARY DISCHARGE DIAGNOSES  Diagnosis: Hypotension, unspecified hypotension type  Assessment and Plan of Treatment: Hypotension, unspecified hypotension type on Midodrine    Diagnosis: Pneumothorax after biopsy  Assessment and Plan of Treatment: Pneumothorax after biopsy    Diagnosis: Pneumothorax after biopsy  Assessment and Plan of Treatment: Pneumothorax after biopsy

## 2020-01-21 NOTE — PROGRESS NOTE ADULT - SUBJECTIVE AND OBJECTIVE BOX
Interval Events: Patient was seen and examined at bedside.  Feels well.     REVIEW OF SYSTEMS:  Constitutional: [ ] fevers [ ] chills [ ] weight loss [ ] weight gain  CV: [ ] chest pain [ ] orthopnea [ ] palpitations [ ] murmur  Resp: [ ] cough [ ] shortness of breath [ ] dyspnea [ ] wheezing [ ] sputum [ ] hemoptysis  [ x] All other systems negative  [ ] Unable to assess ROS because ________    OBJECTIVE:  ICU Vital Signs Last 24 Hrs  T(C): 36.2 (21 Jan 2020 11:55), Max: 37 (20 Jan 2020 23:50)  T(F): 97.2 (21 Jan 2020 11:55), Max: 98.6 (20 Jan 2020 23:50)  HR: 68 (21 Jan 2020 11:55) (66 - 82)  BP: 124/78 (21 Jan 2020 11:55) (96/54 - 124/78)  BP(mean): --  ABP: --  ABP(mean): --  RR: 18 (21 Jan 2020 11:55) (16 - 18)  SpO2: 97% (21 Jan 2020 11:55) (94% - 98%)        01-20 @ 07:01 - 01-21 @ 07:00  --------------------------------------------------------  IN: 1140 mL / OUT: 0 mL / NET: 1140 mL    01-21 @ 07:01 - 01-21 @ 14:38  --------------------------------------------------------  IN: 240 mL / OUT: 0 mL / NET: 240 mL      CAPILLARY BLOOD GLUCOSE  General: Well appearing Male. No apparent distress  HEENT:   Mucous membranes are moist.  Neck: Supple. No JVD  Respiratory: clear lungs  Cardiovascular: RRR, no m/r/g  Abdomen: Soft, non-tender, non-distended.   Extremities: No lower extremity edema.  Skin: Warm, dry, no rash.   Neurological: CNII-XII grossly intact.   Psychiatry: appropriate mood and affect.           HOSPITAL MEDICATIONS:  MEDICATIONS  (STANDING):  busPIRone 10 milliGRAM(s) Oral two times a day  dicyclomine 10 milliGRAM(s) Oral two times a day before meals  enoxaparin Injectable 40 milliGRAM(s) SubCutaneous daily  lactulose Syrup 20 Gram(s) Oral two times a day  mesalamine ER Capsule 1000 milliGRAM(s) Oral four times a day with meals  midodrine. 10 milliGRAM(s) Oral three times a day  nicotine - 21 mG/24Hr(s) Patch 1 patch Transdermal daily  oxybutynin 5 milliGRAM(s) Oral two times a day  pantoprazole    Tablet 40 milliGRAM(s) Oral two times a day  polyethylene glycol 3350 17 Gram(s) Oral two times a day  senna 2 Tablet(s) Oral at bedtime  sodium chloride 0.9%. 1000 milliLiter(s) (75 mL/Hr) IV Continuous <Continuous>  tamsulosin 0.8 milliGRAM(s) Oral at bedtime  tiotropium 18 MICROgram(s) Capsule 1 Capsule(s) Inhalation daily    MEDICATIONS  (PRN):  acetaminophen   Tablet .. 650 milliGRAM(s) Oral every 6 hours PRN Temp greater or equal to 38C (100.4F), Mild Pain (1 - 3)  albuterol/ipratropium for Nebulization. 3 milliLiter(s) Nebulizer every 6 hours PRN Shortness of Breath  bisacodyl Suppository 10 milliGRAM(s) Rectal daily PRN Constipation  morphine  - Injectable 2 milliGRAM(s) IV Push every 6 hours PRN Severe Pain (7 - 10)  ondansetron Injectable 4 milliGRAM(s) IV Push every 6 hours PRN Nausea and/or Vomiting      LABS:    Hgb Trend: 13.7<--        Creatinine Trend: 0.96<--, 0.98<--, 0.87<--, 0.80<--, 1.02<--, 0.97<--      Quant gold - negative  Histo Ag - negative  Aspergillus galactomannan - neg  HIV - neg    Cultures - AFB smear negative, cultures pending.    RADIOLOGY:  [x ] Reviewed and interpreted by me  < from: CT Chest No Cont (01.07.20 @ 15:04) >  4.4 x 2.5 cm right upper lobe irregular spiculated mass with central cavitation. Finding may represent a neoplasm surrounding a bulla or cavitary infection such as tuberculosis. Recommend pulmonary consultation.    Emphysema.    Stable mild nodular thickening of the left adrenal gland.    < end of copied text >

## 2020-01-21 NOTE — PROGRESS NOTE ADULT - ASSESSMENT
49M with a PMH of Barretts Disease, ?Crohn's on Pentasa, IBS presents to ED for L sided chest pain.     CT on presentation revealed 4.4 x 2.5 cm upper lobe irregular spiculated mass with central cavitation; malignancy, TB?    He reports chronic intermittent abdominal discomfort. Outpatient work-up has been negative for Crohns however he reports previous pathology about small bowel resection noted Crohns (was not able to obtain pathology). Work-up including EGD/colonoscopy otherwise negative for IBD. Stable as inpatient without any further complaints after treating constipation.     *S/p lung biopsy 1/13, small apical ptx  *Final cultures pending; considering VATS bx if nondiagnostic     He is clinically stable from a GI standpoint, pending culture results and further work-up of lung mass. He will follow-up with GI as outpatient. Please call with any questions.

## 2020-01-21 NOTE — CHART NOTE - NSCHARTNOTEFT_GEN_A_CORE
Assessment: Pt with lung mass (TB ruled out), s/p right lung biopsy 1/13/20, results still pending, pneumothorax after biopsy, chest pain, hypotension, Crohn's disease, Urena's esophagus without dysphagia, overactive bladder, BPH, nicotine dependence, anxiety.    Factors impacting intake: [x] none [ ] nausea  [ ] vomiting [ ] diarrhea [ ] constipation  [ ]chewing problems [ ] swallowing issues  [ ] other:     Diet Prescription: Diet, Soft:   Supplement Feeding Modality:  Oral  Ensure Clear Cans or Servings Per Day:  3       Frequency:  Daily (01-14-20 @ 16:59)    Intake: Po intake excellent, 100%; Pt reported enjoys and drinks Ensure Clear supplements 3x daily & Magic cup 2x daily    Current Weight: 65.2 kg (1/21), 62.8 kg (1/14)  % Weight Change: 3.8% (2.4 kg) wt gain x 1 week; No edema    Nutrition focused physical exam conducted; Subcutaneous fat loss: [moderate] Orbital fat pads region, [moderate]Buccal fat region, [mild]Triceps region,  [moderate]Ribs region.  Muscle wasting: [moderate]Temples region, [moderate]Clavicle region, [mild]Shoulder region, [mild]Scapula region, [mild]Interosseous region, [moderate]thigh region, [mild]Calf region    Pertinent Medications: MEDICATIONS  (STANDING):  busPIRone 10 milliGRAM(s) Oral two times a day  dicyclomine 10 milliGRAM(s) Oral two times a day before meals  enoxaparin Injectable 40 milliGRAM(s) SubCutaneous daily  lactulose Syrup 20 Gram(s) Oral two times a day  mesalamine ER Capsule 1000 milliGRAM(s) Oral four times a day with meals  midodrine. 10 milliGRAM(s) Oral three times a day  nicotine - 21 mG/24Hr(s) Patch 1 patch Transdermal daily  oxybutynin 5 milliGRAM(s) Oral two times a day  pantoprazole    Tablet 40 milliGRAM(s) Oral two times a day  polyethylene glycol 3350 17 Gram(s) Oral two times a day  senna 2 Tablet(s) Oral at bedtime  sodium chloride 0.9%. 1000 milliLiter(s) (75 mL/Hr) IV Continuous <Continuous>  tamsulosin 0.8 milliGRAM(s) Oral at bedtime  tiotropium 18 MICROgram(s) Capsule 1 Capsule(s) Inhalation daily    MEDICATIONS  (PRN):  acetaminophen   Tablet .. 650 milliGRAM(s) Oral every 6 hours PRN Temp greater or equal to 38C (100.4F), Mild Pain (1 - 3)  albuterol/ipratropium for Nebulization. 3 milliLiter(s) Nebulizer every 6 hours PRN Shortness of Breath  bisacodyl Suppository 10 milliGRAM(s) Rectal daily PRN Constipation  morphine  - Injectable 2 milliGRAM(s) IV Push every 6 hours PRN Severe Pain (7 - 10)  ondansetron Injectable 4 milliGRAM(s) IV Push every 6 hours PRN Nausea and/or Vomiting    Pertinent Labs:  01-13 [id number] 5.1 % 01-13 Chol 153 mg/dL LDL 83 mg/dL HDL 42 mg/dL Trig 138 mg/dL    CAPILLARY BLOOD GLUCOSE    Skin: WDL    Estimated Needs:   [x] no change since previous assessment on 1/14/20  [ ] recalculated:     Previous Nutrition Diagnosis:  Nutrition Diagnostic Terminology #1 Malnutrition...     Malnutrition moderate malnutrition in context of chronic illness.     Etiology inadequate protein-energy intake in setting of homelessness, urena's esophagus, Crohn's disease.     Signs/Symptoms physical findings of moderate fat/muscle loss.    Nutrition Diagnosis is [x] ongoing (however appears to be improving; wt gain & 100% po intake x 1 week) [ ] resolved [ ] not applicable    Goal/Expected Outcome pt to consume >75% of meals & supplement (goal met)    New Nutrition Diagnosis: [x] not applicable      Interventions:   Recommend  [x] Continue current diet rx  [ ] Change Diet To:  [ ] Nutrition Supplement:  [ ] Nutrition Support  [ ] Other:     Monitoring and Evaluation:   [ x ] PO intake [ x ] Tolerance to diet prescription [ x ] weights [ x ] labs[ x ] follow up per protocol  [ ] other: Assessment: Pt with lung mass (TB ruled out), s/p right lung biopsy on 1/13/20 (results still pending), pneumothorax after biopsy, chest pain, hypotension, Crohn's disease, Urena's esophagus without dysphagia, overactive bladder, BPH, nicotine dependency, anxiety.    Factors impacting intake: [x] none [ ] nausea  [ ] vomiting [ ] diarrhea [ ] constipation  [ ]chewing problems [ ] swallowing issues  [ ] other:     Diet Prescription: Diet, Soft:   Supplement Feeding Modality:  Oral  Ensure Clear Cans or Servings Per Day:  3       Frequency:  Daily (01-14-20 @ 16:59)    Intake: Po intake excellent, 100%; Pt enjoys and drinks Ensure Clear supplements 3x daily & Magic cup 2x daily    Current Weight: 65.2 kg (1/21), 62.8 kg (1/14)  % Weight Change: 3.8% (2.4 kg) wt gain x 1 week; No edema    Nutrition focused physical exam conducted; Subcutaneous fat loss: [moderate] Orbital fat pads region, [moderate]Buccal fat region, [mild]Triceps region,  [moderate]Ribs region.  Muscle wasting: [moderate]Temples region, [moderate]Clavicle region, [mild]Shoulder region, [mild]Scapula region, [mild]Interosseous region, [moderate]thigh region, [mild]Calf region    Pertinent Medications: MEDICATIONS  (STANDING):  busPIRone 10 milliGRAM(s) Oral two times a day  dicyclomine 10 milliGRAM(s) Oral two times a day before meals  enoxaparin Injectable 40 milliGRAM(s) SubCutaneous daily  lactulose Syrup 20 Gram(s) Oral two times a day  mesalamine ER Capsule 1000 milliGRAM(s) Oral four times a day with meals  midodrine. 10 milliGRAM(s) Oral three times a day  nicotine - 21 mG/24Hr(s) Patch 1 patch Transdermal daily  oxybutynin 5 milliGRAM(s) Oral two times a day  pantoprazole    Tablet 40 milliGRAM(s) Oral two times a day  polyethylene glycol 3350 17 Gram(s) Oral two times a day  senna 2 Tablet(s) Oral at bedtime  sodium chloride 0.9%. 1000 milliLiter(s) (75 mL/Hr) IV Continuous <Continuous>  tamsulosin 0.8 milliGRAM(s) Oral at bedtime  tiotropium 18 MICROgram(s) Capsule 1 Capsule(s) Inhalation daily    MEDICATIONS  (PRN):  acetaminophen   Tablet .. 650 milliGRAM(s) Oral every 6 hours PRN Temp greater or equal to 38C (100.4F), Mild Pain (1 - 3)  albuterol/ipratropium for Nebulization. 3 milliLiter(s) Nebulizer every 6 hours PRN Shortness of Breath  bisacodyl Suppository 10 milliGRAM(s) Rectal daily PRN Constipation  morphine  - Injectable 2 milliGRAM(s) IV Push every 6 hours PRN Severe Pain (7 - 10)  ondansetron Injectable 4 milliGRAM(s) IV Push every 6 hours PRN Nausea and/or Vomiting    Pertinent Labs:  01-13 [id number] 5.1 % 01-13 Chol 153 mg/dL LDL 83 mg/dL HDL 42 mg/dL Trig 138 mg/dL    CAPILLARY BLOOD GLUCOSE    Skin: WDL    Estimated Needs:   [x] no change since previous assessment on 1/14/20  [ ] recalculated:     Previous Nutrition Diagnosis:  Nutrition Diagnostic Terminology #1 Malnutrition...     Malnutrition moderate malnutrition in context of chronic illness.     Etiology inadequate protein-energy intake in setting of homelessness, urena's esophagus, Crohn's disease.     Signs/Symptoms physical findings of moderate fat/muscle loss.    Nutrition Diagnosis is [x] ongoing (however appears to be improving; wt gain & 100% po intake x 1 week) [ ] resolved [ ] not applicable    Goal/Expected Outcome pt to consume >75% of meals & supplement (goal met)    New Nutrition Diagnosis: [x] not applicable      Interventions:   Recommend  [x] Continue current nutrition plan of care  [ ] Change Diet To:  [ ] Nutrition Supplement:  [ ] Nutrition Support  [ ] Other:     Monitoring and Evaluation:   [ x ] PO intake [ x ] Tolerance to diet prescription [ x ] weights [ x ] labs[ x ] follow up per protocol  [ ] other: Assessment: Pt with lung mass (TB ruled out), s/p right lung biopsy on 1/13/20 (results still pending), pneumothorax after biopsy, chest pain, hypotension, Crohn's disease, Urena's esophagus without dysphagia, overactive bladder, BPH, nicotine dependency, anxiety.    Factors impacting intake: [x] none [ ] nausea  [ ] vomiting [ ] diarrhea [ ] constipation  [ ]chewing problems [ ] swallowing issues  [ ] other:     Diet Prescription: Diet, Soft:   Supplement Feeding Modality:  Oral  Ensure Clear Cans or Servings Per Day:  3       Frequency:  Daily (01-14-20 @ 16:59)    Intake: Po intake excellent, 100%; Pt enjoys and drinks Ensure Clear supplements 3x daily & Magic cup 2x daily    Current Weight: 65.2 kg (1/21), 62.8 kg (1/14)  % Weight Change: 3.8% (2.4 kg) wt gain x 1 week; No edema    Nutrition focused physical exam conducted; Subcutaneous fat loss: [moderate] Orbital fat pads region, [moderate]Buccal fat region, [mild]Triceps region,  [moderate]Ribs region.  Muscle wasting: [moderate]Temples region, [moderate]Clavicle region, [mild]Shoulder region, [mild]Scapula region, [mild]Interosseous region, [moderate]thigh region, [mild]Calf region    Pertinent Medications: MEDICATIONS  (STANDING):  busPIRone 10 milliGRAM(s) Oral two times a day  dicyclomine 10 milliGRAM(s) Oral two times a day before meals  enoxaparin Injectable 40 milliGRAM(s) SubCutaneous daily  lactulose Syrup 20 Gram(s) Oral two times a day  mesalamine ER Capsule 1000 milliGRAM(s) Oral four times a day with meals  midodrine. 10 milliGRAM(s) Oral three times a day  nicotine - 21 mG/24Hr(s) Patch 1 patch Transdermal daily  oxybutynin 5 milliGRAM(s) Oral two times a day  pantoprazole    Tablet 40 milliGRAM(s) Oral two times a day  polyethylene glycol 3350 17 Gram(s) Oral two times a day  senna 2 Tablet(s) Oral at bedtime  sodium chloride 0.9%. 1000 milliLiter(s) (75 mL/Hr) IV Continuous <Continuous>  tamsulosin 0.8 milliGRAM(s) Oral at bedtime  tiotropium 18 MICROgram(s) Capsule 1 Capsule(s) Inhalation daily    MEDICATIONS  (PRN):  acetaminophen   Tablet .. 650 milliGRAM(s) Oral every 6 hours PRN Temp greater or equal to 38C (100.4F), Mild Pain (1 - 3)  albuterol/ipratropium for Nebulization. 3 milliLiter(s) Nebulizer every 6 hours PRN Shortness of Breath  bisacodyl Suppository 10 milliGRAM(s) Rectal daily PRN Constipation  morphine  - Injectable 2 milliGRAM(s) IV Push every 6 hours PRN Severe Pain (7 - 10)  ondansetron Injectable 4 milliGRAM(s) IV Push every 6 hours PRN Nausea and/or Vomiting    Pertinent Labs:  01-13 [id number] 5.1 % 01-13 Chol 153 mg/dL LDL 83 mg/dL HDL 42 mg/dL Trig 138 mg/dL    CAPILLARY BLOOD GLUCOSE    Skin: WDL    Estimated Needs:   [x] no change since previous assessment on 1/14/20  [ ] recalculated:     Previous Nutrition Diagnosis:  Nutrition Diagnostic Terminology #1 Malnutrition...     Malnutrition moderate malnutrition in context of chronic illness.     Etiology inadequate protein-energy intake in setting of homelessness, urena's esophagus, Crohn's disease.     Signs/Symptoms physical findings of moderate fat/muscle loss.    Nutrition Diagnosis is [x] ongoing (however appears to be improving; wt gain & 100% po intake x 1 week) [ ] resolved [ ] not applicable    Goal/Expected Outcome pt to consume >75% of meals & supplement (goal met)    New Nutrition Diagnosis: [x] not applicable      Interventions:   Recommend  [x] Continue with current diet regimen  [ ] Change Diet To:  [ ] Nutrition Supplement:  [ ] Nutrition Support  [ ] Other:     Monitoring and Evaluation:   [ x ] PO intake [ x ] Tolerance to diet prescription [ x ] weights [ x ] labs[ x ] follow up per protocol  [ ] other:

## 2020-01-21 NOTE — DISCHARGE NOTE PROVIDER - NSDCMRMEDTOKEN_GEN_ALL_CORE_FT
bisacodyl 10 mg rectal suppository: 1 suppository(ies) rectal once a day, As needed, Constipation  busPIRone 10 mg oral tablet: 1 tab(s) orally 2 times a day  dicyclomine 10 mg oral capsule: 1 cap(s) orally 2 times a day (before meals)  Flomax 0.4 mg oral capsule: 2 cap(s) orally once a day  lactulose 10 g/15 mL oral syrup: 30 milliliter(s) orally 2 times a day  midodrine 10 mg oral tablet: 1 tab(s) orally 3 times a day  nicotine 14 mg/24 hr transdermal film, extended release: 1 patch transdermal once a day   omeprazole 40 mg oral delayed release capsule: 1 cap(s) orally once a day  ondansetron 4 mg oral tablet: 1 tab(s) orally 2 times a day, As Needed   Pentasa 250 mg oral capsule, extended release: 4 cap(s) orally 4 times a day  polyethylene glycol 3350 oral powder for reconstitution: 17 gram(s) orally 2 times a day  senna oral tablet: 2 tab(s) orally once a day (at bedtime)  tiotropium 18 mcg inhalation capsule: 1 cap(s) inhaled once a day  tolterodine 2 mg oral tablet: orally once a day (at bedtime)

## 2020-01-21 NOTE — PROGRESS NOTE ADULT - PROBLEM SELECTOR PROBLEM 10
Anxiety
Chest pain, unspecified type
Nicotine dependence with nicotine-induced disorder, unspecified nicotine product type
Chest pain, unspecified type

## 2020-01-22 ENCOUNTER — TRANSCRIPTION ENCOUNTER (OUTPATIENT)
Age: 50
End: 2020-01-22

## 2020-01-22 VITALS
WEIGHT: 145.06 LBS | SYSTOLIC BLOOD PRESSURE: 106 MMHG | RESPIRATION RATE: 18 BRPM | TEMPERATURE: 97 F | OXYGEN SATURATION: 98 % | DIASTOLIC BLOOD PRESSURE: 67 MMHG | HEART RATE: 59 BPM

## 2020-01-22 RX ADMIN — Medication 1000 MILLIGRAM(S): at 08:57

## 2020-01-22 RX ADMIN — Medication 10 MILLIGRAM(S): at 06:02

## 2020-01-22 RX ADMIN — Medication 1 PATCH: at 08:58

## 2020-01-22 RX ADMIN — MIDODRINE HYDROCHLORIDE 10 MILLIGRAM(S): 2.5 TABLET ORAL at 06:02

## 2020-01-22 RX ADMIN — PANTOPRAZOLE SODIUM 40 MILLIGRAM(S): 20 TABLET, DELAYED RELEASE ORAL at 06:02

## 2020-01-22 RX ADMIN — Medication 5 MILLIGRAM(S): at 06:04

## 2020-01-22 NOTE — DISCHARGE NOTE NURSING/CASE MANAGEMENT/SOCIAL WORK - PATIENT PORTAL LINK FT
You can access the FollowMyHealth Patient Portal offered by [facility] by registering at the following website: [email or web address]. By joining [employer]’s FollowMyHealth portal, you will also be able to view your health information using other applications (apps) compatible with our system.

## 2020-01-22 NOTE — DISCHARGE NOTE NURSING/CASE MANAGEMENT/SOCIAL WORK - NSDCPEEMAIL_GEN_ALL_CORE
[facility] for Tobacco Control email [email or web address]

## 2020-01-22 NOTE — DISCHARGE NOTE NURSING/CASE MANAGEMENT/SOCIAL WORK - NSDCPEWEB_GEN_ALL_CORE
[facility] for Tobacco Control website --- [email or web address] website --- [email or web address]

## 2020-01-27 ENCOUNTER — APPOINTMENT (OUTPATIENT)
Dept: PULMONOLOGY | Facility: CLINIC | Age: 50
End: 2020-01-27
Payer: MEDICAID

## 2020-01-27 ENCOUNTER — LABORATORY RESULT (OUTPATIENT)
Age: 50
End: 2020-01-27

## 2020-01-27 VITALS
HEIGHT: 70 IN | BODY MASS INDEX: 20.04 KG/M2 | TEMPERATURE: 98 F | SYSTOLIC BLOOD PRESSURE: 130 MMHG | DIASTOLIC BLOOD PRESSURE: 93 MMHG | HEART RATE: 74 BPM | OXYGEN SATURATION: 96 % | WEIGHT: 140 LBS

## 2020-01-27 DIAGNOSIS — K20.9 BARRETT'S ESOPHAGUS W/OUT DYSPLASIA: ICD-10-CM

## 2020-01-27 DIAGNOSIS — Z59.0 HOMELESSNESS: ICD-10-CM

## 2020-01-27 DIAGNOSIS — Z87.19 PERSONAL HISTORY OF OTHER DISEASES OF THE DIGESTIVE SYSTEM: ICD-10-CM

## 2020-01-27 DIAGNOSIS — K22.70 BARRETT'S ESOPHAGUS W/OUT DYSPLASIA: ICD-10-CM

## 2020-01-27 DIAGNOSIS — Z86.59 PERSONAL HISTORY OF OTHER MENTAL AND BEHAVIORAL DISORDERS: ICD-10-CM

## 2020-01-27 DIAGNOSIS — Z82.5 FAMILY HISTORY OF ASTHMA AND OTHER CHRONIC LOWER RESPIRATORY DISEASES: ICD-10-CM

## 2020-01-27 PROCEDURE — 99407 BEHAV CHNG SMOKING > 10 MIN: CPT

## 2020-01-27 PROCEDURE — 36415 COLL VENOUS BLD VENIPUNCTURE: CPT

## 2020-01-27 PROCEDURE — 94060 EVALUATION OF WHEEZING: CPT | Mod: 59

## 2020-01-27 PROCEDURE — 94729 DIFFUSING CAPACITY: CPT

## 2020-01-27 PROCEDURE — 94727 GAS DIL/WSHOT DETER LNG VOL: CPT

## 2020-01-27 PROCEDURE — 94617 EXERCISE TST BRNCSPSM W/ECG: CPT

## 2020-01-27 PROCEDURE — 99205 OFFICE O/P NEW HI 60 MIN: CPT | Mod: 25

## 2020-01-27 SDOH — ECONOMIC STABILITY - HOUSING INSECURITY: HOMELESSNESS: Z59.0

## 2020-01-28 DIAGNOSIS — I95.9 HYPOTENSION, UNSPECIFIED: ICD-10-CM

## 2020-01-28 DIAGNOSIS — F32.9 MAJOR DEPRESSIVE DISORDER, SINGLE EPISODE, UNSPECIFIED: ICD-10-CM

## 2020-01-28 DIAGNOSIS — Z59.0 HOMELESSNESS: ICD-10-CM

## 2020-01-28 DIAGNOSIS — R07.9 CHEST PAIN, UNSPECIFIED: ICD-10-CM

## 2020-01-28 DIAGNOSIS — K22.70 BARRETT'S ESOPHAGUS WITHOUT DYSPLASIA: ICD-10-CM

## 2020-01-28 DIAGNOSIS — N40.0 BENIGN PROSTATIC HYPERPLASIA WITHOUT LOWER URINARY TRACT SYMPTOMS: ICD-10-CM

## 2020-01-28 DIAGNOSIS — F41.9 ANXIETY DISORDER, UNSPECIFIED: ICD-10-CM

## 2020-01-28 DIAGNOSIS — E44.0 MODERATE PROTEIN-CALORIE MALNUTRITION: ICD-10-CM

## 2020-01-28 DIAGNOSIS — F17.210 NICOTINE DEPENDENCE, CIGARETTES, UNCOMPLICATED: ICD-10-CM

## 2020-01-28 DIAGNOSIS — J95.811 POSTPROCEDURAL PNEUMOTHORAX: ICD-10-CM

## 2020-01-28 DIAGNOSIS — J43.9 EMPHYSEMA, UNSPECIFIED: ICD-10-CM

## 2020-01-28 DIAGNOSIS — K58.9 IRRITABLE BOWEL SYNDROME WITHOUT DIARRHEA: ICD-10-CM

## 2020-01-28 DIAGNOSIS — N32.81 OVERACTIVE BLADDER: ICD-10-CM

## 2020-01-28 DIAGNOSIS — K59.00 CONSTIPATION, UNSPECIFIED: ICD-10-CM
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
## 2020-01-28 DIAGNOSIS — K50.90 CROHN'S DISEASE, UNSPECIFIED, WITHOUT COMPLICATIONS: ICD-10-CM
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
## 2020-01-28 DIAGNOSIS — M31.30 WEGENER'S GRANULOMATOSIS WITHOUT RENAL INVOLVEMENT: ICD-10-CM

## 2020-01-28 PROBLEM — Z86.59 HISTORY OF DEPRESSION: Status: RESOLVED | Noted: 2020-01-28 | Resolved: 2020-01-28

## 2020-01-28 PROBLEM — Z82.5 FAMILY HISTORY OF CHRONIC OBSTRUCTIVE PULMONARY DISEASE: Status: ACTIVE | Noted: 2020-01-28

## 2020-01-28 PROBLEM — Z87.19 HISTORY OF CROHN'S DISEASE: Status: RESOLVED | Noted: 2020-01-28 | Resolved: 2020-01-28

## 2020-01-28 LAB
ALBUMIN SERPL ELPH-MCNC: 4.8 G/DL
ALP BLD-CCNC: 88 U/L
ALT SERPL-CCNC: 26 U/L
ANION GAP SERPL CALC-SCNC: 12 MMOL/L
AST SERPL-CCNC: 24 U/L
BASOPHILS # BLD AUTO: 0.05 K/UL
BASOPHILS NFR BLD AUTO: 0.5 %
BILIRUB SERPL-MCNC: 0.8 MG/DL
BUN SERPL-MCNC: 9 MG/DL
CALCIUM SERPL-MCNC: 9.7 MG/DL
CHLORIDE SERPL-SCNC: 102 MMOL/L
CK SERPL-CCNC: 187 U/L
CO2 SERPL-SCNC: 27 MMOL/L
CREAT SERPL-MCNC: 0.92 MG/DL
CRP SERPL-MCNC: 0.32 MG/DL
DSDNA AB SER-ACNC: <12 IU/ML
EOSINOPHIL # BLD AUTO: 0.08 K/UL
EOSINOPHIL NFR BLD AUTO: 0.8 %
ERYTHROCYTE [SEDIMENTATION RATE] IN BLOOD BY WESTERGREN METHOD: 28 MM/HR
GLUCOSE SERPL-MCNC: 110 MG/DL
HCT VFR BLD CALC: 45 %
HGB BLD-MCNC: 15.2 G/DL
IMM GRANULOCYTES NFR BLD AUTO: 0.3 %
LYMPHOCYTES # BLD AUTO: 1.81 K/UL
LYMPHOCYTES NFR BLD AUTO: 18.5 %
MAN DIFF?: NORMAL
MCHC RBC-ENTMCNC: 33 PG
MCHC RBC-ENTMCNC: 33.8 GM/DL
MCV RBC AUTO: 97.8 FL
MONOCYTES # BLD AUTO: 0.72 K/UL
MONOCYTES NFR BLD AUTO: 7.4 %
NEUTROPHILS # BLD AUTO: 7.1 K/UL
NEUTROPHILS NFR BLD AUTO: 72.5 %
PLATELET # BLD AUTO: 255 K/UL
POTASSIUM SERPL-SCNC: 4.3 MMOL/L
PROT SERPL-MCNC: 7.2 G/DL
RBC # BLD: 4.6 M/UL
RBC # FLD: 12.4 %
SODIUM SERPL-SCNC: 140 MMOL/L
WBC # FLD AUTO: 9.79 K/UL

## 2020-01-28 RX ORDER — MESALAMINE 250 MG/1
250 CAPSULE ORAL
Refills: 0 | Status: ACTIVE | COMMUNITY

## 2020-01-28 RX ORDER — BUSPIRONE HYDROCHLORIDE 10 MG/1
10 TABLET ORAL
Refills: 0 | Status: ACTIVE | COMMUNITY

## 2020-01-28 RX ORDER — OMEPRAZOLE 40 MG/1
40 CAPSULE, DELAYED RELEASE ORAL
Refills: 0 | Status: ACTIVE | COMMUNITY

## 2020-01-28 SDOH — ECONOMIC STABILITY - HOUSING INSECURITY: HOMELESSNESS: Z59.0

## 2020-01-28 NOTE — ASSESSMENT
[FreeTextEntry1] : 48 yo male with emphysema, Crohn's disease (on Pentasa), homeless with RUL cavitary mass. \par \par #RUL cavitary mass - s/p IR guided bx - pathology with necrotizing epithelioid granulomatous inflammation with negative AFB and final stains and no e/o neoplastic process. Final AFB cultures are still pending. HIV, Aspergillus galactomannan and histoplasma Ag were negative. Quant gold negative\par Etiology still not clear - Necrobioitic lung nodule related to Crohns? TB/MAC - will need to wait for final AFB cultures. Regular cultures with no growth\par -- will await final AFB cultures\par -- check ANCA and autoimmune serologies\par -- CXR given rib pain\par \par #Emphysema - PFT with minimal obstructive defect. Gold group A. \par -- start Incruse\par \par #Smoker - smoking cessation discussed extensively. Tobacco Control Center referral provided\par -- pt can't tolerate Nicotine patches 2/2 allergies\par -- trial of Chantix\par \par #HCM - refused flu and pneumococcal vaccines\par \par All questions answered. Patient in agreement with plan. \par Follow up in 4-6  or sooner if needed.\par

## 2020-01-28 NOTE — H&P ADULT - PROBLEM SELECTOR PLAN 5
Add 52 Modifier (Optional): no
Post-Care Instructions: I reviewed with the patient in detail post-care instructions. Patient is to wear sunprotection, and avoid picking at any of the treated lesions. Pt may apply Vaseline to crusted or scabbing areas.
Consent: The patient's consent was obtained including but not limited to risks of crusting, scabbing, blistering, scarring, darker or lighter pigmentary change, recurrence, incomplete removal and infection.
Number Of Freeze-Thaw Cycles: 1 freeze-thaw cycle
Duration Of Freeze Thaw-Cycle (Seconds): 10
Medical Necessity Clause: This procedure was medically necessary because the lesions that were treated were:
Detail Level: Detailed
Medical Necessity Information: It is in your best interest to select a reason for this procedure from the list below. All of these items fulfill various CMS LCD requirements except the new and changing color options.
flomax

## 2020-01-28 NOTE — DISCUSSION/SUMMARY
[FreeTextEntry1] : < from: CT Chest No Cont (01.07.20 @ 15:04) >\par 4.4 x 2.5 cm right upper lobe irregular spiculated mass with central cavitation. Finding may represent a neoplasm surrounding a bulla or cavitary infection such as tuberculosis. Recommend pulmonary consultation.\par Emphysema.\par Stable mild nodular thickening of the left adrenal gland.\par \par AFB smear negative; quant gold neg\par Regular culture - neg\par HIV negative\par Histoplasma Ag - neg\par Aspergillus Galactomannan neg

## 2020-01-28 NOTE — CONSULT LETTER
[Dear  ___] : Dear  [unfilled], [Consult Letter:] : I had the pleasure of evaluating your patient, [unfilled]. [Please see my note below.] : Please see my note below. [Consult Closing:] : Thank you very much for allowing me to participate in the care of this patient.  If you have any questions, please do not hesitate to contact me. [FreeTextEntry3] : Sincerely,\par \par Ileana Disla MD\par [facility]\par Pulmonary Medicine\par

## 2020-01-28 NOTE — REVIEW OF SYSTEMS
[Dry Eyes] : dry eyes [Negative] : Endocrine [Depression] : no depression [TextBox_30] : per HPI [TextBox_14] : dry, mouth [TextBox_91] : pain [TextBox_69] : per HPI [TextBox_83] : bladder spasms [TextBox_101] : dry skin

## 2020-01-28 NOTE — HISTORY OF PRESENT ILLNESS
[Current] : current [Never] : never [TextBox_4] : Mr. JEOVANY ACOSTA was hospitalized at [facility] from 1/7-1/21/2020. He is a current smoker with Crohn's disease and Alfaro's esophagus who initially presented with chest pain. Cardiac workup was non-revealing. On imaging, was found to have RUL cavitary mass with irregular borders. Patient reported not feeling well for the past 6 months or so with malaise, fatigue, wt loss, he is not sure if he has fevers, denies night sweats. He has cough for the past several months, worse in am, productive of greenish sputum. Denies recent dx of PNA. He has very poor dentition and notes frequent episodes of vomiting/reflux. Patient lived in homeless shelter; denies known exposures to TB, reports negative PPD in the past. He traveled to [place] in August but has not been out of the country in the past 20 years. \par IR guided bx was done -  showed necrotizing epithelioid granulomatous inflammation with negative AFB and final stains and no e/o neoplastic process. Final AFB cultures are still pending. HIV, Aspergillus galactomannan and histoplasma Ag were negative. Quant gold negative\par \par Today, patient reports right sided reproducible chest pain. No cough, no fevers, no malaise, no SOB. He is currently staying at a hotel\par \par PMH: Crohns disease; Jonny's esophagus; depression/anxiety\par Meds: per chart\par All: NKDA\par SH: Homeless, lived in a shelter, currently staying in a hotel. Former EMT. Smoker\par FH: Father - copd/colon ca. no hx of autoimmune dis\par PMD: TELMA MILLER\par GI: Dr Marie\par Immunizations: refused immunizations\par \par  [TextBox_11] : 1 [TextBox_13] : 30 [TextBox_29] : trying to quit, now down to a few cigs/day

## 2020-01-28 NOTE — PHYSICAL EXAM
[Normal Oropharynx] : normal oropharynx [No Acute Distress] : no acute distress [Normal Appearance] : normal appearance [No Neck Mass] : no neck mass [Normal Rate/Rhythm] : normal rate/rhythm [Normal S1, S2] : normal s1, s2 [No Murmurs] : no murmurs [No Resp Distress] : no resp distress [Clear to Auscultation Bilaterally] : clear to auscultation bilaterally [No Abnormalities] : no abnormalities [Benign] : benign [Normal Gait] : normal gait [No Clubbing] : no clubbing [No Cyanosis] : no cyanosis [No Edema] : no edema [FROM] : FROM [Normal Color/ Pigmentation] : normal color/ pigmentation [No Focal Deficits] : no focal deficits [Oriented x3] : oriented x3 [Normal Affect] : normal affect [TextBox_11] : poor oral care [TextBox_80] : TTP over right rib

## 2020-01-28 NOTE — COUNSELING
[Tobacco Use Cessation Intensive Greater Than 10 Minutes] : Tobacco Use Cessation Intensive Greater Than 10 Minutes [Risk of tobacco use and health benefits of smoking cessation discussed] : Risk of tobacco use and health benefits of smoking cessation discussed [Cessation strategies including cessation program discussed] : Cessation strategies including cessation program discussed [Use of nicotine replacement therapies and other medications discussed] : Use of nicotine replacement therapies and other medications discussed [Encouraged to pick a quit date and identify support needed to quit] : Encouraged to pick a quit date and identify support needed to quit [FreeTextEntry3] : 13

## 2020-01-29 LAB
CENTROMERE IGG SER-ACNC: <0.2 CD:130001892
ENA JO1 AB SER IA-ACNC: <0.2 AL
ENA RNP AB SER IA-ACNC: <0.2 AL
ENA SCL70 IGG SER IA-ACNC: <0.2 AL
ENA SM AB SER IA-ACNC: <0.2 AL
ENA SS-A AB SER IA-ACNC: <0.2 AL
ENA SS-B AB SER IA-ACNC: <0.2 AL
MPO AB + PR3 PNL SER: NORMAL

## 2020-01-31 LAB — ALDOLASE SERPL-CCNC: 5.3 U/L

## 2020-02-03 LAB — ANA SER IF-ACNC: NEGATIVE

## 2020-02-11 NOTE — DISCHARGE NOTE ADULT - HOSPITAL COURSE
[Negative] : Genitourinary
Admitted for N/V and pain abdomen. Had colonoscopy done recently, report pending. Will have GI follow the patient. Start on solu medrol IV.   08/28/18 : No vomiting. Start back on Pentasa and Xinafax.  08/29/18 : GI consult noted. Started on Bentyl with pain relief. Does not want to eat because the food is not tasty enough. Cleared by GI for discharge. Discharge home, F/U with GI and PMD.       Problem/Plan - 1:  ·  Problem: Crohn's disease with complication, unspecified gastrointestinal tract location.  Plan: Solumedrol. Mesalamine when able to take PO meds. GI consult.      Problem/Plan - 2:  ·  Problem: Depression, unspecified depression type.  Plan: Lexapro.      Problem/Plan - 3:  ·  Problem: Intractable abdominal pain.  Plan: Resolved.

## 2020-02-12 LAB
CULTURE RESULTS: SIGNIFICANT CHANGE UP
SPECIMEN SOURCE: SIGNIFICANT CHANGE UP

## 2020-02-26 LAB
CULTURE RESULTS: SIGNIFICANT CHANGE UP
NIGHT BLUE STAIN TISS: SIGNIFICANT CHANGE UP
SPECIMEN SOURCE: SIGNIFICANT CHANGE UP

## 2020-03-01 ENCOUNTER — OUTPATIENT (OUTPATIENT)
Dept: OUTPATIENT SERVICES | Facility: HOSPITAL | Age: 50
LOS: 1 days | End: 2020-03-01
Payer: MEDICAID

## 2020-03-01 DIAGNOSIS — S83.519A SPRAIN OF ANTERIOR CRUCIATE LIGAMENT OF UNSPECIFIED KNEE, INITIAL ENCOUNTER: Chronic | ICD-10-CM

## 2020-03-09 ENCOUNTER — TRANSCRIPTION ENCOUNTER (OUTPATIENT)
Age: 50
End: 2020-03-09

## 2020-03-09 ENCOUNTER — APPOINTMENT (OUTPATIENT)
Dept: PULMONOLOGY | Facility: CLINIC | Age: 50
End: 2020-03-09
Payer: MEDICAID

## 2020-03-09 PROCEDURE — 99214 OFFICE O/P EST MOD 30 MIN: CPT

## 2020-03-09 NOTE — PHYSICAL EXAM
[No Acute Distress] : no acute distress [Normal Oropharynx] : normal oropharynx [Normal Appearance] : normal appearance [No Neck Mass] : no neck mass [Normal Rate/Rhythm] : normal rate/rhythm [Normal S1, S2] : normal s1, s2 [No Murmurs] : no murmurs [No Resp Distress] : no resp distress [Clear to Auscultation Bilaterally] : clear to auscultation bilaterally [No Abnormalities] : no abnormalities [Benign] : benign [Normal Gait] : normal gait [No Clubbing] : no clubbing [No Cyanosis] : no cyanosis [No Edema] : no edema [FROM] : FROM [Normal Color/ Pigmentation] : normal color/ pigmentation [No Focal Deficits] : no focal deficits [Oriented x3] : oriented x3 [Normal Affect] : normal affect [TextBox_11] : poor oral care

## 2020-03-09 NOTE — REVIEW OF SYSTEMS
[Negative] : HEENT [Depression] : no depression [TextBox_30] : per HPI [TextBox_69] : per HPI [TextBox_83] : bladder spasms [TextBox_91] : pain [TextBox_101] : dry skin

## 2020-03-09 NOTE — CONSULT LETTER
[Dear  ___] : Dear  [unfilled], [Consult Letter:] : I had the pleasure of evaluating your patient, [unfilled]. [Please see my note below.] : Please see my note below. [Consult Closing:] : Thank you very much for allowing me to participate in the care of this patient.  If you have any questions, please do not hesitate to contact me. [FreeTextEntry3] : Sincerely,\par \par Ileana Disla MD\par [facility]\par Pulmonary Medicine\par

## 2020-03-09 NOTE — HISTORY OF PRESENT ILLNESS
[Current] : current [Never] : never [TextBox_4] : Mr. JEOVANY ACOSTA is a 48 yo male with Crohn's disease, Alfaro's esophagus, emphysema and RUL cavitary lung lesion who is here for f/u.\par \par History: Mr. JEOVANY ACOSTA  was hospitalized at [facility] from 1/7-1/21/2020, was found to have RUL 4.4x2.5cm cavitary mass with irregular borders. IR guided bx was done -  showed necrotizing epithelioid granulomatous inflammation with negative AFB and fungal cultures. HIV, Aspergillus galactomannan and histoplasma Ag were negative. Quant gold negative. \par \par Since last vist, patient was treated for flu by PMD. \par He has occasional POSADAS when walking fast, otherwise feels OK. No wt loss, no hemoptysis, no cough. He has not smoked in the last two weeks. Doing well with Chantix. \par Was evaluated by CT surgery Dr Marley and planned for RUL wedge resection. 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 no hx of autoimmune dis\par PMD: TELMA MILLER\par GI: Dr Marie\par Immunizations: refused immunizations [TextBox_11] : 1 [TextBox_13] : 30

## 2020-03-09 NOTE — ASSESSMENT
[FreeTextEntry1] : 50 yo male with emphysema, Crohn's disease (on Pentasa), homeless with RUL cavitary mass. \par \par #RUL cavitary mass - s/p IR guided bx - pathology with necrotizing epithelioid granulomatous inflammation with negative AFB and fungal cultures and no e/o neoplastic process. HIV, Aspergillus galactomannan and histoplasma Ag were negative. Quant gold negative. Atypical ANCA is indeterminate (could be related to Crohn's disease). There was no e/o preceding lung infection/PNA, no fevers, no leucocytosis. I am stil concerned for possible underlying malignancy. \par He is planned for RUL wedge resection with Dr Marley\par \par \par #Emphysema - PFT with minimal obstructive defect. Gold group A. No recent exacerbations.\par -- c/w Incruse\par \par #Smoker - quit 2 weeks ago. Doing well with Chantix. Encouraged abstinence.\par -- does not like Nicotine gum and lozenges, cant tolerate patch 2/2 skin irritation\par -- will continue Chantix for 2-3 months as long as well tolerated.\par \par #HCM - refused flu and pneumococcal vaccines\par \par All questions answered. Patient in agreement with plan. \par Follow up in 2-3 months or sooner if needed.\par

## 2020-03-12 DIAGNOSIS — Z71.89 OTHER SPECIFIED COUNSELING: ICD-10-CM

## 2020-03-19 ENCOUNTER — APPOINTMENT (OUTPATIENT)
Dept: CT IMAGING | Facility: HOSPITAL | Age: 50
End: 2020-03-19

## 2020-03-19 ENCOUNTER — OUTPATIENT (OUTPATIENT)
Dept: OUTPATIENT SERVICES | Facility: HOSPITAL | Age: 50
LOS: 1 days | Discharge: HOME | End: 2020-03-19
Payer: MEDICAID

## 2020-03-19 DIAGNOSIS — S83.519A SPRAIN OF ANTERIOR CRUCIATE LIGAMENT OF UNSPECIFIED KNEE, INITIAL ENCOUNTER: Chronic | ICD-10-CM

## 2020-03-19 DIAGNOSIS — C34.11 MALIGNANT NEOPLASM OF UPPER LOBE, RIGHT BRONCHUS OR LUNG: ICD-10-CM

## 2020-03-19 PROCEDURE — 71250 CT THORAX DX C-: CPT | Mod: 26

## 2020-05-16 ENCOUNTER — NON-APPOINTMENT (OUTPATIENT)
Age: 50
End: 2020-05-16

## 2020-05-18 ENCOUNTER — APPOINTMENT (OUTPATIENT)
Dept: PULMONOLOGY | Facility: CLINIC | Age: 50
End: 2020-05-18
Payer: MEDICAID

## 2020-05-18 PROCEDURE — 99203 OFFICE O/P NEW LOW 30 MIN: CPT | Mod: 95

## 2020-05-18 NOTE — PLAN
[FreeTextEntry1] : I have schedule patient an appointment for tomorrow at [facility] for COVID PCR and antibody testing

## 2020-05-18 NOTE — HISTORY OF PRESENT ILLNESS
[Home] : at home, [unfilled] , at the time of the visit. [Medical Office: ([facility])___] : at the medical office located in  [FreeTextEntry1] : 49 year old male referred for [facility] consulation, possible COVID\par \par Extensive tobacco history, and being followed for a cavitary upper lobe lesion that was non-sp on bx, COPD.\par He has been having more POSADAS for the past month -- which he attributed to started the Incruse\par 1 flight, 1 block\par He has a pulse ox -- 02 sats remain always >92% ra, even with ambuation\par \par He has had no other sx. No cough. No fever. No sore throat. No myalgias. No HA. No n/v/d.. No loss of taste/smell\par \par He had a CT chest at [facility] which showed unchanged RUL cavitary mass and emphysema, as well "subtle peribronchovascular groundglass opacities in RUL, RML likely inflamm/infectious"\par Concern for COVID\par \par Pt is currently living in a homeless shelter - a house with 20 something other people, and 2 close roommnates. none known to have covid\par \par Other PMH includes Crohns, on pentasa, and hypotension, on midodrine\par \par He also spoke with Thoracic surgeon, Dr. Marley at [facility], and is planned for a surgical resection of RUL lesion next week\par

## 2020-05-20 ENCOUNTER — TRANSCRIPTION ENCOUNTER (OUTPATIENT)
Age: 50
End: 2020-05-20

## 2020-05-22 ENCOUNTER — INPATIENT (INPATIENT)
Facility: HOSPITAL | Age: 50
LOS: 13 days | Discharge: HOME | End: 2020-06-05
Attending: INTERNAL MEDICINE | Admitting: INTERNAL MEDICINE
Payer: MEDICAID

## 2020-05-22 ENCOUNTER — TRANSCRIPTION ENCOUNTER (OUTPATIENT)
Age: 50
End: 2020-05-22

## 2020-05-22 VITALS
HEART RATE: 92 BPM | OXYGEN SATURATION: 95 % | TEMPERATURE: 99 F | DIASTOLIC BLOOD PRESSURE: 114 MMHG | HEIGHT: 70 IN | RESPIRATION RATE: 20 BRPM | WEIGHT: 139.99 LBS | SYSTOLIC BLOOD PRESSURE: 145 MMHG

## 2020-05-22 DIAGNOSIS — S83.519A SPRAIN OF ANTERIOR CRUCIATE LIGAMENT OF UNSPECIFIED KNEE, INITIAL ENCOUNTER: Chronic | ICD-10-CM

## 2020-05-22 LAB
ALBUMIN SERPL ELPH-MCNC: 3.6 G/DL — SIGNIFICANT CHANGE UP (ref 3.3–5)
ALP SERPL-CCNC: 75 U/L — SIGNIFICANT CHANGE UP (ref 40–120)
ALT FLD-CCNC: 21 U/L — SIGNIFICANT CHANGE UP (ref 12–78)
ANION GAP SERPL CALC-SCNC: 8 MMOL/L — SIGNIFICANT CHANGE UP (ref 5–17)
APPEARANCE UR: CLEAR — SIGNIFICANT CHANGE UP
AST SERPL-CCNC: 18 U/L — SIGNIFICANT CHANGE UP (ref 15–37)
BASOPHILS # BLD AUTO: 0.03 K/UL — SIGNIFICANT CHANGE UP (ref 0–0.2)
BASOPHILS NFR BLD AUTO: 0.4 % — SIGNIFICANT CHANGE UP (ref 0–2)
BILIRUB SERPL-MCNC: 0.5 MG/DL — SIGNIFICANT CHANGE UP (ref 0.2–1.2)
BILIRUB UR-MCNC: NEGATIVE — SIGNIFICANT CHANGE UP
BUN SERPL-MCNC: 8 MG/DL — SIGNIFICANT CHANGE UP (ref 7–23)
CALCIUM SERPL-MCNC: 8.6 MG/DL — SIGNIFICANT CHANGE UP (ref 8.5–10.1)
CHLORIDE SERPL-SCNC: 107 MMOL/L — SIGNIFICANT CHANGE UP (ref 96–108)
CO2 SERPL-SCNC: 25 MMOL/L — SIGNIFICANT CHANGE UP (ref 22–31)
COLOR SPEC: YELLOW — SIGNIFICANT CHANGE UP
CREAT SERPL-MCNC: 1.08 MG/DL — SIGNIFICANT CHANGE UP (ref 0.5–1.3)
DIFF PNL FLD: ABNORMAL
EOSINOPHIL # BLD AUTO: 0.15 K/UL — SIGNIFICANT CHANGE UP (ref 0–0.5)
EOSINOPHIL NFR BLD AUTO: 1.9 % — SIGNIFICANT CHANGE UP (ref 0–6)
GLUCOSE SERPL-MCNC: 76 MG/DL — SIGNIFICANT CHANGE UP (ref 70–99)
GLUCOSE UR QL: NEGATIVE MG/DL — SIGNIFICANT CHANGE UP
HCT VFR BLD CALC: 42.9 % — SIGNIFICANT CHANGE UP (ref 39–50)
HGB BLD-MCNC: 14.8 G/DL — SIGNIFICANT CHANGE UP (ref 13–17)
IMM GRANULOCYTES NFR BLD AUTO: 0.2 % — SIGNIFICANT CHANGE UP (ref 0–1.5)
KETONES UR-MCNC: NEGATIVE — SIGNIFICANT CHANGE UP
LEUKOCYTE ESTERASE UR-ACNC: ABNORMAL
LIDOCAIN IGE QN: 42 U/L — LOW (ref 73–393)
LYMPHOCYTES # BLD AUTO: 2.44 K/UL — SIGNIFICANT CHANGE UP (ref 1–3.3)
LYMPHOCYTES # BLD AUTO: 30.2 % — SIGNIFICANT CHANGE UP (ref 13–44)
MCHC RBC-ENTMCNC: 30.6 PG — SIGNIFICANT CHANGE UP (ref 27–34)
MCHC RBC-ENTMCNC: 34.5 GM/DL — SIGNIFICANT CHANGE UP (ref 32–36)
MCV RBC AUTO: 88.8 FL — SIGNIFICANT CHANGE UP (ref 80–100)
MONOCYTES # BLD AUTO: 0.69 K/UL — SIGNIFICANT CHANGE UP (ref 0–0.9)
MONOCYTES NFR BLD AUTO: 8.5 % — SIGNIFICANT CHANGE UP (ref 2–14)
NEUTROPHILS # BLD AUTO: 4.76 K/UL — SIGNIFICANT CHANGE UP (ref 1.8–7.4)
NEUTROPHILS NFR BLD AUTO: 58.8 % — SIGNIFICANT CHANGE UP (ref 43–77)
NITRITE UR-MCNC: NEGATIVE — SIGNIFICANT CHANGE UP
NRBC # BLD: 0 /100 WBCS — SIGNIFICANT CHANGE UP (ref 0–0)
PH UR: 7 — SIGNIFICANT CHANGE UP (ref 5–8)
PLATELET # BLD AUTO: 260 K/UL — SIGNIFICANT CHANGE UP (ref 150–400)
POTASSIUM SERPL-MCNC: 3.9 MMOL/L — SIGNIFICANT CHANGE UP (ref 3.5–5.3)
POTASSIUM SERPL-SCNC: 3.9 MMOL/L — SIGNIFICANT CHANGE UP (ref 3.5–5.3)
PROT SERPL-MCNC: 7.2 GM/DL — SIGNIFICANT CHANGE UP (ref 6–8.3)
PROT UR-MCNC: NEGATIVE MG/DL — SIGNIFICANT CHANGE UP
RBC # BLD: 4.83 M/UL — SIGNIFICANT CHANGE UP (ref 4.2–5.8)
RBC # FLD: 13 % — SIGNIFICANT CHANGE UP (ref 10.3–14.5)
SARS-COV-2 RNA SPEC QL NAA+PROBE: SIGNIFICANT CHANGE UP
SODIUM SERPL-SCNC: 140 MMOL/L — SIGNIFICANT CHANGE UP (ref 135–145)
SP GR SPEC: 1 — LOW (ref 1.01–1.02)
TROPONIN I SERPL-MCNC: <.015 NG/ML — SIGNIFICANT CHANGE UP (ref 0.01–0.04)
UROBILINOGEN FLD QL: NEGATIVE MG/DL — SIGNIFICANT CHANGE UP
WBC # BLD: 8.09 K/UL — SIGNIFICANT CHANGE UP (ref 3.8–10.5)
WBC # FLD AUTO: 8.09 K/UL — SIGNIFICANT CHANGE UP (ref 3.8–10.5)
WBC UR QL: SIGNIFICANT CHANGE UP

## 2020-05-22 PROCEDURE — 99222 1ST HOSP IP/OBS MODERATE 55: CPT

## 2020-05-22 PROCEDURE — 74177 CT ABD & PELVIS W/CONTRAST: CPT | Mod: 26

## 2020-05-22 PROCEDURE — 93010 ELECTROCARDIOGRAM REPORT: CPT | Mod: 77

## 2020-05-22 PROCEDURE — 93010 ELECTROCARDIOGRAM REPORT: CPT

## 2020-05-22 PROCEDURE — 99285 EMERGENCY DEPT VISIT HI MDM: CPT

## 2020-05-22 PROCEDURE — 71045 X-RAY EXAM CHEST 1 VIEW: CPT | Mod: 26

## 2020-05-22 RX ORDER — ONDANSETRON 8 MG/1
4 TABLET, FILM COATED ORAL ONCE
Refills: 0 | Status: COMPLETED | OUTPATIENT
Start: 2020-05-22 | End: 2020-05-22

## 2020-05-22 RX ORDER — IOHEXOL 300 MG/ML
30 INJECTION, SOLUTION INTRAVENOUS ONCE
Refills: 0 | Status: COMPLETED | OUTPATIENT
Start: 2020-05-22 | End: 2020-05-22

## 2020-05-22 RX ORDER — SODIUM CHLORIDE 9 MG/ML
1000 INJECTION INTRAMUSCULAR; INTRAVENOUS; SUBCUTANEOUS ONCE
Refills: 0 | Status: COMPLETED | OUTPATIENT
Start: 2020-05-22 | End: 2020-05-22

## 2020-05-22 RX ADMIN — ONDANSETRON 4 MILLIGRAM(S): 8 TABLET, FILM COATED ORAL at 23:58

## 2020-05-22 RX ADMIN — SODIUM CHLORIDE 1000 MILLILITER(S): 9 INJECTION INTRAMUSCULAR; INTRAVENOUS; SUBCUTANEOUS at 22:20

## 2020-05-22 RX ADMIN — SODIUM CHLORIDE 1000 MILLILITER(S): 9 INJECTION INTRAMUSCULAR; INTRAVENOUS; SUBCUTANEOUS at 21:11

## 2020-05-22 RX ADMIN — IOHEXOL 30 MILLILITER(S): 300 INJECTION, SOLUTION INTRAVENOUS at 21:12

## 2020-05-22 NOTE — ED PROVIDER NOTE - CLINICAL SUMMARY MEDICAL DECISION MAKING FREE TEXT BOX
49 year old male with Crohns, lung mass that is getting larger, inability to have surgery because of covid 19 outbreak, dyspnea on exertion with outpatient ct chest last week. still having pain with sob on exertion that requires admission.

## 2020-05-22 NOTE — ED ADULT NURSE NOTE - OBJECTIVE STATEMENT
pt received c/o SOB for the past two weeks with neg covid test. pt report crohns attack onset. pt reports vomiting  3 hours ago. mass in lobe travels as per pulmonologist

## 2020-05-22 NOTE — ED PROVIDER NOTE - OBJECTIVE STATEMENT
50 y/o M with pmhx Crohn's disease, depression, COPD, and Barrettes disease presents with chest tightness for past x1 week with SOB that's been ongoing for x2 weeks and worse with ambulation. Pt states he had chest x-ray last week at [facility] where they noticed mass in R lobe which has traveled towards lower lobe, pt then had a (-) covid test. Pt is also endorsing abdominal pain for past x5 days with associated N/V which is similar to previous Crohn's flare-ups in past. Pt's LBM was x4 days ago but states this is typical with flare-ups. Pt otherwise denies fevers, chills, body aches, diarrhea, cough, or urinary symptoms.     PSHX resection after blockage in 2014.

## 2020-05-22 NOTE — ED ADULT NURSE NOTE - ED STAT RN HANDOFF WHERE
Call patient.  A sore throat is not a symptom I would put him on antibiotics for.  I would like to know if he has a fever, headache, muscle aches and pains or a rash either at the site of the bite or rash spots elsewhere in his body.   holding

## 2020-05-22 NOTE — ED ADULT TRIAGE NOTE - CHIEF COMPLAINT QUOTE
HX of Crohn's disease , COPD , R lung mass and Alfaro's disease, pt states tested C19 negative 5/20. PW with  chest pressure and SOB x 1 week , LLQ pain x 5 days associated with nausea and 1 episode of vomiting today.  Last BM Sunday denies bloody stool. Denies fever, chills and body aches.

## 2020-05-22 NOTE — ED PROVIDER NOTE - CARE PLAN
Principal Discharge DX:	Crohn's disease of small intestine with other complication  Secondary Diagnosis:	Abdominal pain  Secondary Diagnosis:	Dyspnea

## 2020-05-22 NOTE — ED ADULT NURSE NOTE - NSIMPLEMENTINTERV_GEN_ALL_ED
Implemented All Universal Safety Interventions:  [place] to call system. Call bell, personal items and telephone within reach. Instruct patient to call for assistance. Room bathroom lighting operational. Non-slip footwear when patient is off stretcher. Physically safe environment: no spills, clutter or unnecessary equipment. Stretcher in lowest position, wheels locked, appropriate side rails in place.

## 2020-05-23 DIAGNOSIS — F32.9 MAJOR DEPRESSIVE DISORDER, SINGLE EPISODE, UNSPECIFIED: ICD-10-CM

## 2020-05-23 DIAGNOSIS — K50.018 CROHN'S DISEASE OF SMALL INTESTINE WITH OTHER COMPLICATION: ICD-10-CM

## 2020-05-23 DIAGNOSIS — R10.10 UPPER ABDOMINAL PAIN, UNSPECIFIED: ICD-10-CM

## 2020-05-23 DIAGNOSIS — Z72.0 TOBACCO USE: ICD-10-CM

## 2020-05-23 DIAGNOSIS — N40.0 BENIGN PROSTATIC HYPERPLASIA WITHOUT LOWER URINARY TRACT SYMPTOMS: ICD-10-CM

## 2020-05-23 DIAGNOSIS — R91.8 OTHER NONSPECIFIC ABNORMAL FINDING OF LUNG FIELD: ICD-10-CM

## 2020-05-23 DIAGNOSIS — I95.1 ORTHOSTATIC HYPOTENSION: ICD-10-CM

## 2020-05-23 DIAGNOSIS — Z29.9 ENCOUNTER FOR PROPHYLACTIC MEASURES, UNSPECIFIED: ICD-10-CM

## 2020-05-23 PROCEDURE — 12345: CPT | Mod: NC

## 2020-05-23 PROCEDURE — 99222 1ST HOSP IP/OBS MODERATE 55: CPT

## 2020-05-23 RX ORDER — NICOTINE POLACRILEX 2 MG
1 GUM BUCCAL DAILY
Refills: 0 | Status: DISCONTINUED | OUTPATIENT
Start: 2020-05-23 | End: 2020-05-29

## 2020-05-23 RX ORDER — MORPHINE SULFATE 50 MG/1
1 CAPSULE, EXTENDED RELEASE ORAL EVERY 6 HOURS
Refills: 0 | Status: DISCONTINUED | OUTPATIENT
Start: 2020-05-23 | End: 2020-05-23

## 2020-05-23 RX ORDER — POLYETHYLENE GLYCOL 3350 17 G/17G
17 POWDER, FOR SOLUTION ORAL
Refills: 0 | Status: DISCONTINUED | OUTPATIENT
Start: 2020-05-23 | End: 2020-05-23

## 2020-05-23 RX ORDER — ENOXAPARIN SODIUM 100 MG/ML
40 INJECTION SUBCUTANEOUS DAILY
Refills: 0 | Status: DISCONTINUED | OUTPATIENT
Start: 2020-05-23 | End: 2020-05-29

## 2020-05-23 RX ORDER — MORPHINE SULFATE 50 MG/1
2 CAPSULE, EXTENDED RELEASE ORAL ONCE
Refills: 0 | Status: DISCONTINUED | OUTPATIENT
Start: 2020-05-23 | End: 2020-05-23

## 2020-05-23 RX ORDER — OXYBUTYNIN CHLORIDE 5 MG
5 TABLET ORAL
Refills: 0 | Status: DISCONTINUED | OUTPATIENT
Start: 2020-05-23 | End: 2020-05-29

## 2020-05-23 RX ORDER — MIDODRINE HYDROCHLORIDE 2.5 MG/1
10 TABLET ORAL THREE TIMES A DAY
Refills: 0 | Status: DISCONTINUED | OUTPATIENT
Start: 2020-05-23 | End: 2020-05-24

## 2020-05-23 RX ORDER — ACETAMINOPHEN 500 MG
650 TABLET ORAL EVERY 6 HOURS
Refills: 0 | Status: DISCONTINUED | OUTPATIENT
Start: 2020-05-23 | End: 2020-05-29

## 2020-05-23 RX ORDER — TIOTROPIUM BROMIDE 18 UG/1
1 CAPSULE ORAL; RESPIRATORY (INHALATION) DAILY
Refills: 0 | Status: DISCONTINUED | OUTPATIENT
Start: 2020-05-23 | End: 2020-05-29

## 2020-05-23 RX ORDER — GABAPENTIN 400 MG/1
300 CAPSULE ORAL ONCE
Refills: 0 | Status: COMPLETED | OUTPATIENT
Start: 2020-05-23 | End: 2020-05-23

## 2020-05-23 RX ORDER — LACTULOSE 10 G/15ML
10 SOLUTION ORAL
Refills: 0 | Status: DISCONTINUED | OUTPATIENT
Start: 2020-05-23 | End: 2020-05-29

## 2020-05-23 RX ORDER — PANTOPRAZOLE SODIUM 20 MG/1
40 TABLET, DELAYED RELEASE ORAL
Refills: 0 | Status: DISCONTINUED | OUTPATIENT
Start: 2020-05-23 | End: 2020-05-29

## 2020-05-23 RX ORDER — OXYBUTYNIN CHLORIDE 5 MG
10 TABLET ORAL DAILY
Refills: 0 | Status: DISCONTINUED | OUTPATIENT
Start: 2020-05-23 | End: 2020-05-23

## 2020-05-23 RX ORDER — TAMSULOSIN HYDROCHLORIDE 0.4 MG/1
0.4 CAPSULE ORAL AT BEDTIME
Refills: 0 | Status: DISCONTINUED | OUTPATIENT
Start: 2020-05-23 | End: 2020-05-25

## 2020-05-23 RX ORDER — MESALAMINE 400 MG
1000 TABLET, DELAYED RELEASE (ENTERIC COATED) ORAL
Refills: 0 | Status: DISCONTINUED | OUTPATIENT
Start: 2020-05-23 | End: 2020-05-25

## 2020-05-23 RX ORDER — POLYETHYLENE GLYCOL 3350 17 G/17G
17 POWDER, FOR SOLUTION ORAL
Refills: 0 | Status: COMPLETED | OUTPATIENT
Start: 2020-05-23 | End: 2020-05-26

## 2020-05-23 RX ADMIN — ENOXAPARIN SODIUM 40 MILLIGRAM(S): 100 INJECTION SUBCUTANEOUS at 14:41

## 2020-05-23 RX ADMIN — MIDODRINE HYDROCHLORIDE 10 MILLIGRAM(S): 2.5 TABLET ORAL at 05:17

## 2020-05-23 RX ADMIN — GABAPENTIN 300 MILLIGRAM(S): 400 CAPSULE ORAL at 14:41

## 2020-05-23 RX ADMIN — Medication 1000 MILLIGRAM(S): at 20:04

## 2020-05-23 RX ADMIN — Medication 5 MILLIGRAM(S): at 20:04

## 2020-05-23 RX ADMIN — PANTOPRAZOLE SODIUM 40 MILLIGRAM(S): 20 TABLET, DELAYED RELEASE ORAL at 05:17

## 2020-05-23 RX ADMIN — Medication 10 MILLIGRAM(S): at 21:47

## 2020-05-23 RX ADMIN — POLYETHYLENE GLYCOL 3350 17 GRAM(S): 17 POWDER, FOR SOLUTION ORAL at 20:07

## 2020-05-23 RX ADMIN — Medication 10 MILLIGRAM(S): at 05:17

## 2020-05-23 RX ADMIN — MIDODRINE HYDROCHLORIDE 10 MILLIGRAM(S): 2.5 TABLET ORAL at 02:00

## 2020-05-23 RX ADMIN — TAMSULOSIN HYDROCHLORIDE 0.4 MILLIGRAM(S): 0.4 CAPSULE ORAL at 21:47

## 2020-05-23 RX ADMIN — Medication 5 MILLIGRAM(S): at 05:17

## 2020-05-23 RX ADMIN — Medication 1000 MILLIGRAM(S): at 14:41

## 2020-05-23 RX ADMIN — MIDODRINE HYDROCHLORIDE 10 MILLIGRAM(S): 2.5 TABLET ORAL at 14:41

## 2020-05-23 RX ADMIN — MIDODRINE HYDROCHLORIDE 10 MILLIGRAM(S): 2.5 TABLET ORAL at 20:04

## 2020-05-23 RX ADMIN — MORPHINE SULFATE 2 MILLIGRAM(S): 50 CAPSULE, EXTENDED RELEASE ORAL at 03:28

## 2020-05-23 RX ADMIN — Medication 1 MILLIGRAM(S): at 20:04

## 2020-05-23 RX ADMIN — Medication 1000 MILLIGRAM(S): at 05:17

## 2020-05-23 RX ADMIN — Medication 10 MILLIGRAM(S): at 20:04

## 2020-05-23 NOTE — CONSULT NOTE ADULT - PROBLEM SELECTOR RECOMMENDATION 9
-Miralax twice daily   -Lactulose twice daily   -Magnesium citrate x 1   -Avoid narcotics   -Advance diet slowly as tolerated -Miralax twice daily   -Lactulose twice daily   -Magnesium citrate x 1   -Continue home medications (Pentasa, dicylomine, PPI)  -Avoid narcotics   -Advance diet slowly as tolerated

## 2020-05-23 NOTE — CHART NOTE - NSCHARTNOTEFT_GEN_A_CORE
Patient is a 49M with a PMH of Barretts Disease, Crohn's on Pentasa IBS, spiculated lung mass who presents to worsening dyspnea on exertion and abdominal pain. Please see H&P for more details. GI, pulmonary and thoracic sx consult noted. Pt was seen and examined at the bedside. Continue with treatment management.

## 2020-05-23 NOTE — CONSULT NOTE ADULT - SUBJECTIVE AND OBJECTIVE BOX
Chief Complaint:  Patient is a 49y old  Male who presents with a chief complaint of abdominal pain, dyspnea (23 May 2020 11:48)      HPI:  Patient is a 49M with a PMH of Barretts Disease, Crohn's on Pentasa IBS, spiculated lung mass who presents to worsening dyspnea on exertion and abdominal pain.  Patient states his breathing has been worsening over the last two weeks but denies hx of cough, fever, chills.  Has been following Pulmonary with Dr Ileana Disla.  Patient also states that he has had abdominal pain with poor PO intake for 5 days.  States he has had no BM for the last 5 days as well.  Follows with GI, and had a tele visit with Dr Russ 3 days ago who told him to present to the ED if his abdominal pain persisted.  Admitted to [facility] in 2020 - had biopsy that showed necrotizing granulomas in the lung.  Vitals stable.  Labs benign.  CT chest and abdomen negative for acute disease.  Will admit to floor for further eval. (23 May 2020 01:31). GI consulted for further evaluation. He reports last bowel movement . He denies any blood in stools, fevers, chills, nausea/vomiting, unintentional weight loss. He denies any NSAID use or significant ETOH abuse.       PMH/PSH:PAST MEDICAL & SURGICAL HISTORY:  Alfaro's esophagus without dysplasia  Depression, unspecified depression type  Crohn's disease with complication, unspecified gastrointestinal tract location  ACL (anterior cruciate ligament) tear      Allergies:  adhesives (Rash)  No Known Drug Allergies      Medications:  acetaminophen   Tablet .. 650 milliGRAM(s) Oral every 6 hours PRN  busPIRone 10 milliGRAM(s) Oral two times a day  dicyclomine 10 milliGRAM(s) Oral two times a day before meals  enoxaparin Injectable 40 milliGRAM(s) SubCutaneous daily  mesalamine ER Capsule 1000 milliGRAM(s) Oral four times a day  midodrine. 10 milliGRAM(s) Oral three times a day  morphine  - Injectable 1 milliGRAM(s) IV Push every 6 hours PRN  nicotine  14 mG/24 Hr(s) Transdermal Patch - Peds 1 Patch Transdermal daily  oxybutynin 5 milliGRAM(s) Oral two times a day  pantoprazole    Tablet 40 milliGRAM(s) Oral before breakfast  tamsulosin 0.4 milliGRAM(s) Oral at bedtime      Review of Systems:    General:  No weight loss, fevers, chills, night sweats, fatigue,   Eyes:  Good vision, no reported pain  ENT:  No sore throat, pain, runny nose, dysphagia  CV:  No pain, palpitations, hypo/hypertension  Resp:  + SOB  GI:  as per HPI   :  No pain, bleeding, incontinence, nocturia  Muscle:  No pain, weakness  Breast:  No pain, abscess, mass, discharge  Neuro:  No weakness, tingling, memory problems  Psych:  No fatigue, insomnia, mood problems, depression  Endocrine:  No polyuria, polydipsia, cold/heat intolerance  Heme:  No petechiae, ecchymosis, easy bruisability  Skin:  No rash, tattoos, scars, edema    Relevant Family History:   FAMILY HISTORY:  Family history of diabetes mellitus in maternal grandmother (Mother, Grandparent)  Family history of COPD (chronic obstructive pulmonary disease) (Father)  Family history of colon cancer in father (Father)  Family history of hypertension in father (Father, Mother, Grandparent)      Relevant Social History: Alcohol ( -) , Tobacco ( -) , Illicit drugs (- )     Physical Exam:    Vital Signs:  Vital Signs Last 24 Hrs  T(C): 36.6 (23 May 2020 10:42), Max: 37 (22 May 2020 19:51)  T(F): 97.9 (23 May 2020 10:42), Max: 98.6 (22 May 2020 19:51)  HR: 68 (23 May 2020 10:42) (66 - 92)  BP: 110/77 (23 May 2020 10:42) (97/56 - 145/114)  BP(mean): 67 (23 May 2020 00:08) (67 - 67)  RR: 18 (23 May 2020 10:42) (17 - 20)  SpO2: 96% (23 May 2020 10:42) (94% - 97%)  Daily Height in cm: 177.8 (23 May 2020 03:22)    Daily Weight in k.7 (23 May 2020 03:22)    General:  Appears stated age, well-groomed, well-nourished, no distress  HEENT:  NC/AT,  conjunctivae clear and pink, no thyromegaly, nodules, adenopathy, no JVD, anicteric sclera  Chest:  Full & symmetric excursion, no increased effort, breath sounds clear  Cardiovascular:  Regular rhythm, S1, S2, no murmur/rub/S3/S4, no abdominal bruit, no edema  Abdomen:  Soft, mild ttp left upper quadrant, non distended, normoactive bowel sounds,  no masses , no hepatosplenomegaly, no signs of chronic liver disease  Extremities:  no cyanosis, clubbing or edema  Skin:  No rash/erythema/ecchymoses/petechiae/wounds/abscess/warm/dry  Neuro/Psych:  Alert, oriented, no asterixis, no tremor, no encephalopathy    Laboratory:                          14.8   8.09  )-----------( 260      ( 22 May 2020 21:10 )             42.9     05-    140  |  107  |  8   ----------------------------<  76  3.9   |  25  |  1.08    Ca    8.6      22 May 2020 21:10    TPro  7.2  /  Alb  3.6  /  TBili  0.5  /  DBili  x   /  AST  18  /  ALT  21  /  AlkPhos  75      LIVER FUNCTIONS - ( 22 May 2020 21:10 )  Alb: 3.6 g/dL / Pro: 7.2 gm/dL / ALK PHOS: 75 U/L / ALT: 21 U/L / AST: 18 U/L / GGT: x             Urinalysis Basic - ( 22 May 2020 21:27 )    Color: Yellow / Appearance: Clear / S.005 / pH: x  Gluc: x / Ketone: Negative  / Bili: Negative / Urobili: Negative mg/dL   Blood: x / Protein: Negative mg/dL / Nitrite: Negative   Leuk Esterase: Small / RBC: x / WBC 0-2   Sq Epi: x / Non Sq Epi: x / Bacteria: x      Amylase Serum--      Lipase serum42 U/L<L>       Ammonia--      Intake and Output    20 @ 07:01  -  20 @ 07:00  --------------------------------------------------------  IN: 218 mL / OUT: 0 mL / NET: 218 mL        Imaging:  < from: CT Abdomen and Pelvis w/ Oral Cont and w/ IV Cont (20 @ 22:06) >    EXAM:  CT ABDOMEN AND PELVIS OC IC                            PROCEDURE DATE:  2020          INTERPRETATION:  CT ABDOMEN AND PELVIS WITH CONTRAST    INDICATION: Left lower quadrant abdominal pain.    TECHNIQUE: Contrast enhanced CT of the abdomen and pelvis. Images are reformatted in the sagittal and coronal planes.    90 mL of Omnipaque 350 contrast material was injected IV. Oral contrast was also administered.    COMPARISON: CT Abdomen pelvis 2019.    FINDINGS:    Lower Thorax: No consolidation or effusion. Mild dependent atelectasis is seen at the lung bases.    Liver: No suspicious lesions.  Biliary: No dilatation. No calcified gallstones within the gallbladder  Spleen: No suspicious lesions.  Pancreas: No inflammatory changes or ductal dilatation.  Adrenals: Normal.  Kidneys: No hydronephrosis.  Vessels: Normal caliber. Atherosclerotic disease of the aorta and its branches.    GI tract: No evidence of small bowel obstruction. No significant bowel wall thickening or inflammatory changes though detailed evaluation of the distal GI tract is limited secondary to inadequate distention. Normal appendix.    Peritoneum/retroperitoneum and mesentery: No free air. No organized fluid collection. No adenopathy.    Pelvic organs/Bladder: No significant prostatomegaly. Bladder is normal.    Abdominal wall: Unremarkable.  Bones and soft tissues: Advanced degenerative disc disease at L5-S1 with associated mild anterolisthesis, likely related to bilateral facet spondylolysis.    IMPRESSION:    No acute bowel inflammatory changes or obstruction.    Normal appendix.    Advanced degenerative disc disease at L5-S1 with associated mild anterolisthesis, likely related to bilateral facet spondylolysis.          MICHAEL MORRISON M.D., ATTENDING RADIOLOGIST  This document has been electronically signed. May 22 2020 10:39PM

## 2020-05-23 NOTE — CONSULT NOTE ADULT - SUBJECTIVE AND OBJECTIVE BOX
JEOVANY ACOSTA    S 1E 194 D    Patient is a 49y old  Male who presents with a chief complaint of abdominal pain, dyspnea (23 May 2020 01:31)       Allergies    adhesives (Rash)  No Known Drug Allergies    Intolerances        HPI:  Patient is a 49M with a PMH of Barretts Disease, Crohn's on Pentasa IBS, spiculated lung mass who presents to worsening dyspnea on exertion and abdominal pain.  Patient states his breathing has been worsening over the last two weeks but denies hx of cough, fever, chills.  Has been following Pulmonary with Dr Ileana Disla.  Patient also states that he has had abdominal pain with poor PO intake for 5 days.  States he has had no BM for the last 5 days as well.  Follows with GI, and had a tele visit with Dr Russ 3 days ago who told him to present to the ED if his abdominal pain persisted.  Admitted to [facility] in 2020 - had biopsy that showed necrotizing granulomas in the lung.  Vitals stable.  Labs benign.  CT chest and abdomen negative for acute disease.  Will admit to floor for further eval. (23 May 2020 01:31)      PAST MEDICAL & SURGICAL HISTORY:  Alfaro's esophagus without dysplasia  Depression, unspecified depression type  Crohn's disease with complication, unspecified gastrointestinal tract location  ACL (anterior cruciate ligament) tear      FAMILY HISTORY:  Family history of diabetes mellitus in maternal grandmother (Mother, Grandparent)  Family history of COPD (chronic obstructive pulmonary disease) (Father)  Family history of colon cancer in father (Father)  Family history of hypertension in father (Father, Mother, Grandparent)        MEDICATIONS   acetaminophen   Tablet .. 650 milliGRAM(s) Oral every 6 hours PRN  busPIRone 10 milliGRAM(s) Oral two times a day  dicyclomine 10 milliGRAM(s) Oral two times a day before meals  enoxaparin Injectable 40 milliGRAM(s) SubCutaneous daily  mesalamine ER Capsule 1000 milliGRAM(s) Oral four times a day  midodrine. 10 milliGRAM(s) Oral three times a day  morphine  - Injectable 1 milliGRAM(s) IV Push every 6 hours PRN  nicotine  14 mG/24 Hr(s) Transdermal Patch - Peds 1 Patch Transdermal daily  oxybutynin 5 milliGRAM(s) Oral two times a day  pantoprazole    Tablet 40 milliGRAM(s) Oral before breakfast  tamsulosin 0.4 milliGRAM(s) Oral at bedtime      Vital Signs Last 24 Hrs  T(C): 36.6 (23 May 2020 10:42), Max: 37 (22 May 2020 19:51)  T(F): 97.9 (23 May 2020 10:42), Max: 98.6 (22 May 2020 19:51)  HR: 68 (23 May 2020 10:42) (66 - 92)  BP: 110/77 (23 May 2020 10:42) (97/56 - 145/114)  BP(mean): 67 (23 May 2020 00:08) (67 - 67)  RR: 18 (23 May 2020 10:42) (17 - 20)  SpO2: 96% (23 May 2020 10:42) (94% - 97%)      20 @ 07:01  -  20 @ 07:00  --------------------------------------------------------  IN: 218 mL / OUT: 0 mL / NET: 218 mL            LABS:                        14.8   8.09  )-----------( 260      ( 22 May 2020 21:10 )             42.9         140  |  107  |  8   ----------------------------<  76  3.9   |  25  |  1.08    Ca    8.6      22 May 2020 21:10    TPro  7.2  /  Alb  3.6  /  TBili  0.5  /  DBili  x   /  AST  18  /  ALT  21  /  AlkPhos  75  0522      Urinalysis Basic - ( 22 May 2020 21:27 )    Color: Yellow / Appearance: Clear / S.005 / pH: x  Gluc: x / Ketone: Negative  / Bili: Negative / Urobili: Negative mg/dL   Blood: x / Protein: Negative mg/dL / Nitrite: Negative   Leuk Esterase: Small / RBC: x / WBC 0-2   Sq Epi: x / Non Sq Epi: x / Bacteria: x            WBC:  WBC Count: 8.09 K/uL ( @ 21:10)      MICROBIOLOGY:  RECENT CULTURES:        CARDIAC MARKERS ( 22 May 2020 21:10 )  <.015 ng/mL / x     / x     / x     / x                Sodium:  Sodium, Serum: 140 mmol/L ( @ 21:10)      1.08 mg/dL  @ 21:10      Hemoglobin:  Hemoglobin: 14.8 g/dL ( @ 21:10)      Platelets: Platelet Count - Automated: 260 K/uL ( @ 21:10)      LIVER FUNCTIONS - ( 22 May 2020 21:10 )  Alb: 3.6 g/dL / Pro: 7.2 gm/dL / ALK PHOS: 75 U/L / ALT: 21 U/L / AST: 18 U/L / GGT: x             Urinalysis Basic - ( 22 May 2020 21:27 )    Color: Yellow / Appearance: Clear / S.005 / pH: x  Gluc: x / Ketone: Negative  / Bili: Negative / Urobili: Negative mg/dL   Blood: x / Protein: Negative mg/dL / Nitrite: Negative   Leuk Esterase: Small / RBC: x / WBC 0-2   Sq Epi: x / Non Sq Epi: x / Bacteria: x        RADIOLOGY & ADDITIONAL STUDIES:

## 2020-05-23 NOTE — ED ADULT NURSE REASSESSMENT NOTE - NS ED NURSE REASSESS COMMENT FT1
Pt slightly hypotensive systollically in the 90s, asymptomatic. Pt deferred morphine administration at this time.  Pt given home med, midodrine per orders. Report given to Sandra in ED holiding with H&P, ER course and pertinent clinical data reviewd. Accepting RN aware to reassess BP and needed pain medication if warrented. Awaiting transport to floor, will sign off.....................................ZARINA ROJAS

## 2020-05-23 NOTE — H&P ADULT - ASSESSMENT
Patient is a 49M with a PMH of Barretts Disease, Crohn's on Pentasya, IBS, spiculated lung mass who presents to worsening dyspnea on exertion and abdominal pain.  Patient states his breathing has been worsening over the last two weeks but denies hx of cough, fever, chills.  Has been following Pulmonary with Dr Ileana Disla.  Patient also states that he has had abdominal pain with poor PO intake for 5 days.  States he has had no BM for the last 5 days as well.  Follows with GI, and had a tele visit with Dr Russ 3 days ago who told him to present to the ED if his abdominal pain persisted.  Admitted to [facility] in 01/2020 - had biopsy that showed necrotizing granulomas in the lung.  Vitals stable.  Labs benign.  CT chest and abdomen negative for acute disease.  Will admit to floor for futher eval.      IMPROVE VTE Individual Risk Assessment          RISK                                                          Points  [  ] Previous VTE                                                3  [  ] Thrombophilia                                             2  [  ] Lower limb paralysis                                    2        (unable to hold up >15 seconds)    [  ] Current Cancer                                             2         (within 6 months)  [  ] Immobilization > 24 hrs                              1  [  ] ICU/CCU stay > 24 hours                            1  [  ] Age > 60                                                    1    IMPROVE VTE Score - 1

## 2020-05-23 NOTE — H&P ADULT - HISTORY OF PRESENT ILLNESS
Patient is a 49M with a PMH of Barretts Disease, Crohn's on Pentasa IBS, spiculated lung mass who presents to worsening dyspnea on exertion and abdominal pain.  Patient states his breathing has been worsening over the last two weeks but denies hx of cough, fever, chills.  Has been following Pulmonary with Dr Ileana Disla.  Patient also states that he has had abdominal pain with poor PO intake for 5 days.  States he has had no BM for the last 5 days as well.  Follows with GI, and had a tele visit with Dr Russ 3 days ago who told him to present to the ED if his abdominal pain persisted.  Admitted to [facility] in 01/2020 - had biopsy that showed necrotizing granulomas in the lung.  Vitals stable.  Labs benign.  CT chest and abdomen negative for acute disease.  Will admit to floor for further eval.

## 2020-05-23 NOTE — H&P ADULT - PROBLEM/PLAN-2
Detail Level: Simple Introduction Text (Please End With A Colon): Defer procedure: Other Procedure: MOHS DISPLAY PLAN FREE TEXT

## 2020-05-23 NOTE — PROGRESS NOTE ADULT - SUBJECTIVE AND OBJECTIVE BOX
Patient known to our service.  Followed for 4 cm RUL mass.  Admitted from ER with Crohn's exacerbation.  Does report increased dyspnea.  No pneumonia on CT.

## 2020-05-23 NOTE — ED CLERICAL - NS ED CLERK UNITS
MD in room updating pt and spouse on plan of care. Another 250 ml bolus of fluid administered. Call light within reach. Will continue to monitor.    RF

## 2020-05-23 NOTE — H&P ADULT - NSHPREVIEWOFSYSTEMS_GEN_ALL_CORE
Constitutional: no fever, chills, night sweats  Ears: no hearing changes or ear pain,   Nose: no nasal congestion, sinus pain, or rhinorrhea  Cardio: no chest pain, orthopnea, edema, or palpitations  Resp: dyspnea positive.  No cough, wheezing  GI: abdominal pain, constipation positive.  No nausea, vomiting, diarrhea, hematochezia, or melena  : no dysuria, urinary frequency, hematuria  MSK: no back pain, neck pain  Skin: no rash, pruritis   Neuro: no weakness, dizziness, lightheadedness, syncope   Heme/Lymph: no bruising or bleeding

## 2020-05-23 NOTE — H&P ADULT - NSHPLABSRESULTS_GEN_ALL_CORE
Recent Vitals  T(C): 36.7 (20 @ 23:24), Max: 37 (20 @ 19:51)  HR: 72 (20 @ 23:24) (72 - 92)  BP: 139/97 (20 @ 23:24) (126/96 - 145/114)  RR: 18 (20 @ 23:24) (18 - 20)  SpO2: 96% (20 @ 23:24) (95% - 96%)                        14.8   8.09  )-----------( 260      ( 22 May 2020 21:10 )             42.9         140  |  107  |  8   ----------------------------<  76  3.9   |  25  |  1.08    Ca    8.6      22 May 2020 21:10    TPro  7.2  /  Alb  3.6  /  TBili  0.5  /  DBili  x   /  AST  18  /  ALT  21  /  AlkPhos  75  05-22      LIVER FUNCTIONS - ( 22 May 2020 21:10 )  Alb: 3.6 g/dL / Pro: 7.2 gm/dL / ALK PHOS: 75 U/L / ALT: 21 U/L / AST: 18 U/L / GGT: x           Urinalysis Basic - ( 22 May 2020 21:27 )    Color: Yellow / Appearance: Clear / S.005 / pH: x  Gluc: x / Ketone: Negative  / Bili: Negative / Urobili: Negative mg/dL   Blood: x / Protein: Negative mg/dL / Nitrite: Negative   Leuk Esterase: Small / RBC: x / WBC 0-2   Sq Epi: x / Non Sq Epi: x / Bacteria: x        Home Medications:  busPIRone 10 mg oral tablet: 1 tab(s) orally 2 times a day (22 May 2020 20:19)  Chantix 1 mg oral tablet: 1 tab(s) orally 2 times a day (22 May 2020 20:19)  Flomax 0.4 mg oral capsule: 2 cap(s) orally once a day (22 May 2020 20:19)  Incruse Ellipta 62.5 mcg/inh inhalation powder:  (22 May 2020 20:19)  tolterodine 2 mg oral tablet: orally once a day (at bedtime) (22 May 2020 20:19)

## 2020-05-23 NOTE — H&P ADULT - NSHPPHYSICALEXAM_GEN_ALL_CORE
Physical exam:  General: patient in no acute distress, resting comfortably  Head:  Atraumatic, Normocephalic  Eyes: EOMI, PERRLA, clear sclera  Neck: Supple, thyroid nontender, non enlarged  Cardio: S1/S2 +ve, regular rate and rhythm, no M/G/R  Resp: clear to ausculation bilaterally, no rales or wheezes  GI: abdomen soft, with mild tenderness in the LUQ, non distended, no guarding, BS +ve x 4  Ext: no significant pedal edema  Neuro: CN 2-12 intact, no significant motor or sensory deficits.  Skin: No rashes or lesions

## 2020-05-24 DIAGNOSIS — R10.12 LEFT UPPER QUADRANT PAIN: ICD-10-CM

## 2020-05-24 LAB
ALBUMIN SERPL ELPH-MCNC: 3.5 G/DL — SIGNIFICANT CHANGE UP (ref 3.3–5)
ALP SERPL-CCNC: 72 U/L — SIGNIFICANT CHANGE UP (ref 40–120)
ALT FLD-CCNC: 22 U/L — SIGNIFICANT CHANGE UP (ref 12–78)
ANION GAP SERPL CALC-SCNC: 6 MMOL/L — SIGNIFICANT CHANGE UP (ref 5–17)
AST SERPL-CCNC: 22 U/L — SIGNIFICANT CHANGE UP (ref 15–37)
BILIRUB SERPL-MCNC: 0.8 MG/DL — SIGNIFICANT CHANGE UP (ref 0.2–1.2)
BUN SERPL-MCNC: 8 MG/DL — SIGNIFICANT CHANGE UP (ref 7–23)
CALCIUM SERPL-MCNC: 8.8 MG/DL — SIGNIFICANT CHANGE UP (ref 8.5–10.1)
CHLORIDE SERPL-SCNC: 103 MMOL/L — SIGNIFICANT CHANGE UP (ref 96–108)
CK SERPL-CCNC: 108 U/L — SIGNIFICANT CHANGE UP (ref 26–308)
CO2 SERPL-SCNC: 28 MMOL/L — SIGNIFICANT CHANGE UP (ref 22–31)
CREAT SERPL-MCNC: 1.27 MG/DL — SIGNIFICANT CHANGE UP (ref 0.5–1.3)
D DIMER BLD IA.RAPID-MCNC: <150 NG/ML DDU — SIGNIFICANT CHANGE UP
ERYTHROCYTE [SEDIMENTATION RATE] IN BLOOD: 2 MM/HR — SIGNIFICANT CHANGE UP (ref 0–20)
GLUCOSE SERPL-MCNC: 101 MG/DL — HIGH (ref 70–99)
HCT VFR BLD CALC: 44.8 % — SIGNIFICANT CHANGE UP (ref 39–50)
HGB BLD-MCNC: 15.1 G/DL — SIGNIFICANT CHANGE UP (ref 13–17)
INR BLD: 1.01 RATIO — SIGNIFICANT CHANGE UP (ref 0.88–1.16)
MCHC RBC-ENTMCNC: 30.8 PG — SIGNIFICANT CHANGE UP (ref 27–34)
MCHC RBC-ENTMCNC: 33.7 GM/DL — SIGNIFICANT CHANGE UP (ref 32–36)
MCV RBC AUTO: 91.4 FL — SIGNIFICANT CHANGE UP (ref 80–100)
NRBC # BLD: 0 /100 WBCS — SIGNIFICANT CHANGE UP (ref 0–0)
PLATELET # BLD AUTO: 230 K/UL — SIGNIFICANT CHANGE UP (ref 150–400)
POTASSIUM SERPL-MCNC: 4 MMOL/L — SIGNIFICANT CHANGE UP (ref 3.5–5.3)
POTASSIUM SERPL-SCNC: 4 MMOL/L — SIGNIFICANT CHANGE UP (ref 3.5–5.3)
PROT SERPL-MCNC: 6.9 GM/DL — SIGNIFICANT CHANGE UP (ref 6–8.3)
PROTHROM AB SERPL-ACNC: 11.2 SEC — SIGNIFICANT CHANGE UP (ref 10–12.9)
RBC # BLD: 4.9 M/UL — SIGNIFICANT CHANGE UP (ref 4.2–5.8)
RBC # FLD: 13 % — SIGNIFICANT CHANGE UP (ref 10.3–14.5)
SODIUM SERPL-SCNC: 137 MMOL/L — SIGNIFICANT CHANGE UP (ref 135–145)
WBC # BLD: 6.04 K/UL — SIGNIFICANT CHANGE UP (ref 3.8–10.5)
WBC # FLD AUTO: 6.04 K/UL — SIGNIFICANT CHANGE UP (ref 3.8–10.5)

## 2020-05-24 PROCEDURE — 93970 EXTREMITY STUDY: CPT | Mod: 26

## 2020-05-24 PROCEDURE — 99233 SBSQ HOSP IP/OBS HIGH 50: CPT

## 2020-05-24 PROCEDURE — 99232 SBSQ HOSP IP/OBS MODERATE 35: CPT

## 2020-05-24 RX ORDER — ONDANSETRON 8 MG/1
4 TABLET, FILM COATED ORAL EVERY 6 HOURS
Refills: 0 | Status: DISCONTINUED | OUTPATIENT
Start: 2020-05-24 | End: 2020-05-29

## 2020-05-24 RX ORDER — MIDODRINE HYDROCHLORIDE 2.5 MG/1
10 TABLET ORAL THREE TIMES A DAY
Refills: 0 | Status: DISCONTINUED | OUTPATIENT
Start: 2020-05-24 | End: 2020-05-25

## 2020-05-24 RX ADMIN — POLYETHYLENE GLYCOL 3350 17 GRAM(S): 17 POWDER, FOR SOLUTION ORAL at 06:34

## 2020-05-24 RX ADMIN — MIDODRINE HYDROCHLORIDE 10 MILLIGRAM(S): 2.5 TABLET ORAL at 11:56

## 2020-05-24 RX ADMIN — Medication 10 MILLIGRAM(S): at 17:17

## 2020-05-24 RX ADMIN — Medication 1000 MILLIGRAM(S): at 06:33

## 2020-05-24 RX ADMIN — Medication 1000 MILLIGRAM(S): at 23:17

## 2020-05-24 RX ADMIN — Medication 5 MILLIGRAM(S): at 17:17

## 2020-05-24 RX ADMIN — Medication 1 MILLIGRAM(S): at 06:34

## 2020-05-24 RX ADMIN — PANTOPRAZOLE SODIUM 40 MILLIGRAM(S): 20 TABLET, DELAYED RELEASE ORAL at 06:34

## 2020-05-24 RX ADMIN — TAMSULOSIN HYDROCHLORIDE 0.4 MILLIGRAM(S): 0.4 CAPSULE ORAL at 21:38

## 2020-05-24 RX ADMIN — POLYETHYLENE GLYCOL 3350 17 GRAM(S): 17 POWDER, FOR SOLUTION ORAL at 17:17

## 2020-05-24 RX ADMIN — LACTULOSE 10 GRAM(S): 10 SOLUTION ORAL at 11:57

## 2020-05-24 RX ADMIN — MIDODRINE HYDROCHLORIDE 10 MILLIGRAM(S): 2.5 TABLET ORAL at 06:33

## 2020-05-24 RX ADMIN — Medication 10 MILLIGRAM(S): at 06:34

## 2020-05-24 RX ADMIN — Medication 1000 MILLIGRAM(S): at 11:55

## 2020-05-24 RX ADMIN — ONDANSETRON 4 MILLIGRAM(S): 8 TABLET, FILM COATED ORAL at 21:50

## 2020-05-24 RX ADMIN — Medication 1 MILLIGRAM(S): at 17:17

## 2020-05-24 RX ADMIN — Medication 1000 MILLIGRAM(S): at 17:17

## 2020-05-24 RX ADMIN — Medication 5 MILLIGRAM(S): at 06:33

## 2020-05-24 RX ADMIN — ENOXAPARIN SODIUM 40 MILLIGRAM(S): 100 INJECTION SUBCUTANEOUS at 11:55

## 2020-05-24 RX ADMIN — ONDANSETRON 4 MILLIGRAM(S): 8 TABLET, FILM COATED ORAL at 11:57

## 2020-05-24 RX ADMIN — TIOTROPIUM BROMIDE 1 CAPSULE(S): 18 CAPSULE ORAL; RESPIRATORY (INHALATION) at 11:56

## 2020-05-24 NOTE — PROGRESS NOTE ADULT - ASSESSMENT
49M with a PMH of Barretts Disease, ?Crohn's on Pentasa, IBS presents to ED with SOB, abdominal discomfort in setting of constipation     CT on presentation unremarkable without any evidence of inflammation.     Of note outpatient work-up has been negative for Crohns however he is adament pathology from small bowel resection noted Crohns (was not able to obtain pathology). Work-up including EGD/colonoscopy otherwise negative for IBD. Capsule pending as outpatient     Abdominal pain is most likely secondary to irritable bowel syndrome with constipation. Clinical picture is not consistent with Crohns flare, which is a questionable diagnosis.CT unremarkable. No concerning signs on physical exam. Would treat constipation conservatively and monitor closely.

## 2020-05-24 NOTE — PROGRESS NOTE ADULT - SUBJECTIVE AND OBJECTIVE BOX
Patient is a 49y old  Male who presents with a chief complaint of abdominal pain, dyspnea (24 May 2020 12:17)      INTERVAL HPI/ OVERNIGHT EVENTS: Pt was seen and examined at bedside today, No significant overnight events, pt admits that abdominal pain and nausea are still present but improving. pt was able to have 2 small bowel movements since yesterday      MEDICATIONS  (STANDING):  busPIRone 10 milliGRAM(s) Oral two times a day  dicyclomine 10 milliGRAM(s) Oral two times a day before meals  enoxaparin Injectable 40 milliGRAM(s) SubCutaneous daily  mesalamine ER Capsule 1000 milliGRAM(s) Oral four times a day  midodrine. 10 milliGRAM(s) Oral three times a day  nicotine  14 mG/24 Hr(s) Transdermal Patch - Peds 1 Patch Transdermal daily  oxybutynin 5 milliGRAM(s) Oral two times a day  pantoprazole    Tablet 40 milliGRAM(s) Oral before breakfast  polyethylene glycol 3350 17 Gram(s) Oral two times a day  tamsulosin 0.4 milliGRAM(s) Oral at bedtime  tiotropium 18 MICROgram(s) Capsule 1 Capsule(s) Inhalation daily  varenicline 1 milliGRAM(s) Oral every 12 hours    MEDICATIONS  (PRN):  acetaminophen   Tablet .. 650 milliGRAM(s) Oral every 6 hours PRN Temp greater or equal to 38C (100.4F), Mild Pain (1 - 3)  lactulose Syrup 10 Gram(s) Oral two times a day PRN constipation  ondansetron Injectable 4 milliGRAM(s) IV Push every 6 hours PRN Nausea and/or Vomiting      Allergies    adhesives (Rash)  No Known Drug Allergies    Intolerances        REVIEW OF SYSTEMS:    Unable to examine due to [ ] Encephalopathy [ ] Advanced Dementia [ ] Expressive Aphasia [ ] Non-verbal patient    CONSTITUTIONAL: No fever, NO generalized weakness/Fatigue, No weight loss  EYES: No eye pain, visual disturbances, or discharge  ENMT:  No difficulty hearing, tinnitus, vertigo; No sinus or throat pain  NECK: No pain or stiffness  RESPIRATORY: No shortness of breath,  cough, wheezing, sputum or hemoptysis   CARDIOVASCULAR: No chest pain, palpitations, or leg swelling  GASTROINTESTINAL: positive abdominal pain and nausea, no vomiting, diarrhea or constipation. No melena or hematochezia.  GENITOURINARY: No dysuria, frequency, hematuria, or incontinence  NEUROLOGICAL: No headaches, Dizziness, memory loss, loss of strength, numbness, or tremors  SKIN: No itching, burning, rashes, or lesions   MUSCULOSKELETAL: No joint pain or swelling; No muscle, back, or extremity pain  PSYCHIATRIC: No depression, anxiety, mood swings, or difficulty sleeping  HEME/LYMPH: No easy bruising, or bleeding gums      Vital Signs Last 24 Hrs  T(C): 36.8 (24 May 2020 11:00), Max: 36.8 (24 May 2020 11:00)  T(F): 98.3 (24 May 2020 11:00), Max: 98.3 (24 May 2020 11:00)  HR: 62 (24 May 2020 11:00) (62 - 70)  BP: 121/89 (24 May 2020 11:00) (108/76 - 143/97)  BP(mean): --  RR: 18 (24 May 2020 11:00) (17 - 18)  SpO2: 96% (24 May 2020 11:00) (94% - 96%)    PHYSICAL EXAM:  GENERAL: NAD, well-developed, well-groomed  HEAD:  Atraumatic, Normocephalic  EYES: conjunctiva and sclera clear  ENMT: Moist mucous membranes  NECK: Supple, No JVD, Normal thyroid  CHEST/LUNG: Clear to Auscultation bilaterally; No rales, rhonchi, wheezing, or rubs  HEART: Regular rate and rhythm; No murmurs, rubs, or gallops  ABDOMEN: Soft, LUQ tenderness, Nondistended; Bowel sounds present  EXTREMITIES:  2+ Peripheral Pulses, No clubbing, cyanosis, or edema  SKIN: No rashes or lesions  NERVOUS SYSTEM:  Alert & Oriented X3, Good concentration; Motor Strength 5/5 B/L upper and lower extremities    LABS:                        15.1   6.04  )-----------( 230      ( 24 May 2020 08:06 )             44.8         137  |  103  |  8   ----------------------------<  101<H>  4.0   |  28  |  1.27    Ca    8.8      24 May 2020 08:06    TPro  6.9  /  Alb  3.5  /  TBili  0.8  /  DBili  .12  /  AST  22  /  ALT  22  /  AlkPhos  72      PT/INR - ( 24 May 2020 08:06 )   PT: 11.2 sec;   INR: 1.01 ratio           Urinalysis Basic - ( 22 May 2020 21:27 )    Color: Yellow / Appearance: Clear / S.005 / pH: x  Gluc: x / Ketone: Negative  / Bili: Negative / Urobili: Negative mg/dL   Blood: x / Protein: Negative mg/dL / Nitrite: Negative   Leuk Esterase: Small / RBC: x / WBC 0-2   Sq Epi: x / Non Sq Epi: x / Bacteria: x      CAPILLARY BLOOD GLUCOSE              RADIOLOGY & ADDITIONAL TESTS:          Imaging Personally Reviewed:  [ ] YES  [ ] NO    Consultant(s) Notes Reviewed:  [x ] YES  [ ] NO    Care Discussed with Consultants/Other Providers [x ] YES  [ ] NO

## 2020-05-24 NOTE — PROGRESS NOTE ADULT - SUBJECTIVE AND OBJECTIVE BOX
Gastroenterology  Patient seen and examined bedside resting comfortably.  + BM today, + flatus, no blood   Abdominal pain significantly improved but mild (consistent with long standing chronic abdominal discomfort)    T(F): 98.3 (05-24-20 @ 11:00), Max: 98.3 (05-24-20 @ 11:00)  HR: 62 (05-24-20 @ 11:00) (62 - 70)  BP: 121/89 (05-24-20 @ 11:00) (108/76 - 143/97)  RR: 18 (05-24-20 @ 11:00) (17 - 18)  SpO2: 96% (05-24-20 @ 11:00) (94% - 96%)  Wt(kg): --  CAPILLARY BLOOD GLUCOSE          PHYSICAL EXAM:  General: NAD, WDWN.   Neuro:  Alert & responsive  HEENT: NCAT, EOMI, conjunctiva clear  CV: +S1+S2 regular rate and rhythm  Lung: clear to ausculation bilaterally, respirations nonlabored, good inspiratory effort  Abdomen: soft, mild ttp, No distention Normal active BS  Extremities: no cyanosis, clubbing or edema    LABS:                        15.1   6.04  )-----------( 230      ( 24 May 2020 08:06 )             44.8     05-24    137  |  103  |  8   ----------------------------<  101<H>  4.0   |  28  |  1.27    Ca    8.8      24 May 2020 08:06    TPro  6.9  /  Alb  3.5  /  TBili  0.8  /  DBili  .12  /  AST  22  /  ALT  22  /  AlkPhos  72  05-24    LIVER FUNCTIONS - ( 24 May 2020 08:06 )  Alb: 3.5 g/dL / Pro: 6.9 gm/dL / ALK PHOS: 72 U/L / ALT: 22 U/L / AST: 22 U/L / GGT: x           PT/INR - ( 24 May 2020 08:06 )   PT: 11.2 sec;   INR: 1.01 ratio           I&O's Detail    24 May 2020 07:01  -  24 May 2020 15:10  --------------------------------------------------------  IN:    Oral Fluid: 240 mL  Total IN: 240 mL    OUT:    Voided: 500 mL  Total OUT: 500 mL    Total NET: -260 mL        05-24 @ 08:06    137 | 103 | 8  /8.8 | -- | --  _______________________/  4.0 | 28 | 1.27                           \par   Lipase, Serum: 42 U/L (05-22 @ 21:10)

## 2020-05-24 NOTE — PROGRESS NOTE ADULT - PROBLEM SELECTOR PLAN 1
-Miralax twice daily   -Lactulose twice daily   -Magnesium citrate x 1   -Continue home medications (Pentasa, dicylomine, PPI)  -Avoid narcotics   -Advance diet slowly as tolerated.

## 2020-05-24 NOTE — CONSULT NOTE ADULT - SUBJECTIVE AND OBJECTIVE BOX
HPI:  Patient is a 49M with a PMH of Barretts Disease, Crohn's on Pentasa IBS, spiculated lung mass who presents to worsening dyspnea on exertion and abdominal pain.  Patient states his breathing has been worsening over the last two weeks but denies hx of cough, fever, chills.  Has been following Pulmonary with Dr Ileana Disla.  Patient also states that he has had abdominal pain with poor PO intake for 5 days.  States he has had no BM for the last 5 days as well.  Follows with GI, and had a tele visit with Dr Russ 3 days ago who told him to present to the ED if his abdominal pain persisted.  Admitted to [facility] in 2020 - had biopsy that showed necrotizing granulomas in the lung.  Vitals stable.  Labs benign.  CT chest and abdomen negative for acute disease.  Will admit to floor for further eval. (23 May 2020 01:31)      PAST MEDICAL & SURGICAL HISTORY:  Alfaro's esophagus without dysplasia  Depression, unspecified depression type  Crohn's disease with complication, unspecified gastrointestinal tract location  ACL (anterior cruciate ligament) tear      SOCHX:   tobacco,  -  alcohol    FMHX: FA/MO  - contributory       Recent Travel:    Immunizations:    Allergies    adhesives (Rash)  No Known Drug Allergies    Intolerances        MEDICATIONS  (STANDING):  busPIRone 10 milliGRAM(s) Oral two times a day  dicyclomine 10 milliGRAM(s) Oral two times a day before meals  enoxaparin Injectable 40 milliGRAM(s) SubCutaneous daily  mesalamine ER Capsule 1000 milliGRAM(s) Oral four times a day  nicotine  14 mG/24 Hr(s) Transdermal Patch - Peds 1 Patch Transdermal daily  oxybutynin 5 milliGRAM(s) Oral two times a day  pantoprazole    Tablet 40 milliGRAM(s) Oral before breakfast  polyethylene glycol 3350 17 Gram(s) Oral two times a day  tamsulosin 0.4 milliGRAM(s) Oral at bedtime  tiotropium 18 MICROgram(s) Capsule 1 Capsule(s) Inhalation daily  varenicline 1 milliGRAM(s) Oral every 12 hours    MEDICATIONS  (PRN):  acetaminophen   Tablet .. 650 milliGRAM(s) Oral every 6 hours PRN Temp greater or equal to 38C (100.4F), Mild Pain (1 - 3)  lactulose Syrup 10 Gram(s) Oral two times a day PRN constipation  midodrine. 10 milliGRAM(s) Oral three times a day PRN SBP<110  ondansetron Injectable 4 milliGRAM(s) IV Push every 6 hours PRN Nausea and/or Vomiting      REVIEW OF SYSTEMS:  CONSTITUTIONAL: No fever, weight loss, or fatigue  EYES: No eye pain, visual disturbances, or discharge  ENMT:  No difficulty hearing, tinnitus, vertigo; No sinus or throat pain  NECK: No pain or stiffness  BREASTS: No pain, masses, or nipple discharge  RESPIRATORY: No cough, wheezing, chills or hemoptysis; No shortness of breath  CARDIOVASCULAR: No chest pain, palpitations, dizziness, or leg swelling  GASTROINTESTINAL: No abdominal or epigastric pain. No nausea, vomiting, or hematemesis; No diarrhea or constipation. No melena or hematochezia.  GENITOURINARY: No dysuria, frequency, hematuria, or incontinence  NEUROLOGICAL: No headaches, memory loss, loss of strength, numbness, or tremors  SKIN: No itching, burning, rashes, or lesions   LYMPH NODES: No enlarged glands  ENDOCRINE: No heat or cold intolerance; No hair loss  MUSCULOSKELETAL: No joint pain or swelling; No muscle, back, or extremity pain  PSYCHIATRIC: No depression, anxiety, mood swings, or difficulty sleeping  HEME/LYMPH: No easy bruising, or bleeding gums  ALLERGY AND IMMUNOLOGIC: No hives or eczema    VITAL SIGNS:    T(C): 36.8 (20 @ 11:00), Max: 36.8 (20 @ 11:00)  T(F): 98.3 (20 @ :00), Max: 98.3 (20 @ 11:00)  HR: 62 (20 @ :00) (62 - 70)  BP: 121/89 (20 @ :00) (108/76 - 143/97)  RR: 18 (20 @ :00) (17 - 18)  SpO2: 96% (20 @ :00) (94% - 96%)    PHYSICAL EXAM:    GENERAL: NAD, well-groomed, well-developed  HEAD:  Atraumatic, Normocephalic  EYES: EOMI, PERRLA, conjunctiva and sclera clear  ENMT: No tonsillar erythema, exudates, or enlargement; Moist mucous membranes, Good dentition, No lesions  NECK: Supple, No JVD, Normal thyroid  NERVOUS SYSTEM:  Alert & Oriented X3, Good concentration; Motor Strength 5/5 B/L upper and lower extremities; DTRs 2+ intact and symmetric  CHEST/LUNG: Clear to auscultation bilaterally; No rales, rhonchi, wheezing bilaterally  HEART: Regular rate and rhythm; No murmurs, rubs, or gallops  ABDOMEN: Soft, Nontender, Nondistended; Bowel sounds present  EXTREMITIES:  2+ Peripheral Pulses, No clubbing, cyanosis, or edema  LYMPH: No lymphadenopathy noted  SKIN: No rashes or lesions  BACK: no pressor sore     LABS:                         15.1   6.04  )-----------( 230      ( 24 May 2020 08:06 )             44.8         137  |  103  |  8   ----------------------------<  101<H>  4.0   |  28  |  1.27    Ca    8.8      24 May 2020 08:06    TPro  6.9  /  Alb  3.5  /  TBili  0.8  /  DBili  .12  /  AST  22  /  ALT  22  /  AlkPhos  72      LIVER FUNCTIONS - ( 24 May 2020 08:06 )  Alb: 3.5 g/dL / Pro: 6.9 gm/dL / ALK PHOS: 72 U/L / ALT: 22 U/L / AST: 22 U/L / GGT: x           PT/INR - ( 24 May 2020 08:06 )   PT: 11.2 sec;   INR: 1.01 ratio           Urinalysis Basic - ( 22 May 2020 21:27 )    Color: Yellow / Appearance: Clear / S.005 / pH: x  Gluc: x / Ketone: Negative  / Bili: Negative / Urobili: Negative mg/dL   Blood: x / Protein: Negative mg/dL / Nitrite: Negative   Leuk Esterase: Small / RBC: x / WBC 0-2   Sq Epi: x / Non Sq Epi: x / Bacteria: x        CARDIAC MARKERS ( 24 May 2020 08:06 )  x     / x     / 108 U/L / x     / x      CARDIAC MARKERS ( 22 May 2020 21:10 )  <.015 ng/mL / x     / x     / x     / x              Sedimentation Rate, Erythrocyte: 2 mm/hr ( @ 08:06)                          Radiology:      < from: CT Abdomen and Pelvis w/ Oral Cont and w/ IV Cont (20 @ 22:06) >  IMPRESSION:    No acute bowel inflammatory changes or obstruction.    Normal appendix.    Advanced degenerative disc disease at L5-S1 with associated mild anterolisthesis, likely related to bilateral facet spondylolysis.                  MICHAEL MORRISON M.D., ATTENDING RADIOLOGIST  This document has been electronically signed. May 22 2020 10:39PM          < end of copied text >  < from: CT Chest No Cont (20 @ 11:08) >  IMPRESSION: Spiculated right upper lobe mass with central cavitation, not significantly changed in size compared to prior.                NATALIA BIRD M.D., ATTENDING RADIOLOGIST  This document has been electronically signed. Mar 19 2020  4:21PM        < end of copied text > HPI:  Patient is a 49M with a PMH of Barretts Disease, Crohn's on Pentasa IBS, spiculated lung mass who presents to worsening dyspnea on exertion and abdominal pain.  Patient states his breathing has been worsening over the last two weeks but denies hx of cough, fever, chills.  Has been following Pulmonary with Dr Ileana Disla.  Patient also states that he has had abdominal pain with poor PO intake for 5 days.  States he has had no BM for the last 5 days as well.  Follows with GI, and had a tele visit with Dr Russ 3 days ago who told him to present to the ED if his abdominal pain persisted.  Admitted to [facility] in 2020 - had biopsy that showed necrotizing granulomas in the lung.  Vitals stable.  Labs benign.  CT chest and abdomen negative for acute disease.  Will admit to floor for further eval. (23 May 2020 01:31)  as per patient not able to climb even one flight  with me he talked nonstop without short of breath   says biologicals did not help; pentasa did   used to be a paramedic; quit ;; yearly ppd till then NEGATIVE   PAST MEDICAL & SURGICAL HISTORY:  Alfaro's esophagus without dysplasia  Depression, unspecified depression type  Crohn's disease with complication, unspecified gastrointestinal tract location  ACL (anterior cruciate ligament) tear      SOCHX:   plus tobacco,  -occ  alcohol  1-2 ppd; stopped 5 weeks   FMHX: FA/MO  non- contributory       Recent Travel:    Immunizations:    Allergies    adhesives (Rash)  No Known Drug Allergies    Intolerances        MEDICATIONS  (STANDING):  busPIRone 10 milliGRAM(s) Oral two times a day  dicyclomine 10 milliGRAM(s) Oral two times a day before meals  enoxaparin Injectable 40 milliGRAM(s) SubCutaneous daily  mesalamine ER Capsule 1000 milliGRAM(s) Oral four times a day  nicotine  14 mG/24 Hr(s) Transdermal Patch - Peds 1 Patch Transdermal daily  oxybutynin 5 milliGRAM(s) Oral two times a day  pantoprazole    Tablet 40 milliGRAM(s) Oral before breakfast  polyethylene glycol 3350 17 Gram(s) Oral two times a day  tamsulosin 0.4 milliGRAM(s) Oral at bedtime  tiotropium 18 MICROgram(s) Capsule 1 Capsule(s) Inhalation daily  varenicline 1 milliGRAM(s) Oral every 12 hours    MEDICATIONS  (PRN):  acetaminophen   Tablet .. 650 milliGRAM(s) Oral every 6 hours PRN Temp greater or equal to 38C (100.4F), Mild Pain (1 - 3)  lactulose Syrup 10 Gram(s) Oral two times a day PRN constipation  midodrine. 10 milliGRAM(s) Oral three times a day PRN SBP<110  ondansetron Injectable 4 milliGRAM(s) IV Push every 6 hours PRN Nausea and/or Vomiting      REVIEW OF SYSTEMS:  CONSTITUTIONAL: No fever,  has  weight loss, fatigue  EYES: No eye pain, visual disturbances, or discharge  ENMT:  No difficulty hearing, tinnitus, vertigo; No sinus or throat pain  NECK: No pain or stiffness  RESPIRATORY: No cough, wheezing, chills or hemoptysis;   has  shortness of breath  CARDIOVASCULAR: No chest pain, palpitations, dizziness, or leg swelling  GASTROINTESTINAL: has  abdominal and  epigastric pain. No nausea, vomiting, or hematemesis;   No diarrhea   has  constipation. No melena or hematochezia.  poor appetite  GENITOURINARY: No dysuria, frequency, hematuria, or incontinence  NEUROLOGICAL: No headaches, memory loss, loss of strength, numbness, or tremors  SKIN: No itching, burning, rashes, or lesions   LYMPH NODES: No enlarged glands  ENDOCRINE: No heat or cold intolerance; No hair loss  MUSCULOSKELETAL: No joint pain or swelling; No muscle, back, or extremity pain  PSYCHIATRIC: No depression, anxiety, mood swings, or difficulty sleeping  HEME/LYMPH: No easy bruising, or bleeding gums  ALLERGY AND IMMUNOLOGIC: No hives or eczema    VITAL SIGNS:    T(C): 36.8 (20 @ 11:00), Max: 36.8 (20 @ 11:00)  T(F): 98.3 (20 @ :00), Max: 98.3 (20 @ :00)  HR: 62 (20 @ :00) (62 - 70)  BP: 121/89 (20 @ :00) (108/76 - 143/97)  RR: 18 (20 @ :00) (17 - 18)  SpO2: 96% (20 @ :00) (94% - 96%)    PHYSICAL EXAM:    GENERAL: NAD, well-groomed, well-developed  HEAD:  Atraumatic, Normocephalic  EYES: EOMI, PERRLA, conjunctiva and sclera clear  ENMT: No tonsillar erythema, exudates, or enlargement; Moist mucous membranes,   NECK: Supple, No JVD, Normal thyroid  NERVOUS SYSTEM:  Alert & Oriented X3, Good concentration;  CHEST/LUNG: Clear to auscultation bilaterally; No rales, rhonchi, wheezing bilaterally  HEART: Regular rate and rhythm; No murmurs, rubs, or gallops  ABDOMEN: Soft, Nontender, Nondistended; Bowel sounds present  EXTREMITIES:  2+ Peripheral Pulses, No clubbing, cyanosis, or edema  LYMPH: No lymphadenopathy noted  SKIN: No rashes or lesions  BACK: no pressor sore     LABS:                         15.1   6.04  )-----------( 230      ( 24 May 2020 08:06 )             44.8     05-24    137  |  103  |  8   ----------------------------<  101<H>  4.0   |  28  |  1.27    Ca    8.8      24 May 2020 08:06    TPro  6.9  /  Alb  3.5  /  TBili  0.8  /  DBili  .12  /  AST  22  /  ALT  22  /  AlkPhos  72  05-24    LIVER FUNCTIONS - ( 24 May 2020 08:06 )  Alb: 3.5 g/dL / Pro: 6.9 gm/dL / ALK PHOS: 72 U/L / ALT: 22 U/L / AST: 22 U/L / GGT: x           PT/INR - ( 24 May 2020 08:06 )   PT: 11.2 sec;   INR: 1.01 ratio           Urinalysis Basic - ( 22 May 2020 21:27 )    Color: Yellow / Appearance: Clear / S.005 / pH: x  Gluc: x / Ketone: Negative  / Bili: Negative / Urobili: Negative mg/dL   Blood: x / Protein: Negative mg/dL / Nitrite: Negative   Leuk Esterase: Small / RBC: x / WBC 0-2   Sq Epi: x / Non Sq Epi: x / Bacteria: x        CARDIAC MARKERS ( 24 May 2020 08:06 )  x     / x     / 108 U/L / x     / x      CARDIAC MARKERS ( 22 May 2020 21:10 )  <.015 ng/mL / x     / x     / x     / x              Sedimentation Rate, Erythrocyte: 2 mm/hr ( @ 08:06)                          Radiology:      < from: CT Abdomen and Pelvis w/ Oral Cont and w/ IV Cont (20 @ 22:06) >  IMPRESSION:    No acute bowel inflammatory changes or obstruction.    Normal appendix.    Advanced degenerative disc disease at L5-S1 with associated mild anterolisthesis, likely related to bilateral facet spondylolysis.                  MICHAEL MORRISON M.D., ATTENDING RADIOLOGIST  This document has been electronically signed. May 22 2020 10:39PM          < end of copied text >  < from: CT Chest No Cont (20 @ 11:08) >  IMPRESSION: Spiculated right upper lobe mass with central cavitation, not significantly changed in size compared to prior.                NATALIA BIRD M.D., ATTENDING RADIOLOGIST  This document has been electronically signed. Mar 19 2020  4:21PM        < end of copied text >

## 2020-05-24 NOTE — PROGRESS NOTE ADULT - ASSESSMENT
Patient is a 49M with a PMH of Barretts Disease, Crohn's on Pentasya, IBS, spiculated lung mass who presents to worsening dyspnea on exertion and abdominal pain.  Patient states his breathing has been worsening over the last two weeks but denies hx of cough, fever, chills.  Has been following Pulmonary with Dr Ileana Disla.  Patient also states that he has had abdominal pain with poor PO intake for 5 days.  States he has had no BM for the last 5 days as well.  Follows with GI, and had a tele visit with Dr Russ 3 days ago who told him to present to the ED if his abdominal pain persisted.  Admitted to [facility] in 01/2020 - had biopsy that showed necrotizing granulomas in the lung.  Vitals stable.  Labs benign.  CT chest and abdomen negative for acute disease.  Will admit to floor for futher eval.      IMPROVE VTE Individual Risk Assessment          RISK                                                          Points  [  ] Previous VTE                                                3  [  ] Thrombophilia                                             2  [  ] Lower limb paralysis                                    2        (unable to hold up >15 seconds)    [  ] Current Cancer                                             2         (within 6 months)  [  ] Immobilization > 24 hrs                              1  [  ] ICU/CCU stay > 24 hours                            1  [  ] Age > 60                                                    1    IMPROVE VTE Score - 1

## 2020-05-24 NOTE — PROGRESS NOTE ADULT - ASSESSMENT
cont rx DAVE THAYER [facility]  [id number]  1970 DOA 2020 DR LYDIA FRANCISCO           REVIEW OF SYMPTOMS      Able to give ROS  Yes     RELIABLE No   CONSTITUTIONAL Weakness Yes  Chills No Vision changes No  ENDOCRINE No unexplained hair loss No heat or cold intolerance    ALLERGY No hives  Sore throat No   RESP Coughing blood no  Shortness of breath YES   NEURO No Headache  Confusion Pain neck No   CARDIAC No Chest pain No Palpitations   GI No Pain abdomen NO   Vomiting NO     PHYSICAL EXAM    HEENT Unremarkable PERRLA atraumatic   RESP Fair air entry EXP prolonged    Harsh breath sound Resp distres mild   CARDIAC S1 S2 No S3     NO JVD    ABDOMEN SOFT BS PRESENT NOT DISTENDED No hepatosplenomegaly PEDAL EDEMA present No calf tenderness  NO rash   GENERAL Not TOXIC looking    OXYGENATION      2020 ra 96%     VITALS/LABS   2020 afeb 62 120/89   2020 w 6 Hb 15 Plt 230 Na 137 K 4 CO2 28 Cr 1.2         PT DATA/BEST PRACTICE  ALLERGY       adhesives               WT        64 (2020)                             BMI         20 (2020)                    CrCl                                  ADVANCED DIRECTIVE     HEAD OF BED ELEVATION Yes      INFECTION PPLX     DVT PROPHYLAXIS             Lvnx 40 ()    VTE RULED OUT  D-dimr 2020 d-dimr n    V duplx 2020 V duplex n   MCCANN PROPHYLAXIS    protonix 40 ()         DYSPHAGIA EVAL     DIET       reg ()                                                                   IV F  CARDIAC  Midodrine  10.3 ()   ESR  / ESR 4-2     AFB STATUS   2020 AFB sm N-nicole     QFT Gold TB N-nicole   HIV STATUS    hiv N-nicole  GALACTOMANNAN   Asperg GM N-nicole   PROCEDURE  2020 CT bx Lung nodule Necrotizing epithelioid granulomatous inflammation                                                             PATIENT SUMMARY   49 m PMH Crohns COPD R lung mass Barretts admitted 2020 with chest pressure and SOB x 1 w Also co LLQ pain x 5 d     ER vitals 145/114 92 afeb     home meds spiriva midodrine 10.3 nicotine 14 pentasa flomax .4                                    PATIENT DATA/ASSESSMENT/RECOMMENDATIONS     RO TB RO CA  49 m who has liven in homeless shelter in past and has trevelled to [place] in past has rul cavitary lesion at least since 2020   His HIV test was negative    His cavitary rul lesion  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   Being smoker he is also at risk for lung ca   Given the above data I  suspect active TB    DW Dr Laura 2020 3:15 PM     recommend    Airborne isolation  sputum afb x 3  qft gold   spiriva   symbicort   us duplex deanne   ECHO   Suggest ID eval to consider starting AFB treatment with 4 antiTB drugs based on necrotizing granulomatous inflammation seen on biopsy even if sputum afb is neg   May still need excision of lesion once tb treatment is started to exclude cancer                TIME SPENT Over 25 minutes aggregate care time spent on encounter; activities included   direct pt care, counseling and/or coordinating care reviewing notes, lab data/ imaging , discussion with multidisciplinary team/ pt /family. Risks, benefits, alternatives  discussed in detail.    DAVE THAYER [facility]  [phone]  1970 DOA 2020 DR LYDIA FRANCISCO

## 2020-05-24 NOTE — PROGRESS NOTE ADULT - PROBLEM SELECTOR PLAN 1
Unclear etiology, CT abdomen appears benign.   Pt insist its Crohn's despite lack of evidence of Crohn's  possible IBS with constipation  GI consult appreciated.   continue with laxative to move bowels and see if this alleviate his pain   avoid opioids, continue with protonix and antiemetics.

## 2020-05-24 NOTE — PROGRESS NOTE ADULT - PROBLEM SELECTOR PLAN 2
GI consult appreciated   no evidence to support Crohn's disease, including previous EGD/Colonoscopy    CT scan: no acute changes  cont w/ Pentasa   pt may need outpatient Pill endoscopy with GI

## 2020-05-24 NOTE — PROGRESS NOTE ADULT - PROBLEM SELECTOR PLAN 3
Neoplasms vs. TB   CT chest: 3.8 x 1.9 cm spiculated right upper lobe mass with central cavitation   Thoracic sx and Pulmonary consult appreciated.   Previous QuantiFeron negative   Cavity lesion biopsy significant for Necrotizing epithelioid granulomatous inflammation  TB to be ruled out with AFB sputum as per Pulmonary, will follow

## 2020-05-24 NOTE — PROGRESS NOTE ADULT - SUBJECTIVE AND OBJECTIVE BOX
JEOVANY ACOSTA    S 2C 242 I    Patient is a 49y old  Male who presents with a chief complaint of abdominal pain, dyspnea (23 May 2020 14:13)       Allergies    adhesives (Rash)  No Known Drug Allergies    Intolerances        HPI:  Patient is a 49M with a PMH of Barretts Disease, Crohn's on Pentasa IBS, spiculated lung mass who presents to worsening dyspnea on exertion and abdominal pain.  Patient states his breathing has been worsening over the last two weeks but denies hx of cough, fever, chills.  Has been following Pulmonary with Dr Ileana Disla.  Patient also states that he has had abdominal pain with poor PO intake for 5 days.  States he has had no BM for the last 5 days as well.  Follows with GI, and had a tele visit with Dr Russ 3 days ago who told him to present to the ED if his abdominal pain persisted.  Admitted to [facility] in 2020 - had biopsy that showed necrotizing granulomas in the lung.  Vitals stable.  Labs benign.  CT chest and abdomen negative for acute disease.  Will admit to floor for further eval. (23 May 2020 01:31)      PAST MEDICAL & SURGICAL HISTORY:  Alfaro's esophagus without dysplasia  Depression, unspecified depression type  Crohn's disease with complication, unspecified gastrointestinal tract location  ACL (anterior cruciate ligament) tear      FAMILY HISTORY:  Family history of diabetes mellitus in maternal grandmother (Mother, Grandparent)  Family history of COPD (chronic obstructive pulmonary disease) (Father)  Family history of colon cancer in father (Father)  Family history of hypertension in father (Father, Mother, Grandparent)        MEDICATIONS   acetaminophen   Tablet .. 650 milliGRAM(s) Oral every 6 hours PRN  busPIRone 10 milliGRAM(s) Oral two times a day  dicyclomine 10 milliGRAM(s) Oral two times a day before meals  enoxaparin Injectable 40 milliGRAM(s) SubCutaneous daily  lactulose Syrup 10 Gram(s) Oral two times a day PRN  mesalamine ER Capsule 1000 milliGRAM(s) Oral four times a day  midodrine. 10 milliGRAM(s) Oral three times a day  nicotine  14 mG/24 Hr(s) Transdermal Patch - Peds 1 Patch Transdermal daily  ondansetron Injectable 4 milliGRAM(s) IV Push every 6 hours PRN  oxybutynin 5 milliGRAM(s) Oral two times a day  pantoprazole    Tablet 40 milliGRAM(s) Oral before breakfast  polyethylene glycol 3350 17 Gram(s) Oral two times a day  tamsulosin 0.4 milliGRAM(s) Oral at bedtime  tiotropium 18 MICROgram(s) Capsule 1 Capsule(s) Inhalation daily  varenicline 1 milliGRAM(s) Oral every 12 hours      Vital Signs Last 24 Hrs  T(C): 36.8 (24 May 2020 11:00), Max: 36.8 (24 May 2020 11:00)  T(F): 98.3 (24 May 2020 11:00), Max: 98.3 (24 May 2020 11:00)  HR: 62 (24 May 2020 11:00) (62 - 70)  BP: 121/89 (24 May 2020 11:00) (108/76 - 143/97)  BP(mean): --  RR: 18 (24 May 2020 11:00) (17 - 18)  SpO2: 96% (24 May 2020 11:00) (94% - 96%)            LABS:                        15.1   6.04  )-----------( 230      ( 24 May 2020 08:06 )             44.8         137  |  103  |  8   ----------------------------<  101<H>  4.0   |  28  |  1.27    Ca    8.8      24 May 2020 08:06    TPro  6.9  /  Alb  3.5  /  TBili  0.8  /  DBili  .12  /  AST  22  /  ALT  22  /  AlkPhos  72  -24    PT/INR - ( 24 May 2020 08:06 )   PT: 11.2 sec;   INR: 1.01 ratio           Urinalysis Basic - ( 22 May 2020 21:27 )    Color: Yellow / Appearance: Clear / S.005 / pH: x  Gluc: x / Ketone: Negative  / Bili: Negative / Urobili: Negative mg/dL   Blood: x / Protein: Negative mg/dL / Nitrite: Negative   Leuk Esterase: Small / RBC: x / WBC 0-2   Sq Epi: x / Non Sq Epi: x / Bacteria: x            WBC:  WBC Count: 6.04 K/uL ( @ 08:06)  WBC Count: 8.09 K/uL ( @ 21:10)      MICROBIOLOGY:  RECENT CULTURES:        CARDIAC MARKERS ( 24 May 2020 08:06 )  x     / x     / 108 U/L / x     / x      CARDIAC MARKERS ( 22 May 2020 21:10 )  <.015 ng/mL / x     / x     / x     / x            PT/INR - ( 24 May 2020 08:06 )   PT: 11.2 sec;   INR: 1.01 ratio             Sodium:  Sodium, Serum: 137 mmol/L ( @ 08:06)  Sodium, Serum: 140 mmol/L ( 21:10)      1.27 mg/dL  @ 08:06  1.08 mg/dL  @ 21:10      Hemoglobin:  Hemoglobin: 15.1 g/dL ( @ 08:06)  Hemoglobin: 14.8 g/dL ( @ 21:10)      Platelets: Platelet Count - Automated: 230 K/uL ( @ 08:06)  Platelet Count - Automated: 260 K/uL ( @ 21:10)      LIVER FUNCTIONS - ( 24 May 2020 08:06 )  Alb: 3.5 g/dL / Pro: 6.9 gm/dL / ALK PHOS: 72 U/L / ALT: 22 U/L / AST: 22 U/L / GGT: x             Urinalysis Basic - ( 22 May 2020 21:27 )    Color: Yellow / Appearance: Clear / S.005 / pH: x  Gluc: x / Ketone: Negative  / Bili: Negative / Urobili: Negative mg/dL   Blood: x / Protein: Negative mg/dL / Nitrite: Negative   Leuk Esterase: Small / RBC: x / WBC 0-2   Sq Epi: x / Non Sq Epi: x / Bacteria: x        RADIOLOGY & ADDITIONAL STUDIES:

## 2020-05-25 LAB
NIGHT BLUE STAIN TISS: SIGNIFICANT CHANGE UP
SPECIMEN SOURCE: SIGNIFICANT CHANGE UP

## 2020-05-25 PROCEDURE — 99232 SBSQ HOSP IP/OBS MODERATE 35: CPT

## 2020-05-25 PROCEDURE — 99233 SBSQ HOSP IP/OBS HIGH 50: CPT

## 2020-05-25 RX ORDER — MIDODRINE HYDROCHLORIDE 2.5 MG/1
10 TABLET ORAL THREE TIMES A DAY
Refills: 0 | Status: DISCONTINUED | OUTPATIENT
Start: 2020-05-25 | End: 2020-05-29

## 2020-05-25 RX ORDER — MESALAMINE 400 MG
1000 TABLET, DELAYED RELEASE (ENTERIC COATED) ORAL
Refills: 0 | Status: DISCONTINUED | OUTPATIENT
Start: 2020-05-25 | End: 2020-05-29

## 2020-05-25 RX ORDER — TAMSULOSIN HYDROCHLORIDE 0.4 MG/1
0.4 CAPSULE ORAL DAILY
Refills: 0 | Status: DISCONTINUED | OUTPATIENT
Start: 2020-05-25 | End: 2020-05-29

## 2020-05-25 RX ADMIN — Medication 1000 MILLIGRAM(S): at 17:38

## 2020-05-25 RX ADMIN — Medication 5 MILLIGRAM(S): at 06:00

## 2020-05-25 RX ADMIN — POLYETHYLENE GLYCOL 3350 17 GRAM(S): 17 POWDER, FOR SOLUTION ORAL at 05:59

## 2020-05-25 RX ADMIN — MIDODRINE HYDROCHLORIDE 10 MILLIGRAM(S): 2.5 TABLET ORAL at 06:11

## 2020-05-25 RX ADMIN — MIDODRINE HYDROCHLORIDE 10 MILLIGRAM(S): 2.5 TABLET ORAL at 17:38

## 2020-05-25 RX ADMIN — Medication 15 MILLIGRAM(S): at 14:30

## 2020-05-25 RX ADMIN — Medication 10 MILLIGRAM(S): at 06:11

## 2020-05-25 RX ADMIN — PANTOPRAZOLE SODIUM 40 MILLIGRAM(S): 20 TABLET, DELAYED RELEASE ORAL at 06:12

## 2020-05-25 RX ADMIN — Medication 1 MILLIGRAM(S): at 06:01

## 2020-05-25 RX ADMIN — Medication 1 MILLIGRAM(S): at 17:38

## 2020-05-25 RX ADMIN — TIOTROPIUM BROMIDE 1 CAPSULE(S): 18 CAPSULE ORAL; RESPIRATORY (INHALATION) at 11:51

## 2020-05-25 RX ADMIN — Medication 1000 MILLIGRAM(S): at 05:59

## 2020-05-25 RX ADMIN — Medication 10 MILLIGRAM(S): at 06:00

## 2020-05-25 RX ADMIN — TAMSULOSIN HYDROCHLORIDE 0.4 MILLIGRAM(S): 0.4 CAPSULE ORAL at 11:50

## 2020-05-25 RX ADMIN — Medication 15 MILLIGRAM(S): at 21:21

## 2020-05-25 RX ADMIN — Medication 10 MILLIGRAM(S): at 16:43

## 2020-05-25 RX ADMIN — ONDANSETRON 4 MILLIGRAM(S): 8 TABLET, FILM COATED ORAL at 21:32

## 2020-05-25 RX ADMIN — MIDODRINE HYDROCHLORIDE 10 MILLIGRAM(S): 2.5 TABLET ORAL at 12:47

## 2020-05-25 RX ADMIN — Medication 5 MILLIGRAM(S): at 17:38

## 2020-05-25 NOTE — PROGRESS NOTE ADULT - ASSESSMENT
Patient is a 49M with a PMH of Barretts Disease, Crohn's on Pentasya, IBS, spiculated lung mass who presents to worsening dyspnea on exertion and abdominal pain.  Patient states his breathing has been worsening over the last two weeks but denies hx of cough, fever, chills.  Has been following Pulmonary with Dr Ileana Disla.  Patient also states that he has had abdominal pain with poor PO intake for 5 days.  States he has had no BM for the last 5 days as well.  Follows with GI, and had a tele visit with Dr Russ 3 days ago who told him to present to the ED if his abdominal pain persisted.  Admitted to [facility] [place] in 01/2020 - had biopsy that showed necrotizing granulomas in the lung.  Vitals stable.  Labs benign.  CT chest and abdomen negative for acute disease.  Will admit to floor for futher eval.      IMPROVE VTE Individual Risk Assessment          RISK                                                          Points  [  ] Previous VTE                                                3  [  ] Thrombophilia                                             2  [  ] Lower limb paralysis                                    2        (unable to hold up >15 seconds)    [  ] Current Cancer                                             2         (within 6 months)  [  ] Immobilization > 24 hrs                              1  [  ] ICU/CCU stay > 24 hours                            1  [  ] Age > 60                                                    1    IMPROVE VTE Score - 1

## 2020-05-25 NOTE — PROGRESS NOTE ADULT - PROBLEM SELECTOR PLAN 3
Neoplasms vs. TB   CT chest: 3.8 x 1.9 cm spiculated right upper lobe mass with central cavitation   Thoracic sx, ID and Pulmonary consult appreciated.   Previous QuantiFeron negative, AFB sputum neg X2 now  Cavity lesion biopsy significant for Necrotizing epithelioid granulomatous inflammation  will get repeat CT chest   Pt to go for possible VATS, lesion can be cultured after resection Neoplasms vs. TB   CT chest (5/19/20): 3.8 x 1.9 cm spiculated right upper lobe mass with central cavitation   Thoracic sx, ID and Pulmonary consult appreciated.   Previous QuantiFeron negative, AFB sputum neg X2 now  Cavity lesion biopsy significant for Necrotizing epithelioid granulomatous inflammation  Pt to go for possible VATS w/ thoracic sx, lesion can be cultured after resection

## 2020-05-25 NOTE — PROGRESS NOTE ADULT - ASSESSMENT
lung mass with cavitation   no corrobrating data that this is tb   esr 2   qgtb NEGATIVE   WOULD NOT START  TB DRUGS WITHOUT A DIAGNOSIS   CONSIDER RESECTION

## 2020-05-25 NOTE — PROGRESS NOTE ADULT - SUBJECTIVE AND OBJECTIVE BOX
JEOVANY ACOSTA    S 2C 242 I    Patient is a 49y old  Male who presents with a chief complaint of abdominal pain, dyspnea (24 May 2020 15:10)       Allergies    adhesives (Rash)  No Known Drug Allergies    Intolerances        HPI:  Patient is a 49M with a PMH of Barretts Disease, Crohn's on Pentasa IBS, spiculated lung mass who presents to worsening dyspnea on exertion and abdominal pain.  Patient states his breathing has been worsening over the last two weeks but denies hx of cough, fever, chills.  Has been following Pulmonary with Dr Ileana Disla.  Patient also states that he has had abdominal pain with poor PO intake for 5 days.  States he has had no BM for the last 5 days as well.  Follows with GI, and had a tele visit with Dr Russ 3 days ago who told him to present to the ED if his abdominal pain persisted.  Admitted to [facility] in 01/2020 - had biopsy that showed necrotizing granulomas in the lung.  Vitals stable.  Labs benign.  CT chest and abdomen negative for acute disease.  Will admit to floor for further eval. (23 May 2020 01:31)      PAST MEDICAL & SURGICAL HISTORY:  Alfaro's esophagus without dysplasia  Depression, unspecified depression type  Crohn's disease with complication, unspecified gastrointestinal tract location  ACL (anterior cruciate ligament) tear      FAMILY HISTORY:  Family history of diabetes mellitus in maternal grandmother (Mother, Grandparent)  Family history of COPD (chronic obstructive pulmonary disease) (Father)  Family history of colon cancer in father (Father)  Family history of hypertension in father (Father, Mother, Grandparent)        MEDICATIONS   acetaminophen   Tablet .. 650 milliGRAM(s) Oral every 6 hours PRN  busPIRone 15 milliGRAM(s) Oral three times a day  dicyclomine 10 milliGRAM(s) Oral two times a day before meals  enoxaparin Injectable 40 milliGRAM(s) SubCutaneous daily  lactulose Syrup 10 Gram(s) Oral two times a day PRN  mesalamine ER Capsule 1000 milliGRAM(s) Oral two times a day with meals  midodrine. 10 milliGRAM(s) Oral three times a day  nicotine  14 mG/24 Hr(s) Transdermal Patch - Peds 1 Patch Transdermal daily  ondansetron Injectable 4 milliGRAM(s) IV Push every 6 hours PRN  oxybutynin 5 milliGRAM(s) Oral two times a day  pantoprazole    Tablet 40 milliGRAM(s) Oral before breakfast  polyethylene glycol 3350 17 Gram(s) Oral two times a day  tamsulosin 0.4 milliGRAM(s) Oral daily  tiotropium 18 MICROgram(s) Capsule 1 Capsule(s) Inhalation daily  varenicline 1 milliGRAM(s) Oral every 12 hours      Vital Signs Last 24 Hrs  T(C): 36.9 (25 May 2020 13:01), Max: 37.2 (24 May 2020 16:48)  T(F): 98.4 (25 May 2020 13:01), Max: 99 (24 May 2020 16:48)  HR: 65 (25 May 2020 13:01) (56 - 68)  BP: 122/58 (25 May 2020 13:01) (111/63 - 122/58)  BP(mean): --  RR: 16 (25 May 2020 05:35) (16 - 18)  SpO2: 97% (25 May 2020 05:35) (95% - 97%)      05-24-20 @ 07:01  -  05-25-20 @ 07:00  --------------------------------------------------------  IN: 240 mL / OUT: 500 mL / NET: -260 mL            LABS:                        15.1   6.04  )-----------( 230      ( 24 May 2020 08:06 )             44.8     05-24    137  |  103  |  8   ----------------------------<  101<H>  4.0   |  28  |  1.27    Ca    8.8      24 May 2020 08:06    TPro  6.9  /  Alb  3.5  /  TBili  0.8  /  DBili  .12  /  AST  22  /  ALT  22  /  AlkPhos  72  05-24    PT/INR - ( 24 May 2020 08:06 )   PT: 11.2 sec;   INR: 1.01 ratio                   WBC:  WBC Count: 6.04 K/uL (05-24 @ 08:06)  WBC Count: 8.09 K/uL (05-22 @ 21:10)      MICROBIOLOGY:  RECENT CULTURES:  05-25 .Sputum Sputum XXXX XXXX XXXX          CARDIAC MARKERS ( 24 May 2020 08:06 )  x     / x     / 108 U/L / x     / x            PT/INR - ( 24 May 2020 08:06 )   PT: 11.2 sec;   INR: 1.01 ratio             Sodium:  Sodium, Serum: 137 mmol/L (05-24 @ 08:06)  Sodium, Serum: 140 mmol/L (05-22 @ 21:10)      1.27 mg/dL 05-24 @ 08:06  1.08 mg/dL 05-22 @ 21:10      Hemoglobin:  Hemoglobin: 15.1 g/dL (05-24 @ 08:06)  Hemoglobin: 14.8 g/dL (05-22 @ 21:10)      Platelets: Platelet Count - Automated: 230 K/uL (05-24 @ 08:06)  Platelet Count - Automated: 260 K/uL (05-22 @ 21:10)      LIVER FUNCTIONS - ( 24 May 2020 08:06 )  Alb: 3.5 g/dL / Pro: 6.9 gm/dL / ALK PHOS: 72 U/L / ALT: 22 U/L / AST: 22 U/L / GGT: x                 RADIOLOGY & ADDITIONAL STUDIES:

## 2020-05-25 NOTE — PROGRESS NOTE ADULT - SUBJECTIVE AND OBJECTIVE BOX
HPI:  Patient is a 49M with a PMH of Barretts Disease, Crohn's on Pentasa IBS, spiculated lung mass who presents to worsening dyspnea on exertion and abdominal pain.  Patient states his breathing has been worsening over the last two weeks but denies hx of cough, fever, chills.  Has been following Pulmonary with Dr Ileana Disla.  Patient also states that he has had abdominal pain with poor PO intake for 5 days.  States he has had no BM for the last 5 days as well.  Follows with GI, and had a tele visit with Dr Russ 3 days ago who told him to present to the ED if his abdominal pain persisted.  Admitted to [facility] in 01/2020 - had biopsy that showed necrotizing granulomas in the lung.  Vitals stable.  Labs benign.  CT chest and abdomen negative for acute disease.  Will admit to floor for further eval. (23 May 2020 01:31)      Allergies    adhesives (Rash)  No Known Drug Allergies    Intolerances        MEDICATIONS  (STANDING):  busPIRone 15 milliGRAM(s) Oral three times a day  dicyclomine 10 milliGRAM(s) Oral two times a day before meals  enoxaparin Injectable 40 milliGRAM(s) SubCutaneous daily  mesalamine ER Capsule 1000 milliGRAM(s) Oral two times a day with meals  midodrine. 10 milliGRAM(s) Oral three times a day  nicotine  14 mG/24 Hr(s) Transdermal Patch - Peds 1 Patch Transdermal daily  oxybutynin 5 milliGRAM(s) Oral two times a day  pantoprazole    Tablet 40 milliGRAM(s) Oral before breakfast  polyethylene glycol 3350 17 Gram(s) Oral two times a day  tamsulosin 0.4 milliGRAM(s) Oral daily  tiotropium 18 MICROgram(s) Capsule 1 Capsule(s) Inhalation daily  varenicline 1 milliGRAM(s) Oral every 12 hours    MEDICATIONS  (PRN):  acetaminophen   Tablet .. 650 milliGRAM(s) Oral every 6 hours PRN Temp greater or equal to 38C (100.4F), Mild Pain (1 - 3)  lactulose Syrup 10 Gram(s) Oral two times a day PRN constipation  ondansetron Injectable 4 milliGRAM(s) IV Push every 6 hours PRN Nausea and/or Vomiting      REVIEW OF SYSTEMS:    CONSTITUTIONAL: No fever, chills, weight loss, or fatigue  HEENT: No sore throat, runny nose, ear ache  RESPIRATORY: No cough, wheezing, No shortness of breath  CARDIOVASCULAR: No chest pain, palpitations, dizziness  GASTROINTESTINAL: No abdominal pain. No nausea, vomiting, diarrhea  GENITOURINARY: No dysuria, increase frequency, hematuria, or incontinence  NEUROLOGICAL: No headaches, memory loss, loss of strength, numbness, or tremors, no weakness  EXTREMITY: No pedal edema BLE  SKIN: No itching, burning, rashes, or lesions     VITAL SIGNS:  T(C): 36.7 (05-25-20 @ 16:51), Max: 36.9 (05-25-20 @ 13:01)  T(F): 98 (05-25-20 @ 16:51), Max: 98.4 (05-25-20 @ 13:01)  HR: 82 (05-25-20 @ 16:51) (56 - 82)  BP: 114/87 (05-25-20 @ 16:51) (111/63 - 122/58)  RR: 16 (05-25-20 @ 16:51) (16 - 16)  SpO2: 98% (05-25-20 @ 16:51) (97% - 98%)  Wt(kg): --    PHYSICAL EXAM:    GENERAL: not in any distress  HEENT: Neck is supple, normocephalic, atraumatic   CHEST/LUNG: Clear to auscultation bilaterally; No rales, rhonchi, wheezing  HEART: Regular rate and rhythm; No murmurs, rubs, or gallops  ABDOMEN: Soft, Nontender, Nondistended; Bowel sounds present, no rebound   EXTREMITIES:  2+ Peripheral Pulses, No clubbing, cyanosis, or edema  GENITOURINARY:   SKIN: No rashes or lesions  BACK: no pressor sore   NERVOUS SYSTEM:  Alert & Oriented X3, Good concentration  PSYCH: normal affect     LABS:                         15.1   6.04  )-----------( 230      ( 24 May 2020 08:06 )             44.8     05-24    137  |  103  |  8   ----------------------------<  101<H>  4.0   |  28  |  1.27    Ca    8.8      24 May 2020 08:06    TPro  6.9  /  Alb  3.5  /  TBili  0.8  /  DBili  .12  /  AST  22  /  ALT  22  /  AlkPhos  72  05-24    LIVER FUNCTIONS - ( 24 May 2020 08:06 )  Alb: 3.5 g/dL / Pro: 6.9 gm/dL / ALK PHOS: 72 U/L / ALT: 22 U/L / AST: 22 U/L / GGT: x           PT/INR - ( 24 May 2020 08:06 )   PT: 11.2 sec;   INR: 1.01 ratio               CARDIAC MARKERS ( 24 May 2020 08:06 )  x     / x     / 108 U/L / x     / x              Sedimentation Rate, Erythrocyte: 2 mm/hr (05-24 @ 08:06)                          Radiology: HPI:  Patient is a 49M with a PMH of Barretts Disease, Crohn's on Pentasa IBS, spiculated lung mass who presents to worsening dyspnea on exertion and abdominal pain.  Patient states his breathing has been worsening over the last two weeks but denies hx of cough, fever, chills.  Has been following Pulmonary with Dr Ileana Disla.  Patient also states that he has had abdominal pain with poor PO intake for 5 days.  States he has had no BM for the last 5 days as well.  Follows with GI, and had a tele visit with Dr Russ 3 days ago who told him to present to the ED if his abdominal pain persisted.  Admitted to [facility] in 01/2020 - had biopsy that showed necrotizing granulomas in the lung.  Vitals stable.  Labs benign.  CT chest and abdomen negative for acute disease.  Will admit to floor for further eval. (23 May 2020 01:31)  discussed with dr vasquez yesterday   pulm wants to start tb drugs   but esr 2; qgtb NEGATIVE jan 12     Allergies    adhesives (Rash)  No Known Drug Allergies    Intolerances        MEDICATIONS  (STANDING):  busPIRone 15 milliGRAM(s) Oral three times a day  dicyclomine 10 milliGRAM(s) Oral two times a day before meals  enoxaparin Injectable 40 milliGRAM(s) SubCutaneous daily  mesalamine ER Capsule 1000 milliGRAM(s) Oral two times a day with meals  midodrine. 10 milliGRAM(s) Oral three times a day  nicotine  14 mG/24 Hr(s) Transdermal Patch - Peds 1 Patch Transdermal daily  oxybutynin 5 milliGRAM(s) Oral two times a day  pantoprazole    Tablet 40 milliGRAM(s) Oral before breakfast  polyethylene glycol 3350 17 Gram(s) Oral two times a day  tamsulosin 0.4 milliGRAM(s) Oral daily  tiotropium 18 MICROgram(s) Capsule 1 Capsule(s) Inhalation daily  varenicline 1 milliGRAM(s) Oral every 12 hours    MEDICATIONS  (PRN):  acetaminophen   Tablet .. 650 milliGRAM(s) Oral every 6 hours PRN Temp greater or equal to 38C (100.4F), Mild Pain (1 - 3)  lactulose Syrup 10 Gram(s) Oral two times a day PRN constipation  ondansetron Injectable 4 milliGRAM(s) IV Push every 6 hours PRN Nausea and/or Vomiting      REVIEW OF SYSTEMS:    CONSTITUTIONAL: No fever, chills,  has  weight loss, and  fatigue  HEENT: No sore throat, runny nose, ear ache  RESPIRATORY: pos  cough,   no wheezing, No shortness of breath  CARDIOVASCULAR: No chest pain, palpitations, dizziness  GASTROINTESTINAL: No abdominal pain. No nausea, vomiting, diarrhea  GENITOURINARY: No dysuria, increase frequency, hematuria, or incontinence  NEUROLOGICAL: No headaches, memory loss, loss of strength, numbness, or tremors, no weakness  EXTREMITY: No pedal edema BLE  SKIN: No itching, burning, rashes, or lesions     VITAL SIGNS:  T(C): 36.7 (05-25-20 @ 16:51), Max: 36.9 (05-25-20 @ 13:01)  T(F): 98 (05-25-20 @ 16:51), Max: 98.4 (05-25-20 @ 13:01)  HR: 82 (05-25-20 @ 16:51) (56 - 82)  BP: 114/87 (05-25-20 @ 16:51) (111/63 - 122/58)  RR: 16 (05-25-20 @ 16:51) (16 - 16)  SpO2: 98% (05-25-20 @ 16:51) (97% - 98%)  Wt(kg): --    PHYSICAL EXAM:    GENERAL: not in any distress  HEENT: Neck is supple, normocephalic, atraumatic   CHEST/LUNG: Clear to auscultation bilaterally; No rales, rhonchi, wheezing  HEART: Regular rate and rhythm; No murmurs, rubs, or gallops  ABDOMEN: Soft, Nontender, Nondistended; Bowel sounds present, no rebound   EXTREMITIES:  2+ Peripheral Pulses, No clubbing, cyanosis, or edema  SKIN: No rashes or lesions  BACK: no pressor sore   NERVOUS SYSTEM:  Alert & Oriented X3, Good concentration  PSYCH: normal affect     LABS:                         15.1   6.04  )-----------( 230      ( 24 May 2020 08:06 )             44.8     05-24    137  |  103  |  8   ----------------------------<  101<H>  4.0   |  28  |  1.27    Ca    8.8      24 May 2020 08:06    TPro  6.9  /  Alb  3.5  /  TBili  0.8  /  DBili  .12  /  AST  22  /  ALT  22  /  AlkPhos  72  05-24    LIVER FUNCTIONS - ( 24 May 2020 08:06 )  Alb: 3.5 g/dL / Pro: 6.9 gm/dL / ALK PHOS: 72 U/L / ALT: 22 U/L / AST: 22 U/L / GGT: x           PT/INR - ( 24 May 2020 08:06 )   PT: 11.2 sec;   INR: 1.01 ratio               CARDIAC MARKERS ( 24 May 2020 08:06 )  x     / x     / 108 U/L / x     / x              Sedimentation Rate, Erythrocyte: 2 mm/hr (05-24 @ 08:06)                          Radiology:

## 2020-05-25 NOTE — PROGRESS NOTE ADULT - PROBLEM SELECTOR PLAN 1
Unclear etiology, CT abdomen appears benign.   Pt insist its Crohn's despite lack of evidence of Crohn's  possible IBS with constipation  GI consult appreciated.   continue with laxative to move bowels   avoid opioids, continue with Protonix and antiemetics.

## 2020-05-25 NOTE — PROGRESS NOTE ADULT - SUBJECTIVE AND OBJECTIVE BOX
Patient is a 49y old  Male who presents with a chief complaint of abdominal pain, dyspnea (25 May 2020 13:17)      INTERVAL HPI/ OVERNIGHT EVENTS: Pt was seen and examined at bedside today, No significant overnight events, pt admits his abdominal pain is improving and was able to have two "small" BM since yesterday. He denies CP, SOB or cough.      MEDICATIONS  (STANDING):  busPIRone 15 milliGRAM(s) Oral three times a day  dicyclomine 10 milliGRAM(s) Oral two times a day before meals  enoxaparin Injectable 40 milliGRAM(s) SubCutaneous daily  mesalamine ER Capsule 1000 milliGRAM(s) Oral two times a day with meals  midodrine. 10 milliGRAM(s) Oral three times a day  nicotine  14 mG/24 Hr(s) Transdermal Patch - Peds 1 Patch Transdermal daily  oxybutynin 5 milliGRAM(s) Oral two times a day  pantoprazole    Tablet 40 milliGRAM(s) Oral before breakfast  polyethylene glycol 3350 17 Gram(s) Oral two times a day  tamsulosin 0.4 milliGRAM(s) Oral daily  tiotropium 18 MICROgram(s) Capsule 1 Capsule(s) Inhalation daily  varenicline 1 milliGRAM(s) Oral every 12 hours    MEDICATIONS  (PRN):  acetaminophen   Tablet .. 650 milliGRAM(s) Oral every 6 hours PRN Temp greater or equal to 38C (100.4F), Mild Pain (1 - 3)  lactulose Syrup 10 Gram(s) Oral two times a day PRN constipation  ondansetron Injectable 4 milliGRAM(s) IV Push every 6 hours PRN Nausea and/or Vomiting      Allergies    adhesives (Rash)  No Known Drug Allergies    Intolerances        REVIEW OF SYSTEMS:    Unable to examine due to [ ] Encephalopathy [ ] Advanced Dementia [ ] Expressive Aphasia [ ] Non-verbal patient    CONSTITUTIONAL: No fever, NO generalized weakness/Fatigue, No weight loss  EYES: No eye pain, visual disturbances, or discharge  ENMT:  No difficulty hearing, tinnitus, vertigo; No sinus or throat pain  NECK: No pain or stiffness  RESPIRATORY: No shortness of breath,  cough, wheezing, sputum or hemoptysis   CARDIOVASCULAR: No chest pain, palpitations, or leg swelling  GASTROINTESTINAL: positive abdominal pain and nausea, no vomiting, diarrhea or constipation. No melena or hematochezia.  GENITOURINARY: No dysuria, frequency, hematuria, or incontinence  NEUROLOGICAL: No headaches, Dizziness, memory loss, loss of strength, numbness, or tremors  SKIN: No itching, burning, rashes, or lesions   MUSCULOSKELETAL: No joint pain or swelling; No muscle, back, or extremity pain  PSYCHIATRIC: No depression, anxiety, mood swings, or difficulty sleeping  HEME/LYMPH: No easy bruising, or bleeding gums        Vital Signs Last 24 Hrs  T(C): 36.9 (25 May 2020 13:01), Max: 37.2 (24 May 2020 16:48)  T(F): 98.4 (25 May 2020 13:01), Max: 99 (24 May 2020 16:48)  HR: 65 (25 May 2020 13:01) (56 - 68)  BP: 122/58 (25 May 2020 13:01) (111/63 - 122/58)  BP(mean): --  RR: 16 (25 May 2020 05:35) (16 - 18)  SpO2: 97% (25 May 2020 05:35) (95% - 97%)    PHYSICAL EXAM:  GENERAL: NAD, well-developed, well-groomed  HEAD:  Atraumatic, Normocephalic  EYES: conjunctiva and sclera clear  ENMT: Moist mucous membranes  NECK: Supple, No JVD, Normal thyroid  CHEST/LUNG: Clear to Auscultation bilaterally; No rales, rhonchi, wheezing, or rubs  HEART: Regular rate and rhythm; No murmurs, rubs, or gallops  ABDOMEN: Soft, Nontender, Nondistended; Bowel sounds present  EXTREMITIES:  2+ Peripheral Pulses, No clubbing, cyanosis, or edema  SKIN: No rashes or lesions  NERVOUS SYSTEM:  Alert & Oriented X3, Good concentration; Motor Strength 5/5 B/L upper and lower extremities    LABS:                        15.1   6.04  )-----------( 230      ( 24 May 2020 08:06 )             44.8     05-24    137  |  103  |  8   ----------------------------<  101<H>  4.0   |  28  |  1.27    Ca    8.8      24 May 2020 08:06    TPro  6.9  /  Alb  3.5  /  TBili  0.8  /  DBili  .12  /  AST  22  /  ALT  22  /  AlkPhos  72  05-24    PT/INR - ( 24 May 2020 08:06 )   PT: 11.2 sec;   INR: 1.01 ratio             CAPILLARY BLOOD GLUCOSE            Culture - Acid Fast - Sputum w/Smear (collected 05-25-20)  Source: .Sputum Sputum        RADIOLOGY & ADDITIONAL TESTS:          Imaging Personally Reviewed:  [ ] YES  [ ] NO    Consultant(s) Notes Reviewed:  [x ] YES  [ ] NO    Care Discussed with Consultants/Other Providers [x ] YES  [ ] NO

## 2020-05-26 ENCOUNTER — TRANSCRIPTION ENCOUNTER (OUTPATIENT)
Age: 50
End: 2020-05-26

## 2020-05-26 LAB
ACE SERPL-CCNC: 31 U/L — SIGNIFICANT CHANGE UP (ref 14–82)
ANION GAP SERPL CALC-SCNC: 6 MMOL/L — SIGNIFICANT CHANGE UP (ref 5–17)
AUTO DIFF PNL BLD: ABNORMAL
BUN SERPL-MCNC: 12 MG/DL — SIGNIFICANT CHANGE UP (ref 7–23)
C-ANCA SER-ACNC: NEGATIVE — SIGNIFICANT CHANGE UP
CALCIUM SERPL-MCNC: 9.2 MG/DL — SIGNIFICANT CHANGE UP (ref 8.5–10.1)
CHLORIDE SERPL-SCNC: 102 MMOL/L — SIGNIFICANT CHANGE UP (ref 96–108)
CO2 SERPL-SCNC: 29 MMOL/L — SIGNIFICANT CHANGE UP (ref 22–31)
CREAT SERPL-MCNC: 1.25 MG/DL — SIGNIFICANT CHANGE UP (ref 0.5–1.3)
DSDNA AB SER-ACNC: <12 IU/ML — SIGNIFICANT CHANGE UP
GAMMA INTERFERON BACKGROUND BLD IA-ACNC: 0.01 IU/ML — SIGNIFICANT CHANGE UP
GLUCOSE SERPL-MCNC: 95 MG/DL — SIGNIFICANT CHANGE UP (ref 70–99)
M TB IFN-G BLD-IMP: NEGATIVE — SIGNIFICANT CHANGE UP
M TB IFN-G CD4+ BCKGRND COR BLD-ACNC: 0 IU/ML — SIGNIFICANT CHANGE UP
M TB IFN-G CD4+CD8+ BCKGRND COR BLD-ACNC: 0 IU/ML — SIGNIFICANT CHANGE UP
P-ANCA SER-ACNC: NEGATIVE — SIGNIFICANT CHANGE UP
POTASSIUM SERPL-MCNC: 4.2 MMOL/L — SIGNIFICANT CHANGE UP (ref 3.5–5.3)
POTASSIUM SERPL-SCNC: 4.2 MMOL/L — SIGNIFICANT CHANGE UP (ref 3.5–5.3)
PROCALCITONIN SERPL-MCNC: 0.04 NG/ML — SIGNIFICANT CHANGE UP (ref 0.02–0.1)
QUANT TB PLUS MITOGEN MINUS NIL: 6.55 IU/ML — SIGNIFICANT CHANGE UP
SODIUM SERPL-SCNC: 137 MMOL/L — SIGNIFICANT CHANGE UP (ref 135–145)

## 2020-05-26 PROCEDURE — 99233 SBSQ HOSP IP/OBS HIGH 50: CPT

## 2020-05-26 PROCEDURE — 99232 SBSQ HOSP IP/OBS MODERATE 35: CPT

## 2020-05-26 RX ORDER — LANOLIN ALCOHOL/MO/W.PET/CERES
3 CREAM (GRAM) TOPICAL ONCE
Refills: 0 | Status: COMPLETED | OUTPATIENT
Start: 2020-05-26 | End: 2020-05-26

## 2020-05-26 RX ADMIN — Medication 15 MILLIGRAM(S): at 05:50

## 2020-05-26 RX ADMIN — MIDODRINE HYDROCHLORIDE 10 MILLIGRAM(S): 2.5 TABLET ORAL at 17:21

## 2020-05-26 RX ADMIN — Medication 1 MILLIGRAM(S): at 05:51

## 2020-05-26 RX ADMIN — Medication 1 MILLIGRAM(S): at 17:21

## 2020-05-26 RX ADMIN — Medication 10 MILLIGRAM(S): at 17:21

## 2020-05-26 RX ADMIN — Medication 15 MILLIGRAM(S): at 13:03

## 2020-05-26 RX ADMIN — POLYETHYLENE GLYCOL 3350 17 GRAM(S): 17 POWDER, FOR SOLUTION ORAL at 05:51

## 2020-05-26 RX ADMIN — Medication 1000 MILLIGRAM(S): at 07:00

## 2020-05-26 RX ADMIN — Medication 1000 MILLIGRAM(S): at 17:21

## 2020-05-26 RX ADMIN — MIDODRINE HYDROCHLORIDE 10 MILLIGRAM(S): 2.5 TABLET ORAL at 05:50

## 2020-05-26 RX ADMIN — PANTOPRAZOLE SODIUM 40 MILLIGRAM(S): 20 TABLET, DELAYED RELEASE ORAL at 06:30

## 2020-05-26 RX ADMIN — Medication 10 MILLIGRAM(S): at 06:28

## 2020-05-26 RX ADMIN — Medication 5 MILLIGRAM(S): at 17:21

## 2020-05-26 RX ADMIN — Medication 3 MILLIGRAM(S): at 23:35

## 2020-05-26 RX ADMIN — TIOTROPIUM BROMIDE 1 CAPSULE(S): 18 CAPSULE ORAL; RESPIRATORY (INHALATION) at 13:37

## 2020-05-26 RX ADMIN — Medication 15 MILLIGRAM(S): at 22:21

## 2020-05-26 RX ADMIN — TAMSULOSIN HYDROCHLORIDE 0.4 MILLIGRAM(S): 0.4 CAPSULE ORAL at 13:03

## 2020-05-26 RX ADMIN — Medication 5 MILLIGRAM(S): at 05:52

## 2020-05-26 NOTE — PROGRESS NOTE ADULT - PROBLEM SELECTOR PLAN 3
Neoplasms vs. TB   CT chest (5/19/20): 3.8 x 1.9 cm spiculated right upper lobe mass with central cavitation   Thoracic sx, ID and Pulmonary consult appreciated.   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 Marley, he will schedule pt for VATS this admission.   continue with Isolation until Lesion can be culture and TB can be ruled out definitively

## 2020-05-26 NOTE — PROGRESS NOTE ADULT - ASSESSMENT
Patient is a 49M with a PMH of Barretts Disease, Crohn's on Pentasya, IBS, spiculated lung mass who presents to worsening dyspnea on exertion and abdominal pain.  Patient states his breathing has been worsening over the last two weeks but denies hx of cough, fever, chills.  Has been following Pulmonary with Dr Ileana Disla.  Patient also states that he has had abdominal pain with poor PO intake for 5 days.  States he has had no BM for the last 5 days as well.  Follows with GI, and had a tele visit with Dr Russ 3 days ago who told him to present to the ED if his abdominal pain persisted.  Admitted to [facility] in 01/2020 - had biopsy that showed necrotizing granulomas in the lung.  Vitals stable.  Labs benign.  CT chest and abdomen negative for acute disease.  Will admit to floor for futher eval.      IMPROVE VTE Individual Risk Assessment          RISK                                                          Points  [  ] Previous VTE                                                3  [  ] Thrombophilia                                             2  [  ] Lower limb paralysis                                    2        (unable to hold up >15 seconds)    [  ] Current Cancer                                             2         (within 6 months)  [  ] Immobilization > 24 hrs                              1  [  ] ICU/CCU stay > 24 hours                            1  [  ] Age > 60                                                    1    IMPROVE VTE Score - 1

## 2020-05-26 NOTE — PROGRESS NOTE ADULT - SUBJECTIVE AND OBJECTIVE BOX
JEOVANY ACOSTA    S [address]C [address]    Patient is a 49y old  Male who presents with a chief complaint of abdominal pain, dyspnea (25 May 2020 19:40)       Allergies    adhesives (Rash)  No Known Drug Allergies    Intolerances        HPI:  Patient is a 49M with a PMH of Barretts Disease, Crohn's on Pentasa IBS, spiculated lung mass who presents to worsening dyspnea on exertion and abdominal pain.  Patient states his breathing has been worsening over the last two weeks but denies hx of cough, fever, chills.  Has been following Pulmonary with Dr Ileana Disla.  Patient also states that he has had abdominal pain with poor PO intake for 5 days.  States he has had no BM for the last 5 days as well.  Follows with GI, and had a tele visit with Dr Russ 3 days ago who told him to present to the ED if his abdominal pain persisted.  Admitted to [facility] in 01/2020 - had biopsy that showed necrotizing granulomas in the lung.  Vitals stable.  Labs benign.  CT chest and abdomen negative for acute disease.  Will admit to floor for further eval. (23 May 2020 01:31)      PAST MEDICAL & SURGICAL HISTORY:  Alfaro's esophagus without dysplasia  Depression, unspecified depression type  Crohn's disease with complication, unspecified gastrointestinal tract location  ACL (anterior cruciate ligament) tear      FAMILY HISTORY:  Family history of diabetes mellitus in maternal grandmother (Mother, Grandparent)  Family history of COPD (chronic obstructive pulmonary disease) (Father)  Family history of colon cancer in father (Father)  Family history of hypertension in father (Father, Mother, Grandparent)        MEDICATIONS   acetaminophen   Tablet .. 650 milliGRAM(s) Oral every 6 hours PRN  aluminum hydroxide/magnesium hydroxide/simethicone Suspension 30 milliLiter(s) Oral every 6 hours PRN  busPIRone 15 milliGRAM(s) Oral three times a day  dicyclomine 10 milliGRAM(s) Oral two times a day before meals  enoxaparin Injectable 40 milliGRAM(s) SubCutaneous daily  lactulose Syrup 10 Gram(s) Oral two times a day PRN  mesalamine ER Capsule 1000 milliGRAM(s) Oral two times a day with meals  midodrine. 10 milliGRAM(s) Oral three times a day  nicotine  14 mG/24 Hr(s) Transdermal Patch - Peds 1 Patch Transdermal daily  ondansetron Injectable 4 milliGRAM(s) IV Push every 6 hours PRN  oxybutynin 5 milliGRAM(s) Oral two times a day  pantoprazole    Tablet 40 milliGRAM(s) Oral before breakfast  tamsulosin 0.4 milliGRAM(s) Oral daily  tiotropium 18 MICROgram(s) Capsule 1 Capsule(s) Inhalation daily  varenicline 1 milliGRAM(s) Oral every 12 hours      Vital Signs Last 24 Hrs  T(C): 37.1 (26 May 2020 12:20), Max: 37.1 (26 May 2020 12:20)  T(F): 98.7 (26 May 2020 12:20), Max: 98.7 (26 May 2020 12:20)  HR: 89 (26 May 2020 12:20) (59 - 89)  BP: 127/84 (26 May 2020 12:20) (102/74 - 127/84)  BP(mean): --  RR: 18 (26 May 2020 12:20) (16 - 18)  SpO2: 96% (26 May 2020 12:20) (96% - 98%)      05-25-20 @ 07:01  -  05-26-20 @ 07:00  --------------------------------------------------------  IN: 260 mL / OUT: 0 mL / NET: 260 mL            LABS:    05-26    137  |  102  |  12  ----------------------------<  95  4.2   |  29  |  1.25    Ca    9.2      26 May 2020 06:18                WBC:  WBC Count: 6.04 K/uL (05-24 @ 08:06)  WBC Count: 8.09 K/uL (05-22 @ 21:10)      MICROBIOLOGY:  RECENT CULTURES:  05-25 .Sputum Sputum XXXX [id number] [id number]                    Sodium:  Sodium, Serum: 137 mmol/L (05-26 @ 06:18)  Sodium, Serum: 137 mmol/L (05-24 @ 08:06)  Sodium, Serum: 140 mmol/L (05-22 @ 21:10)      1.25 mg/dL 05-26 @ 06:18  1.27 mg/dL 05-24 @ 08:06  1.08 mg/dL 05-22 @ 21:10      Hemoglobin:  Hemoglobin: 15.1 g/dL (05-24 @ 08:06)  Hemoglobin: 14.8 g/dL (05-22 @ 21:10)      Platelets: Platelet Count - Automated: 230 K/uL (05-24 @ 08:06)  Platelet Count - Automated: 260 K/uL (05-22 @ 21:10)              RADIOLOGY & ADDITIONAL STUDIES:

## 2020-05-26 NOTE — PROGRESS NOTE ADULT - SUBJECTIVE AND OBJECTIVE BOX
Patient is a 49y old  Male who presents with a chief complaint of abdominal pain, dyspnea (26 May 2020 14:00)      INTERVAL HPI/ OVERNIGHT EVENTS: Pt was seen and examined at bedside today, No significant overnight events, pt continues to have intermittent abdominal pain, cont to have BM with laxatives.      MEDICATIONS  (STANDING):  busPIRone 15 milliGRAM(s) Oral three times a day  dicyclomine 10 milliGRAM(s) Oral two times a day before meals  enoxaparin Injectable 40 milliGRAM(s) SubCutaneous daily  mesalamine ER Capsule 1000 milliGRAM(s) Oral two times a day with meals  midodrine. 10 milliGRAM(s) Oral three times a day  nicotine  14 mG/24 Hr(s) Transdermal Patch - Peds 1 Patch Transdermal daily  oxybutynin 5 milliGRAM(s) Oral two times a day  pantoprazole    Tablet 40 milliGRAM(s) Oral before breakfast  tamsulosin 0.4 milliGRAM(s) Oral daily  tiotropium 18 MICROgram(s) Capsule 1 Capsule(s) Inhalation daily  varenicline 1 milliGRAM(s) Oral every 12 hours    MEDICATIONS  (PRN):  acetaminophen   Tablet .. 650 milliGRAM(s) Oral every 6 hours PRN Temp greater or equal to 38C (100.4F), Mild Pain (1 - 3)  aluminum hydroxide/magnesium hydroxide/simethicone Suspension 30 milliLiter(s) Oral every 6 hours PRN Dyspepsia  lactulose Syrup 10 Gram(s) Oral two times a day PRN constipation  ondansetron Injectable 4 milliGRAM(s) IV Push every 6 hours PRN Nausea and/or Vomiting      Allergies    adhesives (Rash)  No Known Drug Allergies    Intolerances        REVIEW OF SYSTEMS:    Unable to examine due to [ ] Encephalopathy [ ] Advanced Dementia [ ] Expressive Aphasia [ ] Non-verbal patient    CONSTITUTIONAL: No fever, NO generalized weakness/Fatigue, No weight loss  EYES: No eye pain, visual disturbances, or discharge  ENMT:  No difficulty hearing, tinnitus, vertigo; No sinus or throat pain  NECK: No pain or stiffness  RESPIRATORY: No shortness of breath,  cough, wheezing, sputum or hemoptysis   CARDIOVASCULAR: No chest pain, palpitations, or leg swelling  GASTROINTESTINAL: positive abdominal pain and nausea, no vomiting, diarrhea or constipation. No melena or hematochezia.  GENITOURINARY: No dysuria, frequency, hematuria, or incontinence  NEUROLOGICAL: No headaches, Dizziness, memory loss, loss of strength, numbness, or tremors  SKIN: No itching, burning, rashes, or lesions   MUSCULOSKELETAL: No joint pain or swelling; No muscle, back, or extremity pain  PSYCHIATRIC: No depression, anxiety, mood swings, or difficulty sleeping  HEME/LYMPH: No easy bruising, or bleeding gums      Vital Signs Last 24 Hrs  T(C): 37.1 (26 May 2020 12:20), Max: 37.1 (26 May 2020 12:20)  T(F): 98.7 (26 May 2020 12:20), Max: 98.7 (26 May 2020 12:20)  HR: 89 (26 May 2020 12:20) (59 - 89)  BP: 127/84 (26 May 2020 12:20) (102/74 - 127/84)  BP(mean): --  RR: 18 (26 May 2020 12:20) (16 - 18)  SpO2: 96% (26 May 2020 12:20) (96% - 98%)    PHYSICAL EXAM:  GENERAL: NAD, well-developed, well-groomed  HEAD:  Atraumatic, Normocephalic  EYES: conjunctiva and sclera clear  ENMT: Moist mucous membranes  NECK: Supple, No JVD, Normal thyroid  CHEST/LUNG: Clear to Auscultation bilaterally; No rales, rhonchi, wheezing, or rubs  HEART: Regular rate and rhythm; No murmurs, rubs, or gallops  ABDOMEN: Soft, Nontender, Nondistended; Bowel sounds present  EXTREMITIES:  2+ Peripheral Pulses, No clubbing, cyanosis, or edema  SKIN: No rashes or lesions  NERVOUS SYSTEM:  Alert & Oriented X3, Good concentration; Motor Strength 5/5 B/L upper and lower extremities    LABS:    05-26    137  |  102  |  12  ----------------------------<  95  4.2   |  29  |  1.25    Ca    9.2      26 May 2020 06:18          CAPILLARY BLOOD GLUCOSE            Culture - Acid Fast - Sputum w/Smear (collected 05-25-20)  Source: .Sputum Sputum        RADIOLOGY & ADDITIONAL TESTS:          Imaging Personally Reviewed:  [ ] YES  [ ] NO    Consultant(s) Notes Reviewed:  [x ] YES  [ ] NO    Care Discussed with Consultants/Other Providers [x ] YES  [ ] NO

## 2020-05-26 NOTE — PROGRESS NOTE ADULT - ASSESSMENT
cont rx DAVE THAYER [facility]  [phone]  1970 DOA 2020 DR LYDIA FRANCISCO           REVIEW OF SYMPTOMS      Able to give ROS  Yes     RELIABLE No   CONSTITUTIONAL Weakness Yes  Chills No Vision changes No  ENDOCRINE No unexplained hair loss No heat or cold intolerance    ALLERGY No hives  Sore throat No   RESP Coughing blood no  Shortness of breath YES   NEURO No Headache  Confusion Pain neck No   CARDIAC No Chest pain No Palpitations   GI No Pain abdomen NO   Vomiting NO     PHYSICAL EXAM    HEENT Unremarkable PERRLA atraumatic   RESP Fair air entry EXP prolonged    Harsh breath sound Resp distres mild   CARDIAC S1 S2 No S3     NO JVD    ABDOMEN SOFT BS PRESENT NOT DISTENDED No hepatosplenomegaly PEDAL EDEMA present No calf tenderness  NO rash   GENERAL Not TOXIC looking    OXYGENATION      -2020 ra 96% - ra 97%    VITALS/LABS   2020 afeb 81 119/70   2020 Na 137 K 4.2 Cr 1.2     PT DATA/BEST PRACTICE  ALLERGY       adhesives               WT        64 (2020)                             BMI         20 (2020)                    CrCl                                  ADVANCED DIRECTIVE     HEAD OF BED ELEVATION Yes      INFECTION PPLX     DVT PROPHYLAXIS             Lvnx 40 ()    MCCANN PROPHYLAXIS    protonix 40 ()         DYSPHAGIA EVAL     DIET       reg ()                                                                   IV F    PATIENT DATA/ASSESSMENT/RECOMMENDATIONS     RO TB RO CA  49 m who has liven in homeless shelter in past and has trevelled to [place] in past has rul cavitary lesion at least since 2020   His HIV test was negative    His cavitary rul lesion  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    ct showed persistent 3.8 x 1.7 cm rul spiculated mass with cavity    afb smear N-nicole    QFT N-nicole   2020 ace and ds dna were N-nicole   Being smoker he is also at risk for lung ca   ID eval dated  noted   ID recommends to NOT start anti TB Rx based on available information   Given the above data TB as well as cancer still need to be definitively excluded   Agree with  excision of lesion to exclude cancer  PULMONARY CLEARANCE FOR PLANNED LUNG MASS RESECTION  DW DR Laura 2020  Patient is in optimal state and is cleared form Pulmonary standpoint   Need resection to exclude cancer or TB   Recommend clearance also by Infection disease     TIME SPENT Over 25 minutes aggregate care time spent on encounter; activities included   direct pt care, counseling and/or coordinating care reviewing notes, lab data/ imaging , discussion with multidisciplinary team/ pt /family. Risks, benefits, alternatives  discussed in detail.    DAVE THAYER [facility]J  [phone]  1970 DOA 2020 DR LYDIA FRANCISCO

## 2020-05-26 NOTE — PROGRESS NOTE ADULT - PROBLEM SELECTOR PLAN 2
GI consult appreciated   no evidence to support Crohn's disease, including previous EGD/Colonoscopy    CT Abd: no acute changes  cont w/ Pentasa   pt may need outpatient Pill endoscopy with GI

## 2020-05-26 NOTE — PROGRESS NOTE ADULT - ASSESSMENT
Lung mass with cavitation   no corroborating data that this is tb     qgtb NEGATIVE on 1/2020  no othetr sign of TB   low esr   WOULD NOT START  TB DRUGS WITHOUT A DIAGNOSIS   CONSIDER RESECTION     case discussed with primary team and patient

## 2020-05-26 NOTE — PROGRESS NOTE ADULT - SUBJECTIVE AND OBJECTIVE BOX
HPI:  Patient is a 49M with a PMH of Barretts Disease, Crohn's on Pentasa IBS, spiculated lung mass who presents to worsening dyspnea on exertion and abdominal pain.  Patient states his breathing has been worsening over the last two weeks but denies hx of cough, fever, chills.  Has been following Pulmonary with Dr Ileana Disla.  Patient also states that he has had abdominal pain with poor PO intake for 5 days.  States he has had no BM for the last 5 days as well.  Follows with GI, and had a tele visit with Dr Russ 3 days ago who told him to present to the ED if his abdominal pain persisted.  Admitted to [facility] in 01/2020 - had biopsy that showed necrotizing granulomas in the lung.  Vitals stable.  Labs benign.  CT chest and abdomen negative for acute disease.  Will admit to floor for further eval. (23 May 2020 01:31)      Allergies    adhesives (Rash)  No Known Drug Allergies    Intolerances        MEDICATIONS  (STANDING):  busPIRone 15 milliGRAM(s) Oral three times a day  dicyclomine 10 milliGRAM(s) Oral two times a day before meals  enoxaparin Injectable 40 milliGRAM(s) SubCutaneous daily  mesalamine ER Capsule 1000 milliGRAM(s) Oral two times a day with meals  midodrine. 10 milliGRAM(s) Oral three times a day  nicotine  14 mG/24 Hr(s) Transdermal Patch - Peds 1 Patch Transdermal daily  oxybutynin 5 milliGRAM(s) Oral two times a day  pantoprazole    Tablet 40 milliGRAM(s) Oral before breakfast  tamsulosin 0.4 milliGRAM(s) Oral daily  tiotropium 18 MICROgram(s) Capsule 1 Capsule(s) Inhalation daily  varenicline 1 milliGRAM(s) Oral every 12 hours    MEDICATIONS  (PRN):  acetaminophen   Tablet .. 650 milliGRAM(s) Oral every 6 hours PRN Temp greater or equal to 38C (100.4F), Mild Pain (1 - 3)  aluminum hydroxide/magnesium hydroxide/simethicone Suspension 30 milliLiter(s) Oral every 6 hours PRN Dyspepsia  lactulose Syrup 10 Gram(s) Oral two times a day PRN constipation  ondansetron Injectable 4 milliGRAM(s) IV Push every 6 hours PRN Nausea and/or Vomiting      REVIEW OF SYSTEMS:    CONSTITUTIONAL: No fever, chills, weight loss, or fatigue  HEENT: No sore throat, runny nose, ear ache  RESPIRATORY: No cough, wheezing, No shortness of breath  CARDIOVASCULAR: No chest pain, palpitations, dizziness  GASTROINTESTINAL: No abdominal pain. No nausea, vomiting, diarrhea  GENITOURINARY: No dysuria, increase frequency, hematuria, or incontinence  NEUROLOGICAL: No headaches, memory loss, loss of strength, numbness, or tremors, no weakness  EXTREMITY: No pedal edema BLE  SKIN: No itching, burning, rashes, or lesions     VITAL SIGNS:  T(C): 37.1 (05-26-20 @ 12:20), Max: 37.1 (05-26-20 @ 12:20)  T(F): 98.7 (05-26-20 @ 12:20), Max: 98.7 (05-26-20 @ 12:20)  HR: 89 (05-26-20 @ 12:20) (59 - 89)  BP: 127/84 (05-26-20 @ 12:20) (102/74 - 127/84)  RR: 18 (05-26-20 @ 12:20) (16 - 18)  SpO2: 96% (05-26-20 @ 12:20) (96% - 98%)  Wt(kg): --    PHYSICAL EXAM:    GENERAL: not in any distress  HEENT: Neck is supple, normocephalic, atraumatic   CHEST/LUNG: Clear to percussion bilaterally; No rales, rhonchi, wheezing  HEART: Regular rate and rhythm; No murmurs, rubs, or gallops  ABDOMEN: Soft, Nontender, Nondistended; Bowel sounds present, no rebound   EXTREMITIES:  2+ Peripheral Pulses, No clubbing, cyanosis, or edema  GENITOURINARY:   SKIN: No rashes or lesions  BACK: no pressor sore   NERVOUS SYSTEM:  Alert & Oriented X3, Good concentration  PSYCH: normal affect     LABS:     05-26    137  |  102  |  12  ----------------------------<  95  4.2   |  29  |  1.25    Ca    9.2      26 May 2020 06:18                      Sedimentation Rate, Erythrocyte: 2 mm/hr (05-24 @ 08:06)                          Radiology:

## 2020-05-27 ENCOUNTER — TRANSCRIPTION ENCOUNTER (OUTPATIENT)
Age: 50
End: 2020-05-27

## 2020-05-27 LAB
ACE SERPL-CCNC: 32 U/L — SIGNIFICANT CHANGE UP (ref 14–82)
HISTONE AB SER-ACNC: 1.1 UNITS — HIGH (ref 0–0.9)
PROCALCITONIN SERPL-MCNC: 0.03 NG/ML — SIGNIFICANT CHANGE UP (ref 0.02–0.1)
SARS-COV-2 RNA SPEC QL NAA+PROBE: SIGNIFICANT CHANGE UP

## 2020-05-27 PROCEDURE — 99233 SBSQ HOSP IP/OBS HIGH 50: CPT

## 2020-05-27 PROCEDURE — 99232 SBSQ HOSP IP/OBS MODERATE 35: CPT

## 2020-05-27 RX ORDER — LANOLIN ALCOHOL/MO/W.PET/CERES
3 CREAM (GRAM) TOPICAL AT BEDTIME
Refills: 0 | Status: DISCONTINUED | OUTPATIENT
Start: 2020-05-27 | End: 2020-05-29

## 2020-05-27 RX ADMIN — Medication 15 MILLIGRAM(S): at 13:12

## 2020-05-27 RX ADMIN — Medication 1000 MILLIGRAM(S): at 08:59

## 2020-05-27 RX ADMIN — MIDODRINE HYDROCHLORIDE 10 MILLIGRAM(S): 2.5 TABLET ORAL at 12:01

## 2020-05-27 RX ADMIN — Medication 3 MILLIGRAM(S): at 23:59

## 2020-05-27 RX ADMIN — Medication 5 MILLIGRAM(S): at 06:27

## 2020-05-27 RX ADMIN — Medication 15 MILLIGRAM(S): at 06:28

## 2020-05-27 RX ADMIN — PANTOPRAZOLE SODIUM 40 MILLIGRAM(S): 20 TABLET, DELAYED RELEASE ORAL at 06:27

## 2020-05-27 RX ADMIN — TAMSULOSIN HYDROCHLORIDE 0.4 MILLIGRAM(S): 0.4 CAPSULE ORAL at 12:02

## 2020-05-27 RX ADMIN — TIOTROPIUM BROMIDE 1 CAPSULE(S): 18 CAPSULE ORAL; RESPIRATORY (INHALATION) at 12:02

## 2020-05-27 RX ADMIN — Medication 10 MILLIGRAM(S): at 06:28

## 2020-05-27 RX ADMIN — Medication 1000 MILLIGRAM(S): at 17:22

## 2020-05-27 RX ADMIN — Medication 10 MILLIGRAM(S): at 17:22

## 2020-05-27 RX ADMIN — Medication 5 MILLIGRAM(S): at 17:23

## 2020-05-27 RX ADMIN — Medication 1 MILLIGRAM(S): at 06:28

## 2020-05-27 RX ADMIN — ONDANSETRON 4 MILLIGRAM(S): 8 TABLET, FILM COATED ORAL at 13:12

## 2020-05-27 RX ADMIN — MIDODRINE HYDROCHLORIDE 10 MILLIGRAM(S): 2.5 TABLET ORAL at 06:27

## 2020-05-27 RX ADMIN — Medication 15 MILLIGRAM(S): at 21:26

## 2020-05-27 RX ADMIN — Medication 1 MILLIGRAM(S): at 17:23

## 2020-05-27 NOTE — DIETITIAN INITIAL EVALUATION ADULT. - PHYSICAL APPEARANCE
BMI = 20.5; no edema noted/other (specify) Unable to conduct nutrition-focused physical exam due to isolation precautions

## 2020-05-27 NOTE — CHART NOTE - NSCHARTNOTEFT_GEN_A_CORE
Per telephone conversation with Dr. Marley:  - He plans to take pt to OR Friday [await scheduling]  - per OR protocol, COVID 19 screening ordered.

## 2020-05-27 NOTE — DIETITIAN INITIAL EVALUATION ADULT. - DIET TYPE
Ensure Enlive x 1/day (provides 350 kcal, 20 g protein)  & Ensure Clear x 2/day (provides 480 kcal, 16 g protein)/regular/supplement (specify)

## 2020-05-27 NOTE — PROGRESS NOTE ADULT - ASSESSMENT
Lung mass with cavitation   no corroborating data that this is TB    QuantiFeron NEGATIVE on 1/2020 and again Neg on 5/24/2020  no other sign of TB   low esr , clinically ok   WOULD NOT START  TB DRUGS WITHOUT A DIAGNOSIS   CONSIDER RESECTION     atypical ANCA indeterminate   low procalcitonin , monitor off abx   appreciate thoracic   fu with path from a wedge of the upper lobe.  case discussed with primary team and patient

## 2020-05-27 NOTE — DIETITIAN INITIAL EVALUATION ADULT. - ENERGY NEEDS
Height (cm): 177.8 (05-23)  Weight (kg): 64.7 (05-23)  BMI (kg/m2): 20.5 (05-23)  IBW:  75.5 kg +/- 10%   % IBW: 86%    UBW:  stable    %UBW: 100%

## 2020-05-27 NOTE — PROGRESS NOTE ADULT - SUBJECTIVE AND OBJECTIVE BOX
Patient is a 49y old  Male who presents with a chief complaint of abdominal pain, dyspnea (27 May 2020 12:04)      INTERVAL HPI/OVERNIGHT EVENTS:  Pt was seen and examined, no acute events.    MEDICATIONS  (STANDING):  busPIRone 15 milliGRAM(s) Oral three times a day  dicyclomine 10 milliGRAM(s) Oral two times a day before meals  enoxaparin Injectable 40 milliGRAM(s) SubCutaneous daily  mesalamine ER Capsule 1000 milliGRAM(s) Oral two times a day with meals  midodrine. 10 milliGRAM(s) Oral three times a day  nicotine  14 mG/24 Hr(s) Transdermal Patch - Peds 1 Patch Transdermal daily  oxybutynin 5 milliGRAM(s) Oral two times a day  pantoprazole    Tablet 40 milliGRAM(s) Oral before breakfast  tamsulosin 0.4 milliGRAM(s) Oral daily  tiotropium 18 MICROgram(s) Capsule 1 Capsule(s) Inhalation daily  varenicline 1 milliGRAM(s) Oral every 12 hours    MEDICATIONS  (PRN):  acetaminophen   Tablet .. 650 milliGRAM(s) Oral every 6 hours PRN Temp greater or equal to 38C (100.4F), Mild Pain (1 - 3)  aluminum hydroxide/magnesium hydroxide/simethicone Suspension 30 milliLiter(s) Oral every 6 hours PRN Dyspepsia  lactulose Syrup 10 Gram(s) Oral two times a day PRN constipation  ondansetron Injectable 4 milliGRAM(s) IV Push every 6 hours PRN Nausea and/or Vomiting      Allergies  adhesives (Rash)  No Known Drug Allergies      Vital Signs Last 24 Hrs  T(C): 37.2 (27 May 2020 17:05), Max: 37.2 (27 May 2020 17:05)  T(F): 99 (27 May 2020 17:05), Max: 99 (27 May 2020 17:05)  HR: 75 (27 May 2020 17:05) (64 - 75)  BP: 138/97 (27 May 2020 17:05) (108/74 - 138/97)  BP(mean): --  RR: 17 (27 May 2020 17:05) (17 - 17)  SpO2: 98% (27 May 2020 17:05) (96% - 98%)      PHYSICAL EXAM:  GENERAL: NAD  HEAD:  Atraumatic   EYES: PERRLA  NERVOUS SYSTEM:  Awake, alert  CHEST/LUNG: Clear  HEART: RRR  ABDOMEN: Soft, non tender  EXTREMITIES:  no edema      LABS:    05-26    137  |  102  |  12  ----------------------------<  95  4.2   |  29  |  1.25    Ca    9.2      26 May 2020 06:18      CAPILLARY BLOOD GLUCOSE      Culture - Acid Fast - Sputum w/Smear (collected 25 May 2020 00:16)  Source: .Sputum Sputum  Preliminary Report (27 May 2020 15:03):    Culture is being performed.      RADIOLOGY & ADDITIONAL TESTS:    Imaging Personally Reviewed:  [ ] YES  [ ] NO    Consultant(s) Notes Reviewed:  [ ] YES  [ ] NO    Care Discussed with Consultants/Other Providers [ ] YES  [ ] NO

## 2020-05-27 NOTE — PROGRESS NOTE ADULT - SUBJECTIVE AND OBJECTIVE BOX
HPI:  Patient is a 49M with a PMH of Barretts Disease, Crohn's on Pentasa IBS, spiculated lung mass who presents to worsening dyspnea on exertion and abdominal pain.  Patient states his breathing has been worsening over the last two weeks but denies hx of cough, fever, chills.  Has been following Pulmonary with Dr Ileana Disla.  Patient also states that he has had abdominal pain with poor PO intake for 5 days.  States he has had no BM for the last 5 days as well.  Follows with GI, and had a tele visit with Dr Russ 3 days ago who told him to present to the ED if his abdominal pain persisted.  Admitted to [facility] in 01/2020 - had biopsy that showed necrotizing granulomas in the lung.  Vitals stable.  Labs benign.  CT chest and abdomen negative for acute disease.  Will admit to floor for further eval. (23 May 2020 01:31)      Allergies    adhesives (Rash)  No Known Drug Allergies    Intolerances        MEDICATIONS  (STANDING):  busPIRone 15 milliGRAM(s) Oral three times a day  dicyclomine 10 milliGRAM(s) Oral two times a day before meals  enoxaparin Injectable 40 milliGRAM(s) SubCutaneous daily  mesalamine ER Capsule 1000 milliGRAM(s) Oral two times a day with meals  midodrine. 10 milliGRAM(s) Oral three times a day  nicotine  14 mG/24 Hr(s) Transdermal Patch - Peds 1 Patch Transdermal daily  oxybutynin 5 milliGRAM(s) Oral two times a day  pantoprazole    Tablet 40 milliGRAM(s) Oral before breakfast  tamsulosin 0.4 milliGRAM(s) Oral daily  tiotropium 18 MICROgram(s) Capsule 1 Capsule(s) Inhalation daily  varenicline 1 milliGRAM(s) Oral every 12 hours    MEDICATIONS  (PRN):  acetaminophen   Tablet .. 650 milliGRAM(s) Oral every 6 hours PRN Temp greater or equal to 38C (100.4F), Mild Pain (1 - 3)  aluminum hydroxide/magnesium hydroxide/simethicone Suspension 30 milliLiter(s) Oral every 6 hours PRN Dyspepsia  lactulose Syrup 10 Gram(s) Oral two times a day PRN constipation  ondansetron Injectable 4 milliGRAM(s) IV Push every 6 hours PRN Nausea and/or Vomiting      REVIEW OF SYSTEMS:    CONSTITUTIONAL: No fever, chills, weight loss, or fatigue  HEENT: No sore throat, runny nose, ear ache  RESPIRATORY: No cough, wheezing, No shortness of breath  CARDIOVASCULAR: No chest pain, palpitations, dizziness  GASTROINTESTINAL: No abdominal pain. No nausea, vomiting, diarrhea  GENITOURINARY: No dysuria, increase frequency, hematuria, or incontinence  NEUROLOGICAL: No headaches, memory loss, loss of strength, numbness, or tremors, no weakness  EXTREMITY: No pedal edema BLE  SKIN: No itching, burning, rashes, or lesions     VITAL SIGNS:  T(C): 36.3 (05-27-20 @ 05:02), Max: 37.2 (05-26-20 @ 16:55)  T(F): 97.4 (05-27-20 @ 05:02), Max: 98.9 (05-26-20 @ 16:55)  HR: 64 (05-27-20 @ 05:02) (64 - 89)  BP: 112/68 (05-27-20 @ 05:02) (112/68 - 127/84)  RR: 17 (05-26-20 @ 16:55) (17 - 18)  SpO2: 98% (05-27-20 @ 05:02) (96% - 98%)  Wt(kg): --    PHYSICAL EXAM:    GENERAL: not in any distress  HEENT: Neck is supple, normocephalic, atraumatic   CHEST/LUNG: Clear to percussion bilaterally; No rales, rhonchi, wheezing  HEART: Regular rate and rhythm; No murmurs, rubs, or gallops  ABDOMEN: Soft, Nontender, Nondistended; Bowel sounds present, no rebound   EXTREMITIES:  2+ Peripheral Pulses, No clubbing, cyanosis, or edema  GENITOURINARY:   SKIN: No rashes or lesions  BACK: no pressor sore   NERVOUS SYSTEM:  Alert & Oriented X3, Good concentration  PSYCH: normal affect     LABS:     05-26    137  |  102  |  12  ----------------------------<  95  4.2   |  29  |  1.25    Ca    9.2      26 May 2020 06:18                      Sedimentation Rate, Erythrocyte: 2 mm/hr (05-24 @ 08:06)                          Radiology:

## 2020-05-27 NOTE — DIETITIAN INITIAL EVALUATION ADULT. - OTHER INFO
Unable to conduct face-to-face interview c pt while COVID test pending; spoke to pt via phone; independent c ADL. Pt continues to c/o intermittent N/V; Zofran helping. Pt insists on Crohn's hx but per GI note no evidence supporting dx including previous EGD & colonoscopy; GI following. biopsy procedure pending once COVID r/o to determine pathology vs TB?  abdominal pain reported most likely 2/2 IBS c constipation; constipation issue now resolved c lactulose & Miralax regimen; offered prune juice but pt refused.  Offered altered consistency for ease of eating; low fiber diet (pt c poor dentition) but pt reports he's able to order what he needs. Pt receptive to both Ensure Enlive & Ensure Clear.  Wt has been stable based on previous assessment (1/2020).

## 2020-05-27 NOTE — PROGRESS NOTE ADULT - SUBJECTIVE AND OBJECTIVE BOX
JEOVANY ACOSTA    S 2C 242 I    Patient is a 49y old  Male who presents with a chief complaint of abdominal pain, dyspnea (27 May 2020 10:02)       Allergies    adhesives (Rash)  No Known Drug Allergies    Intolerances        HPI:  Patient is a 49M with a PMH of Barretts Disease, Crohn's on Pentasa IBS, spiculated lung mass who presents to worsening dyspnea on exertion and abdominal pain.  Patient states his breathing has been worsening over the last two weeks but denies hx of cough, fever, chills.  Has been following Pulmonary with Dr Ileana Disla.  Patient also states that he has had abdominal pain with poor PO intake for 5 days.  States he has had no BM for the last 5 days as well.  Follows with GI, and had a tele visit with Dr Russ 3 days ago who told him to present to the ED if his abdominal pain persisted.  Admitted to [facility] in 01/2020 - had biopsy that showed necrotizing granulomas in the lung.  Vitals stable.  Labs benign.  CT chest and abdomen negative for acute disease.  Will admit to floor for further eval. (23 May 2020 01:31)      PAST MEDICAL & SURGICAL HISTORY:  Alfaro's esophagus without dysplasia  Depression, unspecified depression type  Crohn's disease with complication, unspecified gastrointestinal tract location  ACL (anterior cruciate ligament) tear      FAMILY HISTORY:  Family history of diabetes mellitus in maternal grandmother (Mother, Grandparent)  Family history of COPD (chronic obstructive pulmonary disease) (Father)  Family history of colon cancer in father (Father)  Family history of hypertension in father (Father, Mother, Grandparent)        MEDICATIONS   acetaminophen   Tablet .. 650 milliGRAM(s) Oral every 6 hours PRN  aluminum hydroxide/magnesium hydroxide/simethicone Suspension 30 milliLiter(s) Oral every 6 hours PRN  busPIRone 15 milliGRAM(s) Oral three times a day  dicyclomine 10 milliGRAM(s) Oral two times a day before meals  enoxaparin Injectable 40 milliGRAM(s) SubCutaneous daily  lactulose Syrup 10 Gram(s) Oral two times a day PRN  mesalamine ER Capsule 1000 milliGRAM(s) Oral two times a day with meals  midodrine. 10 milliGRAM(s) Oral three times a day  nicotine  14 mG/24 Hr(s) Transdermal Patch - Peds 1 Patch Transdermal daily  ondansetron Injectable 4 milliGRAM(s) IV Push every 6 hours PRN  oxybutynin 5 milliGRAM(s) Oral two times a day  pantoprazole    Tablet 40 milliGRAM(s) Oral before breakfast  tamsulosin 0.4 milliGRAM(s) Oral daily  tiotropium 18 MICROgram(s) Capsule 1 Capsule(s) Inhalation daily  varenicline 1 milliGRAM(s) Oral every 12 hours      Vital Signs Last 24 Hrs  T(C): 37 (27 May 2020 11:33), Max: 37.2 (26 May 2020 16:55)  T(F): 98.6 (27 May 2020 11:33), Max: 98.9 (26 May 2020 16:55)  HR: 73 (27 May 2020 11:33) (64 - 89)  BP: 108/74 (27 May 2020 11:33) (108/74 - 127/84)  BP(mean): --  RR: 17 (27 May 2020 11:33) (17 - 18)  SpO2: 96% (27 May 2020 11:33) (96% - 98%)      05-26-20 @ 07:01  -  05-27-20 @ 07:00  --------------------------------------------------------  IN: 240 mL / OUT: 0 mL / NET: 240 mL            LABS:    05-26    137  |  102  |  12  ----------------------------<  95  4.2   |  29  |  1.25    Ca    9.2      26 May 2020 06:18                WBC:  WBC Count: 6.04 K/uL (05-24 @ 08:06)      MICROBIOLOGY:  RECENT CULTURES:  05-25 .Sputum Sputum XXXX [id number]                    Sodium:  Sodium, Serum: 137 mmol/L (05-26 @ 06:18)  Sodium, Serum: 137 mmol/L (05-24 @ 08:06)      1.25 mg/dL 05-26 @ 06:18  1.27 mg/dL 05-24 @ 08:06      Hemoglobin:  Hemoglobin: 15.1 g/dL (05-24 @ 08:06)      Platelets: Platelet Count - Automated: 230 K/uL (05-24 @ 08:06)              RADIOLOGY & ADDITIONAL STUDIES:

## 2020-05-27 NOTE — PROGRESS NOTE ADULT - ASSESSMENT
Patient is a 49M with a PMH of Barretts Disease, Crohn's on Pentasya, IBS, spiculated lung mass who presents to worsening dyspnea on exertion and abdominal pain.  Patient states his breathing has been worsening over the last two weeks but denies hx of cough, fever, chills.  Has been following Pulmonary with Dr Ileana Disla.  Patient also states that he has had abdominal pain with poor PO intake for 5 days.  States he has had no BM for the last 5 days as well.  Follows with GI, and had a tele visit with Dr Russ 3 days ago who told him to present to the ED if his abdominal pain persisted.  Admitted to [facility] in 01/2020 - had biopsy that showed necrotizing granulomas in the lung.  Vitals stable.  Labs benign.  CT chest and abdomen negative for acute disease.

## 2020-05-27 NOTE — PROGRESS NOTE ADULT - ASSESSMENT
cont rx DAVE THAYER LIJ  [id number]  1970 DOA 2020 DR LYDIA FRANCISCO           REVIEW OF SYMPTOMS      Able to give ROS  Yes     RELIABLE No   CONSTITUTIONAL Weakness Yes  Chills No Vision changes No  ENDOCRINE No unexplained hair loss No heat or cold intolerance    ALLERGY No hives  Sore throat No   RESP Coughing blood no  Shortness of breath YES   NEURO No Headache  Confusion Pain neck No   CARDIAC No Chest pain No Palpitations   GI No Pain abdomen NO   Vomiting NO     PHYSICAL EXAM    HEENT Unremarkable PERRLA atraumatic   RESP Fair air entry EXP prolonged    Harsh breath sound Resp distres mild   CARDIAC S1 S2 No S3     NO JVD    ABDOMEN SOFT BS PRESENT NOT DISTENDED No hepatosplenomegaly PEDAL EDEMA present No calf tenderness  NO rash   GENERAL Not TOXIC looking    PROBLEMS  CAVITY RUL       OXYGENATION      -2020 ra 96% - ra 97%    VITALS/LABS   2020 afeb 64 112/68   2020 afeb 81 119/70   2020 Na 137 K 4.2 Cr 1.2     PT DATA/BEST PRACTICE  ALLERGY       adhesives               WT        64 (2020)                             BMI         20 (2020)                    CrCl                                  ADVANCED DIRECTIVE     HEAD OF BED ELEVATION Yes      INFECTION PPLX     DVT PROPHYLAXIS             Lvnx 40 ()    MCCANN PROPHYLAXIS    protonix 40 ()         DYSPHAGIA EVAL     DIET       reg ()                                                                   IV F      PATIENT DATA/ASSESSMENT/RECOMMENDATIONS     RO TB RO CA  49 m who has liven in homeless shelter in past and has trevelled to [place] in past has rul cavitary lesion at least since 2020   His HIV test was negative    His cavitary rul lesion  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    ct showed persistent 3.8 x 1.7 cm rul spiculated mass with cavity    afb smear N-juan carlos    QFT N-juan carlos   ANCA PANCA N-JUAN CARLOS () AANCA INDET ()   2020 ace and ds dna were N-juan carlos   Being smoker he is also at risk for lung ca   ID eval dated  noted   ID recommends to NOT start anti TB Rx based on available information   Given the above data TB as well as cancer still need to be definitively excluded   Agree with  excision of lesion to exclude cancer    PULMONARY CLEARANCE FOR PLANNED LUNG MASS RESECTION  DW DR Laura 2020  Patient is in optimal state and is cleared form Pulmonary standpoint   Need resection to exclude cancer or TB   Recommend clearance also by Infection disease     TIME SPENT Over 25 minutes aggregate care time spent on encounter; activities included   direct pt care, counseling and/or coordinating care reviewing notes, lab data/ imaging , discussion with multidisciplinary team/ pt /family. Risks, benefits, alternatives  discussed in detail.    DAVE THAYER LIJ  [phone]  1970 DOA 2020 DR LYDIA FRANCISCO

## 2020-05-27 NOTE — PROGRESS NOTE ADULT - SUBJECTIVE AND OBJECTIVE BOX
Pt with RUL cavitary lesion.  Etiology not clear. ?? Granuloma versus malignant    For definitive path will require a wedge of the upper lobe.    Will schedule for surgery this week

## 2020-05-28 LAB
ALBUMIN SERPL ELPH-MCNC: 3.4 G/DL — SIGNIFICANT CHANGE UP (ref 3.3–5)
ALP SERPL-CCNC: 69 U/L — SIGNIFICANT CHANGE UP (ref 40–120)
ALT FLD-CCNC: 21 U/L — SIGNIFICANT CHANGE UP (ref 12–78)
ANION GAP SERPL CALC-SCNC: 6 MMOL/L — SIGNIFICANT CHANGE UP (ref 5–17)
APPEARANCE UR: CLEAR — SIGNIFICANT CHANGE UP
APTT BLD: 30.8 SEC — SIGNIFICANT CHANGE UP (ref 27.5–36.3)
AST SERPL-CCNC: 17 U/L — SIGNIFICANT CHANGE UP (ref 15–37)
BILIRUB SERPL-MCNC: 0.6 MG/DL — SIGNIFICANT CHANGE UP (ref 0.2–1.2)
BILIRUB UR-MCNC: NEGATIVE — SIGNIFICANT CHANGE UP
BLD GP AB SCN SERPL QL: SIGNIFICANT CHANGE UP
BUN SERPL-MCNC: 13 MG/DL — SIGNIFICANT CHANGE UP (ref 7–23)
CALCIUM SERPL-MCNC: 8.6 MG/DL — SIGNIFICANT CHANGE UP (ref 8.5–10.1)
CHLORIDE SERPL-SCNC: 104 MMOL/L — SIGNIFICANT CHANGE UP (ref 96–108)
CO2 SERPL-SCNC: 26 MMOL/L — SIGNIFICANT CHANGE UP (ref 22–31)
COLOR SPEC: YELLOW — SIGNIFICANT CHANGE UP
CREAT SERPL-MCNC: 1.19 MG/DL — SIGNIFICANT CHANGE UP (ref 0.5–1.3)
DIFF PNL FLD: NEGATIVE — SIGNIFICANT CHANGE UP
GBM IGG SER-ACNC: <1 AI — SIGNIFICANT CHANGE UP
GLUCOSE SERPL-MCNC: 80 MG/DL — SIGNIFICANT CHANGE UP (ref 70–99)
GLUCOSE UR QL: NEGATIVE MG/DL — SIGNIFICANT CHANGE UP
HCT VFR BLD CALC: 43.3 % — SIGNIFICANT CHANGE UP (ref 39–50)
HGB BLD-MCNC: 14.4 G/DL — SIGNIFICANT CHANGE UP (ref 13–17)
INR BLD: 1.02 RATIO — SIGNIFICANT CHANGE UP (ref 0.88–1.16)
KETONES UR-MCNC: ABNORMAL
LEUKOCYTE ESTERASE UR-ACNC: NEGATIVE — SIGNIFICANT CHANGE UP
MCHC RBC-ENTMCNC: 30.5 PG — SIGNIFICANT CHANGE UP (ref 27–34)
MCHC RBC-ENTMCNC: 33.3 GM/DL — SIGNIFICANT CHANGE UP (ref 32–36)
MCV RBC AUTO: 91.7 FL — SIGNIFICANT CHANGE UP (ref 80–100)
NITRITE UR-MCNC: NEGATIVE — SIGNIFICANT CHANGE UP
NRBC # BLD: 0 /100 WBCS — SIGNIFICANT CHANGE UP (ref 0–0)
PH UR: 7 — SIGNIFICANT CHANGE UP (ref 5–8)
PLATELET # BLD AUTO: 227 K/UL — SIGNIFICANT CHANGE UP (ref 150–400)
POTASSIUM SERPL-MCNC: 3.8 MMOL/L — SIGNIFICANT CHANGE UP (ref 3.5–5.3)
POTASSIUM SERPL-SCNC: 3.8 MMOL/L — SIGNIFICANT CHANGE UP (ref 3.5–5.3)
PROT SERPL-MCNC: 6.8 GM/DL — SIGNIFICANT CHANGE UP (ref 6–8.3)
PROT UR-MCNC: NEGATIVE MG/DL — SIGNIFICANT CHANGE UP
PROTHROM AB SERPL-ACNC: 11.3 SEC — SIGNIFICANT CHANGE UP (ref 10–12.9)
RBC # BLD: 4.72 M/UL — SIGNIFICANT CHANGE UP (ref 4.2–5.8)
RBC # FLD: 12.6 % — SIGNIFICANT CHANGE UP (ref 10.3–14.5)
SODIUM SERPL-SCNC: 136 MMOL/L — SIGNIFICANT CHANGE UP (ref 135–145)
SP GR SPEC: 1.01 — SIGNIFICANT CHANGE UP (ref 1.01–1.02)
UROBILINOGEN FLD QL: NEGATIVE MG/DL — SIGNIFICANT CHANGE UP
WBC # BLD: 7.67 K/UL — SIGNIFICANT CHANGE UP (ref 3.8–10.5)
WBC # FLD AUTO: 7.67 K/UL — SIGNIFICANT CHANGE UP (ref 3.8–10.5)

## 2020-05-28 PROCEDURE — 99233 SBSQ HOSP IP/OBS HIGH 50: CPT

## 2020-05-28 PROCEDURE — 99232 SBSQ HOSP IP/OBS MODERATE 35: CPT

## 2020-05-28 PROCEDURE — 93010 ELECTROCARDIOGRAM REPORT: CPT

## 2020-05-28 PROCEDURE — 71045 X-RAY EXAM CHEST 1 VIEW: CPT | Mod: 26

## 2020-05-28 RX ORDER — SODIUM CHLORIDE 9 MG/ML
1000 INJECTION, SOLUTION INTRAVENOUS
Refills: 0 | Status: DISCONTINUED | OUTPATIENT
Start: 2020-05-28 | End: 2020-05-29

## 2020-05-28 RX ORDER — SODIUM CHLORIDE 9 MG/ML
1000 INJECTION, SOLUTION INTRAVENOUS
Refills: 0 | Status: DISCONTINUED | OUTPATIENT
Start: 2020-05-28 | End: 2020-05-28

## 2020-05-28 RX ORDER — ALPRAZOLAM 0.25 MG
2 TABLET ORAL AT BEDTIME
Refills: 0 | Status: DISCONTINUED | OUTPATIENT
Start: 2020-05-28 | End: 2020-05-28

## 2020-05-28 RX ADMIN — PANTOPRAZOLE SODIUM 40 MILLIGRAM(S): 20 TABLET, DELAYED RELEASE ORAL at 08:11

## 2020-05-28 RX ADMIN — Medication 15 MILLIGRAM(S): at 13:02

## 2020-05-28 RX ADMIN — Medication 5 MILLIGRAM(S): at 05:31

## 2020-05-28 RX ADMIN — SODIUM CHLORIDE 50 MILLILITER(S): 9 INJECTION, SOLUTION INTRAVENOUS at 22:44

## 2020-05-28 RX ADMIN — Medication 3 MILLIGRAM(S): at 22:44

## 2020-05-28 RX ADMIN — Medication 1 MILLIGRAM(S): at 17:37

## 2020-05-28 RX ADMIN — TIOTROPIUM BROMIDE 1 CAPSULE(S): 18 CAPSULE ORAL; RESPIRATORY (INHALATION) at 11:21

## 2020-05-28 RX ADMIN — Medication 10 MILLIGRAM(S): at 08:10

## 2020-05-28 RX ADMIN — Medication 1000 MILLIGRAM(S): at 08:10

## 2020-05-28 RX ADMIN — Medication 10 MILLIGRAM(S): at 17:37

## 2020-05-28 RX ADMIN — Medication 1 MILLIGRAM(S): at 05:32

## 2020-05-28 RX ADMIN — Medication 5 MILLIGRAM(S): at 17:37

## 2020-05-28 RX ADMIN — Medication 15 MILLIGRAM(S): at 05:32

## 2020-05-28 RX ADMIN — Medication 1000 MILLIGRAM(S): at 17:37

## 2020-05-28 RX ADMIN — TAMSULOSIN HYDROCHLORIDE 0.4 MILLIGRAM(S): 0.4 CAPSULE ORAL at 11:21

## 2020-05-28 RX ADMIN — Medication 2 MILLIGRAM(S): at 22:44

## 2020-05-28 RX ADMIN — MIDODRINE HYDROCHLORIDE 10 MILLIGRAM(S): 2.5 TABLET ORAL at 05:31

## 2020-05-28 RX ADMIN — Medication 15 MILLIGRAM(S): at 22:44

## 2020-05-28 RX ADMIN — LACTULOSE 10 GRAM(S): 10 SOLUTION ORAL at 13:51

## 2020-05-28 NOTE — PROGRESS NOTE ADULT - SUBJECTIVE AND OBJECTIVE BOX
HPI:  Patient is a 49M with a PMH of Barretts Disease, Crohn's on Pentasa IBS, spiculated lung mass who presents to worsening dyspnea on exertion and abdominal pain.  Patient states his breathing has been worsening over the last two weeks but denies hx of cough, fever, chills.  Has been following Pulmonary with Dr Ileana Disla.  Patient also states that he has had abdominal pain with poor PO intake for 5 days.  States he has had no BM for the last 5 days as well.  Follows with GI, and had a tele visit with Dr Russ 3 days ago who told him to present to the ED if his abdominal pain persisted.  Admitted to [facility] in 01/2020 - had biopsy that showed necrotizing granulomas in the lung.  Vitals stable.  Labs benign.  CT chest and abdomen negative for acute disease.  Will admit to floor for further eval. (23 May 2020 01:31)      Allergies    adhesives (Rash)  No Known Drug Allergies    Intolerances        MEDICATIONS  (STANDING):  busPIRone 15 milliGRAM(s) Oral three times a day  dicyclomine 10 milliGRAM(s) Oral two times a day before meals  enoxaparin Injectable 40 milliGRAM(s) SubCutaneous daily  melatonin 3 milliGRAM(s) Oral at bedtime  mesalamine ER Capsule 1000 milliGRAM(s) Oral two times a day with meals  midodrine. 10 milliGRAM(s) Oral three times a day  nicotine  14 mG/24 Hr(s) Transdermal Patch - Peds 1 Patch Transdermal daily  oxybutynin 5 milliGRAM(s) Oral two times a day  pantoprazole    Tablet 40 milliGRAM(s) Oral before breakfast  tamsulosin 0.4 milliGRAM(s) Oral daily  tiotropium 18 MICROgram(s) Capsule 1 Capsule(s) Inhalation daily  varenicline 1 milliGRAM(s) Oral every 12 hours    MEDICATIONS  (PRN):  acetaminophen   Tablet .. 650 milliGRAM(s) Oral every 6 hours PRN Temp greater or equal to 38C (100.4F), Mild Pain (1 - 3)  aluminum hydroxide/magnesium hydroxide/simethicone Suspension 30 milliLiter(s) Oral every 6 hours PRN Dyspepsia  lactulose Syrup 10 Gram(s) Oral two times a day PRN constipation  ondansetron Injectable 4 milliGRAM(s) IV Push every 6 hours PRN Nausea and/or Vomiting      REVIEW OF SYSTEMS:    CONSTITUTIONAL: No fever, chills, weight loss, or fatigue  HEENT: No sore throat, runny nose, ear ache  RESPIRATORY: No cough, wheezing, No shortness of breath  CARDIOVASCULAR: No chest pain, palpitations, dizziness  GASTROINTESTINAL: No abdominal pain. No nausea, vomiting, diarrhea  GENITOURINARY: No dysuria, increase frequency, hematuria, or incontinence  NEUROLOGICAL: No headaches, memory loss, loss of strength, numbness, or tremors, no weakness  EXTREMITY: No pedal edema BLE  SKIN: No itching, burning, rashes, or lesions     VITAL SIGNS:  T(C): 37.3 (05-28-20 @ 11:25), Max: 37.3 (05-28-20 @ 11:25)  T(F): 99.1 (05-28-20 @ 11:25), Max: 99.1 (05-28-20 @ 11:25)  HR: 68 (05-28-20 @ 11:25) (65 - 75)  BP: 124/87 (05-28-20 @ 11:25) (123/75 - 139/85)  RR: 18 (05-28-20 @ 11:25) (16 - 18)  SpO2: 98% (05-28-20 @ 11:25) (98% - 99%)  Wt(kg): --    PHYSICAL EXAM:    GENERAL: not in any distress  HEENT: Neck is supple, normocephalic, atraumatic   CHEST/LUNG: Clear to percussion bilaterally; No rales, rhonchi, wheezing  HEART: Regular rate and rhythm; No murmurs, rubs, or gallops  ABDOMEN: Soft, Nontender, Nondistended; Bowel sounds present, no rebound   EXTREMITIES:  2+ Peripheral Pulses, No clubbing, cyanosis, or edema  GENITOURINARY:   SKIN: No rashes or lesions  BACK: no pressor sore   NERVOUS SYSTEM:  Alert & Oriented X3, Good concentration  PSYCH: normal affect     LABS:                         14.4   7.67  )-----------( 227      ( 28 May 2020 06:12 )             43.3     05-28    136  |  104  |  13  ----------------------------<  80  3.8   |  26  |  1.19    Ca    8.6      28 May 2020 06:12    TPro  6.8  /  Alb  3.4  /  TBili  0.6  /  DBili  x   /  AST  17  /  ALT  21  /  AlkPhos  69  05-28    LIVER FUNCTIONS - ( 28 May 2020 06:12 )  Alb: 3.4 g/dL / Pro: 6.8 gm/dL / ALK PHOS: 69 U/L / ALT: 21 U/L / AST: 17 U/L / GGT: x                         Sedimentation Rate, Erythrocyte: 2 mm/hr (05-24 @ 08:06)            Culture Results:   Culture is being performed. (05-25 @ 00:16)                Radiology:

## 2020-05-28 NOTE — PROGRESS NOTE ADULT - SUBJECTIVE AND OBJECTIVE BOX
Patient is a 49y old  Male who presents with a chief complaint of abdominal pain, dyspnea (28 May 2020 12:26)      INTERVAL HPI/OVERNIGHT EVENTS:  Pt was seen and examined, no acute events.    MEDICATIONS  (STANDING):  busPIRone 15 milliGRAM(s) Oral three times a day  dicyclomine 10 milliGRAM(s) Oral two times a day before meals  enoxaparin Injectable 40 milliGRAM(s) SubCutaneous daily  melatonin 3 milliGRAM(s) Oral at bedtime  mesalamine ER Capsule 1000 milliGRAM(s) Oral two times a day with meals  midodrine. 10 milliGRAM(s) Oral three times a day  nicotine  14 mG/24 Hr(s) Transdermal Patch - Peds 1 Patch Transdermal daily  oxybutynin 5 milliGRAM(s) Oral two times a day  pantoprazole    Tablet 40 milliGRAM(s) Oral before breakfast  tamsulosin 0.4 milliGRAM(s) Oral daily  tiotropium 18 MICROgram(s) Capsule 1 Capsule(s) Inhalation daily  varenicline 1 milliGRAM(s) Oral every 12 hours    MEDICATIONS  (PRN):  acetaminophen   Tablet .. 650 milliGRAM(s) Oral every 6 hours PRN Temp greater or equal to 38C (100.4F), Mild Pain (1 - 3)  aluminum hydroxide/magnesium hydroxide/simethicone Suspension 30 milliLiter(s) Oral every 6 hours PRN Dyspepsia  lactulose Syrup 10 Gram(s) Oral two times a day PRN constipation  ondansetron Injectable 4 milliGRAM(s) IV Push every 6 hours PRN Nausea and/or Vomiting      Allergies  adhesives (Rash)  No Known Drug Allergies      Vital Signs Last 24 Hrs  T(C): 37.3 (28 May 2020 11:25), Max: 37.3 (28 May 2020 11:25)  T(F): 99.1 (28 May 2020 11:25), Max: 99.1 (28 May 2020 11:25)  HR: 68 (28 May 2020 11:25) (65 - 75)  BP: 124/87 (28 May 2020 11:25) (123/75 - 139/85)  BP(mean): --  RR: 18 (28 May 2020 11:25) (16 - 18)  SpO2: 98% (28 May 2020 11:25) (98% - 99%)      PHYSICAL EXAM:  GENERAL: NAD  HEAD:  Atraumatic  EYES: PERRLA  NERVOUS SYSTEM:  Awake, alert  CHEST/LUNG: Clear  HEART: RRR  ABDOMEN: Soft, non tender  EXTREMITIES:  no edema      LABS:                        14.4   7.67  )-----------( 227      ( 28 May 2020 06:12 )             43.3         136  |  104  |  13  ----------------------------<  80  3.8   |  26  |  1.19    Ca    8.6      28 May 2020 06:12    TPro  6.8  /  Alb  3.4  /  TBili  0.6  /  DBili  x   /  AST  17  /  ALT  21  /  AlkPhos  69      PT/INR - ( 28 May 2020 12:45 )   PT: 11.3 sec;   INR: 1.02 ratio         PTT - ( 28 May 2020 12:45 )  PTT:30.8 sec  Urinalysis Basic - ( 28 May 2020 13:53 )    Color: Yellow / Appearance: Clear / S.010 / pH: x  Gluc: x / Ketone: Trace  / Bili: Negative / Urobili: Negative mg/dL   Blood: x / Protein: Negative mg/dL / Nitrite: Negative   Leuk Esterase: Negative / RBC: x / WBC x   Sq Epi: x / Non Sq Epi: x / Bacteria: x      CAPILLARY BLOOD GLUCOSE          Culture - Acid Fast - Sputum w/Smear (collected 25 May 2020 00:16)  Source: .Sputum Sputum  Preliminary Report (27 May 2020 15:03):    Culture is being performed.      RADIOLOGY & ADDITIONAL TESTS:    Imaging Personally Reviewed:  [ ] YES  [ ] NO    Consultant(s) Notes Reviewed:  [ ] YES  [ ] NO    Care Discussed with Consultants/Other Providers [ ] YES  [ ] NO

## 2020-05-28 NOTE — PROGRESS NOTE ADULT - SUBJECTIVE AND OBJECTIVE BOX
Seen and examined. Resting comfortable. No acute events overnight.  Pt scheduled for surgery  for R VATS, RUL wedge rsxn  Denies n/emesis, SOB, CP, palpitations        Vital Signs Last 24 Hrs  T(C): 37.3 (20 @ 11:25), Max: 37.3 (20 @ 11:25)  T(F): 99.1 (20 @ 11:25), Max: 99.1 (20 @ 11:25)  HR: 68 (20 @ 11:25) (65 - 75)  BP: 124/87 (20 @ 11:25) (123/75 - 139/85)  RR: 18 (20 @ 11:25) (16 - 18)  SpO2: 98% (20 @ 11:25) (98% - 99%)             @ 07:01  -   @ 07:00  --------------------------------------------------------  IN: 240 mL / OUT: 0 mL / NET: 240 mL       Daily     Daily Weight in k.5 (27 May 2020 14:42)  Admit Wt: Drug Dosing Weight  Height (cm): 177.8 (23 May 2020 03:22)  Weight (kg): 64.7 (23 May 2020 03:22)  BMI (kg/m2): 20.5 (23 May 2020 03:22)  BSA (m2): 1.81 (23 May 2020 03:22)    Bilirubin Total, Serum: 0.6 mg/dL ( @ 06:12)    CAPILLARY BLOOD GLUCOSE              MEDICATIONS  acetaminophen   Tablet .. 650 milliGRAM(s) Oral every 6 hours PRN  aluminum hydroxide/magnesium hydroxide/simethicone Suspension 30 milliLiter(s) Oral every 6 hours PRN  busPIRone 15 milliGRAM(s) Oral three times a day  dicyclomine 10 milliGRAM(s) Oral two times a day before meals  enoxaparin Injectable 40 milliGRAM(s) SubCutaneous daily  lactulose Syrup 10 Gram(s) Oral two times a day PRN  melatonin 3 milliGRAM(s) Oral at bedtime  mesalamine ER Capsule 1000 milliGRAM(s) Oral two times a day with meals  midodrine. 10 milliGRAM(s) Oral three times a day  nicotine  14 mG/24 Hr(s) Transdermal Patch - Peds 1 Patch Transdermal daily  ondansetron Injectable 4 milliGRAM(s) IV Push every 6 hours PRN  oxybutynin 5 milliGRAM(s) Oral two times a day  pantoprazole    Tablet 40 milliGRAM(s) Oral before breakfast  tamsulosin 0.4 milliGRAM(s) Oral daily  tiotropium 18 MICROgram(s) Capsule 1 Capsule(s) Inhalation daily  varenicline 1 milliGRAM(s) Oral every 12 hours      PHYSICAL EXAM    Subjective:   Neurology: alert and oriented x 3, nonfocal, no gross deficits  CV : s1s1 reg no MRGS  Lungs: CTA, equal chest expansion  Drains:  Abdomen: soft, nontender, nondistended, positive bowel sounds, last bowel movement   :    voids  Extremities:    noedema,  +distal pulses present  b/l , no calf tenderness b/l , warm and perfused b/l    CXR:    LABS      136  |  104  |  13  ----------------------------<  80  3.8   |  26  |  1.19    Ca    8.6      28 May 2020 06:12    TPro  6.8  /  Alb  3.4  /  TBili  0.6  /  DBili  x   /  AST  17  /  ALT  21  /  AlkPhos  69                                   14.4   7.67  )-----------( 227      ( 28 May 2020 06:12 )             43.3                   PAST MEDICAL & SURGICAL HISTORY:  Alfaro's esophagus without dysplasia  Depression, unspecified depression type  Crohn's disease with complication, unspecified gastrointestinal tract location  ACL (anterior cruciate ligament) tear

## 2020-05-28 NOTE — PROGRESS NOTE ADULT - ASSESSMENT
Patient is a 49M with a PMH of Barretts Disease, Crohn's on Pentasya, IBS, spiculated lung mass who presents to worsening dyspnea on exertion and abdominal pain.  Patient states his breathing has been worsening over the last two weeks but denies hx of cough, fever, chills.  Has been following Pulmonary with Dr Ileana Disla.  Patient also states that he has had abdominal pain with poor PO intake for 5 days.  States he has had no BM for the last 5 days as well.  Follows with GI, and had a tele visit with Dr Russ 3 days ago who told him to present to the ED if his abdominal pain persisted.  Admitted to [facility] in 01/2020 - had biopsy that showed necrotizing granulomas in the lung.  Vitals stable.  Labs benign.  CT chest and abdomen negative for acute disease.

## 2020-05-28 NOTE — PROGRESS NOTE ADULT - ASSESSMENT
50 y/o male +smoker x30 yrs (stopped 12 weeks ago) p/w RUL cavitary lesion. Granuloma vs malignancy    - NPO p MN/IVF  - Pt scheduled for tmrw 5/29  - Consent signed in chart  - Dr. Marley aware

## 2020-05-28 NOTE — PROGRESS NOTE ADULT - ASSESSMENT
cont rx cont rx  DAVE WILLIAM  [phone]  1970 DOA 2020 DR LYDIA FRANCISCO           REVIEW OF SYMPTOMS      Able to give ROS  Yes     RELIABLE No   CONSTITUTIONAL Weakness Yes  Chills No Vision changes No  ENDOCRINE No unexplained hair loss No heat or cold intolerance    ALLERGY No hives  Sore throat No   RESP Coughing blood no  Shortness of breath YES   NEURO No Headache  Confusion Pain neck No   CARDIAC No Chest pain No Palpitations   GI No Pain abdomen NO   Vomiting NO     PHYSICAL EXAM    HEENT Unremarkable PERRLA atraumatic   RESP Fair air entry EXP prolonged    Harsh breath sound Resp distres mild   CARDIAC S1 S2 No S3     NO JVD    ABDOMEN SOFT BS PRESENT NOT DISTENDED No hepatosplenomegaly PEDAL EDEMA present No calf tenderness  NO rash   GENERAL Not TOXIC looking    PROBLEMS  CAVITY RUL       OXYGENATION      -2020 ra 96% - ra 97%    VITALS/LABS   2020 99f 68 120/80   2020 afeb 64 112/68     PT DATA/BEST PRACTICE  ALLERGY       adhesives               WT        64 (2020)                             BMI         20 (2020)                    CrCl                                  ADVANCED DIRECTIVE     HEAD OF BED ELEVATION Yes      INFECTION PPLX     DVT PROPHYLAXIS             Lvnx 40 ()    MCCANN PROPHYLAXIS    protonix 40 ()         DYSPHAGIA EVAL     DIET       reg ()                                                                   IV F      PATIENT DATA/ASSESSMENT/RECOMMENDATIONS     RO TB RO CA  49 m who has liven in homeless shelter in past and has trevelled to [place] in past has rul cavitary lesion at least since 2020   His HIV test was negative    His cavitary rul lesion  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    ct showed persistent 3.8 x 1.7 cm rul spiculated mass with cavity    afb smear N-juan carlos    QFT N-juan carlos   ANCA PANCA N-JUAN CARLOS () AANCA INDET ()   2020 ace and ds dna were N-juan carlos   Being smoker he is also at risk for lung ca   ID eval dated  noted   ID recommends to NOT start anti TB Rx based on available information   Given the above data TB as well as cancer still need to be definitively excluded   Agree with  excision of lesion to exclude cancer  PULMONARY CLEARANCE FOR PLANNED LUNG MASS RESECTION  DW DR Laura 2020  Patient is in optimal state and is cleared form Pulmonary standpoint   Need resection to exclude cancer or TB   Recommend clearance also by Infection disease     TIME SPENT Over 25 minutes aggregate care time spent on encounter; activities included   direct pt care, counseling and/or coordinating care reviewing notes, lab data/ imaging , discussion with multidisciplinary team/ pt /family. Risks, benefits, alternatives  discussed in detail.    DAVE THAYER LIJ  [phone]  1970 DOA 2020 DR LYDIA FRANCISCO

## 2020-05-28 NOTE — PROGRESS NOTE ADULT - ASSESSMENT
Lung mass with cavitation   no corroborating data that this is TB    QuantiFeron NEGATIVE on 1/2020 and again Neg on 5/24/2020  no other sign of TB   low esr , clinically ok   WOULD NOT START  TB DRUGS WITHOUT A DIAGNOSIS   CONSIDER RESECTION     atypical ANCA indeterminate   low procalcitonin , monitor off abx   appreciate thoracic   fu with path from a wedge of the upper lobe  case discussed with patient today

## 2020-05-28 NOTE — CHART NOTE - NSCHARTNOTEFT_GEN_A_CORE
Preop Dx: Right Lung lesion  Surgeon: Donell  Procedure: Right video assisted thoracoscopy, right upper lobe wedge resection, possible thoracotomy    Vital Signs Last 24 Hrs  T(C): 37.3 (28 May 2020 11:25), Max: 37.3 (28 May 2020 11:25)  T(F): 99.1 (28 May 2020 11:25), Max: 99.1 (28 May 2020 11:25)  HR: 68 (28 May 2020 11:25) (65 - 75)  BP: 124/87 (28 May 2020 11:25) (123/75 - 139/85)  BP(mean): --  RR: 18 (28 May 2020 11:25) (16 - 18)  SpO2: 98% (28 May 2020 11:25) (98% - 99%)    Daily     Daily Weight in k.5 (27 May 2020 14:42)    CBC Full  -  ( 28 May 2020 06:12 )  WBC Count : 7.67 K/uL  RBC Count : 4.72 M/uL  Hemoglobin : 14.4 g/dL  Hematocrit : 43.3 %  Platelet Count - Automated : 227 K/uL  Mean Cell Volume : 91.7 fl  Mean Cell Hemoglobin : 30.5 pg  Mean Cell Hemoglobin Concentration : 33.3 gm/dL  Auto Neutrophil # : x  Auto Lymphocyte # : x  Auto Monocyte # : x  Auto Eosinophil # : x  Auto Basophil # : x  Auto Neutrophil % : x  Auto Lymphocyte % : x  Auto Monocyte % : x  Auto Eosinophil % : x  Auto Basophil % : x        136  |  104  |  13  ----------------------------<  80  3.8   |  26  |  1.19    Ca    8.6      28 May 2020 06:12    TPro  6.8  /  Alb  3.4  /  TBili  0.6  /  DBili  x   /  AST  17  /  ALT  21  /  AlkPhos  69        Type and Screen:   type    UA:      EKG: < from: 12 Lead ECG (20 @ 20:12) >  Line Normal sinus rhythm  Possible Left atrial enlargement  Nonspecific ST abnormality    < end of copied text >        CXR: < from: Xray Chest 1 View AP/PA. (20 @ 22:41) >    . No evidence of acute pulmonary disease.    Plan:  - NPO after midnight except meds  - IVF while NPO    - Consent pending    - Medical clearance for OR    Juan M Sharp PA-C    p#[phone] Preop Dx: Right Lung lesion  Surgeon: Donell  Procedure: Right video assisted thoracoscopy, right upper lobe wedge resection, possible thoracotomy    Vital Signs Last 24 Hrs  T(C): 37.3 (28 May 2020 11:25), Max: 37.3 (28 May 2020 11:25)  T(F): 99.1 (28 May 2020 11:25), Max: 99.1 (28 May 2020 11:25)  HR: 68 (28 May 2020 11:25) (65 - 75)  BP: 124/87 (28 May 2020 11:25) (123/75 - 139/85)  BP(mean): --  RR: 18 (28 May 2020 11:25) (16 - 18)  SpO2: 98% (28 May 2020 11:25) (98% - 99%)    Daily     Daily Weight in k.5 (27 May 2020 14:42)    CBC Full  -  ( 28 May 2020 06:12 )  WBC Count : 7.67 K/uL  RBC Count : 4.72 M/uL  Hemoglobin : 14.4 g/dL  Hematocrit : 43.3 %  Platelet Count - Automated : 227 K/uL  Mean Cell Volume : 91.7 fl  Mean Cell Hemoglobin : 30.5 pg  Mean Cell Hemoglobin Concentration : 33.3 gm/dL  Auto Neutrophil # : x  Auto Lymphocyte # : x  Auto Monocyte # : x  Auto Eosinophil # : x  Auto Basophil # : x  Auto Neutrophil % : x  Auto Lymphocyte % : x  Auto Monocyte % : x  Auto Eosinophil % : x  Auto Basophil % : x        136  |  104  |  13  ----------------------------<  80  3.8   |  26  |  1.19    Ca    8.6      28 May 2020 06:12    TPro  6.8  /  Alb  3.4  /  TBili  0.6  /  DBili  x   /  AST  17  /  ALT  21  /  AlkPhos  69        Type and Screen:   type    UA:Urinalysis (20 @ 21:27)    Blood, Urine: Trace    Glucose Qualitative, Urine: Negative mg/dL    pH Urine: 7.0    Color: Yellow    Urine Appearance: Clear    Bilirubin: Negative    Ketone - Urine: Negative    Specific Gravity: 1.005    Protein, Urine: Negative mg/dL    Urobilinogen: Negative mg/dL    Nitrite: Negative    Leukocyte Esterase Concentration: Small        EKG: < from: 12 Lead ECG (20 @ 20:12) >  Line Normal sinus rhythm  Possible Left atrial enlargement  Nonspecific ST abnormality    < end of copied text >        CXR: < from: Xray Chest 1 View AP/PA. (20 @ 22:41) >    . No evidence of acute pulmonary disease.    Plan:  - NPO after midnight except meds  - IVF while NPO  - Consent pending  - Medical clearance for OR    Juan M Sharp PA-C    p#[phone]

## 2020-05-28 NOTE — PROGRESS NOTE ADULT - PROBLEM SELECTOR PLAN 3
Neoplasms vs. TB   CT chest (5/19/20): 3.8 x 1.9 cm spiculated right upper lobe mass with central cavitation   Thoracic sx, ID and Pulmonary consult appreciated.   Previous QuantiFeron negative, AFB sputum neg X2 now  Cavity lesion biopsy significant for Necrotizing epithelioid granulomatous inflammation  Plan for OR tomorrow

## 2020-05-28 NOTE — PROGRESS NOTE ADULT - SUBJECTIVE AND OBJECTIVE BOX
JEOVANY ACOSTA    [address]    Patient is a 49y old  Male who presents with a chief complaint of abdominal pain, dyspnea (27 May 2020 17:29)       Allergies    adhesives (Rash)  No Known Drug Allergies    Intolerances        HPI:  Patient is a 49M with a PMH of Barretts Disease, Crohn's on Pentasa IBS, spiculated lung mass who presents to worsening dyspnea on exertion and abdominal pain.  Patient states his breathing has been worsening over the last two weeks but denies hx of cough, fever, chills.  Has been following Pulmonary with Dr Ileana Disla.  Patient also states that he has had abdominal pain with poor PO intake for 5 days.  States he has had no BM for the last 5 days as well.  Follows with GI, and had a tele visit with Dr Russ 3 days ago who told him to present to the ED if his abdominal pain persisted.  Admitted to [facility] in 01/2020 - had biopsy that showed necrotizing granulomas in the lung.  Vitals stable.  Labs benign.  CT chest and abdomen negative for acute disease.  Will admit to floor for further eval. (23 May 2020 01:31)      PAST MEDICAL & SURGICAL HISTORY:  Alfaro's esophagus without dysplasia  Depression, unspecified depression type  Crohn's disease with complication, unspecified gastrointestinal tract location  ACL (anterior cruciate ligament) tear      FAMILY HISTORY:  Family history of diabetes mellitus in maternal grandmother (Mother, Grandparent)  Family history of COPD (chronic obstructive pulmonary disease) (Father)  Family history of colon cancer in father (Father)  Family history of hypertension in father (Father, Mother, Grandparent)        MEDICATIONS   acetaminophen   Tablet .. 650 milliGRAM(s) Oral every 6 hours PRN  aluminum hydroxide/magnesium hydroxide/simethicone Suspension 30 milliLiter(s) Oral every 6 hours PRN  busPIRone 15 milliGRAM(s) Oral three times a day  dicyclomine 10 milliGRAM(s) Oral two times a day before meals  enoxaparin Injectable 40 milliGRAM(s) SubCutaneous daily  lactulose Syrup 10 Gram(s) Oral two times a day PRN  melatonin 3 milliGRAM(s) Oral at bedtime  mesalamine ER Capsule 1000 milliGRAM(s) Oral two times a day with meals  midodrine. 10 milliGRAM(s) Oral three times a day  nicotine  14 mG/24 Hr(s) Transdermal Patch - Peds 1 Patch Transdermal daily  ondansetron Injectable 4 milliGRAM(s) IV Push every 6 hours PRN  oxybutynin 5 milliGRAM(s) Oral two times a day  pantoprazole    Tablet 40 milliGRAM(s) Oral before breakfast  tamsulosin 0.4 milliGRAM(s) Oral daily  tiotropium 18 MICROgram(s) Capsule 1 Capsule(s) Inhalation daily  varenicline 1 milliGRAM(s) Oral every 12 hours      Vital Signs Last 24 Hrs  T(C): 36.3 (28 May 2020 05:46), Max: 37.2 (27 May 2020 17:05)  T(F): 97.4 (28 May 2020 05:46), Max: 99 (27 May 2020 17:05)  HR: 65 (28 May 2020 05:46) (65 - 75)  BP: 123/75 (28 May 2020 05:46) (108/74 - 139/85)  BP(mean): --  RR: 18 (28 May 2020 05:46) (16 - 18)  SpO2: 98% (28 May 2020 05:46) (96% - 99%)      05-27-20 @ 07:01  -  05-28-20 @ 07:00  --------------------------------------------------------  IN: 240 mL / OUT: 0 mL / NET: 240 mL            LABS:                        14.4   7.67  )-----------( 227      ( 28 May 2020 06:12 )             43.3     05-28    136  |  104  |  13  ----------------------------<  80  3.8   |  26  |  1.19    Ca    8.6      28 May 2020 06:12    TPro  6.8  /  Alb  3.4  /  TBili  0.6  /  DBili  x   /  AST  17  /  ALT  21  /  AlkPhos  69  05-28              WBC:  WBC Count: 7.67 K/uL (05-28 @ 06:12)      MICROBIOLOGY:  RECENT CULTURES:  05-25 .Sputum Sputum XXXX XXXX   Culture is being performed.                    Sodium:  Sodium, Serum: 136 mmol/L (05-28 @ 06:12)  Sodium, Serum: 137 mmol/L (05-26 @ 06:18)      1.19 mg/dL 05-28 @ 06:12  1.25 mg/dL 05-26 @ 06:18      Hemoglobin:  Hemoglobin: 14.4 g/dL (05-28 @ 06:12)      Platelets: Platelet Count - Automated: 227 K/uL (05-28 @ 06:12)      LIVER FUNCTIONS - ( 28 May 2020 06:12 )  Alb: 3.4 g/dL / Pro: 6.8 gm/dL / ALK PHOS: 69 U/L / ALT: 21 U/L / AST: 17 U/L / GGT: x                 RADIOLOGY & ADDITIONAL STUDIES:

## 2020-05-29 ENCOUNTER — RESULT REVIEW (OUTPATIENT)
Age: 50
End: 2020-05-29

## 2020-05-29 LAB
ALBUMIN SERPL ELPH-MCNC: 3.5 G/DL — SIGNIFICANT CHANGE UP (ref 3.3–5)
ALP SERPL-CCNC: 69 U/L — SIGNIFICANT CHANGE UP (ref 40–120)
ALT FLD-CCNC: 22 U/L — SIGNIFICANT CHANGE UP (ref 12–78)
ANION GAP SERPL CALC-SCNC: 4 MMOL/L — LOW (ref 5–17)
AST SERPL-CCNC: 18 U/L — SIGNIFICANT CHANGE UP (ref 15–37)
BASE EXCESS BLDA CALC-SCNC: -3.1 MMOL/L — LOW (ref -2–2)
BILIRUB SERPL-MCNC: 0.7 MG/DL — SIGNIFICANT CHANGE UP (ref 0.2–1.2)
BLOOD GAS COMMENTS: SIGNIFICANT CHANGE UP
BLOOD GAS SOURCE: SIGNIFICANT CHANGE UP
BUN SERPL-MCNC: 12 MG/DL — SIGNIFICANT CHANGE UP (ref 7–23)
CALCIUM SERPL-MCNC: 8.7 MG/DL — SIGNIFICANT CHANGE UP (ref 8.5–10.1)
CHLORIDE SERPL-SCNC: 105 MMOL/L — SIGNIFICANT CHANGE UP (ref 96–108)
CO2 SERPL-SCNC: 30 MMOL/L — SIGNIFICANT CHANGE UP (ref 22–31)
CREAT SERPL-MCNC: 1.11 MG/DL — SIGNIFICANT CHANGE UP (ref 0.5–1.3)
CULTURE RESULTS: NO GROWTH — SIGNIFICANT CHANGE UP
GLUCOSE SERPL-MCNC: 91 MG/DL — SIGNIFICANT CHANGE UP (ref 70–99)
HCO3 BLDA-SCNC: 24 MMOL/L — SIGNIFICANT CHANGE UP (ref 21–29)
HCT VFR BLD CALC: 44.3 % — SIGNIFICANT CHANGE UP (ref 39–50)
HGB BLD-MCNC: 14.8 G/DL — SIGNIFICANT CHANGE UP (ref 13–17)
HOROWITZ INDEX BLDA+IHG-RTO: 1 — SIGNIFICANT CHANGE UP
MCHC RBC-ENTMCNC: 31 PG — SIGNIFICANT CHANGE UP (ref 27–34)
MCHC RBC-ENTMCNC: 33.4 GM/DL — SIGNIFICANT CHANGE UP (ref 32–36)
MCV RBC AUTO: 92.7 FL — SIGNIFICANT CHANGE UP (ref 80–100)
NRBC # BLD: 0 /100 WBCS — SIGNIFICANT CHANGE UP (ref 0–0)
PCO2 BLDA: 51 MMHG — HIGH (ref 32–46)
PH BLD: 7.29 — LOW (ref 7.35–7.45)
PLATELET # BLD AUTO: 219 K/UL — SIGNIFICANT CHANGE UP (ref 150–400)
PO2 BLDA: 359 MMHG — HIGH (ref 74–108)
POTASSIUM SERPL-MCNC: 4.6 MMOL/L — SIGNIFICANT CHANGE UP (ref 3.5–5.3)
POTASSIUM SERPL-SCNC: 4.6 MMOL/L — SIGNIFICANT CHANGE UP (ref 3.5–5.3)
PROT SERPL-MCNC: 6.9 GM/DL — SIGNIFICANT CHANGE UP (ref 6–8.3)
RBC # BLD: 4.78 M/UL — SIGNIFICANT CHANGE UP (ref 4.2–5.8)
RBC # FLD: 12.7 % — SIGNIFICANT CHANGE UP (ref 10.3–14.5)
SAO2 % BLDA: 100 % — HIGH (ref 92–96)
SARS-COV-2 RNA SPEC QL NAA+PROBE: SIGNIFICANT CHANGE UP
SODIUM SERPL-SCNC: 139 MMOL/L — SIGNIFICANT CHANGE UP (ref 135–145)
SPECIMEN SOURCE: SIGNIFICANT CHANGE UP
WBC # BLD: 6.43 K/UL — SIGNIFICANT CHANGE UP (ref 3.8–10.5)
WBC # FLD AUTO: 6.43 K/UL — SIGNIFICANT CHANGE UP (ref 3.8–10.5)

## 2020-05-29 PROCEDURE — 99232 SBSQ HOSP IP/OBS MODERATE 35: CPT

## 2020-05-29 PROCEDURE — 99291 CRITICAL CARE FIRST HOUR: CPT

## 2020-05-29 PROCEDURE — 88305 TISSUE EXAM BY PATHOLOGIST: CPT | Mod: 26

## 2020-05-29 PROCEDURE — 71250 CT THORAX DX C-: CPT | Mod: 26

## 2020-05-29 PROCEDURE — 88331 PATH CONSLTJ SURG 1 BLK 1SPC: CPT | Mod: 26

## 2020-05-29 RX ORDER — MIDAZOLAM HYDROCHLORIDE 1 MG/ML
4 INJECTION, SOLUTION INTRAMUSCULAR; INTRAVENOUS ONCE
Refills: 0 | Status: DISCONTINUED | OUTPATIENT
Start: 2020-05-29 | End: 2020-05-29

## 2020-05-29 RX ORDER — FENTANYL CITRATE 50 UG/ML
100 INJECTION INTRAVENOUS ONCE
Refills: 0 | Status: DISCONTINUED | OUTPATIENT
Start: 2020-05-29 | End: 2020-05-29

## 2020-05-29 RX ORDER — DEXMEDETOMIDINE HYDROCHLORIDE IN 0.9% SODIUM CHLORIDE 4 UG/ML
0.2 INJECTION INTRAVENOUS
Qty: 200 | Refills: 0 | Status: DISCONTINUED | OUTPATIENT
Start: 2020-05-29 | End: 2020-05-29

## 2020-05-29 RX ORDER — PROPOFOL 10 MG/ML
10 INJECTION, EMULSION INTRAVENOUS
Qty: 1000 | Refills: 0 | Status: DISCONTINUED | OUTPATIENT
Start: 2020-05-29 | End: 2020-05-30

## 2020-05-29 RX ORDER — CHLORHEXIDINE GLUCONATE 213 G/1000ML
1 SOLUTION TOPICAL
Refills: 0 | Status: DISCONTINUED | OUTPATIENT
Start: 2020-05-29 | End: 2020-05-30

## 2020-05-29 RX ORDER — TAMSULOSIN HYDROCHLORIDE 0.4 MG/1
0.4 CAPSULE ORAL DAILY
Refills: 0 | Status: DISCONTINUED | OUTPATIENT
Start: 2020-05-29 | End: 2020-05-29

## 2020-05-29 RX ORDER — ONDANSETRON 8 MG/1
4 TABLET, FILM COATED ORAL EVERY 6 HOURS
Refills: 0 | Status: DISCONTINUED | OUTPATIENT
Start: 2020-05-29 | End: 2020-06-05

## 2020-05-29 RX ORDER — NICOTINE POLACRILEX 2 MG
1 GUM BUCCAL DAILY
Refills: 0 | Status: DISCONTINUED | OUTPATIENT
Start: 2020-05-29 | End: 2020-05-30

## 2020-05-29 RX ORDER — FENTANYL CITRATE 50 UG/ML
50 INJECTION INTRAVENOUS EVERY 4 HOURS
Refills: 0 | Status: DISCONTINUED | OUTPATIENT
Start: 2020-05-29 | End: 2020-05-30

## 2020-05-29 RX ORDER — PANTOPRAZOLE SODIUM 20 MG/1
40 TABLET, DELAYED RELEASE ORAL
Refills: 0 | Status: DISCONTINUED | OUTPATIENT
Start: 2020-05-29 | End: 2020-06-05

## 2020-05-29 RX ORDER — MIDAZOLAM HYDROCHLORIDE 1 MG/ML
0.02 INJECTION, SOLUTION INTRAMUSCULAR; INTRAVENOUS
Qty: 100 | Refills: 0 | Status: DISCONTINUED | OUTPATIENT
Start: 2020-05-29 | End: 2020-05-29

## 2020-05-29 RX ORDER — SODIUM CHLORIDE 9 MG/ML
1000 INJECTION, SOLUTION INTRAVENOUS
Refills: 0 | Status: DISCONTINUED | OUTPATIENT
Start: 2020-05-29 | End: 2020-05-30

## 2020-05-29 RX ORDER — MIDODRINE HYDROCHLORIDE 2.5 MG/1
10 TABLET ORAL THREE TIMES A DAY
Refills: 0 | Status: DISCONTINUED | OUTPATIENT
Start: 2020-05-29 | End: 2020-05-30

## 2020-05-29 RX ORDER — FENTANYL CITRATE 50 UG/ML
0.5 INJECTION INTRAVENOUS
Qty: 2500 | Refills: 0 | Status: DISCONTINUED | OUTPATIENT
Start: 2020-05-29 | End: 2020-05-30

## 2020-05-29 RX ORDER — MESALAMINE 400 MG
1000 TABLET, DELAYED RELEASE (ENTERIC COATED) ORAL
Refills: 0 | Status: DISCONTINUED | OUTPATIENT
Start: 2020-05-29 | End: 2020-06-05

## 2020-05-29 RX ORDER — CHLORHEXIDINE GLUCONATE 213 G/1000ML
15 SOLUTION TOPICAL EVERY 12 HOURS
Refills: 0 | Status: DISCONTINUED | OUTPATIENT
Start: 2020-05-29 | End: 2020-05-30

## 2020-05-29 RX ADMIN — MIDAZOLAM HYDROCHLORIDE 4 MILLIGRAM(S): 1 INJECTION, SOLUTION INTRAMUSCULAR; INTRAVENOUS at 18:20

## 2020-05-29 RX ADMIN — FENTANYL CITRATE 3.24 MICROGRAM(S)/KG/HR: 50 INJECTION INTRAVENOUS at 18:05

## 2020-05-29 RX ADMIN — Medication 2 MILLIGRAM(S): at 18:10

## 2020-05-29 RX ADMIN — Medication 15 MILLIGRAM(S): at 06:23

## 2020-05-29 RX ADMIN — PROPOFOL 3.88 MICROGRAM(S)/KG/MIN: 10 INJECTION, EMULSION INTRAVENOUS at 17:25

## 2020-05-29 RX ADMIN — PANTOPRAZOLE SODIUM 40 MILLIGRAM(S): 20 TABLET, DELAYED RELEASE ORAL at 06:23

## 2020-05-29 RX ADMIN — TIOTROPIUM BROMIDE 1 CAPSULE(S): 18 CAPSULE ORAL; RESPIRATORY (INHALATION) at 11:11

## 2020-05-29 RX ADMIN — FENTANYL CITRATE 100 MICROGRAM(S): 50 INJECTION INTRAVENOUS at 17:49

## 2020-05-29 RX ADMIN — Medication 1 PATCH: at 22:14

## 2020-05-29 RX ADMIN — Medication 1 MILLIGRAM(S): at 06:23

## 2020-05-29 RX ADMIN — CHLORHEXIDINE GLUCONATE 15 MILLILITER(S): 213 SOLUTION TOPICAL at 19:12

## 2020-05-29 RX ADMIN — Medication 5 MILLIGRAM(S): at 06:23

## 2020-05-29 RX ADMIN — SODIUM CHLORIDE 150 MILLILITER(S): 9 INJECTION, SOLUTION INTRAVENOUS at 21:23

## 2020-05-29 NOTE — PROGRESS NOTE ADULT - SUBJECTIVE AND OBJECTIVE BOX
HPI:  Patient is a 49M with a PMH of Barretts Disease, Crohn's on Pentasa IBS, spiculated lung mass who presents to worsening dyspnea on exertion and abdominal pain.  Patient states his breathing has been worsening over the last two weeks but denies hx of cough, fever, chills.  Has been following Pulmonary with Dr Ileana Disla.  Patient also states that he has had abdominal pain with poor PO intake for 5 days.  States he has had no BM for the last 5 days as well.  Follows with GI, and had a tele visit with Dr Russ 3 days ago who told him to present to the ED if his abdominal pain persisted.  Admitted to [facility] in 2020 - had biopsy that showed necrotizing granulomas in the lung.  Vitals stable.  Labs benign.  CT chest and abdomen negative for acute disease.  Will admit to floor for further eval. (23 May 2020 01:31)      Allergies    adhesives (Rash)  No Known Drug Allergies    Intolerances        MEDICATIONS  (STANDING):  busPIRone 15 milliGRAM(s) Oral three times a day  dextrose 5% + sodium chloride 0.45%. 1000 milliLiter(s) (50 mL/Hr) IV Continuous <Continuous>  dicyclomine 10 milliGRAM(s) Oral two times a day before meals  enoxaparin Injectable 40 milliGRAM(s) SubCutaneous daily  melatonin 3 milliGRAM(s) Oral at bedtime  mesalamine ER Capsule 1000 milliGRAM(s) Oral two times a day with meals  midodrine. 10 milliGRAM(s) Oral three times a day  nicotine  14 mG/24 Hr(s) Transdermal Patch - Peds 1 Patch Transdermal daily  oxybutynin 5 milliGRAM(s) Oral two times a day  pantoprazole    Tablet 40 milliGRAM(s) Oral before breakfast  tamsulosin 0.4 milliGRAM(s) Oral daily  tiotropium 18 MICROgram(s) Capsule 1 Capsule(s) Inhalation daily  varenicline 1 milliGRAM(s) Oral every 12 hours    MEDICATIONS  (PRN):  acetaminophen   Tablet .. 650 milliGRAM(s) Oral every 6 hours PRN Temp greater or equal to 38C (100.4F), Mild Pain (1 - 3)  aluminum hydroxide/magnesium hydroxide/simethicone Suspension 30 milliLiter(s) Oral every 6 hours PRN Dyspepsia  lactulose Syrup 10 Gram(s) Oral two times a day PRN constipation  ondansetron Injectable 4 milliGRAM(s) IV Push every 6 hours PRN Nausea and/or Vomiting      REVIEW OF SYSTEMS:    CONSTITUTIONAL: No fever, chills, weight loss, or fatigue  HEENT: No sore throat, runny nose, ear ache  RESPIRATORY: No cough, wheezing, No shortness of breath  CARDIOVASCULAR: No chest pain, palpitations, dizziness  GASTROINTESTINAL: No abdominal pain. No nausea, vomiting, diarrhea  GENITOURINARY: No dysuria, increase frequency, hematuria, or incontinence  NEUROLOGICAL: No headaches, memory loss, loss of strength, numbness, or tremors, no weakness  EXTREMITY: No pedal edema BLE  SKIN: No itching, burning, rashes, or lesions     VITAL SIGNS:  T(C): 36.5 (20 @ 12:02), Max: 37.3 (20 @ 17:30)  T(F): 97.7 (20 @ :02), Max: 99.2 (20 @ 17:30)  HR: 66 (20 @ :) (66 - 73)  BP: 98/74 (20 @ 12:02) (98/74 - 131/80)  RR: 18 (20 @ :02) (16 - 18)  SpO2: 100% (20 @ 12:02) (96% - 100%)  Wt(kg): --    PHYSICAL EXAM:    GENERAL: not in any distress  HEENT: Neck is supple, normocephalic, atraumatic   CHEST/LUNG: Clear to percussion bilaterally; No rales, rhonchi, wheezing  HEART: Regular rate and rhythm; No murmurs, rubs, or gallops  ABDOMEN: Soft, Nontender, Nondistended; Bowel sounds present, no rebound   EXTREMITIES:  2+ Peripheral Pulses, No clubbing, cyanosis, or edema  GENITOURINARY:   SKIN: No rashes or lesions  BACK: no pressor sore   NERVOUS SYSTEM:  Alert & Oriented X3, Good concentration  PSYCH: normal affect     LABS:                         14.8   6.43  )-----------( 219      ( 29 May 2020 06:20 )             44.3         139  |  105  |  12  ----------------------------<  91  4.6   |  30  |  1.11    Ca    8.7      29 May 2020 06:20    TPro  6.9  /  Alb  3.5  /  TBili  0.7  /  DBili  x   /  AST  18  /  ALT  22  /  AlkPhos  69  05-    LIVER FUNCTIONS - ( 29 May 2020 06:20 )  Alb: 3.5 g/dL / Pro: 6.9 gm/dL / ALK PHOS: 69 U/L / ALT: 22 U/L / AST: 18 U/L / GGT: x           PT/INR - ( 28 May 2020 12:45 )   PT: 11.3 sec;   INR: 1.02 ratio         PTT - ( 28 May 2020 12:45 )  PTT:30.8 sec  Urinalysis Basic - ( 28 May 2020 13:53 )    Color: Yellow / Appearance: Clear / S.010 / pH: x  Gluc: x / Ketone: Trace  / Bili: Negative / Urobili: Negative mg/dL   Blood: x / Protein: Negative mg/dL / Nitrite: Negative   Leuk Esterase: Negative / RBC: x / WBC x   Sq Epi: x / Non Sq Epi: x / Bacteria: x                          Culture Results:   Culture is being performed. ( @ 00:16)                Radiology:

## 2020-05-29 NOTE — CONSULT NOTE ADULT - SUBJECTIVE AND OBJECTIVE BOX
HPI:  Patient is a 49M with a PMH of Barretts Disease, Crohn's on Pentasya, IBS, spiculated lung mass who presents to worsening dyspnea on exertion and abdominal pain.  Patient states his breathing has been worsening over the last two weeks but denies hx of cough, fever, chills.  Has been following Pulmonary with Dr Ileana Disla.  Patient also states that he has had abdominal pain with poor PO intake for 5 days.  States he has had no BM for the last 5 days as well.  Follows with GI, and had a tele visit with Dr Russ 3 days ago who told him to present to the ED if his abdominal pain persisted.  Admitted to [facility] in 2020 - had biopsy that showed necrotizing granulomas in the lung.  Vitals stable.  Labs benign.  CT chest and abdomen negative for acute disease.     During Admission Quanterferon sent NEG X 2   COVID -NEGATIVE     CT Chest No Cont (20 @ 06:11) >  IMPRESSION:   Right apical upper lobe masslike density with spiculated margins and small cystic cavity measuring up to 4.1 x 1.3 cm unchanged compared to prior exams. PET/CT and/or biopsy should be considered to exclude malignancy.    : Patient to OR for R VATS, Post op patient was reintubated for hypoxia and agitation. Admitted to ICU for Vent management.     ICU Vital Signs Last 24 Hrs  T(C): 36.5 (29 May 2020 12:02), Max: 36.5 (29 May 2020 11:27)  T(F): 97.7 (29 May 2020 12:02), Max: 97.7 (29 May 2020 11:27)  HR: 76 (29 May 2020 18:30) (66 - 97)  BP: 139/84 (29 May 2020 17:25) (98/74 - 139/84)  BP(mean): 97 (29 May 2020 17:25) (97 - 97)  ABP: 115/68 (29 May 2020 18:30) (115/68 - 195/108)  ABP(mean): 74 (29 May 2020 18:30) (74 - 163)  RR: 18 (29 May 2020 18:30) (16 - 20)  SpO2: 100% (29 May 2020 18:30) (97% - 100%)                          14.8   6.43  )-----------( 219      ( 29 May 2020 06:20 )             44.3         139  |  105  |  12  ----------------------------<  91  4.6   |  30  |  1.11    Ca    8.7      29 May 2020 06:20    TPro  6.9  /  Alb  3.5  /  TBili  0.7  /  DBili  x   /  AST  18  /  ALT  22  /  AlkPhos  69      PT/INR - ( 28 May 2020 12:45 )   PT: 11.3 sec;   INR: 1.02 ratio         PTT - ( 28 May 2020 12:45 )  PTT:30.8 sec  Urinalysis Basic - ( 28 May 2020 13:53 )    Color: Yellow / Appearance: Clear / S.010 / pH: x  Gluc: x / Ketone: Trace  / Bili: Negative / Urobili: Negative mg/dL   Blood: x / Protein: Negative mg/dL / Nitrite: Negative   Leuk Esterase: Negative / RBC: x / WBC x   Sq Epi: x / Non Sq Epi: x / Bacteria: x

## 2020-05-29 NOTE — CHART NOTE - NSCHARTNOTEFT_GEN_A_CORE
Attempted to reach next of kin follow patient's procedure to inform of patient status.  Unable to reach after multiple attempts.

## 2020-05-29 NOTE — PROGRESS NOTE ADULT - SUBJECTIVE AND OBJECTIVE BOX
49y year old Male POD#0 s/p VATS / open biopsy right upper lobe    Patient seen and examined in CCU  Intubated and sedated, with wrist restraints for agitation per RN.    Vital Signs Last 24 Hrs  T(F): 96.7 (05-29-20 @ 18:00), Max: 97.7 (05-29-20 @ 11:27)  HR: 75 (05-29-20 @ 23:00)  BP: 139/84 (05-29-20 @ 17:25)  RR: 18 (05-29-20 @ 23:00)  SpO2: 100% (05-29-20 @ 23:00)  Wt(kg): --     GENERAL: intubated and sedated  CHEST: right hilario drain and CT intact, pleurevac with blood tinged drainage, to wall suction.   LUNG: vented  HEART: +S1S2  ABDOMEN: soft, nondistended.   EXTREMITIES: wrist restraints  : burris indwelling with clear urine output    A/P: 49M with h/o tobacco use and RUL spiculated lung mass cavitary lesion admitted with dyspnea on exertion POD#0 s/p VATS wedge resection of right upper lobe of lung, reintubated d/t hypoxia post op  - continue CCU management, vent support/weaning as tolerated, IVF hydration  - continue Right CT to LWS, local drain care  - Follow up pathology  - c/w ID, pulm, medical Follow up. 49y year old Male POD#0 s/p VATS, wedge resection, open biopsy right upper lobe    Patient seen and examined in CCU  Intubated and sedated, with wrist restraints for agitation per RN.    Vital Signs Last 24 Hrs  T(F): 96.7 (05-29-20 @ 18:00), Max: 97.7 (05-29-20 @ 11:27)  HR: 75 (05-29-20 @ 23:00)  BP: 139/84 (05-29-20 @ 17:25)  RR: 18 (05-29-20 @ 23:00)  SpO2: 100% (05-29-20 @ 23:00)  Wt(kg): --     GENERAL: intubated and sedated  CHEST: right hilario drain and CT intact, pleurevac with blood tinged drainage, to wall suction.   LUNG: vented  HEART: +S1S2  ABDOMEN: soft, nondistended.   EXTREMITIES: wrist restraints  : burris indwelling with clear urine output    A/P: 49M with h/o tobacco use and RUL spiculated lung mass cavitary lesion admitted with dyspnea on exertion POD#0 s/p VATS wedge resection of right upper lobe of lung, reintubated d/t hypoxia post op  - continue CCU management, vent support/weaning as tolerated, IVF hydration  - continue Right CT to LWS, local drain care  - Follow up pathology  - c/w ID, pulm, medical Follow up.

## 2020-05-29 NOTE — CONSULT NOTE ADULT - ATTENDING COMMENTS
agree with above.  Briefly this is 49 year old male that was hypoxic following extubation for VATS. reintubated.  will wean as tolerated.

## 2020-05-29 NOTE — CONSULT NOTE ADULT - ASSESSMENT
49M with a PMH of Barretts Disease, ?Crohn's on Pentasa, IBS presents to ED with SOB, abdominal discomfort in setting of constipation     CT on presentation unremarkable without any evidence of inflammation.     Of note outpatient work-up has been negative for Crohns however he is adament pathology from small bowel resection noted Crohns (was not able to obtain pathology). Work-up including EGD/colonoscopy otherwise negative for IBD. Capsule pending as outpatient     Abdominal pain is most likely secondary to irritable bowel syndrome with constipation. CT unremarkable. No concerning signs on physical exam. Would treat constipation conservatively and monitor closely.
Patient is a 49M with a PMH of Barretts Disease, Crohn's on Pentasya, IBS, spiculated lung mass who presents to worsening dyspnea on exertion and abdominal pain.  Patient states his breathing has been worsening over the last two weeks but denies hx of cough, fever, chills.  Has been following Pulmonary with Dr Ileana Disla.  Patient also states that he has had abdominal pain with poor PO intake for 5 days.  States he has had no BM for the last 5 days as well.  Follows with GI, and had a tele visit with Dr Russ 3 days ago who told him to present to the ED if his abdominal pain persisted.  Admitted to [facility] in 01/2020 - had biopsy that showed necrotizing granulomas in the lung.  Vitals stable.  Labs benign.  CT chest and abdomen negative for acute disease.      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    Plan: Unclear etiology, CT abdomen appears benign.   GI consult appreciated.   continue with laxative to move bowels   avoid opioids, continue with Protonix and antiemetics.      Problem/Plan -  · Problem: Crohn's disease of small intestine with other complication.  Plan: GI consult appreciated   no evidence to support Crohn's disease, including previous EGD/Colonoscopy    CT Abd: no acute changes  cont w/ Pentasa   pt may need outpatient Pill endoscopy with GI.      Problem/Plan - 4:  ·  Problem: Orthostatic hypotension.  Plan: cont w/ midodrine.      Problem/Plan - 5:  ·  Problem: BPH without urinary obstruction.  Plan: resume flomax when able to take PO       Problem/Plan - 6:  Problem: Depression, unspecified depression type. Plan: cont w/ buspirone.     Problem/Plan - 7:  ·  Problem: Nicotine abuse.  Plan: cont w/ nicotine patch,       Problem/Plan - 8:  ·  Problem: Preventive measure.  Plan: DVT prop: Lovenox 40 daily.
DAVE THAYER LIALEJANDRA  [address] [phone]  1970 DOA 2020 DR LYDIA FRANCISCO   ALLERGY    adhesives     CONTACT    Parent Cinda Smith              Initial evaluation/Pulmonary Critical Care consultation requested on  2020  by Dr Mclean   from Dr Baires   Patient examined chart reviewed    HOSPITAL ADMISSION   PATIENT CAME  FROM (if information available)      DAVE WILLIAM  24 [phone]  1970 DOA 2020 DR LYDIA FRANCISCO           REVIEW OF SYMPTOMS      Able to give ROS  Yes     RELIABLE No   CONSTITUTIONAL Weakness Yes  Chills No Vision changes No  ENDOCRINE No unexplained hair loss No heat or cold intolerance    ALLERGY No hives  Sore throat No   RESP Coughing blood no  Shortness of breath YES   NEURO No Headache  Confusion Pain neck No   CARDIAC No Chest pain No Palpitations   GI No Pain abdomen NO   Vomiting NO     PHYSICAL EXAM    HEENT Unremarkable PERRLA atraumatic   RESP Fair air entry EXP prolonged    Harsh breath sound Resp distres mild   CARDIAC S1 S2 No S3     NO JVD    ABDOMEN SOFT BS PRESENT NOT DISTENDED No hepatosplenomegaly PEDAL EDEMA present No calf tenderness  NO rash   GENERAL Not TOXIC looking    OXYGENATION      2020 ra 96%     VITALS/LABS       2020 afeb 68 110/70   2020 w 8 Hb 14.8 Plt 260 Na 140 K 3.9 CO2 25 Cr 1   2020 Tr 1 n   2020 COVID 19 PCR -nicole   2020 ct cap cw  2020   3.8 x 1.9 cm spiculated rul mass with central cavity (prev 2 x 4 cm)   2020 ctap ic -nicole ddd [id number]       PT DATA/BEST PRACTICE  ALLERGY       adhesives               WT        64 (2020)                             BMI         20 (2020)                    CrCl                                  ADVANCED DIRECTIVE     HEAD OF BED ELEVATION Yes      INFECTION PPLX     DVT PROPHYLAXIS             Lvnx 40 ()        MCCANN PROPHYLAXIS    protonix 40 ()         DYSPHAGIA EVAL     DIET       reg ()                                                                   IV F                                                            CARDIAC  Midodrine  10.3 ()   ESR  1/10/2020 ESR 4     AFB STATUS   2020 AFB sm N-nicole     QFT Gold TB N-nicole   HIV STATUS    hiv -nicole  GALACTOMANNAN   Asperg GM N-nicole   PROCEDURE  2020 CT bx Necrotizing epithelioid granulomatous inflammation                                                             PATIENT SUMMARY   49 m PMH Crohns COPD R lung mass Barretts admitted 2020 with chest pressure and SOB x 1 w Also co LLQ pain x 5 d     ER vitals 145/114 92 afeb     home meds spiriva midodrine 10.3 nicotine 14 pentasa flomax .4                                            HOSPITAL COURSE/PLAN   DYSPNEA   RO COPD  RO VTE  RO CHF   RO VTE   COPD   BD started LABA LAMA ICS   LUNG CAVITY RUL   Airborne isolation till 3 neg sputum afb sm   Check qft gold tb test   Suggest HIV test  CAVITARY LUNG MASS RUL  2020 CT bx Necrotizing epithelioid granulomatous  Will need biopsy   Clinical suspicion for squamous cell cancer (smoker cavitary mass)    SMOKER   Nicotine 14 ()   varenicline 1.2 ()   HO POSITIVE HISTONE AB  2020   Histone ab P+nicole   ORTHOSTATIC HYPOTENSION  midodrine 10.3 ()   ? CROHNS COLITIS   As per GI note Outpt alcantara has been neg for Crohns but pt reports that small bowel resection showed crohns EGD Colonoscopy has been negative for IBD Capsule pending   Mescalamine 1000.4 ()               PATIENT DATA/ASSESSMENT/RECOMMENDATIONS     49 m who has liven in homeless shelter in past and has trevelled to [place] in past has rul cavitary lesion at least since 2020   His HIV test was negative    Hi 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   Being smoker he is also at risk for lung ca   Given the above information I strongly sustect active TB    DW Dr Laura 2020 3:15 PM     recommend    Airborne isolation  sputum afb x 3  qft gold   spiriva   symbicort   us duplex deanne   ECHO   Suggest ID eval to considert starting AFB treatment with 4 antiTB drugs based on necrotizing granulomatous inflammation seen on biopsy even if sputum afb is neg   May still need excision of lesion once tb treatment is started to exclude cancer                                            TIME SPENT Over 55 minutes aggregate care time spent on encounter; activities included   direct pt care, counseling and/or coordinating care reviewing notes, lab data/ imaging , discussion with multidisciplinary team/ pt /family. Risks, benefits, alternatives  discussed in detail.    DAVE THAYER LIJ  [phone]  1970 DOA 2020 DR LYDIA FRANCISCO
49M with a PMH of Barretts Disease, Crohn's on Pentasya, IBS, spiculated lung mass who presents to worsening dyspnea on exertion and abdominal pain.  Patient states his breathing has been worsening over the last two weeks but denies hx of cough, fever, chills.  Has been following Pulmonary with Dr Ileana Disla.  Patient also states that he has had abdominal pain with poor PO intake for 5 days.  States he has had no BM for the last 5 days as well.  Follows with GI, and had a tele visit with Dr Russ 3 days ago who told him to present to the ED if his abdominal pain persisted.  gi noted ;; not crohns pr gi note; IBS with constipation   needs work up of mass in lung  prev bx was inconclusive for malignancy   here admitted to rule out tb per pulm   qgtb was NEGATIVE before noted  will follow with you thanks

## 2020-05-29 NOTE — PROGRESS NOTE ADULT - SUBJECTIVE AND OBJECTIVE BOX
JEOVANY RINALDIANDRAE    S [address]E [address]    Patient is a 49y old  Male who presents with a chief complaint of abdominal pain, dyspnea (28 May 2020 15:34)       Allergies    adhesives (Rash)  No Known Drug Allergies    Intolerances        HPI:  Patient is a 49M with a PMH of Barretts Disease, Crohn's on Pentasa IBS, spiculated lung mass who presents to worsening dyspnea on exertion and abdominal pain.  Patient states his breathing has been worsening over the last two weeks but denies hx of cough, fever, chills.  Has been following Pulmonary with Dr Ileana Disla.  Patient also states that he has had abdominal pain with poor PO intake for 5 days.  States he has had no BM for the last 5 days as well.  Follows with GI, and had a tele visit with Dr Russ 3 days ago who told him to present to the ED if his abdominal pain persisted.  Admitted to [facility] in 2020 - had biopsy that showed necrotizing granulomas in the lung.  Vitals stable.  Labs benign.  CT chest and abdomen negative for acute disease.  Will admit to floor for further eval. (23 May 2020 01:31)      PAST MEDICAL & SURGICAL HISTORY:  Alfaro's esophagus without dysplasia  Depression, unspecified depression type  Crohn's disease with complication, unspecified gastrointestinal tract location  ACL (anterior cruciate ligament) tear      FAMILY HISTORY:  Family history of diabetes mellitus in maternal grandmother (Mother, Grandparent)  Family history of COPD (chronic obstructive pulmonary disease) (Father)  Family history of colon cancer in father (Father)  Family history of hypertension in father (Father, Mother, Grandparent)        MEDICATIONS   acetaminophen   Tablet .. 650 milliGRAM(s) Oral every 6 hours PRN  aluminum hydroxide/magnesium hydroxide/simethicone Suspension 30 milliLiter(s) Oral every 6 hours PRN  busPIRone 15 milliGRAM(s) Oral three times a day  dextrose 5% + sodium chloride 0.45%. 1000 milliLiter(s) IV Continuous <Continuous>  dicyclomine 10 milliGRAM(s) Oral two times a day before meals  enoxaparin Injectable 40 milliGRAM(s) SubCutaneous daily  lactulose Syrup 10 Gram(s) Oral two times a day PRN  melatonin 3 milliGRAM(s) Oral at bedtime  mesalamine ER Capsule 1000 milliGRAM(s) Oral two times a day with meals  midodrine. 10 milliGRAM(s) Oral three times a day  nicotine  14 mG/24 Hr(s) Transdermal Patch - Peds 1 Patch Transdermal daily  ondansetron Injectable 4 milliGRAM(s) IV Push every 6 hours PRN  oxybutynin 5 milliGRAM(s) Oral two times a day  pantoprazole    Tablet 40 milliGRAM(s) Oral before breakfast  tamsulosin 0.4 milliGRAM(s) Oral daily  tiotropium 18 MICROgram(s) Capsule 1 Capsule(s) Inhalation daily  varenicline 1 milliGRAM(s) Oral every 12 hours      Vital Signs Last 24 Hrs  T(C): 36.1 (29 May 2020 05:54), Max: 37.3 (28 May 2020 11:25)  T(F): 97 (29 May 2020 05:54), Max: 99.2 (28 May 2020 17:30)  HR: 72 (29 May 2020 05:54) (68 - 73)  BP: 118/82 (29 May 2020 05:54) (111/78 - 131/80)  BP(mean): --  RR: 16 (29 May 2020 05:54) (16 - 18)  SpO2: 98% (29 May 2020 05:54) (96% - 98%)      20 @ 07:01  -  20 @ 07:00  --------------------------------------------------------  IN: 630 mL / OUT: 0 mL / NET: 630 mL            LABS:                        14.8   6.43  )-----------( 219      ( 29 May 2020 06:20 )             44.3         139  |  105  |  12  ----------------------------<  91  4.6   |  30  |  1.11    Ca    8.7      29 May 2020 06:20    TPro  6.9  /  Alb  3.5  /  TBili  0.7  /  DBili  x   /  AST  18  /  ALT  22  /  AlkPhos  69      PT/INR - ( 28 May 2020 12:45 )   PT: 11.3 sec;   INR: 1.02 ratio         PTT - ( 28 May 2020 12:45 )  PTT:30.8 sec  Urinalysis Basic - ( 28 May 2020 13:53 )    Color: Yellow / Appearance: Clear / S.010 / pH: x  Gluc: x / Ketone: Trace  / Bili: Negative / Urobili: Negative mg/dL   Blood: x / Protein: Negative mg/dL / Nitrite: Negative   Leuk Esterase: Negative / RBC: x / WBC x   Sq Epi: x / Non Sq Epi: x / Bacteria: x            WBC:  WBC Count: 6.43 K/uL ( @ 06:20)  WBC Count: 7.67 K/uL ( @ 06:12)      MICROBIOLOGY:  RECENT CULTURES:   .Sputum Sputum XXXX XXXX   Culture is being performed.                PT/INR - ( 28 May 2020 12:45 )   PT: 11.3 sec;   INR: 1.02 ratio         PTT - ( 28 May 2020 12:45 )  PTT:30.8 sec    Sodium:  Sodium, Serum: 139 mmol/L ( @ 06:20)  Sodium, Serum: 136 mmol/L ( @ 06:12)  Sodium, Serum: 137 mmol/L ( @ 06:18)      1.11 mg/dL  @ 06:20  1.19 mg/dL  @ 06:12  1.25 mg/dL  @ 06:18      Hemoglobin:  Hemoglobin: 14.8 g/dL ( @ 06:20)  Hemoglobin: 14.4 g/dL ( @ 06:12)      Platelets: Platelet Count - Automated: 219 K/uL ( @ 06:20)  Platelet Count - Automated: 227 K/uL ( @ 06:12)      LIVER FUNCTIONS - ( 29 May 2020 06:20 )  Alb: 3.5 g/dL / Pro: 6.9 gm/dL / ALK PHOS: 69 U/L / ALT: 22 U/L / AST: 18 U/L / GGT: x             Urinalysis Basic - ( 28 May 2020 13:53 )    Color: Yellow / Appearance: Clear / S.010 / pH: x  Gluc: x / Ketone: Trace  / Bili: Negative / Urobili: Negative mg/dL   Blood: x / Protein: Negative mg/dL / Nitrite: Negative   Leuk Esterase: Negative / RBC: x / WBC x   Sq Epi: x / Non Sq Epi: x / Bacteria: x        RADIOLOGY & ADDITIONAL STUDIES:

## 2020-05-29 NOTE — PROGRESS NOTE ADULT - ASSESSMENT
cont rx cont rx    DAVE WILLIAM  [phone]  1970 DOA 2020 DR LYDIA FRANCISCO           REVIEW OF SYMPTOMS      Able to give ROS  Yes     RELIABLE No   CONSTITUTIONAL Weakness Yes  Chills No Vision changes No  ENDOCRINE No unexplained hair loss No heat or cold intolerance    ALLERGY No hives  Sore throat No   RESP Coughing blood no  Shortness of breath YES   NEURO No Headache  Confusion Pain neck No   CARDIAC No Chest pain No Palpitations   GI No Pain abdomen NO   Vomiting NO     PHYSICAL EXAM    HEENT Unremarkable PERRLA atraumatic   RESP Fair air entry EXP prolonged    Harsh breath sound Resp distres mild   CARDIAC S1 S2 No S3     NO JVD    ABDOMEN SOFT BS PRESENT NOT DISTENDED No hepatosplenomegaly PEDAL EDEMA present No calf tenderness  NO rash   GENERAL Not TOXIC looking    PROBLEMS  CAVITY RUL       OXYGENATION      -2020 ra 96% - ra 97%    VITALS/LABS   2020 afeb 60 98/70   2020 w 6.4 Hb 14.8 Plt 219 Cr 1.1     PT DATA/BEST PRACTICE  ALLERGY       adhesives               WT        64 (2020)                             BMI         20 (2020)                    CrCl                                  ADVANCED DIRECTIVE     HEAD OF BED ELEVATION Yes      INFECTION PPLX     DVT PROPHYLAXIS             Lvnx 40 ()    MCCANN PROPHYLAXIS    protonix 40 ()         DYSPHAGIA EVAL     DIET       reg ()                                                                   IV F        VTE RULED OUT  D-dimr 2020 d-dimr n    V duplx 2020 V duplex n   CARDIAC  Midodrine  10.3 ()   COVID   2020 COVID 19 PCR -juan carlos                                                             PATIENT DATA/ASSESSMENT/RECOMMENDATIONS     RO TB RO CA  49 m who has liven in homeless shelter in past and has trevelled to [place] in past has rul cavitary lesion at least since 2020   His HIV test was negative    His cavitary rul lesion  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    ct showed persistent 3.8 x 1.7 cm rul spiculated mass with cavity    afb smear N-juan carlos    QFT N-juan carlos   ANCA PANCA N-JUAN CARLOS () AANCA INDET ()   2020 ace and ds dna were N-juan carlos   Being smoker he is also at risk for lung ca   ID eval dated  noted   ID recommends to NOT start anti TB Rx based on available information   Given the above data TB as well as cancer still need to be definitively excluded   Agree with  excision of lesion to exclude cancer  PULMONARY CLEARANCE FOR PLANNED LUNG MASS RESECTION  DW DR Laura 2020  Patient is in optimal state and is cleared form Pulmonary standpoint   Need resection to exclude cancer or TB   Recommend clearance also by Infection disease        TIME SPENT Over 25 minutes aggregate care time spent on encounter; activities included   direct pt care, counseling and/or coordinating care reviewing notes, lab data/ imaging , discussion with multidisciplinary team/ pt /family. Risks, benefits, alternatives  discussed in detail.    DAVE THAYER LIJ  24 [phone]  1970 DOA 2020 DR LYDIA FRANCISCO

## 2020-05-29 NOTE — PROGRESS NOTE ADULT - ASSESSMENT
Lung mass with cavitation   no corroborating data that this is TB  CT abdomen appears benign. not clear etiology for self reported Crohn ds      QuantiFeron NEGATIVE on 1/2020 and again Neg on 5/24/2020  no other sign of TB   low esr , clinically ok   WOULD NOT START  TB DRUGS WITHOUT A DIAGNOSIS   CONSIDER RESECTION     atypical ANCA indeterminate   low procalcitonin , monitor off abx   appreciate thoracic   fu with path from a wedge of the upper lobe today  fu OR cultures and pathology   case discussed with patient today

## 2020-05-29 NOTE — BRIEF OPERATIVE NOTE - NSICDXBRIEFPREOP_GEN_ALL_CORE_FT
PRE-OP DIAGNOSIS:  Mass of right lung 29-May-2020 16:21:08  Valerio Marley  Lung mass 29-May-2020 16:20:37  Valerio Marley

## 2020-05-30 DIAGNOSIS — K22.70 BARRETT'S ESOPHAGUS WITHOUT DYSPLASIA: ICD-10-CM

## 2020-05-30 LAB
ALBUMIN SERPL ELPH-MCNC: 2.9 G/DL — LOW (ref 3.3–5)
ALP SERPL-CCNC: 58 U/L — SIGNIFICANT CHANGE UP (ref 40–120)
ALT FLD-CCNC: 23 U/L — SIGNIFICANT CHANGE UP (ref 12–78)
ANION GAP SERPL CALC-SCNC: 6 MMOL/L — SIGNIFICANT CHANGE UP (ref 5–17)
AST SERPL-CCNC: 29 U/L — SIGNIFICANT CHANGE UP (ref 15–37)
BASOPHILS # BLD AUTO: 0.01 K/UL — SIGNIFICANT CHANGE UP (ref 0–0.2)
BASOPHILS NFR BLD AUTO: 0.1 % — SIGNIFICANT CHANGE UP (ref 0–2)
BILIRUB SERPL-MCNC: 0.6 MG/DL — SIGNIFICANT CHANGE UP (ref 0.2–1.2)
BUN SERPL-MCNC: 10 MG/DL — SIGNIFICANT CHANGE UP (ref 7–23)
CALCIUM SERPL-MCNC: 8 MG/DL — LOW (ref 8.5–10.1)
CHLORIDE SERPL-SCNC: 106 MMOL/L — SIGNIFICANT CHANGE UP (ref 96–108)
CO2 SERPL-SCNC: 26 MMOL/L — SIGNIFICANT CHANGE UP (ref 22–31)
CREAT SERPL-MCNC: 0.99 MG/DL — SIGNIFICANT CHANGE UP (ref 0.5–1.3)
EOSINOPHIL # BLD AUTO: 0 K/UL — SIGNIFICANT CHANGE UP (ref 0–0.5)
EOSINOPHIL NFR BLD AUTO: 0 % — SIGNIFICANT CHANGE UP (ref 0–6)
GLUCOSE BLDC GLUCOMTR-MCNC: 107 MG/DL — HIGH (ref 70–99)
GLUCOSE SERPL-MCNC: 101 MG/DL — HIGH (ref 70–99)
GRAM STN FLD: SIGNIFICANT CHANGE UP
GRAM STN FLD: SIGNIFICANT CHANGE UP
HCT VFR BLD CALC: 39.1 % — SIGNIFICANT CHANGE UP (ref 39–50)
HGB BLD-MCNC: 13 G/DL — SIGNIFICANT CHANGE UP (ref 13–17)
IMM GRANULOCYTES NFR BLD AUTO: 0.4 % — SIGNIFICANT CHANGE UP (ref 0–1.5)
LYMPHOCYTES # BLD AUTO: 0.7 K/UL — LOW (ref 1–3.3)
LYMPHOCYTES # BLD AUTO: 5.7 % — LOW (ref 13–44)
MAGNESIUM SERPL-MCNC: 1.9 MG/DL — SIGNIFICANT CHANGE UP (ref 1.6–2.6)
MCHC RBC-ENTMCNC: 31.3 PG — SIGNIFICANT CHANGE UP (ref 27–34)
MCHC RBC-ENTMCNC: 33.2 GM/DL — SIGNIFICANT CHANGE UP (ref 32–36)
MCV RBC AUTO: 94 FL — SIGNIFICANT CHANGE UP (ref 80–100)
MONOCYTES # BLD AUTO: 0.88 K/UL — SIGNIFICANT CHANGE UP (ref 0–0.9)
MONOCYTES NFR BLD AUTO: 7.2 % — SIGNIFICANT CHANGE UP (ref 2–14)
NEUTROPHILS # BLD AUTO: 10.63 K/UL — HIGH (ref 1.8–7.4)
NEUTROPHILS NFR BLD AUTO: 86.6 % — HIGH (ref 43–77)
NIGHT BLUE STAIN TISS: SIGNIFICANT CHANGE UP
NIGHT BLUE STAIN TISS: SIGNIFICANT CHANGE UP
NRBC # BLD: 0 /100 WBCS — SIGNIFICANT CHANGE UP (ref 0–0)
PHOSPHATE SERPL-MCNC: 3.8 MG/DL — SIGNIFICANT CHANGE UP (ref 2.5–4.5)
PLATELET # BLD AUTO: 206 K/UL — SIGNIFICANT CHANGE UP (ref 150–400)
POTASSIUM SERPL-MCNC: 4.4 MMOL/L — SIGNIFICANT CHANGE UP (ref 3.5–5.3)
POTASSIUM SERPL-SCNC: 4.4 MMOL/L — SIGNIFICANT CHANGE UP (ref 3.5–5.3)
PROT SERPL-MCNC: 5.7 GM/DL — LOW (ref 6–8.3)
RBC # BLD: 4.16 M/UL — LOW (ref 4.2–5.8)
RBC # FLD: 12.7 % — SIGNIFICANT CHANGE UP (ref 10.3–14.5)
SODIUM SERPL-SCNC: 138 MMOL/L — SIGNIFICANT CHANGE UP (ref 135–145)
SPECIMEN SOURCE: SIGNIFICANT CHANGE UP
WBC # BLD: 12.27 K/UL — HIGH (ref 3.8–10.5)
WBC # FLD AUTO: 12.27 K/UL — HIGH (ref 3.8–10.5)

## 2020-05-30 PROCEDURE — 99233 SBSQ HOSP IP/OBS HIGH 50: CPT | Mod: GC

## 2020-05-30 PROCEDURE — 99232 SBSQ HOSP IP/OBS MODERATE 35: CPT

## 2020-05-30 PROCEDURE — 71045 X-RAY EXAM CHEST 1 VIEW: CPT | Mod: 26,77

## 2020-05-30 PROCEDURE — 71045 X-RAY EXAM CHEST 1 VIEW: CPT | Mod: 26

## 2020-05-30 RX ORDER — HYDROMORPHONE HYDROCHLORIDE 2 MG/ML
1 INJECTION INTRAMUSCULAR; INTRAVENOUS; SUBCUTANEOUS EVERY 4 HOURS
Refills: 0 | Status: DISCONTINUED | OUTPATIENT
Start: 2020-05-30 | End: 2020-06-02

## 2020-05-30 RX ORDER — SODIUM CHLORIDE 9 MG/ML
500 INJECTION, SOLUTION INTRAVENOUS ONCE
Refills: 0 | Status: DISCONTINUED | OUTPATIENT
Start: 2020-05-30 | End: 2020-06-05

## 2020-05-30 RX ORDER — FENTANYL CITRATE 50 UG/ML
20 INJECTION INTRAVENOUS ONCE
Refills: 0 | Status: DISCONTINUED | OUTPATIENT
Start: 2020-05-30 | End: 2020-05-30

## 2020-05-30 RX ORDER — HYDROMORPHONE HYDROCHLORIDE 2 MG/ML
2 INJECTION INTRAMUSCULAR; INTRAVENOUS; SUBCUTANEOUS ONCE
Refills: 0 | Status: DISCONTINUED | OUTPATIENT
Start: 2020-05-30 | End: 2020-05-30

## 2020-05-30 RX ORDER — BENZOCAINE AND MENTHOL 5; 1 G/100ML; G/100ML
1 LIQUID ORAL
Refills: 0 | Status: DISCONTINUED | OUTPATIENT
Start: 2020-05-30 | End: 2020-06-05

## 2020-05-30 RX ORDER — ENOXAPARIN SODIUM 100 MG/ML
40 INJECTION SUBCUTANEOUS DAILY
Refills: 0 | Status: DISCONTINUED | OUTPATIENT
Start: 2020-05-30 | End: 2020-06-05

## 2020-05-30 RX ORDER — POLYETHYLENE GLYCOL 3350 17 G/17G
17 POWDER, FOR SOLUTION ORAL
Refills: 0 | Status: DISCONTINUED | OUTPATIENT
Start: 2020-05-30 | End: 2020-06-05

## 2020-05-30 RX ORDER — PROPOFOL 10 MG/ML
20 INJECTION, EMULSION INTRAVENOUS ONCE
Refills: 0 | Status: COMPLETED | OUTPATIENT
Start: 2020-05-30 | End: 2020-05-30

## 2020-05-30 RX ORDER — OXYCODONE HYDROCHLORIDE 5 MG/1
5 TABLET ORAL EVERY 4 HOURS
Refills: 0 | Status: DISCONTINUED | OUTPATIENT
Start: 2020-05-30 | End: 2020-06-05

## 2020-05-30 RX ORDER — ONDANSETRON 8 MG/1
4 TABLET, FILM COATED ORAL ONCE
Refills: 0 | Status: COMPLETED | OUTPATIENT
Start: 2020-05-30 | End: 2020-05-30

## 2020-05-30 RX ORDER — TAMSULOSIN HYDROCHLORIDE 0.4 MG/1
0.4 CAPSULE ORAL AT BEDTIME
Refills: 0 | Status: DISCONTINUED | OUTPATIENT
Start: 2020-05-30 | End: 2020-06-05

## 2020-05-30 RX ORDER — ALPRAZOLAM 0.25 MG
0.5 TABLET ORAL ONCE
Refills: 0 | Status: DISCONTINUED | OUTPATIENT
Start: 2020-05-30 | End: 2020-05-30

## 2020-05-30 RX ORDER — SENNA PLUS 8.6 MG/1
2 TABLET ORAL AT BEDTIME
Refills: 0 | Status: DISCONTINUED | OUTPATIENT
Start: 2020-05-30 | End: 2020-06-05

## 2020-05-30 RX ADMIN — OXYCODONE HYDROCHLORIDE 5 MILLIGRAM(S): 5 TABLET ORAL at 21:28

## 2020-05-30 RX ADMIN — Medication 1 PATCH: at 07:45

## 2020-05-30 RX ADMIN — PROPOFOL 20 MILLIGRAM(S): 10 INJECTION, EMULSION INTRAVENOUS at 01:27

## 2020-05-30 RX ADMIN — HYDROMORPHONE HYDROCHLORIDE 1 MILLIGRAM(S): 2 INJECTION INTRAMUSCULAR; INTRAVENOUS; SUBCUTANEOUS at 10:06

## 2020-05-30 RX ADMIN — HYDROMORPHONE HYDROCHLORIDE 2 MILLIGRAM(S): 2 INJECTION INTRAMUSCULAR; INTRAVENOUS; SUBCUTANEOUS at 19:49

## 2020-05-30 RX ADMIN — Medication 1000 MILLIGRAM(S): at 10:07

## 2020-05-30 RX ADMIN — PANTOPRAZOLE SODIUM 40 MILLIGRAM(S): 20 TABLET, DELAYED RELEASE ORAL at 10:08

## 2020-05-30 RX ADMIN — FENTANYL CITRATE 20 MICROGRAM(S): 50 INJECTION INTRAVENOUS at 01:27

## 2020-05-30 RX ADMIN — Medication 15 MILLIGRAM(S): at 21:17

## 2020-05-30 RX ADMIN — FENTANYL CITRATE 3.24 MICROGRAM(S)/KG/HR: 50 INJECTION INTRAVENOUS at 01:22

## 2020-05-30 RX ADMIN — Medication 15 MILLIGRAM(S): at 06:35

## 2020-05-30 RX ADMIN — PROPOFOL 3.88 MICROGRAM(S)/KG/MIN: 10 INJECTION, EMULSION INTRAVENOUS at 01:22

## 2020-05-30 RX ADMIN — ENOXAPARIN SODIUM 40 MILLIGRAM(S): 100 INJECTION SUBCUTANEOUS at 12:29

## 2020-05-30 RX ADMIN — HYDROMORPHONE HYDROCHLORIDE 1 MILLIGRAM(S): 2 INJECTION INTRAMUSCULAR; INTRAVENOUS; SUBCUTANEOUS at 16:28

## 2020-05-30 RX ADMIN — POLYETHYLENE GLYCOL 3350 17 GRAM(S): 17 POWDER, FOR SOLUTION ORAL at 18:02

## 2020-05-30 RX ADMIN — ONDANSETRON 4 MILLIGRAM(S): 8 TABLET, FILM COATED ORAL at 19:48

## 2020-05-30 RX ADMIN — ONDANSETRON 4 MILLIGRAM(S): 8 TABLET, FILM COATED ORAL at 18:02

## 2020-05-30 RX ADMIN — Medication 15 MILLIGRAM(S): at 15:01

## 2020-05-30 RX ADMIN — ONDANSETRON 4 MILLIGRAM(S): 8 TABLET, FILM COATED ORAL at 23:57

## 2020-05-30 RX ADMIN — TAMSULOSIN HYDROCHLORIDE 0.4 MILLIGRAM(S): 0.4 CAPSULE ORAL at 21:18

## 2020-05-30 RX ADMIN — ONDANSETRON 4 MILLIGRAM(S): 8 TABLET, FILM COATED ORAL at 06:48

## 2020-05-30 NOTE — PROGRESS NOTE ADULT - ASSESSMENT
cont rx cont rx    DAVE WILLIAM  [phone]  1970 DOA 2020 DR LYDIA FRANCISCO           REVIEW OF SYMPTOMS      Able to give ROS  Yes     RELIABLE No   CONSTITUTIONAL Weakness Yes  Chills No Vision changes No  ENDOCRINE No unexplained hair loss No heat or cold intolerance    ALLERGY No hives  Sore throat No   RESP Coughing blood no  Shortness of breath YES   NEURO No Headache  Confusion Pain neck No   CARDIAC No Chest pain No Palpitations   GI No Pain abdomen NO   Vomiting NO     PHYSICAL EXAM    HEENT Unremarkable PERRLA atraumatic   RESP Fair air entry EXP prolonged    Harsh breath sound Resp distres mild   CARDIAC S1 S2 No S3     NO JVD    ABDOMEN SOFT BS PRESENT NOT DISTENDED No hepatosplenomegaly PEDAL EDEMA present No calf tenderness  NO rash   GENERAL Not TOXIC looking    PROBLEMS  CAVITY RUL       OXYGENATION      -2020 ra 96% - ra 97%    VITALS/LABS   2020 99f 78 120/80   2020 w 12.2 Hb 13 Cr .9     PT DATA/BEST PRACTICE  ALLERGY       adhesives               WT        64 (2020)                             BMI         20 (2020)                    CrCl                                  ADVANCED DIRECTIVE     HEAD OF BED ELEVATION Yes      INFECTION PPLX     DVT PROPHYLAXIS             Lvnx 40 ()    MCCANN PROPHYLAXIS    protonix 40 ()         DYSPHAGIA EVAL     DIET       reg ()                                                                   IV F      VTE RULED OUT  D-dimr 2020 d-dimr n    V duplx 2020 V duplex n   CARDIAC  Midodrine  10.3 ()   COVID   2020 COVID 19 PCR -nicole     CAVITY LUNG   IMAGING  2020 ct cap cw  2020   3.8 x 1.9 cm spiculated rul mass with central cavity (prev 2 x 4 cm)   ESR  1/ ESR 4-2     AFB STATUS    Tissue rul AFB sm N-nicole    swab AFB Sm N-nicole   2020 AFB smera N-nicole   2020 AFB sm N-nicole    QFT GOLD    QFT Gold TB N-nicole    QFT Gold TB N-nicole   HIV STATUS    hiv N-nicole  GALACTOMANNAN   Asperg GM N-nicole   COLLAGEN   Histone ab 1.1 (bl hi) ()   GBMA N-nicole ()   ANCA PANCA N-NICOLE ()   AANCA INDET ()    ACE 31    DS DNA N-nicole   PROCEDURE HPE   VATS wedge resectn rul Dr Marley  2020 CT bx Lung nodule   HPE   2020  Necrotizing epithelioid granulomatous inflammation     PATIENT SUMMARY   49 m PMH Crohns COPD R lung mass Barretts admitted 2020 with chest pressure and SOB x 1 w Also co LLQ pain x 5 d     ER vitals 145/114 92 afeb     home meds spiriva midodrine 10.3 nicotine 14 pentasa flomax .4                                    Open lung bx vats done  Dr Marley  Pt tr out of ICU 2020     ASSESSMENT/RECOMMENDTION  Supportive care   Await hpe              TIME SPENT Over 25 minutes aggregate care time spent on encounter; activities included   direct pt care, counseling and/or coordinating care reviewing notes, lab data/ imaging , discussion with multidisciplinary team/ pt /family. Risks, benefits, alternatives  discussed in detail.    DAVE THAYER LIALEJANDRA  24 [phone]  1970 DOA 2020 DR LYDIA FRANCISCO

## 2020-05-30 NOTE — PROGRESS NOTE ADULT - ASSESSMENT
49M with a PMH of Barretts Disease, Crohn's on Pentasya, IBS, spiculated lung mass s/p VATS POD #1; extubated this AM after having hypoxia post op requiring re-intubation.      Neuro: Weaned off fentanyl drip; continue with oxycodone IR PO for moderate pain. dilaudid prn for pain.  Nicotine patch for smoking.   CV: No acute issues.    Pulm: s/p extubation this AM on nasal cannula.  Chest tube in place to suction; follow up CT surgery recs  GI: Continue with pentasa and protonix; restart miralax, senna.   Renal/Metabolic: No acute issues; restart flomax;  ID: No acute issues  Heme: Lovenox daily.  Dispo: Stable for transfer to floor.

## 2020-05-30 NOTE — CHART NOTE - NSCHARTNOTEFT_GEN_A_CORE
Rapid response called by RN for patient's chest tube being pulled out slightly. Patient seen and examined at bedside with medical and ICU teams. Per RN, there was mild bleeding at the chest tube insertion site so she reinforced the dressing and achieved hemostasis. Patient states that he has been nauseous and vomiting all day. C/o pain at the chest tube insertion site. Denies chest pain, SOB, dizziness.    Vitals:  Temp:99  BP:145/100  HR:99  SpO2: % on NC    PHYSICAL EXAM:  GENERAL: NAD  CHEST/LUNG: Right chest tube in place to LWS with mild blood stain on the dressing. Serosanguinous output in the chest tube and hilario. Clear to ausculation, bilaterally   HEART: RRR S1S2  ABDOMEN: non distended, soft, non tender, no guarding  EXTREMITIES: calf soft, non tender b/l    A/P: 49M with h/o tobacco use and RUL spiculated lung mass cavitary lesion admitted with dyspnea on exertion POD#1 s/p VATS wedge resection of right upper lobe of lung, reintubated d/t hypoxia post op, now extubated.  Rapid response called for patient's chest tube being pulled out slightly.  - STAT CXR done at bedside shows chest tube moved down but still in place.  - Zofran for nausea  - Dilaudid for pain  - Estrada placed for urinary retention  - continue Right CT to LWS, local drain care  - Follow up pathology  - c/w ID, pulm, medical follow up  - rec PT eval  - Discussed with Dr. Sewell and ICU at bedside. Dr. Krishnanaz informed.

## 2020-05-30 NOTE — PROGRESS NOTE ADULT - ASSESSMENT
Lung mass with cavitation   no corroborating data that this is TB  CT abdomen appears benign. not clear etiology for self reported Crohn ds      QuantiFeron NEGATIVE on 1/2020 and again Neg on 5/24/2020  no other sign of TB   low esr , clinically ok   WOULD NOT START  TB DRUGS WITHOUT A DIAGNOSIS   CONSIDER RESECTION     atypical ANCA indeterminate   low procalcitonin , monitor off abx   appreciate thoracic   fu with path from a wedge of the upper lobe today  fu OR cultures and pathology   case discussed with patient today Lung mass with cavitation   no corroborating data that this is TB;;QuantiFeron NEGATIVE on 1/2020 and again Neg on 5/24/2020  no other sign of TB   low esr , clinically ok   fu with path from a wedge of the upper lobe

## 2020-05-30 NOTE — PROGRESS NOTE ADULT - SUBJECTIVE AND OBJECTIVE BOX
INTERVAL HPI/OVERNIGHT EVENTS:      POD #1: s/p VATS with left thoracotomy; s/p extubation this AM.      CENTRAL LINE: [ ] YES [ x] NO  LOCATION:       HUSSEIN: [ ] YES [ x] NO        A-LINE:  [ ] YES [ x] NO  LOCATION:       GLOBAL ISSUE/BEST PRACTICE:  Analgesia:  Sedation:  HOB elevation: yes  Stress ulcer prophylaxis: Protonix  VTE prophylaxis: HSQ  Oral Care: Chlorhexidine  Glycemic control: ISS/Lantus  Nutrition:    REVIEW OF SYSTEMS:  R sided chest pain    CONSTITUTIONAL: No fever, weight loss, or fatigue  RESPIRATORY: No cough, wheezing, chills or hemoptysis; No shortness of breath  CARDIOVASCULAR: R chest pain at the site of the chest tube; no palpitations, dizziness, or leg swelling  GASTROINTESTINAL: No abdominal or epigastric pain. No nausea, vomiting, or hematemesis; No diarrhea or constipation. No melena or hematochezia.  NEUROLOGICAL: No headaches, memory loss, loss of strength, numbness, or tremors    PHYSICAL EXAM:    GENERAL: NAD, well-groomed, well-developed  HEAD:  Atraumatic, Normocephalic  NECK: Supple, No JVD, Normal thyroid  CHEST/LUNG: Decreased on R; with chest tube in place.    HEART: Regular rate and rhythm; No murmurs, rubs, or gallops  ABDOMEN: Soft, Nontender, Nondistended; Bowel sounds present  EXTREMITIES:  2+ Peripheral Pulses, No clubbing, cyanosis, or edema  NERVOUS SYSTEM:  Alert & Oriented X3, Good concentration; Motor Strength 5/5 B/L upper and lower extremities; DTRs 2+ intact and symmetric    ICU Vital Signs Last 24 Hrs  T(C): 37.2 (30 May 2020 05:31), Max: 37.2 (29 May 2020 23:28)  T(F): 98.9 (30 May 2020 05:31), Max: 98.9 (29 May 2020 23:28)  HR: 104 (30 May 2020 10:00) (65 - 104)  BP: 148/85 (30 May 2020 10:00) (89/54 - 148/85)  BP(mean): 100 (30 May 2020 10:00) (62 - 100)  ABP: 87/74 (30 May 2020 05:30) (71/54 - 195/108)  ABP(mean): 132 (30 May 2020 05:30) (55 - 163)  RR: 24 (30 May 2020 10:00) (7 - 24)  SpO2: 85% (30 May 2020 10:00) (85% - 100%)      I&O's Detail    29 May 2020 07:01  -  30 May 2020 07:00  --------------------------------------------------------  IN:    fentaNYL  Infusion: 258.4 mL    Lactated Ringers IV Bolus: 600 mL    lactated ringers.: 750 mL    propofol Infusion: 69.1 mL  Total IN: 1677.5 mL    OUT:    Chest Tube: 165 mL    Indwelling Catheter - Urethral: 1700 mL  Total OUT: 1865 mL    Total NET: -187.5 mL          MEDICATIONS  NEURO  Meds: busPIRone 15 milliGRAM(s) Oral every 8 hours  HYDROmorphone  Injectable 1 milliGRAM(s) IV Push every 4 hours PRN Severe Pain (7 - 10)  ondansetron Injectable 4 milliGRAM(s) IV Push every 6 hours PRN Nausea and/or Vomiting  oxyCODONE    IR 5 milliGRAM(s) Oral every 4 hours PRN Moderate Pain (4 - 6)    RESPIRATORY  ABG - ( 29 May 2020 17:41 )  pH: x     /  pCO2: 51    /  pO2: 359   / HCO3: 24    / Base Excess: -3.1  /  SaO2: 100     Lactate: x                Meds:   CARDIOVASCULAR  Meds: tamsulosin 0.4 milliGRAM(s) Oral at bedtime    GI/NUTRITION  Meds: mesalamine ER Capsule 1000 milliGRAM(s) Oral two times a day with meals  pantoprazole    Tablet 40 milliGRAM(s) Oral before breakfast  polyethylene glycol 3350 17 Gram(s) Oral two times a day  senna 2 Tablet(s) Oral at bedtime    GENITOURINARY  Meds: lactated ringers Bolus 500 milliLiter(s) IV Bolus once    HEMATOLOGIC  Meds: enoxaparin Injectable 40 milliGRAM(s) SubCutaneous daily    [x] VTE Prophylaxis  INFECTIOUS DISEASES  Meds:   ENDOCRINE  CAPILLARY BLOOD GLUCOSE        Meds:   OTHER MEDICATIONS:  benzocaine 15 mG/menthol 3.6 mG (Sugar-Free) Lozenge 1 Lozenge Oral four times a day PRN  nicotine -  14 mG/24Hr(s) Patch 1 patch Transdermal daily  :    LABS:                        13.0   12.27 )-----------( 206      ( 30 May 2020 03:27 )             39.1          138  |  106  |  10  ----------------------------<  101<H>  4.4   |  26  |  0.99    Ca    8.0<L>      30 May 2020 03:27  Phos  3.8       Mg     1.9         TPro  5.7<L>  /  Alb  2.9<L>  /  TBili  0.6  /  DBili  x   /  AST  29  /  ALT  23  /  AlkPhos  58      PT/INR - ( 28 May 2020 12:45 )   PT: 11.3 sec;   INR: 1.02 ratio         PTT - ( 28 May 2020 12:45 )  PTT:30.8 sec  ## COVID Panel  COVID-19 PCR: NotDetec (20 @ 17:45)  COVID-19 PCR: NotDetec (20 @ 18:07)  Procalcitonin, Serum: 0.03 ng/mL (20 @ 10:50)  Procalcitonin, Serum: 0.04 ng/mL (20 @ 15:52)    Urinalysis Basic - ( 28 May 2020 13:53 )    Color: Yellow / Appearance: Clear / S.010 / pH: x  Gluc: x / Ketone: Trace  / Bili: Negative / Urobili: Negative mg/dL   Blood: x / Protein: Negative mg/dL / Nitrite: Negative   Leuk Esterase: Negative / RBC: x / WBC x   Sq Epi: x / Non Sq Epi: x / Bacteria: x      Culture Results:   No growth ( @ 18:06)    Culture - Tissue with Gram Stain (collected 20 @ 00:40)  Source: .Tissue RIGHT UPPER LOBE WEDGE C/S  Gram Stain (20 @ 04:05):    Rare polymorphonuclear leukocytes seen per low power field    No organisms seen per oil power field    Culture - Body Fluid with Gram Stain (collected 20 @ 00:38)  Source: .Body Fluid RIGHT UPPER LOBE  C/S  Gram Stain (20 @ 04:09):    polymorphonuclear leukocytes seen    No organisms seen    by cytocentrifuge      Mode: Spontaneous/ CPAP (Continuous Positive Airway Pressure), FiO2: 50, PEEP: 5, MAP: 7, PC: 10, PIP: 18      RADIOLOGY & ADDITIONAL STUDIES:

## 2020-05-30 NOTE — CHART NOTE - NSCHARTNOTEFT_GEN_A_CORE
POD#! s/p VATS, wedge resection, open biopsy right upper lobe, chest tube placement admitted to ICU for intubation after procedure    h/o Crohn's disease, s/p small bowel resection, BPH, anxiety.  Long time history of smoking, was found to have spiculated mass on chest CT.  Patient was extubated at 6am, tolerating well, passed dysphagia, pain meds transitioned to oxycodone, and Dilaudid PRN.    Last BM 5/29, resumed home meds for Crohn's, chest tube to suction - managed by thoracic surgery    stable for transfer to floor

## 2020-05-30 NOTE — PROGRESS NOTE ADULT - SUBJECTIVE AND OBJECTIVE BOX
JEOVANY ACOSTA    [facility] CCU 7    Patient is a 49y old  Male who presents with a chief complaint of abdominal pain, dyspnea (30 May 2020 07:00)       Allergies    adhesives (Rash)  No Known Drug Allergies    Intolerances        HPI:  Patient is a 49M with a PMH of Barretts Disease, Crohn's on Pentasa IBS, spiculated lung mass who presents to worsening dyspnea on exertion and abdominal pain.  Patient states his breathing has been worsening over the last two weeks but denies hx of cough, fever, chills.  Has been following Pulmonary with Dr Ileana Disla.  Patient also states that he has had abdominal pain with poor PO intake for 5 days.  States he has had no BM for the last 5 days as well.  Follows with GI, and had a tele visit with Dr Russ 3 days ago who told him to present to the ED if his abdominal pain persisted.  Admitted to [facility] in 2020 - had biopsy that showed necrotizing granulomas in the lung.  Vitals stable.  Labs benign.  CT chest and abdomen negative for acute disease.  Will admit to floor for further eval. (23 May 2020 01:31)      PAST MEDICAL & SURGICAL HISTORY:  Alfaro's esophagus without dysplasia  Depression, unspecified depression type  Crohn's disease with complication, unspecified gastrointestinal tract location  ACL (anterior cruciate ligament) tear      FAMILY HISTORY:  Family history of diabetes mellitus in maternal grandmother (Mother, Grandparent)  Family history of COPD (chronic obstructive pulmonary disease) (Father)  Family history of colon cancer in father (Father)  Family history of hypertension in father (Father, Mother, Grandparent)        MEDICATIONS   benzocaine 15 mG/menthol 3.6 mG (Sugar-Free) Lozenge 1 Lozenge Oral four times a day PRN  busPIRone 15 milliGRAM(s) Oral every 8 hours  enoxaparin Injectable 40 milliGRAM(s) SubCutaneous daily  HYDROmorphone  Injectable 1 milliGRAM(s) IV Push every 4 hours PRN  lactated ringers Bolus 500 milliLiter(s) IV Bolus once  mesalamine ER Capsule 1000 milliGRAM(s) Oral two times a day with meals  nicotine -  14 mG/24Hr(s) Patch 1 patch Transdermal daily  ondansetron Injectable 4 milliGRAM(s) IV Push every 6 hours PRN  oxyCODONE    IR 5 milliGRAM(s) Oral every 4 hours PRN  pantoprazole    Tablet 40 milliGRAM(s) Oral before breakfast  polyethylene glycol 3350 17 Gram(s) Oral two times a day  senna 2 Tablet(s) Oral at bedtime  tamsulosin 0.4 milliGRAM(s) Oral at bedtime      Vital Signs Last 24 Hrs  T(C): 37.2 (30 May 2020 05:31), Max: 37.2 (29 May 2020 23:28)  T(F): 98.9 (30 May 2020 05:31), Max: 98.9 (29 May 2020 23:28)  HR: 104 (30 May 2020 10:00) (65 - 104)  BP: 148/85 (30 May 2020 10:00) (89/54 - 148/85)  BP(mean): 100 (30 May 2020 10:00) (62 - 100)  RR: 24 (30 May 2020 10:00) (7 - 24)  SpO2: 85% (30 May 2020 10:00) (85% - 100%)      20 @ 07:01  -  20 @ 07:00  --------------------------------------------------------  IN: 1677.5 mL / OUT: 1865 mL / NET: -187.5 mL        Mode: Spontaneous/ CPAP (Continuous Positive Airway Pressure), FiO2: 50, PEEP: 5, MAP: 7, PC: 10, PIP: 18    LABS:                        13.0   12.27 )-----------( 206      ( 30 May 2020 03:27 )             39.1     05-30    138  |  106  |  10  ----------------------------<  101<H>  4.4   |  26  |  0.99    Ca    8.0<L>      30 May 2020 03:27  Phos  3.8     05-30  Mg     1.9     0530    TPro  5.7<L>  /  Alb  2.9<L>  /  TBili  0.6  /  DBili  x   /  AST  29  /  ALT  23  /  AlkPhos  58  05-30    PT/INR - ( 28 May 2020 12:45 )   PT: 11.3 sec;   INR: 1.02 ratio         PTT - ( 28 May 2020 12:45 )  PTT:30.8 sec  Urinalysis Basic - ( 28 May 2020 13:53 )    Color: Yellow / Appearance: Clear / S.010 / pH: x  Gluc: x / Ketone: Trace  / Bili: Negative / Urobili: Negative mg/dL   Blood: x / Protein: Negative mg/dL / Nitrite: Negative   Leuk Esterase: Negative / RBC: x / WBC x   Sq Epi: x / Non Sq Epi: x / Bacteria: x        ABG - ( 29 May 2020 17:41 )  pH, Arterial: x     pH, Blood: 7.29  /  pCO2: 51    /  pO2: 359   / HCO3: 24    / Base Excess: -3.1  /  SaO2: 100                 WBC:  WBC Count: 12.27 K/uL ( @ 03:27)  WBC Count: 6.43 K/uL ( @ 06:20)  WBC Count: 7.67 K/uL ( @ 06:12)      MICROBIOLOGY:  RECENT CULTURES:   .Tissue RIGHT UPPER LOBE WEDGE C/S XXXX   Rare polymorphonuclear leukocytes seen per low power field  No organisms seen per oil power field XXXX     .Body Fluid RIGHT UPPER LOBE  C/S XXXX   polymorphonuclear leukocytes seen  No organisms seen  by cytocentrifuge XXXX     .Urine Clean Catch (Midstream) XXXX XXXX   No growth     .Sputum Sputum XXXX XXXX   Culture is being performed.                PT/INR - ( 28 May 2020 12:45 )   PT: 11.3 sec;   INR: 1.02 ratio         PTT - ( 28 May 2020 12:45 )  PTT:30.8 sec    Sodium:  Sodium, Serum: 138 mmol/L ( @ 03:27)  Sodium, Serum: 139 mmol/L ( @ 06:20)  Sodium, Serum: 136 mmol/L ( @ 06:12)      0.99 mg/dL  @ :27  1.11 mg/dL  @ 06:20  1.19 mg/dL  @ 06:12      Hemoglobin:  Hemoglobin: 13.0 g/dL ( @ 03:27)  Hemoglobin: 14.8 g/dL ( @ 06:20)  Hemoglobin: 14.4 g/dL ( @ 06:12)      Platelets: Platelet Count - Automated: 206 K/uL ( @ 03:27)  Platelet Count - Automated: 219 K/uL ( @ 06:20)  Platelet Count - Automated: 227 K/uL ( @ 06:12)      LIVER FUNCTIONS - ( 30 May 2020 03:27 )  Alb: 2.9 g/dL / Pro: 5.7 gm/dL / ALK PHOS: 58 U/L / ALT: 23 U/L / AST: 29 U/L / GGT: x             Urinalysis Basic - ( 28 May 2020 13:53 )    Color: Yellow / Appearance: Clear / S.010 / pH: x  Gluc: x / Ketone: Trace  / Bili: Negative / Urobili: Negative mg/dL   Blood: x / Protein: Negative mg/dL / Nitrite: Negative   Leuk Esterase: Negative / RBC: x / WBC x   Sq Epi: x / Non Sq Epi: x / Bacteria: x        RADIOLOGY & ADDITIONAL STUDIES:

## 2020-05-30 NOTE — PROGRESS NOTE ADULT - PROBLEM SELECTOR PROBLEM 3
BPH without urinary obstruction
Lung mass
Depression, unspecified depression type

## 2020-05-30 NOTE — PROGRESS NOTE ADULT - ASSESSMENT
Patient is a 49M with a PMH of Barretts Disease, Crohn's on Pentasya, IBS, spiculated lung mass (former  and 30 PPD smoker) who presents to worsening dyspnea on exertion and abdominal pain.  Patient states his breathing has been worsening over the last two weeks but denies hx of cough, fever, chills.  Has been following Pulmonary with Dr Ileana Disla.  Patient also states that he has had abdominal pain with poor PO intake for 5 days.  States he has had no BM for the last 5 days as well.  Follows with GI, and had a tele visit with Dr Russ 3 days ago who told him to present to the ED if his abdominal pain persisted.  Admitted to [facility] in 01/2020 - had biopsy that showed necrotizing granulomas in the lung.  Vitals stable.   CT chest and abdomen negative for acute disease. s/p VATS with thoracotomy; cultures pending    Extubated this am

## 2020-05-30 NOTE — PROGRESS NOTE ADULT - PROBLEM SELECTOR PROBLEM 2
Crohn's disease of small intestine with other complication
Lung mass
BPH without urinary obstruction

## 2020-05-30 NOTE — PROGRESS NOTE ADULT - SUBJECTIVE AND OBJECTIVE BOX
HPI:  Patient is a 49M with a PMH of Barretts Disease, Crohn's on Pentasa IBS, spiculated lung mass who presents to worsening dyspnea on exertion and abdominal pain.  Patient states his breathing has been worsening over the last two weeks but denies hx of cough, fever, chills.  Has been following Pulmonary with Dr Ileana Disla.  Patient also states that he has had abdominal pain with poor PO intake for 5 days.  States he has had no BM for the last 5 days as well.  Follows with GI, and had a tele visit with Dr Russ 3 days ago who told him to present to the ED if his abdominal pain persisted.  Admitted to [facility] in 01/2020 - had biopsy that showed necrotizing granulomas in the lung.  Vitals stable.  Labs benign.  CT chest and abdomen negative for acute disease.  Will admit to floor for further eval. (23 May 2020 01:31)      Allergies    adhesives (Rash)  No Known Drug Allergies    Intolerances        MEDICATIONS  (STANDING):  ALPRAZolam 0.5 milliGRAM(s) Oral once  busPIRone 15 milliGRAM(s) Oral every 8 hours  enoxaparin Injectable 40 milliGRAM(s) SubCutaneous daily  lactated ringers Bolus 500 milliLiter(s) IV Bolus once  mesalamine ER Capsule 1000 milliGRAM(s) Oral two times a day with meals  pantoprazole    Tablet 40 milliGRAM(s) Oral before breakfast  polyethylene glycol 3350 17 Gram(s) Oral two times a day  senna 2 Tablet(s) Oral at bedtime  tamsulosin 0.4 milliGRAM(s) Oral at bedtime    MEDICATIONS  (PRN):  benzocaine 15 mG/menthol 3.6 mG (Sugar-Free) Lozenge 1 Lozenge Oral four times a day PRN Sore Throat  HYDROmorphone  Injectable 1 milliGRAM(s) IV Push every 4 hours PRN Severe Pain (7 - 10)  ondansetron Injectable 4 milliGRAM(s) IV Push every 6 hours PRN Nausea and/or Vomiting  oxyCODONE    IR 5 milliGRAM(s) Oral every 4 hours PRN Moderate Pain (4 - 6)      REVIEW OF SYSTEMS:    CONSTITUTIONAL: No fever, chills, weight loss, or fatigue  HEENT: No sore throat, runny nose, ear ache  RESPIRATORY: No cough, wheezing, No shortness of breath  CARDIOVASCULAR: No chest pain, palpitations, dizziness  GASTROINTESTINAL: No abdominal pain. No nausea, vomiting, diarrhea  GENITOURINARY: No dysuria, increase frequency, hematuria, or incontinence  NEUROLOGICAL: No headaches, memory loss, loss of strength, numbness, or tremors, no weakness  EXTREMITY: No pedal edema BLE  SKIN: No itching, burning, rashes, or lesions     VITAL SIGNS:  T(C): 37.2 (05-30-20 @ 17:15), Max: 37.3 (05-30-20 @ 15:46)  T(F): 99 (05-30-20 @ 17:15), Max: 99.2 (05-30-20 @ 15:46)  HR: 83 (05-30-20 @ 17:15) (65 - 104)  BP: 135/92 (05-30-20 @ 17:15) (89/54 - 148/85)  RR: 20 (05-30-20 @ 17:15) (7 - 29)  SpO2: 94% (05-30-20 @ 17:15) (85% - 100%)  Wt(kg): --    PHYSICAL EXAM:    GENERAL: not in any distress  HEENT: Neck is supple, normocephalic, atraumatic   CHEST/LUNG: Clear to auscultation bilaterally; No rales, rhonchi, wheezing  HEART: Regular rate and rhythm; No murmurs, rubs, or gallops  ABDOMEN: Soft, Nontender, Nondistended; Bowel sounds present, no rebound   EXTREMITIES:  2+ Peripheral Pulses, No clubbing, cyanosis, or edema  GENITOURINARY:   SKIN: No rashes or lesions  BACK: no pressor sore   NERVOUS SYSTEM:  Alert & Oriented X3, Good concentration  PSYCH: normal affect     LABS:                         13.0   12.27 )-----------( 206      ( 30 May 2020 03:27 )             39.1     05-30    138  |  106  |  10  ----------------------------<  101<H>  4.4   |  26  |  0.99    Ca    8.0<L>      30 May 2020 03:27  Phos  3.8     05-30  Mg     1.9     05-30    TPro  5.7<L>  /  Alb  2.9<L>  /  TBili  0.6  /  DBili  x   /  AST  29  /  ALT  23  /  AlkPhos  58  05-30    LIVER FUNCTIONS - ( 30 May 2020 03:27 )  Alb: 2.9 g/dL / Pro: 5.7 gm/dL / ALK PHOS: 58 U/L / ALT: 23 U/L / AST: 29 U/L / GGT: x               ABG - ( 29 May 2020 17:41 )  pH, Arterial: x     pH, Blood: 7.29  /  pCO2: 51    /  pO2: 359   / HCO3: 24    / Base Excess: -3.1  /  SaO2: 100                                 Culture Results:   No growth (05-30 @ 00:40)  Culture Results:   No growth to date. (05-30 @ 00:38)  Culture Results:   No growth (05-28 @ 18:06)  Culture Results:   Culture is being performed. (05-25 @ 00:16)                Radiology: HPI:  Patient is a 49M with a PMH of Barretts Disease, Crohn's on Pentasa IBS, spiculated lung mass who presents to worsening dyspnea on exertion and abdominal pain.  Patient states his breathing has been worsening over the last two weeks but denies hx of cough, fever, chills.  Has been following Pulmonary with Dr Ileana Disla.  Patient also states that he has had abdominal pain with poor PO intake for 5 days.  States he has had no BM for the last 5 days as well.  Follows with GI, and had a tele visit with Dr Russ 3 days ago who told him to present to the ED if his abdominal pain persisted.  Admitted to [facility] in 01/2020 - had biopsy that showed necrotizing granulomas in the lung.  Vitals stable.  Labs benign.  CT chest and abdomen negative for acute disease.  Will admit to floor for further eval. (23 May 2020 01:31)  work up delayed as covid issues  came as getting worse  now sp lung resction   just out of icu     Allergies    adhesives (Rash)  No Known Drug Allergies    Intolerances        MEDICATIONS  (STANDING):  ALPRAZolam 0.5 milliGRAM(s) Oral once  busPIRone 15 milliGRAM(s) Oral every 8 hours  enoxaparin Injectable 40 milliGRAM(s) SubCutaneous daily  lactated ringers Bolus 500 milliLiter(s) IV Bolus once  mesalamine ER Capsule 1000 milliGRAM(s) Oral two times a day with meals  pantoprazole    Tablet 40 milliGRAM(s) Oral before breakfast  polyethylene glycol 3350 17 Gram(s) Oral two times a day  senna 2 Tablet(s) Oral at bedtime  tamsulosin 0.4 milliGRAM(s) Oral at bedtime    MEDICATIONS  (PRN):  benzocaine 15 mG/menthol 3.6 mG (Sugar-Free) Lozenge 1 Lozenge Oral four times a day PRN Sore Throat  HYDROmorphone  Injectable 1 milliGRAM(s) IV Push every 4 hours PRN Severe Pain (7 - 10)  ondansetron Injectable 4 milliGRAM(s) IV Push every 6 hours PRN Nausea and/or Vomiting  oxyCODONE    IR 5 milliGRAM(s) Oral every 4 hours PRN Moderate Pain (4 - 6)      REVIEW OF SYSTEMS:    no issues   happy with sx     VITAL SIGNS:  T(C): 37.2 (05-30-20 @ 17:15), Max: 37.3 (05-30-20 @ 15:46)  T(F): 99 (05-30-20 @ 17:15), Max: 99.2 (05-30-20 @ 15:46)  HR: 83 (05-30-20 @ 17:15) (65 - 104)  BP: 135/92 (05-30-20 @ 17:15) (89/54 - 148/85)  RR: 20 (05-30-20 @ 17:15) (7 - 29)  SpO2: 94% (05-30-20 @ 17:15) (85% - 100%)  Wt(kg): --    PHYSICAL EXAM:    GENERAL: not in any distress  HEENT: Neck is supple, normocephalic, atraumatic   CHEST/LUNG: Clear to auscultation bilaterally; No rales, rhonchi, wheezing  chest tube   HEART: Regular rate and rhythm; No murmurs, rubs, or gallops  ABDOMEN: Soft, Nontender, Nondistended; Bowel sounds present, no rebound   EXTREMITIES:  2+ Peripheral Pulses, No clubbing, cyanosis, or edema  SKIN: No rashes or lesions  BACK: no pressor sore   NERVOUS SYSTEM:  Alert & Oriented X3, Good concentration  PSYCH: normal affect     LABS:                         13.0   12.27 )-----------( 206      ( 30 May 2020 03:27 )             39.1     05-30    138  |  106  |  10  ----------------------------<  101<H>  4.4   |  26  |  0.99    Ca    8.0<L>      30 May 2020 03:27  Phos  3.8     05-30  Mg     1.9     05-30    TPro  5.7<L>  /  Alb  2.9<L>  /  TBili  0.6  /  DBili  x   /  AST  29  /  ALT  23  /  AlkPhos  58  05-30    LIVER FUNCTIONS - ( 30 May 2020 03:27 )  Alb: 2.9 g/dL / Pro: 5.7 gm/dL / ALK PHOS: 58 U/L / ALT: 23 U/L / AST: 29 U/L / GGT: x               ABG - ( 29 May 2020 17:41 )  pH, Arterial: x     pH, Blood: 7.29  /  pCO2: 51    /  pO2: 359   / HCO3: 24    / Base Excess: -3.1  /  SaO2: 100                                 Culture Results:   No growth (05-30 @ 00:40)  Culture Results:   No growth to date. (05-30 @ 00:38)  Culture Results:   No growth (05-28 @ 18:06)  Culture Results:   Culture is being performed. (05-25 @ 00:16)                Radiology:

## 2020-05-30 NOTE — PROGRESS NOTE ADULT - SUBJECTIVE AND OBJECTIVE BOX
Postoperative Day #: 1 s/p VATS, wedge resection, open biopsy right upper lobe  Patient seen and examined bedside resting comfortably in ICU.  S/p extubation this am, patient c/o back pain and being "unable to get comfortable". On fentanyl gtt.  Estrada removed, voiding.  Denies vomiting. C/o dry mouth.  Afebrile.    T(F): 98.9 (05-29-20 @ 23:28), Max: 98.9 (05-29-20 @ 23:28)  HR: 87 (05-30-20 @ 06:00) (65 - 100)  BP: 122/81 (05-30-20 @ 06:00) (89/54 - 139/84)  RR: 16 (05-30-20 @ 06:00) (10 - 22)  SpO2: 96% (05-30-20 @ 06:00) (95% - 100%)  Wt(kg): --      PHYSICAL EXAM:  General: NAD, WDWN  Neuro:  Alert & oriented   HEENT: NCAT, EOMI, conjunctiva clear  CV: +S1+S2   Lung: right chest tube to LWS, small air leak noted. Blood tinged output in pleurevac, 165cc. Right hilario drain with serosanguineous output  Abdomen: soft, NTND.   Extremities: no pedal edema or calf tenderness noted     LABS:                        13.0   12.27 )-----------( 206      ( 30 May 2020 03:27 )             39.1     05-30    138  |  106  |  10  ----------------------------<  101<H>  4.4   |  26  |  0.99    Ca    8.0<L>      30 May 2020 03:27  Phos  3.8     05-30  Mg     1.9     05-30    TPro  5.7<L>  /  Alb  2.9<L>  /  TBili  0.6  /  DBili  x   /  AST  29  /  ALT  23  /  AlkPhos  58  05-30    PT/INR - ( 28 May 2020 12:45 )   PT: 11.3 sec;   INR: 1.02 ratio    PTT - ( 28 May 2020 12:45 )  PTT:30.8 sec    Culture Results:   No growth (05-28 @ 18:06)  Culture Results:   Culture is being performed. (05-25 @ 00:16)      A/P: 49M with h/o tobacco use and RUL spiculated lung mass cavitary lesion admitted with dyspnea on exertion POD#1 s/p VATS wedge resection of right upper lobe of lung, reintubated d/t hypoxia post op, now extubated  - continue CCU management, IVF hydration  - advance diet  - continue Right CT to LWS, local drain care  - Follow up pathology  - c/w ID, pulm, medical Follow up.  - rec PT eval

## 2020-05-30 NOTE — PROGRESS NOTE ADULT - PROBLEM SELECTOR PROBLEM 5
Lung mass
Alfaro's esophagus without dysplasia
BPH without urinary obstruction

## 2020-05-30 NOTE — PROGRESS NOTE ADULT - SUBJECTIVE AND OBJECTIVE BOX
HPI:  Patient is a 49M with a PMH of Barretts Disease, Crohn's on Pentasa IBS, spiculated lung mass who presents to worsening dyspnea on exertion and abdominal pain.  Patient states his breathing has been worsening over the last two weeks but denies hx of cough, fever, chills.  Has been following Pulmonary with Dr Ileana Disla.  Patient also states that he has had abdominal pain with poor PO intake for 5 days.  States he has had no BM for the last 5 days as well.  Follows with GI, and had a tele visit with Dr Russ 3 days ago who told him to present to the ED if his abdominal pain persisted.  Admitted to [facility] in 2020 - had biopsy that showed necrotizing granulomas in the lung.  Vitals stable.  Labs benign.  CT chest and abdomen negative for acute disease.  Admitted to floor    Patient Day 7 s/p VATS with left thoracotom,y admitted to ICU on propofol and fentanyl. This am weaned and extubated  PAST MEDICAL & SURGICAL HISTORY:  Alfaro's esophagus without dysplasia  Depression, unspecified depression type  Crohn's disease with complication, unspecified gastrointestinal tract location  ACL (anterior cruciate ligament) tear  GERD    MEDICATIONS  (STANDING):  busPIRone 15 milliGRAM(s) Oral every 8 hours  chlorhexidine 4% Liquid 1 Application(s) Topical <User Schedule>  enoxaparin Injectable 40 milliGRAM(s) SubCutaneous daily  fentaNYL   Infusion 0.5 MICROgram(s)/kG/Hr (3.24 mL/Hr) IV Continuous <Continuous>  lactated ringers Bolus 500 milliLiter(s) IV Bolus once  lactated ringers. 1000 milliLiter(s) (75 mL/Hr) IV Continuous <Continuous>  mesalamine ER Capsule 1000 milliGRAM(s) Oral two times a day with meals  nicotine -  14 mG/24Hr(s) Patch 1 patch Transdermal daily  pantoprazole    Tablet 40 milliGRAM(s) Oral before breakfast    MEDICATIONS  (PRN):  benzocaine 15 mG/menthol 3.6 mG (Sugar-Free) Lozenge 1 Lozenge Oral four times a day PRN Sore Throat  fentaNYL    Injectable 50 MICROGram(s) IV Push every 4 hours PRN Moderate Pain (4 - 6)  ondansetron Injectable 4 milliGRAM(s) IV Push every 6 hours PRN Nausea and/or Vomiting      REVIEW OF SYSTEMS  Gen: complaint of back pain; bite block in bed at area  EENT: dentures reports slight sore thraot post extubation  GI: Gerd utilizes low residue with mechanincal soft + Dyspepsia  Pulmonary: + cough worked in construction with chemicals; former paramedic    ICU Vital Signs Last 24 Hrs  T(C): 37.2 (29 May 2020 23:28), Max: 37.2 (29 May 2020 23:28)  T(F): 98.9 (29 May 2020 23:28), Max: 98.9 (29 May 2020 23:28)  HR: 95 (30 May 2020 05:30) (65 - 97)  BP: 115/90 (30 May 2020 05:30) (89/54 - 139/84)  BP(mean): 97 (30 May 2020 05:30) (62 - 97)  ABP: 87/74 (30 May 2020 05:30) (71/54 - 195/108)  ABP(mean): 132 (30 May 2020 05:30) (55 - 163)  RR: 13 (30 May 2020 05:30) (13 - 22)  SpO2: 98% (30 May 2020 05:30) (98% - 100%)    I&O's Detail    28 May 2020 07:  -  29 May 2020 07:00  --------------------------------------------------------  IN:    dextrose 5% + sodium chloride 0.45%.: 350 mL    Oral Fluid: 280 mL  Total IN: 630 mL    OUT:  Total OUT: 0 mL    Total NET: 630 mL      29 May 2020 07:01  -  30 May 2020 06:23  --------------------------------------------------------  IN:    fentaNYL  Infusion: 204 mL    lactated ringers.: 750 mL    propofol Infusion: 56.9 mL  Total IN: 1010.9 mL    OUT:    Chest Tube: 55 mL    Urine: 1750  Total OUT: 1805 mL    Total NET: negative 794    Gen awake, alert pain 4 on a 0-10 scale; fentanyl at 0.5 cg  Lungs; bilateral air entry diminished right lobe  Cardiac s1/s2  Chest; right flank at scapular border incision, right chest tube  Abd benign   burris removed  Ext no edema    Lab Results:  CBC  CBC Full  -  ( 30 May 2020 03:27 )  WBC Count : 12.27 K/uL  RBC Count : 4.16 M/uL  Hemoglobin : 13.0 g/dL  Hematocrit : 39.1 %  Platelet Count - Automated : 206 K/uL  Mean Cell Volume : 94.0 fl  Mean Cell Hemoglobin : 31.3 pg  Mean Cell Hemoglobin Concentration : 33.2 gm/dL  Auto Neutrophil # : 10.63 K/uL  Auto Lymphocyte # : 0.70 K/uL  Auto Monocyte # : 0.88 K/uL  Auto Eosinophil # : 0.00 K/uL  Auto Basophil # : 0.01 K/uL  Auto Neutrophil % : 86.6 %  Auto Lymphocyte % : 5.7 %  Auto Monocyte % : 7.2 %  Auto Eosinophil % : 0.0 %  Auto Basophil % : 0.1 %    .		Differential:	[] Automated		[] Manual  Chemistry                        13.0   12 )-----------( 206      ( 30 May 2020 03:27 )             39.1     05-30    138  |  106  |  10  ----------------------------<  101<H>  4.4   |  26  |  0.99    Ca    8.0<L>      30 May 2020 03:27  Phos  3.8     05-30  Mg     1.9     05-30    TPro  5.7<L>  /  Alb  2.9<L>  /  TBili  0.6  /  DBili  x   /  AST  29  /  ALT  23  /  AlkPhos  58  05-30    LIVER FUNCTIONS - ( 30 May 2020 03:27 )  Alb: 2.9 g/dL / Pro: 5.7 gm/dL / ALK PHOS: 58 U/L / ALT: 23 U/L / AST: 29 U/L / GGT: x           PT/INR - ( 28 May 2020 12:45 )   PT: 11.3 sec;   INR: 1.02 ratio         PTT - ( 28 May 2020 12:45 )  PTT:30.8 sec  Urinalysis Basic - ( 28 May 2020 13:53 )    Color: Yellow / Appearance: Clear / S.010 / pH: x  Gluc: x / Ketone: Trace  / Bili: Negative / Urobili: Negative mg/dL   Blood: x / Protein: Negative mg/dL / Nitrite: Negative   Leuk Esterase: Negative / RBC: x / WBC x   Sq Epi: x / Non Sq Epi: x / Bacteria: x        ABG - ( 29 May 2020 17:41 )  pH, Arterial: x     pH, Blood: 7.29  /  pCO2: 51    /  pO2: 359   / HCO3: 24    / Base Excess: -3.1  /  SaO2: 100   post OR      MICROBIOLOGY/CULTURES:  Culture Results:   No growth ( @ 18:06)  Culture Results:   Culture is being performed. ( @ 00:16)    Culture: pending thoracotomy rare PML

## 2020-05-31 LAB
ALBUMIN SERPL ELPH-MCNC: 3.1 G/DL — LOW (ref 3.3–5)
ALP SERPL-CCNC: 62 U/L — SIGNIFICANT CHANGE UP (ref 40–120)
ALT FLD-CCNC: 22 U/L — SIGNIFICANT CHANGE UP (ref 12–78)
ANION GAP SERPL CALC-SCNC: 6 MMOL/L — SIGNIFICANT CHANGE UP (ref 5–17)
ANION GAP SERPL CALC-SCNC: 7 MMOL/L — SIGNIFICANT CHANGE UP (ref 5–17)
AST SERPL-CCNC: 39 U/L — HIGH (ref 15–37)
BILIRUB SERPL-MCNC: 0.9 MG/DL — SIGNIFICANT CHANGE UP (ref 0.2–1.2)
BUN SERPL-MCNC: 7 MG/DL — SIGNIFICANT CHANGE UP (ref 7–23)
BUN SERPL-MCNC: 8 MG/DL — SIGNIFICANT CHANGE UP (ref 7–23)
CALCIUM SERPL-MCNC: 8.6 MG/DL — SIGNIFICANT CHANGE UP (ref 8.5–10.1)
CALCIUM SERPL-MCNC: 8.7 MG/DL — SIGNIFICANT CHANGE UP (ref 8.5–10.1)
CHLORIDE SERPL-SCNC: 104 MMOL/L — SIGNIFICANT CHANGE UP (ref 96–108)
CHLORIDE SERPL-SCNC: 105 MMOL/L — SIGNIFICANT CHANGE UP (ref 96–108)
CO2 SERPL-SCNC: 28 MMOL/L — SIGNIFICANT CHANGE UP (ref 22–31)
CO2 SERPL-SCNC: 29 MMOL/L — SIGNIFICANT CHANGE UP (ref 22–31)
CREAT SERPL-MCNC: 0.88 MG/DL — SIGNIFICANT CHANGE UP (ref 0.5–1.3)
CREAT SERPL-MCNC: 0.94 MG/DL — SIGNIFICANT CHANGE UP (ref 0.5–1.3)
GLUCOSE SERPL-MCNC: 75 MG/DL — SIGNIFICANT CHANGE UP (ref 70–99)
GLUCOSE SERPL-MCNC: 76 MG/DL — SIGNIFICANT CHANGE UP (ref 70–99)
GRAM STN FLD: SIGNIFICANT CHANGE UP
HCT VFR BLD CALC: 39.4 % — SIGNIFICANT CHANGE UP (ref 39–50)
HGB BLD-MCNC: 13.5 G/DL — SIGNIFICANT CHANGE UP (ref 13–17)
MAGNESIUM SERPL-MCNC: 2.1 MG/DL — SIGNIFICANT CHANGE UP (ref 1.6–2.6)
MCHC RBC-ENTMCNC: 31.5 PG — SIGNIFICANT CHANGE UP (ref 27–34)
MCHC RBC-ENTMCNC: 34.3 GM/DL — SIGNIFICANT CHANGE UP (ref 32–36)
MCV RBC AUTO: 92.1 FL — SIGNIFICANT CHANGE UP (ref 80–100)
NRBC # BLD: 0 /100 WBCS — SIGNIFICANT CHANGE UP (ref 0–0)
PHOSPHATE SERPL-MCNC: 2.7 MG/DL — SIGNIFICANT CHANGE UP (ref 2.5–4.5)
PLATELET # BLD AUTO: 191 K/UL — SIGNIFICANT CHANGE UP (ref 150–400)
POTASSIUM SERPL-MCNC: 3.8 MMOL/L — SIGNIFICANT CHANGE UP (ref 3.5–5.3)
POTASSIUM SERPL-MCNC: 3.9 MMOL/L — SIGNIFICANT CHANGE UP (ref 3.5–5.3)
POTASSIUM SERPL-SCNC: 3.8 MMOL/L — SIGNIFICANT CHANGE UP (ref 3.5–5.3)
POTASSIUM SERPL-SCNC: 3.9 MMOL/L — SIGNIFICANT CHANGE UP (ref 3.5–5.3)
PROT SERPL-MCNC: 6.5 GM/DL — SIGNIFICANT CHANGE UP (ref 6–8.3)
RBC # BLD: 4.28 M/UL — SIGNIFICANT CHANGE UP (ref 4.2–5.8)
RBC # FLD: 12.9 % — SIGNIFICANT CHANGE UP (ref 10.3–14.5)
SODIUM SERPL-SCNC: 139 MMOL/L — SIGNIFICANT CHANGE UP (ref 135–145)
SODIUM SERPL-SCNC: 140 MMOL/L — SIGNIFICANT CHANGE UP (ref 135–145)
SPECIMEN SOURCE: SIGNIFICANT CHANGE UP
WBC # BLD: 8.96 K/UL — SIGNIFICANT CHANGE UP (ref 3.8–10.5)
WBC # FLD AUTO: 8.96 K/UL — SIGNIFICANT CHANGE UP (ref 3.8–10.5)

## 2020-05-31 PROCEDURE — 99233 SBSQ HOSP IP/OBS HIGH 50: CPT

## 2020-05-31 PROCEDURE — 71045 X-RAY EXAM CHEST 1 VIEW: CPT | Mod: 26

## 2020-05-31 PROCEDURE — 99232 SBSQ HOSP IP/OBS MODERATE 35: CPT

## 2020-05-31 RX ORDER — DIPHENHYDRAMINE HCL 50 MG
25 CAPSULE ORAL ONCE
Refills: 0 | Status: COMPLETED | OUTPATIENT
Start: 2020-05-31 | End: 2020-05-31

## 2020-05-31 RX ADMIN — Medication 15 MILLIGRAM(S): at 15:06

## 2020-05-31 RX ADMIN — TAMSULOSIN HYDROCHLORIDE 0.4 MILLIGRAM(S): 0.4 CAPSULE ORAL at 21:32

## 2020-05-31 RX ADMIN — HYDROMORPHONE HYDROCHLORIDE 1 MILLIGRAM(S): 2 INJECTION INTRAMUSCULAR; INTRAVENOUS; SUBCUTANEOUS at 17:42

## 2020-05-31 RX ADMIN — ONDANSETRON 4 MILLIGRAM(S): 8 TABLET, FILM COATED ORAL at 08:24

## 2020-05-31 RX ADMIN — Medication 15 MILLIGRAM(S): at 21:32

## 2020-05-31 RX ADMIN — Medication 1000 MILLIGRAM(S): at 08:17

## 2020-05-31 RX ADMIN — PANTOPRAZOLE SODIUM 40 MILLIGRAM(S): 20 TABLET, DELAYED RELEASE ORAL at 08:17

## 2020-05-31 RX ADMIN — HYDROMORPHONE HYDROCHLORIDE 1 MILLIGRAM(S): 2 INJECTION INTRAMUSCULAR; INTRAVENOUS; SUBCUTANEOUS at 21:30

## 2020-05-31 RX ADMIN — HYDROMORPHONE HYDROCHLORIDE 1 MILLIGRAM(S): 2 INJECTION INTRAMUSCULAR; INTRAVENOUS; SUBCUTANEOUS at 04:01

## 2020-05-31 RX ADMIN — HYDROMORPHONE HYDROCHLORIDE 1 MILLIGRAM(S): 2 INJECTION INTRAMUSCULAR; INTRAVENOUS; SUBCUTANEOUS at 12:32

## 2020-05-31 RX ADMIN — POLYETHYLENE GLYCOL 3350 17 GRAM(S): 17 POWDER, FOR SOLUTION ORAL at 17:42

## 2020-05-31 RX ADMIN — Medication 25 MILLIGRAM(S): at 06:09

## 2020-05-31 RX ADMIN — Medication 1000 MILLIGRAM(S): at 17:43

## 2020-05-31 RX ADMIN — Medication 0.5 MILLIGRAM(S): at 00:20

## 2020-05-31 RX ADMIN — Medication 15 MILLIGRAM(S): at 05:10

## 2020-05-31 RX ADMIN — HYDROMORPHONE HYDROCHLORIDE 1 MILLIGRAM(S): 2 INJECTION INTRAMUSCULAR; INTRAVENOUS; SUBCUTANEOUS at 08:17

## 2020-05-31 RX ADMIN — ENOXAPARIN SODIUM 40 MILLIGRAM(S): 100 INJECTION SUBCUTANEOUS at 12:32

## 2020-05-31 RX ADMIN — HYDROMORPHONE HYDROCHLORIDE 1 MILLIGRAM(S): 2 INJECTION INTRAMUSCULAR; INTRAVENOUS; SUBCUTANEOUS at 00:04

## 2020-05-31 NOTE — PROGRESS NOTE ADULT - SUBJECTIVE AND OBJECTIVE BOX
transferred out of icu to my service on 5/31/2020          INTERVAL HPI/OVERNIGHT EVENTS:  POD #2 : s/p VATS with left thoracotomy;    overnight had urinary retention 1.2liters out was staretd back on flomax on 5/30/2020            MEDICATIONS  (STANDING):  busPIRone 15 milliGRAM(s) Oral every 8 hours  enoxaparin Injectable 40 milliGRAM(s) SubCutaneous daily  lactated ringers Bolus 500 milliLiter(s) IV Bolus once  mesalamine ER Capsule 1000 milliGRAM(s) Oral two times a day with meals  pantoprazole    Tablet 40 milliGRAM(s) Oral before breakfast  polyethylene glycol 3350 17 Gram(s) Oral two times a day  senna 2 Tablet(s) Oral at bedtime  tamsulosin 0.4 milliGRAM(s) Oral at bedtime    MEDICATIONS  (PRN):  benzocaine 15 mG/menthol 3.6 mG (Sugar-Free) Lozenge 1 Lozenge Oral four times a day PRN Sore Throat  HYDROmorphone  Injectable 1 milliGRAM(s) IV Push every 4 hours PRN Severe Pain (7 - 10)  ondansetron Injectable 4 milliGRAM(s) IV Push every 6 hours PRN Nausea and/or Vomiting  oxyCODONE    IR 5 milliGRAM(s) Oral every 4 hours PRN Moderate Pain (4 - 6)        Vital Signs Last 24 Hrs  T(C): 37.3 (31 May 2020 05:10), Max: 37.3 (30 May 2020 15:46)  T(F): 99.2 (31 May 2020 05:10), Max: 99.2 (30 May 2020 15:46)  HR: 85 (31 May 2020 05:10) (77 - 96)  BP: 129/90 (31 May 2020 05:10) (128/89 - 135/92)  BP(mean): 99 (30 May 2020 15:00) (94 - 99)  RR: 18 (31 May 2020 05:10) (18 - 29)  SpO2: 96% (31 May 2020 05:10) (94% - 99%)      PHYSICAL EXAM:    GENERAL: NAD, well-groomed, well-developed  HEAD:  Atraumatic, Normocephalic  NECK: Supple, No JVD, Normal thyroid  CHEST/LUNG: Decreased on R; with chest tube in place.    HEART: Regular rate and rhythm; No murmurs, rubs, or gallops  ABDOMEN: Soft, Nontender, Nondistended; Bowel sounds present  EXTREMITIES:  2+ Peripheral Pulses, No clubbing, cyanosis, or edema  NERVOUS SYSTEM:  Alert & Oriented X3, Good concentration; Motor Strength 5/5 B/L upper and lower extremities; DTRs 2+ intact and symmetric        LABS:                          13.5   8.96  )-----------( 191      ( 31 May 2020 07:45 )             39.4   05-31    140  |  104  |  7   ----------------------------<  76  3.8   |  29  |  0.88    Ca    8.6      31 May 2020 07:45  Phos  2.7     05-31  Mg     2.1     05-31    TPro  6.5  /  Alb  3.1<L>  /  TBili  0.9  /  DBili  x   /  AST  39<H>  /  ALT  22  /  AlkPhos  62  05-31        Culture - Acid Fast - Tissue w/Smear (collected 30 May 2020 00:40)  Source: .Tissue RIGHT UPPER LOBE WEDGE C/S    Culture - Tissue with Gram Stain (collected 30 May 2020 00:40)  Source: .Tissue RIGHT UPPER LOBE WEDGE C/S  Gram Stain (prelim) (30 May 2020 21:50):    Rare polymorphonuclear leukocytes seen per low power field    No organisms seen per oil power field  Preliminary Report (30 May 2020 21:50):    No growth    Culture - Body Fluid with Gram Stain (collected 30 May 2020 00:38)  Source: .Body Fluid RIGHT UPPER LOBE  C/S  Gram Stain (prelim) (30 May 2020 20:31):    polymorphonuclear leukocytes seen    No organisms seen    by cytocentrifuge  Preliminary Report (30 May 2020 20:31):    No growth to date.    Culture - Acid Fast - Body Fluid w/Smear (collected 30 May 2020 00:38)  Source: .Body Fluid RIGHT UPPER LOBE    Culture - Urine (collected 28 May 2020 18:06)  Source: .Urine Clean Catch (Midstream)  Final Report (29 May 2020 18:09):    No growth        RADIOLOGY & ADDITIONAL STUDIES:  < from: Xray Chest 1 View- PORTABLE-Urgent (05.30.20 @ 19:53) >  History chest tube placement    Comparison prior day    Postoperative right-sided chest tubes remain in position without significant residual or recurrent pneumothorax, consolidation or layering effusion. Overlying soft tissue emphysema persists without significant interval change. There is development of mild discoid atelectasis in the left lung base. Cardiacsilhouette is normal in size. There is mild persistent perioperative consolidation in the perihilar right upper lung.      < end of copied text >

## 2020-05-31 NOTE — PROGRESS NOTE ADULT - SUBJECTIVE AND OBJECTIVE BOX
Patient is a 49M with a PMH of Barretts Disease, Crohn's on Pentasa IBS, spiculated lung mass who presents to worsening dyspnea on exertion and abdominal pain.  Patient states his breathing has been worsening over the last two weeks but denies hx of cough, fever, chills.  Has been following Pulmonary with Dr Ileana Disla.  Patient also states that he has had abdominal pain with poor PO intake for 5 days.  States he has had no BM for the last 5 days as well.  Follows with GI, and had a tele visit with Dr Russ 3 days ago who told him to present to the ED if his abdominal pain persisted.  Admitted to [facility] in 01/2020 - had biopsy that showed necrotizing granulomas in the lung.  Vitals stable.  Labs benign.  CT chest and abdomen negative for acute disease.  Will admit to floor for further eval. (23 May 2020 01:31)  work up delayed as covid issues  came as getting worse  now sp lung resction   all events noted   yesterday sl dislodgment of chest tube   Allergies    adhesives (Rash)  No Known Drug Allergies    Intolerances        MEDICATIONS  (STANDING):  busPIRone 15 milliGRAM(s) Oral every 8 hours  enoxaparin Injectable 40 milliGRAM(s) SubCutaneous daily  lactated ringers Bolus 500 milliLiter(s) IV Bolus once  mesalamine ER Capsule 1000 milliGRAM(s) Oral two times a day with meals  pantoprazole    Tablet 40 milliGRAM(s) Oral before breakfast  polyethylene glycol 3350 17 Gram(s) Oral two times a day  senna 2 Tablet(s) Oral at bedtime  tamsulosin 0.4 milliGRAM(s) Oral at bedtime    MEDICATIONS  (PRN):  benzocaine 15 mG/menthol 3.6 mG (Sugar-Free) Lozenge 1 Lozenge Oral four times a day PRN Sore Throat  HYDROmorphone  Injectable 1 milliGRAM(s) IV Push every 4 hours PRN Severe Pain (7 - 10)  ondansetron Injectable 4 milliGRAM(s) IV Push every 6 hours PRN Nausea and/or Vomiting  oxyCODONE    IR 5 milliGRAM(s) Oral every 4 hours PRN Moderate Pain (4 - 6)      REVIEW OF SYSTEMS:    pain in chest tube site   otherwise ok     VITAL SIGNS:  T(C): 36.9 (05-31-20 @ 11:11), Max: 37.3 (05-31-20 @ 05:10)  T(F): 98.4 (05-31-20 @ 11:11), Max: 99.2 (05-31-20 @ 05:10)  HR: 89 (05-31-20 @ 11:11) (85 - 89)  BP: 135/87 (05-31-20 @ 11:11) (129/90 - 135/87)  RR: 18 (05-31-20 @ 11:11) (18 - 18)  SpO2: 97% (05-31-20 @ 11:11) (96% - 97%)  Wt(kg): --    PHYSICAL EXAM:    GENERAL: not in any distress  HEENT: Neck is supple, normocephalic, atraumatic   CHEST/LUNG: Clear to auscultation bilaterally; No rales, rhonchi, wheezing  chest tube plus ;; did not touch the site  HEART: Regular rate and rhythm; No murmurs, rubs, or gallops  ABDOMEN: Soft, Nontender, Nondistended; Bowel sounds present, no rebound   EXTREMITIES:  2+ Peripheral Pulses, No clubbing, cyanosis, or edema  SKIN: No rashes or lesions  BACK: no pressor sore   NERVOUS SYSTEM:  Alert & Oriented X3, Good concentration  PSYCH: normal affect     LABS:                         13.5   8.96  )-----------( 191      ( 31 May 2020 07:45 )             39.4     05-31    140  |  104  |  7   ----------------------------<  76  3.8   |  29  |  0.88    Ca    8.6      31 May 2020 07:45  Phos  2.7     05-31  Mg     2.1     05-31    TPro  6.5  /  Alb  3.1<L>  /  TBili  0.9  /  DBili  x   /  AST  39<H>  /  ALT  22  /  AlkPhos  62  05-31    LIVER FUNCTIONS - ( 31 May 2020 07:45 )  Alb: 3.1 g/dL / Pro: 6.5 gm/dL / ALK PHOS: 62 U/L / ALT: 22 U/L / AST: 39 U/L / GGT: x                                   Culture Results:   No growth (05-30 @ 00:40)  Culture Results:   No growth to date. (05-30 @ 00:38)  Culture Results:   No growth (05-28 @ 18:06)  Culture Results:   Culture is being performed. (05-25 @ 00:16)                Radiology:

## 2020-05-31 NOTE — PROGRESS NOTE ADULT - SUBJECTIVE AND OBJECTIVE BOX
Patient seen and examined at bedside in no acute distress.  Yesterday evening events noted.   Today patient reports moderate pain to the surgical incision site and mild discomfort at chest tube insertion site.  Denies any bleeding from the site. Denies shortness of breath, chest pain, cough, nausea, vomiting.     Vital Signs Last 24 Hrs  T(F): 99.2 (05-31-20 @ 05:10), Max: 99.2 (05-30-20 @ 15:46)  HR: 85 (05-31-20 @ 05:10)  BP: 129/90 (05-31-20 @ 05:10)  RR: 18 (05-31-20 @ 05:10)  SpO2: 96% (05-31-20 @ 05:10)      POCT Blood Glucose.: 107 mg/dL (30 May 2020 19:26)      GENERAL: NAD  CHEST/LUNG: R chest tube to suction (-20).  Chest tube site enforced with paper tape. Surgical site covered with gauze and tegaderm; no bleeding. Respirations nonlabored. Saturating well.     HEART: Regular rate and rhythm  ABDOMEN: Soft, Nontender, Nondistended  EXTREMITIES:  No calf tenderness, No edema    I&O's Detail    30 May 2020 07:01  -  31 May 2020 07:00  --------------------------------------------------------  IN:    Lactated Ringers IV Bolus: 600 mL    Oral Fluid: 1110 mL  Total IN: 1710 mL    OUT:    Bulb: 50 mL    Chest Tube: 90 mL    Indwelling Catheter - Urethral: 3900 mL  Total OUT: 4040 mL    Total NET: -2330 mL          LABS:                        13.5   8.96  )-----------( 191      ( 31 May 2020 07:45 )             39.4     05-31    140  |  104  |  7   ----------------------------<  76  3.8   |  29  |  0.88    Ca    8.6      31 May 2020 07:45  Phos  2.7     05-31  Mg     2.1     05-31    TPro  6.5  /  Alb  3.1<L>  /  TBili  0.9  /  DBili  x   /  AST  39<H>  /  ALT  22  /  AlkPhos  62  05-31    Impreesion: 49M with h/o tobacco use and RUL spiculated lung mass cavitary lesion admitted with dyspnea on exertion POD#2 s/p VATS wedge resection of right upper lobe of lung, reintubated d/t hypoxia post op, now extubated.  Rapid response called for patient's chest tube being pulled out slightly yesterday evening. CXR showed CT moved down but still in place.       Plan  - Continue Right CT to LWS, local drain care.   - Will f/u AM CXR.   - Pain and nausea management.   - F/u pathology.   - Medical management per medical team.    - Recommend PT evaluation.    - Will discuss with Dr. Marley.

## 2020-05-31 NOTE — PROGRESS NOTE ADULT - ASSESSMENT
cont rx cont rx    DAVE WILLIAM  [phone]  1970 DOA 2020 DR LYDIA FRANCISCO           REVIEW OF SYMPTOMS      Able to give ROS  Yes     RELIABLE No   CONSTITUTIONAL Weakness Yes  Chills No Vision changes No  ENDOCRINE No unexplained hair loss No heat or cold intolerance    ALLERGY No hives  Sore throat No   RESP Coughing blood no  Shortness of breath YES   NEURO No Headache  Confusion Pain neck No   CARDIAC No Chest pain No Palpitations   GI No Pain abdomen NO   Vomiting NO     PHYSICAL EXAM    HEENT Unremarkable PERRLA atraumatic   RESP Fair air entry EXP prolonged    Harsh breath sound Resp distres mild   CARDIAC S1 S2 No S3     NO JVD    ABDOMEN SOFT BS PRESENT NOT DISTENDED No hepatosplenomegaly PEDAL EDEMA present No calf tenderness  NO rash   GENERAL Not TOXIC looking    PROBLEMS  CAVITY RUL       OXYGENATION      -2020 ra 96% - ra 97%    VITALS/LABS   2020 afeb 89 120/90     PT DATA/BEST PRACTICE  ALLERGY       adhesives               WT        64 (2020)                             BMI         20 (2020)                    CrCl                                  ADVANCED DIRECTIVE     HEAD OF BED ELEVATION Yes      INFECTION PPLX     DVT PROPHYLAXIS             Lvnx 40 ()    MCCANN PROPHYLAXIS    protonix 40 ()         DYSPHAGIA EVAL     DIET       reg ()                                                                   IV F    PATIENT SUMMARY   49 m PMH Crohns COPD R lung mass Barretts admitted 2020 with chest pressure and SOB x 1 w Also co LLQ pain x 5 d     ER vitals 145/114 92 afeb     home meds spiriva midodrine 10.3 nicotine 14 pentasa flomax .4                                    Open lung bx vats done  Dr Marley  Pt tr out of ICU 2020     ASSESSMENT/RECOMMENDTION  Supportive care   Await hpe    TIME SPENT Over 25 minutes aggregate care time spent on encounter; activities included   direct pt care, counseling and/or coordinating care reviewing notes, lab data/ imaging , discussion with multidisciplinary team/ pt /family. Risks, benefits, alternatives  discussed in detail.    DAVE THAYER LIALEJANDRA  [phone]  1970 DOA 2020 DR LYDIA FRANCISCO

## 2020-05-31 NOTE — PROGRESS NOTE ADULT - ASSESSMENT
Lung mass with cavitation   no corroborating data that this is TB;;QuantiFeron NEGATIVE on 1/2020 and again Neg on 5/24/2020  no other sign of TB   low esr , clinically ok   fu with path from a wedge of the upper lobe; pending still   on no antibiotics   watch

## 2020-05-31 NOTE — PROGRESS NOTE ADULT - ASSESSMENT
49M with a PMH of Barretts Disease, Crohn's on Pentasya, IBS, spiculated lung mass s/p VATS POD #1; extubated this AM after having hypoxia post op requiring re-intubation.      Neuro: Weaned off fentanyl drip; continue with oxycodone IR PO for moderate pain. dilaudid prn for pain.  Nicotine patch for smoking.   CV: No acute issues.    Pulm: s/p extubation on 5/30/2020 on nasal cannula.  s/p VATS on 5/29/2020 chest tube care and management per thoracic surgery . follow up pathology which is still pending   cultures have been negative  Chest tube in place to suction; follow up CT surgery recs,  5/31/2020 this   GI: h/o Vini's Continue with Pentasa and protonix; restart Miralax senna.   BPH  was resumed on flomax on 5/30/200No   ID: No acute issues  Heme: Lovenox daily.

## 2020-05-31 NOTE — PROGRESS NOTE ADULT - SUBJECTIVE AND OBJECTIVE BOX
JEOVANY ACOSTA    S 1B 133 I    Patient is a 49y old  Male who presents with a chief complaint of abdominal pain, dyspnea (31 May 2020 11:43)       Allergies    adhesives (Rash)  No Known Drug Allergies    Intolerances        HPI:  Patient is a 49M with a PMH of Barretts Disease, Crohn's on Pentasa IBS, spiculated lung mass who presents to worsening dyspnea on exertion and abdominal pain.  Patient states his breathing has been worsening over the last two weeks but denies hx of cough, fever, chills.  Has been following Pulmonary with Dr Ileana Disla.  Patient also states that he has had abdominal pain with poor PO intake for 5 days.  States he has had no BM for the last 5 days as well.  Follows with GI, and had a tele visit with Dr Russ 3 days ago who told him to present to the ED if his abdominal pain persisted.  Admitted to [facility] in 01/2020 - had biopsy that showed necrotizing granulomas in the lung.  Vitals stable.  Labs benign.  CT chest and abdomen negative for acute disease.  Will admit to floor for further eval. (23 May 2020 01:31)      PAST MEDICAL & SURGICAL HISTORY:  Alfaro's esophagus without dysplasia  Depression, unspecified depression type  Crohn's disease with complication, unspecified gastrointestinal tract location  ACL (anterior cruciate ligament) tear      FAMILY HISTORY:  Family history of diabetes mellitus in maternal grandmother (Mother, Grandparent)  Family history of COPD (chronic obstructive pulmonary disease) (Father)  Family history of colon cancer in father (Father)  Family history of hypertension in father (Father, Mother, Grandparent)        MEDICATIONS   benzocaine 15 mG/menthol 3.6 mG (Sugar-Free) Lozenge 1 Lozenge Oral four times a day PRN  busPIRone 15 milliGRAM(s) Oral every 8 hours  enoxaparin Injectable 40 milliGRAM(s) SubCutaneous daily  HYDROmorphone  Injectable 1 milliGRAM(s) IV Push every 4 hours PRN  lactated ringers Bolus 500 milliLiter(s) IV Bolus once  mesalamine ER Capsule 1000 milliGRAM(s) Oral two times a day with meals  ondansetron Injectable 4 milliGRAM(s) IV Push every 6 hours PRN  oxyCODONE    IR 5 milliGRAM(s) Oral every 4 hours PRN  pantoprazole    Tablet 40 milliGRAM(s) Oral before breakfast  polyethylene glycol 3350 17 Gram(s) Oral two times a day  senna 2 Tablet(s) Oral at bedtime  tamsulosin 0.4 milliGRAM(s) Oral at bedtime      Vital Signs Last 24 Hrs  T(C): 37.3 (31 May 2020 05:10), Max: 37.3 (30 May 2020 15:46)  T(F): 99.2 (31 May 2020 05:10), Max: 99.2 (30 May 2020 15:46)  HR: 85 (31 May 2020 05:10) (77 - 85)  BP: 129/90 (31 May 2020 05:10) (128/89 - 135/92)  BP(mean): 99 (30 May 2020 15:00) (94 - 99)  RR: 18 (31 May 2020 05:10) (18 - 29)  SpO2: 96% (31 May 2020 05:10) (94% - 99%)      05-30-20 @ 07:01  -  05-31-20 @ 07:00  --------------------------------------------------------  IN: 1710 mL / OUT: 4040 mL / NET: -2330 mL            LABS:                        13.5   8.96  )-----------( 191      ( 31 May 2020 07:45 )             39.4     05-31    140  |  104  |  7   ----------------------------<  76  3.8   |  29  |  0.88    Ca    8.6      31 May 2020 07:45  Phos  2.7     05-31  Mg     2.1     05-31    TPro  6.5  /  Alb  3.1<L>  /  TBili  0.9  /  DBili  x   /  AST  39<H>  /  ALT  22  /  AlkPhos  62  05-31          ABG - ( 29 May 2020 17:41 )  pH, Arterial: x     pH, Blood: 7.29  /  pCO2: 51    /  pO2: 359   / HCO3: 24    / Base Excess: -3.1  /  SaO2: 100                 WBC:  WBC Count: 8.96 K/uL (05-31 @ 07:45)  WBC Count: 12.27 K/uL (05-30 @ 03:27)  WBC Count: 6.43 K/uL (05-29 @ 06:20)  WBC Count: 7.67 K/uL (05-28 @ 06:12)      MICROBIOLOGY:  RECENT CULTURES:  05-30 .Tissue RIGHT UPPER LOBE WEDGE C/S XXXX   Rare polymorphonuclear leukocytes seen per low power field  No organisms seen per oil power field   No growth    05-30 .Body Fluid RIGHT UPPER LOBE XXXX   polymorphonuclear leukocytes seen  No organisms seen  by cytocentrifuge   No growth to date.    05-28 .Urine Clean Catch (Midstream) XXXX XXXX   No growth    05-25 .Sputum Sputum XXXX XXXX   Culture is being performed.                    Sodium:  Sodium, Serum: 140 mmol/L (05-31 @ 07:45)  Sodium, Serum: 139 mmol/L (05-31 @ 07:45)  Sodium, Serum: 138 mmol/L (05-30 @ 03:27)  Sodium, Serum: 139 mmol/L (05-29 @ 06:20)  Sodium, Serum: 136 mmol/L (05-28 @ 06:12)      0.88 mg/dL 05-31 @ 07:45  0.99 mg/dL 05-30 @ 03:27  1.11 mg/dL 05-29 @ 06:20  1.19 mg/dL 05-28 @ 06:12      Hemoglobin:  Hemoglobin: 13.5 g/dL (05-31 @ 07:45)  Hemoglobin: 13.0 g/dL (05-30 @ 03:27)  Hemoglobin: 14.8 g/dL (05-29 @ 06:20)  Hemoglobin: 14.4 g/dL (05-28 @ 06:12)      Platelets: Platelet Count - Automated: 191 K/uL (05-31 @ 07:45)  Platelet Count - Automated: 206 K/uL (05-30 @ 03:27)  Platelet Count - Automated: 219 K/uL (05-29 @ 06:20)  Platelet Count - Automated: 227 K/uL (05-28 @ 06:12)      LIVER FUNCTIONS - ( 31 May 2020 07:45 )  Alb: 3.1 g/dL / Pro: 6.5 gm/dL / ALK PHOS: 62 U/L / ALT: 22 U/L / AST: 39 U/L / GGT: x                 RADIOLOGY & ADDITIONAL STUDIES:

## 2020-06-01 PROCEDURE — 99232 SBSQ HOSP IP/OBS MODERATE 35: CPT

## 2020-06-01 PROCEDURE — 71045 X-RAY EXAM CHEST 1 VIEW: CPT | Mod: 26,76

## 2020-06-01 RX ORDER — LACTULOSE 10 G/15ML
20 SOLUTION ORAL ONCE
Refills: 0 | Status: COMPLETED | OUTPATIENT
Start: 2020-06-01 | End: 2020-06-01

## 2020-06-01 RX ORDER — DIPHENHYDRAMINE HCL 50 MG
25 CAPSULE ORAL ONCE
Refills: 0 | Status: COMPLETED | OUTPATIENT
Start: 2020-06-01 | End: 2020-06-01

## 2020-06-01 RX ADMIN — HYDROMORPHONE HYDROCHLORIDE 1 MILLIGRAM(S): 2 INJECTION INTRAMUSCULAR; INTRAVENOUS; SUBCUTANEOUS at 20:43

## 2020-06-01 RX ADMIN — POLYETHYLENE GLYCOL 3350 17 GRAM(S): 17 POWDER, FOR SOLUTION ORAL at 05:59

## 2020-06-01 RX ADMIN — HYDROMORPHONE HYDROCHLORIDE 1 MILLIGRAM(S): 2 INJECTION INTRAMUSCULAR; INTRAVENOUS; SUBCUTANEOUS at 01:53

## 2020-06-01 RX ADMIN — HYDROMORPHONE HYDROCHLORIDE 1 MILLIGRAM(S): 2 INJECTION INTRAMUSCULAR; INTRAVENOUS; SUBCUTANEOUS at 10:32

## 2020-06-01 RX ADMIN — SENNA PLUS 2 TABLET(S): 8.6 TABLET ORAL at 22:27

## 2020-06-01 RX ADMIN — HYDROMORPHONE HYDROCHLORIDE 1 MILLIGRAM(S): 2 INJECTION INTRAMUSCULAR; INTRAVENOUS; SUBCUTANEOUS at 15:53

## 2020-06-01 RX ADMIN — LACTULOSE 20 GRAM(S): 10 SOLUTION ORAL at 13:06

## 2020-06-01 RX ADMIN — Medication 25 MILLIGRAM(S): at 01:53

## 2020-06-01 RX ADMIN — ENOXAPARIN SODIUM 40 MILLIGRAM(S): 100 INJECTION SUBCUTANEOUS at 12:27

## 2020-06-01 RX ADMIN — ONDANSETRON 4 MILLIGRAM(S): 8 TABLET, FILM COATED ORAL at 12:38

## 2020-06-01 RX ADMIN — Medication 15 MILLIGRAM(S): at 05:58

## 2020-06-01 RX ADMIN — TAMSULOSIN HYDROCHLORIDE 0.4 MILLIGRAM(S): 0.4 CAPSULE ORAL at 22:27

## 2020-06-01 RX ADMIN — HYDROMORPHONE HYDROCHLORIDE 1 MILLIGRAM(S): 2 INJECTION INTRAMUSCULAR; INTRAVENOUS; SUBCUTANEOUS at 05:56

## 2020-06-01 RX ADMIN — ONDANSETRON 4 MILLIGRAM(S): 8 TABLET, FILM COATED ORAL at 22:35

## 2020-06-01 RX ADMIN — Medication 15 MILLIGRAM(S): at 13:11

## 2020-06-01 RX ADMIN — Medication 1000 MILLIGRAM(S): at 08:18

## 2020-06-01 RX ADMIN — Medication 15 MILLIGRAM(S): at 22:27

## 2020-06-01 RX ADMIN — PANTOPRAZOLE SODIUM 40 MILLIGRAM(S): 20 TABLET, DELAYED RELEASE ORAL at 08:18

## 2020-06-01 NOTE — PROGRESS NOTE ADULT - SUBJECTIVE AND OBJECTIVE BOX
HPI:  Patient is a 49M with a PMH of Barretts Disease, Crohn's on Pentasa IBS, spiculated lung mass who presents to worsening dyspnea on exertion and abdominal pain.  Patient states his breathing has been worsening over the last two weeks but denies hx of cough, fever, chills.  Has been following Pulmonary with Dr Ileana Disla.  Patient also states that he has had abdominal pain with poor PO intake for 5 days.  States he has had no BM for the last 5 days as well.  Follows with GI, and had a tele visit with Dr Russ 3 days ago who told him to present to the ED if his abdominal pain persisted.  Admitted to [facility] in 01/2020 - had biopsy that showed necrotizing granulomas in the lung.  Vitals stable.  Labs benign.  CT chest and abdomen negative for acute disease.  Will admit to floor for further eval. (23 May 2020 01:31)      Allergies    adhesives (Rash)  No Known Drug Allergies    Intolerances        MEDICATIONS  (STANDING):  busPIRone 15 milliGRAM(s) Oral every 8 hours  enoxaparin Injectable 40 milliGRAM(s) SubCutaneous daily  lactated ringers Bolus 500 milliLiter(s) IV Bolus once  mesalamine ER Capsule 1000 milliGRAM(s) Oral two times a day with meals  pantoprazole    Tablet 40 milliGRAM(s) Oral before breakfast  polyethylene glycol 3350 17 Gram(s) Oral two times a day  senna 2 Tablet(s) Oral at bedtime  tamsulosin 0.4 milliGRAM(s) Oral at bedtime    MEDICATIONS  (PRN):  benzocaine 15 mG/menthol 3.6 mG (Sugar-Free) Lozenge 1 Lozenge Oral four times a day PRN Sore Throat  HYDROmorphone  Injectable 1 milliGRAM(s) IV Push every 4 hours PRN Severe Pain (7 - 10)  ondansetron Injectable 4 milliGRAM(s) IV Push every 6 hours PRN Nausea and/or Vomiting  oxyCODONE    IR 5 milliGRAM(s) Oral every 4 hours PRN Moderate Pain (4 - 6)      REVIEW OF SYSTEMS:    CONSTITUTIONAL: No fever, chills, weight loss, or fatigue  HEENT: No sore throat, runny nose, ear ache  RESPIRATORY: No cough, wheezing, No shortness of breath  CARDIOVASCULAR: No chest pain, palpitations, dizziness  GASTROINTESTINAL: No abdominal pain. No nausea, vomiting, diarrhea  GENITOURINARY: No dysuria, increase frequency, hematuria, or incontinence  NEUROLOGICAL: No headaches, memory loss, loss of strength, numbness, or tremors, no weakness  EXTREMITY: No pedal edema BLE  SKIN: No itching, burning, rashes, or lesions     VITAL SIGNS:  T(C): 37 (06-01-20 @ 11:55), Max: 37 (06-01-20 @ 11:55)  T(F): 98.6 (06-01-20 @ 11:55), Max: 98.6 (06-01-20 @ 11:55)  HR: 107 (06-01-20 @ 11:55) (93 - 107)  BP: 149/93 (06-01-20 @ 11:55) (106/73 - 149/93)  RR: 18 (06-01-20 @ 11:55) (18 - 18)  SpO2: 94% (06-01-20 @ 11:55) (94% - 96%)  Wt(kg): --    PHYSICAL EXAM:    GENERAL: not in any distress  HEENT: Neck is supple, normocephalic, atraumatic   CHEST/LUNG: Clear to percussion bilaterally; No rales, rhonchi, wheezing  HEART: Regular rate and rhythm; No murmurs, rubs, or gallops  ABDOMEN: Soft, Nontender, Nondistended; Bowel sounds present, no rebound   EXTREMITIES:  2+ Peripheral Pulses, No clubbing, cyanosis, or edema  GENITOURINARY:   SKIN: No rashes or lesions  BACK: no pressor sore   NERVOUS SYSTEM:  Alert & Oriented X3, Good concentration  PSYCH: normal affect     LABS:                         13.5   8.96  )-----------( 191      ( 31 May 2020 07:45 )             39.4     05-31    140  |  104  |  7   ----------------------------<  76  3.8   |  29  |  0.88    Ca    8.6      31 May 2020 07:45  Phos  2.7     05-31  Mg     2.1     05-31    TPro  6.5  /  Alb  3.1<L>  /  TBili  0.9  /  DBili  x   /  AST  39<H>  /  ALT  22  /  AlkPhos  62  05-31    LIVER FUNCTIONS - ( 31 May 2020 07:45 )  Alb: 3.1 g/dL / Pro: 6.5 gm/dL / ALK PHOS: 62 U/L / ALT: 22 U/L / AST: 39 U/L / GGT: x                                   Culture Results:   No growth (05-30 @ 00:40)  Culture Results:   Testing in progress (05-30 @ 00:40)  Culture Results:   Testing in progress (05-30 @ 00:38)  Culture Results:   No growth to date. (05-30 @ 00:38)  Culture Results:   No growth (05-28 @ 18:06)                Radiology:

## 2020-06-01 NOTE — PROGRESS NOTE ADULT - ASSESSMENT
cont rx cont rx      DAVE WILLIAM  [phone]  1970 DOA 2020 DR LYDIA FRANCISCO           REVIEW OF SYMPTOMS      Able to give ROS  Yes     RELIABLE No   CONSTITUTIONAL Weakness Yes  Chills No Vision changes No  ENDOCRINE No unexplained hair loss No heat or cold intolerance    ALLERGY No hives  Sore throat No   RESP Coughing blood no  Shortness of breath YES   NEURO No Headache  Confusion Pain neck No   CARDIAC No Chest pain No Palpitations   GI No Pain abdomen NO   Vomiting NO     PHYSICAL EXAM    HEENT Unremarkable PERRLA atraumatic   RESP Fair air entry EXP prolonged    Harsh breath sound Resp distres mild   CARDIAC S1 S2 No S3     NO JVD    ABDOMEN SOFT BS PRESENT NOT DISTENDED No hepatosplenomegaly PEDAL EDEMA present No calf tenderness  NO rash   GENERAL Not TOXIC looking    PROBLEMS  CAVITY RUL       OXYGENATION      -2020 ra 96% - ra 97%    VITALS/LABS   2020 afeb 130/80 107     PT DATA/BEST PRACTICE  ALLERGY       adhesives               WT        64 (2020)                             BMI         20 (2020)                    CrCl                                  ADVANCED DIRECTIVE     HEAD OF BED ELEVATION Yes      INFECTION PPLX     DVT PROPHYLAXIS             Lvnx 40 ()    MCCANN PROPHYLAXIS    protonix 40 ()         DYSPHAGIA EVAL     DIET       reg ()                                                                   IV F      CAVITY LUNG   IMAGING  2020 ct cap cw  2020   3.8 x 1.9 cm spiculated rul mass with central cavity (prev 2 x 4 cm)   ESR  1/ ESR 4-2     AFB STATUS    Tissue rul AFB sm N-juan carlos    swab AFB Sm N-juan carlos   2020 AFB smera N-juan carlos   2020 AFB sm N-juan carlos    QFT GOLD    QFT Gold TB N-juan carlos    QFT Gold TB N-juan carlos         PATIENT SUMMARY   49 m PMH Crohns COPD R lung mass Barretts admitted 2020 with chest pressure and SOB x 1 w Also co LLQ pain x 5 d     ER vitals 145/114 92 afeb     home meds spiriva midodrine 10.3 nicotine 14 pentasa flomax .4                                    Open lung bx vats done  Dr Marley  Pt tr out of ICU 2020     ASSESSMENT/RECOMMENDTION  Supportive care   Await hpe            PATIENT DATA/ASSESSMENT/RECOMMENDATIONS     RO TB RO CA  49 m who has liven in homeless shelter in past and has trevelled to [place] in past has rul cavitary lesion at least since 2020   His HIV test was negative    His cavitary rul lesion  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    ct showed persistent 3.8 x 1.7 cm rul spiculated mass with cavity    afb smear N-juan carlos    QFT N-juan carlos   ANCA PANCA N-JUAN CARLOS () AANCA INDET ()   2020 ace and ds dna were N-juan carlos   Being smoker he is also at risk for lung ca   ID eval dated  noted   ID recommends to NOT start anti TB Rx based on available information   Given the above data TB as well as cancer still need to be definitively excluded   Agree with  excision of lesion to exclude cancer  PULMONARY CLEARANCE FOR PLANNED LUNG MASS RESECTION  DW DR Laura 2020  Patient is in optimal state and is cleared form Pulmonary standpoint   Need resection to exclude cancer or TB   Recommend clearance also by Infection disease     TIME SPENT Over 25 minutes aggregate care time spent on encounter; activities included   direct pt care, counseling and/or coordinating care reviewing notes, lab data/ imaging , discussion with multidisciplinary team/ pt /family. Risks, benefits, alternatives  discussed in detail.    DAVE THAYER LIJ  24 [phone]  1970 DOA 2020 DR LYDIA FRANCISCO

## 2020-06-01 NOTE — PROGRESS NOTE ADULT - ASSESSMENT
Lung mass with cavitation   no corroborating data that this is TB;;QuantiFeron NEGATIVE on 1/2020 and again Neg on 5/24/2020  no other sign of TB   low esr , clinically ok    s/p Right VATS, RUL wedge resection/open bx.  pathology pending  monitor off abx

## 2020-06-01 NOTE — PROGRESS NOTE ADULT - SUBJECTIVE AND OBJECTIVE BOX
JEOVANY ACOSTA    S 1B 133 I    Patient is a 49y old  Male who presents with a chief complaint of abdominal pain, dyspnea (01 Jun 2020 13:45)       Allergies    adhesives (Rash)  No Known Drug Allergies    Intolerances        HPI:  Patient is a 49M with a PMH of Barretts Disease, Crohn's on Pentasa IBS, spiculated lung mass who presents to worsening dyspnea on exertion and abdominal pain.  Patient states his breathing has been worsening over the last two weeks but denies hx of cough, fever, chills.  Has been following Pulmonary with Dr Ileana Disla.  Patient also states that he has had abdominal pain with poor PO intake for 5 days.  States he has had no BM for the last 5 days as well.  Follows with GI, and had a tele visit with Dr Russ 3 days ago who told him to present to the ED if his abdominal pain persisted.  Admitted to [facility] in 01/2020 - had biopsy that showed necrotizing granulomas in the lung.  Vitals stable.  Labs benign.  CT chest and abdomen negative for acute disease.  Will admit to floor for further eval. (23 May 2020 01:31)      PAST MEDICAL & SURGICAL HISTORY:  Alfaro's esophagus without dysplasia  Depression, unspecified depression type  Crohn's disease with complication, unspecified gastrointestinal tract location  ACL (anterior cruciate ligament) tear      FAMILY HISTORY:  Family history of diabetes mellitus in maternal grandmother (Mother, Grandparent)  Family history of COPD (chronic obstructive pulmonary disease) (Father)  Family history of colon cancer in father (Father)  Family history of hypertension in father (Father, Mother, Grandparent)        MEDICATIONS   benzocaine 15 mG/menthol 3.6 mG (Sugar-Free) Lozenge 1 Lozenge Oral four times a day PRN  busPIRone 15 milliGRAM(s) Oral every 8 hours  enoxaparin Injectable 40 milliGRAM(s) SubCutaneous daily  HYDROmorphone  Injectable 1 milliGRAM(s) IV Push every 4 hours PRN  lactated ringers Bolus 500 milliLiter(s) IV Bolus once  mesalamine ER Capsule 1000 milliGRAM(s) Oral two times a day with meals  ondansetron Injectable 4 milliGRAM(s) IV Push every 6 hours PRN  oxyCODONE    IR 5 milliGRAM(s) Oral every 4 hours PRN  pantoprazole    Tablet 40 milliGRAM(s) Oral before breakfast  polyethylene glycol 3350 17 Gram(s) Oral two times a day  senna 2 Tablet(s) Oral at bedtime  tamsulosin 0.4 milliGRAM(s) Oral at bedtime      Vital Signs Last 24 Hrs  T(C): 37 (01 Jun 2020 11:55), Max: 37 (01 Jun 2020 11:55)  T(F): 98.6 (01 Jun 2020 11:55), Max: 98.6 (01 Jun 2020 11:55)  HR: 107 (01 Jun 2020 11:55) (93 - 107)  BP: 149/93 (01 Jun 2020 11:55) (106/73 - 149/93)  BP(mean): --  RR: 18 (01 Jun 2020 11:55) (18 - 18)  SpO2: 94% (01 Jun 2020 11:55) (94% - 96%)      05-31-20 @ 07:01  -  06-01-20 @ 07:00  --------------------------------------------------------  IN: 0 mL / OUT: 1470 mL / NET: -1470 mL    06-01-20 @ 07:01 - 06-01-20 @ 14:11  --------------------------------------------------------  IN: 0 mL / OUT: 200 mL / NET: -200 mL            LABS:                        13.5   8.96  )-----------( 191      ( 31 May 2020 07:45 )             39.4     05-31    140  |  104  |  7   ----------------------------<  76  3.8   |  29  |  0.88    Ca    8.6      31 May 2020 07:45  Phos  2.7     05-31  Mg     2.1     05-31    TPro  6.5  /  Alb  3.1<L>  /  TBili  0.9  /  DBili  x   /  AST  39<H>  /  ALT  22  /  AlkPhos  62  05-31              WBC:  WBC Count: 8.96 K/uL (05-31 @ 07:45)  WBC Count: 12.27 K/uL (05-30 @ 03:27)  WBC Count: 6.43 K/uL (05-29 @ 06:20)      MICROBIOLOGY:  RECENT CULTURES:  05-31 .Sputum Sputum XXXX   Numerous polymorphonuclear leukocytes per low power field  No Squamous epithelial cells per low power field  No organisms seen XXXX    05-30 .Tissue RIGHT UPPER LOBE WEDGE C/S XXXX   Rare polymorphonuclear leukocytes seen per low power field  No organisms seen per oil power field   No growth    05-30 .Body Fluid RIGHT UPPER LOBE XXXX   polymorphonuclear leukocytes seen  No organisms seen  by cytocentrifuge   Testing in progress    05-28 .Urine Clean Catch (Midstream) XXXX XXXX   No growth                    Sodium:  Sodium, Serum: 140 mmol/L (05-31 @ 07:45)  Sodium, Serum: 139 mmol/L (05-31 @ 07:45)  Sodium, Serum: 138 mmol/L (05-30 @ 03:27)  Sodium, Serum: 139 mmol/L (05-29 @ 06:20)      0.88 mg/dL 05-31 @ 07:45  0.99 mg/dL 05-30 @ 03:27  1.11 mg/dL 05-29 @ 06:20      Hemoglobin:  Hemoglobin: 13.5 g/dL (05-31 @ 07:45)  Hemoglobin: 13.0 g/dL (05-30 @ 03:27)  Hemoglobin: 14.8 g/dL (05-29 @ 06:20)      Platelets: Platelet Count - Automated: 191 K/uL (05-31 @ 07:45)  Platelet Count - Automated: 206 K/uL (05-30 @ 03:27)  Platelet Count - Automated: 219 K/uL (05-29 @ 06:20)      LIVER FUNCTIONS - ( 31 May 2020 07:45 )  Alb: 3.1 g/dL / Pro: 6.5 gm/dL / ALK PHOS: 62 U/L / ALT: 22 U/L / AST: 39 U/L / GGT: x                 RADIOLOGY & ADDITIONAL STUDIES:

## 2020-06-01 NOTE — CHART NOTE - NSCHARTNOTEFT_GEN_A_CORE
DISCUSSED WITH INFECTION CONTROL  SPUTUM 5/25 GROWING AFB   GREW IN 6 DAYS GOES AGAINST M. TUBERCULOSIS WHICH NEEDS  4-6 WEEKS TOP GROW  THIS IS A RAPID GROWER   AWAIT PCR   MAINTAIN PRIVATE ROOM   NO NEED FOR AIRBORNE ISOLATION

## 2020-06-01 NOTE — PROGRESS NOTE ADULT - SUBJECTIVE AND OBJECTIVE BOX
transferred out of icu to my service on 5/31/2020          INTERVAL HPI/OVERNIGHT EVENTS:  POD #3 : s/p VATS with left thoracotomy    MEDICATIONS  (STANDING):  busPIRone 15 milliGRAM(s) Oral every 8 hours  enoxaparin Injectable 40 milliGRAM(s) SubCutaneous daily  lactated ringers Bolus 500 milliLiter(s) IV Bolus once  mesalamine ER Capsule 1000 milliGRAM(s) Oral two times a day with meals  pantoprazole    Tablet 40 milliGRAM(s) Oral before breakfast  polyethylene glycol 3350 17 Gram(s) Oral two times a day  senna 2 Tablet(s) Oral at bedtime  tamsulosin 0.4 milliGRAM(s) Oral at bedtime    MEDICATIONS  (PRN):  benzocaine 15 mG/menthol 3.6 mG (Sugar-Free) Lozenge 1 Lozenge Oral four times a day PRN Sore Throat  HYDROmorphone  Injectable 1 milliGRAM(s) IV Push every 4 hours PRN Severe Pain (7 - 10)  ondansetron Injectable 4 milliGRAM(s) IV Push every 6 hours PRN Nausea and/or Vomiting  oxyCODONE    IR 5 milliGRAM(s) Oral every 4 hours PRN Moderate Pain (4 - 6)    Vital Signs Last 24 Hrs  T(C): 36.8 (01 Jun 2020 06:12), Max: 36.9 (31 May 2020 11:11)  T(F): 98.2 (01 Jun 2020 06:12), Max: 98.4 (31 May 2020 11:11)  HR: 93 (01 Jun 2020 06:12) (89 - 93)  BP: 107/75 (01 Jun 2020 06:12) (107/75 - 135/87)  BP(mean): --  RR: 18 (01 Jun 2020 06:12) (18 - 18)  SpO2: 96% (01 Jun 2020 06:12) (96% - 97%)      PHYSICAL EXAM:    GENERAL: NAD, well-groomed, well-developed  HEAD:  Atraumatic, Normocephalic  NECK: Supple, No JVD, Normal thyroid  CHEST/LUNG: Decreased on R; with chest tube in place.    HEART: Regular rate and rhythm; No murmurs, rubs, or gallops  ABDOMEN: Soft, Nontender, Nondistended; Bowel sounds present  EXTREMITIES:  2+ Peripheral Pulses, No clubbing, cyanosis, or edema  NERVOUS SYSTEM:  Alert & Oriented X3, Good concentration; Motor Strength 5/5 B/L upper and lower extremities; DTRs 2+ intact and symmetric        LABS:                                             13.5   8.96  )-----------( 191      ( 31 May 2020 07:45 )             39.4   05-31    140  |  104  |  7   ----------------------------<  76  3.8   |  29  |  0.88    Ca    8.6      31 May 2020 07:45  Phos  2.7     05-31  Mg     2.1     05-31    TPro  6.5  /  Alb  3.1<L>  /  TBili  0.9  /  DBili  x   /  AST  39<H>  /  ALT  22  /  AlkPhos  62  05-31        Culture - Acid Fast - Tissue w/Smear (collected 30 May 2020 00:40)  Source: .Tissue RIGHT UPPER LOBE WEDGE C/S    Culture - Tissue with Gram Stain (collected 30 May 2020 00:40)  Source: .Tissue RIGHT UPPER LOBE WEDGE C/S  Gram Stain (prelim) (30 May 2020 21:50):    Rare polymorphonuclear leukocytes seen per low power field    No organisms seen per oil power field  Preliminary Report (30 May 2020 21:50):    No growth    Culture - Body Fluid with Gram Stain (collected 30 May 2020 00:38)  Source: .Body Fluid RIGHT UPPER LOBE  C/S  Gram Stain (prelim) (30 May 2020 20:31):    polymorphonuclear leukocytes seen    No organisms seen    by cytocentrifuge  Preliminary Report (30 May 2020 20:31):    No growth to date.    Culture - Acid Fast - Body Fluid w/Smear (collected 30 May 2020 00:38)  Source: .Body Fluid RIGHT UPPER LOBE    Culture - Urine (collected 28 May 2020 18:06)  Source: .Urine Clean Catch (Midstream)  Final Report (29 May 2020 18:09):    No growth        RADIOLOGY & ADDITIONAL STUDIES:  < from: Xray Chest 1 View- PORTABLE-Urgent (05.30.20 @ 19:53) >  History chest tube placement    Comparison prior day    Postoperative right-sided chest tubes remain in position without significant residual or recurrent pneumothorax, consolidation or layering effusion. Overlying soft tissue emphysema persists without significant interval change. There is development of mild discoid atelectasis in the left lung base. Cardiacsilhouette is normal in size. There is mild persistent perioperative consolidation in the perihilar right upper lung.      < end of copied text >

## 2020-06-01 NOTE — CHART NOTE - NSCHARTNOTEFT_GEN_A_CORE
F/U CXR reviewed    < from: Xray Chest 1 View- PORTABLE-Urgent (06.01.20 @ 12:21) >      EXAM:  XR CHEST PORTABLE URGENT 1V                            PROCEDURE DATE:  06/01/2020          INTERPRETATION:  Chest one view    HISTORY: Chest tube removal    COMPARISON STUDY: Earlier the same morning    Frontal expiratory view of the chest shows the heart to be normal in size. One right chest tube remains.    The lungs show less left base atelectasis or infiltrate and there is no evidence of pneumothorax nor pleural effusion.    IMPRESSION:  No pneumothorax.    Thank you for the courtesy of this referral.    GERARDO HERNANDEZ M.D., ATTENDING RADIOLOGIST  This document has been electronically signed. Jun 1 2020 12:30PM         Discussed with Dr. Marley, via phone.   Will get repeat CXR in AM.    Await TOV & PVR results.

## 2020-06-01 NOTE — PROGRESS NOTE ADULT - SUBJECTIVE AND OBJECTIVE BOX
Postoperative Day # 3  s/p Right VATS, RUL wedge resection/open bx.    Patient seen and examined bedside resting comfortably.  C/O right sided chest pain. No c/o SOB  Denies nausea and vomiting. Tolerating diet.  Flatus/BM. +    T(F): 98.2 (06-01-20 @ 06:12), Max: 98.4 (05-31-20 @ 11:11)  HR: 93 (06-01-20 @ 06:12) (89 - 93)  BP: 106/73 (06-01-20 @ 10:30) (106/73 - 135/87)  RR: 18 (06-01-20 @ 06:12) (18 - 18)  SpO2: 96% (06-01-20 @ 06:12) (96% - 97%)    PHYSICAL EXAM:  General: NAD  Neuro:  Alert & oriented x 3  Abdomen: soft, NTND. Normactive BS  Chest: Rt sided chest tube & drain tube in place. Evidence of large amount of serous drainage on the dressings, Suction was taken off of pleurevac yesterday. No air leak noted at this time. Decreased breath sounds on right. Trochar sites were clean & dry. Steristrips in place.  : Estrada cath in place draining clear urine    LABS:                no new labs today      < from: Xray Chest 1 View- PORTABLE-Urgent (06.01.20 @ 09:50) >    EXAM:  XR CHEST PORTABLE URGENT 1V                            PROCEDURE DATE:  06/01/2020          INTERPRETATION:  Chest one view    HISTORY: Postop    COMPARISON STUDY: 5/31/2020    Frontal expiratory view of the chest shows the heart to be similar in size. Right chest tubes remain present.    The lungs show similar left base atelectasis and there is no evidence of pneumothorax nor pleural effusion. Right lateral subcutaneous emphysema remains present.    IMPRESSION:  No pneumothorax.    Thank you for the courtesy of this referral.    GERARDO HERNANDEZ M.D., ATTENDING RADIOLOGIST  This document has been electronically signed. Jun 1 2020  9:57AM       I&O's Detail    31 May 2020 07:01  -  01 Jun 2020 07:00  --------------------------------------------------------  IN:  Total IN: 0 mL    OUT:    Bulb: 30 mL    Chest Tube: 40 mL    Indwelling Catheter - Urethral: 1400 mL  Total OUT: 1470 mL    Total NET: -1470 mL          Impression: 49y Male Postoperative Day # 3  s/p Right VATS, RUL wedge resection/open bx.  pathology pending    Plan:  Pt was discussed with Dr. Marley, via phone.  Rt sided CT was removed without incident. Drain left in place. DSD applied.  Pt said his breathing was easier after the removal of the tube. Pulse ox 94% after removal.  Repeat CXR ordered.    Will remove Estrada Cath for a TOV. Will measure PVR.

## 2020-06-02 LAB
ANION GAP SERPL CALC-SCNC: 3 MMOL/L — LOW (ref 5–17)
BUN SERPL-MCNC: 8 MG/DL — SIGNIFICANT CHANGE UP (ref 7–23)
CALCIUM SERPL-MCNC: 9 MG/DL — SIGNIFICANT CHANGE UP (ref 8.5–10.1)
CHLORIDE SERPL-SCNC: 102 MMOL/L — SIGNIFICANT CHANGE UP (ref 96–108)
CO2 SERPL-SCNC: 33 MMOL/L — HIGH (ref 22–31)
CREAT SERPL-MCNC: 1.11 MG/DL — SIGNIFICANT CHANGE UP (ref 0.5–1.3)
CULTURE RESULTS: SIGNIFICANT CHANGE UP
GLUCOSE SERPL-MCNC: 155 MG/DL — HIGH (ref 70–99)
GRAM STN FLD: SIGNIFICANT CHANGE UP
HCT VFR BLD CALC: 41.2 % — SIGNIFICANT CHANGE UP (ref 39–50)
HGB BLD-MCNC: 13.9 G/DL — SIGNIFICANT CHANGE UP (ref 13–17)
MAGNESIUM SERPL-MCNC: 1.9 MG/DL — SIGNIFICANT CHANGE UP (ref 1.6–2.6)
MCHC RBC-ENTMCNC: 31.3 PG — SIGNIFICANT CHANGE UP (ref 27–34)
MCHC RBC-ENTMCNC: 33.7 GM/DL — SIGNIFICANT CHANGE UP (ref 32–36)
MCV RBC AUTO: 92.8 FL — SIGNIFICANT CHANGE UP (ref 80–100)
METHOD TYPE: SIGNIFICANT CHANGE UP
NRBC # BLD: 0 /100 WBCS — SIGNIFICANT CHANGE UP (ref 0–0)
ORGANISM # SPEC MICROSCOPIC CNT: SIGNIFICANT CHANGE UP
ORGANISM # SPEC MICROSCOPIC CNT: SIGNIFICANT CHANGE UP
PHOSPHATE SERPL-MCNC: 2.8 MG/DL — SIGNIFICANT CHANGE UP (ref 2.5–4.5)
PLATELET # BLD AUTO: 226 K/UL — SIGNIFICANT CHANGE UP (ref 150–400)
POTASSIUM SERPL-MCNC: 3.8 MMOL/L — SIGNIFICANT CHANGE UP (ref 3.5–5.3)
POTASSIUM SERPL-SCNC: 3.8 MMOL/L — SIGNIFICANT CHANGE UP (ref 3.5–5.3)
RBC # BLD: 4.44 M/UL — SIGNIFICANT CHANGE UP (ref 4.2–5.8)
RBC # FLD: 12.2 % — SIGNIFICANT CHANGE UP (ref 10.3–14.5)
SODIUM SERPL-SCNC: 138 MMOL/L — SIGNIFICANT CHANGE UP (ref 135–145)
SPECIMEN SOURCE: SIGNIFICANT CHANGE UP
WBC # BLD: 9.94 K/UL — SIGNIFICANT CHANGE UP (ref 3.8–10.5)
WBC # FLD AUTO: 9.94 K/UL — SIGNIFICANT CHANGE UP (ref 3.8–10.5)

## 2020-06-02 PROCEDURE — 99232 SBSQ HOSP IP/OBS MODERATE 35: CPT

## 2020-06-02 PROCEDURE — 71045 X-RAY EXAM CHEST 1 VIEW: CPT | Mod: 26

## 2020-06-02 PROCEDURE — 71045 X-RAY EXAM CHEST 1 VIEW: CPT | Mod: 26,77

## 2020-06-02 RX ORDER — MORPHINE SULFATE 50 MG/1
2 CAPSULE, EXTENDED RELEASE ORAL ONCE
Refills: 0 | Status: DISCONTINUED | OUTPATIENT
Start: 2020-06-02 | End: 2020-06-02

## 2020-06-02 RX ORDER — MORPHINE SULFATE 50 MG/1
2 CAPSULE, EXTENDED RELEASE ORAL EVERY 6 HOURS
Refills: 0 | Status: DISCONTINUED | OUTPATIENT
Start: 2020-06-02 | End: 2020-06-05

## 2020-06-02 RX ORDER — LACTULOSE 10 G/15ML
10 SOLUTION ORAL
Refills: 0 | Status: DISCONTINUED | OUTPATIENT
Start: 2020-06-02 | End: 2020-06-05

## 2020-06-02 RX ORDER — ONDANSETRON 8 MG/1
4 TABLET, FILM COATED ORAL ONCE
Refills: 0 | Status: COMPLETED | OUTPATIENT
Start: 2020-06-02 | End: 2020-06-02

## 2020-06-02 RX ORDER — FAMOTIDINE 10 MG/ML
20 INJECTION INTRAVENOUS ONCE
Refills: 0 | Status: DISCONTINUED | OUTPATIENT
Start: 2020-06-02 | End: 2020-06-05

## 2020-06-02 RX ADMIN — Medication 15 MILLIGRAM(S): at 14:30

## 2020-06-02 RX ADMIN — HYDROMORPHONE HYDROCHLORIDE 1 MILLIGRAM(S): 2 INJECTION INTRAMUSCULAR; INTRAVENOUS; SUBCUTANEOUS at 14:30

## 2020-06-02 RX ADMIN — POLYETHYLENE GLYCOL 3350 17 GRAM(S): 17 POWDER, FOR SOLUTION ORAL at 18:21

## 2020-06-02 RX ADMIN — LACTULOSE 10 GRAM(S): 10 SOLUTION ORAL at 14:59

## 2020-06-02 RX ADMIN — POLYETHYLENE GLYCOL 3350 17 GRAM(S): 17 POWDER, FOR SOLUTION ORAL at 05:45

## 2020-06-02 RX ADMIN — Medication 1000 MILLIGRAM(S): at 18:43

## 2020-06-02 RX ADMIN — TAMSULOSIN HYDROCHLORIDE 0.4 MILLIGRAM(S): 0.4 CAPSULE ORAL at 21:59

## 2020-06-02 RX ADMIN — Medication 15 MILLIGRAM(S): at 05:45

## 2020-06-02 RX ADMIN — ENOXAPARIN SODIUM 40 MILLIGRAM(S): 100 INJECTION SUBCUTANEOUS at 11:45

## 2020-06-02 RX ADMIN — ONDANSETRON 4 MILLIGRAM(S): 8 TABLET, FILM COATED ORAL at 18:47

## 2020-06-02 RX ADMIN — MORPHINE SULFATE 2 MILLIGRAM(S): 50 CAPSULE, EXTENDED RELEASE ORAL at 18:43

## 2020-06-02 RX ADMIN — ONDANSETRON 4 MILLIGRAM(S): 8 TABLET, FILM COATED ORAL at 21:59

## 2020-06-02 RX ADMIN — MORPHINE SULFATE 2 MILLIGRAM(S): 50 CAPSULE, EXTENDED RELEASE ORAL at 21:59

## 2020-06-02 RX ADMIN — Medication 15 MILLIGRAM(S): at 21:59

## 2020-06-02 NOTE — PROGRESS NOTE ADULT - ASSESSMENT
Case dw Dr Sewell DAVE THAYER LI  [phone]  1970 DOA 2020 DR LYDIA FRANCISCO           REVIEW OF SYMPTOMS      Able to give ROS  Yes     RELIABLE No   CONSTITUTIONAL Weakness Yes  Chills No Vision changes No  ENDOCRINE No unexplained hair loss No heat or cold intolerance    ALLERGY No hives  Sore throat No   RESP Coughing blood no  Shortness of breath YES   NEURO No Headache  Confusion Pain neck No   CARDIAC No Chest pain No Palpitations   GI No Pain abdomen NO   Vomiting NO     PHYSICAL EXAM    HEENT Unremarkable PERRLA atraumatic   RESP Fair air entry EXP prolonged    Harsh breath sound Resp distres mild   CARDIAC S1 S2 No S3     NO JVD    ABDOMEN SOFT BS PRESENT NOT DISTENDED No hepatosplenomegaly PEDAL EDEMA present No calf tenderness  NO rash   GENERAL Not TOXIC looking    PROBLEMS  CAVITY RUL       OXYGENATION      -2020 ra 96% - ra 97%    VITALS/LABS   2020 afeb 80 110/70     PT DATA/BEST PRACTICE  ALLERGY       adhesives               WT        64 (2020)                             BMI         20 (2020)                    CrCl                                  ADVANCED DIRECTIVE     HEAD OF BED ELEVATION Yes      INFECTION PPLX     DVT PROPHYLAXIS             Lvnx 40 ()    MCCANN PROPHYLAXIS    protonix 40 ()         DYSPHAGIA EVAL     DIET       reg ()                                                                   IV F        VTE RULED OUT  D-dimr 2020 d-dimr n    V duplx 2020 V duplex n   CARDIAC  Midodrine  10.3 ()   COVID   2020 COVID 19 PCR -nicole       CAVITY LUNG   IMAGING  2020 ct cap cw  2020   3.8 x 1.9 cm spiculated rul mass with central cavity (prev 2 x 4 cm)   ESR   ESR 4-2     AFB STATUS    Tissue rul AFB sm N-nicole    swab AFB Sm N-nicole   2020 AFB smera N-nicole  AFB growth in broth  Confirmed with lab Kari [phone] on 2020  6:45 AM   (2020 ID evaluated the POSITIVE AFB and since it appeared to be rapid grower ID recommended to hold off airborne isolation)    2020 AFB sm N-nicole    QFT GOLD    QFT Gold TB N-nicole    QFT Gold TB N-nicole     PATIENT SUMMARY   49 m PMH Crohns COPD R lung mass Barretts admitted 2020 with chest pressure and SOB x 1 w Also co LLQ pain x 5 d     ER vitals 145/114 92 afeb     home meds spiriva midodrine 10.3 nicotine 14 pentasa flomax .4                                    Open lung bx vats done  Dr Marley  Pt tr out of ICU 2020       PATIENT DATA/ASSESSMENT/RECOMMENDATIONS     CAVITY RUL RO TB RO CA  49 m who has liven in homeless shelter in past and has trevelled to [place] in past has rul cavitary lesion at least since 2020   His HIV test was negative    His cavitary rul lesion  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    ct showed persistent 3.8 x 1.7 cm rul spiculated mass with cavity    afb smear N-nicole BUT BROTH GREW AFB    QFT N-nicole   ANCA PANCA N-NICOLE () AANCA INDET ()   2020 ace and ds dna were N-nicole   Being smoker he is also at risk for lung ca   ID eval dated  noted   ID recommends to NOT start anti TB Rx based on available information   Patient had VATS wedge  and HPE is pending   2020 ID recommends to hold off airborne isolation as  AFB growth is too rapid to be M TB   Await dna probe to defnitively exclude MTB and await HPE and biopsy culture         TIME SPENT Over 25 minutes aggregate care time spent on encounter; activities included   direct pt care, counseling and/or coordinating care reviewing notes, lab data/ imaging , discussion with multidisciplinary team/ pt /family. Risks, benefits, alternatives  discussed in detail.    DAVE THAYER LIALEJANDRA  [phone]  1970 DOA 2020 DR LYDIA FRANCISCO

## 2020-06-02 NOTE — PROGRESS NOTE ADULT - SUBJECTIVE AND OBJECTIVE BOX
Postoperative Day #  4 s/p Rt VATS; RUL wedge resection    Patient seen and examined bedside. He appears to be very anxious.  States he still has some discomfort in his right chest.  Denies nausea and vomiting. Tolerating diet.  Flatus/no BM. in "a couple of days. He says he will try again later.  Denies chest pain, dyspnea, cough.    T(F): 98.1 (06-02-20 @ 05:32), Max: 98.6 (06-01-20 @ 11:55)  HR: 110 (06-02-20 @ 05:32) (105 - 112)  BP: 127/87 (06-02-20 @ 05:32) (127/87 - 149/93)  RR: 20 (06-02-20 @ 05:32) (17 - 20)  SpO2: 94% (06-02-20 @ 05:32) (94% - 97%)    PHYSICAL EXAM:  General: NAD  Neuro:  Alert & oriented x 3  Chest: decreased breath sounds on right. Boyd drain in place on the right. 7.5ml serosanguenous out for the past 24 hr. Dressing to Rt chest wall dry & intact.  Abdomen: soft, NTND. Normactive BS    LABS:                        13.9   9.94  )-----------( 226      ( 02 Jun 2020 08:26 )             41.2     06-02    138  |  102  |  8   ----------------------------<  155<H>  3.8   |  33<H>  |  1.11    Ca    9.0      02 Jun 2020 08:26  Phos  2.8     06-02  Mg     1.9     06-02    Culture - Fungal, Tissue (05.30.20 @ 00:40)    Specimen Source: .Tissue RIGHT UPPER LOBE WEDGE C/S    Culture Results:   Testing in progress    Culture - Sputum . (05.31.20 @ 15:02)    Gram Stain: Culture - Acid Fast - Body Fluid w/Smear (05.30.20 @ 00:38)    Specimen Source: .Body Fluid RIGHT UPPER LOBE    Acid Fast Bacilli Smear:   No acid fast bacilli seen by fluorochrome stain  "Specimen was sent on a swab.  Swabs are not recommended  for optimal recovery of Mycobacteria from culture and may be falsely  negative."      Numerous polymorphonuclear leukocytes per low power field  No Squamous epithelial cells per low power field  No organisms seen    Specimen Source: .Sputum Sputum    Culture Results:   Normal Respiratory Karissa present    Culture - Acid Fast - Tissue w/Smear (05.30.20 @ 00:40)    Specimen Source: .Tissue RIGHT UPPER LOBE WEDGE C/S    Acid Fast Bacilli Smear:   No acid fast bacilli seen by fluorochrome stain    Culture - Tissue with Gram Stain (05.30.20 @ 00:40)    Gram Stain:   Rare polymorphonuclear leukocytes seen per low power field  No organisms seen per oil power field    Specimen Source: .Tissue RIGHT UPPER LOBE WEDGE C/S    Culture Results:   No growth      < from: Xray Chest 1 View- PORTABLE-Routine (06.02.20 @ 07:42) >I    EXAM:  XR CHEST PORTABLE ROUTINE 1V                            PROCEDURE DATE:  06/02/2020          INTERPRETATION:  CLINICAL INDICATION: 49 years  Male with s/p CT removal.    COMPARISON: 6/1/2020    The right chest tube is unchanged. There is no pneumothorax. Right chest wall subcutaneous emphysema is unchanged.    AP view of the chest demonstrates mild bibasilar discoid atelectasis.. There is no pleural effusion.    The heart is normal in size. There is no mediastinal or hilar mass.     The pulmonary vasculature is normal.     Mild thoracic degenerative changes are present.    IMPRESSION:    Right chest tube in situ. No pneumothorax. Right subcutaneous emphysema unchanged.    Mild bibasilar discoid atelectasis.  ANASTASIYA ADAMS M.D., ATTENDING RADIOLOGIST  This document has been electronically signed. Jun 2 2020  8:36AM          Culture - Fungal, Body Fluid (05.30.20 @ 00:38)    Specimen Source: .Body Fluid RIGHT UPPER LOBE    Culture Results:   Testing in progress      I&O's Detail  01 Jun 2020 07:01  -  02 Jun 2020 07:00  --------------------------------------------------------  IN:  Total IN: 0 mL    OUT:    Bulb: 7.5 mL    Indwelling Catheter - Urethral: 200 mL    Voided: 400 mL  Total OUT: 607.5 mL    Total NET: -607.5 mL    Pulmonary f/u noted." 6/2/2020 645a  [facility] that sputim afb is positive from 5/25 I confirmed with the lab [phone] this am and identification will not be available for another week  I explained to Dr Sewell and sent a message to Dr Blue this am that my recommendation is to start airborne isolation while patient is in the hospital and to consider starting 4 anti TB meds in view of fact that prior biopsy showed necrotizing granuloma   Await HPE from VATS and await gene probe from recent tests done end of May 2020   It could turn out to be nonTB mycobacteria but could very well be M Tb complex also."       Impression: 49y Male Postoperative Day #  4 s/p Rt VATS; RUL wedge resection  TB?      Plan:  -continue VTE prophylaxis   - continue IVAB per ID - Dr. Blue called for ID evaluation by Dr. Baires  - continue medical f/u  -continue local wound care  - continue incentive spirometry  - ambulate  -f/u AM labs  -will discuss with surgical attending - D/C Boyd drain today?

## 2020-06-02 NOTE — CHART NOTE - NSCHARTNOTEFT_GEN_A_CORE
After a discussion with Dr. Marley. Boyd drain was removed from the right side of the chest.  Sterile dressing applied. Sealed with Tegaderm.  Pt tolerated procedure well.  F/U CXR ordered.    Explained to pt that there may still be some serous drainage into dressing.

## 2020-06-02 NOTE — PROGRESS NOTE ADULT - SUBJECTIVE AND OBJECTIVE BOX
transferred out of icu to my service on 5/31/2020          INTERVAL HPI/OVERNIGHT EVENTS:  POD #4 : s/p VATS with left thoracotomy    MEDICATIONS  (STANDING):  busPIRone 15 milliGRAM(s) Oral every 8 hours  enoxaparin Injectable 40 milliGRAM(s) SubCutaneous daily  lactated ringers Bolus 500 milliLiter(s) IV Bolus once  mesalamine ER Capsule 1000 milliGRAM(s) Oral two times a day with meals  pantoprazole    Tablet 40 milliGRAM(s) Oral before breakfast  polyethylene glycol 3350 17 Gram(s) Oral two times a day  senna 2 Tablet(s) Oral at bedtime  tamsulosin 0.4 milliGRAM(s) Oral at bedtime    MEDICATIONS  (PRN):  benzocaine 15 mG/menthol 3.6 mG (Sugar-Free) Lozenge 1 Lozenge Oral four times a day PRN Sore Throat  HYDROmorphone  Injectable 1 milliGRAM(s) IV Push every 4 hours PRN Severe Pain (7 - 10)  ondansetron Injectable 4 milliGRAM(s) IV Push every 6 hours PRN Nausea and/or Vomiting  oxyCODONE    IR 5 milliGRAM(s) Oral every 4 hours PRN Moderate Pain (4 - 6)      Vital Signs Last 24 Hrs  T(C): 36.7 (02 Jun 2020 05:32), Max: 37 (01 Jun 2020 11:55)  T(F): 98.1 (02 Jun 2020 05:32), Max: 98.6 (01 Jun 2020 11:55)  HR: 102 (02 Jun 2020 10:52) (102 - 112)  BP: 123/77 (02 Jun 2020 10:52) (123/77 - 149/93)  BP(mean): --  RR: 18 (02 Jun 2020 10:52) (17 - 20)  SpO2: 93% (02 Jun 2020 10:52) (93% - 97%)    PHYSICAL EXAM:    GENERAL: NAD, well-groomed, well-developed  HEAD:  Atraumatic, Normocephalic  NECK: Supple, No JVD, Normal thyroid  CHEST/LUNG: Decreased on R; with chest tube in place.    HEART: Regular rate and rhythm; No murmurs, rubs, or gallops  ABDOMEN: Soft, Nontender, Nondistended; Bowel sounds present  EXTREMITIES:  2+ Peripheral Pulses, No clubbing, cyanosis, or edema  NERVOUS SYSTEM:  Alert & Oriented X3, Good concentration; Motor Strength 5/5 B/L upper and lower extremities; DTRs 2+ intact and symmetric        LABS:                                             13.9   9.94  )-----------( 226      ( 02 Jun 2020 08:26 )             41.2   06-02    138  |  102  |  8   ----------------------------<  155<H>  3.8   |  33<H>  |  1.11    Ca    9.0      02 Jun 2020 08:26  Phos  2.8     06-02  Mg     1.9     06-02        Culture - Acid Fast - Tissue w/Smear (collected 30 May 2020 00:40)  Source: .Tissue RIGHT UPPER LOBE WEDGE C/S    Culture - Tissue with Gram Stain (collected 30 May 2020 00:40)  Source: .Tissue RIGHT UPPER LOBE WEDGE C/S  Gram Stain (prelim) (30 May 2020 21:50):    Rare polymorphonuclear leukocytes seen per low power field    No organisms seen per oil power field  Preliminary Report (30 May 2020 21:50):    No growth    Culture - Body Fluid with Gram Stain (collected 30 May 2020 00:38)  Source: .Body Fluid RIGHT UPPER LOBE  C/S  Gram Stain (prelim) (30 May 2020 20:31):    polymorphonuclear leukocytes seen    No organisms seen    by cytocentrifuge  Preliminary Report (30 May 2020 20:31):    No growth to date.    Culture - Acid Fast - Body Fluid w/Smear (collected 30 May 2020 00:38)  Source: .Body Fluid RIGHT UPPER LOBE    Culture - Urine (collected 28 May 2020 18:06)  Source: .Urine Clean Catch (Midstream)  Final Report (29 May 2020 18:09):    No growth        RADIOLOGY & ADDITIONAL STUDIES:  < from: Xray Chest 1 View- PORTABLE-Urgent (05.30.20 @ 19:53) >  History chest tube placement    Comparison prior day    Postoperative right-sided chest tubes remain in position without significant residual or recurrent pneumothorax, consolidation or layering effusion. Overlying soft tissue emphysema persists without significant interval change. There is development of mild discoid atelectasis in the left lung base. Cardiacsilhouette is normal in size. There is mild persistent perioperative consolidation in the perihilar right upper lung.      < end of copied text >

## 2020-06-02 NOTE — PROGRESS NOTE ADULT - SUBJECTIVE AND OBJECTIVE BOX
HPI:  Patient is a 49M with a PMH of Barretts Disease, Crohn's on Pentasa IBS, spiculated lung mass who presents to worsening dyspnea on exertion and abdominal pain.  Patient states his breathing has been worsening over the last two weeks but denies hx of cough, fever, chills.  Has been following Pulmonary with Dr Ileana Disla.  Patient also states that he has had abdominal pain with poor PO intake for 5 days.  States he has had no BM for the last 5 days as well.  Follows with GI, and had a tele visit with Dr Russ 3 days ago who told him to present to the ED if his abdominal pain persisted.  Admitted to [facility] in 01/2020 - had biopsy that showed necrotizing granulomas in the lung.  Vitals stable.  Labs benign.  CT chest and abdomen negative for acute disease.  Will admit to floor for further eval. (23 May 2020 01:31)      Allergies    adhesives (Rash)  No Known Drug Allergies    Intolerances        MEDICATIONS  (STANDING):  busPIRone 15 milliGRAM(s) Oral every 8 hours  enoxaparin Injectable 40 milliGRAM(s) SubCutaneous daily  lactated ringers Bolus 500 milliLiter(s) IV Bolus once  mesalamine ER Capsule 1000 milliGRAM(s) Oral two times a day with meals  pantoprazole    Tablet 40 milliGRAM(s) Oral before breakfast  polyethylene glycol 3350 17 Gram(s) Oral two times a day  senna 2 Tablet(s) Oral at bedtime  tamsulosin 0.4 milliGRAM(s) Oral at bedtime    MEDICATIONS  (PRN):  benzocaine 15 mG/menthol 3.6 mG (Sugar-Free) Lozenge 1 Lozenge Oral four times a day PRN Sore Throat  HYDROmorphone  Injectable 1 milliGRAM(s) IV Push every 4 hours PRN Severe Pain (7 - 10)  ondansetron Injectable 4 milliGRAM(s) IV Push every 6 hours PRN Nausea and/or Vomiting  oxyCODONE    IR 5 milliGRAM(s) Oral every 4 hours PRN Moderate Pain (4 - 6)      REVIEW OF SYSTEMS:    CONSTITUTIONAL: No fever, chills, weight loss, or fatigue  HEENT: No sore throat, runny nose, ear ache  RESPIRATORY: No cough, wheezing, No shortness of breath  CARDIOVASCULAR: No chest pain, palpitations, dizziness  GASTROINTESTINAL: No abdominal pain. No nausea, vomiting, diarrhea  GENITOURINARY: No dysuria, increase frequency, hematuria, or incontinence  NEUROLOGICAL: No headaches, memory loss, loss of strength, numbness, or tremors, no weakness  EXTREMITY: No pedal edema BLE  SKIN: No itching, burning, rashes, or lesions     VITAL SIGNS:  T(C): 36.7 (06-02-20 @ 05:32), Max: 36.8 (06-01-20 @ 16:23)  T(F): 98.1 (06-02-20 @ 05:32), Max: 98.2 (06-01-20 @ 16:23)  HR: 102 (06-02-20 @ 10:52) (102 - 112)  BP: 123/77 (06-02-20 @ 10:52) (123/77 - 135/88)  RR: 18 (06-02-20 @ 10:52) (17 - 20)  SpO2: 93% (06-02-20 @ 10:52) (93% - 97%)  Wt(kg): --    PHYSICAL EXAM:    GENERAL: not in any distress  HEENT: Neck is supple, normocephalic, atraumatic   CHEST/LUNG: Clear to percussion bilaterally; No rales, rhonchi, wheezing  HEART: Regular rate and rhythm; No murmurs, rubs, or gallops  ABDOMEN: Soft, Nontender, Nondistended; Bowel sounds present, no rebound   EXTREMITIES:  2+ Peripheral Pulses, No clubbing, cyanosis, or edema  GENITOURINARY:   SKIN: No rashes or lesions  BACK: no pressor sore   NERVOUS SYSTEM:  Alert & Oriented X3      LABS:                         13.9   9.94  )-----------( 226      ( 02 Jun 2020 08:26 )             41.2     06-02    138  |  102  |  8   ----------------------------<  155<H>  3.8   |  33<H>  |  1.11    Ca    9.0      02 Jun 2020 08:26  Phos  2.8     06-02  Mg     1.9     06-02                      Culture Results:   Normal Respiratory Karissa present (05-31 @ 15:02)  Culture Results:   No growth (05-30 @ 00:40)  Culture Results:   Testing in progress (05-30 @ 00:40)  Culture Results:   Testing in progress (05-30 @ 00:38)  Culture Results:   No growth to date. (05-30 @ 00:38)  Culture Results:   No growth (05-28 @ 18:06)                Radiology:

## 2020-06-02 NOTE — PROGRESS NOTE ADULT - SUBJECTIVE AND OBJECTIVE BOX
JEOVANY ACOSTA    S 1B 133 I    Patient is a 49y old  Male who presents with a chief complaint of abdominal pain, dyspnea (01 Jun 2020 14:11)       Allergies    adhesives (Rash)  No Known Drug Allergies    Intolerances        HPI:  Patient is a 49M with a PMH of Barretts Disease, Crohn's on Pentasa IBS, spiculated lung mass who presents to worsening dyspnea on exertion and abdominal pain.  Patient states his breathing has been worsening over the last two weeks but denies hx of cough, fever, chills.  Has been following Pulmonary with Dr Ileana Disla.  Patient also states that he has had abdominal pain with poor PO intake for 5 days.  States he has had no BM for the last 5 days as well.  Follows with GI, and had a tele visit with Dr Russ 3 days ago who told him to present to the ED if his abdominal pain persisted.  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  Vitals stable.  Labs benign.  CT chest and abdomen negative for acute disease.  Will admit to floor for further eval. (23 May 2020 01:31)      PAST MEDICAL & SURGICAL HISTORY:  Alfaro's esophagus without dysplasia  Depression, unspecified depression type  Crohn's disease with complication, unspecified gastrointestinal tract location  ACL (anterior cruciate ligament) tear      FAMILY HISTORY:  Family history of diabetes mellitus in maternal grandmother (Mother, Grandparent)  Family history of COPD (chronic obstructive pulmonary disease) (Father)  Family history of colon cancer in father (Father)  Family history of hypertension in father (Father, Mother, Grandparent)        MEDICATIONS   benzocaine 15 mG/menthol 3.6 mG (Sugar-Free) Lozenge 1 Lozenge Oral four times a day PRN  busPIRone 15 milliGRAM(s) Oral every 8 hours  enoxaparin Injectable 40 milliGRAM(s) SubCutaneous daily  HYDROmorphone  Injectable 1 milliGRAM(s) IV Push every 4 hours PRN  lactated ringers Bolus 500 milliLiter(s) IV Bolus once  mesalamine ER Capsule 1000 milliGRAM(s) Oral two times a day with meals  ondansetron Injectable 4 milliGRAM(s) IV Push every 6 hours PRN  oxyCODONE    IR 5 milliGRAM(s) Oral every 4 hours PRN  pantoprazole    Tablet 40 milliGRAM(s) Oral before breakfast  polyethylene glycol 3350 17 Gram(s) Oral two times a day  senna 2 Tablet(s) Oral at bedtime  tamsulosin 0.4 milliGRAM(s) Oral at bedtime      Vital Signs Last 24 Hrs  T(C): 36.7 (02 Jun 2020 05:32), Max: 37 (01 Jun 2020 11:55)  T(F): 98.1 (02 Jun 2020 05:32), Max: 98.6 (01 Jun 2020 11:55)  HR: 102 (02 Jun 2020 10:52) (102 - 112)  BP: 123/77 (02 Jun 2020 10:52) (123/77 - 149/93)  BP(mean): --  RR: 18 (02 Jun 2020 10:52) (17 - 20)  SpO2: 93% (02 Jun 2020 10:52) (93% - 97%)      06-01-20 @ 07:01  -  06-02-20 @ 07:00  --------------------------------------------------------  IN: 0 mL / OUT: 607.5 mL / NET: -607.5 mL            LABS:                        13.9   9.94  )-----------( 226      ( 02 Jun 2020 08:26 )             41.2     06-02    138  |  102  |  8   ----------------------------<  155<H>  3.8   |  33<H>  |  1.11    Ca    9.0      02 Jun 2020 08:26  Phos  2.8     06-02  Mg     1.9     06-02                WBC:  WBC Count: 9.94 K/uL (06-02 @ 08:26)  WBC Count: 8.96 K/uL (05-31 @ 07:45)  WBC Count: 12.27 K/uL (05-30 @ 03:27)      MICROBIOLOGY:  RECENT CULTURES:  05-31 .Sputum Sputum XXXX   Numerous polymorphonuclear leukocytes per low power field  No Squamous epithelial cells per low power field  No organisms seen   Normal Respiratory Karissa present    05-30 .Tissue RIGHT UPPER LOBE WEDGE C/S XXXX   Rare polymorphonuclear leukocytes seen per low power field  No organisms seen per oil power field   No growth    05-30 .Body Fluid RIGHT UPPER LOBE XXXX   polymorphonuclear leukocytes seen  No organisms seen  by cytocentrifuge   Testing in progress    05-28 .Urine Clean Catch (Midstream) XXXX XXXX   No growth                    Sodium:  Sodium, Serum: 138 mmol/L (06-02 @ 08:26)  Sodium, Serum: 140 mmol/L (05-31 @ 07:45)  Sodium, Serum: 139 mmol/L (05-31 @ 07:45)  Sodium, Serum: 138 mmol/L (05-30 @ 03:27)      1.11 mg/dL 06-02 @ 08:26  0.88 mg/dL 05-31 @ 07:45  0.99 mg/dL 05-30 @ 03:27      Hemoglobin:  Hemoglobin: 13.9 g/dL (06-02 @ 08:26)  Hemoglobin: 13.5 g/dL (05-31 @ 07:45)  Hemoglobin: 13.0 g/dL (05-30 @ 03:27)      Platelets: Platelet Count - Automated: 226 K/uL (06-02 @ 08:26)  Platelet Count - Automated: 191 K/uL (05-31 @ 07:45)  Platelet Count - Automated: 206 K/uL (05-30 @ 03:27)              RADIOLOGY & ADDITIONAL STUDIES:

## 2020-06-02 NOTE — CHART NOTE - NSCHARTNOTEFT_GEN_A_CORE
6/2/2020 645a  I was made aware by nirse last night that sputim afb is positive from 5/25 I confirmed with the lab [id number] this am and identification will not be available for another week  I explained to Dr Sewell and sent a message to Dr Blue this am that my recommendation is to start airborne isolation while patient is in the hospital and to consider starting 4 anti TB meds in view of fact that prior biopsy showed necrotizing granuloma   Await HPE from VATS and await gene probe from recent tests done end of May 2020   It could turn out to be nonTB mycobacteria but could very well be M Tb complex also       Dr Sewell informed me that Dr Blue has already been informed of this positive afb

## 2020-06-02 NOTE — CHART NOTE - NSCHARTNOTEFT_GEN_A_CORE
Called by RN for pt describing vasovagal while straining on toilet having BM.  Pt describes acute on chronic constipation now improved s/p lactulose, on toilet, felt dizzy and woke up on floor.  He states he remembers sliding onto the floor. Denies head strike or other injury.  Reports episode preceded by intense 10/10 "tearing" abdominal pain.  Pt states he was able to get himself off floor and "clean up" himself and the bathroom before receiving assistance.  He currently denies dizziness, ha, pain or injury    T(C): , Max: 37.1 (06-02-20 @ 12:19)  HR: 105 (06-02-20 @ 19:55) (83 - 110)  BP: 141/101 (06-02-20 @ 19:55) (110/73 - 141/101)  RR: 20 (06-02-20 @ 19:55) (18 - 20)  SpO2: 97% (06-02-20 @ 19:55) (91% - 97%)    pt sitting up in bed, NAD  Neuro: aaox3, no focal deficit  skin: no abrasions/laceration/bruising. dressing c/d/i  ext: no alexandre deformities    HPI:  Patient is a 49M with a PMH of Barretts Disease, Crohn's on Pentasa IBS, spiculated lung mass s/p VATS last week.  c/o constipation now with large BM and description of non-witnessed vasovagal on toilet without injury.  - no imaging required at this time  - morphine 2mg IVP x1 for breakthrough pain  - pepcid 20mg IVP x 1 for persistent heartburn  - zofran 4mg IVP x 1 for breakthrough nausea  - pt to call for assitance when getting oob tonight  - continue to monitor

## 2020-06-02 NOTE — PROGRESS NOTE ADULT - ASSESSMENT
Lung mass with cavitation   no corroborating data that this is TB;;QuantiFeron NEGATIVE on 1/2020 and again Neg on 5/24/2020  no other sign of TB   low esr , clinically ok    s/p Right VATS, RUL wedge resection/open bx.  pathology pending  monitor off abx   on 5/25 , growing acid fast bacilli grew in 6 days against M . TB which need  4-6 WEEKS TOP GROW  will await Mtb PCR     NO need for airborne isolation   case discussed with Dr Blue and patient

## 2020-06-03 DIAGNOSIS — F06.30 MOOD DISORDER DUE TO KNOWN PHYSIOLOGICAL CONDITION, UNSPECIFIED: ICD-10-CM

## 2020-06-03 LAB
CULTURE RESULTS: SIGNIFICANT CHANGE UP
CULTURE RESULTS: SIGNIFICANT CHANGE UP
GRAM STN FLD: SIGNIFICANT CHANGE UP
GRAM STN FLD: SIGNIFICANT CHANGE UP
SPECIMEN SOURCE: SIGNIFICANT CHANGE UP
SPECIMEN SOURCE: SIGNIFICANT CHANGE UP

## 2020-06-03 PROCEDURE — 99232 SBSQ HOSP IP/OBS MODERATE 35: CPT

## 2020-06-03 PROCEDURE — 74177 CT ABD & PELVIS W/CONTRAST: CPT | Mod: 26

## 2020-06-03 PROCEDURE — 90792 PSYCH DIAG EVAL W/MED SRVCS: CPT

## 2020-06-03 RX ORDER — ALPRAZOLAM 0.25 MG
0.5 TABLET ORAL AT BEDTIME
Refills: 0 | Status: DISCONTINUED | OUTPATIENT
Start: 2020-06-03 | End: 2020-06-05

## 2020-06-03 RX ORDER — IOHEXOL 300 MG/ML
30 INJECTION, SOLUTION INTRAVENOUS ONCE
Refills: 0 | Status: COMPLETED | OUTPATIENT
Start: 2020-06-03 | End: 2020-06-03

## 2020-06-03 RX ORDER — ALPRAZOLAM 0.25 MG
0.5 TABLET ORAL ONCE
Refills: 0 | Status: DISCONTINUED | OUTPATIENT
Start: 2020-06-03 | End: 2020-06-03

## 2020-06-03 RX ADMIN — Medication 0.5 MILLIGRAM(S): at 01:16

## 2020-06-03 RX ADMIN — Medication 0.5 MILLIGRAM(S): at 21:45

## 2020-06-03 RX ADMIN — SENNA PLUS 2 TABLET(S): 8.6 TABLET ORAL at 21:46

## 2020-06-03 RX ADMIN — OXYCODONE HYDROCHLORIDE 5 MILLIGRAM(S): 5 TABLET ORAL at 21:45

## 2020-06-03 RX ADMIN — IOHEXOL 30 MILLILITER(S): 300 INJECTION, SOLUTION INTRAVENOUS at 11:31

## 2020-06-03 RX ADMIN — PANTOPRAZOLE SODIUM 40 MILLIGRAM(S): 20 TABLET, DELAYED RELEASE ORAL at 07:39

## 2020-06-03 RX ADMIN — ONDANSETRON 4 MILLIGRAM(S): 8 TABLET, FILM COATED ORAL at 21:46

## 2020-06-03 RX ADMIN — Medication 15 MILLIGRAM(S): at 06:04

## 2020-06-03 RX ADMIN — TAMSULOSIN HYDROCHLORIDE 0.4 MILLIGRAM(S): 0.4 CAPSULE ORAL at 21:45

## 2020-06-03 RX ADMIN — Medication 1000 MILLIGRAM(S): at 08:51

## 2020-06-03 RX ADMIN — ENOXAPARIN SODIUM 40 MILLIGRAM(S): 100 INJECTION SUBCUTANEOUS at 11:31

## 2020-06-03 RX ADMIN — Medication 15 MILLIGRAM(S): at 21:45

## 2020-06-03 RX ADMIN — Medication 15 MILLIGRAM(S): at 13:41

## 2020-06-03 RX ADMIN — OXYCODONE HYDROCHLORIDE 5 MILLIGRAM(S): 5 TABLET ORAL at 08:57

## 2020-06-03 NOTE — PROGRESS NOTE ADULT - SUBJECTIVE AND OBJECTIVE BOX
JEOVANY ACOSTA    S 2E 280 I    Patient is a 49y old  Male who presents with a chief complaint of abdominal pain, dyspnea (02 Jun 2020 12:00)       Allergies    adhesives (Rash)  No Known Drug Allergies    Intolerances        HPI:  Patient is a 49M with a PMH of Barretts Disease, Crohn's on Pentasa IBS, spiculated lung mass who presents to worsening dyspnea on exertion and abdominal pain.  Patient states his breathing has been worsening over the last two weeks but denies hx of cough, fever, chills.  Has been following Pulmonary with Dr Ileana Disla.  Patient also states that he has had abdominal pain with poor PO intake for 5 days.  States he has had no BM for the last 5 days as well.  Follows with GI, and had a tele visit with Dr Russ 3 days ago who told him to present to the ED if his abdominal pain persisted.  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  Vitals stable.  Labs benign.  CT chest and abdomen negative for acute disease.  Will admit to floor for further eval. (23 May 2020 01:31)      PAST MEDICAL & SURGICAL HISTORY:  Alfaro's esophagus without dysplasia  Depression, unspecified depression type  Crohn's disease with complication, unspecified gastrointestinal tract location  ACL (anterior cruciate ligament) tear      FAMILY HISTORY:  Family history of diabetes mellitus in maternal grandmother (Mother, Grandparent)  Family history of COPD (chronic obstructive pulmonary disease) (Father)  Family history of colon cancer in father (Father)  Family history of hypertension in father (Father, Mother, Grandparent)        MEDICATIONS   benzocaine 15 mG/menthol 3.6 mG (Sugar-Free) Lozenge 1 Lozenge Oral four times a day PRN  busPIRone 15 milliGRAM(s) Oral every 8 hours  enoxaparin Injectable 40 milliGRAM(s) SubCutaneous daily  famotidine Injectable 20 milliGRAM(s) IV Push once PRN  lactated ringers Bolus 500 milliLiter(s) IV Bolus once  lactulose Syrup 10 Gram(s) Oral two times a day  mesalamine ER Capsule 1000 milliGRAM(s) Oral two times a day with meals  morphine  - Injectable 2 milliGRAM(s) IV Push every 6 hours PRN  ondansetron Injectable 4 milliGRAM(s) IV Push every 6 hours PRN  oxyCODONE    IR 5 milliGRAM(s) Oral every 4 hours PRN  pantoprazole    Tablet 40 milliGRAM(s) Oral before breakfast  polyethylene glycol 3350 17 Gram(s) Oral two times a day  senna 2 Tablet(s) Oral at bedtime  tamsulosin 0.4 milliGRAM(s) Oral at bedtime      Vital Signs Last 24 Hrs  T(C): 36.9 (03 Jun 2020 05:14), Max: 37.1 (02 Jun 2020 12:19)  T(F): 98.5 (03 Jun 2020 05:14), Max: 98.8 (02 Jun 2020 13:30)  HR: 86 (03 Jun 2020 05:14) (83 - 105)  BP: 117/78 (03 Jun 2020 05:14) (110/73 - 141/101)  BP(mean): --  RR: 18 (03 Jun 2020 05:14) (18 - 20)  SpO2: 96% (03 Jun 2020 05:14) (91% - 97%)            LABS:                        13.9   9.94  )-----------( 226      ( 02 Jun 2020 08:26 )             41.2     06-02    138  |  102  |  8   ----------------------------<  155<H>  3.8   |  33<H>  |  1.11    Ca    9.0      02 Jun 2020 08:26  Phos  2.8     06-02  Mg     1.9     06-02                WBC:  WBC Count: 9.94 K/uL (06-02 @ 08:26)  WBC Count: 8.96 K/uL (05-31 @ 07:45)      MICROBIOLOGY:  RECENT CULTURES:  05-31 .Sputum Sputum Streptococcus anginosus   Numerous polymorphonuclear leukocytes per low power field  No Squamous epithelial cells per low power field  No organisms seen   Normal Respiratory Karissa present    05-30 .Tissue RIGHT UPPER LOBE WEDGE C/S XXXX   Rare polymorphonuclear leukocytes seen per low power field  No organisms seen per oil power field   No growth    05-30 .Body Fluid RIGHT UPPER LOBE XXXX   polymorphonuclear leukocytes seen  No organisms seen  by cytocentrifuge   Testing in progress    05-28 .Urine Clean Catch (Midstream) XXXX XXXX   No growth                    Sodium:  Sodium, Serum: 138 mmol/L (06-02 @ 08:26)  Sodium, Serum: 140 mmol/L (05-31 @ 07:45)  Sodium, Serum: 139 mmol/L (05-31 @ 07:45)      1.11 mg/dL 06-02 @ 08:26  0.88 mg/dL 05-31 @ 07:45      Hemoglobin:  Hemoglobin: 13.9 g/dL (06-02 @ 08:26)  Hemoglobin: 13.5 g/dL (05-31 @ 07:45)      Platelets: Platelet Count - Automated: 226 K/uL (06-02 @ 08:26)  Platelet Count - Automated: 191 K/uL (05-31 @ 07:45)              RADIOLOGY & ADDITIONAL STUDIES:

## 2020-06-03 NOTE — PROGRESS NOTE ADULT - SUBJECTIVE AND OBJECTIVE BOX
49M with a PMH of Barretts Disease, Crohn's on Pentasa IBS, spiculated lung mass who presents to worsening dyspnea on exertion and abdominal pain.  Patient states his breathing has been worsening over the last two weeks but denies hx of cough, fever, chills.  Has been following Pulmonary with Dr Ileana Disla.  Patient also states that he has had abdominal pain with poor PO intake for 5 days.  States he has had no BM for the last 5 days as well.  Follows with GI, and had a tele visit with Dr Russ 3 days ago who told him to present to the ED if his abdominal pain persisted.  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  Vitals stable.  Labs benign.  CT chest and abdomen negative for acute disease.  Will admit to floor for further eval. (23 May 2020 01:31)      Allergies    adhesives (Rash)  No Known Drug Allergies    Intolerances        MEDICATIONS  (STANDING):  ALPRAZolam 0.5 milliGRAM(s) Oral at bedtime  busPIRone 15 milliGRAM(s) Oral every 8 hours  enoxaparin Injectable 40 milliGRAM(s) SubCutaneous daily  lactated ringers Bolus 500 milliLiter(s) IV Bolus once  lactulose Syrup 10 Gram(s) Oral two times a day  mesalamine ER Capsule 1000 milliGRAM(s) Oral two times a day with meals  pantoprazole    Tablet 40 milliGRAM(s) Oral before breakfast  polyethylene glycol 3350 17 Gram(s) Oral two times a day  senna 2 Tablet(s) Oral at bedtime  tamsulosin 0.4 milliGRAM(s) Oral at bedtime    MEDICATIONS  (PRN):  benzocaine 15 mG/menthol 3.6 mG (Sugar-Free) Lozenge 1 Lozenge Oral four times a day PRN Sore Throat  famotidine Injectable 20 milliGRAM(s) IV Push once PRN heartburn  LORazepam     Tablet 1 milliGRAM(s) Oral every 6 hours PRN Anxiety  morphine  - Injectable 2 milliGRAM(s) IV Push every 6 hours PRN Severe Pain (7 - 10)  ondansetron Injectable 4 milliGRAM(s) IV Push every 6 hours PRN Nausea and/or Vomiting  oxyCODONE    IR 5 milliGRAM(s) Oral every 4 hours PRN Moderate Pain (4 - 6)      REVIEW OF SYSTEMS:    CONSTITUTIONAL: No fever, chills, weight loss, or fatigue  HEENT: No sore throat, runny nose, ear ache  RESPIRATORY: No cough, wheezing, No shortness of breath  CARDIOVASCULAR: No chest pain, palpitations, dizziness  GASTROINTESTINAL: No abdominal pain. No nausea, vomiting, diarrhea  GENITOURINARY: No dysuria, increase frequency, hematuria, or incontinence  NEUROLOGICAL: No headaches, memory loss, loss of strength, numbness, or tremors, no weakness  EXTREMITY: No pedal edema BLE  SKIN: No itching, burning, rashes, or lesions     VITAL SIGNS:  T(C): 36.9 (06-03-20 @ 12:17), Max: 37 (06-02-20 @ 23:37)  T(F): 98.4 (06-03-20 @ 12:17), Max: 98.6 (06-02-20 @ 23:37)  HR: 94 (06-03-20 @ 17:00) (85 - 105)  BP: 110/77 (06-03-20 @ 17:00) (110/77 - 143/83)  RR: 18 (06-03-20 @ 17:00) (18 - 20)  SpO2: 95% (06-03-20 @ 17:00) (95% - 97%)  Wt(kg): --    PHYSICAL EXAM:    GENERAL: not in any distress  HEENT: Neck is supple, normocephalic, atraumatic   CHEST/LUNG: Clear to percussion bilaterally; No rales, rhonchi, wheezing  HEART: Regular rate and rhythm; No murmurs, rubs, or gallops  ABDOMEN: Soft, Nontender, Nondistended; Bowel sounds present, no rebound   EXTREMITIES:  2+ Peripheral Pulses, No clubbing, cyanosis, or edema  GENITOURINARY:   SKIN: No rashes or lesions  BACK: no pressor sore   NERVOUS SYSTEM:  Alert & Oriented X3, Good concentration  PSYCH: normal affect     LABS:                         13.9   9.94  )-----------( 226      ( 02 Jun 2020 08:26 )             41.2     06-02    138  |  102  |  8   ----------------------------<  155<H>  3.8   |  33<H>  |  1.11    Ca    9.0      02 Jun 2020 08:26  Phos  2.8     06-02  Mg     1.9     06-02                                Culture Results:   Normal Respiratory Karissa present (05-31 @ 15:02)  Culture Results:   No growth at 5 days. (05-30 @ 00:40)  Culture Results:   Culture is being performed. (05-30 @ 00:40)  Culture Results:   Testing in progress (05-30 @ 00:40)  Culture Results:   Culture is being performed. (05-30 @ 00:38)  Culture Results:   Testing in progress (05-30 @ 00:38)  Culture Results:   No growth to date. (05-30 @ 00:38)  Culture Results:   No growth (05-28 @ 18:06)                Radiology:

## 2020-06-03 NOTE — PROGRESS NOTE ADULT - ASSESSMENT
cont rx cont rx      DAVE WILLIAM  [phone]  1970 DOA 2020 DR LYDIA FRANCISCO           REVIEW OF SYMPTOMS      Able to give ROS  Yes     RELIABLE No   CONSTITUTIONAL Weakness Yes  Chills No Vision changes No  ENDOCRINE No unexplained hair loss No heat or cold intolerance    ALLERGY No hives  Sore throat No   RESP Coughing blood no  Shortness of breath YES   NEURO No Headache  Confusion Pain neck No   CARDIAC No Chest pain No Palpitations   GI No Pain abdomen NO   Vomiting NO     PHYSICAL EXAM    HEENT Unremarkable PERRLA atraumatic   RESP Fair air entry EXP prolonged    Harsh breath sound Resp distres mild   CARDIAC S1 S2 No S3     NO JVD    ABDOMEN SOFT BS PRESENT NOT DISTENDED No hepatosplenomegaly PEDAL EDEMA present No calf tenderness  NO rash   GENERAL Not TOXIC looking    PROBLEMS  CAVITY RUL       OXYGENATION      -2020 ra 96% - ra 97%    VITALS/LABS   6/3/2020 afeb 86 117/78     PT DATA/BEST PRACTICE  ALLERGY       adhesives               WT        64 (2020)                             BMI         20 (2020)                    CrCl                                  ADVANCED DIRECTIVE     HEAD OF BED ELEVATION Yes      INFECTION PPLX     DVT PROPHYLAXIS             Lvnx 40 ()    MCCANN PROPHYLAXIS    protonix 40 ()         DYSPHAGIA EVAL     DIET       reg ()                                                                   IV F    CAVITY LUNG   IMAGING  2020 ct cap cw  2020   3.8 x 1.9 cm spiculated rul mass with central cavity (prev 2 x 4 cm)   ESR  1/ ESR 4-2     AFB STATUS    Tissue rul AFB sm N-nicole    swab AFB Sm N-nicole   2020 AFB smera N-nicole  AFB growth in broth  Confirmed with lab Kari [id number] on 2020  6:45 AM   (2020 ID evaluated the POSITIVE AFB and since it appeared to be rapid grower ID recommended to hold off airborne isolation)    2020 AFB sm N-nicole    QFT GOLD    QFT Gold TB N-nicole    QFT Gold TB N-nicole   HIV STATUS    hiv N-nicole  GALACTOMANNAN   Asperg GM N-nicole   COLLAGEN   Histone ab 1.1 (bl hi) ()   GBMA N-nicole ()   ANCA PANCA N-NICOLE ()   AANCA INDET ()    ACE 31    DS DNA N-nicole   PROCEDURE HPE   VATS wedge resectn rul Dr Marley  2020 R chest tube removd   2020 CT bx Lung nodule   HPE    rul wedge necrotizing epithelioid granuloma AFB sm N-nicole   2020  Necrotizing epithelioid granulomatous inflammation     PATIENT SUMMARY   49 m PMH Crohns COPD R lung mass Barretts admitted 2020 with chest pressure and SOB x 1 w Also co LLQ pain x 5 d     ER vitals 145/114 92 afeb     home meds spiriva midodrine 10.3 nicotine 14 pentasa flomax .4                                    Open lung bx vats done  Dr Marley  Pt tr out of ICU 2020       PATIENT DATA/ASSESSMENT/RECOMMENDATIONS     CAVITY RUL RO TB RO CA  49 m who has liven in homeless shelter in past and has trevelled to [place] in past has rul cavitary lesion at least since 2020   His HIV test was negative    His cavitary rul lesion  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    ct showed persistent 3.8 x 1.7 cm rul spiculated mass with cavity    afb smear N-nicole BUT BROTH GREW AFB    QFT N-nicole   ANCA PANCA N-NICOLE () AANCA INDET ()   2020 ace and ds dna were N-nicole   Being smoker he is also at risk for lung ca   ID eval dated  noted   ID recommends to NOT start anti TB Rx based on available information   Patient had VATS wedge  and HPE shopwed again NECROTIZING GRANULOMA   2020 ID recommends to hold off airborne isolation as  AFB growth is too rapid to be M TB   Await dna probe to defnitively exclude MTB and await HPE and biopsy culture   6/3/2020 I would recommend starting 4 anti TB drugs till MBC conclusively ruled out on gene probe or cuoture of  biopsy    Ultimate decision will be up to ID       TIME SPENT Over 25 minutes aggregate care time spent on encounter; activities included   direct pt care, counseling and/or coordinating care reviewing notes, lab data/ imaging , discussion with multidisciplinary team/ pt /family. Risks, benefits, alternatives  discussed in detail.    DAVE THAYER LI  [phone]  1970 DOA 2020 DR LYDIA FRANCISCO

## 2020-06-03 NOTE — BEHAVIORAL HEALTH ASSESSMENT NOTE - SUICIDE PROTECTIVE FACTORS
Has future plans/Supportive social network of family or friends/Fear of death or the actual act of killing self/Positive therapeutic relationships/Responsibility to family and others/Identifies reasons for living/Cultural, spiritual and/or moral attitudes against suicide

## 2020-06-03 NOTE — BEHAVIORAL HEALTH ASSESSMENT NOTE - NSBHCHARTREVIEWLAB_PSY_A_CORE FT
06-02    138  |  102  |  8   ----------------------------<  155<H>  3.8   |  33<H>  |  1.11    Ca    9.0      02 Jun 2020 08:26  Phos  2.8     06-02  Mg     1.9     06-02

## 2020-06-03 NOTE — PROGRESS NOTE ADULT - SUBJECTIVE AND OBJECTIVE BOX
Patient is a 49y old  Male who presents with a chief complaint of abdominal pain, dyspnea (03 Jun 2020 08:41)      INTERVAL HPI/OVERNIGHT EVENTS: no events     MEDICATIONS  (STANDING):  busPIRone 15 milliGRAM(s) Oral every 8 hours  enoxaparin Injectable 40 milliGRAM(s) SubCutaneous daily  iohexol 300 mG (iodine)/mL Oral Solution 30 milliLiter(s) Oral once  lactated ringers Bolus 500 milliLiter(s) IV Bolus once  lactulose Syrup 10 Gram(s) Oral two times a day  mesalamine ER Capsule 1000 milliGRAM(s) Oral two times a day with meals  pantoprazole    Tablet 40 milliGRAM(s) Oral before breakfast  polyethylene glycol 3350 17 Gram(s) Oral two times a day  senna 2 Tablet(s) Oral at bedtime  tamsulosin 0.4 milliGRAM(s) Oral at bedtime    MEDICATIONS  (PRN):  benzocaine 15 mG/menthol 3.6 mG (Sugar-Free) Lozenge 1 Lozenge Oral four times a day PRN Sore Throat  famotidine Injectable 20 milliGRAM(s) IV Push once PRN heartburn  morphine  - Injectable 2 milliGRAM(s) IV Push every 6 hours PRN Severe Pain (7 - 10)  ondansetron Injectable 4 milliGRAM(s) IV Push every 6 hours PRN Nausea and/or Vomiting  oxyCODONE    IR 5 milliGRAM(s) Oral every 4 hours PRN Moderate Pain (4 - 6)      Allergies    adhesives (Rash)  No Known Drug Allergies    Intolerances           Vital Signs Last 24 Hrs  T(C): 36.9 (03 Jun 2020 05:14), Max: 37.1 (02 Jun 2020 12:19)  T(F): 98.5 (03 Jun 2020 05:14), Max: 98.8 (02 Jun 2020 13:30)  HR: 86 (03 Jun 2020 05:14) (83 - 105)  BP: 117/78 (03 Jun 2020 05:14) (110/73 - 141/101)  BP(mean): --  RR: 18 (03 Jun 2020 05:14) (18 - 20)  SpO2: 96% (03 Jun 2020 05:14) (91% - 97%)    PHYSICAL EXAM:    GENERAL: NAD, well-groomed, well-developed  HEAD:  Atraumatic, Normocephalic  NECK: Supple, No JVD, Normal thyroid  CHEST/LUNG: Decreased on R; with chest tube in place.    HEART: Regular rate and rhythm; No murmurs, rubs, or gallops  ABDOMEN: Soft, Nontender, Nondistended; Bowel sounds present  EXTREMITIES:  2+ Peripheral Pulses, No clubbing, cyanosis, or edema  NERVOUS SYSTEM:  Alert & Oriented X3, Good concentration; Motor Strength 5/5 B/L upper and lower extremities; DTRs 2+ intact and symmetric    LABS:                        13.9   9.94  )-----------( 226      ( 02 Jun 2020 08:26 )             41.2     06-02    138  |  102  |  8   ----------------------------<  155<H>  3.8   |  33<H>  |  1.11    Ca    9.0      02 Jun 2020 08:26  Phos  2.8     06-02  Mg     1.9     06-02          CAPILLARY BLOOD GLUCOSE          RADIOLOGY & ADDITIONAL TESTS:    Imaging Personally Reviewed:  [ X] YES  [ ] NO    Consultant(s) Notes Reviewed:  [ X] YES  [ ] NO    Care Discussed with Consultants/Other Providers [X ] YES  [ ] NO

## 2020-06-03 NOTE — BEHAVIORAL HEALTH ASSESSMENT NOTE - NSBHCONSULTMEDS_PSY_A_CORE FT
Xanax 0.5mg po qhs for sleep (did well on it last night); Ativan 1mg PO q6hrs PRN for anxiety; declined increase in Buspar

## 2020-06-03 NOTE — BEHAVIORAL HEALTH ASSESSMENT NOTE - DESCRIPTION (FIRST USE, LAST USE, QUANTITY, FREQUENCY, DURATION)
active cigarette smoker cigarette smoker x 30 yrs; quit > 1 yr ago reports rare social drinking at most 1 bottle of beer / week recreational user; smoking joint helps with Crohn's symptoms; CBD oil tried and not effective; had medical marijuana user license in [place]

## 2020-06-03 NOTE — BEHAVIORAL HEALTH ASSESSMENT NOTE - DESCRIPTION
01/2020: 4.4 x 2.5 cm upper lobe irregular spiculated mass with central cavitation of lung w/ biopsy (necrotizing granulomatous inflammatory changes; TB ruled out); Crohn's disease, COPD, Alfaro's esophagus without dysplasia

## 2020-06-03 NOTE — PROGRESS NOTE ADULT - ASSESSMENT
49M with a PMH of Barretts Disease, Crohn's on Pentasya, IBS, spiculated lung mass s/p VATS POD #1; extubated this AM after having hypoxia post op requiring re-intubation.      s.p vasovagal ep 6/2/20 , complains of sharp abdominal pain this am, will ct     Neuro: Weaned off fentanyl drip; continue with oxycodone IR PO for moderate pain. dilaudid prn for pain.  Nicotine patch for smoking.   CV: No acute issues.    Pulm: s/p extubation on 5/30/2020 on nasal cannula.  s/p VATS on 5/29/2020 chest tube care and management per thoracic surgery . follow up pathology which is still pending   cultures have been negative  Chest tube in place to suction; follow up CT surgery recs,  5/31/2020 6/1/2020 chest  tube removed   AFB in sputum per Dr. solis MTb doesn't grow this fast and quatefreion negative times two.   per dr. kenny pt should be staretd on Mtb meds and moved to airborne isolation    id/pulm on board      GI: h/o Vini's Continue with Pentasa and protonix; restart Miralax senna.   BPH  was resumed on flomax on 5/30/200No   ID: No acute issues  Heme: Lovenox daily.

## 2020-06-03 NOTE — BEHAVIORAL HEALTH ASSESSMENT NOTE - HPI (INCLUDE ILLNESS QUALITY, SEVERITY, DURATION, TIMING, CONTEXT, MODIFYING FACTORS, ASSOCIATED SIGNS AND SYMPTOMS)
48 yo male, undomiciled, unemployed, noncaregiver, admitted on 5/22/20 for worsening dyspnea on exertion and abdominal pain, + alcohol and Marijuana user (positive UTOX;  last admission), s/p VATS wedge resection of right upper lobe of lung on 5/29/20 (biopsy from January '20 showing necrotizing granulomatous inflammatory change), with sputum AFB + (started airborne isolation; 4 anti TB meds given prior biopsy showed necrotizing granuloma as per ID; await HPE from VATS and await gene probe from recent tests done end of May 2020). Consult called to r/o depression.    ISTOP Reference #: [id number]   filled in [facility] [place]   09/30/2016 09/30/2016  OXYCODONE-ACETAMINOPHEN 5-325  6.0 1 JAMIE, STEVEN Medicaid [facility]   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[facility]; [employer]  and drummer in a band most recently), noncaregiver (1 adult child), who was admitted on 5/22/20 for worsening dyspnea on exertion and abdominal pain, + alcohol and Marijuana user (positive UTOX;  last admission), + has outpatient therapist for individual therapy, hx of mood sxs, 3 remote prior suicidal gestures, s/p VATS wedge resection of right upper lobe of lung on 5/29/20 (biopsy from January '20 showing necrotizing granulomatous inflammatory change), with sputum AFB + (started airborne isolation; 4 anti TB meds given prior biopsy showed necrotizing granuloma as per ID; await HPE from VATS and await gene probe from recent tests done end of May 2020). Consult called to r/o depression.    EXAM: calm, cooperative, polite, fully engages and a reliable historian; gives good medical hx and is very talkative. Patient reports that he got upset 2 daus prior due to delay in medical attention (drain was leaking and for 6 hours no one came to see him so he decided to leave and was a [employer] flight), and had drain removed 2 days ago. Patient reports that he also had emotional lability, some reactivity including crying easily which he thinks is due to Chantix withdrawal as he had been on it for years and it was not given to him here (no taper; just DC). He feels better now and denies nicotine cravings. He last smoked > 1 yr ago with Chantix. He does not want to restart it at this time as his withdrawal sxs are much better and he has not nicotine cravings.   Otherwise, denies any symptoms of hypomania/nilo/psychosis/major depression/ anxiety/panic. Denies any active or passive suicidal or homicidal ideation. Names protective factors (charles; family; hope for future). Denies substance abuse; denies access to weapons.      ISTOP Reference #: [id number]   [place] in [facility] [place]   09/30/2016 09/30/2016  OXYCODONE-ACETAMINOPHEN 5-325  6.0 1 JAMIE STEVEN Medicaid [facility]   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[facility]; [employer]  and drummer in a band most recently), noncaregiver (1 adult child), who was admitted on 5/22/20 for worsening dyspnea on exertion and abdominal pain, + alcohol and Marijuana user (positive UTOX;  last admission), + has outpatient therapist for individual therapy, hx of mood sxs, 3 remote prior suicidal gestures, s/p VATS wedge resection of right upper lobe of lung on 5/29/20 (biopsy from January '20 showing necrotizing granulomatous inflammatory change), with sputum AFB + (started airborne isolation; 4 anti TB meds given prior biopsy showed necrotizing granuloma as per ID; await HPE from VATS and await gene probe from recent tests done end of May 2020). Consult called to r/o depression.    EXAM: calm, cooperative, polite, fully engages and a reliable historian; gives good medical hx and is very talkative. Patient reports that he got upset 2 days prior due to delay in medical attention (drain was leaking and for 6 hours no one came to see him so he decided to leave and was a Code flight), and had drain removed 2 days ago. Patient reports that he also had emotional lability, some reactivity including crying easily which he thinks is due to Chantix withdrawal as he had been on it for years and it was not given to him here (no taper; just DC). He feels better now and denies nicotine cravings. He last smoked > 1 yr ago with Chantix. He does not want to restart it at this time as his withdrawal sxs are much better and he has not nicotine cravings. he also had a vaso-vagal episode while he was straining on the toilet - he did not have a BM in 5 days and has hx of constipation so he bared down and had a syncopal episode and slid on the floor and got smeared with feces all over. He cleaned himself up. Otherwise, denies any symptoms of hypomania/nilo/psychosis/major depression/ anxiety/panic. Denies any active or passive suicidal or homicidal ideation. Names protective factors (charles; family; hope for future). Denies substance abuse; denies access to weapons.      ISTOP Reference #: [id number]   [place] [facility] [place]   09/30/2016 09/30/2016  OXYCODONE-ACETAMINOPHEN 5-325  6.0 1 JAMIEVRE, STEVEN Medicaid [facility]   09/29/2016 09/29/2016  OXYCODONE-ACETAMINOPHEN 5-325  6.0 1 LEFEBVRE, STEVEN Medicaid [facility]     CV no records found

## 2020-06-03 NOTE — PROGRESS NOTE ADULT - SUBJECTIVE AND OBJECTIVE BOX
Postoperative Day # 5  s/p Rt VATS    Patient seen and examined bedside resting comfortably.  He states he was straining while attempting to have BM last PM. States he went "down to the floor". He denies any new chest pain. C/O "10/10" abd pain.  Denies nausea and vomiting. Tolerating diet.  Flatus/BM. + - very small BM last night.  Denies chest pain, dyspnea, cough.    T(F): 98.4 (06-03-20 @ 12:17), Max: 98.8 (06-02-20 @ 13:30)  HR: 101 (06-03-20 @ 12:17) (83 - 105)  BP: 143/83 (06-03-20 @ 12:17) (110/73 - 143/83)  RR: 20 (06-03-20 @ 12:17) (18 - 20)  SpO2: 97% (06-03-20 @ 12:17) (91% - 97%)    PHYSICAL EXAM:  General: NAD  Neuro:  Alert & oriented x 3. Appears anxious  Chest: dressings dry & intact.  lear.    LABS:  no new labs today      < from: Xray Chest 1 View- PORTABLE-Urgent (06.02.20 @ 15:55) >    EXAM:  XR CHEST PORTABLE URGENT 1V                            PROCEDURE DATE:  06/02/2020      NTERPRETATION:  AP chest on June 2, 2020 at 3:13 PM. Patient had removal of right chest tube.    Heart is magnified by technique. Bandlike atelectasis at the left base and minimal atelectasis at right base again noted.    Right chest tube seen on June 2 earlier study has been removed. There is no change in the underlying lungs.    Old posttraumatic deformity of left clavicle again noted.    IMPRESSION: Uneventful removal of right chest tube.    ANTONIO GOMEZ M.D., ATTENDING RADIOLOGIST  This document has been electronically signed. Jun 2 2020  4:00PM          Impression: 49y Male Postoperative Day # 5  s/p Rt VATS      Plan:  -continue VTE prophylaxis   - continue medical f/u - await abdominal CT results  - continue pulm f/u  - continue behavioral health f/u  -Increase activity with PT, OOB, Ambulate  -Patient instructed on and encouraged incentive spirometry use  -continue local wound care  -will discuss with Dr. Marley

## 2020-06-03 NOTE — BEHAVIORAL HEALTH ASSESSMENT NOTE - DETAILS
DM- maternal grandmother; COPD and colon cancer, HTN - father > 10 yr hx of suicidal gestures Dami medication - hair and weight loss see HPI

## 2020-06-03 NOTE — BEHAVIORAL HEALTH ASSESSMENT NOTE - RISK ASSESSMENT
Low Acute Suicide Risk Chronic risk factors: single, male gender, undomiciled, suspected used heavier alcohol in the past; hx of suicide attempts; hx of mood sxs. Protective factors: age < 65 yrs; medication and treatment compliant; denies hx of aggression/violence; denies legal issues; motivated for help; articulate; has family support (mother still alive; has adult child); access to psychiatric health services; denies access to guns. No acute risk factors identified

## 2020-06-03 NOTE — BEHAVIORAL HEALTH ASSESSMENT NOTE - NSBHCHARTREVIEWVS_PSY_A_CORE FT
T(C): 36.9 (06-03-20 @ 12:17), Max: 37.1 (06-02-20 @ 13:30)  HR: 101 (06-03-20 @ 12:17) (83 - 105)  BP: 143/83 (06-03-20 @ 12:17) (110/73 - 143/83)  RR: 20 (06-03-20 @ 12:17) (18 - 20)  SpO2: 97% (06-03-20 @ 12:17) (91% - 97%)

## 2020-06-03 NOTE — CHART NOTE - NSCHARTNOTEFT_GEN_A_CORE
Pt admitted c SOB, abdominal pain; found to have necrotizing granulomas of lung; now s/p rt VATS, RUL wedge resection c biopsy; pathology pending.  Isolation precautions continued to r/o TB.    Factors impacting intake: [X ] none [ ] nausea  [ ] vomiting [ ] diarrhea [ ] constipation  [ ]chewing problems [ ] swallowing issues  [ ] other:     Diet Prescription: Diet, Regular:   Supplement Feeding Modality:  Oral  Ensure Clear Cans or Servings Per Day:  1       Frequency:  Two Times a day  Ensure Enlive Cans or Servings Per Day:  1       Frequency:  Daily (06-01-20 @ 11:26)    Intake:   Pt eating well; % most meals; drinking supplements (pt requested both)    Current Weight: Weight (kg): 67.4 (6/2); admission wt 64.7 (5/23)  % Weight Change: 4% gain x 10 days    Physical Appearance:  no edema noted; unable to conduct nutrition-focused physical exam due to continued isolation precautions    Pertinent Medications: MEDICATIONS  (STANDING):  busPIRone 15 milliGRAM(s) Oral every 8 hours  enoxaparin Injectable 40 milliGRAM(s) SubCutaneous daily  iohexol 300 mG (iodine)/mL Oral Solution 30 milliLiter(s) Oral once  lactated ringers Bolus 500 milliLiter(s) IV Bolus once  lactulose Syrup 10 Gram(s) Oral two times a day  mesalamine ER Capsule 1000 milliGRAM(s) Oral two times a day with meals  pantoprazole    Tablet 40 milliGRAM(s) Oral before breakfast  polyethylene glycol 3350 17 Gram(s) Oral two times a day  senna 2 Tablet(s) Oral at bedtime  tamsulosin 0.4 milliGRAM(s) Oral at bedtime    MEDICATIONS  (PRN):  benzocaine 15 mG/menthol 3.6 mG (Sugar-Free) Lozenge 1 Lozenge Oral four times a day PRN Sore Throat  famotidine Injectable 20 milliGRAM(s) IV Push once PRN heartburn  morphine  - Injectable 2 milliGRAM(s) IV Push every 6 hours PRN Severe Pain (7 - 10)  ondansetron Injectable 4 milliGRAM(s) IV Push every 6 hours PRN Nausea and/or Vomiting  oxyCODONE    IR 5 milliGRAM(s) Oral every 4 hours PRN Moderate Pain (4 - 6)    Pertinent Labs:   06-02 Na138 mmol/L Glu 155 mg/dL<H> K+ 3.8 mmol/L Cr  1.11 mg/dL BUN 8 mg/dL 06-02 Phos 2.8 mg/dL 05-31 Alb 3.1 g/dL<L>    Skin:   WDL    Estimated Needs:   [X ] no change since previous assessment  (5/27/20)  [ ] recalculated:     Previous Nutrition Diagnosis:   [X ] Altered GI Function  Etiology:  Alteration of GI tract function   Signs & Symptoms:  recurrent N/V/C, abdominal pain, early satiety, dx of Alfaro's Esophagus (severe reflux)    GOAL:  Pt to consume > 50% meals/supplements w/o GI distress - met; maintain wt +/- 2# - met    Nutrition Diagnosis is [ ] ongoing  [X ] resolved [ ] not applicable     New Nutrition Diagnosis: [X ] not applicable    Interventions:   continue current diet rx as noted & requested by pt  Recommend  [ ] Change Diet To:  [ ] Nutrition Supplement  [ ] Nutrition Support  [ ] Other:     Monitoring and Evaluation:   [X ] PO intake [ x ] Tolerance to diet prescription [ x ] weights [ x ] labs[ x ] follow up per protocol  [ ] other:

## 2020-06-03 NOTE — BEHAVIORAL HEALTH ASSESSMENT NOTE - VIOLENCE PROTECTIVE FACTORS:
Engagement in treatment/Good treatment response/compliance/Sobriety/Affective Stability/Insight into violence risk and need for management/treatment

## 2020-06-04 ENCOUNTER — TRANSCRIPTION ENCOUNTER (OUTPATIENT)
Age: 50
End: 2020-06-04

## 2020-06-04 ENCOUNTER — RESULT REVIEW (OUTPATIENT)
Age: 50
End: 2020-06-04

## 2020-06-04 PROCEDURE — 99232 SBSQ HOSP IP/OBS MODERATE 35: CPT

## 2020-06-04 RX ADMIN — POLYETHYLENE GLYCOL 3350 17 GRAM(S): 17 POWDER, FOR SOLUTION ORAL at 06:06

## 2020-06-04 RX ADMIN — Medication 1 MILLIGRAM(S): at 22:05

## 2020-06-04 RX ADMIN — OXYCODONE HYDROCHLORIDE 5 MILLIGRAM(S): 5 TABLET ORAL at 17:09

## 2020-06-04 RX ADMIN — Medication 1000 MILLIGRAM(S): at 17:07

## 2020-06-04 RX ADMIN — Medication 1000 MILLIGRAM(S): at 08:00

## 2020-06-04 RX ADMIN — POLYETHYLENE GLYCOL 3350 17 GRAM(S): 17 POWDER, FOR SOLUTION ORAL at 17:07

## 2020-06-04 RX ADMIN — TAMSULOSIN HYDROCHLORIDE 0.4 MILLIGRAM(S): 0.4 CAPSULE ORAL at 22:07

## 2020-06-04 RX ADMIN — SENNA PLUS 2 TABLET(S): 8.6 TABLET ORAL at 22:07

## 2020-06-04 RX ADMIN — OXYCODONE HYDROCHLORIDE 5 MILLIGRAM(S): 5 TABLET ORAL at 22:05

## 2020-06-04 RX ADMIN — Medication 15 MILLIGRAM(S): at 22:07

## 2020-06-04 RX ADMIN — Medication 10 MILLIGRAM(S): at 16:18

## 2020-06-04 RX ADMIN — OXYCODONE HYDROCHLORIDE 5 MILLIGRAM(S): 5 TABLET ORAL at 13:12

## 2020-06-04 RX ADMIN — ONDANSETRON 4 MILLIGRAM(S): 8 TABLET, FILM COATED ORAL at 06:06

## 2020-06-04 RX ADMIN — LACTULOSE 10 GRAM(S): 10 SOLUTION ORAL at 17:07

## 2020-06-04 RX ADMIN — OXYCODONE HYDROCHLORIDE 5 MILLIGRAM(S): 5 TABLET ORAL at 07:50

## 2020-06-04 RX ADMIN — Medication 10 MILLIGRAM(S): at 11:24

## 2020-06-04 RX ADMIN — PANTOPRAZOLE SODIUM 40 MILLIGRAM(S): 20 TABLET, DELAYED RELEASE ORAL at 06:13

## 2020-06-04 RX ADMIN — Medication 15 MILLIGRAM(S): at 06:06

## 2020-06-04 RX ADMIN — Medication 1 MILLIGRAM(S): at 16:18

## 2020-06-04 RX ADMIN — ENOXAPARIN SODIUM 40 MILLIGRAM(S): 100 INJECTION SUBCUTANEOUS at 11:04

## 2020-06-04 RX ADMIN — Medication 15 MILLIGRAM(S): at 13:09

## 2020-06-04 NOTE — PROGRESS NOTE ADULT - SUBJECTIVE AND OBJECTIVE BOX
Gastroenterology  Patient seen and examined bedside resting comfortably.  Reports abdominal "spasming", which he has circled. Reports normally spasms are on left side prior to hospitalization and now on right   One episode yesterday of severe abdominal pain. CT unremarkable.   Reports brown bowel movements, soft, without blood.     T(F): 98.6 (06-04-20 @ 11:36), Max: 98.9 (06-03-20 @ 17:00)  HR: 91 (06-04-20 @ 11:36) (72 - 94)  BP: 113/78 (06-04-20 @ 11:36) (102/64 - 113/78)  RR: 18 (06-04-20 @ 11:36) (17 - 18)  SpO2: 100% (06-04-20 @ 11:36) (94% - 100%)  Wt(kg): --  CAPILLARY BLOOD GLUCOSE          PHYSICAL EXAM:  General: NAD, WDWN.   Neuro:  Alert & responsive  HEENT: NCAT, EOMI, conjunctiva clear  CV: +S1+S2 regular rate and rhythm  Lung: clear to ausculation bilaterally, respirations nonlabored, good inspiratory effort  Abdomen: soft, Non Tender, No distention Normal active BS  Extremities: no cyanosis, clubbing or edema    LABS:              I&O's Detail

## 2020-06-04 NOTE — PROGRESS NOTE ADULT - SUBJECTIVE AND OBJECTIVE BOX
Patient is a 49y old  Male who presents with a chief complaint of abdominal pain, dyspnea (04 Jun 2020 10:47)      INTERVAL HPI/OVERNIGHT EVENTS: no events     MEDICATIONS  (STANDING):  ALPRAZolam 0.5 milliGRAM(s) Oral at bedtime  busPIRone 15 milliGRAM(s) Oral every 8 hours  dicyclomine 10 milliGRAM(s) Oral three times a day before meals  enoxaparin Injectable 40 milliGRAM(s) SubCutaneous daily  lactated ringers Bolus 500 milliLiter(s) IV Bolus once  lactulose Syrup 10 Gram(s) Oral two times a day  mesalamine ER Capsule 1000 milliGRAM(s) Oral two times a day with meals  pantoprazole    Tablet 40 milliGRAM(s) Oral before breakfast  polyethylene glycol 3350 17 Gram(s) Oral two times a day  senna 2 Tablet(s) Oral at bedtime  tamsulosin 0.4 milliGRAM(s) Oral at bedtime    MEDICATIONS  (PRN):  benzocaine 15 mG/menthol 3.6 mG (Sugar-Free) Lozenge 1 Lozenge Oral four times a day PRN Sore Throat  famotidine Injectable 20 milliGRAM(s) IV Push once PRN heartburn  LORazepam     Tablet 1 milliGRAM(s) Oral every 6 hours PRN Anxiety  morphine  - Injectable 2 milliGRAM(s) IV Push every 6 hours PRN Severe Pain (7 - 10)  ondansetron Injectable 4 milliGRAM(s) IV Push every 6 hours PRN Nausea and/or Vomiting  oxyCODONE    IR 5 milliGRAM(s) Oral every 4 hours PRN Moderate Pain (4 - 6)      Allergies    adhesives (Rash)  No Known Drug Allergies    Intolerances         Vital Signs Last 24 Hrs  T(C): 36.8 (04 Jun 2020 05:00), Max: 37.2 (03 Jun 2020 17:00)  T(F): 98.2 (04 Jun 2020 05:00), Max: 98.9 (03 Jun 2020 17:00)  HR: 72 (04 Jun 2020 05:00) (72 - 101)  BP: 105/71 (04 Jun 2020 05:00) (102/64 - 143/83)  BP(mean): --  RR: 17 (04 Jun 2020 05:00) (17 - 20)  SpO2: 96% (04 Jun 2020 05:00) (94% - 97%)    PHYSICAL EXAM:  GENERAL: NAD, well-groomed, well-developed  HEAD:  Atraumatic, Normocephalic  EYES: EOMI, PERRLA, conjunctiva and sclera clear  ENMT: No tonsillar erythema, exudates, or enlargement; Moist mucous membranes, Good dentition, No lesions  NECK: Supple, No JVD, Normal thyroid  NERVOUS SYSTEM:  Alert & Oriented X3, Good concentration; Motor Strength 5/5 B/L upper and lower extremities; DTRs 2+ intact and symmetric  CHEST/LUNG: Clear to percussion bilaterally; No rales, rhonchi, wheezing, or rubs  HEART: Regular rate and rhythm; No murmurs, rubs, or gallops  ABDOMEN: Soft, Nontender, Nondistended; Bowel sounds present  EXTREMITIES:  2+ Peripheral Pulses, No clubbing, cyanosis, or edema  LYMPH: No lymphadenopathy noted  SKIN: No rashes or lesions    LABS:              CAPILLARY BLOOD GLUCOSE          RADIOLOGY & ADDITIONAL TESTS:    Imaging Personally Reviewed:  [ X] YES  [ ] NO    Consultant(s) Notes Reviewed:  [ X] YES  [ ] NO    Care Discussed with Consultants/Other Providers [X ] YES  [ ] NO

## 2020-06-04 NOTE — PROGRESS NOTE ADULT - ASSESSMENT
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 MIGUEL DAVE THAYER LIJ  [id number]  1968 DOA 2020 DR LYDIA FRANCISCO           REVIEW OF SYMPTOMS      Able to give ROS  Yes     RELIABLE No   CONSTITUTIONAL Weakness Yes  Chills No Vision changes No  ENDOCRINE No unexplained hair loss No heat or cold intolerance    ALLERGY No hives  Sore throat No   RESP Coughing blood no  Shortness of breath YES   NEURO No Headache  Confusion Pain neck No   CARDIAC No Chest pain No Palpitations   GI No Pain abdomen NO   Vomiting NO     PHYSICAL EXAM    HEENT Unremarkable PERRLA atraumatic   RESP Fair air entry EXP prolonged    Harsh breath sound Resp distres mild   CARDIAC S1 S2 No S3     NO JVD    ABDOMEN SOFT BS PRESENT NOT DISTENDED No hepatosplenomegaly PEDAL EDEMA present No calf tenderness  NO rash   GENERAL Not TOXIC looking    PROBLEMS  CAVITY RUL       OXYGENATION      -2020 ra 96% - ra 97%    VITALS/LABS   2020 afeb 130/90     PT DATA/BEST PRACTICE  ALLERGY       adhesives               WT        64 (2020)                             BMI         20 (2020)                    CrCl                                  ADVANCED DIRECTIVE     HEAD OF BED ELEVATION Yes      INFECTION PPLX     DVT PROPHYLAXIS             Lvnx 40 ()    MCCANN PROPHYLAXIS    protonix 40 ()         DYSPHAGIA EVAL     DIET       reg ()                                                                   IV F    PATIENT SUMMARY   49 m PMH Crohns COPD R lung mass Barretts admitted 2020 with chest pressure and SOB x 1 w Also co LLQ pain x 5 d     ER vitals 145/114 92 afeb     home meds spiriva midodrine 10.3 nicotine 14 pentasa flomax .4                                    Open lung bx vats done  Dr Marley  Pt tr out of ICU 2020     PATIENT DATA/ASSESSMENT/RECOMMENDATIONS     CAVITY RUL RO TB RO CA  49 m who has liven in homeless shelter in past and has trevelled to [place] in past has rul cavitary lesion at least since 2020   His HIV test was negative    His cavitary rul lesion  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    ct showed persistent 3.8 x 1.7 cm rul spiculated mass with cavity    afb smear N-nicole BUT BROTH GREW AFB    QFT N-nicole   ANCA PANCA N-NICOLE () AANCA INDET ()   2020 ace and ds dna were N-nicole   Being smoker he is also at risk for lung ca   ID eval dated  noted   ID recommends to NOT start anti TB Rx based on available information   Patient had VATS wedge  and HPE shopwed again NECROTIZING GRANULOMA   2020 ID recommends to hold off airborne isolation as  AFB growth is too rapid to be M TB   dna probe showed BRETT   HPE again showed necritizing granulomatous inflammation  and biopsy culture  are pending  Given that pt has brett doubt M Tb However bx culktures have ryan be followed up Pt cleared for dc from pulm standpoint   ID to decide whether or not to start Rx fopr ntbm      TIME SPENT Over 25 minutes aggregate care time spent on encounter; activities included   direct pt care, counseling and/or coordinating care reviewing notes, lab data/ imaging , discussion with multidisciplinary team/ pt /family. Risks, benefits, alternatives  discussed in detail.    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

## 2020-06-04 NOTE — PROGRESS NOTE ADULT - ASSESSMENT
49M with a PMH of Barretts Disease, ?Crohn's on Pentasa, IBS presents to ED with SOB, abdominal discomfort in setting of constipation     CT on presentation unremarkable without any evidence of inflammation.     Of note outpatient work-up has been negative for Crohns however he is adament pathology from small bowel resection noted Crohns (was not able to obtain pathology). Work-up including EGD/colonoscopy otherwise negative for IBD. Capsule pending as outpatient     Abdominal pain is most likely secondary to irritable bowel syndrome with constipation. Clinical picture is NOT consistent with Crohns flare, which is a questionable diagnosis. CT unremarkable. Repeat CT after acute abdominal pain unremarkable. No concerning signs on physical exam. Symptoms improved with bowel movements and dicylomine which is consistent with IBS.

## 2020-06-04 NOTE — PROGRESS NOTE ADULT - ASSESSMENT
49M with a PMH of Barretts Disease, Crohn's on Pentasya, IBS, spiculated lung mass s/p VATS POD #1; extubated this AM after having hypoxia post op requiring re-intubation.      s.p vasovagal ep 6/2/20 , ct abd normal     Neuro: Weaned off fentanyl drip; continue with oxycodone IR PO for moderate pain. dilaudid prn for pain.  Nicotine patch for smoking.   CV: No acute issues.    Pulm: s/p extubation on 5/30/2020 on nasal cannula.  s/p VATS on 5/29/2020 chest tube care and management per thoracic surgery . follow up pathology which is still pending   cultures have been negative  Chest tube in place to suction; follow up CT surgery recs,  5/31/2020 6/1/2020 chest  tube removed   AFB in sputum per Dr. solis MTb doesn't grow this fast and quatefreion negative times two.   per dr. kenny pt should be staretd on Mtb meds and moved to airborne isolation    id/pulm on board      GI: h/o Vini's Continue with Pentasa and protonix; restart Miralax senna.   BPH  was resumed on flomax on 5/30/200No   ID: No acute issues  Heme: Lovenox daily.

## 2020-06-04 NOTE — PROGRESS NOTE ADULT - SUBJECTIVE AND OBJECTIVE BOX
JEOVANY ACOSTA    S 2E 280 I    Patient is a 49y old  Male who presents with a chief complaint of abdominal pain, dyspnea (04 Jun 2020 11:24)       Allergies    adhesives (Rash)  No Known Drug Allergies    Intolerances        HPI:  Patient is a 49M with a PMH of Barretts Disease, Crohn's on Pentasa IBS, spiculated lung mass who presents to worsening dyspnea on exertion and abdominal pain.  Patient states his breathing has been worsening over the last two weeks but denies hx of cough, fever, chills.  Has been following Pulmonary with Dr Ileana Disla.  Patient also states that he has had abdominal pain with poor PO intake for 5 days.  States he has had no BM for the last 5 days as well.  Follows with GI, and had a tele visit with Dr Russ 3 days ago who told him to present to the ED if his abdominal pain persisted.  Admitted to [facility] in 01/2020 - had biopsy that showed necrotizing granulomas in the lung.  Vitals stable.  Labs benign.  CT chest and abdomen negative for acute disease.  Will admit to floor for further eval. (23 May 2020 01:31)      PAST MEDICAL & SURGICAL HISTORY:  Alfaro's esophagus without dysplasia  Depression, unspecified depression type  Crohn's disease with complication, unspecified gastrointestinal tract location  ACL (anterior cruciate ligament) tear      FAMILY HISTORY:  Family history of diabetes mellitus in maternal grandmother (Mother, Grandparent)  Family history of COPD (chronic obstructive pulmonary disease) (Father)  Family history of colon cancer in father (Father)  Family history of hypertension in father (Father, Mother, Grandparent)        MEDICATIONS   ALPRAZolam 0.5 milliGRAM(s) Oral at bedtime  benzocaine 15 mG/menthol 3.6 mG (Sugar-Free) Lozenge 1 Lozenge Oral four times a day PRN  busPIRone 15 milliGRAM(s) Oral every 8 hours  dicyclomine 10 milliGRAM(s) Oral three times a day before meals  enoxaparin Injectable 40 milliGRAM(s) SubCutaneous daily  famotidine Injectable 20 milliGRAM(s) IV Push once PRN  lactated ringers Bolus 500 milliLiter(s) IV Bolus once  lactulose Syrup 10 Gram(s) Oral two times a day  LORazepam     Tablet 1 milliGRAM(s) Oral every 6 hours PRN  mesalamine ER Capsule 1000 milliGRAM(s) Oral two times a day with meals  morphine  - Injectable 2 milliGRAM(s) IV Push every 6 hours PRN  ondansetron Injectable 4 milliGRAM(s) IV Push every 6 hours PRN  oxyCODONE    IR 5 milliGRAM(s) Oral every 4 hours PRN  pantoprazole    Tablet 40 milliGRAM(s) Oral before breakfast  polyethylene glycol 3350 17 Gram(s) Oral two times a day  senna 2 Tablet(s) Oral at bedtime  tamsulosin 0.4 milliGRAM(s) Oral at bedtime      Vital Signs Last 24 Hrs  T(C): 36.8 (04 Jun 2020 05:00), Max: 37.2 (03 Jun 2020 17:00)  T(F): 98.2 (04 Jun 2020 05:00), Max: 98.9 (03 Jun 2020 17:00)  HR: 72 (04 Jun 2020 05:00) (72 - 101)  BP: 105/71 (04 Jun 2020 05:00) (102/64 - 143/83)  BP(mean): --  RR: 17 (04 Jun 2020 05:00) (17 - 20)  SpO2: 96% (04 Jun 2020 05:00) (94% - 97%)            LABS:                    WBC:  WBC Count: 9.94 K/uL (06-02 @ 08:26)      MICROBIOLOGY:  RECENT CULTURES:  05-31 .Sputum Sputum Streptococcus anginosus   Numerous polymorphonuclear leukocytes per low power field  No Squamous epithelial cells per low power field  No organisms seen   Normal Respiratory Karissa present    05-30 .Tissue RIGHT UPPER LOBE WEDGE C/S XXXX   Rare polymorphonuclear leukocytes seen per low power field  No organisms seen per oil power field   No growth at 5 days.    05-30 .Body Fluid RIGHT UPPER LOBE XXXX   polymorphonuclear leukocytes seen  No organisms seen  by cytocentrifuge   Culture is being performed.    05-28 .Urine Clean Catch (Midstream) XXXX XXXX   No growth                    Sodium:  Sodium, Serum: 138 mmol/L (06-02 @ 08:26)      1.11 mg/dL 06-02 @ 08:26      Hemoglobin:  Hemoglobin: 13.9 g/dL (06-02 @ 08:26)      Platelets: Platelet Count - Automated: 226 K/uL (06-02 @ 08:26)              RADIOLOGY & ADDITIONAL STUDIES:

## 2020-06-04 NOTE — PROGRESS NOTE ADULT - SUBJECTIVE AND OBJECTIVE BOX
HPI:  Patient is a 49M with a PMH of Barretts Disease, Crohn's on Pentasa IBS, spiculated lung mass who presents to worsening dyspnea on exertion and abdominal pain.  Patient states his breathing has been worsening over the last two weeks but denies hx of cough, fever, chills.  Has been following Pulmonary with Dr Ileana Disla.  Patient also states that he has had abdominal pain with poor PO intake for 5 days.  States he has had no BM for the last 5 days as well.  Follows with GI, and had a tele visit with Dr Russ 3 days ago who told him to present to the ED if his abdominal pain persisted.  Admitted to [facility] in 01/2020 - had biopsy that showed necrotizing granulomas in the lung.  Vitals stable.  Labs benign.  CT chest and abdomen negative for acute disease.  Will admit to floor for further eval. (23 May 2020 01:31)      Allergies    adhesives (Rash)  No Known Drug Allergies    Intolerances        MEDICATIONS  (STANDING):  ALPRAZolam 0.5 milliGRAM(s) Oral at bedtime  busPIRone 15 milliGRAM(s) Oral every 8 hours  dicyclomine 10 milliGRAM(s) Oral three times a day before meals  enoxaparin Injectable 40 milliGRAM(s) SubCutaneous daily  lactated ringers Bolus 500 milliLiter(s) IV Bolus once  lactulose Syrup 10 Gram(s) Oral two times a day  mesalamine ER Capsule 1000 milliGRAM(s) Oral two times a day with meals  pantoprazole    Tablet 40 milliGRAM(s) Oral before breakfast  polyethylene glycol 3350 17 Gram(s) Oral two times a day  senna 2 Tablet(s) Oral at bedtime  tamsulosin 0.4 milliGRAM(s) Oral at bedtime    MEDICATIONS  (PRN):  benzocaine 15 mG/menthol 3.6 mG (Sugar-Free) Lozenge 1 Lozenge Oral four times a day PRN Sore Throat  famotidine Injectable 20 milliGRAM(s) IV Push once PRN heartburn  LORazepam     Tablet 1 milliGRAM(s) Oral every 6 hours PRN Anxiety  morphine  - Injectable 2 milliGRAM(s) IV Push every 6 hours PRN Severe Pain (7 - 10)  ondansetron Injectable 4 milliGRAM(s) IV Push every 6 hours PRN Nausea and/or Vomiting  oxyCODONE    IR 5 milliGRAM(s) Oral every 4 hours PRN Moderate Pain (4 - 6)      REVIEW OF SYSTEMS:    CONSTITUTIONAL: No fever, chills, weight loss, or fatigue  HEENT: No sore throat, runny nose, ear ache  RESPIRATORY: No cough, wheezing, No shortness of breath  CARDIOVASCULAR: No chest pain, palpitations, dizziness  GASTROINTESTINAL: No abdominal pain. No nausea, vomiting, diarrhea  GENITOURINARY: No dysuria, increase frequency, hematuria, or incontinence  NEUROLOGICAL: No headaches, memory loss, loss of strength, numbness, or tremors, no weakness  EXTREMITY: No pedal edema BLE  SKIN: No itching, burning, rashes, or lesions     VITAL SIGNS:  T(C): 37 (06-04-20 @ 11:36), Max: 37.2 (06-03-20 @ 17:00)  T(F): 98.6 (06-04-20 @ 11:36), Max: 98.9 (06-03-20 @ 17:00)  HR: 91 (06-04-20 @ 11:36) (72 - 101)  BP: 113/78 (06-04-20 @ 11:36) (102/64 - 143/83)  RR: 18 (06-04-20 @ 11:36) (17 - 20)  SpO2: 100% (06-04-20 @ 11:36) (94% - 100%)  Wt(kg): --    PHYSICAL EXAM:    GENERAL: not in any distress  HEENT: Neck is supple, normocephalic, atraumatic   CHEST/LUNG: Clear to percussion bilaterally; No rales, rhonchi, wheezing  HEART: Regular rate and rhythm; No murmurs, rubs, or gallops  ABDOMEN: Soft, Nontender, Nondistended; Bowel sounds present, no rebound   EXTREMITIES:  2+ Peripheral Pulses, No clubbing, cyanosis, or edema  GENITOURINARY:   SKIN: No rashes or lesions  BACK: no pressor sore   NERVOUS SYSTEM:  Alert & Oriented X3, Good concentration  PSYCH: normal affect     LABS:                                     Culture Results:   Normal Respiratory Karissa present (05-31 @ 15:02)  Culture Results:   No growth at 5 days. (05-30 @ 00:40)  Culture Results:   Culture is being performed. (05-30 @ 00:40)  Culture Results:   Testing in progress (05-30 @ 00:40)  Culture Results:   Culture is being performed. (05-30 @ 00:38)  Culture Results:   Testing in progress (05-30 @ 00:38)  Culture Results:   No growth at 5 days (05-30 @ 00:38)  Culture Results:   No growth (05-28 @ 18:06)                Radiology:

## 2020-06-04 NOTE — PROGRESS NOTE ADULT - PROBLEM SELECTOR PROBLEM 1
Left upper quadrant abdominal pain
Lung mass
Pain of upper abdomen

## 2020-06-04 NOTE — PROGRESS NOTE ADULT - PROBLEM SELECTOR PLAN 1
-Miralax twice daily   -Lactulose twice daily   -Dicyclomine twice daily   -Magnesium citrate prn if constipation   -Continue home Pentasa  -Continue pantoprazole 40 mg daily   -Avoid narcotics   -Advance diet slowly as tolerated.

## 2020-06-04 NOTE — PROGRESS NOTE ADULT - ASSESSMENT
Lung mass with cavitation   no corroborating data that this is TB;;QuantiFeron NEGATIVE on 1/2020 and again Neg on 5/24/2020  no other sign of TB   low esr , clinically ok    s/p Right VATS, RUL wedge resection/open bx.  pathology pending      on 5/25 , growing acid fast bacilli grew in 6 days against M . TB which need  4-6 WEEKS TOP GROW  AFB culture grew MIGUEL , will fu clinically and CT as outpatient   fu with RUL wedge cultures   not on any antibiotics  sputum culture with strep anginosus not likely need to treat     will fu with  RUL wedge cultures     pt was advised to FU with [facility] [facility] upon discharge  [id number], [place], [place], [address] [phone]     NO need for airborne isolation   case discussed with Dr Blue and patient

## 2020-06-05 ENCOUNTER — TRANSCRIPTION ENCOUNTER (OUTPATIENT)
Age: 50
End: 2020-06-05

## 2020-06-05 ENCOUNTER — INPATIENT (INPATIENT)
Facility: HOSPITAL | Age: 50
LOS: 3 days | Discharge: HOME | End: 2020-06-09
Attending: INTERNAL MEDICINE | Admitting: INTERNAL MEDICINE
Payer: MEDICAID

## 2020-06-05 VITALS
TEMPERATURE: 98 F | HEART RATE: 94 BPM | DIASTOLIC BLOOD PRESSURE: 98 MMHG | SYSTOLIC BLOOD PRESSURE: 133 MMHG | RESPIRATION RATE: 17 BRPM | OXYGEN SATURATION: 97 %

## 2020-06-05 VITALS
OXYGEN SATURATION: 97 % | WEIGHT: 139.99 LBS | HEART RATE: 114 BPM | SYSTOLIC BLOOD PRESSURE: 131 MMHG | TEMPERATURE: 98 F | DIASTOLIC BLOOD PRESSURE: 94 MMHG | HEIGHT: 71 IN | RESPIRATION RATE: 20 BRPM

## 2020-06-05 DIAGNOSIS — S83.519A SPRAIN OF ANTERIOR CRUCIATE LIGAMENT OF UNSPECIFIED KNEE, INITIAL ENCOUNTER: Chronic | ICD-10-CM

## 2020-06-05 LAB
ALBUMIN SERPL ELPH-MCNC: 2.9 G/DL — LOW (ref 3.3–5)
ALBUMIN SERPL ELPH-MCNC: 3.7 G/DL — SIGNIFICANT CHANGE UP (ref 3.3–5)
ALP SERPL-CCNC: 67 U/L — SIGNIFICANT CHANGE UP (ref 40–120)
ALP SERPL-CCNC: 85 U/L — SIGNIFICANT CHANGE UP (ref 40–120)
ALT FLD-CCNC: 21 U/L — SIGNIFICANT CHANGE UP (ref 12–78)
ALT FLD-CCNC: 26 U/L — SIGNIFICANT CHANGE UP (ref 12–78)
ANION GAP SERPL CALC-SCNC: 6 MMOL/L — SIGNIFICANT CHANGE UP (ref 5–17)
ANION GAP SERPL CALC-SCNC: 7 MMOL/L — SIGNIFICANT CHANGE UP (ref 5–17)
APTT BLD: 30.8 SEC — SIGNIFICANT CHANGE UP (ref 27.5–36.3)
AST SERPL-CCNC: 19 U/L — SIGNIFICANT CHANGE UP (ref 15–37)
AST SERPL-CCNC: 24 U/L — SIGNIFICANT CHANGE UP (ref 15–37)
BASOPHILS # BLD AUTO: 0.05 K/UL — SIGNIFICANT CHANGE UP (ref 0–0.2)
BASOPHILS NFR BLD AUTO: 0.5 % — SIGNIFICANT CHANGE UP (ref 0–2)
BILIRUB SERPL-MCNC: 0.4 MG/DL — SIGNIFICANT CHANGE UP (ref 0.2–1.2)
BILIRUB SERPL-MCNC: 0.5 MG/DL — SIGNIFICANT CHANGE UP (ref 0.2–1.2)
BUN SERPL-MCNC: 11 MG/DL — SIGNIFICANT CHANGE UP (ref 7–23)
BUN SERPL-MCNC: 13 MG/DL — SIGNIFICANT CHANGE UP (ref 7–23)
CALCIUM SERPL-MCNC: 8.7 MG/DL — SIGNIFICANT CHANGE UP (ref 8.5–10.1)
CALCIUM SERPL-MCNC: 9 MG/DL — SIGNIFICANT CHANGE UP (ref 8.5–10.1)
CHLORIDE SERPL-SCNC: 104 MMOL/L — SIGNIFICANT CHANGE UP (ref 96–108)
CHLORIDE SERPL-SCNC: 107 MMOL/L — SIGNIFICANT CHANGE UP (ref 96–108)
CO2 SERPL-SCNC: 26 MMOL/L — SIGNIFICANT CHANGE UP (ref 22–31)
CO2 SERPL-SCNC: 28 MMOL/L — SIGNIFICANT CHANGE UP (ref 22–31)
CREAT SERPL-MCNC: 0.82 MG/DL — SIGNIFICANT CHANGE UP (ref 0.5–1.3)
CREAT SERPL-MCNC: 0.92 MG/DL — SIGNIFICANT CHANGE UP (ref 0.5–1.3)
EOSINOPHIL # BLD AUTO: 0.11 K/UL — SIGNIFICANT CHANGE UP (ref 0–0.5)
EOSINOPHIL NFR BLD AUTO: 1.1 % — SIGNIFICANT CHANGE UP (ref 0–6)
GLUCOSE SERPL-MCNC: 126 MG/DL — HIGH (ref 70–99)
GLUCOSE SERPL-MCNC: 95 MG/DL — SIGNIFICANT CHANGE UP (ref 70–99)
HCT VFR BLD CALC: 37.6 % — LOW (ref 39–50)
HCT VFR BLD CALC: 41.5 % — SIGNIFICANT CHANGE UP (ref 39–50)
HGB BLD-MCNC: 12.9 G/DL — LOW (ref 13–17)
HGB BLD-MCNC: 14 G/DL — SIGNIFICANT CHANGE UP (ref 13–17)
IMM GRANULOCYTES NFR BLD AUTO: 0.3 % — SIGNIFICANT CHANGE UP (ref 0–1.5)
INR BLD: 1.09 RATIO — SIGNIFICANT CHANGE UP (ref 0.88–1.16)
LYMPHOCYTES # BLD AUTO: 1.87 K/UL — SIGNIFICANT CHANGE UP (ref 1–3.3)
LYMPHOCYTES # BLD AUTO: 19.5 % — SIGNIFICANT CHANGE UP (ref 13–44)
MCHC RBC-ENTMCNC: 31 PG — SIGNIFICANT CHANGE UP (ref 27–34)
MCHC RBC-ENTMCNC: 31.2 PG — SIGNIFICANT CHANGE UP (ref 27–34)
MCHC RBC-ENTMCNC: 33.7 GM/DL — SIGNIFICANT CHANGE UP (ref 32–36)
MCHC RBC-ENTMCNC: 34.3 GM/DL — SIGNIFICANT CHANGE UP (ref 32–36)
MCV RBC AUTO: 90.8 FL — SIGNIFICANT CHANGE UP (ref 80–100)
MCV RBC AUTO: 92 FL — SIGNIFICANT CHANGE UP (ref 80–100)
MONOCYTES # BLD AUTO: 0.85 K/UL — SIGNIFICANT CHANGE UP (ref 0–0.9)
MONOCYTES NFR BLD AUTO: 8.8 % — SIGNIFICANT CHANGE UP (ref 2–14)
NEUTROPHILS # BLD AUTO: 6.7 K/UL — SIGNIFICANT CHANGE UP (ref 1.8–7.4)
NEUTROPHILS NFR BLD AUTO: 69.8 % — SIGNIFICANT CHANGE UP (ref 43–77)
NRBC # BLD: 0 /100 WBCS — SIGNIFICANT CHANGE UP (ref 0–0)
NRBC # BLD: 0 /100 WBCS — SIGNIFICANT CHANGE UP (ref 0–0)
PLATELET # BLD AUTO: 255 K/UL — SIGNIFICANT CHANGE UP (ref 150–400)
PLATELET # BLD AUTO: 297 K/UL — SIGNIFICANT CHANGE UP (ref 150–400)
POTASSIUM SERPL-MCNC: 3.6 MMOL/L — SIGNIFICANT CHANGE UP (ref 3.5–5.3)
POTASSIUM SERPL-MCNC: 3.9 MMOL/L — SIGNIFICANT CHANGE UP (ref 3.5–5.3)
POTASSIUM SERPL-SCNC: 3.6 MMOL/L — SIGNIFICANT CHANGE UP (ref 3.5–5.3)
POTASSIUM SERPL-SCNC: 3.9 MMOL/L — SIGNIFICANT CHANGE UP (ref 3.5–5.3)
PROT SERPL-MCNC: 6.8 GM/DL — SIGNIFICANT CHANGE UP (ref 6–8.3)
PROT SERPL-MCNC: 7.7 GM/DL — SIGNIFICANT CHANGE UP (ref 6–8.3)
PROTHROM AB SERPL-ACNC: 12.2 SEC — SIGNIFICANT CHANGE UP (ref 10–12.9)
RBC # BLD: 4.14 M/UL — LOW (ref 4.2–5.8)
RBC # BLD: 4.51 M/UL — SIGNIFICANT CHANGE UP (ref 4.2–5.8)
RBC # FLD: 12.4 % — SIGNIFICANT CHANGE UP (ref 10.3–14.5)
RBC # FLD: 12.5 % — SIGNIFICANT CHANGE UP (ref 10.3–14.5)
SODIUM SERPL-SCNC: 138 MMOL/L — SIGNIFICANT CHANGE UP (ref 135–145)
SODIUM SERPL-SCNC: 140 MMOL/L — SIGNIFICANT CHANGE UP (ref 135–145)
WBC # BLD: 8 K/UL — SIGNIFICANT CHANGE UP (ref 3.8–10.5)
WBC # BLD: 9.61 K/UL — SIGNIFICANT CHANGE UP (ref 3.8–10.5)
WBC # FLD AUTO: 8 K/UL — SIGNIFICANT CHANGE UP (ref 3.8–10.5)
WBC # FLD AUTO: 9.61 K/UL — SIGNIFICANT CHANGE UP (ref 3.8–10.5)

## 2020-06-05 PROCEDURE — 99232 SBSQ HOSP IP/OBS MODERATE 35: CPT

## 2020-06-05 PROCEDURE — 71045 X-RAY EXAM CHEST 1 VIEW: CPT | Mod: 26

## 2020-06-05 PROCEDURE — 99285 EMERGENCY DEPT VISIT HI MDM: CPT

## 2020-06-05 PROCEDURE — 99239 HOSP IP/OBS DSCHRG MGMT >30: CPT

## 2020-06-05 PROCEDURE — 99231 SBSQ HOSP IP/OBS SF/LOW 25: CPT

## 2020-06-05 PROCEDURE — 93010 ELECTROCARDIOGRAM REPORT: CPT

## 2020-06-05 RX ORDER — OXYCODONE HYDROCHLORIDE 5 MG/1
1 TABLET ORAL
Qty: 12 | Refills: 0
Start: 2020-06-05 | End: 2020-06-07

## 2020-06-05 RX ORDER — OXYCODONE AND ACETAMINOPHEN 5; 325 MG/1; MG/1
1 TABLET ORAL ONCE
Refills: 0 | Status: DISCONTINUED | OUTPATIENT
Start: 2020-06-05 | End: 2020-06-05

## 2020-06-05 RX ORDER — ALPRAZOLAM 0.25 MG
1 TABLET ORAL
Qty: 4 | Refills: 0
Start: 2020-06-05 | End: 2020-06-08

## 2020-06-05 RX ORDER — TOLTERODINE TARTRATE 1 MG/1
0 TABLET, FILM COATED ORAL
Qty: 0 | Refills: 0 | DISCHARGE

## 2020-06-05 RX ORDER — DIAZEPAM 5 MG
5 TABLET ORAL ONCE
Refills: 0 | Status: DISCONTINUED | OUTPATIENT
Start: 2020-06-05 | End: 2020-06-05

## 2020-06-05 RX ADMIN — Medication 15 MILLIGRAM(S): at 13:50

## 2020-06-05 RX ADMIN — OXYCODONE HYDROCHLORIDE 5 MILLIGRAM(S): 5 TABLET ORAL at 07:43

## 2020-06-05 RX ADMIN — OXYCODONE AND ACETAMINOPHEN 1 TABLET(S): 5; 325 TABLET ORAL at 21:21

## 2020-06-05 RX ADMIN — Medication 1000 MILLIGRAM(S): at 07:44

## 2020-06-05 RX ADMIN — Medication 10 MILLIGRAM(S): at 07:43

## 2020-06-05 RX ADMIN — Medication 1 MILLIGRAM(S): at 13:50

## 2020-06-05 RX ADMIN — POLYETHYLENE GLYCOL 3350 17 GRAM(S): 17 POWDER, FOR SOLUTION ORAL at 05:48

## 2020-06-05 RX ADMIN — Medication 10 MILLIGRAM(S): at 11:41

## 2020-06-05 RX ADMIN — Medication 15 MILLIGRAM(S): at 05:48

## 2020-06-05 RX ADMIN — PANTOPRAZOLE SODIUM 40 MILLIGRAM(S): 20 TABLET, DELAYED RELEASE ORAL at 07:43

## 2020-06-05 RX ADMIN — Medication 5 MILLIGRAM(S): at 21:22

## 2020-06-05 NOTE — DISCHARGE NOTE PROVIDER - NSDCMRMEDTOKEN_GEN_ALL_CORE_FT
ALPRAZolam 0.5 mg oral tablet: 1 tab(s) orally once a day (at bedtime) MDD:1tab  busPIRone 15 mg oral tablet: 1 tab(s) orally every 8 hours  dicyclomine 10 mg oral capsule: 1 cap(s) orally 2 times a day (before meals)  Flomax 0.4 mg oral capsule: 2 cap(s) orally once a day  Incruse Ellipta 62.5 mcg/inh inhalation powder:   lactulose 10 g/15 mL oral syrup: 30 milliliter(s) orally 2 times a day  LORazepam 1 mg oral tablet: 1 tab(s) orally every 6 hours, As needed, Anxiety MDD:4tab  nicotine 14 mg/24 hr transdermal film, extended release: 1 patch transdermal once a day   omeprazole 40 mg oral delayed release capsule: 1 cap(s) orally once a day  ondansetron 4 mg oral tablet: 1 tab(s) orally 2 times a day, As Needed   oxyCODONE 5 mg oral tablet: 1 tab(s) orally every 6 hours, As Needed -Moderate Pain (4 - 6) MDD:4tab  Pentasa 250 mg oral capsule, extended release: 4 cap(s) orally 4 times a day

## 2020-06-05 NOTE — DISCHARGE NOTE PROVIDER - HOSPITAL COURSE
49M with a PMH of Barretts Disease, Crohn's on Pentasya, IBS, spiculated lung mass s/p VATS extubated  after having hypoxia post op requiring re-intubation.          Due to patient complicated hospital stay including intubation, infection and current covid19 pandemic . HE would benefit from solitary shelter (hotel room) rather than public shelter.             s.p vasovagal ep 6/2/20 , ct abd normal         Neuro: Weaned off fentanyl drip; continue with oxycodone IR PO for moderate pain. dilaudid prn for pain.  Nicotine patch for smoking.     CV: No acute issues.      Pulm: s/p extubation on 5/30/2020 on nasal cannula.  s/p VATS on 5/29/2020 chest tube care and management per thoracic surgery . follow up pathology which is still pending     cultures have been negative  Chest tube in place to suction; follow up CT surgery recs,  5/31/2020 6/1/2020 chest  tube removed    cleared by id/pulm for outpt f/u          GI: h/o Vini's Continue with Pentasa and protonix; restart Miralax senna.     BPH  was resumed on flomax on 5/30/200No     ID: No acute issues            45min spent on dc plan

## 2020-06-05 NOTE — ED ADULT NURSE NOTE - PMH
Alfaro's esophagus without dysplasia    COPD (chronic obstructive pulmonary disease)    Crohn's disease with complication, unspecified gastrointestinal tract location    Depression, unspecified depression type    Emphysema lung    Hypotension    Mass of lung  calvatory mass R upper lobe

## 2020-06-05 NOTE — ED PROVIDER NOTE - PHYSICAL EXAMINATION
Vitals: tachy at 114, otherwise WNL  Gen: AAOx3, NAD, sitting uncomfortably in stretcher, calm, cooperative, non-toxic   Head: ncat, perrla, eomi b/l  Neck: supple, no lymphadenopathy, no midline deviation  Heart: rrr, no m/r/g  Lungs: CTA b/l, no rales/ronchi/wheezes, R lateral chest dressing/wounds CDI  Abd: soft, nontender, non-distended, no rebound or guarding  Ext: no clubbing/cyanosis/edema  Neuro: sensation and muscle strength intact b/l, steady gait

## 2020-06-05 NOTE — PROGRESS NOTE ADULT - SUBJECTIVE AND OBJECTIVE BOX
HPI:  Patient is a 49M with a PMH of Barretts Disease, Crohn's on Pentasa IBS, spiculated lung mass who presents to worsening dyspnea on exertion and abdominal pain.  Patient states his breathing has been worsening over the last two weeks but denies hx of cough, fever, chills.  Has been following Pulmonary with Dr Ileana Disla.  Patient also states that he has had abdominal pain with poor PO intake for 5 days.  States he has had no BM for the last 5 days as well.  Follows with GI, and had a tele visit with Dr Russ 3 days ago who told him to present to the ED if his abdominal pain persisted.  Admitted to [facility] in 01/2020 - had biopsy that showed necrotizing granulomas in the lung.  Vitals stable.  Labs benign.  CT chest and abdomen negative for acute disease.  Will admit to floor for further eval. (23 May 2020 01:31)      Allergies    adhesives (Rash)  No Known Drug Allergies    Intolerances        MEDICATIONS  (STANDING):  ALPRAZolam 0.5 milliGRAM(s) Oral at bedtime  busPIRone 15 milliGRAM(s) Oral every 8 hours  dicyclomine 10 milliGRAM(s) Oral three times a day before meals  enoxaparin Injectable 40 milliGRAM(s) SubCutaneous daily  lactated ringers Bolus 500 milliLiter(s) IV Bolus once  lactulose Syrup 10 Gram(s) Oral two times a day  mesalamine ER Capsule 1000 milliGRAM(s) Oral two times a day with meals  pantoprazole    Tablet 40 milliGRAM(s) Oral before breakfast  polyethylene glycol 3350 17 Gram(s) Oral two times a day  senna 2 Tablet(s) Oral at bedtime  tamsulosin 0.4 milliGRAM(s) Oral at bedtime    MEDICATIONS  (PRN):  benzocaine 15 mG/menthol 3.6 mG (Sugar-Free) Lozenge 1 Lozenge Oral four times a day PRN Sore Throat  famotidine Injectable 20 milliGRAM(s) IV Push once PRN heartburn  LORazepam     Tablet 1 milliGRAM(s) Oral every 6 hours PRN Anxiety  morphine  - Injectable 2 milliGRAM(s) IV Push every 6 hours PRN Severe Pain (7 - 10)  ondansetron Injectable 4 milliGRAM(s) IV Push every 6 hours PRN Nausea and/or Vomiting  oxyCODONE    IR 5 milliGRAM(s) Oral every 4 hours PRN Moderate Pain (4 - 6)      REVIEW OF SYSTEMS:    CONSTITUTIONAL: No fever, chills, weight loss, or fatigue  HEENT: No sore throat, runny nose, ear ache  RESPIRATORY: No cough, wheezing, No shortness of breath  CARDIOVASCULAR: No chest pain, palpitations, dizziness  GASTROINTESTINAL: No abdominal pain. No nausea, vomiting, diarrhea  GENITOURINARY: No dysuria, increase frequency, hematuria, or incontinence  NEUROLOGICAL: No headaches, memory loss, loss of strength, numbness, or tremors, no weakness  EXTREMITY: No pedal edema BLE  SKIN: No itching, burning, rashes, or lesions     VITAL SIGNS:  T(C): 36.9 (06-05-20 @ 05:55), Max: 37.1 (06-04-20 @ 17:00)  T(F): 98.5 (06-05-20 @ 05:55), Max: 98.7 (06-04-20 @ 17:00)  HR: 95 (06-05-20 @ 05:55) (78 - 95)  BP: 142/83 (06-05-20 @ 05:55) (103/66 - 142/83)  RR: 18 (06-05-20 @ 05:55) (17 - 18)  SpO2: 95% (06-05-20 @ 05:55) (95% - 100%)  Wt(kg): --    PHYSICAL EXAM:    GENERAL: Alert, oriented x3, NAD  HEENT: Neck is supple, normocephalic, atraumatic   CHEST/LUNG: Clear to percussion bilaterally, bandage on place on the right side  HEART: Regular rate and rhythm  ABDOMEN: Soft, Nontender, Nondistended; Bowel sounds present, no rebound   EXTREMITIES:  No edema    LABS:                         12.9   8.00  )-----------( 255      ( 05 Jun 2020 06:45 )             37.6     06-05    138  |  104  |  13  ----------------------------<  95  3.9   |  28  |  0.92    Ca    8.7      05 Jun 2020 06:45    TPro  6.8  /  Alb  2.9<L>  /  TBili  0.5  /  DBili  x   /  AST  19  /  ALT  21  /  AlkPhos  67  06-05    LIVER FUNCTIONS - ( 05 Jun 2020 06:45 )  Alb: 2.9 g/dL / Pro: 6.8 gm/dL / ALK PHOS: 67 U/L / ALT: 21 U/L / AST: 19 U/L / GGT: x               Culture Results:   Normal Respiratory Karissa present (05-31 @ 15:02)  Culture Results:   No growth at 5 days. (05-30 @ 00:40)  Culture Results:   Culture is being performed. (05-30 @ 00:40)  Culture Results:   Testing in progress (05-30 @ 00:40)  Culture Results:   Culture is being performed. (05-30 @ 00:38)  Culture Results:   Testing in progress (05-30 @ 00:38)  Culture Results:   No growth at 5 days (05-30 @ 00:38)                Radiology:

## 2020-06-05 NOTE — PROGRESS NOTE ADULT - ASSESSMENT
Lung mass with cavitation   QuantiFeron Negative on 1/2020 and again Neg on 5/24/2020  s/p Right VATS, RUL wedge resection/open bx.  pathology: Necrotizing epitheloid granulomatous inflammation    on 5/25 , growing acid fast bacilli grew in 6 days against M . TB which need  4-6 WEEKS TOP GROW  AFB culture grew MIGUEL , will fu clinically and CT as outpatient   RUL wedge cultures: Pending  not on any antibiotics  sputum culture with strep anginosus not likely need to treat     Patient clinically improving  No in Acute distress  No fever  No leukocytosis    pt was advised to FU with [facility] upon discharge  [id number], [place], [place], [address] [phone]

## 2020-06-05 NOTE — ED PROVIDER NOTE - OBJECTIVE STATEMENT
48 yo M with R sided chest wall pain, s/p recent VATS and RUL resection for mass/abscess of lung.  Pt. was discharged today to department of  ([facility]) in [place] because he is homeless and has multiple medical problems.  When he arrived, they claimed paperwork was either not done correctly or not received and they couldn't process him, so he was told to leave.  Pt. came back to [facility] thereafter.  Pt. complains of R sided cp at surgical wound, but denies drainage, fever, sob.  He feels well otherwise.   ROS: negative for fever, cough, headache, shortness of breath, abd pain, nausea, vomiting, diarrhea, rash, paresthesia, and weakness--all other systems reviewed are negative.   PMH: Barretts Disease, Crohn's on Pentasa IBS, spiculated lung mass s/p RUL wedge resection (VATS) (prolonged hospital stay), anxiety, depression, prior suicide attempts, ; Meds: See EMR for list; SH: Denies smoking/drinking/drug use

## 2020-06-05 NOTE — PROGRESS NOTE ADULT - SUBJECTIVE AND OBJECTIVE BOX
Patient is a 49y old  Male who presents with a chief complaint of abdominal pain, dyspnea (05 Jun 2020 09:16)      INTERVAL HPI/OVERNIGHT EVENTS:    MEDICATIONS  (STANDING):  ALPRAZolam 0.5 milliGRAM(s) Oral at bedtime  busPIRone 15 milliGRAM(s) Oral every 8 hours  dicyclomine 10 milliGRAM(s) Oral three times a day before meals  enoxaparin Injectable 40 milliGRAM(s) SubCutaneous daily  lactated ringers Bolus 500 milliLiter(s) IV Bolus once  lactulose Syrup 10 Gram(s) Oral two times a day  mesalamine ER Capsule 1000 milliGRAM(s) Oral two times a day with meals  pantoprazole    Tablet 40 milliGRAM(s) Oral before breakfast  polyethylene glycol 3350 17 Gram(s) Oral two times a day  senna 2 Tablet(s) Oral at bedtime  tamsulosin 0.4 milliGRAM(s) Oral at bedtime    MEDICATIONS  (PRN):  benzocaine 15 mG/menthol 3.6 mG (Sugar-Free) Lozenge 1 Lozenge Oral four times a day PRN Sore Throat  famotidine Injectable 20 milliGRAM(s) IV Push once PRN heartburn  LORazepam     Tablet 1 milliGRAM(s) Oral every 6 hours PRN Anxiety  morphine  - Injectable 2 milliGRAM(s) IV Push every 6 hours PRN Severe Pain (7 - 10)  ondansetron Injectable 4 milliGRAM(s) IV Push every 6 hours PRN Nausea and/or Vomiting  oxyCODONE    IR 5 milliGRAM(s) Oral every 4 hours PRN Moderate Pain (4 - 6)      Allergies    adhesives (Rash)  No Known Drug Allergies    Intolerances        REVIEW OF SYSTEMS:  CONSTITUTIONAL: No fever, weight loss, or fatigue  EYES: No eye pain, visual disturbances, or discharge  ENMT:  No difficulty hearing, tinnitus, vertigo; No sinus or throat pain  NECK: No pain or stiffness  BREASTS: No pain, masses, or nipple discharge  RESPIRATORY: No cough, wheezing, chills or hemoptysis; No shortness of breath  CARDIOVASCULAR: No chest pain, palpitations, dizziness, or leg swelling  GASTROINTESTINAL: No abdominal or epigastric pain. No nausea, vomiting, or hematemesis; No diarrhea or constipation. No melena or hematochezia.  GENITOURINARY: No dysuria, frequency, hematuria, or incontinence  NEUROLOGICAL: No headaches, memory loss, loss of strength, numbness, or tremors  SKIN: No itching, burning, rashes, or lesions   LYMPH NODES: No enlarged glands  ENDOCRINE: No heat or cold intolerance; No hair loss  MUSCULOSKELETAL: No joint pain or swelling; No muscle, back, or extremity pain  PSYCHIATRIC: No depression, anxiety, mood swings, or difficulty sleeping  HEME/LYMPH: No easy bruising, or bleeding gums  ALLERGY AND IMMUNOLOGIC: No hives or eczema    Vital Signs Last 24 Hrs  T(C): 36.9 (05 Jun 2020 05:55), Max: 37.1 (04 Jun 2020 17:00)  T(F): 98.5 (05 Jun 2020 05:55), Max: 98.7 (04 Jun 2020 17:00)  HR: 95 (05 Jun 2020 05:55) (78 - 95)  BP: 142/83 (05 Jun 2020 05:55) (103/66 - 142/83)  BP(mean): --  RR: 18 (05 Jun 2020 05:55) (17 - 18)  SpO2: 95% (05 Jun 2020 05:55) (95% - 100%)    PHYSICAL EXAM:  GENERAL: NAD, well-groomed, well-developed  HEAD:  Atraumatic, Normocephalic  EYES: EOMI, PERRLA, conjunctiva and sclera clear  ENMT: No tonsillar erythema, exudates, or enlargement; Moist mucous membranes, Good dentition, No lesions  NECK: Supple, No JVD, Normal thyroid  NERVOUS SYSTEM:  Alert & Oriented X3, Good concentration; Motor Strength 5/5 B/L upper and lower extremities; DTRs 2+ intact and symmetric  CHEST/LUNG: Clear to percussion bilaterally; No rales, rhonchi, wheezing, or rubs  HEART: Regular rate and rhythm; No murmurs, rubs, or gallops  ABDOMEN: Soft, Nontender, Nondistended; Bowel sounds present  EXTREMITIES:  2+ Peripheral Pulses, No clubbing, cyanosis, or edema  LYMPH: No lymphadenopathy noted  SKIN: No rashes or lesions    LABS:                        12.9   8.00  )-----------( 255      ( 05 Jun 2020 06:45 )             37.6     06-05    138  |  104  |  13  ----------------------------<  95  3.9   |  28  |  0.92    Ca    8.7      05 Jun 2020 06:45    TPro  6.8  /  Alb  2.9<L>  /  TBili  0.5  /  DBili  x   /  AST  19  /  ALT  21  /  AlkPhos  67  06-05        CAPILLARY BLOOD GLUCOSE          RADIOLOGY & ADDITIONAL TESTS:    Imaging Personally Reviewed:  [ X] YES  [ ] NO    Consultant(s) Notes Reviewed:  [ X] YES  [ ] NO    Care Discussed with Consultants/Other Providers [X ] YES  [ ] NO

## 2020-06-05 NOTE — PROGRESS NOTE ADULT - ASSESSMENT
49M with a PMH of Barretts Disease, Crohn's on Pentasya, IBS, spiculated lung mass s/p VATS POD #1; extubated this AM after having hypoxia post op requiring re-intubation.      Due to patient complicated hospital stay including intubation, infection and current covid19 pandemic . HE would benefit from solitary shelter (hotel room) rather than public shelter.       s.p vasovagal ep 6/2/20 , ct abd normal     Neuro: Weaned off fentanyl drip; continue with oxycodone IR PO for moderate pain. dilaudid prn for pain.  Nicotine patch for smoking.   CV: No acute issues.    Pulm: s/p extubation on 5/30/2020 on nasal cannula.  s/p VATS on 5/29/2020 chest tube care and management per thoracic surgery . follow up pathology which is still pending   cultures have been negative  Chest tube in place to suction; follow up CT surgery recs,  5/31/2020 6/1/2020 chest  tube removed  cleared by id/pulm for outpt f/u      GI: h/o Vini's Continue with Pentasa and protonix; restart Miralax senna.   BPH  was resumed on flomax on 5/30/200No   ID: No acute issues  Heme: Lovenox daily.

## 2020-06-05 NOTE — PROGRESS NOTE BEHAVIORAL HEALTH - NSBHFUPINTERVALHXFT_PSY_A_CORE
No significant interval events. patient is medically cleared and is ready for discharge today. [place] the Ativan helped his anxiety and denies adverse medication side effects. Complaining about DSS and some nurses but otherwise no complaints about his physical issues. Otherwise, denies any symptoms of hypomania/nilo/psychosis/major depression/ anxiety/panic. Denies any active or passive suicidal or homicidal ideation. Names protective factors (charles; family; hope for future). Denies substance abuse; denies access to weapons

## 2020-06-05 NOTE — ED ADULT TRIAGE NOTE - CHIEF COMPLAINT QUOTE
discharged today s/p surgery  c/o SOB , R- rib pain, RUQ, RLQ abd pain x two days, midsternal chest pain, L- arm pain, pins and needles in 4th and 5th digit L- hand

## 2020-06-05 NOTE — ED PROVIDER NOTE - CLINICAL SUMMARY MEDICAL DECISION MAKING FREE TEXT BOX
48 yo M with R chest wall pain, doubt ischemia, social admit for homelessness, has no where to go  -basic labs, ekg, cxr, admit for SW dispo

## 2020-06-05 NOTE — PROGRESS NOTE ADULT - SUBJECTIVE AND OBJECTIVE BOX
JEOVANY ACOSTA    S 2E 280 I    Patient is a 49y old  Male who presents with a chief complaint of abdominal pain, dyspnea (04 Jun 2020 15:14)       Allergies    adhesives (Rash)  No Known Drug Allergies    Intolerances        HPI:  Patient is a 49M with a PMH of Barretts Disease, Crohn's on Pentasa IBS, spiculated lung mass who presents to worsening dyspnea on exertion and abdominal pain.  Patient states his breathing has been worsening over the last two weeks but denies hx of cough, fever, chills.  Has been following Pulmonary with Dr Ileana Disla.  Patient also states that he has had abdominal pain with poor PO intake for 5 days.  States he has had no BM for the last 5 days as well.  Follows with GI, and had a tele visit with Dr Russ 3 days ago who told him to present to the ED if his abdominal pain persisted.  Admitted to [facility] in 01/2020 - had biopsy that showed necrotizing granulomas in the lung.  Vitals stable.  Labs benign.  CT chest and abdomen negative for acute disease.  Will admit to floor for further eval. (23 May 2020 01:31)      PAST MEDICAL & SURGICAL HISTORY:  Alfaro's esophagus without dysplasia  Depression, unspecified depression type  Crohn's disease with complication, unspecified gastrointestinal tract location  ACL (anterior cruciate ligament) tear      FAMILY HISTORY:  Family history of diabetes mellitus in maternal grandmother (Mother, Grandparent)  Family history of COPD (chronic obstructive pulmonary disease) (Father)  Family history of colon cancer in father (Father)  Family history of hypertension in father (Father, Mother, Grandparent)        MEDICATIONS   ALPRAZolam 0.5 milliGRAM(s) Oral at bedtime  benzocaine 15 mG/menthol 3.6 mG (Sugar-Free) Lozenge 1 Lozenge Oral four times a day PRN  busPIRone 15 milliGRAM(s) Oral every 8 hours  dicyclomine 10 milliGRAM(s) Oral three times a day before meals  enoxaparin Injectable 40 milliGRAM(s) SubCutaneous daily  famotidine Injectable 20 milliGRAM(s) IV Push once PRN  lactated ringers Bolus 500 milliLiter(s) IV Bolus once  lactulose Syrup 10 Gram(s) Oral two times a day  LORazepam     Tablet 1 milliGRAM(s) Oral every 6 hours PRN  mesalamine ER Capsule 1000 milliGRAM(s) Oral two times a day with meals  morphine  - Injectable 2 milliGRAM(s) IV Push every 6 hours PRN  ondansetron Injectable 4 milliGRAM(s) IV Push every 6 hours PRN  oxyCODONE    IR 5 milliGRAM(s) Oral every 4 hours PRN  pantoprazole    Tablet 40 milliGRAM(s) Oral before breakfast  polyethylene glycol 3350 17 Gram(s) Oral two times a day  senna 2 Tablet(s) Oral at bedtime  tamsulosin 0.4 milliGRAM(s) Oral at bedtime      Vital Signs Last 24 Hrs  T(C): 36.9 (05 Jun 2020 05:55), Max: 37.1 (04 Jun 2020 17:00)  T(F): 98.5 (05 Jun 2020 05:55), Max: 98.7 (04 Jun 2020 17:00)  HR: 95 (05 Jun 2020 05:55) (78 - 95)  BP: 142/83 (05 Jun 2020 05:55) (103/66 - 142/83)  BP(mean): --  RR: 18 (05 Jun 2020 05:55) (17 - 18)  SpO2: 95% (05 Jun 2020 05:55) (95% - 100%)      06-05-20 @ 07:01  -  06-05-20 @ 09:16  --------------------------------------------------------  IN: 250 mL / OUT: 0 mL / NET: 250 mL            LABS:                        12.9   8.00  )-----------( 255      ( 05 Jun 2020 06:45 )             37.6     06-05    138  |  104  |  13  ----------------------------<  95  3.9   |  28  |  0.92    Ca    8.7      05 Jun 2020 06:45    TPro  6.8  /  Alb  2.9<L>  /  TBili  0.5  /  DBili  x   /  AST  19  /  ALT  21  /  AlkPhos  67  06-05              WBC:  WBC Count: 8.00 K/uL (06-05 @ 06:45)  WBC Count: 9.94 K/uL (06-02 @ 08:26)      MICROBIOLOGY:  RECENT CULTURES:  05-31 .Sputum Sputum Streptococcus anginosus   Numerous polymorphonuclear leukocytes per low power field  No Squamous epithelial cells per low power field  No organisms seen   Normal Respiratory Karissa present    05-30 .Tissue RIGHT UPPER LOBE WEDGE C/S XXXX   Rare polymorphonuclear leukocytes seen per low power field  No organisms seen per oil power field   No growth at 5 days.    05-30 .Body Fluid RIGHT UPPER LOBE XXXX   polymorphonuclear leukocytes seen  No organisms seen  by cytocentrifuge   Culture is being performed.                    Sodium:  Sodium, Serum: 138 mmol/L (06-05 @ 06:45)  Sodium, Serum: 138 mmol/L (06-02 @ 08:26)      0.92 mg/dL 06-05 @ 06:45  1.11 mg/dL 06-02 @ 08:26      Hemoglobin:  Hemoglobin: 12.9 g/dL (06-05 @ 06:45)  Hemoglobin: 13.9 g/dL (06-02 @ 08:26)      Platelets: Platelet Count - Automated: 255 K/uL (06-05 @ 06:45)  Platelet Count - Automated: 226 K/uL (06-02 @ 08:26)      LIVER FUNCTIONS - ( 05 Jun 2020 06:45 )  Alb: 2.9 g/dL / Pro: 6.8 gm/dL / ALK PHOS: 67 U/L / ALT: 21 U/L / AST: 19 U/L / GGT: x                 RADIOLOGY & ADDITIONAL STUDIES:

## 2020-06-05 NOTE — PROGRESS NOTE BEHAVIORAL HEALTH - RISK ASSESSMENT
Chronic risk factors: single, male gender, undomiciled, suspected used heavier alcohol in the past; hx of suicide attempts; hx of mood sxs. Protective factors: age < 65 yrs; medication and treatment compliant; denies hx of aggression/violence; denies legal issues; motivated for help; articulate; has family support (mother still alive; has adult child); access to psychiatric health services; denies access to guns. No acute risk factors identified

## 2020-06-05 NOTE — DISCHARGE NOTE PROVIDER - CARE PROVIDERS DIRECT ADDRESSES
,DirectAddress_Unknown,DirectAddress_Unknown,[email or web address],DirectAddress_Unknown

## 2020-06-05 NOTE — DISCHARGE NOTE NURSING/CASE MANAGEMENT/SOCIAL WORK - PATIENT PORTAL LINK FT
You can access the FollowMyHealth Patient Portal offered by [facility] by registering at the following website: [email or web address]. By joining [facility]’s FollowMyHealth portal, you will also be able to view your health information using other applications (apps) compatible with our system.

## 2020-06-05 NOTE — PROGRESS NOTE BEHAVIORAL HEALTH - NSBHCHARTREVIEWVS_PSY_A_CORE FT
T(C): 36.7 (06-05-20 @ 10:54), Max: 37.1 (06-04-20 @ 17:00)  HR: 94 (06-05-20 @ 10:54) (78 - 95)  BP: 133/98 (06-05-20 @ 10:54) (103/66 - 142/83)  RR: 17 (06-05-20 @ 10:54) (17 - 18)  SpO2: 97% (06-05-20 @ 10:54) (95% - 100%)

## 2020-06-05 NOTE — ED ADULT NURSE NOTE - NSIMPLEMENTINTERV_GEN_ALL_ED
Implemented All Universal Safety Interventions:  [place] to call system. Call bell, personal items and telephone within reach. Instruct patient to call for assistance. Room bathroom lighting operational. Non-slip footwear when patient is off stretcher. Physically safe environment: no spills, clutter or unnecessary equipment. Stretcher in lowest position, wheels locked, appropriate side rails in place.

## 2020-06-05 NOTE — ED PROVIDER NOTE - PROGRESS NOTE DETAILS
pt. stable and comfortable, labs unremarkable thus far, will admit for disposition, pt. is homeless and cannot find shelter over the weekend, also needs pain management for R chest wall pain post-op

## 2020-06-05 NOTE — ED PROVIDER NOTE - CARE PLAN
Principal Discharge DX:	Chest wall pain Principal Discharge DX:	Chest wall pain  Secondary Diagnosis:	Homelessness

## 2020-06-05 NOTE — PROGRESS NOTE ADULT - ASSESSMENT
cont rx cont rx    DAVE WILLIAM  [id number]  1970 DOA 2020 DR LYDIA FRANCISCO           REVIEW OF SYMPTOMS      Able to give ROS  Yes     RELIABLE No   CONSTITUTIONAL Weakness Yes  Chills No Vision changes No  ENDOCRINE No unexplained hair loss No heat or cold intolerance    ALLERGY No hives  Sore throat No   RESP Coughing blood no  Shortness of breath YES   NEURO No Headache  Confusion Pain neck No   CARDIAC No Chest pain No Palpitations   GI No Pain abdomen NO   Vomiting NO     PHYSICAL EXAM    HEENT Unremarkable PERRLA atraumatic   RESP Fair air entry EXP prolonged    Harsh breath sound Resp distres mild   CARDIAC S1 S2 No S3     NO JVD    ABDOMEN SOFT BS PRESENT NOT DISTENDED No hepatosplenomegaly PEDAL EDEMA present No calf tenderness  NO rash   GENERAL Not TOXIC looking    PROBLEMS  CAVITY RUL       OXYGENATION      -2020 ra 96% - ra 97%    VITALS/LABS   2020 afeb 94 130/90     PT DATA/BEST PRACTICE  ALLERGY       adhesives               WT        64 (2020)                             BMI         20 (2020)                    CrCl                                  ADVANCED DIRECTIVE     HEAD OF BED ELEVATION Yes      INFECTION PPLX     DVT PROPHYLAXIS             Lvnx 40 ()    MCCANN PROPHYLAXIS    protonix 40 ()         DYSPHAGIA EVAL     DIET       reg ()                                                                   IV F    PATIENT SUMMARY   49 m PMH Crohns COPD R lung mass Barretts admitted 2020 with chest pressure and SOB x 1 w Also co LLQ pain x 5 d     ER vitals 145/114 92 afeb     home meds spiriva midodrine 10.3 nicotine 14 pentasa flomax .4                                    Open lung bx vats done  Dr Marley  Pt tr out of ICU 2020       PATIENT DATA/ASSESSMENT/RECOMMENDATIONS     CAVITY RUL RO TB RO CA  49 m who has liven in homeless shelter in past and has trevelled to [place] in past has rul cavitary lesion at least since 2020   His HIV test was negative    His cavitary rul lesion  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    ct showed persistent 3.8 x 1.7 cm rul spiculated mass with cavity    afb smear N-nicole BUT [employer]    QFT N-nicole   ANCA PANCA N-NICOLE () AANCA INDET ()   2020 ace and ds dna were N-nicole   Being smoker he is also at risk for lung ca   ID eval dated  noted   ID recommends to NOT start anti TB Rx based on available information   Patient had VATS wedge  and HPE shopwed again NECROTIZING GRANULOMA   2020 ID recommends to hold off airborne isolation as  AFB growth is too rapid to be M TB   dna probe showed BRETT   HPE again showed necritizing granulomatous inflammation  and biopsy culture  are pending  Given that pt has brett doubt M Tb However bx culktures have ryan be followed up Pt cleared for dc from pulm standpoint   ID to decide whether or not to start Rx fopr ntbm      TIME SPENT Over 25 minutes aggregate care time spent on encounter; activities included   direct pt care, counseling and/or coordinating care reviewing notes, lab data/ imaging , discussion with multidisciplinary team/ pt /family. Risks, benefits, alternatives  discussed in detail.    DAVE THAYER LIJ  24 [phone]  1970 DOA 2020 DR LYDIA FRANCISCO

## 2020-06-05 NOTE — PROGRESS NOTE BEHAVIORAL HEALTH - SUMMARY
48 yo male at psychiatric baseline; no acute symptoms. Suspecting he was using alcohol heavier than reported

## 2020-06-05 NOTE — DISCHARGE NOTE PROVIDER - CARE PROVIDER_API CALL
Jovi Russ)  Internal Medicine  [address], [address]  [place] [address]  Phone: [phone]  Fax: [phone]  Follow Up Time:     Valerio Marley  SURGERY  [address]  [place] [address]  Phone: [phone]  Fax: [phone]  Follow Up Time:     Matias Baires  CRITICAL CARE MEDICINE  [facility] [place] [address]  Phone: [phone]  Fax: [phone]  Follow Up Time:     infectious disease,   [facility] upon discharge  [address], [place], [place], [address] [phone]  Phone: (   )    -  Fax: (   )    -  Follow Up Time:

## 2020-06-05 NOTE — DISCHARGE NOTE PROVIDER - NSDCCPCAREPLAN_GEN_ALL_CORE_FT
PRINCIPAL DISCHARGE DIAGNOSIS  Diagnosis: Crohn's disease of small intestine with other complication  Assessment and Plan of Treatment: follow up wt gi      SECONDARY DISCHARGE DIAGNOSES  Diagnosis: Dyspnea  Assessment and Plan of Treatment: follow up with thoracic/pulm and infectious disease clinic    Diagnosis: Abdominal pain  Assessment and Plan of Treatment:

## 2020-06-05 NOTE — PROGRESS NOTE ADULT - REASON FOR ADMISSION
abdominal pain, dyspnea

## 2020-06-05 NOTE — DISCHARGE NOTE PROVIDER - PROVIDER TOKENS
PROVIDER:[TOKEN:[[id number]]],PROVIDER:[TOKEN:[[id number]]],PROVIDER:[TOKEN:[[id number]]],FREE:[LAST:[infectious disease],PHONE:[(   )    -],FAX:[(   )    -],ADDRESS:[[facility] upon discharge  [address], [place], [address] [phone]]]

## 2020-06-06 DIAGNOSIS — K50.919 CROHN'S DISEASE, UNSPECIFIED, WITH UNSPECIFIED COMPLICATIONS: ICD-10-CM

## 2020-06-06 DIAGNOSIS — K21.9 GASTRO-ESOPHAGEAL REFLUX DISEASE WITHOUT ESOPHAGITIS: ICD-10-CM

## 2020-06-06 DIAGNOSIS — F32.9 MAJOR DEPRESSIVE DISORDER, SINGLE EPISODE, UNSPECIFIED: ICD-10-CM

## 2020-06-06 DIAGNOSIS — A31.0 PULMONARY MYCOBACTERIAL INFECTION: ICD-10-CM

## 2020-06-06 DIAGNOSIS — Z72.0 TOBACCO USE: ICD-10-CM

## 2020-06-06 DIAGNOSIS — K58.9 IRRITABLE BOWEL SYNDROME WITHOUT DIARRHEA: ICD-10-CM

## 2020-06-06 DIAGNOSIS — Z65.9 PROBLEM RELATED TO UNSPECIFIED PSYCHOSOCIAL CIRCUMSTANCES: ICD-10-CM

## 2020-06-06 DIAGNOSIS — N40.0 BENIGN PROSTATIC HYPERPLASIA WITHOUT LOWER URINARY TRACT SYMPTOMS: ICD-10-CM

## 2020-06-06 DIAGNOSIS — Z29.9 ENCOUNTER FOR PROPHYLACTIC MEASURES, UNSPECIFIED: ICD-10-CM

## 2020-06-06 LAB — SARS-COV-2 RNA SPEC QL NAA+PROBE: SIGNIFICANT CHANGE UP

## 2020-06-06 PROCEDURE — 12345: CPT | Mod: NC

## 2020-06-06 RX ORDER — OXYCODONE HYDROCHLORIDE 5 MG/1
5 TABLET ORAL EVERY 8 HOURS
Refills: 0 | Status: DISCONTINUED | OUTPATIENT
Start: 2020-06-06 | End: 2020-06-06

## 2020-06-06 RX ORDER — NICOTINE POLACRILEX 2 MG
1 GUM BUCCAL DAILY
Refills: 0 | Status: DISCONTINUED | OUTPATIENT
Start: 2020-06-06 | End: 2020-06-09

## 2020-06-06 RX ORDER — ALPRAZOLAM 0.25 MG
0.5 TABLET ORAL AT BEDTIME
Refills: 0 | Status: DISCONTINUED | OUTPATIENT
Start: 2020-06-06 | End: 2020-06-09

## 2020-06-06 RX ORDER — HEPARIN SODIUM 5000 [USP'U]/ML
5000 INJECTION INTRAVENOUS; SUBCUTANEOUS EVERY 12 HOURS
Refills: 0 | Status: DISCONTINUED | OUTPATIENT
Start: 2020-06-06 | End: 2020-06-09

## 2020-06-06 RX ORDER — TAMSULOSIN HYDROCHLORIDE 0.4 MG/1
0.8 CAPSULE ORAL AT BEDTIME
Refills: 0 | Status: DISCONTINUED | OUTPATIENT
Start: 2020-06-06 | End: 2020-06-07

## 2020-06-06 RX ORDER — MESALAMINE 400 MG
1000 TABLET, DELAYED RELEASE (ENTERIC COATED) ORAL
Refills: 0 | Status: DISCONTINUED | OUTPATIENT
Start: 2020-06-06 | End: 2020-06-08

## 2020-06-06 RX ORDER — PANTOPRAZOLE SODIUM 20 MG/1
40 TABLET, DELAYED RELEASE ORAL
Refills: 0 | Status: DISCONTINUED | OUTPATIENT
Start: 2020-06-06 | End: 2020-06-09

## 2020-06-06 RX ORDER — LACTULOSE 10 G/15ML
20 SOLUTION ORAL
Refills: 0 | Status: DISCONTINUED | OUTPATIENT
Start: 2020-06-06 | End: 2020-06-09

## 2020-06-06 RX ORDER — ONDANSETRON 8 MG/1
4 TABLET, FILM COATED ORAL EVERY 12 HOURS
Refills: 0 | Status: DISCONTINUED | OUTPATIENT
Start: 2020-06-06 | End: 2020-06-09

## 2020-06-06 RX ORDER — OXYCODONE HYDROCHLORIDE 5 MG/1
5 TABLET ORAL EVERY 4 HOURS
Refills: 0 | Status: DISCONTINUED | OUTPATIENT
Start: 2020-06-06 | End: 2020-06-09

## 2020-06-06 RX ADMIN — Medication 1000 MILLIGRAM(S): at 23:14

## 2020-06-06 RX ADMIN — Medication 15 MILLIGRAM(S): at 20:48

## 2020-06-06 RX ADMIN — Medication 15 MILLIGRAM(S): at 05:43

## 2020-06-06 RX ADMIN — Medication 1000 MILLIGRAM(S): at 16:58

## 2020-06-06 RX ADMIN — Medication 15 MILLIGRAM(S): at 14:30

## 2020-06-06 RX ADMIN — Medication 0.5 MILLIGRAM(S): at 20:47

## 2020-06-06 RX ADMIN — OXYCODONE HYDROCHLORIDE 5 MILLIGRAM(S): 5 TABLET ORAL at 02:57

## 2020-06-06 RX ADMIN — OXYCODONE HYDROCHLORIDE 5 MILLIGRAM(S): 5 TABLET ORAL at 20:56

## 2020-06-06 RX ADMIN — Medication 1 MILLIGRAM(S): at 02:49

## 2020-06-06 RX ADMIN — PANTOPRAZOLE SODIUM 40 MILLIGRAM(S): 20 TABLET, DELAYED RELEASE ORAL at 07:58

## 2020-06-06 RX ADMIN — Medication 1000 MILLIGRAM(S): at 11:35

## 2020-06-06 RX ADMIN — LACTULOSE 20 GRAM(S): 10 SOLUTION ORAL at 05:43

## 2020-06-06 RX ADMIN — OXYCODONE HYDROCHLORIDE 5 MILLIGRAM(S): 5 TABLET ORAL at 11:35

## 2020-06-06 RX ADMIN — Medication 1 MILLIGRAM(S): at 08:58

## 2020-06-06 RX ADMIN — Medication 1000 MILLIGRAM(S): at 05:43

## 2020-06-06 RX ADMIN — TAMSULOSIN HYDROCHLORIDE 0.8 MILLIGRAM(S): 0.4 CAPSULE ORAL at 20:48

## 2020-06-06 RX ADMIN — Medication 10 MILLIGRAM(S): at 16:58

## 2020-06-06 RX ADMIN — Medication 10 MILLIGRAM(S): at 08:42

## 2020-06-06 NOTE — CHART NOTE - NSCHARTNOTEFT_GEN_A_CORE
49M with a PMH of  irritable bowel syndrome, ?Crohns Disease on Pentasa, spiculated lung mass s/p VATS (last week) with RUL wedge resection, anxiety/depression who presents to the ED for social issues.  Patient was discharged from [facility] earlier today.  Patient is homeless and was instructed to go to the Department of  to get set up with a hotel room however when he arrived he was told that the paperwork from JASSON at [facility] was not received. Please see H&P for more details. Pt was seen and examined at the bedside. Will follow up with SW.

## 2020-06-06 NOTE — H&P ADULT - NSICDXPASTMEDICALHX_GEN_ALL_CORE_FT
PAST MEDICAL HISTORY:  Alfaro's esophagus without dysplasia     COPD (chronic obstructive pulmonary disease)     Crohn's disease with complication, unspecified gastrointestinal tract location     Depression, unspecified depression type     Emphysema lung     Hypotension     Mass of lung calvatory mass R upper lobe

## 2020-06-06 NOTE — H&P ADULT - HISTORY OF PRESENT ILLNESS
Lung mass with cavitation   QuantiFeron Negative on 1/2020 and again Neg on 5/24/2020  s/p Right VATS, RUL wedge resection/open bx.  Surgery about a week ago    48 yo M with R sided chest wall pain, s/p recent VATS and RUL resection for mass/abscess of lung.  Pt. was discharged today to department of  ([facility]) in [place] because he is homeless and has multiple medical problems.  When he arrived, they claimed paperwork was either not done correctly or not received and they couldn't process him, so he was told to leave.  Pt. came back to [facility] thereafter.  Pt. complains of R sided cp at surgical wound, but denies drainage, fever, sob.  He feels well otherwise.   	ROS: negative for fever, cough, headache, shortness of breath, abd pain, nausea, vomiting, diarrhea, rash, paresthesia, and weakness--all other systems reviewed are negative.   PMH: Barretts Disease, Crohn's on Pentasa IBS, spiculated lung mass s/p RUL wedge resection (VATS) (prolonged hospital stay), anxiety, depression, prior suicide attempts, ; Meds: See EMR for list; SH: Denies smoking/drinking/drug use    GI worked up for crohns and IBD was ruled out with EGD/colonoscopy however patient adamant about Crohns DZ.  Found to have MIGUEL - ID following for decision to treat, following RUL wedge cultures  Admitted on 5/23    alparaz, buspirone, ativan as needed  pentasa  dicyclomine lactulosis  flomax  ellipita  nicotine patch  ppi  oxycodone Patient is a 49M with a PMH of  irritable bowel syndrome, ?Crohns Disease on Pentasa, spiculated lung mass s/p VATS (last week) with RUL wedge resection, anxiety/depression who presents to the ED for social issues.  Patient was discharged from [facility] earlier today.  Patient is homeless and was instructed to goto the Department of  to get set up with a hotel room however when he arrived he was told that the paperwork from  at [facility] was not received so he returned to the ED.  Patient reports chronic complaints of abdominal pain and increased anxiety but has no acute complaints.  Found to have MIGUEL infection, wedge cultures being followed by ID.  Recommends no airborne isolation or no current treatment.  Labs and vitals stable.  Will admit to med/surg for SW eval.

## 2020-06-06 NOTE — H&P ADULT - ASSESSMENT
Patient is a 49M with a PMH of  irritable bowel syndrome, ?Crohns Disease on Pentasa, spiculated lung mass s/p VATS (last week) with RUL wedge resection, anxiety/depression who presents to the ED for social issues.  Patient was discharged from [facility] earlier today.  Patient is homeless and was instructed to goto the Department of  to get set up with a hotel room however when he arrived he was told that the paperwork from  at [facility] was not received so he returned to the ED.  Patient reports chronic complaints of abdominal pain and increased anxiety but has no acute complaints.  Found to have MIGUEL infection, wedge cultures being followed by ID.  Recommends no airborne isolation or no current treatment.  Labs and vitals stable.  Will admit to med/surg for SW eval.     IMPROVE VTE Individual Risk Assessment          RISK                                                          Points  [  ] Previous VTE                                                3  [  ] Thrombophilia                                             2  [  ] Lower limb paralysis                                    2        (unable to hold up >15 seconds)    [  ] Current Cancer                                             2         (within 6 months)  [  ] Immobilization > 24 hrs                              1  [  ] ICU/CCU stay > 24 hours                            1  [  ] Age > 60                                                    1    IMPROVE VTE Score - 1

## 2020-06-06 NOTE — H&P ADULT - NSHPREVIEWOFSYSTEMS_GEN_ALL_CORE
Constitutional: no fever, chills, night sweats  Ears: no hearing changes or ear pain,   Nose: no nasal congestion, sinus pain, or rhinorrhea  Cardio: no chest pain, orthopnea, edema, or palpitations  Resp: no dyspnea, cough, wheezing  GI: no nausea, vomiting, diarrhea, constipation, hematochezia, or melena  : no dysuria, urinary frequency, hematuria  MSK: no back pain, neck pain  Skin: no rash, pruritis   Neuro: no weakness, dizziness, lightheadedness, syncope   Heme/Lymph: no bruising or bleeding

## 2020-06-06 NOTE — H&P ADULT - NSHPLABSRESULTS_GEN_ALL_CORE
Recent Vitals  T(C): 36.7 (06-06-20 @ 00:22), Max: 36.9 (06-05-20 @ 05:55)  HR: 91 (06-06-20 @ 00:22) (91 - 114)  BP: 136/77 (06-06-20 @ 00:22) (131/94 - 142/83)  RR: 18 (06-06-20 @ 00:22) (17 - 20)  SpO2: 98% (06-06-20 @ 00:22) (95% - 98%)                        14.0   9.61  )-----------( 297      ( 05 Jun 2020 21:36 )             41.5     06-05    140  |  107  |  11  ----------------------------<  126<H>  3.6   |  26  |  0.82    Ca    9.0      05 Jun 2020 21:36    TPro  7.7  /  Alb  3.7  /  TBili  0.4  /  DBili  x   /  AST  24  /  ALT  26  /  AlkPhos  85  06-05    PT/INR - ( 05 Jun 2020 21:36 )   PT: 12.2 sec;   INR: 1.09 ratio         PTT - ( 05 Jun 2020 21:36 )  PTT:30.8 sec  LIVER FUNCTIONS - ( 05 Jun 2020 21:36 )  Alb: 3.7 g/dL / Pro: 7.7 gm/dL / ALK PHOS: 85 U/L / ALT: 26 U/L / AST: 24 U/L / GGT: x               Home Medications:  busPIRone 15 mg oral tablet: 1 tab(s) orally every 8 hours (05 Jun 2020 11:44)  Flomax 0.4 mg oral capsule: 2 cap(s) orally once a day (05 Jun 2020 11:44)  Incruse Ellipta 62.5 mcg/inh inhalation powder:  (05 Jun 2020 11:44)

## 2020-06-06 NOTE — H&P ADULT - PROBLEM SELECTOR PLAN 2
ID following - Dr Henry, will reconsult ID was following prior to discharge - Dr Henry  consider reconsult if pulm wedge cultures return

## 2020-06-06 NOTE — H&P ADULT - NSHPPHYSICALEXAM_GEN_ALL_CORE
Physical exam:  General: patient in no acute distress, resting comfortably  Head:  Atraumatic, Normocephalic  Eyes: EOMI, PERRLA, clear sclera  Neck: Supple, thyroid nontender, non enlarged  Cardio: S1/S2 +ve, regular rate and rhythm, no M/G/R  Resp: clear to ausculation bilaterally, no rales or wheezes  GI: abdomen soft, nontender, non distended, no guarding, BS +ve x 4  Ext: no significant pedal edema  Neuro: CN 2-12 intact, no significant motor or sensory deficits.  Skin: No rashes or lesions

## 2020-06-07 PROCEDURE — 99222 1ST HOSP IP/OBS MODERATE 55: CPT

## 2020-06-07 PROCEDURE — 99232 SBSQ HOSP IP/OBS MODERATE 35: CPT

## 2020-06-07 RX ORDER — TAMSULOSIN HYDROCHLORIDE 0.4 MG/1
0.4 CAPSULE ORAL DAILY
Refills: 0 | Status: DISCONTINUED | OUTPATIENT
Start: 2020-06-07 | End: 2020-06-09

## 2020-06-07 RX ORDER — POLYETHYLENE GLYCOL 3350 17 G/17G
17 POWDER, FOR SOLUTION ORAL
Refills: 0 | Status: DISCONTINUED | OUTPATIENT
Start: 2020-06-07 | End: 2020-06-09

## 2020-06-07 RX ADMIN — Medication 15 MILLIGRAM(S): at 21:28

## 2020-06-07 RX ADMIN — Medication 10 MILLIGRAM(S): at 16:36

## 2020-06-07 RX ADMIN — OXYCODONE HYDROCHLORIDE 5 MILLIGRAM(S): 5 TABLET ORAL at 22:14

## 2020-06-07 RX ADMIN — Medication 15 MILLIGRAM(S): at 04:29

## 2020-06-07 RX ADMIN — Medication 1000 MILLIGRAM(S): at 18:00

## 2020-06-07 RX ADMIN — Medication 1 MILLIGRAM(S): at 17:05

## 2020-06-07 RX ADMIN — TAMSULOSIN HYDROCHLORIDE 0.4 MILLIGRAM(S): 0.4 CAPSULE ORAL at 21:29

## 2020-06-07 RX ADMIN — Medication 10 MILLIGRAM(S): at 07:45

## 2020-06-07 RX ADMIN — Medication 1000 MILLIGRAM(S): at 04:29

## 2020-06-07 RX ADMIN — PANTOPRAZOLE SODIUM 40 MILLIGRAM(S): 20 TABLET, DELAYED RELEASE ORAL at 07:45

## 2020-06-07 RX ADMIN — OXYCODONE HYDROCHLORIDE 5 MILLIGRAM(S): 5 TABLET ORAL at 08:26

## 2020-06-07 RX ADMIN — Medication 1000 MILLIGRAM(S): at 23:54

## 2020-06-07 RX ADMIN — OXYCODONE HYDROCHLORIDE 5 MILLIGRAM(S): 5 TABLET ORAL at 17:52

## 2020-06-07 RX ADMIN — Medication 0.5 MILLIGRAM(S): at 21:28

## 2020-06-07 RX ADMIN — Medication 1000 MILLIGRAM(S): at 12:45

## 2020-06-07 RX ADMIN — Medication 15 MILLIGRAM(S): at 13:09

## 2020-06-07 NOTE — PROGRESS NOTE ADULT - ASSESSMENT
Patient is a 49M with a PMH of  irritable bowel syndrome, ?Crohns Disease on Pentasa, spiculated lung mass s/p VATS (last week) with RUL wedge resection, anxiety/depression who presents to the ED for social issues.  Patient was discharged from [facility] earlier today.  Patient is homeless and was instructed to goto the Department of  to get set up with a hotel room however when he arrived he was told that the paperwork from  at [facility] was not received so he returned to the ED.  Patient reports chronic complaints of abdominal pain and increased anxiety but has no acute complaints.  Found to have MIGUEL infection, wedge cultures being followed by ID.  Recommends no airborne isolation or no current treatment.  Labs and vitals stable.  Will admit to med/surg for SW eval.     IMPROVE VTE Individual Risk Assessment          RISK                                                          Points  [  ] Previous VTE                                                3  [  ] Thrombophilia                                             2  [  ] Lower limb paralysis                                    2        (unable to hold up >15 seconds)    [  ] Current Cancer                                             2         (within 6 months)  [  ] Immobilization > 24 hrs                              1  [  ] ICU/CCU stay > 24 hours                            1  [  ] Age > 60                                                    1    IMPROVE VTE Score - 1

## 2020-06-07 NOTE — PROGRESS NOTE ADULT - SUBJECTIVE AND OBJECTIVE BOX
Patient is a 49y old  Male who presents with a chief complaint of social (06 Jun 2020 00:43)      INTERVAL HPI/ OVERNIGHT EVENTS: Pt was seen and examined at bedside today, No significant overnight events, pt denies any new complaints.     MEDICATIONS  (STANDING):  ALPRAZolam 0.5 milliGRAM(s) Oral at bedtime  busPIRone 15 milliGRAM(s) Oral every 8 hours  dicyclomine 10 milliGRAM(s) Oral two times a day before meals  heparin   Injectable 5000 Unit(s) SubCutaneous every 12 hours  lactulose Syrup 20 Gram(s) Oral two times a day  mesalamine ER Capsule 1000 milliGRAM(s) Oral four times a day  nicotine -  14 mG/24Hr(s) Patch 1 patch Transdermal daily  pantoprazole    Tablet 40 milliGRAM(s) Oral before breakfast  tamsulosin 0.8 milliGRAM(s) Oral at bedtime    MEDICATIONS  (PRN):  LORazepam     Tablet 1 milliGRAM(s) Oral every 6 hours PRN Anxiety  ondansetron    Tablet 4 milliGRAM(s) Oral every 12 hours PRN Nausea and/or Vomiting  oxyCODONE    IR 5 milliGRAM(s) Oral every 4 hours PRN Moderate Pain (4 - 6)      Allergies    adhesives (Rash)  No Known Drug Allergies    Intolerances        REVIEW OF SYSTEMS:    Unable to examine due to [ ] Encephalopathy [ ] Advanced Dementia [ ] Expressive Aphasia [ ] Non-verbal patient    CONSTITUTIONAL: No fever, NO generalized weakness/Fatigue, No weight loss  EYES: No eye pain, visual disturbances, or discharge  ENMT:  No difficulty hearing, tinnitus, vertigo; No sinus or throat pain  NECK: No pain or stiffness  RESPIRATORY: No shortness of breath,  cough, wheezing, sputum or hemoptysis   CARDIOVASCULAR: No chest pain, palpitations, or leg swelling  GASTROINTESTINAL: No abdominal pain. No nausea, vomiting, diarrhea or constipation. No melena or hematochezia.  GENITOURINARY: No dysuria, frequency, hematuria, or incontinence  NEUROLOGICAL: No headaches, Dizziness, memory loss, loss of strength, numbness, or tremors  SKIN: No itching, burning, rashes, or lesions   MUSCULOSKELETAL: No joint pain or swelling; No muscle, back, or extremity pain  PSYCHIATRIC: No depression, anxiety, mood swings, or difficulty sleeping  HEME/LYMPH: No easy bruising, or bleeding gums      Vital Signs Last 24 Hrs  T(C): 36.9 (07 Jun 2020 11:34), Max: 36.9 (07 Jun 2020 11:34)  T(F): 98.5 (07 Jun 2020 11:34), Max: 98.5 (07 Jun 2020 11:34)  HR: 91 (07 Jun 2020 11:34) (78 - 99)  BP: 128/92 (07 Jun 2020 11:34) (101/60 - 128/92)  BP(mean): --  RR: 16 (07 Jun 2020 11:34) (16 - 17)  SpO2: 95% (07 Jun 2020 11:34) (95% - 98%)    PHYSICAL EXAM:  GENERAL: NAD, well-developed, well-groomed  HEAD:  Atraumatic, Normocephalic  EYES: conjunctiva and sclera clear  ENMT: Moist mucous membranes  NECK: Supple, No JVD, Normal thyroid  CHEST/LUNG: Clear to Auscultation bilaterally; No rales, rhonchi, wheezing, or rubs  HEART: Regular rate and rhythm; No murmurs, rubs, or gallops  ABDOMEN: Soft, Nontender, Nondistended; Bowel sounds present  EXTREMITIES:  2+ Peripheral Pulses, No clubbing, cyanosis, or edema  SKIN: No rashes or lesions  NERVOUS SYSTEM:  Alert & Oriented X3, Good concentration; Motor Strength 5/5 B/L upper and lower extremities    LABS:                        14.0   9.61  )-----------( 297      ( 05 Jun 2020 21:36 )             41.5     06-05    140  |  107  |  11  ----------------------------<  126<H>  3.6   |  26  |  0.82    Ca    9.0      05 Jun 2020 21:36    TPro  7.7  /  Alb  3.7  /  TBili  0.4  /  DBili  x   /  AST  24  /  ALT  26  /  AlkPhos  85  06-05    PT/INR - ( 05 Jun 2020 21:36 )   PT: 12.2 sec;   INR: 1.09 ratio         PTT - ( 05 Jun 2020 21:36 )  PTT:30.8 sec    CAPILLARY BLOOD GLUCOSE            Culture - Sputum (collected 05-31-20)  Source: .Sputum Sputum  Gram Stain (06-02-20):    Numerous polymorphonuclear leukocytes per low power field    No Squamous epithelial cells per low power field    No organisms seen  Final Report (06-02-20):    Normal Respiratory Karissa present  Organism: Streptococcus anginosus (06-02-20)  Organism: Streptococcus anginosus (06-02-20)    Sensitivities:      Method Type: VLADIMIR        RADIOLOGY & ADDITIONAL TESTS:          Imaging Personally Reviewed:  [ ] YES  [ ] NO    Consultant(s) Notes Reviewed:  [ ] YES  [ ] NO    Care Discussed with Consultants/Other Providers [x ] YES  [ ] NO

## 2020-06-08 DIAGNOSIS — M31.30 WEGENER'S GRANULOMATOSIS WITHOUT RENAL INVOLVEMENT: ICD-10-CM

## 2020-06-08 DIAGNOSIS — K50.00 CROHN'S DISEASE OF SMALL INTESTINE WITHOUT COMPLICATIONS: ICD-10-CM

## 2020-06-08 DIAGNOSIS — Z11.59 ENCOUNTER FOR SCREENING FOR OTHER VIRAL DISEASES: ICD-10-CM

## 2020-06-08 DIAGNOSIS — N40.0 BENIGN PROSTATIC HYPERPLASIA WITHOUT LOWER URINARY TRACT SYMPTOMS: ICD-10-CM

## 2020-06-08 DIAGNOSIS — K58.1 IRRITABLE BOWEL SYNDROME WITH CONSTIPATION: ICD-10-CM

## 2020-06-08 DIAGNOSIS — Z59.0 HOMELESSNESS: ICD-10-CM

## 2020-06-08 DIAGNOSIS — R09.02 HYPOXEMIA: ICD-10-CM

## 2020-06-08 DIAGNOSIS — J95.89 OTHER POSTPROCEDURAL COMPLICATIONS AND DISORDERS OF RESPIRATORY SYSTEM, NOT ELSEWHERE CLASSIFIED: ICD-10-CM

## 2020-06-08 DIAGNOSIS — I95.1 ORTHOSTATIC HYPOTENSION: ICD-10-CM

## 2020-06-08 DIAGNOSIS — R91.8 OTHER NONSPECIFIC ABNORMAL FINDING OF LUNG FIELD: ICD-10-CM

## 2020-06-08 DIAGNOSIS — J44.9 CHRONIC OBSTRUCTIVE PULMONARY DISEASE, UNSPECIFIED: ICD-10-CM

## 2020-06-08 DIAGNOSIS — Z80.0 FAMILY HISTORY OF MALIGNANT NEOPLASM OF DIGESTIVE ORGANS: ICD-10-CM

## 2020-06-08 DIAGNOSIS — F32.9 MAJOR DEPRESSIVE DISORDER, SINGLE EPISODE, UNSPECIFIED: ICD-10-CM

## 2020-06-08 DIAGNOSIS — F17.210 NICOTINE DEPENDENCE, CIGARETTES, UNCOMPLICATED: ICD-10-CM

## 2020-06-08 DIAGNOSIS — K22.70 BARRETT'S ESOPHAGUS WITHOUT DYSPLASIA: ICD-10-CM

## 2020-06-08 PROCEDURE — 99232 SBSQ HOSP IP/OBS MODERATE 35: CPT

## 2020-06-08 RX ORDER — MESALAMINE 400 MG
1000 TABLET, DELAYED RELEASE (ENTERIC COATED) ORAL
Refills: 0 | Status: DISCONTINUED | OUTPATIENT
Start: 2020-06-08 | End: 2020-06-09

## 2020-06-08 RX ADMIN — Medication 1 MILLIGRAM(S): at 12:44

## 2020-06-08 RX ADMIN — PANTOPRAZOLE SODIUM 40 MILLIGRAM(S): 20 TABLET, DELAYED RELEASE ORAL at 05:36

## 2020-06-08 RX ADMIN — Medication 1 MILLIGRAM(S): at 06:23

## 2020-06-08 RX ADMIN — LACTULOSE 20 GRAM(S): 10 SOLUTION ORAL at 05:36

## 2020-06-08 RX ADMIN — Medication 1000 MILLIGRAM(S): at 12:40

## 2020-06-08 RX ADMIN — OXYCODONE HYDROCHLORIDE 5 MILLIGRAM(S): 5 TABLET ORAL at 06:22

## 2020-06-08 RX ADMIN — TAMSULOSIN HYDROCHLORIDE 0.4 MILLIGRAM(S): 0.4 CAPSULE ORAL at 12:40

## 2020-06-08 RX ADMIN — Medication 15 MILLIGRAM(S): at 15:16

## 2020-06-08 RX ADMIN — Medication 15 MILLIGRAM(S): at 22:18

## 2020-06-08 RX ADMIN — Medication 0.5 MILLIGRAM(S): at 22:18

## 2020-06-08 RX ADMIN — Medication 10 MILLIGRAM(S): at 17:19

## 2020-06-08 RX ADMIN — Medication 10 MILLIGRAM(S): at 05:36

## 2020-06-08 RX ADMIN — POLYETHYLENE GLYCOL 3350 17 GRAM(S): 17 POWDER, FOR SOLUTION ORAL at 06:23

## 2020-06-08 RX ADMIN — POLYETHYLENE GLYCOL 3350 17 GRAM(S): 17 POWDER, FOR SOLUTION ORAL at 17:22

## 2020-06-08 RX ADMIN — HEPARIN SODIUM 5000 UNIT(S): 5000 INJECTION INTRAVENOUS; SUBCUTANEOUS at 05:36

## 2020-06-08 RX ADMIN — OXYCODONE HYDROCHLORIDE 5 MILLIGRAM(S): 5 TABLET ORAL at 15:16

## 2020-06-08 RX ADMIN — Medication 15 MILLIGRAM(S): at 05:35

## 2020-06-08 RX ADMIN — Medication 1000 MILLIGRAM(S): at 08:02

## 2020-06-08 RX ADMIN — Medication 1000 MILLIGRAM(S): at 17:19

## 2020-06-08 SDOH — ECONOMIC STABILITY - HOUSING INSECURITY: HOMELESSNESS: Z59.0

## 2020-06-08 NOTE — PROGRESS NOTE ADULT - SUBJECTIVE AND OBJECTIVE BOX
Patient is a 49y old  Male who presents with a chief complaint of social (07 Jun 2020 12:18)      INTERVAL HPI/ OVERNIGHT EVENTS: Pt was seen and examined at bedside today, No significant overnight events, pt denies any new complaints.      MEDICATIONS  (STANDING):  ALPRAZolam 0.5 milliGRAM(s) Oral at bedtime  busPIRone 15 milliGRAM(s) Oral every 8 hours  dicyclomine 10 milliGRAM(s) Oral two times a day before meals  heparin   Injectable 5000 Unit(s) SubCutaneous every 12 hours  lactulose Syrup 20 Gram(s) Oral two times a day  mesalamine ER Capsule 1000 milliGRAM(s) Oral two times a day  nicotine -  14 mG/24Hr(s) Patch 1 patch Transdermal daily  pantoprazole    Tablet 40 milliGRAM(s) Oral before breakfast  tamsulosin 0.4 milliGRAM(s) Oral daily    MEDICATIONS  (PRN):  aluminum hydroxide/magnesium hydroxide/simethicone Suspension 30 milliLiter(s) Oral every 6 hours PRN Dyspepsia  LORazepam     Tablet 1 milliGRAM(s) Oral every 6 hours PRN Anxiety  ondansetron    Tablet 4 milliGRAM(s) Oral every 12 hours PRN Nausea and/or Vomiting  oxyCODONE    IR 5 milliGRAM(s) Oral every 4 hours PRN Moderate Pain (4 - 6)  polyethylene glycol 3350 17 Gram(s) Oral two times a day PRN Constipation      Allergies    adhesives (Rash)  No Known Drug Allergies    Intolerances        REVIEW OF SYSTEMS:    Unable to examine due to [ ] Encephalopathy [ ] Advanced Dementia [ ] Expressive Aphasia [ ] Non-verbal patient    CONSTITUTIONAL: No fever, NO generalized weakness/Fatigue, No weight loss  EYES: No eye pain, visual disturbances, or discharge  ENMT:  No difficulty hearing, tinnitus, vertigo; No sinus or throat pain  NECK: No pain or stiffness  RESPIRATORY: No shortness of breath,  cough, wheezing, sputum or hemoptysis   CARDIOVASCULAR: No chest pain, palpitations, or leg swelling  GASTROINTESTINAL: No abdominal pain. No nausea, vomiting, diarrhea or constipation. No melena or hematochezia.  GENITOURINARY: No dysuria, frequency, hematuria, or incontinence  NEUROLOGICAL: No headaches, Dizziness, memory loss, loss of strength, numbness, or tremors  SKIN: No itching, burning, rashes, or lesions   MUSCULOSKELETAL: No joint pain or swelling; No muscle, back, or extremity pain  PSYCHIATRIC: No depression, anxiety, mood swings, or difficulty sleeping  HEME/LYMPH: No easy bruising, or bleeding gums      Vital Signs Last 24 Hrs  T(C): 36.8 (08 Jun 2020 06:11), Max: 36.8 (08 Jun 2020 06:11)  T(F): 98.2 (08 Jun 2020 06:11), Max: 98.2 (08 Jun 2020 06:11)  HR: 95 (08 Jun 2020 06:11) (74 - 95)  BP: 121/82 (08 Jun 2020 06:11) (95/62 - 121/82)  BP(mean): --  RR: 16 (08 Jun 2020 06:11) (16 - 18)  SpO2: 95% (08 Jun 2020 06:11) (95% - 97%)    PHYSICAL EXAM:  GENERAL: NAD, well-developed, well-groomed  HEAD:  Atraumatic, Normocephalic  EYES: conjunctiva and sclera clear  ENMT: Moist mucous membranes  NECK: Supple, No JVD, Normal thyroid  CHEST/LUNG: Clear to Auscultation bilaterally; No rales, rhonchi, wheezing, or rubs  HEART: Regular rate and rhythm; No murmurs, rubs, or gallops  ABDOMEN: Soft, Nontender, Nondistended; Bowel sounds present  EXTREMITIES:  2+ Peripheral Pulses, No clubbing, cyanosis, or edema  SKIN: No rashes or lesions  NERVOUS SYSTEM:  Alert & Oriented X3, Good concentration; Motor Strength 5/5 B/L upper and lower extremities    LABS:              CAPILLARY BLOOD GLUCOSE              RADIOLOGY & ADDITIONAL TESTS:          Imaging Personally Reviewed:  [ ] YES  [ ] NO    Consultant(s) Notes Reviewed:  [ ] YES  [ ] NO    Care Discussed with Consultants/Other Providers [x ] YES  [ ] NO

## 2020-06-08 NOTE — PROGRESS NOTE ADULT - ASSESSMENT
Patient is a 49M with a PMH of  irritable bowel syndrome, ?Crohns Disease on Pentasa, spiculated lung mass s/p VATS (last week) with RUL wedge resection, anxiety/depression who presents to the ED for social issues.  Patient was discharged from [facility] earlier today.  Patient is homeless and was instructed to goto the Department of  to get set up with a hotel room however when he arrived he was told that the paperwork from  at [facility] was not received so he returned to the ED.  Patient reports chronic complaints of abdominal pain and increased anxiety but has no acute complaints.  Found to have MIGUEL infection, wedge cultures being followed by ID.  Recommends no airborne isolation or no current treatment.  Labs and vitals stable.  Will admit to med/surg for SW eval.     IMPROVE VTE Individual Risk Assessment          RISK                                                          Points  [  ] Previous VTE                                                3  [  ] Thrombophilia                                             2  [  ] Lower limb paralysis                                    2        (unable to hold up >15 seconds)    [  ] Current Cancer                                             2         (within 6 months)  [  ] Immobilization > 24 hrs                              1  [  ] ICU/CCU stay > 24 hours                            1  [  ] Age > 60                                                    1    IMPROVE VTE Score - 1

## 2020-06-09 ENCOUNTER — TRANSCRIPTION ENCOUNTER (OUTPATIENT)
Age: 50
End: 2020-06-09

## 2020-06-09 VITALS — DIASTOLIC BLOOD PRESSURE: 65 MMHG | SYSTOLIC BLOOD PRESSURE: 97 MMHG | HEART RATE: 68 BPM

## 2020-06-09 PROCEDURE — 99239 HOSP IP/OBS DSCHRG MGMT >30: CPT

## 2020-06-09 RX ORDER — MIDODRINE HYDROCHLORIDE 2.5 MG/1
1 TABLET ORAL
Qty: 0 | Refills: 0 | DISCHARGE
Start: 2020-06-09

## 2020-06-09 RX ORDER — MIDODRINE HYDROCHLORIDE 2.5 MG/1
10 TABLET ORAL THREE TIMES A DAY
Refills: 0 | Status: DISCONTINUED | OUTPATIENT
Start: 2020-06-09 | End: 2020-06-09

## 2020-06-09 RX ADMIN — Medication 10 MILLIGRAM(S): at 08:33

## 2020-06-09 RX ADMIN — Medication 1000 MILLIGRAM(S): at 05:54

## 2020-06-09 RX ADMIN — POLYETHYLENE GLYCOL 3350 17 GRAM(S): 17 POWDER, FOR SOLUTION ORAL at 06:09

## 2020-06-09 RX ADMIN — TAMSULOSIN HYDROCHLORIDE 0.4 MILLIGRAM(S): 0.4 CAPSULE ORAL at 11:21

## 2020-06-09 RX ADMIN — OXYCODONE HYDROCHLORIDE 5 MILLIGRAM(S): 5 TABLET ORAL at 00:01

## 2020-06-09 RX ADMIN — PANTOPRAZOLE SODIUM 40 MILLIGRAM(S): 20 TABLET, DELAYED RELEASE ORAL at 08:33

## 2020-06-09 RX ADMIN — Medication 15 MILLIGRAM(S): at 05:54

## 2020-06-09 RX ADMIN — Medication 1 MILLIGRAM(S): at 09:12

## 2020-06-09 RX ADMIN — MIDODRINE HYDROCHLORIDE 10 MILLIGRAM(S): 2.5 TABLET ORAL at 11:20

## 2020-06-09 NOTE — DISCHARGE NOTE PROVIDER - CARE PROVIDER_API CALL
Ely Blue  INFECTIOUS DISEASE  [address], [address]  [place] [address]  Phone: [phone]  Fax: [phone]  Follow Up Time:     Valerio Marley  SURGERY  [address]  [place] [address]  Phone: [phone]  Fax: [phone]  Follow Up Time:     Matias Baires  CRITICAL CARE MEDICINE  [address] [place] [address]  Phone: [phone]  Fax: [phone]  Follow Up Time:     Jovi Russ)  Internal Medicine  [address], [address]  [place] [address]  Phone: [phone]  Fax: [phone]  Follow Up Time:

## 2020-06-09 NOTE — DISCHARGE NOTE PROVIDER - NSDCCPCAREPLAN_GEN_ALL_CORE_FT
PRINCIPAL DISCHARGE DIAGNOSIS  Diagnosis: Mycobacterium avium infection  Assessment and Plan of Treatment: No active infectious symptoms at this time, cont to monitor  Pt will follow up with infectious Disease Dr. Blue, Pulmonary Dr. Baires and Thoracic surgery Dr. Marley.      SECONDARY DISCHARGE DIAGNOSES  Diagnosis: Crohn's disease with complication, unspecified gastrointestinal tract location  Assessment and Plan of Treatment: ?Chron's Disease   continue with Pentasa   follow up with Gastroenterology Dr. Russ.    Diagnosis: Irritable bowel syndrome, unspecified type  Assessment and Plan of Treatment: continue with Laxatives prn   GERD: continue Omeprazole    Diagnosis: Depression, unspecified depression type  Assessment and Plan of Treatment: continue home medications.

## 2020-06-09 NOTE — DISCHARGE NOTE PROVIDER - PROVIDER TOKENS
PROVIDER:[TOKEN:[[id number]]],PROVIDER:[TOKEN:[[id number]]],PROVIDER:[TOKEN:[[id number]]],PROVIDER:[TOKEN:[[id number]]]

## 2020-06-09 NOTE — DISCHARGE NOTE PROVIDER - NSDCMRMEDTOKEN_GEN_ALL_CORE_FT
ALPRAZolam 0.5 mg oral tablet: 1 tab(s) orally once a day (at bedtime) MDD:1tab  busPIRone 15 mg oral tablet: 1 tab(s) orally every 8 hours  dicyclomine 10 mg oral capsule: 1 cap(s) orally 2 times a day (before meals)  Flomax 0.4 mg oral capsule: 2 cap(s) orally once a day  Incruse Ellipta 62.5 mcg/inh inhalation powder:   lactulose 10 g/15 mL oral syrup: 30 milliliter(s) orally 2 times a day  LORazepam 1 mg oral tablet: 1 tab(s) orally every 6 hours, As needed, Anxiety MDD:4tab  midodrine 10 mg oral tablet: 1 tab(s) orally 3 times a day  nicotine 14 mg/24 hr transdermal film, extended release: 1 patch transdermal once a day   omeprazole 40 mg oral delayed release capsule: 1 cap(s) orally once a day  ondansetron 4 mg oral tablet: 1 tab(s) orally 2 times a day, As Needed   oxyCODONE 5 mg oral tablet: 1 tab(s) orally every 6 hours, As Needed -Moderate Pain (4 - 6) MDD:4tab  Pentasa 250 mg oral capsule, extended release: 4 cap(s) orally 4 times a day

## 2020-06-09 NOTE — DISCHARGE NOTE PROVIDER - HOSPITAL COURSE
49M with a PMH of  irritable bowel syndrome, ?Crohns Disease on Pentasa, spiculated lung mass s/p VATS (last week) with RUL wedge resection, anxiety/depression who presents to the ED for social issues.  Patient was discharged from [facility] earlier today.  Patient is homeless and was instructed to go to the Department of  to get set up with a hotel room however when he arrived he was told that the paperwork from  at [facility] was not received so he returned to the ED.  Patient reports chronic complaints of abdominal pain and increased anxiety but has no acute complaints.  Found to have MIGUEL infection, wedge cultures being followed by ID.        The patient was admitted to the hospital, no acute medical issues during this admission. The patient's shelter was setup and the patient will be discharged today.

## 2020-06-11 DIAGNOSIS — K22.70 BARRETT'S ESOPHAGUS WITHOUT DYSPLASIA: ICD-10-CM

## 2020-06-11 DIAGNOSIS — A31.0 PULMONARY MYCOBACTERIAL INFECTION: ICD-10-CM

## 2020-06-11 DIAGNOSIS — Z59.0 HOMELESSNESS: ICD-10-CM

## 2020-06-11 DIAGNOSIS — K50.919 CROHN'S DISEASE, UNSPECIFIED, WITH UNSPECIFIED COMPLICATIONS: ICD-10-CM

## 2020-06-11 DIAGNOSIS — F32.9 MAJOR DEPRESSIVE DISORDER, SINGLE EPISODE, UNSPECIFIED: ICD-10-CM

## 2020-06-11 DIAGNOSIS — R07.89 OTHER CHEST PAIN: ICD-10-CM

## 2020-06-11 DIAGNOSIS — K21.9 GASTRO-ESOPHAGEAL REFLUX DISEASE WITHOUT ESOPHAGITIS: ICD-10-CM

## 2020-06-11 DIAGNOSIS — Z72.0 TOBACCO USE: ICD-10-CM

## 2020-06-11 DIAGNOSIS — Z82.49 FAMILY HISTORY OF ISCHEMIC HEART DISEASE AND OTHER DISEASES OF THE CIRCULATORY SYSTEM: ICD-10-CM

## 2020-06-11 DIAGNOSIS — Z80.0 FAMILY HISTORY OF MALIGNANT NEOPLASM OF DIGESTIVE ORGANS: ICD-10-CM

## 2020-06-11 DIAGNOSIS — J43.9 EMPHYSEMA, UNSPECIFIED: ICD-10-CM

## 2020-06-11 DIAGNOSIS — N40.0 BENIGN PROSTATIC HYPERPLASIA WITHOUT LOWER URINARY TRACT SYMPTOMS: ICD-10-CM

## 2020-06-11 DIAGNOSIS — F41.9 ANXIETY DISORDER, UNSPECIFIED: ICD-10-CM

## 2020-06-11 SDOH — ECONOMIC STABILITY - HOUSING INSECURITY: HOMELESSNESS: Z59.0

## 2020-06-12 PROBLEM — I95.9 HYPOTENSION, UNSPECIFIED: Chronic | Status: ACTIVE | Noted: 2020-06-05

## 2020-06-12 PROBLEM — J43.9 EMPHYSEMA, UNSPECIFIED: Chronic | Status: ACTIVE | Noted: 2020-06-05

## 2020-06-12 PROBLEM — J44.9 CHRONIC OBSTRUCTIVE PULMONARY DISEASE, UNSPECIFIED: Chronic | Status: ACTIVE | Noted: 2020-06-05

## 2020-06-12 PROBLEM — R91.8 OTHER NONSPECIFIC ABNORMAL FINDING OF LUNG FIELD: Chronic | Status: ACTIVE | Noted: 2020-06-05

## 2020-06-19 ENCOUNTER — APPOINTMENT (OUTPATIENT)
Dept: PULMONOLOGY | Facility: CLINIC | Age: 50
End: 2020-06-19

## 2020-06-27 LAB
CULTURE RESULTS: SIGNIFICANT CHANGE UP
CULTURE RESULTS: SIGNIFICANT CHANGE UP
SPECIMEN SOURCE: SIGNIFICANT CHANGE UP
SPECIMEN SOURCE: SIGNIFICANT CHANGE UP

## 2020-07-08 ENCOUNTER — APPOINTMENT (OUTPATIENT)
Dept: PULMONOLOGY | Facility: CLINIC | Age: 50
End: 2020-07-08
Payer: MEDICAID

## 2020-07-08 VITALS
OXYGEN SATURATION: 96 % | DIASTOLIC BLOOD PRESSURE: 88 MMHG | WEIGHT: 153 LBS | SYSTOLIC BLOOD PRESSURE: 133 MMHG | HEART RATE: 91 BPM | HEIGHT: 70 IN | BODY MASS INDEX: 21.9 KG/M2 | RESPIRATION RATE: 14 BRPM | TEMPERATURE: 98.5 F

## 2020-07-08 DIAGNOSIS — F17.200 NICOTINE DEPENDENCE, UNSPECIFIED, UNCOMPLICATED: ICD-10-CM

## 2020-07-08 PROCEDURE — 99406 BEHAV CHNG SMOKING 3-10 MIN: CPT

## 2020-07-08 PROCEDURE — 99214 OFFICE O/P EST MOD 30 MIN: CPT | Mod: 25

## 2020-07-08 NOTE — CONSULT LETTER
[Dear  ___] : Dear  [unfilled], [Consult Letter:] : I had the pleasure of evaluating your patient, [unfilled]. [Please see my note below.] : Please see my note below. [Consult Closing:] : Thank you very much for allowing me to participate in the care of this patient.  If you have any questions, please do not hesitate to contact me. [FreeTextEntry3] : Sincerely,\par \par Ileana Disla MD\par [facility]\par Pulmonary Medicine\par

## 2020-07-08 NOTE — ASSESSMENT
[FreeTextEntry1] : 48 yo male former smoker with with emphysema, Crohn's disease (on Pentasa), currently homeless (living in a hotel), s/p RUL wedge resection here for follow up. \par \par #RUL cavitary mass - s/p RUL wedge resection with Dr Marley on 5/29 with pathology showing necrotizing granulomas and cultures growing MIGUEL. Patient has no evidence of current MIGUEL infection and feels well. I agree with repeat imaging in about 6 months and monitoring clinically. I see no indication to initiate treatment for MIGUEL at this point.\par \par #Emphysema - PFT with minimal obstructive defect. Gold group A. No recent exacerbations.\par -- c/w Incruse, doing well. \par \par #Smoker - quit 2/2020 Doing well with Chantix. Encouraged abstinence.\par --will provide patient with Nicotine Lozenge. Would like to wean Chantix.\par -- patient will need annual CT chest screening given smoking hx. \par \par #HCM - refused flu and pneumococcal vaccines\par \par All questions answered. Patient in agreement with plan. \par Patient is planning to move to [place]. I asked him to schedule f/u appt prior to his move (can be tele health). \par

## 2020-07-08 NOTE — HISTORY OF PRESENT ILLNESS
[Former] : former [>= 30 pack years] : >= 30 pack years [Never] : never [TextBox_4] : Mr. JEOVANY ACOSTA is a 48 yo male forme smoker with COPD/emphysema, Crohn's disease, Alfaro's esophagus, emphysema and RUL cavitary lung lesion (s/p resection) who is here for f/u.\par \par History: Mr. JEOVANY ACOSTA  was hospitalized at [facility] from 1/7-1/21/2020, was found to have RUL 4.4x2.5cm cavitary mass with irregular borders. IR guided bx was done -  showed necrotizing epithelioid granulomatous inflammation with negative AFB and fungal cultures. HIV, Aspergillus galactomannan and histoplasma Ag were negative. Quant gold negative. \par He had RUL VATS with wedge resection on 5/29 with pathology showing necrotizing granulomas and cultures growing MIGUEL. \par Today, patient reports feeling well. Scars are healing well (he has some residual pain at the site). He has been using his Incurse, SOB is well controlled. In fact, he walked to my office for 2 hours today. He has not smoked since initiation of chantix. He denies fevers, chills, cough, malaise, sputum production, gained some weight. \par \par PMH: Crohns disease; Jonny's esophagus; depression/anxiety\par Meds: per chart\par All: NKDA\par SH: Homeless, lived in a shelter, currently staying in a hotel. Former EMT. Quit smoking 2/2020\par FH: Father - copd/colon ca. no hx of autoimmune dis\par PMD: TELMA MILLER\par GI: Dr Marie\par Immunizations: refused immunizations [TextBox_11] : 1 [YearQuit] : 2020 [TextBox_13] : 30

## 2020-07-08 NOTE — PHYSICAL EXAM
[No Acute Distress] : no acute distress [Normal Oropharynx] : normal oropharynx [Normal Appearance] : normal appearance [No Neck Mass] : no neck mass [Normal Rate/Rhythm] : normal rate/rhythm [Normal S1, S2] : normal s1, s2 [No Murmurs] : no murmurs [No Resp Distress] : no resp distress [Clear to Auscultation Bilaterally] : clear to auscultation bilaterally [No Abnormalities] : no abnormalities [Benign] : benign [Normal Gait] : normal gait [No Clubbing] : no clubbing [No Cyanosis] : no cyanosis [No Edema] : no edema [FROM] : FROM [Normal Color/ Pigmentation] : normal color/ pigmentation [No Focal Deficits] : no focal deficits [Oriented x3] : oriented x3 [Normal Affect] : normal affect [TextBox_11] : very poor dentation [TextBox_80] : well healing vats scars

## 2020-07-08 NOTE — REVIEW OF SYSTEMS
[Negative] : Endocrine [Depression] : no depression [TextBox_69] : crohnx disease, currently better controlled [TextBox_83] : bladder spasms [TextBox_91] : pain

## 2020-07-13 ENCOUNTER — RX RENEWAL (OUTPATIENT)
Age: 50
End: 2020-07-13

## 2020-07-14 LAB
CULTURE RESULTS: SIGNIFICANT CHANGE UP
CULTURE RESULTS: SIGNIFICANT CHANGE UP
NIGHT BLUE STAIN TISS: SIGNIFICANT CHANGE UP
NIGHT BLUE STAIN TISS: SIGNIFICANT CHANGE UP
SPECIMEN SOURCE: SIGNIFICANT CHANGE UP
SPECIMEN SOURCE: SIGNIFICANT CHANGE UP

## 2020-07-16 ENCOUNTER — OUTPATIENT (OUTPATIENT)
Dept: OUTPATIENT SERVICES | Facility: HOSPITAL | Age: 50
LOS: 1 days | End: 2020-07-16
Payer: MEDICAID

## 2020-07-16 ENCOUNTER — APPOINTMENT (OUTPATIENT)
Dept: INFECTIOUS DISEASE | Facility: CLINIC | Age: 50
End: 2020-07-16
Payer: MEDICAID

## 2020-07-16 VITALS
HEIGHT: 70 IN | WEIGHT: 152 LBS | OXYGEN SATURATION: 98 % | BODY MASS INDEX: 21.76 KG/M2 | DIASTOLIC BLOOD PRESSURE: 104 MMHG | HEART RATE: 91 BPM | SYSTOLIC BLOOD PRESSURE: 152 MMHG | TEMPERATURE: 97.6 F

## 2020-07-16 DIAGNOSIS — B97.89 OTHER VIRAL AGENTS AS THE CAUSE OF DISEASES CLASSIFIED ELSEWHERE: ICD-10-CM

## 2020-07-16 DIAGNOSIS — S83.519A SPRAIN OF ANTERIOR CRUCIATE LIGAMENT OF UNSPECIFIED KNEE, INITIAL ENCOUNTER: Chronic | ICD-10-CM

## 2020-07-16 PROCEDURE — G0463: CPT

## 2020-07-16 PROCEDURE — 99205 OFFICE O/P NEW HI 60 MIN: CPT

## 2020-07-29 NOTE — ASSESSMENT
[FreeTextEntry1] : Mr. Giorgio Ruvalcaba is a 49 year old male with COPD/ emphysema, Crohn's disease on Pentasa, last used Remicade in 2014, Alfaro's esophagus presenting to ID clinic for evaluation for a RUL cavitary lesion s/p RUL VATS with wedge resection with MIGUEL.\par \par Currently feels well. No cough. Dyspnea has been improving since the surgery. CXR from 6/5/2020 with small focus of linear atelectasis or scarring at the left base. Postsurgical changes in the right upper lung. The lungs are otherwise clear. No pneumothorax.\par \par Recommend:\par #Cavitary lung lesion s/p VATS with wedge resection found to have MIGUEL\par -Has no cough and feels well. No fevers.\par -Continue to monitor off MIGUEL treatment at this time.\par -Will need a repeat CT chest in about 6 months to see if any progression of disease.\par -If any progression of MAC, will need to consider treatment at that time\par \par RTC in 6 months.

## 2020-07-29 NOTE — PHYSICAL EXAM
[General Appearance - Alert] : alert [General Appearance - In No Acute Distress] : in no acute distress [General Appearance - Well Nourished] : well nourished [General Appearance - Well-Appearing] : healthy appearing [Sclera] : the sclera and conjunctiva were normal [PERRL With Normal Accommodation] : pupils were equal in size, round, reactive to light [Extraocular Movements] : extraocular movements were intact [Outer Ear] : the ears and nose were normal in appearance [Hearing Threshold Finger Rub Not [place]] : hearing was normal [Examination Of The Oral Cavity] : the lips and gums were normal [Oropharynx] : the oropharynx was normal with no thrush [Neck Appearance] : the appearance of the neck was normal [Neck Cervical Mass (___cm)] : no neck mass was observed [Respiration, Rhythm And Depth] : normal respiratory rhythm and effort [Exaggerated Use Of Accessory Muscles For Inspiration] : no accessory muscle use [Auscultation Breath Sounds / Voice Sounds] : lungs were clear to auscultation bilaterally [Heart Rate And Rhythm] : heart rate was normal and rhythm regular [Heart Sounds] : normal S1 and S2 [Murmurs] : no murmurs [Edema] : there was no peripheral edema [Bowel Sounds] : normal bowel sounds [Abdomen Soft] : soft [Abdomen Tenderness] : non-tender [No Palpable Adenopathy] : no palpable adenopathy [Musculoskeletal - Swelling] : no joint swelling [Range of Motion to Joints] : range of motion to joints [Nail Clubbing] : no clubbing  or cyanosis of the fingernails [Skin Color & Pigmentation] : normal skin color and pigmentation [Motor Tone] : muscle strength and tone were normal [] : no rash [Skin Lesions] : no skin lesions [Sensation] : the sensory exam was normal to light touch and pinprick [Motor Exam] : the motor exam was normal [No Focal Deficits] : no focal deficits [Oriented To Time, Place, And Person] : oriented to person, place, and time [Affect] : the affect was normal

## 2020-07-29 NOTE — CONSULT LETTER
[Dear  ___] : Dear  [unfilled], [Consult Letter:] : I had the pleasure of evaluating your patient, [unfilled]. [( Thank you for referring [unfilled] for consultation for _____ )] : Thank you for referring [unfilled] for consultation for [unfilled] [Please see my note below.] : Please see my note below. [Consult Closing:] : Thank you very much for allowing me to participate in the care of this patient.  If you have any questions, please do not hesitate to contact me. [Sincerely,] : Sincerely, [FreeTextEntry3] : Zain Lindquist MD\par Attending Physician\par Division of Infectious Diseases\par [address]\[place] [address]\par Office: [phone]\par Fax: [phone]\par Email: [email or web address] \par [facility]\par Visit us at [facility]\par \par

## 2020-07-29 NOTE — HISTORY OF PRESENT ILLNESS
[FreeTextEntry1] : Mr. Giorgio Ruvalcaba is a 49 year old male with COPD/ emphysema, Crohn's disease on Pentasa, last used Remicade in 2014, Alfaro's esophagus presenting to ID clinic for evaluation for a RUL cavitary lesion s/p RUL VATS with wedge resection with MIGUEL.\par \par He presents to [facility] on 1/7/20 where he was found to have a RUL cavitary mass s/p IR biopsy demonstrating necrotizing epithelioid granulomatous inflammation. At that time, Quantiferon was negative, Path was negative for AFB stain, and fungal, AFB and bacterial cultures were all negative. \par \par On 5/29, he went for RUL VATS with wedge resection on 5/29 which demonstrated necrotizing granulomas and cultures with MIGUEL. He presents today for evaluation. He has no complaints. He feels well. He dyspnea that has been improving, no cough.  He denies fever, chills.

## 2020-07-30 DIAGNOSIS — A31.0 PULMONARY MYCOBACTERIAL INFECTION: ICD-10-CM

## 2020-07-30 DIAGNOSIS — J98.4 OTHER DISORDERS OF LUNG: ICD-10-CM

## 2020-09-11 ENCOUNTER — TRANSCRIPTION ENCOUNTER (OUTPATIENT)
Age: 50
End: 2020-09-11

## 2020-09-14 ENCOUNTER — TRANSCRIPTION ENCOUNTER (OUTPATIENT)
Age: 50
End: 2020-09-14

## 2020-09-23 NOTE — ED PROVIDER NOTE - CCCP TRG CHIEF CMPLNT
Transferred in stable condition to [facility]. All belongings and family members with patient.  VS stable throughout the day. Remained in Afib with controlled ventricular rate. Voiding freely. Had one medium black tarry stool.   SOB/chest pressure

## 2021-01-01 NOTE — PATIENT PROFILE ADULT - NSALCOHOLFREQ_GEN_A_NUR
Unable to assess Unable to assess Unable to assess Unable to assess Unable to assess Unable to assess Unable to assess Other Unable to assess Unable to assess Unable to assess Unable to assess Unable to assess Unable to assess Unable to assess 2-4 times/mo

## 2021-02-18 ENCOUNTER — TRANSCRIPTION ENCOUNTER (OUTPATIENT)
Age: 51
End: 2021-02-18

## 2021-04-04 NOTE — H&P ADULT - NSHPPOACENTRALVENOUSCATHETER_GEN_ALL_CORE
April 4, 2021        Maynor Daley  [address]  [facility] [address]    To Whom It May Concern:    This is to certify Maynor Daley was evaluated on 04/04/21 and is unable to return to work until 4/11/21, pending COVID PCR results.    Maynor Daley should self-isolate.  ?  CDC guidelines for return to work are as follows:  · At least 24 hours have passed since fever resolution without use of fever reducing medication and   · Symptoms have improved and  · At least 10 days have passed since symptoms first appeared    **The loss of taste and smell may persist for weeks or months after recovery and do not need to delay the end of isolation.     Per CDC recommendations, employers should not require a COVID-19 test result or a healthcare provider’s note for employees who are sick to validate their illness, qualify for sick leave, or to return to work.    The Coronavirus is a rapidly evolving situation and recommendations are changing regularly to prevent spread of the disease and further loss of life.    Thank you for your understanding during these unusual times.     Electronically signed by:         Haleigh Vásquez, [facility]                 [address] [place] [id number]    
no

## 2021-07-07 NOTE — ED PROVIDER NOTE - MDM ORDERS SUBMITTED SELECTION
Please note your blood work GFR slightly improved and A1c is good.  We will discuss all in detail next visit.  Dr. SCHMID
Medications/Imaging Studies/EKG/Labs

## 2021-07-14 NOTE — BEHAVIORAL HEALTH ASSESSMENT NOTE - NS ED B[facility] REVIEW OF ED CHART AVAILABLE LABS REVIEWED
Physician Discharge Summary     Patient ID:  Magdaleno Brasher  [id number]  77 y.o.  1953    Admit date: 7/5/2021    Discharge date and time: 7/14/2021    Admission Diagnoses:   Patient Active Problem List   Diagnosis    Hematuria    BPH (benign prostatic hyperplasia)    SUNNY [facility]acute kidney injury) ([facility] [address]    Colonic mass    Hypertension    Hyperlipidemia    Coronary artery disease    Prolonged Q-T interval on ECG    Hypotension    CKD (chronic kidney disease) stage 3, GFR 30-59 ml/min (HCC)    Paroxysmal atrial fibrillation (HCC)    Diffuse large B-cell lymphoma (HCC)    Diffuse large B cell lymphoma (HCC)    Severe protein-calorie malnutrition (HCC)    Anemia    Thrombocytopenia (HCC)       Discharge Diagnoses: sepsis, B-cell lymphoma, supraventricular tachycardia    Consults: Infectious disease, electrophysiology, vascular surgery    Procedures: Port removal, CARMEN    Hospital Course:  Admitted for evaluation of pancytopenia and hematuria in pt with B cell lymphoma on chemo in spite of neulasta   hemonc to follow for transfusions - appreciate input   Await Gcsf tx   Hold oac / antiplatelets - o n xarelto at home - consider coming off for now   Transfuse 1 unit PRBC 7/8/2021-monitor volume status , avoid volume  Daily labs      Gram-negative nevaeh sepsis/bacteremia-blood cultures with Klebsiella/Pseudomonas/Serratia  Merrem per infectious disease-switch to p.o. Levaquin 750 daily for discharge  Diuresis 20 IV Lasix twice daily  Port removal completed on 7/12/2021  Patient awaiting CARMEN 7/13/2021     May benefit from bronchoscopy  dw nisha.  Al zoby   Bronch if no improvement with diruresis-in the future if needed        A. fib RVR/electrolyte abnormalities  Rate control-achieved with beta-blocker, sotalol discontinued on discharge patient placed on p.o. metoprolol twice daily  Supplement electrolytes  Oral anticoagulation?-not yet secondary to pancytopenia and increased risk of bleeding  Xarelto is on hold antacids, multivitamins, iron, zinc but if needed, take antibiotic at least 2 hours before or 6 hours after milk, antacids, multivitamins, iron, zinc.     metoprolol succinate 50 MG extended release tablet  Commonly known as: TOPROL XL  Take 1 tablet by mouth 2 times daily        CONTINUE taking these medications    amLODIPine 5 MG tablet  Commonly known as: NORVASC     esomeprazole Magnesium 20 MG Pack  Commonly known as: NEXIUM     ferrous sulfate 325 (65 Fe) MG tablet  Commonly known as: IRON 325     lisinopril-hydroCHLOROthiazide 10-12.5 MG per tablet  Commonly known as: PRINZIDE;ZESTORETIC     ondansetron 4 MG disintegrating tablet  Commonly known as: Zofran ODT  Take 1 tablet by mouth every 8 hours as needed for Nausea or Vomiting     PRAVASTATIN SODIUM PO     PROAIR HFA IN     sennosides-docusate sodium 8.6-50 MG tablet  Commonly known as: SENOKOT-S     tamsulosin 0.4 MG capsule  Commonly known as: FLOMAX     vitamin B-12 100 MCG tablet  Commonly known as: CYANOCOBALAMIN        STOP taking these medications    sotalol 80 MG tablet  Commonly known as: BETAPACE     Xarelto 20 MG Tabs tablet  Generic drug: rivaroxaban           Where to Get Your Medications      These medications were sent to [facility][id number] [address] [address]    Phone: [phone]   · metoprolol succinate 50 MG extended release tablet     You can get these medications from any pharmacy    Bring a paper prescription for each of these medications  · levoFLOXacin 750 MG tablet       Activity: activity as tolerated  Diet: cardiac diet    Pt has been advised to: Follow-up with Lydia Wise DO in 1 week.   Follow-up with consultants as recommended by them    Note that over 30 minutes was spent in preparing discharge papers, discussing discharge with patient, medication review, etc.    Signed:  Alena Gitelman, MD  7/14/2021  4:37 PM Yes

## 2021-07-26 ENCOUNTER — TRANSCRIPTION ENCOUNTER (OUTPATIENT)
Age: 51
End: 2021-07-26

## 2021-08-16 ENCOUNTER — TRANSCRIPTION ENCOUNTER (OUTPATIENT)
Age: 51
End: 2021-08-16

## 2021-08-16 ENCOUNTER — RX RENEWAL (OUTPATIENT)
Age: 51
End: 2021-08-16

## 2021-08-16 NOTE — H&P ADULT - PROBLEM SELECTOR PLAN 4
Detail Level: Detailed General Sunscreen Counseling: I recommend a broad spectrum sunscreen with a SPF of 30 or higher, and should be reapplied every 2-3 hours after that as long as sun exposure continues. I also recommend a lip balm with a sunscreen as well. Sun protective clothing and hats can be used but must be worn the entire time you are exposed to the sun's rays. DVTp: SCDs, AC contraindicated

## 2021-08-17 ENCOUNTER — TRANSCRIPTION ENCOUNTER (OUTPATIENT)
Age: 51
End: 2021-08-17

## 2021-09-17 ENCOUNTER — APPOINTMENT (OUTPATIENT)
Dept: PULMONOLOGY | Facility: CLINIC | Age: 51
End: 2021-09-17
Payer: MEDICAID

## 2021-09-17 VITALS
WEIGHT: 166 LBS | BODY MASS INDEX: 23.77 KG/M2 | HEART RATE: 80 BPM | HEIGHT: 70 IN | DIASTOLIC BLOOD PRESSURE: 88 MMHG | TEMPERATURE: 97.9 F | SYSTOLIC BLOOD PRESSURE: 130 MMHG | OXYGEN SATURATION: 96 %

## 2021-09-17 PROCEDURE — 99215 OFFICE O/P EST HI 40 MIN: CPT

## 2021-09-18 NOTE — ASSESSMENT
[FreeTextEntry1] : 48 yo male former smoker with with emphysema, Crohn's disease (on Pentasa), s/p RUL wedge resection 2/2 MIGUEL here for f/u. \par \par #RUL cavitary mass - s/p RUL wedge resection with Dr Marley on 5/29/2020 with pathology showing necrotizing granulomas and cultures growing MIGUEL. Patient has no evidence of current MIGUEL infection and feels well. Plan for repeat CT chest 6/2022\par \par #Emphysema - PFT with minimal obstructive defect. Gold group A. No recent exacerbations.\par -- c/w Incruse for now, Albuterol prn\par -- repeat PFTs\par \par #Smoker - quit 2/2020 Doing well with Chantix. Encouraged abstinence.\par -- patient will need annual CT chest screening given smoking hx. \par \par #HCM - refusing COVID vaccine, strongly advised\par \par All questions answered. Patient in agreement with plan. \par f/u after repeat PFTs\par \par

## 2021-09-18 NOTE — HISTORY OF PRESENT ILLNESS
[Former] : former [>= 30 pack years] : >= 30 pack years [Never] : never [TextBox_4] : Mr. JEOVANY ACOSTA is a 49 yo male forme smoker with COPD/emphysema, Crohn's disease, Alfaro's esophagus, emphysema and RUL cavitary lung lesion 2/2 MIGUEL (s/p resection) who is here for f/u.\par \par History: Mr. JEOVANY ACOSTA  was hospitalized at [facility] from 1/7-1/21/2020, was found to have RUL 4.4x2.5cm cavitary mass with irregular borders. IR guided bx was done -  showed necrotizing epithelioid granulomatous inflammation with negative AFB and fungal cultures. HIV, Aspergillus galactomannan and histoplasma Ag were negative. Quant gold negative. He had RUL VATS with wedge resection on 5/29/2020 with pathology showing necrotizing granulomas and cultures growing MIGUEL. \par He notes feeling more SOB recently. Currently undergoing cardiac workup. Still using Incruse. Has not been using Albuterol. Not smoking\par  He denies fevers, chills, cough, malaise, sputum production, gained some weight. \par Followed by Dr Wu (thoracic)\par \par CT chest 6/2021 - severe upper lobe predominant emphysema, s/u RUL resection, post operative changes, bronchial wall thickening. \par \par PMH: Crohns disease; Jonny's esophagus; depression/anxiety\par Meds: per chart\par All: NKDA\par SH: Homeless, lived in a shelter, currently staying in a hotel. Former EMT. Quit smoking 2/2020\par FH: Father - copd/colon ca. no hx of autoimmune dis\par PMD: TELMA MILLER\par GI: Dr Marie\par Immunizations: refused immunizations [TextBox_11] : 1 [TextBox_13] : 30 [YearQuit] : 2020

## 2021-09-20 NOTE — ED PROVIDER NOTE - CADM POA CENTRAL LINE
Assessment & Plan     Bipolar affective disorder, current episode hypomanic (H)  Mark exhibited pressured speech today and an elevated mood but it does not sound like he's fully manic at this time but I am concerned he could progress to that without intervention. He reports a history of bipolar disorder and there are notes that he's been treated with lithium and lamictal in the past as well as other unknown meds. He was interested in something for his anxiety today, and I recommended initiating Seroquel as a mood stabilizer while we get him to psych for a full eval and med management.  - MENTAL HEALTH REFERRAL  - Adult; Outpatient Treatment, Psychiatry; Individual/Couples/Family/Group Therapy/Health Psychology; [facility] [phone]; We will contact you to schedule the appointment or please call with any questio...; Future  - QUEtiapine (SEROQUEL) 100 MG tablet; Take 0.5 tablets (50 mg) by mouth every evening for 2 days, THEN 1 tablet (100 mg) every evening for 29 days.    Depression Screening Follow Up  PHQ 9/20/2021   PHQ-9 Total Score 13   Q9: Thoughts of better off dead/self-harm past 2 weeks Not at all     Last PHQ-9 9/20/2021   1.  Little interest or pleasure in doing things 1   2.  Feeling down, depressed, or hopeless 1   3.  Trouble falling or staying asleep, or sleeping too much 1   4.  Feeling tired or having little energy 2   5.  Poor appetite or overeating 1   6.  Feeling bad about yourself 3   7.  Trouble concentrating 3   8.  Moving slowly or restless 1   Q9: Thoughts of better off dead/self-harm past 2 weeks 0   PHQ-9 Total Score 13   Difficulty at work, home, or with people Very difficult     Follow Up Actions Taken  Mental Health Referral placed     F/u with psych as above    Lior Paredes MD  [facility]    Zeynep Flores is a 29 year old who presents today to discuss his mental health. He reports a history of mental health issues,  including depression, anxiety, panic attacks, and bipolar. He not currently on medication. He self-medicates with cannabis for his panic attacks. He does feel his mood has been more elevated recently. Tells me he sleeps 8 hours a night. Reports that each of the last two summers he has become fixated on buying a large shed but eventually talked himself out of it as it didn't make sense for him to do so given his life situation. He'd like a psych referral and to start medication today.    Review of Systems   Constitutional, psych systems are negative, except as otherwise noted.      Objective    /86   Pulse 83   Temp 98.2  F (36.8  C) (Tympanic)   Resp 16   Wt 82.3 kg (181 lb 8 oz)   SpO2 98%   BMI 24.62 kg/m    Body mass index is 24.62 kg/m .     Physical Exam   GENERAL: Alert.  EYES: Conjunctivae/corneas clear. EOMs grossly intact  HENT: NC/AT, facies symmetric.  RESP: CTAB. No w/r/r.  CV: RRR, no m/r/g.  MSK: Moves all four extremities freely.  SKIN: No significant ulcers, lesions or rashes on the visualized portions of the skin  NEURO: Alert. Oriented.  PSYCH: Linear thought process. Speech normal volume. Speech mildly to moderately pressured. No tangential thoughts, hallucinations, or delusions.   No

## 2021-09-23 ENCOUNTER — APPOINTMENT (OUTPATIENT)
Dept: PULMONOLOGY | Facility: CLINIC | Age: 51
End: 2021-09-23
Payer: MEDICAID

## 2021-09-23 PROCEDURE — 94060 EVALUATION OF WHEEZING: CPT

## 2021-09-23 PROCEDURE — 94729 DIFFUSING CAPACITY: CPT

## 2021-09-23 PROCEDURE — 94727 GAS DIL/WSHOT DETER LNG VOL: CPT

## 2021-10-28 ENCOUNTER — RX RENEWAL (OUTPATIENT)
Age: 51
End: 2021-10-28

## 2021-11-03 ENCOUNTER — TRANSCRIPTION ENCOUNTER (OUTPATIENT)
Age: 51
End: 2021-11-03

## 2021-12-01 PROCEDURE — G9005: CPT

## 2022-01-26 NOTE — PROGRESS NOTE ADULT - PROBLEM/PLAN-4
Procedure Date:  2022    Patient: Kishore Hernández  : 1964    Sedation: MAC    Outpatient History and Physical Review for EGD    Indications: GERD, Dyspepsia and nausea and vomiting    Family History of Colon Cancer or Colon Polyps: No    Current Outpatient Medications   Medication Sig Dispense Refill   • montelukast (SINGULAIR) 10 MG tablet TAKE 1 TABLET BY MOUTH EVERY NIGHT 90 tablet 0   • omeprazole (PriLOSEC) 40 MG capsule Take 1 capsule by mouth daily. 90 capsule 1   • ALPRAZolam (XANAX) 0.5 MG tablet Take 1 tablet by mouth nightly as needed for Sleep. 30 tablet 0   • ondansetron (Zofran) 4 MG tablet Take 1 tablet by mouth every 8 hours as needed for Nausea. 30 tablet 0   • hydrochlorothiazide (HYDRODIURIL) 25 MG tablet TAKE 1 TABLET BY MOUTH DAILY 90 tablet 0   • losartan (COZAAR) 100 MG tablet TAKE 1 TABLET BY MOUTH DAILY 90 tablet 0   • alendronate (FOSAMAX) 70 MG tablet Take 1 tablet by mouth every 7 days. 16 tablet 1   • gabapentin (NEURONTIN) 300 MG capsule Take 1 capsule by mouth 3 times daily. 270 capsule 0   • triamcinolone (ARISTOCORT) 0.1 % ointment      • naproxen (NAPROSYN) 500 MG tablet Take 500 mg by mouth 2 times daily (with meals).     • erythromycin (ILOTYCIN) ophthalmic ointment APPLY A THIN RIBBON OF OINTMENT TO RIGHT EYE THREE TIMES DAILY FOR 1 WEEK     • predniSONE (DELTASONE) 10 MG (21) tablet pack Take one tablet daily.  Take with food 90 tablet 0   • atorvastatin (LIPITOR) 10 MG tablet TAKE 1 TABLET BY MOUTH DAILY 90 tablet 1   • triamcinolone (ARISTOCORT) 0.1 % cream Apply to affected areas on the face/ears twice daily as needed for itching. Do not use more than 15 days per month. 30 g 0   • albuterol (ProAir HFA) 108 (90 Base) MCG/ACT inhaler Inhale 2 puffs into the lungs every 4 hours as needed for Shortness of Breath or Wheezing. 8.5 g 11   • fluorouracil (EFUDEX) 5 % cream Apply to forehead, cheeks, and ears twice daily for 2 weeks. 40 g 0   • PARoxetine (Paxil) 10 MG tablet 
Take 1 tablet by mouth every morning. 30 tablet 5   • fluticasone-salmeterol (Advair HFA) 230-21 MCG/ACT inhaler Inhale 2 puffs into the lungs 2 times daily. Rinse mouth and throat after each use 12 g 12   • tiotropium (Spiriva HandiHaler) 18 MCG capsule for inhaler Place 1 capsule into inhaler and inhale daily. 30 capsule 6   • metroNIDAZOLE (METROCREAM) 0.75 % cream Apply to affected areas face twice daily for rosacea 45 g 3   • ketoconazole (NIZORAL) 2 % cream Apply to affected areas face  twice daily until resolved. 60 g 2   • ketoconazole (NIZORAL) 2 % cream Apply to affected areas on face and ears twice daily. 15 g 3   • folic acid (FOLATE) 1 MG tablet 1 tablet daily , 6 days per week (do not take on same day as Methotrexate) 60 tablet 3   • lidocaine-prilocaine (EMLA) cream Apply to affected areas right medial cheek  90 minutes  prior to procedure under occlusion. 30 g 0   • azelastine (ASTELIN) 0.1 % nasal spray Spray 1 spray in each nostril 2 times daily. Use in each nostril as directed (Patient taking differently: Spray 1 spray in each nostril 2 times daily as needed. Use in each nostril as directed) 30 mL 5   • ascorbic acid (VITAMIN C) 500 MG tablet Take 500 mg by mouth daily. Indications: stopped pre op 6/5/19.  DOS 6/13/19      • aspirin EC (ASPIRIN) 81 MG EC tablet Take 81 mg by mouth daily. Indications: not using        Current Facility-Administered Medications   Medication Dose Route Frequency Provider Last Rate Last Admin   • lidocaine (ANECREAM/LMX) 4 % cream 1 application  1 application Topical PRN Maikel Carr MD       • lidocaine-sodium bicarbonate 0.9-8.4% for J-tip administration 0.5-1 mL  0.5-1 mL Subcutaneous PRN Maikel Carr MD        Or   • lidocaine 1 % injection 5-10 mg  5-10 mg Subcutaneous PRN Maikel Carr MD       • dextrose (GLUTOSE) 40 % gel 30 g  30 g Oral Once PRN Maikel Carr MD       • dextrose 50 % injection 25 g  25 g Intravenous PRN Maikel Carr MD   
    • insulin regular (human) (HumuLIN R, NovoLIN R) sliding scale injection   Subcutaneous Once PRN Maikel Carr MD       • sodium chloride 0.9 % flush bag 25 mL  25 mL Intravenous PRN Maikel Carr MD       • sodium chloride (PF) 0.9 % injection 2 mL  2 mL Intracatheter 2 times per day Maikel Carr MD       • lactated ringers infusion   Intravenous Continuous Maikel Carr MD       • sodium chloride 0.9% infusion   Intravenous Continuous Maikel Carr MD       • dextrose 5 % infusion   Intravenous Continuous PRN Maikel Carr MD       • sodium chloride 0.9% infusion   Intravenous Continuous Pavel Glover MD       • sodium chloride (PF) 0.9 % injection 2 mL  2 mL Intracatheter 2 times per day Pavel Glover MD       • sodium chloride 0.9 % flush bag 25 mL  25 mL Intravenous PRN Pavel Glover MD       • lidocaine viscous 2 % oral solution 10 mL  10 mL Mouth/Throat Once PRN Pavel Glover MD       • PROPOFOL 10 MG/ML IV BOLUS Pyxis Override            • LIDOCAINE HCL (PF) 1 % IJ SOLN Pyxis Override                ALLERGIES:  Cat dander and Seasonal            Past Medical History:   Diagnosis Date   • Advance directive discussed with patient 12/6/2013    Requesting forms, will give to patient 12/10   • Anxiety    • Asthma 10/2/2014    Since a child   • 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   • Environmental allergies     Pollen, dust, mold   • History of colon polyps 12/10/2013    hyperplastic - Dr GERARD Chisholm   • Hypercholesterolemia    • Hypertension    • Moderate obstructive sleep apnea 1/13/2015    CPAP 7cm   • Pulmonary sarcoidosis (CMS/HCC) 11/4/2014    SVC syndrome-resolved   • Wears glasses      Past Surgical History:   Procedure Laterality Date   • Bronchoscopy  2015    with biopsy   • Colonoscopy diagnostic  12/09/2019    Colonoscopy 5 year recall   • Colonoscopy remove lesions by snare  12/10/2013    5 year recall due 12/2018 - [facility]PP's - Dr GERARD Chisholm   • Colonoscopy w/ polypectomy      Three 
previous. Last  procedure 2009, no polyps   • Knee arthroscopy w/ debridement  1990's    Left         PHYSICAL EXAM:  HEENT: Within normal limits  LUNGS: Lungs clear to auscultation. No cold symptoms present.  HEART: S1,S2, regular rate and rhythm, no murmer.  NEUROLOGICAL: Within normal limits.  MENTAL STATUS: Alert, oriented x3, interactive.  SKIN: Warm, dry and intact, no lesions or rashes.   GENITOURINARY: N\A    The risks of the procedure including the possibility of bleeding, perforation (possibly resulting in laparotomy/colostomy) aspiration, and the risk of a polypectomy were discussed with the patient who accepts these risks.                  
DISPLAY PLAN FREE TEXT
DISPLAY PLAN FREE TEXT

## 2022-02-11 NOTE — PROGRESS NOTE ADULT - PROBLEM/PLAN-9
DISPLAY PLAN FREE TEXT
DISPLAY PLAN FREE TEXT
Class II - visualization of the soft palate, fauces, and uvula

## 2022-03-31 NOTE — DIETITIAN INITIAL EVALUATION ADULT. - 33 TO
Chief Complaint   Patient presents with   • Fever     low grade fever x2 days   • Loss of Appetite   • Other     Decreased wet diapers today. Pt was seen by PCP yesterday and today. Sent here by PCP for dehydration.    Pt BIB mother. Pt is alert and age appropriate. VSS, afebrile. NPO discussed. Pt to room.    
Just an FYI patient has not responded to our calls to set up colonoscopy.  Two letters were mailed.  
[phone]

## 2022-04-01 ENCOUNTER — RX RENEWAL (OUTPATIENT)
Age: 52
End: 2022-04-01

## 2022-04-26 NOTE — ED BEHAVIORAL HEALTH ASSESSMENT NOTE - NS ED BHA MED ROS CONSTITUTIONAL SYMPTOMS
No complaints Erivedge Counseling- I discussed with the patient the risks of Erivedge including but not limited to nausea, vomiting, diarrhea, constipation, weight loss, changes in the sense of taste, decreased appetite, muscle spasms, and hair loss.  The patient verbalized understanding of the proper use and possible adverse effects of Erivedge.  All of the patient's questions and concerns were addressed.

## 2022-05-06 NOTE — STUDENT SIGN OFF DOCUMENT - CO-SIGNATURE DATE
27-Sep-2018 Message sent to patient to notify that appointment with pulmonary is OK as scheduled. Closing encounter.

## 2022-05-11 ENCOUNTER — RX RENEWAL (OUTPATIENT)
Age: 52
End: 2022-05-11

## 2022-05-24 NOTE — ED BEHAVIORAL HEALTH ASSESSMENT NOTE - NS ED BHA MED ROS SKIN
"Chief Complaint   Patient presents with   • Lactose Intolerance     X6 days vomiting when eating dairy products    • Cough     X15 days        Subjective          Sade Kilgore presents to [facility]    Reviewed all recent labs and medications. Pt presents with fatigue, vomiting when drinking milk, nausea, cough. States cough has been ongoing for several weeks. Has not taken anything to treat. Denies fever, chills, SOB, diarrhea, nasal congestion, sore throat or other. Pt would like to be tested for lactose intolerance.     Reviewed all recent labs and medications.      Past History:  Medical History: has a past medical history of Asthma and Class 1 obesity without serious comorbidity with body mass index (BMI) of 30.0 to 30.9 in adult (11/8/2021).   Surgical History: has no past surgical history on file.   Family History: family history includes Cancer in her father; No Known Problems in her mother.   Social History: reports that she has never smoked. She has never used smokeless tobacco. She reports previous alcohol use. She reports that she does not use drugs.  Allergies: Patient has no known allergies.  (Not in a hospital admission)       Social History     Socioeconomic History   • Marital status: Single   Tobacco Use   • Smoking status: Never Smoker   • Smokeless tobacco: Never Used   Vaping Use   • Vaping Use: Never used   Substance and Sexual Activity   • Alcohol use: Not Currently   • Drug use: Never   • Sexual activity: Defer       Health Maintenance Due   Topic Date Due   • COVID-19 Vaccine (1) Never done   • HPV VACCINES (1 - 2-dose series) Never done   • CHLAMYDIA SCREENING  Never done       Objective     Vital Signs:   /64   Pulse 77   Temp 98.7 °F (37.1 °C)   Resp 18   Ht 162.6 cm (64\")   Wt 80.7 kg (178 lb)   SpO2 99%   BMI 30.55 kg/m²       Physical Exam  Vitals reviewed.   Constitutional:       General: She is not in acute distress.  HENT: "      Head: Normocephalic.      Right Ear: Tympanic membrane normal.      Left Ear: Tympanic membrane normal.      Nose: Nose normal.      Mouth/Throat:      Pharynx: Oropharynx is clear. No posterior oropharyngeal erythema.   Eyes:      General: No scleral icterus.     Extraocular Movements: Extraocular movements intact.      Conjunctiva/sclera: Conjunctivae normal.      Pupils: Pupils are equal, round, and reactive to light.   Cardiovascular:      Rate and Rhythm: Normal rate and regular rhythm.      Pulses: Normal pulses.      Heart sounds: Normal heart sounds.   Pulmonary:      Effort: Pulmonary effort is normal.      Breath sounds: Normal breath sounds.   Abdominal:      General: Bowel sounds are normal.      Palpations: Abdomen is soft.   Musculoskeletal:         General: Normal range of motion.      Cervical back: Neck supple.   Skin:     General: Skin is warm and dry.   Neurological:      Mental Status: She is alert and oriented to person, place, and time.   Psychiatric:         Mood and Affect: Mood normal.         Behavior: Behavior normal.         Thought Content: Thought content normal.         Judgment: Judgment normal.          Review of Systems   Constitutional: Positive for fatigue. Negative for chills and fever.   HENT: Negative for congestion, ear pain, sinus pressure and sore throat.    Eyes: Negative for blurred vision.   Respiratory: Positive for cough. Negative for shortness of breath.    Cardiovascular: Negative for chest pain, palpitations and leg swelling.   Gastrointestinal: Positive for nausea. Negative for abdominal pain, constipation, diarrhea, vomiting and GERD.   Musculoskeletal: Negative for arthralgias.   Skin: Negative for rash and skin lesions.   Neurological: Negative for dizziness and headache.   Psychiatric/Behavioral: Negative for sleep disturbance, suicidal ideas and depressed mood. The patient is not nervous/anxious.         Result Review :     Common labs    Common Labsle  11/8/21 11/8/21 11/8/21    1055 1055 1055   Glucose   82   BUN   11   Creatinine   0.60   eGFR Non African Am   123   Sodium   139   Potassium   4.0   Chloride   105   Calcium   9.7   Albumin   4.50   Total Bilirubin   0.8   Alkaline Phosphatase   109   AST (SGOT)   20   ALT (SGPT)   15   WBC 8.75     Hemoglobin 13.1     Hematocrit 39.6     Platelets 291     Total Cholesterol  141    Triglycerides  65    HDL Cholesterol  43    LDL Cholesterol   85                      Assessment and Plan    Diagnoses and all orders for this visit:    1. Milk intolerance (Primary)  Comments:  Rec waiting to see if nausea passed, however patient would like to have drawn today  Disc nature of viral gastroenteritis, often not able to tolerate milk prod.  Orders:  -     Lactose Tolerance Test; Future    2. Cough  Comments:  tessalon perles prn cough     3. Class 1 obesity without serious comorbidity with body mass index (BMI) of 30.0 to 30.9 in adult, unspecified obesity type    4. Other fatigue  Comments:  Increase clear fluids   Increase rest   labs if fatigue persists     5. Viral gastroenteritis  Comments:  zofran 8mg PO tid prn   Nash diet   Increase clear fluids     6. Allergic rhinitis, unspecified seasonality, unspecified trigger  Comments:  Start claritin 10mg PO qd, pt has at home   Start sinulair 10mg PO qhs   Discussed all BB warnings     Other orders  -     benzonatate (Tessalon Perles) 100 MG capsule; Take 1 capsule by mouth 3 (Three) Times a Day As Needed for Cough.  Dispense: 30 capsule; Refill: 0  -     montelukast (Singulair) 10 MG tablet; Take 1 tablet by mouth Every Night.  Dispense: 30 tablet; Refill: 5  -     ondansetron (Zofran) 8 MG tablet; Take 1 tablet by mouth Every 8 (Eight) Hours As Needed for Nausea or Vomiting.  Dispense: 20 tablet; Refill: 0          Follow Up   Return if symptoms worsen or fail to improve.  Patient was given instructions and counseling regarding her condition or for health maintenance  advice. Please see specific information pulled into the AVS if appropriate.        No complaints

## 2022-08-30 NOTE — CONSULT LETTER
[Dear  ___] : Dear  [unfilled], [Consult Letter:] : I had the pleasure of evaluating your patient, [unfilled]. [Please see my note below.] : Please see my note below. [Consult Closing:] : Thank you very much for allowing me to participate in the care of this patient.  If you have any questions, please do not hesitate to contact me. 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 Febrile overnight to 100.4 but subsequently afebrile again. Cultures from ascitic fluid (8/9 and 8/16) grew E. coli and Streptococcus anginosis, with repeat ascitic fluid culture from 8/25 pending. Will plan for repeat paracentesis this week also to trend ascitic fluid PMN count (had partially improved from 8/9 to 8/16 but most recent fluid studies from 8/25 difficult to interpret in context of interval hemoperitoneum that has since stabilized). S/p ceftriaxone (8/8), s/p Zosyn (8/8-16), s/p vancomycin (8/10), and currently on Unasyn (8/16- ) and fluconazole (8/19- ) with Transplant ID following, with tentative plan to switch to Augmentin when ready for discharge. End date for antibiotics not yet determined. Anasarca improving with diuresis, currently on Lasix 80 mg iv daily and spironolactone 200 mg po daily. He is wait-listed for liver transplant, ABO O with MELD-Na 22 today, but on internal hold pending ID clearance to proceed with transplant.    Laron Harper M.D., Ph.D.  Transplant Hepatology [FreeTextEntry3] : Sincerely,\par \par Ileana Disla MD\par [facility]\par Pulmonary Medicine\par

## 2022-09-09 NOTE — ED ADULT NURSE NOTE - NSSUSCREENINGQ2_ED_ALL_ED
Physical Therapy: Initial Evaluation    Patient: Westley Merlin (89 y.o. male)   Examination Date:   Plan of Care Certification Period: 2022 to        :  1951 ;    Confirmed: Yes MRN: [id number]  CSN: [id number]   Insurance: Payor: MEDICARE / Plan: MEDICARE PART A AND B / Product Type: *No Product type* /   Insurance ID: [id number] - (Medicare) Secondary Insurance (if applicable): LESLY GARRISON   Referring Physician: Ellan Kanner, MD     PCP: Kaelyn Dill MD Visits to Date/Visits Approved:   /      No Show/Cancelled Appts:   /       Medical Diagnosis: Low back pain, unspecified [M54.50]    Treatment Diagnosis: LBP, hip ext weakness, flexiiblity restrictions     PERTINENT MEDICAL HISTORY   Patient Assessed for Rehabilitation Services: Yes       Medical History: Chart Reviewed: Yes   Past Medical History:   Diagnosis Date    Benign prostatic hyperplasia     DVT (deep venous thrombosis) ([facility] [address]     Hemochromatosis     History of pulmonary embolism     Hyperlipidemia     Hypertension, essential     Kidney stone     in er for kidney stones(13), had Dilaudid, saw Dr Willow Garcia, did pass one\"    Major depressive disorder     Mantoux: positive     Polycythemia vera(238.4) dx late     \"per wife-(13) \"according to Dr Ligia Leonard he does not have this but he does have high iron\"    Pulmonary embolism [facility])     old chart gives hx brant PE with right lower DVT 2012(pc)(had scope left knee 2012)- had Filter placement done 2012    Tinnitus     Type 2 diabetes mellitus ([facility] [address]     dx      Surgical History:   Past Surgical History:   Procedure Laterality Date    COLONOSCOPY      KNEE ARTHROSCOPY Right 2012    LITHOTRIPSY      SKIN BIOPSY  2012    Spermatocelectomy    VASECTOMY      VENA CAVA FILTER PLACEMENT         Medications:   Current Outpatient Medications:     tiZANidine (ZANAFLEX) 4 MG tablet, Take 1 tablet by mouth 3 times daily as needed (muscle spasm), Disp: 25 tablet, Rfl: 0    metFORMIN (GLUCOPHAGE) 500 MG tablet, TAKE 2 TABS BY MOUTH IN THE MORNING, 1 TAB AT NOON, AND 2 TABS IN THE EVENING WITH MEALS, Disp: 450 tablet, Rfl: 0    lisinopril (PRINIVIL;ZESTRIL) 5 MG tablet, TAKE 1 TABLET BY MOUTH EVERY DAY, Disp: 90 tablet, Rfl: 1    dapagliflozin (FARXIGA) 10 MG tablet, Take 1 tablet by mouth every morning, Disp: 30 tablet, Rfl: 5    dilTIAZem (CARTIA XT) 300 MG extended release capsule, Take 1 capsule by mouth daily, Disp: 90 capsule, Rfl: 1    pioglitazone (ACTOS) 30 MG tablet, Take 1 tablet by mouth daily, Disp: 90 tablet, Rfl: 1    glipiZIDE (GLUCOTROL XL) 2.5 MG extended release tablet, Take 1 tablet by mouth daily, Disp: 90 tablet, Rfl: 1    atorvastatin (LIPITOR) 20 MG tablet, TAKE 1 TABLET BY MOUTH ONE TIME A DAY, Disp: 90 tablet, Rfl: 1    Multiple Vitamins-Minerals (THERAPEUTIC MULTIVITAMIN-MINERALS) tablet, Take 1 tablet by mouth daily, Disp: , Rfl:     COMBIGAN 0.2-0.5 % ophthalmic solution, , Disp: , Rfl:     LATANOPROST OP, Apply to eye, Disp: , Rfl:     Probiotic Product (PROBIOTIC DAILY PO), Take by mouth, Disp: , Rfl:     niacinamide 500 MG tablet, Take 500 mg by mouth 2 times daily (with meals), Disp: , Rfl:     PANTOTHENIC ACID PO, Take 500 mg by mouth daily. , Disp: , Rfl:     Multiple Vitamins-Minerals (OCUVITE) TABS oral tablet, Take 1 tablet by mouth daily. , Disp: , Rfl:     vitamin D3 (CHOLECALCIFEROL) 400 units TABS tablet, Take by mouth daily Patient states taking 1000 units daily. , Disp: , Rfl:   Allergies: Pollen extract, Tree extract, and Vicodin [hydrocodone-acetaminophen]      SUBJECTIVE EXAMINATION      ,           Subjective History:    Subjective: One month sudden onset of low back pain after leaning forward to tie shoes with return to neutral with sharp pain. 5 days later with leaning over to look in the mirror with sharp pain again. Difficulty walking and standing errect with difficulty with putting weight on feet.  Pain has improved with ongoing lingering with able to walk. Last issues with back ~ 6 years with improvement. X-rays completed with severe multi level DDD. Taking meds PRN. Most comfortable with sitting and standing. Avoiding heavy lifting. No return follow up at this time. Additional Pertinent Hx (if applicable):     Prior diagnostic testing[de-identified] X-ray      Learning/Language: Learning  Does the patient/guardian have any barriers to learning?: No barriers  Will there be a co-learner?: No  What is the preferred language of the patient/guardian?: English  Is an  required?: No  How does the patient/guardian prefer to learn new concepts?: Listening, Demonstration     Pain Screening   Pain Screening  Patient Currently in Pain: Yes  Pain Assessment: 0-10  Pain Level: 1  Best Pain Level: 1  Worst Pain Level: 2  Patient's Stated Pain Goal: 0 - No pain  Pain Type: Acute pain  Pain Location: Back  Pain Orientation: Left  Pain Descriptors: Aching, Dull  Pain Frequency: Continuous  Pain Onset: Sudden  Functional Pain Assessment: Activities are not prevented  Pain Management/Relieving Factors: Rest, Ice, Heat    Functional Status    Dominant Hand: : Right    Social History:  Social History  Lives With: Spouse    Occupation/Interests:  Occupation: Retired  Leisure & Hobbies: None    Prior Level of Function:    Independent        Current Level of Function:         ADL Assistance: Independent  Homemaking Assistance: Independent  Ambulation Assistance: Independent  Transfer Assistance: Independent  Active : Yes  Mode of Transportation: Car    OBJECTIVE EXAMINATION   Restrictions:             Review of Systems:  Vision: Within Functional Limits  Hearing: Within functional limits  Overall Orientation Status: Within Normal Limits  Follows Commands: Within Functional Limits    VBI Screening / Lumbar Screening:        Regional Screen:         Observations:  General Observations  Description: normal sitting posture, in no distress. 5x sit to stand: 13.96 sec without UE assistance    Palpation:        Ambulation/Gait (if applicable):       Balance Screen:  Balance  Single Stance R Le  Single Stance L Leg: 3  Comments: no increase in back pain. Neuro Screen:  WNL  Left AROM  Right AROM          WNL with exception of hip ext limited to 5 deg due to stiffness and weakness . WNL with exception of hip ext limited to 5 deg due to stiffness and weakness . Left PROM  Right PROM      General PROM LE: Right WFL, Left WFL    General PROM LE: Right WFL, Left WFL        Left Strength  Right Strength      General Strength Testing LE:  (pain with only hip ext testing. 6MWT: 1281ft without rest brek   1.5/10 pain post tx.)  Strength LLE  L Hip Flexion: 4+/5  L Hip Extension: 3-/5  L Hip ABduction: 4+/5  L Hip Internal Rotation: 5/5  L Hip External Rotation: 5/5  L Knee Flexion: 5/5  L Knee Extension: 5/5  L Ankle Dorsiflexion: 5/5 General Strength Testing LE:  (pain with only hip ext testing.           6MWT: 1281ft without rest brek   1.5/10 pain post tx.)  Strength RLE  R Hip Flexion: 4+/5  R Hip Extension: 3-/5  R Hip ABduction: 4+/5  R Hip ADduction: 4+/5  R Hip Internal Rotation: 4+/5  R Hip External Rotation: 5/5  R Knee Flexion: 5/5  R Knee Extension: 5/5  R Ankle Dorsiflexion: 5/5       Lumbar Assessment   AROM Lumbar Spine   Flexion: distal tibia with 1.5/10  Extension: WFL  Lateral Flexion Right: L side pain at distal thigh at 2/10  Lateral Flexion Left: WFL to distal thigh without symptoms  Right Rotation: WFL  Left Rotation: [facility]       Trunk Strength     Trunk Strength  R Internal Obliques: Good  R External Obliques: Good  L Internal Obliques: Good  L External Obliques: Good     Muscle Length/Flexibility: Muscle Length LE  General Muscle Length - LE: Length assessed  Right Rectus Femoris: Tight  Left Rectus Femoris: Tight    Joint Mobility (if applicable):    NA    Special Tests:    (-) SLR, Slump     Additional Finding(s) (if applicable):    Oswestry: 14% disability        ASSESSMENT     Impression:  Pt is 66year old male with 1 month sudden onset of pain into low back after reaching to tie shoe. Pt now has has pain with all activities as low level with minimal fluctuation. Pt demo deficits this date that include hip ext/core weakness, hip flexor stiffness, decreased balance and pain. Testing this date indicate signs and symptoms of lumbar strain. Pt will benefit with PT services with progression of strength/ROM, manual and modalities to return to PLOF. Pt prior to onset of current condition had no pain with able to complete full ADLs and work activities. Patient received education on their current pathology and how their condition effects them with their functional activities. Patient understood discussion and questions were answered. Patient understands their activity limitations and understands rational for treatment progression. Body Structures, Functions, Activity Limitations Requiring Skilled Therapeutic Intervention: Decreased functional mobility , Decreased ROM, Decreased strength, Decreased endurance, Decreased balance    Statement of Medical Necessity: Physical Therapy is both indicated and medically necessary as outlined in the POC to increase the likelihood of meeting the functionally related goals stated below. Patient's Activity Tolerance: Patient tolerated evaluation without incident      Patient's rehabilitation potential/prognosis is considered to be: Good    Factors which may impact rehabilitation potential include: None  Measures taken to address barrier(s): N/A     GOALS   Patient Goal(s): improve pain. Short Term Goals Completed by Defer to Long Term Goals Goal Status                                                                   Long Term Goals Completed by 5 weeks 10/16/22 Goal Status   Pt will demo I with HEP/symptom management.      Pt will demo able to complete bridge without increase in pain to demo improved hip ext strength and tolerance. Pt will demo full lumbar flex AROM without increase in pain to ease reaching to floor. Pt will demo Oswestry <8% disability to demo improved function. Pt will demo normaal hip flexor flexibility to ease improve standing posture and symptoms. TREATMENT PLAN       Requires PT Follow-Up: Yes  Specific Instructions for Next Treatment: review HEP, hip flexor stretching, hip extensor/core strengthening, paraspinal stretching, modalities PRN. Pt. actively involved in establishing Plan of Care and Goals: Yes  Patient/ Caregiver education and instruction: Goals, PT Role, Plan of Care, Evaluative findings, Home Exercise Program             Treatment may include any combination of the following: ROM, Neuromuscular re-education, Manual Therapy - Soft Tissue Mobilization, Therapeutic activities, Home exercise program, Modalities     Frequency / Duration:  Patient to be seen 2 for 5 weeks      Eval Complexity: Overall Evaluation : Low  Decision Making: Low Complexity  History: Personal Factors and/or Comorbidities Impacting POC: Low  Clinical Presentation: Low    Therapist Signature: Vineet Crowder, PT , DPT, OCS    Date: 9/9/2022     1/3/1042 3:14 AM   I certify that the above Therapy Services are being furnished while the patient is under my care. I agree with the treatment plan and certify that this therapy is necessary. Physician's Signature:  ___________________________   Date:_______                                                                   Selena Sanches, *        Physician Comments: _______________________________________________    Please sign and return to   [facility]. Please fax to the location listed below.  [address] YOU for this referral!    [address], [address] [id number]  Dept: [phone]  Loc: [phone] Yes

## 2022-09-13 ENCOUNTER — NON-APPOINTMENT (OUTPATIENT)
Age: 52
End: 2022-09-13

## 2022-10-04 ENCOUNTER — RX RENEWAL (OUTPATIENT)
Age: 52
End: 2022-10-04

## 2022-10-14 ENCOUNTER — APPOINTMENT (OUTPATIENT)
Dept: PULMONOLOGY | Facility: CLINIC | Age: 52
End: 2022-10-14

## 2022-10-14 VITALS
BODY MASS INDEX: 26.26 KG/M2 | DIASTOLIC BLOOD PRESSURE: 86 MMHG | SYSTOLIC BLOOD PRESSURE: 127 MMHG | TEMPERATURE: 98 F | WEIGHT: 183 LBS | HEART RATE: 102 BPM | OXYGEN SATURATION: 92 %

## 2022-10-14 DIAGNOSIS — J98.4 OTHER DISORDERS OF LUNG: ICD-10-CM

## 2022-10-14 DIAGNOSIS — A31.0 PULMONARY MYCOBACTERIAL INFECTION: ICD-10-CM

## 2022-10-14 PROCEDURE — 99214 OFFICE O/P EST MOD 30 MIN: CPT

## 2022-10-14 RX ORDER — VARENICLINE TARTRATE 0.5 (11)-1
0.5 MG X 11 & KIT ORAL
Qty: 1 | Refills: 0 | Status: DISCONTINUED | COMMUNITY
Start: 2020-01-27 | End: 2022-10-14

## 2022-10-14 RX ORDER — NICOTINE POLACRILEX 2 MG/1
2 LOZENGE ORAL
Qty: 90 | Refills: 3 | Status: DISCONTINUED | COMMUNITY
Start: 2020-07-08 | End: 2022-10-14

## 2022-10-14 RX ORDER — VARENICLINE TARTRATE 1 MG/1
1 TABLET, FILM COATED ORAL TWICE DAILY
Qty: 60 | Refills: 0 | Status: DISCONTINUED | COMMUNITY
Start: 2020-02-26 | End: 2022-10-14

## 2022-10-14 RX ORDER — BISACODYL 5 MG/1
TABLET, DELAYED RELEASE ORAL
Refills: 0 | Status: DISCONTINUED | COMMUNITY
End: 2022-10-14

## 2022-10-14 RX ORDER — MIDODRINE HYDROCHLORIDE 10 MG/1
10 TABLET ORAL
Refills: 0 | Status: DISCONTINUED | COMMUNITY
End: 2022-10-14

## 2022-10-14 RX ORDER — DICYCLOMINE HYDROCHLORIDE 10 MG/1
CAPSULE ORAL
Refills: 0 | Status: DISCONTINUED | COMMUNITY
End: 2022-10-14

## 2022-10-14 NOTE — HISTORY OF PRESENT ILLNESS
[Former] : former [Never] : never [TextBox_4] : Mr. JEOVANY ACOSTA is a 49 yo male forme smoker with COPD/emphysema, Crohn's disease, Alfaro's esophagus, emphysema and RUL cavitary lung lesion 2/2 MIGUEL (s/p resection) who is here for f/u.\par \par History: Mr. JEOVANY ACOSTA  was hospitalized at [facility] from 1/7-1/21/2020, was found to have RUL 4.4x2.5cm cavitary mass with irregular borders. IR guided bx was done -  showed necrotizing epithelioid granulomatous inflammation with negative AFB and fungal cultures. HIV, Aspergillus galactomannan and histoplasma Ag were negative. Quant gold negative. He had RUL VATS with wedge resection on 5/29/2020 with pathology showing necrotizing granulomas and cultures growing MIGUEL. \par Followed by Dr uW (thoracic)\par \par Interval hx: \par Last seen Sept 2021\par Had prostate surgery for BPH - no malignancy\par Continues to get nerve blocks for  pain. \par Denies respiratory issues - no SOB, no wheezing, no cough, no pulmonary related hospitalizations\par Few hospitalizations with HTN ([facility])\par \par CT chest 6/2021 - severe upper lobe predominant emphysema, s/u RUL resection, post operative changes, bronchial wall thickening. \par \par PMH: Crohns disease; Jonny's esophagus; depression/anxiety\par Meds: per chart\par All: NKDA\par SH: Homeless, lived in a shelter, currently staying in a hotel. Former EMT. Quit smoking 2/2020\par FH: Father - copd/colon ca. no hx of autoimmune dis\par PMD: TELMA MILLER\par GI: Dr Marie\par Immunizations: refused immunizations [TextBox_11] : 1 [TextBox_13] : 30 [YearQuit] : 2020

## 2022-10-14 NOTE — CONSULT LETTER
[Dear  ___] : Dear  [unfilled], [Consult Letter:] : I had the pleasure of evaluating your patient, [unfilled]. [Please see my note below.] : Please see my note below. [Consult Closing:] : Thank you very much for allowing me to participate in the care of this patient.  If you have any questions, please do not hesitate to contact me. [FreeTextEntry3] : Sincerely,\par \par Ileana Disla MD\par [facility]\par Pulmonary Medicine\par

## 2022-10-14 NOTE — ASSESSMENT
[FreeTextEntry1] : 50 yo male former smoker with with emphysema, Crohn's disease (on Pentasa), w cavitary lung nodule (s/p resection) here for f/u.  \par \par #RUL cavitary mass - s/p RUL wedge resection with Dr Marley on 5/29/2020 with pathology showing necrotizing granulomas and cultures growing MIGUEL. Patient has no evidence of current MIGUEL infection and feels well. \par CT chest 7/2022 with postoperative scarring/changes\par \par #COPD/emphysema - needs repeat PFTs\par -- c/w Incruse for now, Albuterol prn\par \par #Smoker - quit 2/2020\par -- repeat CT chest 7/2023\par \par #HCM - refusing all vaccines strongly advised\par \par All questions answered. Patient in agreement with plan. \par f/u after repeat PFTs\par \par

## 2022-10-21 ENCOUNTER — APPOINTMENT (OUTPATIENT)
Dept: PULMONOLOGY | Facility: CLINIC | Age: 52
End: 2022-10-21

## 2022-10-21 PROCEDURE — 94729 DIFFUSING CAPACITY: CPT

## 2022-10-21 PROCEDURE — 94060 EVALUATION OF WHEEZING: CPT

## 2022-10-21 PROCEDURE — 94727 GAS DIL/WSHOT DETER LNG VOL: CPT

## 2022-12-01 ENCOUNTER — RX RENEWAL (OUTPATIENT)
Age: 52
End: 2022-12-01

## 2022-12-22 NOTE — ED ADULT NURSE NOTE - PRO INTERPRETER NEED 2
Patient is a 59 year old here accompanied by spouse today for infusion of IVF per order of Dr michelle. Patient identified with two identifiers, order verified, and verbal consent for today's infusion obtained from patient.      Patient meets order parameters for today's treatment.    Patient denies questions or concerns regarding infusion and/or medication(s) being administered.    Patients port accessed using non-coring, 19 gauge, 3/4 inch needle. Port accessed per facility protocol. Port flushed easily, blood return noted.  No signs and symptoms of infection or infiltration.      IV pump verified with IVF dose, drug, and rate of administration. Infusion administered per protocol. Patient tolerated infusion well, no signs or symptoms of adverse reaction noted.  Patient denies pain nor discomfort.     IV removed, catheter intact. Site clean, dry and intact. No signs or symptoms of infiltration or infection. Covered with a sterile bandage, slight pressure applied for 30 seconds. Pt instructed to leave bandage intact for a minimum of one hour, and to call with questions or concerns. Patient states understanding, discharged.     English

## 2023-01-02 ENCOUNTER — NON-APPOINTMENT (OUTPATIENT)
Age: 53
End: 2023-01-02

## 2023-01-31 ENCOUNTER — RX RENEWAL (OUTPATIENT)
Age: 53
End: 2023-01-31

## 2023-02-05 ENCOUNTER — NON-APPOINTMENT (OUTPATIENT)
Age: 53
End: 2023-02-05

## 2023-02-09 ENCOUNTER — NON-APPOINTMENT (OUTPATIENT)
Age: 53
End: 2023-02-09

## 2023-02-14 NOTE — ED ADULT NURSE NOTE - PAIN RATING/NUMBER SCALE (0-10): ACTIVITY
Continue Regimen: mometasone 0.1 % topical solution: Apply to affected areas on scalp twice daily as needed Detail Level: Zone Continue Regimen: ketoconazole 2 % shampoo Wash scalp 2-3 times a week. Leave on 5-10 mins then rinse off. 6

## 2023-03-04 NOTE — ED PROVIDER NOTE - CPE EDP CARDIAC NORM
decreased balance during turns/losing balance/decreased sequencing ability/decreased step length/inability to maintain weight-bearing restrictions w/o assist/decreased weight-shifting ability
normal...

## 2023-03-20 ENCOUNTER — NON-APPOINTMENT (OUTPATIENT)
Age: 53
End: 2023-03-20

## 2023-03-21 ENCOUNTER — RX RENEWAL (OUTPATIENT)
Age: 53
End: 2023-03-21

## 2023-04-14 ENCOUNTER — APPOINTMENT (OUTPATIENT)
Dept: PULMONOLOGY | Facility: CLINIC | Age: 53
End: 2023-04-14

## 2023-05-10 ENCOUNTER — APPOINTMENT (OUTPATIENT)
Dept: PULMONOLOGY | Facility: CLINIC | Age: 53
End: 2023-05-10
Payer: MEDICAID

## 2023-05-10 VITALS
HEART RATE: 88 BPM | WEIGHT: 183 LBS | SYSTOLIC BLOOD PRESSURE: 132 MMHG | HEIGHT: 70 IN | BODY MASS INDEX: 26.2 KG/M2 | DIASTOLIC BLOOD PRESSURE: 82 MMHG | RESPIRATION RATE: 15 BRPM | TEMPERATURE: 98 F | OXYGEN SATURATION: 97 %

## 2023-05-10 PROCEDURE — 99214 OFFICE O/P EST MOD 30 MIN: CPT

## 2023-05-10 RX ORDER — UMECLIDINIUM 62.5 UG/1
62.5 AEROSOL, POWDER ORAL
Qty: 3 | Refills: 1 | Status: DISCONTINUED | COMMUNITY
Start: 2020-01-27 | End: 2023-05-10

## 2023-05-10 NOTE — HISTORY OF PRESENT ILLNESS
[Former] : former [Never] : never [TextBox_4] : Mr. JEOVANY ACOSTA is a 51 yo male forme smoker with COPD/emphysema, Crohn's disease, Alfaro's esophagus, emphysema and RUL cavitary lung lesion 2/2 MIGUEL (s/p resection) who is here for f/u.\par \par History: Mr. JEOVANY ACOSTA  was hospitalized at [facility] from 1/7-1/21/2020, was found to have RUL 4.4x2.5cm cavitary mass with irregular borders. IR guided bx was done -  showed necrotizing epithelioid granulomatous inflammation with negative AFB and fungal cultures. HIV, Aspergillus galactomannan and histoplasma Ag were negative. Quant gold negative. He had RUL VATS with wedge resection on 5/29/2020 with pathology showing necrotizing granulomas and cultures growing MIGUEL. \par Followed by Dr Wu (thoracic)\par \par Interval hx: \par Compliant with Incruse.  Notes some labored breathing after walking up the stairs.  Had cardiac evaluation which she reports was normal.\par Using albuterol less than 1 time per week.\par \par Fell last months and broke 4 ribs.  Getting nerve block next week\par \par CT chest 6/2021 - severe upper lobe predominant emphysema, s/u RUL resection, post operative changes, bronchial wall thickening. 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 no hx of autoimmune dis\par PMD: TELMA MILLER\par GI: Dr Marie\par Immunizations: refused immunizations [YearQuit] : 2/2020

## 2023-05-10 NOTE — ASSESSMENT
[FreeTextEntry1] : 50 yo male former smoker with with emphysema, Crohn's disease (on Pentasa), w cavitary lung nodule (s/p resection) here for f/u.  \par \par #RUL cavitary mass - s/p RUL wedge resection with Dr Marley on 5/29/2020 with pathology showing necrotizing granulomas and cultures growing MIGUEL. Patient has no evidence of current MIGUEL infection and feels well. \par CT chest 7/2022 with postoperative scarring/changes\par \par #COPD/emphysema -\par --was previously on Incruse, try Anoro\par \par #Smoker - quit 2/2020\par -- repeat CT chest due 7/2023 (patient reports had CT chest recently, will bring the results)\par \par #HCM - refusing all vaccines strongly advised\par \par All questions answered. Patient in agreement with plan. \par f/u in 4mo\par \par

## 2023-05-10 NOTE — CONSULT LETTER
[Dear  ___] : Dear  [unfilled], [Consult Letter:] : I had the pleasure of evaluating your patient, [unfilled]. [Please see my note below.] : Please see my note below. [Consult Closing:] : Thank you very much for allowing me to participate in the care of this patient.  If you have any questions, please do not hesitate to contact me. [FreeTextEntry3] : Sincerely,\par \par Ileana Disla MD\par [facility]\par Pulmonary Medicine\par

## 2023-05-10 NOTE — PHYSICAL EXAM
[No Acute Distress] : no acute distress [Normal Oropharynx] : normal oropharynx [Normal Appearance] : normal appearance [No Neck Mass] : no neck mass [Normal Rate/Rhythm] : normal rate/rhythm [Normal S1, S2] : normal s1, s2 [No Murmurs] : no murmurs [No Resp Distress] : no resp distress [No Abnormalities] : no abnormalities [Benign] : benign [Normal Gait] : normal gait [No Clubbing] : no clubbing [No Cyanosis] : no cyanosis [No Edema] : no edema [FROM] : FROM [Normal Color/ Pigmentation] : normal color/ pigmentation [No Focal Deficits] : no focal deficits [Oriented x3] : oriented x3 [Normal Affect] : normal affect [TextBox_11] : very poor dentation [TextBox_68] : Slightly decreased air movement [TextBox_80] : well healing vats scars

## 2023-05-16 ENCOUNTER — NON-APPOINTMENT (OUTPATIENT)
Age: 53
End: 2023-05-16

## 2023-05-25 NOTE — PROGRESS NOTE ADULT - PROBLEM SELECTOR PLAN 10
INTERNAL MEDICINE FOLLOW-UP VISIT NOTE    CHIEF COMPLAINT     Chief Complaint   Patient presents with    Hypertension       HPI     Jennifer Motley is a 69 y.o. female who presents for an urgent visit today.    PCP is Tahira Mistry MD, patient is new to me.     Pt following up for DM and HTN.     Doing well with numbers at home  Has left ankle pain and minimal swelling after NO trauma or injury. No history of gout. No fever, chills, nausea, vomiting. Has been eating a lot of seafood recently. Not a drinker. Hydrates well.      Past Medical History:  Past Medical History:   Diagnosis Date    Cataract     Diabetes mellitus type II, controlled     Glaucoma     Hypertension     Idiopathic ventricular tachycardia     Obese     Ocular hypertension     Overactive bladder     Thyroid cyst        Home Medications:  Prior to Admission medications    Medication Sig Start Date End Date Taking? Authorizing Provider   aspirin (ECOTRIN) 81 MG EC tablet Take 1 tablet (81 mg total) by mouth once daily. 1/13/21 2/27/23  Tahira Mistry MD   B-complex with vitamin C (SUPER B COMPLEX-VITAMIN C) tablet Take 1 tablet by mouth once daily. 1/15/21   Tahira Mistry MD   calcium-vitamin D3 500 mg(1,250mg) -200 unit per tablet Take 1 tablet by mouth once daily.    Historical Provider   cranberry 500 mg Cap Take 1 capsule by mouth once daily. 1/13/21   Tahira Mistry MD   dorzolamide (TRUSOPT) 2 % ophthalmic solution Place 1 drop into both eyes 3 (three) times daily. 2/27/23   Supriya Simons MD   fesoterodine (TOVIAZ) 8 mg Tb24 Take 1 tablet by mouth once daily.    Historical Provider   FLAXSEED ORAL Take 1 tablet by mouth once daily at 6am.     Historical Provider   hydroCHLOROthiazide (HYDRODIURIL) 25 MG tablet Take 1 tablet (25 mg total) by mouth once daily. 12/15/22   Tahira Mistry MD   latanoprost 0.005 % ophthalmic solution Place 1 drop into both eyes every evening. 4/24/23   Supriya Simons MD   metFORMIN (GLUCOPHAGE) 500 MG  tablet Take 1 tablet (500 mg total) by mouth daily with breakfast. 5/18/22 5/18/23  Thaira Mistry MD   multivitamin capsule Take 1 capsule by mouth once daily.    Historical Provider   turmeric root extract 500 mg Cap Take 1 each by mouth once daily. 1/13/21   Tahira Mistry MD   valsartan (DIOVAN) 160 MG tablet Take 1 tablet (160 mg total) by mouth once daily. 5/18/22 5/18/23  Tahira Mistry MD   verapamiL (CALAN-SR) 180 MG CR tablet TAKE 1 TABLET EVERY EVENING 9/12/22   Gerhard Cohen MD       Review of Systems:  Review of Systems   Constitutional:  Negative for chills and fever.   HENT:  Negative for sore throat and trouble swallowing.    Eyes:  Negative for visual disturbance.   Respiratory:  Negative for cough and shortness of breath.    Cardiovascular:  Negative for chest pain.   Gastrointestinal:  Negative for abdominal pain, constipation, diarrhea, nausea and vomiting.   Genitourinary:  Negative for dysuria and flank pain.   Musculoskeletal:  Negative for back pain, neck pain and neck stiffness.        Left ankle pain    Skin:  Negative for rash.   Neurological:  Negative for dizziness, syncope, weakness and headaches.   Psychiatric/Behavioral:  Negative for confusion.      Health Maintainence:   Immunizations:  Health Maintenance         Date Due Completion Date    Shingles Vaccine (1 of 2) Never done ---    Mammogram 06/19/2023 12/19/2022    Override on 12/28/2020: Done    Override on 10/17/2019: Done    Hemoglobin A1c 08/01/2023 2/1/2023    Override on 5/8/2018: Done    Diabetes Urine Screening 11/15/2023 11/15/2022    Foot Exam 11/15/2023 11/15/2022    Override on 7/25/2019: Done    Override on 5/22/2018: Done    DEXA Scan 12/18/2023 12/18/2020    Override on 6/1/2015: Done    Lipid Panel 02/01/2024 2/1/2023    Override on 9/16/2021: Done    Override on 4/20/2018: Done    Override on 10/1/2016: Done    Eye Exam 02/27/2024 2/27/2023    Override on 11/13/2020: Done    Override on 4/23/2018: Done     Colorectal Cancer Screening 06/07/2024 6/7/2021    TETANUS VACCINE 10/11/2030 10/11/2020             PHYSICAL EXAM     There were no vitals taken for this visit.    Physical Exam  Vitals and nursing note reviewed.   Constitutional:       Appearance: Normal appearance.      Comments: Obese female in NAD or apparent pain. She makes good eye contact, speaks in clear full sentences and ambulates with ease.    HENT:      Head: Normocephalic and atraumatic.      Nose: Nose normal.      Mouth/Throat:      Pharynx: Oropharynx is clear.   Eyes:      Conjunctiva/sclera: Conjunctivae normal.   Cardiovascular:      Rate and Rhythm: Normal rate and regular rhythm.      Pulses: Normal pulses.   Pulmonary:      Effort: No respiratory distress.   Abdominal:      Tenderness: There is no abdominal tenderness.   Musculoskeletal:         General: Normal range of motion.      Cervical back: No rigidity.      Comments: Medial aspect of TTP to the medial malleolus. No overlying skin changes. There is some warmth to touch and redness. No swelling. Normal PT and DP pulses.    Skin:     General: Skin is warm and dry.      Capillary Refill: Capillary refill takes less than 2 seconds.      Findings: No rash.   Neurological:      General: No focal deficit present.      Mental Status: She is alert.      Gait: Gait normal.   Psychiatric:         Mood and Affect: Mood normal.       LABS     Lab Results   Component Value Date    HGBA1C 6.3 (H) 11/15/2022     CMP  Sodium   Date Value Ref Range Status   11/16/2020 140 136 - 145 mmol/L Final     Potassium   Date Value Ref Range Status   11/16/2020 4.2 3.5 - 5.1 mmol/L Final     Chloride   Date Value Ref Range Status   11/16/2020 103 95 - 110 mmol/L Final     CO2   Date Value Ref Range Status   11/16/2020 29 23 - 29 mmol/L Final     Glucose   Date Value Ref Range Status   11/16/2020 118 (H) 70 - 110 mg/dL Final     BUN   Date Value Ref Range Status   11/16/2020 16 8 - 23 mg/dL Final     Creatinine    Date Value Ref Range Status   11/16/2020 0.8 0.5 - 1.4 mg/dL Final     Calcium   Date Value Ref Range Status   11/16/2020 9.8 8.7 - 10.5 mg/dL Final     Total Protein   Date Value Ref Range Status   11/16/2020 8.1 6.0 - 8.4 g/dL Final     Albumin   Date Value Ref Range Status   11/16/2020 4.0 3.5 - 5.2 g/dL Final     Total Bilirubin   Date Value Ref Range Status   11/16/2020 0.5 0.1 - 1.0 mg/dL Final     Comment:     For infants and newborns, interpretation of results should be based  on gestational age, weight and in agreement with clinical  observations.  Premature Infant recommended reference ranges:  Up to 24 hours.............<8.0 mg/dL  Up to 48 hours............<12.0 mg/dL  3-5 days..................<15.0 mg/dL  6-29 days.................<15.0 mg/dL       Alkaline Phosphatase   Date Value Ref Range Status   11/16/2020 69 55 - 135 U/L Final     AST   Date Value Ref Range Status   11/16/2020 20 10 - 40 U/L Final     ALT   Date Value Ref Range Status   11/16/2020 25 10 - 44 U/L Final     Anion Gap   Date Value Ref Range Status   11/16/2020 8 8 - 16 mmol/L Final     eGFR if    Date Value Ref Range Status   11/16/2020 >60 >60 mL/min/1.73 m^2 Final     eGFR if non    Date Value Ref Range Status   11/16/2020 >60 >60 mL/min/1.73 m^2 Final     Comment:     Calculation used to obtain the estimated glomerular filtration  rate (eGFR) is the CKD-EPI equation.        Lab Results   Component Value Date    WBC 5.63 11/16/2020    HGB 12.4 11/16/2020    HCT 39.7 11/16/2020    MCV 90 11/16/2020     11/16/2020     Lab Results   Component Value Date    CHOL 186 11/16/2020    CHOL 180 07/25/2019     Lab Results   Component Value Date    HDL 59 11/16/2020    HDL 59 07/25/2019     Lab Results   Component Value Date    LDLCALC 105.0 11/16/2020    LDLCALC 104.2 07/25/2019     Lab Results   Component Value Date    TRIG 110 11/16/2020    TRIG 84 07/25/2019     Lab Results   Component Value Date     CHOLHDL 31.7 11/16/2020    CHOLHDL 32.8 07/25/2019     Lab Results   Component Value Date    TSH 0.847 11/16/2020       ASSESSMENT/PLAN     Jennifer Motley is a 69 y.o. female     Jennifer was seen today for hypertension. Well controlled, continue current regimen. Pt concerned for gout to left ankle. Will check uric acid and x-ray. Will treat with NSAIDs once dx reviewed.     Diagnoses and all orders for this visit:    Acute left ankle pain  -     URIC ACID; Future  -     X-Ray Ankle Complete 3 View Left; Future    Essential hypertension  -     COMPREHENSIVE METABOLIC PANEL; Future  -     Hemoglobin A1C; Future    Controlled type 2 diabetes mellitus without complication, without long-term current use of insulin  -     COMPREHENSIVE METABOLIC PANEL; Future  -     Hemoglobin A1C; Future       Patient was counseled on when and how to seek emergent care.       Henny Farfan PA-C   Department of Internal Medicine - [facility]   11:58 AM      c/o left side chest pain on and off for last 2 weeks but just telling me this on 1/12/2020   monitor on   telemetry    ekg noted wnl n acute st or t wave changes  f/u  echo   cardiac enzymes negative times 3   hgba1c wnl   lipid profile penidng

## 2023-06-05 NOTE — PROGRESS NOTE ADULT - PROBLEM SELECTOR PLAN 2
Name: Vanessa Root ADMIT: 2023   : 1974  PCP: Reese Batista MD    MRN: [id number] LOS: 12 days   AGE/SEX: 49 y.o. female  ROOM: [facility]     Subjective   Subjective   Patient lying comfortably in bed. No new complaints. No fevers or chills. Asking about getting SCDs at home. She feels it is helping her neuropathy.    Review of Systems   Constitutional:  Negative for chills and fever.   Respiratory:  Negative for cough and shortness of breath.    Cardiovascular:  Negative for chest pain and leg swelling.   Gastrointestinal:  Negative for abdominal pain, diarrhea and nausea.      Objective   Objective   Vital Signs  Temp:  [97.6 °F (36.4 °C)-98.2 °F (36.8 °C)] 98.2 °F (36.8 °C)  Heart Rate:  [76-84] 83  Resp:  [18-20] 18  BP: (110-166)/() 110/62  SpO2:  [93 %-96 %] 93 %  on  Flow (L/min):  [0] 0;   Device (Oxygen Therapy): CPAP  Body mass index is 47.36 kg/m².    Physical Exam  Constitutional:       General: She is not in acute distress.     Appearance: She is obese. She is not diaphoretic.   HENT:      Mouth/Throat:      Mouth: Mucous membranes are moist.      Pharynx: Oropharynx is clear.   Cardiovascular:      Rate and Rhythm: Normal rate and regular rhythm.   Pulmonary:      Effort: Pulmonary effort is normal. No respiratory distress.      Breath sounds: No wheezing.   Abdominal:      General: Abdomen is flat. There is no distension.      Palpations: Abdomen is soft.      Tenderness: There is no guarding or rebound.   Musculoskeletal:         General: No swelling or deformity. Normal range of motion.   Skin:     Comments: Wound VAC on gluteal wound   Neurological:      General: No focal deficit present.      Mental Status: She is alert and oriented to person, place, and time. Mental status is at baseline.       Results Review     I reviewed the patient's new clinical results.  Results from last 7 days   Lab Units 23  0606 23  0621 23  0643 23  0620   WBC  10*3/mm3 15.92* 17.05* 20.83* 24.12*   HEMOGLOBIN g/dL 9.5* 9.1* 9.1* 9.0*   PLATELETS 10*3/mm3 409 411 428 436       Results from last 7 days   Lab Units 06/05/23  0607 06/04/23  0621 06/03/23  0643 06/02/23  0620   SODIUM mmol/L 140 139 144 142   POTASSIUM mmol/L 4.7 4.8 4.8 4.4   CHLORIDE mmol/L 104 107 110* 107   CO2 mmol/L 25.0 26.0 25.2 26.7   BUN mg/dL 11 12 14 9   CREATININE mg/dL 0.83 0.95 0.94 0.82   GLUCOSE mg/dL 109* 122* 144* 127*     Estimated Creatinine Clearance: 108.6 mL/min (by C-G formula based on SCr of 0.83 mg/dL).  Results from last 7 days   Lab Units 06/05/23  0607 06/04/23  0621 06/03/23  0643 06/02/23  0620   ALBUMIN g/dL 2.8* 2.6* 2.7* 2.7*   BILIRUBIN mg/dL 0.3 0.3 0.3 0.3   ALK PHOS U/L 268* 265* 259* 263*   AST (SGOT) U/L 24 19 17 19   ALT (SGPT) U/L 14 15 13 13       Results from last 7 days   Lab Units 06/05/23  0607 06/04/23  0621 06/03/23  0643 06/02/23  0620   CALCIUM mg/dL 9.1 8.9 8.2* 8.1*   ALBUMIN g/dL 2.8* 2.6* 2.7* 2.7*   MAGNESIUM mg/dL 1.9 2.1 2.2 2.3       Results from last 7 days   Lab Units 06/01/23  0454 05/31/23  1117 05/31/23  0702   PROCALCITONIN ng/mL 0.37*  --  0.44*   LACTATE mmol/L  --  1.8  --      No results found for: COVID19  Glucose   Date/Time Value Ref Range Status   06/05/2023 1259 97 70 - 130 mg/dL Final     Comment:     Meter: [id number] : [id number] Dex Del Castillo RN   06/05/2023 0836 122 70 - 130 mg/dL Final     Comment:     Meter: [id number] : [id number] Dex Del Castillo RN   06/04/2023 2202 109 70 - 130 mg/dL Final     Comment:     Meter: [id number] : [id number] Leandro Babbnilorraine STERLING RN   06/04/2023 1703 137 (H) 70 - 130 mg/dL Final     Comment:     Meter: [id number] : [id number] Samantha Davila RN   06/04/2023 1610 138 (H) 70 - 130 mg/dL Final     Comment:     Meter: [id number] : [id number] Elizabet Reis NA   06/04/2023 1218 121 70 - 130 mg/dL Final     Comment:     Meter: [id number] : [id number] Samantha Davila RN   06/04/2023 1122 169  (H) 70 - 130 mg/dL Final     Comment:     Meter: [id number] : [id number] SHARMA       Duplex Venous Upper Extremity - Bilateral CAR    Acute right upper extremity superficial thrombophlebitis noted in the   cephalic (upper arm) and cephalic (forearm).    Acute left upper extremity superficial thrombophlebitis noted in the   basilic (upper arm), cephalic (upper arm), cephalic (forearm) and median   cubital.    All other vessels appear normal.    Scheduled Meds  vitamin C, 250 mg, Oral, Daily  atorvastatin, 80 mg, Oral, Daily  enoxaparin, 40 mg, Subcutaneous, Q12H  gabapentin, 300 mg, Oral, Q12H  hydrocortisone-bacitracin-zinc oxide-nystatin, 1 application, Topical, Q12H  insulin glargine, 1-200 Units, Subcutaneous, Nightly - Glucommander  insulin lispro, 1-200 Units, Subcutaneous, 4x Daily With Meals & Nightly  levothyroxine, 250 mcg, Oral, Q AM  pantoprazole, 40 mg, Oral, Q AM  potassium chloride, 20 mEq, Oral, BID With Meals    Continuous Infusions   PRN Meds    acetaminophen    Calcium Replacement - Follow Nurse / BPA Driven Protocol    dextrose    dextrose    glucagon (human recombinant)    HYDROcodone-acetaminophen    hydrOXYzine    insulin lispro    loperamide    Magnesium Standard Dose Replacement - Follow Nurse / BPA Driven Protocol    melatonin    Morphine    ondansetron **OR** ondansetron    Phosphorus Replacement - Follow Nurse / BPA Driven Protocol    Potassium Replacement - Follow Nurse / BPA Driven Protocol    sodium chloride          Assessment/Plan     Active Hospital Problems    Diagnosis  POA    **Necrotizing soft tissue infection [M79.89]  Yes    Fasciitis [M72.9]  Yes    Hypokalemia [E87.6]  Yes    Hypomagnesemia [E83.42]  Yes    Immobility syndrome [M62.3]  Yes    Sepsis, due to unspecified organism, unspecified whether acute organ dysfunction present [A41.9]  Yes    Diabetes mellitus [E11.9]  Yes    Spinal stenosis of lumbar region [M48.061]  Yes    Diabetic peripheral neuropathy  [E11.42]  Yes    Obstructive sleep apnea syndrome [G47.33]  Yes    Hypothyroidism [E03.9]  Yes      Resolved Hospital Problems   No resolved problems to display.       49 y.o. female admitted with Necrotizing soft tissue infection.    Right necrotic gluteal decubitus with necrotizing fasciitis with sepsis  Left lower lobe aspiration pneumonia  Bilateral superficial thrombophlebitis of upper extremities, no DVT  -s/p debridement 5/25  -Leukocytosis improving  -CT chest with patchy consolidation in the left lower lobe.  Recommend follow-up imaging to ensure resolution.  -Completed amoxicillin/clavulanic acid 6/4/2023    Immobility secondary to spinal stenosis and peripheral neuropathy     Type 2 diabetes  -Patient is on Glucomander  -Blood sugars acceptable     Hypokalemia and hypomagnesemia  -Have been replaced     Hypothyroidism  -Continue with Synthroid at higher dose  -tsh 22, she was off thyroid meds x 3 months prior to admission     CKD stage II  -Stable    Anxiety  -alprazolam worked well for her. However changed to hydroxyzine and she has requested hydroxyzine prescription on discharge     Adenopathy  -Follow-up CT scan of the abdomen and pelvis in 6 months     Neuropathy  -Patient states Neurontin helped in the past  -She also feels SCDs are helping and would like some for home use    Pharmacy to dose Lovenox for DVT prophylaxis.  Full code.  Discussed with patient, nursing staff, and care team on multidisciplinary rounds.  Anticipate discharge home probably tomorrow pending transportation and hospital bed    Gerardo Burks MD  [facility]  06/05/23           PPI BID

## 2023-06-26 NOTE — ED ADULT NURSE NOTE - NSSUHOSCREENINGYN_ED_ALL_ED
Bleeding that does not stop/Swelling that gets worse/Pain not relieved by Medications/Fever greater than (need to indicate Fahrenheit or Celsius)/Wound/Surgical Site with redness, or foul smelling discharge or pus/Nausea and vomiting that does not stop/Unable to urinate/Inability to tolerate liquids or foods
Yes - the patient is able to be screened

## 2023-06-28 NOTE — PROGRESS NOTE ADULT - PROBLEM SELECTOR PROBLEM 1
Lung mass Soolantra Pregnancy And Lactation Text: This medication is Pregnancy Category C. This medication is considered safe during breast feeding.

## 2023-06-30 ENCOUNTER — APPOINTMENT (OUTPATIENT)
Dept: ORTHOPEDIC SURGERY | Facility: CLINIC | Age: 53
End: 2023-06-30
Payer: MEDICAID

## 2023-06-30 VITALS — BODY MASS INDEX: 26.2 KG/M2 | HEIGHT: 70 IN | WEIGHT: 183 LBS

## 2023-06-30 DIAGNOSIS — M81.0 AGE-RELATED OSTEOPOROSIS W/OUT CURRENT PATHOLOGICAL FRACTURE: ICD-10-CM

## 2023-06-30 DIAGNOSIS — G56.20 LESION OF ULNAR NERVE, UNSPECIFIED UPPER LIMB: ICD-10-CM

## 2023-06-30 PROCEDURE — 99204 OFFICE O/P NEW MOD 45 MIN: CPT

## 2023-06-30 PROCEDURE — 73030 X-RAY EXAM OF SHOULDER: CPT | Mod: LT

## 2023-06-30 PROCEDURE — 72040 X-RAY EXAM NECK SPINE 2-3 VW: CPT

## 2023-06-30 NOTE — DATA REVIEWED
[MRI] : MRI [Cervical Spine] : cervical spine [I reviewed the films/CD and agree] : I reviewed the films/CD and agree [FreeTextEntry1] : extensive ddd, cord impingement without myelomalacia(2021)

## 2023-06-30 NOTE — ASSESSMENT
[FreeTextEntry1] : Cervical radiculitis\par L Ulnar neuritis\par \par Needs updated MRI cervical spine- may need cervical decompression\par Will have him f/u with Dr. Jason

## 2023-06-30 NOTE — PHYSICAL EXAM
No [Flexion] : flexion [Extension] : extension [Rotation to left] : rotation to left [Rotation to right] : rotation to right [NL (150)] : flexion 150 degrees [NL (0)] : extension 0 degrees [NL (90)] : supination 90 degrees [Straightening consistent with spasm] : Straightening consistent with spasm [Disc space narrowing] : Disc space narrowing [Left] : left shoulder [There are no fractures, subluxations or dislocations. No significant abnormalities are seen] : There are no fractures, subluxations or dislocations. No significant abnormalities are seen [] : Numbness does not radiate to right arm with motion [FreeTextEntry1] : non union of displaced clavicle fx [FreeTextEntry9] : no pain

## 2023-06-30 NOTE — HISTORY OF PRESENT ILLNESS
[Gradual] : gradual [Constant] : constant [de-identified] : 6/30/23- 52M RHD\par \par Here for consult from neurologist, Dr. Frank.  He has hx C4-5 hnp, LUE radicular pain, received TPI (pain mgmt Dr. Macario) 6/20/23.  This relieved L arm pain. He continues to have pain posterior shoulder that radiates into shoulder blade. + cramping sensation. Reports decreased motion, good days and bad.  Concerned he drops things holding and lifting things.  He has L RF, SF is constant. Wakes him at night.  Followed by Dr. Frank.  Had EMG results LUE.  His symptoms have been present 3-4 years. No injury recalled. Was an  at the time lifting steel beams.  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 No neck/arm surgery.  He did PT 3-4 years ago with relief neck and shoulder. \par \par PMH: Crohns, BPH, no DM\par PSH: VATS for Lung CA RUL [FreeTextEntry1] : left shoulder  [FreeTextEntry5] : patient is feeling increasing pain in his shoulder. He has constant  numbness in his hand and ring and pinky fingers

## 2023-07-07 ENCOUNTER — APPOINTMENT (OUTPATIENT)
Dept: MRI IMAGING | Facility: CLINIC | Age: 53
End: 2023-07-07

## 2023-08-21 NOTE — PROGRESS NOTE ADULT - PROBLEM SELECTOR PROBLEM 3
Detail Level: Detailed Hypotension, unspecified hypotension type Include Pregnancy/Lactation Warning?: No Birth Control Pills Pregnancy And Lactation Text: This medication should be avoided if pregnant and for the first 30 days post-partum. Bactrim Pregnancy And Lactation Text: This medication is Pregnancy Category D and is known to cause fetal risk.  It is also excreted in breast milk. Winlevi Counseling:  I discussed with the patient the risks of topical clascoterone including but not limited to erythema, scaling, itching, and stinging. Patient voiced their understanding. Doxycycline Counseling:  Patient counseled regarding possible photosensitivity and increased risk for sunburn.  Patient instructed to avoid sunlight, if possible.  When exposed to sunlight, patients should wear protective clothing, sunglasses, and sunscreen.  The patient was instructed to call the office immediately if the following severe adverse effects occur:  hearing changes, easy bruising/bleeding, severe headache, or vision changes.  The patient verbalized understanding of the proper use and possible adverse effects of doxycycline.  All of the patient's questions and concerns were addressed. Topical Sulfur Applications Counseling: Topical Sulfur Counseling: Patient counseled that this medication may cause skin irritation or allergic reactions.  In the event of skin irritation, the patient was advised to reduce the amount of the drug applied or use it less frequently.   The patient verbalized understanding of the proper use and possible adverse effects of topical sulfur application.  All of the patient's questions and concerns were addressed. Topical Clindamycin Pregnancy And Lactation Text: This medication is Pregnancy Category B and is considered safe during pregnancy. It is unknown if it is excreted in breast milk. Topical Retinoid counseling:  Patient advised to apply a pea-sized amount only at bedtime and wait 30 minutes after washing their face before applying.  If too drying, patient may add a non-comedogenic moisturizer. The patient verbalized understanding of the proper use and possible adverse effects of retinoids.  All of the patient's questions and concerns were addressed. Tetracycline Counseling: Patient counseled regarding possible photosensitivity and increased risk for sunburn.  Patient instructed to avoid sunlight, if possible.  When exposed to sunlight, patients should wear protective clothing, sunglasses, and sunscreen.  The patient was instructed to call the office immediately if the following severe adverse effects occur:  hearing changes, easy bruising/bleeding, severe headache, or vision changes.  The patient verbalized understanding of the proper use and possible adverse effects of tetracycline.  All of the patient's questions and concerns were addressed. Patient understands to avoid pregnancy while on therapy due to potential birth defects. Spironolactone Counseling: Patient advised regarding risks of diarrhea, abdominal pain, hyperkalemia, birth defects (for female patients), liver toxicity and renal toxicity. The patient may need blood work to monitor liver and kidney function and potassium levels while on therapy. The patient verbalized understanding of the proper use and possible adverse effects of spironolactone.  All of the patient's questions and concerns were addressed. Erythromycin Counseling:  I discussed with the patient the risks of erythromycin including but not limited to GI upset, allergic reaction, drug rash, diarrhea, increase in liver enzymes, and yeast infections. Azithromycin Counseling:  I discussed with the patient the risks of azithromycin including but not limited to GI upset, allergic reaction, drug rash, diarrhea, and yeast infections. Isotretinoin Counseling: Patient should get monthly blood tests, not donate blood, not drive at night if vision affected, not share medication, and not undergo elective surgery for 6 months after tx completed. Side effects reviewed, pt to contact office should one occur. Topical Sulfur Applications Pregnancy And Lactation Text: This medication is Pregnancy Category C and has an unknown safety profile during pregnancy. It is unknown if this topical medication is excreted in breast milk. Topical Retinoid Pregnancy And Lactation Text: This medication is Pregnancy Category C. It is unknown if this medication is excreted in breast milk. Tazorac Pregnancy And Lactation Text: This medication is not safe during pregnancy. It is unknown if this medication is excreted in breast milk. Aklief counseling:  Patient advised to apply a pea-sized amount only at bedtime and wait 30 minutes after washing their face before applying.  If too drying, patient may add a non-comedogenic moisturizer.  The most commonly reported side effects including irritation, redness, scaling, dryness, stinging, burning, itching, and increased risk of sunburn.  The patient verbalized understanding of the proper use and possible adverse effects of retinoids.  All of the patient's questions and concerns were addressed. High Dose Vitamin A Pregnancy And Lactation Text: High dose vitamin A therapy is contraindicated during pregnancy and breast feeding. Benzoyl Peroxide Counseling: Patient counseled that medicine may cause skin irritation and bleach clothing.  In the event of skin irritation, the patient was advised to reduce the amount of the drug applied or use it less frequently.   The patient verbalized understanding of the proper use and possible adverse effects of benzoyl peroxide.  All of the patient's questions and concerns were addressed. Benzoyl Peroxide Pregnancy And Lactation Text: This medication is Pregnancy Category C. It is unknown if benzoyl peroxide is excreted in breast milk. Dapsone Counseling: I discussed with the patient the risks of dapsone including but not limited to hemolytic anemia, agranulocytosis, rashes, methemoglobinemia, kidney failure, peripheral neuropathy, headaches, GI upset, and liver toxicity.  Patients who start dapsone require monitoring including baseline LFTs and weekly CBCs for the first month, then every month thereafter.  The patient verbalized understanding of the proper use and possible adverse effects of dapsone.  All of the patient's questions and concerns were addressed. Azelaic Acid Counseling: Patient counseled that medicine may cause skin irritation and to avoid applying near the eyes.  In the event of skin irritation, the patient was advised to reduce the amount of the drug applied or use it less frequently.   The patient verbalized understanding of the proper use and possible adverse effects of azelaic acid.  All of the patient's questions and concerns were addressed. Sarecycline Counseling: Patient advised regarding possible photosensitivity and discoloration of the teeth, skin, lips, tongue and gums.  Patient instructed to avoid sunlight, if possible.  When exposed to sunlight, patients should wear protective clothing, sunglasses, and sunscreen.  The patient was instructed to call the office immediately if the following severe adverse effects occur:  hearing changes, easy bruising/bleeding, severe headache, or vision changes.  The patient verbalized understanding of the proper use and possible adverse effects of sarecycline.  All of the patient's questions and concerns were addressed. Dapsone Pregnancy And Lactation Text: This medication is Pregnancy Category C and is not considered safe during pregnancy or breast feeding. Bactrim Counseling:  I discussed with the patient the risks of sulfa antibiotics including but not limited to GI upset, allergic reaction, drug rash, diarrhea, dizziness, photosensitivity, and yeast infections.  Rarely, more serious reactions can occur including but not limited to aplastic anemia, agranulocytosis, methemoglobinemia, blood dyscrasias, liver or kidney failure, lung infiltrates or desquamative/blistering drug rashes. Aklief Pregnancy And Lactation Text: It is unknown if this medication is safe to use during pregnancy.  It is unknown if this medication is excreted in breast milk.  Breastfeeding women should use the topical cream on the smallest area of the skin for the shortest time needed while breastfeeding.  Do not apply to nipple and areola. Isotretinoin Pregnancy And Lactation Text: This medication is Pregnancy Category X and is considered extremely dangerous during pregnancy. It is unknown if it is excreted in breast milk. Azelaic Acid Pregnancy And Lactation Text: This medication is considered safe during pregnancy and breast feeding. Azithromycin Pregnancy And Lactation Text: This medication is considered safe during pregnancy and is also secreted in breast milk. Tetracycline Pregnancy And Lactation Text: This medication is Pregnancy Category D and not consider safe during pregnancy. It is also excreted in breast milk. Erythromycin Pregnancy And Lactation Text: This medication is Pregnancy Category B and is considered safe during pregnancy. It is also excreted in breast milk. Birth Control Pills Counseling: Birth Control Pill Counseling: I discussed with the patient the potential side effects of OCPs including but not limited to increased risk of stroke, heart attack, thrombophlebitis, deep venous thrombosis, hepatic adenomas, breast changes, GI upset, headaches, and depression.  The patient verbalized understanding of the proper use and possible adverse effects of OCPs. All of the patient's questions and concerns were addressed. Winlevi Pregnancy And Lactation Text: This medication is considered safe during pregnancy and breastfeeding. Spironolactone Pregnancy And Lactation Text: This medication can cause feminization of the male fetus and should be avoided during pregnancy. The active metabolite is also found in breast milk. Detail Level: Zone Minocycline Counseling: Patient advised regarding possible photosensitivity and discoloration of the teeth, skin, lips, tongue and gums.  Patient instructed to avoid sunlight, if possible.  When exposed to sunlight, patients should wear protective clothing, sunglasses, and sunscreen.  The patient was instructed to call the office immediately if the following severe adverse effects occur:  hearing changes, easy bruising/bleeding, severe headache, or vision changes.  The patient verbalized understanding of the proper use and possible adverse effects of minocycline.  All of the patient's questions and concerns were addressed. Topical Clindamycin Counseling: Patient counseled that this medication may cause skin irritation or allergic reactions.  In the event of skin irritation, the patient was advised to reduce the amount of the drug applied or use it less frequently.   The patient verbalized understanding of the proper use and possible adverse effects of clindamycin.  All of the patient's questions and concerns were addressed. Doxycycline Pregnancy And Lactation Text: This medication is Pregnancy Category D and not consider safe during pregnancy. It is also excreted in breast milk but is considered safe for shorter treatment courses. High Dose Vitamin A Counseling: Side effects reviewed, pt to contact office should one occur. Tazorac Counseling:  Patient advised that medication is irritating and drying.  Patient may need to apply sparingly and wash off after an hour before eventually leaving it on overnight.  The patient verbalized understanding of the proper use and possible adverse effects of tazorac.  All of the patient's questions and concerns were addressed.

## 2023-10-04 ENCOUNTER — APPOINTMENT (OUTPATIENT)
Dept: PULMONOLOGY | Facility: CLINIC | Age: 53
End: 2023-10-04
Payer: MEDICAID

## 2023-10-04 VITALS
HEART RATE: 80 BPM | SYSTOLIC BLOOD PRESSURE: 123 MMHG | OXYGEN SATURATION: 93 % | WEIGHT: 190 LBS | HEIGHT: 70 IN | DIASTOLIC BLOOD PRESSURE: 82 MMHG | BODY MASS INDEX: 27.2 KG/M2

## 2023-10-04 PROCEDURE — 99214 OFFICE O/P EST MOD 30 MIN: CPT

## 2023-10-04 RX ORDER — UMECLIDINIUM BROMIDE AND VILANTEROL TRIFENATATE 62.5; 25 UG/1; UG/1
62.5-25 POWDER RESPIRATORY (INHALATION)
Qty: 3 | Refills: 0 | Status: DISCONTINUED | COMMUNITY
Start: 2023-05-10 | End: 2023-10-04

## 2023-10-06 ENCOUNTER — APPOINTMENT (OUTPATIENT)
Dept: MRI IMAGING | Facility: CLINIC | Age: 53
End: 2023-10-06
Payer: MEDICAID

## 2023-10-06 PROCEDURE — 72141 MRI NECK SPINE W/O DYE: CPT

## 2023-10-10 ENCOUNTER — APPOINTMENT (OUTPATIENT)
Dept: ORTHOPEDIC SURGERY | Facility: CLINIC | Age: 53
End: 2023-10-10
Payer: MEDICAID

## 2023-10-10 VITALS — WEIGHT: 190 LBS | HEIGHT: 70 IN | BODY MASS INDEX: 27.2 KG/M2

## 2023-10-10 PROCEDURE — 99213 OFFICE O/P EST LOW 20 MIN: CPT

## 2023-10-25 RX ORDER — ALBUTEROL SULFATE 90 UG/1
108 (90 BASE) INHALANT RESPIRATORY (INHALATION)
Qty: 1 | Refills: 3 | Status: ACTIVE | OUTPATIENT
Start: 2021-09-17

## 2023-11-15 ENCOUNTER — APPOINTMENT (OUTPATIENT)
Dept: ORTHOPEDIC SURGERY | Facility: CLINIC | Age: 53
End: 2023-11-15
Payer: MEDICAID

## 2023-11-15 DIAGNOSIS — M54.12 RADICULOPATHY, CERVICAL REGION: ICD-10-CM

## 2023-11-15 DIAGNOSIS — M50.320 OTHER CERVICAL DISC DEGENERATION, MID-CERVICAL REGION, UNSPECIFIED LEVEL: ICD-10-CM

## 2023-11-15 DIAGNOSIS — M48.02 SPINAL STENOSIS, CERVICAL REGION: ICD-10-CM

## 2023-11-15 DIAGNOSIS — M62.838 OTHER MUSCLE SPASM: ICD-10-CM

## 2023-11-15 PROCEDURE — 99205 OFFICE O/P NEW HI 60 MIN: CPT

## 2023-12-06 ENCOUNTER — APPOINTMENT (OUTPATIENT)
Dept: ORTHOPEDIC SURGERY | Facility: CLINIC | Age: 53
End: 2023-12-06
Payer: MEDICAID

## 2023-12-06 VITALS
WEIGHT: 185 LBS | HEART RATE: 81 BPM | DIASTOLIC BLOOD PRESSURE: 89 MMHG | HEIGHT: 70 IN | BODY MASS INDEX: 26.48 KG/M2 | SYSTOLIC BLOOD PRESSURE: 126 MMHG

## 2023-12-06 DIAGNOSIS — M48.02 SPINAL STENOSIS, CERVICAL REGION: ICD-10-CM

## 2023-12-06 PROCEDURE — 99203 OFFICE O/P NEW LOW 30 MIN: CPT

## 2023-12-06 RX ORDER — AMLODIPINE BESYLATE 5 MG/1
5 TABLET ORAL
Refills: 0 | Status: ACTIVE | COMMUNITY

## 2023-12-06 RX ORDER — FENOFIBRATE 54 MG/1
54 TABLET ORAL
Refills: 0 | Status: ACTIVE | COMMUNITY

## 2023-12-06 RX ORDER — OLANZAPINE 15 MG/1
15 TABLET, FILM COATED ORAL
Refills: 0 | Status: ACTIVE | COMMUNITY

## 2023-12-06 RX ORDER — MIRTAZAPINE 7.5 MG/1
7.5 TABLET, FILM COATED ORAL
Refills: 0 | Status: ACTIVE | COMMUNITY

## 2023-12-06 RX ORDER — ALENDRONATE SODIUM 70 MG/1
70 TABLET ORAL
Refills: 0 | Status: ACTIVE | COMMUNITY

## 2023-12-06 RX ORDER — ONDANSETRON 4 MG/1
4 TABLET ORAL
Refills: 0 | Status: ACTIVE | COMMUNITY

## 2023-12-19 ENCOUNTER — OUTPATIENT (OUTPATIENT)
Dept: OUTPATIENT SERVICES | Facility: HOSPITAL | Age: 53
LOS: 1 days | Discharge: HOME | End: 2023-12-19
Payer: MEDICAID

## 2023-12-19 VITALS
HEART RATE: 80 BPM | SYSTOLIC BLOOD PRESSURE: 133 MMHG | WEIGHT: 186.73 LBS | OXYGEN SATURATION: 97 % | HEIGHT: 70 IN | DIASTOLIC BLOOD PRESSURE: 85 MMHG | RESPIRATION RATE: 18 BRPM | TEMPERATURE: 98 F

## 2023-12-19 DIAGNOSIS — I10 ESSENTIAL (PRIMARY) HYPERTENSION: ICD-10-CM

## 2023-12-19 DIAGNOSIS — Z87.39 PERSONAL HISTORY OF OTHER DISEASES OF THE MUSCULOSKELETAL SYSTEM AND CONNECTIVE TISSUE: ICD-10-CM

## 2023-12-19 DIAGNOSIS — R42 DIZZINESS AND GIDDINESS: ICD-10-CM

## 2023-12-19 DIAGNOSIS — S83.519A SPRAIN OF ANTERIOR CRUCIATE LIGAMENT OF UNSPECIFIED KNEE, INITIAL ENCOUNTER: Chronic | ICD-10-CM

## 2023-12-19 DIAGNOSIS — M54.2 CERVICALGIA: ICD-10-CM

## 2023-12-19 DIAGNOSIS — M54.12 RADICULOPATHY, CERVICAL REGION: ICD-10-CM

## 2023-12-19 DIAGNOSIS — Z01.818 ENCOUNTER FOR OTHER PREPROCEDURAL EXAMINATION: ICD-10-CM

## 2023-12-19 DIAGNOSIS — N40.0 BENIGN PROSTATIC HYPERPLASIA WITHOUT LOWER URINARY TRACT SYMPTOMS: ICD-10-CM

## 2023-12-19 DIAGNOSIS — K22.70 BARRETT'S ESOPHAGUS WITHOUT DYSPLASIA: ICD-10-CM

## 2023-12-19 DIAGNOSIS — J43.9 EMPHYSEMA, UNSPECIFIED: ICD-10-CM

## 2023-12-19 LAB
A1C WITH ESTIMATED AVERAGE GLUCOSE RESULT: 6 % — HIGH (ref 4–5.6)
A1C WITH ESTIMATED AVERAGE GLUCOSE RESULT: 6 % — HIGH (ref 4–5.6)
ALBUMIN SERPL ELPH-MCNC: 3.7 G/DL — SIGNIFICANT CHANGE UP (ref 3.3–5)
ALBUMIN SERPL ELPH-MCNC: 3.7 G/DL — SIGNIFICANT CHANGE UP (ref 3.3–5)
ALP SERPL-CCNC: 111 U/L — SIGNIFICANT CHANGE UP (ref 40–120)
ALP SERPL-CCNC: 111 U/L — SIGNIFICANT CHANGE UP (ref 40–120)
ALT FLD-CCNC: 64 U/L — SIGNIFICANT CHANGE UP (ref 12–78)
ALT FLD-CCNC: 64 U/L — SIGNIFICANT CHANGE UP (ref 12–78)
ANION GAP SERPL CALC-SCNC: 7 MMOL/L — SIGNIFICANT CHANGE UP (ref 5–17)
ANION GAP SERPL CALC-SCNC: 7 MMOL/L — SIGNIFICANT CHANGE UP (ref 5–17)
APTT BLD: 48.6 SEC — HIGH (ref 24.5–35.6)
APTT BLD: 48.6 SEC — HIGH (ref 24.5–35.6)
AST SERPL-CCNC: 47 U/L — HIGH (ref 15–37)
AST SERPL-CCNC: 47 U/L — HIGH (ref 15–37)
BASOPHILS # BLD AUTO: 0.03 K/UL — SIGNIFICANT CHANGE UP (ref 0–0.2)
BASOPHILS # BLD AUTO: 0.03 K/UL — SIGNIFICANT CHANGE UP (ref 0–0.2)
BASOPHILS NFR BLD AUTO: 0.4 % — SIGNIFICANT CHANGE UP (ref 0–2)
BASOPHILS NFR BLD AUTO: 0.4 % — SIGNIFICANT CHANGE UP (ref 0–2)
BILIRUB SERPL-MCNC: 0.6 MG/DL — SIGNIFICANT CHANGE UP (ref 0.2–1.2)
BILIRUB SERPL-MCNC: 0.6 MG/DL — SIGNIFICANT CHANGE UP (ref 0.2–1.2)
BLD GP AB SCN SERPL QL: SIGNIFICANT CHANGE UP
BLD GP AB SCN SERPL QL: SIGNIFICANT CHANGE UP
BUN SERPL-MCNC: 8 MG/DL — SIGNIFICANT CHANGE UP (ref 7–23)
BUN SERPL-MCNC: 8 MG/DL — SIGNIFICANT CHANGE UP (ref 7–23)
CALCIUM SERPL-MCNC: 9.1 MG/DL — SIGNIFICANT CHANGE UP (ref 8.5–10.1)
CALCIUM SERPL-MCNC: 9.1 MG/DL — SIGNIFICANT CHANGE UP (ref 8.5–10.1)
CHLORIDE SERPL-SCNC: 111 MMOL/L — HIGH (ref 96–108)
CHLORIDE SERPL-SCNC: 111 MMOL/L — HIGH (ref 96–108)
CO2 SERPL-SCNC: 24 MMOL/L — SIGNIFICANT CHANGE UP (ref 22–31)
CO2 SERPL-SCNC: 24 MMOL/L — SIGNIFICANT CHANGE UP (ref 22–31)
CREAT SERPL-MCNC: 1.24 MG/DL — SIGNIFICANT CHANGE UP (ref 0.5–1.3)
CREAT SERPL-MCNC: 1.24 MG/DL — SIGNIFICANT CHANGE UP (ref 0.5–1.3)
EGFR: 69 ML/MIN/1.73M2 — SIGNIFICANT CHANGE UP
EGFR: 69 ML/MIN/1.73M2 — SIGNIFICANT CHANGE UP
EOSINOPHIL # BLD AUTO: 0.08 K/UL — SIGNIFICANT CHANGE UP (ref 0–0.5)
EOSINOPHIL # BLD AUTO: 0.08 K/UL — SIGNIFICANT CHANGE UP (ref 0–0.5)
EOSINOPHIL NFR BLD AUTO: 1 % — SIGNIFICANT CHANGE UP (ref 0–6)
EOSINOPHIL NFR BLD AUTO: 1 % — SIGNIFICANT CHANGE UP (ref 0–6)
ESTIMATED AVERAGE GLUCOSE: 126 MG/DL — HIGH (ref 68–114)
ESTIMATED AVERAGE GLUCOSE: 126 MG/DL — HIGH (ref 68–114)
GLUCOSE SERPL-MCNC: 97 MG/DL — SIGNIFICANT CHANGE UP (ref 70–99)
GLUCOSE SERPL-MCNC: 97 MG/DL — SIGNIFICANT CHANGE UP (ref 70–99)
HCT VFR BLD CALC: 45.8 % — SIGNIFICANT CHANGE UP (ref 39–50)
HCT VFR BLD CALC: 45.8 % — SIGNIFICANT CHANGE UP (ref 39–50)
HGB BLD-MCNC: 15.9 G/DL — SIGNIFICANT CHANGE UP (ref 13–17)
HGB BLD-MCNC: 15.9 G/DL — SIGNIFICANT CHANGE UP (ref 13–17)
IMM GRANULOCYTES NFR BLD AUTO: 0.5 % — SIGNIFICANT CHANGE UP (ref 0–0.9)
IMM GRANULOCYTES NFR BLD AUTO: 0.5 % — SIGNIFICANT CHANGE UP (ref 0–0.9)
LYMPHOCYTES # BLD AUTO: 1.47 K/UL — SIGNIFICANT CHANGE UP (ref 1–3.3)
LYMPHOCYTES # BLD AUTO: 1.47 K/UL — SIGNIFICANT CHANGE UP (ref 1–3.3)
LYMPHOCYTES # BLD AUTO: 18.7 % — SIGNIFICANT CHANGE UP (ref 13–44)
LYMPHOCYTES # BLD AUTO: 18.7 % — SIGNIFICANT CHANGE UP (ref 13–44)
MCHC RBC-ENTMCNC: 30 PG — SIGNIFICANT CHANGE UP (ref 27–34)
MCHC RBC-ENTMCNC: 30 PG — SIGNIFICANT CHANGE UP (ref 27–34)
MCHC RBC-ENTMCNC: 34.7 G/DL — SIGNIFICANT CHANGE UP (ref 32–36)
MCHC RBC-ENTMCNC: 34.7 G/DL — SIGNIFICANT CHANGE UP (ref 32–36)
MCV RBC AUTO: 86.4 FL — SIGNIFICANT CHANGE UP (ref 80–100)
MCV RBC AUTO: 86.4 FL — SIGNIFICANT CHANGE UP (ref 80–100)
MONOCYTES # BLD AUTO: 0.74 K/UL — SIGNIFICANT CHANGE UP (ref 0–0.9)
MONOCYTES # BLD AUTO: 0.74 K/UL — SIGNIFICANT CHANGE UP (ref 0–0.9)
MONOCYTES NFR BLD AUTO: 9.4 % — SIGNIFICANT CHANGE UP (ref 2–14)
MONOCYTES NFR BLD AUTO: 9.4 % — SIGNIFICANT CHANGE UP (ref 2–14)
MRSA PCR RESULT.: SIGNIFICANT CHANGE UP
MRSA PCR RESULT.: SIGNIFICANT CHANGE UP
NEUTROPHILS # BLD AUTO: 5.5 K/UL — SIGNIFICANT CHANGE UP (ref 1.8–7.4)
NEUTROPHILS # BLD AUTO: 5.5 K/UL — SIGNIFICANT CHANGE UP (ref 1.8–7.4)
NEUTROPHILS NFR BLD AUTO: 70 % — SIGNIFICANT CHANGE UP (ref 43–77)
NEUTROPHILS NFR BLD AUTO: 70 % — SIGNIFICANT CHANGE UP (ref 43–77)
NRBC # BLD: 0 /100 WBCS — SIGNIFICANT CHANGE UP (ref 0–0)
NRBC # BLD: 0 /100 WBCS — SIGNIFICANT CHANGE UP (ref 0–0)
PLATELET # BLD AUTO: 289 K/UL — SIGNIFICANT CHANGE UP (ref 150–400)
PLATELET # BLD AUTO: 289 K/UL — SIGNIFICANT CHANGE UP (ref 150–400)
POTASSIUM SERPL-MCNC: 3.6 MMOL/L — SIGNIFICANT CHANGE UP (ref 3.5–5.3)
POTASSIUM SERPL-MCNC: 3.6 MMOL/L — SIGNIFICANT CHANGE UP (ref 3.5–5.3)
POTASSIUM SERPL-SCNC: 3.6 MMOL/L — SIGNIFICANT CHANGE UP (ref 3.5–5.3)
POTASSIUM SERPL-SCNC: 3.6 MMOL/L — SIGNIFICANT CHANGE UP (ref 3.5–5.3)
PROT SERPL-MCNC: 7.8 GM/DL — SIGNIFICANT CHANGE UP (ref 6–8.3)
PROT SERPL-MCNC: 7.8 GM/DL — SIGNIFICANT CHANGE UP (ref 6–8.3)
RBC # BLD: 5.3 M/UL — SIGNIFICANT CHANGE UP (ref 4.2–5.8)
RBC # BLD: 5.3 M/UL — SIGNIFICANT CHANGE UP (ref 4.2–5.8)
RBC # FLD: 15.8 % — HIGH (ref 10.3–14.5)
RBC # FLD: 15.8 % — HIGH (ref 10.3–14.5)
S AUREUS DNA NOSE QL NAA+PROBE: SIGNIFICANT CHANGE UP
S AUREUS DNA NOSE QL NAA+PROBE: SIGNIFICANT CHANGE UP
SODIUM SERPL-SCNC: 142 MMOL/L — SIGNIFICANT CHANGE UP (ref 135–145)
SODIUM SERPL-SCNC: 142 MMOL/L — SIGNIFICANT CHANGE UP (ref 135–145)
VIT D25+D1,25 OH+D1,25 PNL SERPL-MCNC: 96 PG/ML — HIGH (ref 19.9–79.3)
VIT D25+D1,25 OH+D1,25 PNL SERPL-MCNC: 96 PG/ML — HIGH (ref 19.9–79.3)
WBC # BLD: 7.86 K/UL — SIGNIFICANT CHANGE UP (ref 3.8–10.5)
WBC # BLD: 7.86 K/UL — SIGNIFICANT CHANGE UP (ref 3.8–10.5)
WBC # FLD AUTO: 7.86 K/UL — SIGNIFICANT CHANGE UP (ref 3.8–10.5)
WBC # FLD AUTO: 7.86 K/UL — SIGNIFICANT CHANGE UP (ref 3.8–10.5)

## 2023-12-19 PROCEDURE — 93010 ELECTROCARDIOGRAM REPORT: CPT

## 2023-12-19 NOTE — H&P PST ADULT - ASSESSMENT
Cervical neck pain for ACDF C3-C7  CAPRINI SCORE [CLOT]    AGE RELATED RISK FACTORS                                                       MOBILITY RELATED FACTORS  [x ] Age 41-60 years                                            (1 Point)                  [ ] Bed rest                                                        (1 Point)  [ ] Age: 61-74 years                                           (2 Points)                 [ ] Plaster cast                                                   (2 Points)  [ ] Age= 75 years                                              (3 Points)                 [ ] Bed bound for more than 72 hours                 (2 Points)    DISEASE RELATED RISK FACTORS                                               GENDER SPECIFIC FACTORS  [ ] Edema in the lower extremities                       (1 Point)                  [ ] Pregnancy                                                     (1 Point)  [ ] Varicose veins                                               (1 Point)                  [ ] Post-partum < 6 weeks                                   (1 Point)             [x ] BMI > 25 Kg/m2                                            (1 Point)                  [ ] Hormonal therapy  or oral contraception          (1 Point)                 [ ] Sepsis (in the previous month)                        (1 Point)                  [ ] History of pregnancy complications                 (1 point)  [ ] Pneumonia or serious lung disease                                               [ ] Unexplained or recurrent                     (1 Point)           (in the previous month)                               (1 Point)  [ ] Abnormal pulmonary function test                     (1 Point)                 SURGERY RELATED RISK FACTORS  [ ] Acute myocardial infarction                              (1 Point)                 [ ]  Section                                             (1 Point)  [ ] Congestive heart failure (in the previous month)  (1 Point)               [ ] Minor surgery                                                  (1 Point)   [ ] Inflammatory bowel disease                             (1 Point)                 [ ] Arthroscopic surgery                                        (2 Points)  [ ] Central venous access                                      (2 Points)                [x ] General surgery lasting more than 45 minutes   (2 Points)       [ ] Stroke (in the previous month)                          (5 Points)               [ ] Elective arthroplasty                                         (5 Points)                                                                                                                                               HEMATOLOGY RELATED FACTORS                                                 TRAUMA RELATED RISK FACTORS  [ ] Prior episodes of VTE                                     (3 Points)                [ ] Fracture of the hip, pelvis, or leg                       (5 Points)  [ ] Positive family history for VTE                         (3 Points)                 [ ] Acute spinal cord injury (in the previous month)  (5 Points)  [ ] Prothrombin 38597 A                                     (3 Points)                 [ ] Paralysis  (less than 1 month)                             (5 Points)  [ ] Factor V Leiden                                             (3 Points)                  [ ] Multiple Trauma within 1 month                        (5 Points)  [ ] Lupus anticoagulants                                     (3 Points)                                                           [ ] Anticardiolipin antibodies                               (3 Points)                                                       [ ] High homocysteine in the blood                      (3 Points)                                             [ ] Other congenital or acquired thrombophilia      (3 Points)                                                [ ] Heparin induced thrombocytopenia                  (3 Points)                                          Total Score [      4    ]    Caprini Score 0 - 2:  Low Risk, No VTE Prophylaxis required for most patients, encourage ambulation  Caprini Score 3 - 6:  At Risk, pharmacologic VTE prophylaxis is indicated for most patients (in the absence of a contraindication)  Caprini Score Greater than or = 7:  High Risk, pharmacologic VTE prophylaxis is indicated for most patients (in the absence of a contraindication) Cervical neck pain for ACDF C3-C7  CAPRINI SCORE [CLOT]    AGE RELATED RISK FACTORS                                                       MOBILITY RELATED FACTORS  [x ] Age 41-60 years                                            (1 Point)                  [ ] Bed rest                                                        (1 Point)  [ ] Age: 61-74 years                                           (2 Points)                 [ ] Plaster cast                                                   (2 Points)  [ ] Age= 75 years                                              (3 Points)                 [ ] Bed bound for more than 72 hours                 (2 Points)    DISEASE RELATED RISK FACTORS                                               GENDER SPECIFIC FACTORS  [ ] Edema in the lower extremities                       (1 Point)                  [ ] Pregnancy                                                     (1 Point)  [ ] Varicose veins                                               (1 Point)                  [ ] Post-partum < 6 weeks                                   (1 Point)             [x ] BMI > 25 Kg/m2                                            (1 Point)                  [ ] Hormonal therapy  or oral contraception          (1 Point)                 [ ] Sepsis (in the previous month)                        (1 Point)                  [ ] History of pregnancy complications                 (1 point)  [ ] Pneumonia or serious lung disease                                               [ ] Unexplained or recurrent                     (1 Point)           (in the previous month)                               (1 Point)  [ ] Abnormal pulmonary function test                     (1 Point)                 SURGERY RELATED RISK FACTORS  [ ] Acute myocardial infarction                              (1 Point)                 [ ]  Section                                             (1 Point)  [ ] Congestive heart failure (in the previous month)  (1 Point)               [ ] Minor surgery                                                  (1 Point)   [ ] Inflammatory bowel disease                             (1 Point)                 [ ] Arthroscopic surgery                                        (2 Points)  [ ] Central venous access                                      (2 Points)                [x ] General surgery lasting more than 45 minutes   (2 Points)       [ ] Stroke (in the previous month)                          (5 Points)               [ ] Elective arthroplasty                                         (5 Points)                                                                                                                                               HEMATOLOGY RELATED FACTORS                                                 TRAUMA RELATED RISK FACTORS  [ ] Prior episodes of VTE                                     (3 Points)                [ ] Fracture of the hip, pelvis, or leg                       (5 Points)  [ ] Positive family history for VTE                         (3 Points)                 [ ] Acute spinal cord injury (in the previous month)  (5 Points)  [ ] Prothrombin 94379 A                                     (3 Points)                 [ ] Paralysis  (less than 1 month)                             (5 Points)  [ ] Factor V Leiden                                             (3 Points)                  [ ] Multiple Trauma within 1 month                        (5 Points)  [ ] Lupus anticoagulants                                     (3 Points)                                                           [ ] Anticardiolipin antibodies                               (3 Points)                                                       [ ] High homocysteine in the blood                      (3 Points)                                             [ ] Other congenital or acquired thrombophilia      (3 Points)                                                [ ] Heparin induced thrombocytopenia                  (3 Points)                                          Total Score [      4    ]    Caprini Score 0 - 2:  Low Risk, No VTE Prophylaxis required for most patients, encourage ambulation  Caprini Score 3 - 6:  At Risk, pharmacologic VTE prophylaxis is indicated for most patients (in the absence of a contraindication)  Caprini Score Greater than or = 7:  High Risk, pharmacologic VTE prophylaxis is indicated for most patients (in the absence of a contraindication)

## 2023-12-19 NOTE — H&P PST ADULT - MUSCULOSKELETAL COMMENTS
Cervical neck pain / Limited rom of neck Cervical neck pain and occasional numbness in left arm. Worse increasing pain when turning head towards the left

## 2023-12-19 NOTE — H&P PST ADULT - NSICDXPROCEDURE_GEN_ALL_CORE_FT
PROCEDURES:  Anterior cervical discectomy and fusion (ACDF) at 4 levels 19-Dec-2023 09:40:50  Hali Kelley P

## 2023-12-19 NOTE — H&P PST ADULT - PROBLEM SELECTOR PLAN 6
Elidel Counseling: Patient may experience a mild burning sensation during topical application. Elidel is not approved in children less than 2 years of age. There have been case reports of hematologic and skin malignancies in patients using topical calcineurin inhibitors although causality is questionable. Continue current medication regime

## 2023-12-19 NOTE — H&P PST ADULT - HISTORY OF PRESENT ILLNESS
53 year old female presents to PST. Patient has a PMHX of Depression, Barrets Esophagitis, COPD, Emphysema and Chrones Disease. Patient has cervical neck pain 2/2 Cervical stenosis and has a planned Anterior Cervical Discectomy Fusion with Dr. Jason on 01/08/2024.     Goal is to be pain free and get back to all activities.     Patient denies any recent travel, cough, fever, chills or recent sick contacts.      53 year old female presents to PST. Patient has a PMHX of Depression, Alfaro Esophagitis, COPD, Emphysema and crohns Disease. Patient has cervical neck pain 2/2 Cervical stenosis and has a planned Anterior Cervical Discectomy Fusion with Dr. Jason on 01/08/2024.     Goal is to be pain free and get back to all activities.     Patient denies any recent travel, cough, fever, chills or recent sick contacts.

## 2023-12-19 NOTE — H&P PST ADULT - PROBLEM SELECTOR PLAN 1
labs - cbc, pt/ptt, cmp, t&s, nose cx, ekg, Vitamin d, hemoglobin a1c     M/C required and pulmonary   preop 3 day hibiclens instruction reviewed and given. instructed on if nose cx positive use mupirocin 5 days and checklist given   take routine meds DOS with sips of water. avoid NSAID and OTC supplements. verbalized understanding   information on proper nutrition, increase protein and better food choices provided in packet   anesthesiologist to review pst labs, ekg, medical clearances and optimization for surgery labs - cbc, pt/ptt, cmp, t&s, nose cx, ekg, Vitamin d, hemoglobin a1c     M/C required, cardiac and pulmonary   preop 3 day hibiclens instruction reviewed and given. instructed on if nose cx positive use mupirocin 5 days and checklist given   take routine meds DOS with sips of water. avoid NSAID and OTC supplements. verbalized understanding   information on proper nutrition, increase protein and better food choices provided in packet   anesthesiologist to review pst labs, ekg, medical clearances and optimization for surgery

## 2023-12-19 NOTE — H&P PST ADULT - NSICDXPASTMEDICALHX_GEN_ALL_CORE_FT
PAST MEDICAL HISTORY:  Alfaro's esophagus without dysplasia     COPD (chronic obstructive pulmonary disease)     Crohn's disease with complication, unspecified gastrointestinal tract location     Depression, unspecified depression type     Emphysema lung     Hypotension     Mass of lung calvatory mass R upper lobe     PAST MEDICAL HISTORY:  Alfaro's esophagus without dysplasia     COPD (chronic obstructive pulmonary disease)     Crohn's disease with complication, unspecified gastrointestinal tract location     Depression, unspecified depression type     Emphysema lung     Hypotension     Mass of lung calvatory mass R upper lobe    Osteoporosis     Vertigo

## 2023-12-20 LAB
INR BLD: 0.91 RATIO — SIGNIFICANT CHANGE UP (ref 0.85–1.18)
INR BLD: 0.91 RATIO — SIGNIFICANT CHANGE UP (ref 0.85–1.18)
PROTHROM AB SERPL-ACNC: 10.9 SEC — SIGNIFICANT CHANGE UP (ref 9.5–13)
PROTHROM AB SERPL-ACNC: 10.9 SEC — SIGNIFICANT CHANGE UP (ref 9.5–13)

## 2023-12-21 ENCOUNTER — APPOINTMENT (OUTPATIENT)
Dept: PULMONOLOGY | Facility: CLINIC | Age: 53
End: 2023-12-21
Payer: MEDICAID

## 2023-12-21 VITALS
WEIGHT: 185.19 LBS | BODY MASS INDEX: 26.51 KG/M2 | RESPIRATION RATE: 16 BRPM | OXYGEN SATURATION: 98 % | HEART RATE: 66 BPM | HEIGHT: 70 IN | SYSTOLIC BLOOD PRESSURE: 104 MMHG | TEMPERATURE: 97.6 F | DIASTOLIC BLOOD PRESSURE: 55 MMHG

## 2023-12-21 DIAGNOSIS — J43.9 EMPHYSEMA, UNSPECIFIED: ICD-10-CM

## 2023-12-21 DIAGNOSIS — Z87.891 PERSONAL HISTORY OF NICOTINE DEPENDENCE: ICD-10-CM

## 2023-12-21 PROCEDURE — 94060 EVALUATION OF WHEEZING: CPT

## 2023-12-21 PROCEDURE — 94727 GAS DIL/WSHOT DETER LNG VOL: CPT

## 2023-12-21 PROCEDURE — 99214 OFFICE O/P EST MOD 30 MIN: CPT | Mod: 25

## 2023-12-21 PROCEDURE — 94729 DIFFUSING CAPACITY: CPT

## 2023-12-21 RX ORDER — DULOXETINE HYDROCHLORIDE 60 MG/1
60 CAPSULE, DELAYED RELEASE PELLETS ORAL
Refills: 0 | Status: DISCONTINUED | COMMUNITY
End: 2023-12-21

## 2023-12-21 NOTE — HISTORY OF PRESENT ILLNESS
[Former] : former [Never] : never [TextBox_4] : Mr. JEOVANY ACOSTA is a 49 yo male forme smoker with COPD/emphysema, Crohn's disease, Alfaro's esophagus, emphysema and RUL cavitary lung lesion 2/2 MIGUEL (s/p resection) who is here for f/u.  History: Mr. JEOVANY ACOSTA  was hospitalized at [facility] from 1/7-1/21/2020, was found to have RUL 4.4x2.5cm cavitary mass with irregular borders. IR guided bx was done -  showed necrotizing epithelioid granulomatous inflammation with negative AFB and fungal cultures. HIV, Aspergillus galactomannan and histoplasma Ag were negative. Quant gold negative. He had RUL VATS with wedge resection on 5/29/2020 with pathology showing necrotizing granulomas and cultures growing MIGUEL.  Followed by Dr Wu (thoracic)  Interval hx:  Last visit was changed from [facility] to [facility].  Has been feeling better.  Denies any respiratory symptoms.  Rare albuterol use.  Good exercise tolerance, no shortness of breath no wheezing. He had COVID recently.  Treated with remdesivir.  Recovered fully Plan for spinal surgery early January.    CT chest 6/2021 - severe upper lobe predominant emphysema, s/u RUL resection, post operative changes, bronchial wall thickening.   PMH: Crohns disease; Jonny's esophagus; depression/anxiety Meds: per chart All: NKDA SH:. Former EMT. Quit smoking 2/2020 FH: Father - copd/colon ca. no hx of autoimmune dis PMD: TELMA MILLER GI: Dr Marie Immunizations: refused immunizations [YearQuit] : 2/2020

## 2023-12-21 NOTE — PHYSICAL EXAM
[No Acute Distress] : no acute distress [Normal Oropharynx] : normal oropharynx [Normal Appearance] : normal appearance [No Neck Mass] : no neck mass [Normal Rate/Rhythm] : normal rate/rhythm [Normal S1, S2] : normal s1, s2 [No Murmurs] : no murmurs [No Resp Distress] : no resp distress [No Abnormalities] : no abnormalities [Benign] : benign [Normal Gait] : normal gait [No Clubbing] : no clubbing [No Cyanosis] : no cyanosis [No Edema] : no edema [FROM] : FROM [Normal Color/ Pigmentation] : normal color/ pigmentation [No Focal Deficits] : no focal deficits [Oriented x3] : oriented x3 [Normal Affect] : normal affect [TextBox_11] : very poor dentation [TextBox_68] : Clear lungs [TextBox_80] : well healing vats scars

## 2023-12-21 NOTE — ASSESSMENT
[FreeTextEntry1] :  50 yo male former smoker with with emphysema, Crohn's disease (on Pentasa), w cavitary lung nodule (s/p resection) here for f/u.  #HCM -from pulmonary standpoint, no  contraindications to patient's orthopedic surgery.  Inhaler should be continued in the perioperative period.  Albuterol can be used on as-needed basis.  #RUL cavitary mass - s/p RUL wedge resection with Dr Marley on 5/29/2020 with pathology showing necrotizing granulomas and cultures growing MIGUEL. Patient has no evidence of current MIGUEL infection and feels well. CT chest 7/2022 with postoperative scarring/changes  #COPD/emphysema -now well-controlled on Breztri.  Pulmonary function testing with mild obstruction -Continue Breztri, albuterol on as-needed basis  #Smoker - quit 2/2020 -- Per patient CT chest was due 7/2023 -request results from Dr. Wu  #HCM - refusing all vaccines strongly advised  All questions answered. Patient in agreement with plan. f/u in 6 mo or sooner if needed

## 2023-12-21 NOTE — REVIEW OF SYSTEMS
[Depression] : no depression [Negative] : Endocrine [TextBox_69] : crohnx disease, currently better controlled [TextBox_83] : bladder spasms [TextBox_91] : pain

## 2023-12-21 NOTE — CONSULT LETTER
[Dear  ___] : Dear  [unfilled], [Consult Letter:] : I had the pleasure of evaluating your patient, [unfilled]. [Please see my note below.] : Please see my note below. [Consult Closing:] : Thank you very much for allowing me to participate in the care of this patient.  If you have any questions, please do not hesitate to contact me. [FreeTextEntry3] : Sincerely,\par  \par  Ileana Disla MD\par  [facility]\par  Pulmonary Medicine\par

## 2024-01-03 NOTE — ASSESSMENT
[FreeTextEntry1] : Pt with severe spinal stenosis and degen changes in c-spine. Discussed surgery is his best option at this point. Pt is well informed and would like to proceed with ACDF C3-C7. He has exhausted forms of conservative tx including injections, meds, and PT. Surgical details discussed. All pt questions answered.   Pt reports having Barretts esophagus and Crohns disease (no biologics) .   C3-C7 Anterior Cervical Discectomy and Fusion- We've discussed the surgery details including instrumentation and grafting options (local, allograft, ICBG, and biologics) as well as potential for complications including but not limited to pain, scar, bleeding, and infection. There is also a possibility for hardware complication such as malposition of hardware, hardware loosening, pullout, failure or fracture of bone, adjacent segment disease, pseudarthrosis, and need for future surgery. Finally, we discussed potential for injury to nerves, the spinal cord either transient or permanent, CSF leak, damage to blood vessels, paralysis, blindness, stroke, dysphagia, dysphonia, need for transfusion, and medical complications.  The patient verbalized understanding and all questions were answered.

## 2024-01-03 NOTE — IMAGING
[de-identified] : CSPINE Inspection: No rash or ecchymosis Palpation: No spasm in traps, rhomboids, paracervicals ROM: limited in all planes Strength: 4/5 left triceps, 4/5 left wrist flexor, 4/5 left  and abductors, 5/5 everywhere else Sensation: Sensation present to light touch bilateral C5-T1 distributions, altered sensation left ulnar Reflexes: Positive Soriano's bilaterally  Bilateral Shoulders- Palpation: No tenderness to palpation ROM: Full with no pain Strength: Decent strength with rotator cuff testing Provocative maneuvers: Negative impingement testing [FreeTextEntry1] : Retrolisthesis C3/4, C4/5

## 2024-01-03 NOTE — HISTORY OF PRESENT ILLNESS
[de-identified] : MRI C Spine 10/6/23-report noted in chart Ind. Review- NF stenosis C3-C7  Pain history:  tried facet blocks in the past with no benefit _________________________ DR. RO NOTES- 6/30/23- 52M RHD Here for consult from neurologist, Dr. Frank. He has hx C4-5 hnp, LUE radicular pain, received TPI (pain mgmt Dr. Macario) 6/20/23. This relieved L arm pain. He continues to have pain posterior shoulder that radiates into shoulder blade. + cramping sensation. Reports decreased motion, good days and bad. Concerned he drops things holding and lifting things. He has L RF, SF is constant. Wakes him at night. Followed by Dr. Frank. Had EMG results LUE. His symptoms have been present 3-4 years. No injury recalled. 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 No neck/arm surgery. He did PT 3-4 years ago with relief neck and shoulder.  PMH: Crohns, BPH, no DM PSH: VATS for Lung CA RUL, prostate, L ACL SH: former smoker, no etoh, +MJ Occ: disabled ____________________________  Patient seen by non-spine partners in the past.  11/15/23-Continued neck pain as above. RHDM. Reports CA free.  [] : no [FreeTextEntry7] : shoulder

## 2024-01-19 PROBLEM — M81.0 AGE-RELATED OSTEOPOROSIS WITHOUT CURRENT PATHOLOGICAL FRACTURE: Chronic | Status: ACTIVE | Noted: 2023-12-19

## 2024-01-19 PROBLEM — R42 DIZZINESS AND GIDDINESS: Chronic | Status: ACTIVE | Noted: 2023-12-19

## 2024-01-23 ENCOUNTER — OUTPATIENT (OUTPATIENT)
Dept: OUTPATIENT SERVICES | Facility: HOSPITAL | Age: 54
LOS: 1 days | Discharge: HOME | End: 2024-01-23
Payer: MEDICAID

## 2024-01-23 VITALS
RESPIRATION RATE: 18 BRPM | HEART RATE: 71 BPM | TEMPERATURE: 97 F | DIASTOLIC BLOOD PRESSURE: 77 MMHG | WEIGHT: 185.63 LBS | SYSTOLIC BLOOD PRESSURE: 119 MMHG | HEIGHT: 70 IN | OXYGEN SATURATION: 95 %

## 2024-01-23 DIAGNOSIS — M54.12 RADICULOPATHY, CERVICAL REGION: ICD-10-CM

## 2024-01-23 DIAGNOSIS — F32.9 MAJOR DEPRESSIVE DISORDER, SINGLE EPISODE, UNSPECIFIED: ICD-10-CM

## 2024-01-23 DIAGNOSIS — R42 DIZZINESS AND GIDDINESS: ICD-10-CM

## 2024-01-23 DIAGNOSIS — Z90.2 ACQUIRED ABSENCE OF LUNG [PART OF]: Chronic | ICD-10-CM

## 2024-01-23 DIAGNOSIS — Z90.49 ACQUIRED ABSENCE OF OTHER SPECIFIED PARTS OF DIGESTIVE TRACT: Chronic | ICD-10-CM

## 2024-01-23 DIAGNOSIS — J44.9 CHRONIC OBSTRUCTIVE PULMONARY DISEASE, UNSPECIFIED: ICD-10-CM

## 2024-01-23 DIAGNOSIS — K50.919 CROHN'S DISEASE, UNSPECIFIED, WITH UNSPECIFIED COMPLICATIONS: ICD-10-CM

## 2024-01-23 DIAGNOSIS — S83.519A SPRAIN OF ANTERIOR CRUCIATE LIGAMENT OF UNSPECIFIED KNEE, INITIAL ENCOUNTER: Chronic | ICD-10-CM

## 2024-01-23 DIAGNOSIS — Z98.890 OTHER SPECIFIED POSTPROCEDURAL STATES: Chronic | ICD-10-CM

## 2024-01-23 DIAGNOSIS — Z01.818 ENCOUNTER FOR OTHER PREPROCEDURAL EXAMINATION: ICD-10-CM

## 2024-01-23 LAB
A1C WITH ESTIMATED AVERAGE GLUCOSE RESULT: 5.8 % — HIGH (ref 4–5.6)
ALBUMIN SERPL ELPH-MCNC: 3.9 G/DL — SIGNIFICANT CHANGE UP (ref 3.3–5)
ALP SERPL-CCNC: 88 U/L — SIGNIFICANT CHANGE UP (ref 40–120)
ALT FLD-CCNC: 39 U/L — SIGNIFICANT CHANGE UP (ref 12–78)
ANION GAP SERPL CALC-SCNC: 4 MMOL/L — LOW (ref 5–17)
APTT BLD: 50.3 SEC — HIGH (ref 24.5–35.6)
AST SERPL-CCNC: 21 U/L — SIGNIFICANT CHANGE UP (ref 15–37)
BASOPHILS # BLD AUTO: 0.04 K/UL — SIGNIFICANT CHANGE UP (ref 0–0.2)
BASOPHILS NFR BLD AUTO: 0.5 % — SIGNIFICANT CHANGE UP (ref 0–2)
BILIRUB SERPL-MCNC: 0.6 MG/DL — SIGNIFICANT CHANGE UP (ref 0.2–1.2)
BLD GP AB SCN SERPL QL: SIGNIFICANT CHANGE UP
BUN SERPL-MCNC: 9 MG/DL — SIGNIFICANT CHANGE UP (ref 7–23)
CALCIUM SERPL-MCNC: 9.1 MG/DL — SIGNIFICANT CHANGE UP (ref 8.5–10.1)
CHLORIDE SERPL-SCNC: 110 MMOL/L — HIGH (ref 96–108)
CO2 SERPL-SCNC: 27 MMOL/L — SIGNIFICANT CHANGE UP (ref 22–31)
CREAT SERPL-MCNC: 1.22 MG/DL — SIGNIFICANT CHANGE UP (ref 0.5–1.3)
EGFR: 70 ML/MIN/1.73M2 — SIGNIFICANT CHANGE UP
EOSINOPHIL # BLD AUTO: 0.09 K/UL — SIGNIFICANT CHANGE UP (ref 0–0.5)
EOSINOPHIL NFR BLD AUTO: 1.1 % — SIGNIFICANT CHANGE UP (ref 0–6)
ESTIMATED AVERAGE GLUCOSE: 120 MG/DL — HIGH (ref 68–114)
GLUCOSE SERPL-MCNC: 93 MG/DL — SIGNIFICANT CHANGE UP (ref 70–99)
HCT VFR BLD CALC: 44.1 % — SIGNIFICANT CHANGE UP (ref 39–50)
HGB BLD-MCNC: 14.9 G/DL — SIGNIFICANT CHANGE UP (ref 13–17)
IMM GRANULOCYTES NFR BLD AUTO: 0.5 % — SIGNIFICANT CHANGE UP (ref 0–0.9)
INR BLD: 0.91 RATIO — SIGNIFICANT CHANGE UP (ref 0.85–1.18)
LYMPHOCYTES # BLD AUTO: 1.79 K/UL — SIGNIFICANT CHANGE UP (ref 1–3.3)
LYMPHOCYTES # BLD AUTO: 20.9 % — SIGNIFICANT CHANGE UP (ref 13–44)
MCHC RBC-ENTMCNC: 30.6 PG — SIGNIFICANT CHANGE UP (ref 27–34)
MCHC RBC-ENTMCNC: 33.8 G/DL — SIGNIFICANT CHANGE UP (ref 32–36)
MCV RBC AUTO: 90.6 FL — SIGNIFICANT CHANGE UP (ref 80–100)
MONOCYTES # BLD AUTO: 0.65 K/UL — SIGNIFICANT CHANGE UP (ref 0–0.9)
MONOCYTES NFR BLD AUTO: 7.6 % — SIGNIFICANT CHANGE UP (ref 2–14)
NEUTROPHILS # BLD AUTO: 5.96 K/UL — SIGNIFICANT CHANGE UP (ref 1.8–7.4)
NEUTROPHILS NFR BLD AUTO: 69.4 % — SIGNIFICANT CHANGE UP (ref 43–77)
NRBC # BLD: 0 /100 WBCS — SIGNIFICANT CHANGE UP (ref 0–0)
PLATELET # BLD AUTO: 258 K/UL — SIGNIFICANT CHANGE UP (ref 150–400)
POTASSIUM SERPL-MCNC: 4.2 MMOL/L — SIGNIFICANT CHANGE UP (ref 3.5–5.3)
POTASSIUM SERPL-SCNC: 4.2 MMOL/L — SIGNIFICANT CHANGE UP (ref 3.5–5.3)
PROT SERPL-MCNC: 7.6 GM/DL — SIGNIFICANT CHANGE UP (ref 6–8.3)
PROTHROM AB SERPL-ACNC: 10.9 SEC — SIGNIFICANT CHANGE UP (ref 9.5–13)
RBC # BLD: 4.87 M/UL — SIGNIFICANT CHANGE UP (ref 4.2–5.8)
RBC # FLD: 13.4 % — SIGNIFICANT CHANGE UP (ref 10.3–14.5)
SODIUM SERPL-SCNC: 141 MMOL/L — SIGNIFICANT CHANGE UP (ref 135–145)
VIT D25+D1,25 OH+D1,25 PNL SERPL-MCNC: 68.8 PG/ML — SIGNIFICANT CHANGE UP (ref 19.9–79.3)
WBC # BLD: 8.57 K/UL — SIGNIFICANT CHANGE UP (ref 3.8–10.5)
WBC # FLD AUTO: 8.57 K/UL — SIGNIFICANT CHANGE UP (ref 3.8–10.5)

## 2024-01-23 PROCEDURE — 93010 ELECTROCARDIOGRAM REPORT: CPT

## 2024-01-23 RX ORDER — DOCUSATE SODIUM 100 MG
1 CAPSULE ORAL
Refills: 0 | DISCHARGE

## 2024-01-23 RX ORDER — AMLODIPINE BESYLATE 2.5 MG/1
1 TABLET ORAL
Refills: 0 | DISCHARGE

## 2024-01-23 RX ORDER — UMECLIDINIUM 62.5 UG/1
0 AEROSOL, POWDER ORAL
Qty: 0 | Refills: 0 | DISCHARGE

## 2024-01-23 NOTE — H&P PST ADULT - ASSESSMENT
53M PMH Depression, Alfaro Esophagitis, COPD, Emphysema and Crohn's Disease presents to [email or web address] for scheduled ACDF C3-C7 with Dr.Mercer CAPRINI SCORE [CLOT]    AGE RELATED RISK FACTORS                                                       MOBILITY RELATED FACTORS  [x ] Age 41-60 years                                            (1 Point)                  [ ] Bed rest                                                        (1 Point)  [ ] Age: 61-74 years                                           (2 Points)                 [ ] Plaster cast                                                   (2 Points)  [ ] Age= 75 years                                              (3 Points)                 [ ] Bed bound for more than 72 hours                 (2 Points)    DISEASE RELATED RISK FACTORS                                               GENDER SPECIFIC FACTORS  [ ] Edema in the lower extremities                       (1 Point)                  [ ] Pregnancy                                                     (1 Point)  [ ] Varicose veins                                               (1 Point)                  [ ] Post-partum < 6 weeks                                   (1 Point)             [ x] BMI > 25 Kg/m2                                            (1 Point)                  [ ] Hormonal therapy  or oral contraception          (1 Point)                 [ ] Sepsis (in the previous month)                        (1 Point)                  [ ] History of pregnancy complications                 (1 point)  [ ] Pneumonia or serious lung disease                                               [ ] Unexplained or recurrent                     (1 Point)           (in the previous month)                               (1 Point)  [ ] Abnormal pulmonary function test                     (1 Point)                 SURGERY RELATED RISK FACTORS  [ ] Acute myocardial infarction                              (1 Point)                 [ ]  Section                                             (1 Point)  [ ] Congestive heart failure (in the previous month)  (1 Point)               [ ] Minor surgery                                                  (1 Point)   [ ] Inflammatory bowel disease                             (1 Point)                 [ ] Arthroscopic surgery                                        (2 Points)  [ ] Central venous access                                      (2 Points)                [ x] General surgery lasting more than 45 minutes   (2 Points)       [ ] Stroke (in the previous month)                          (5 Points)               [ ] Elective arthroplasty                                         (5 Points)                                                                                                                                               HEMATOLOGY RELATED FACTORS                                                 TRAUMA RELATED RISK FACTORS  [ ] Prior episodes of VTE                                     (3 Points)                [ ] Fracture of the hip, pelvis, or leg                       (5 Points)  [ ] Positive family history for VTE                         (3 Points)                 [ ] Acute spinal cord injury (in the previous month)  (5 Points)  [ ] Prothrombin 05441 A                                     (3 Points)                 [ ] Paralysis  (less than 1 month)                             (5 Points)  [ ] Factor V Leiden                                             (3 Points)                  [ ] Multiple Trauma within 1 month                        (5 Points)  [ ] Lupus anticoagulants                                     (3 Points)                                                           [ ] Anticardiolipin antibodies                               (3 Points)                                                       [ ] High homocysteine in the blood                      (3 Points)                                             [ ] Other congenital or acquired thrombophilia      (3 Points)                                                [ ] Heparin induced thrombocytopenia                  (3 Points)                                          Total Score [   4       ]    Caprini Score 0 - 2:  Low Risk, No VTE Prophylaxis required for most patients, encourage ambulation  Caprini Score 3 - 6:  At Risk, pharmacologic VTE prophylaxis is indicated for most patients (in the absence of a contraindication)  Caprini Score Greater than or = 7:  High Risk, pharmacologic VTE prophylaxis is indicated for most patients (in the absence of a contraindication)

## 2024-01-23 NOTE — H&P PST ADULT - HISTORY OF PRESENT ILLNESS
53 year old female presents to PST. Patient has a PMHX of Depression, Alfaro Esophagitis, COPD, Emphysema and crohns Disease. Patient has cervical neck pain 2/2 Cervical stenosis and has a planned Anterior Cervical Discectomy Fusion with Dr. Jason on 01/08/2024.     Goal is to be pain free and get back to all activities.     Patient denies any recent travel, cough, fever, chills or recent sick contacts.      53M PMH former smoker, Depression, Alfaro Esophagitis, COPD, Emphysema and Crohn's Disease presents to PST for scheduled ACDF C3-C7 with

## 2024-01-23 NOTE — H&P PST ADULT - PROBLEM SELECTOR PLAN 1
Labs-CBC, BMP, PT/INR, PTT ,T&S, Nose Cx, EKG   Medical/cardiac/pulmonary/hemeonc clearances required    Preop Hibiclens x 3 day instructions reviewed and given.  Take routine meds DOS with small sips of water, avoid NSAIDs and OTC supplements,  Anesthesiologist to review PST labs, EKG, required clearances, and optimization for surgery

## 2024-01-23 NOTE — H&P PST ADULT - PROBLEM SELECTOR PROBLEM 1
"Chief Complaint   Patient presents with     Hypertension     Lipids       Initial /83 (BP Location: Left arm, Patient Position: Chair, Cuff Size: Adult Regular)  Pulse 75  Temp 97.4  F (36.3  C) (Oral)  Resp 18  Ht 5' 5\" (1.651 m)  Wt 176 lb 12.8 oz (80.2 kg)  SpO2 98%  Breastfeeding? No  BMI 29.42 kg/m2 Estimated body mass index is 29.42 kg/(m^2) as calculated from the following:    Height as of this encounter: 5' 5\" (1.651 m).    Weight as of this encounter: 176 lb 12.8 oz (80.2 kg).  Medication Reconciliation: complete     Paco Alexander      " Radiculopathy, cervical region

## 2024-01-23 NOTE — H&P PST ADULT - NSICDXPASTMEDICALHX_GEN_ALL_CORE_FT
PAST MEDICAL HISTORY:  Alfaro's esophagus without dysplasia     COPD (chronic obstructive pulmonary disease)     Crohn's disease with complication, unspecified gastrointestinal tract location     Depression, unspecified depression type     Emphysema lung     Hypotension     Mass of lung calvatory mass R upper lobe    Osteoporosis     Vertigo

## 2024-01-23 NOTE — H&P PST ADULT - NSICDXPASTSURGICALHX_GEN_ALL_CORE_FT
PAST SURGICAL HISTORY:  ACL (anterior cruciate ligament) tear      PAST SURGICAL HISTORY:  ACL (anterior cruciate ligament) tear     H/O resection of small bowel     History of lobectomy of lung     History of prostate surgery

## 2024-01-24 LAB
MRSA PCR RESULT.: SIGNIFICANT CHANGE UP
S AUREUS DNA NOSE QL NAA+PROBE: SIGNIFICANT CHANGE UP

## 2024-01-25 NOTE — BEHAVIORAL HEALTH ASSESSMENT NOTE - NS ED BHA ALCOHOL
MICU Attending Daily Progress Note      Date of Service: 1/25/2024    Assessment:   Ricardo Meier is a 60 year old yo male with a hx of CVA with R hemiparesis, DM, HTN, ESRD on HD, R BKA with wound complications who is admitted for septic shock. Source likely bacteremia (growing gram negative bacilli), possible line infection. MRI is not concerning for osteo.     Active Diagnoses  - septic shock  - metabolic encephalopathy  - T2DM  - acute L PCA infarct  - hemiparesis  - ESRD on HD  - HTN  - mechanical ventilation for airway protection    - hypokalemia   - anemia     Plan:   Neuro:   - appreciate neurology recs  - continue sertraline   - delirium precautions     CV:   - levophed, goal MAP > 65  - home midodrine 5mg TID  - WENDY bubble study given new stroke   - continue aspirin, statin     Pulm:   - wean nasal cannula     GI:  - pantoprazole   - speech eval, hopefully can start diet otherwise may need NG replaced for med administration     Renal:  - HD, appreciate nephrology assistance    ID:  - Abx: cefepime, vancomycin (for recent osteo infection)   - blood cultures +, awaiting speciation   - will need line holiday. Hopefully we can coordinate with IR and nephrology to have the line removed tomorrow after HD, have the holiday over the weekend, and then have it replaced by IR Monday before dialysis     Heme:  - DVT ppx: heparin   - hgb drop, no sign of bleeding    Endo:  - lantus/lispro - holding for hypoglycemia, currently due to him being NPO   - stop SDS     Dispo: femoral arterial line removed today, if site appears clear can likely transfer to floor in next 24 hours   Goals of Care: Full Code    Subjective:  No overnight events     Pertinent Physical Exam:   Neuro: following commands on left side  CV: RRR  Pulm: no increased work of breathing  Abd: soft    I spent 42 minutes personally attending to this patient's critical care needs for the aforementioned issues with complex decision making and high risk of  physiologic decline.  I personally reviewed the medical chart including notes, laboratory results, radiologic results and all other pertinent information related to patient care. This time excludes that time spent on separately billable procedures or time spent teaching.     Teagan Carlton MD  Critical Care Medicine   1/25/2024 10:32 AM       Yes

## 2024-02-11 ENCOUNTER — TRANSCRIPTION ENCOUNTER (OUTPATIENT)
Age: 54
End: 2024-02-11

## 2024-02-12 ENCOUNTER — TRANSCRIPTION ENCOUNTER (OUTPATIENT)
Age: 54
End: 2024-02-12

## 2024-02-12 ENCOUNTER — APPOINTMENT (OUTPATIENT)
Dept: ORTHOPEDIC SURGERY | Facility: HOSPITAL | Age: 54
End: 2024-02-12
Payer: MEDICAID

## 2024-02-12 ENCOUNTER — INPATIENT (INPATIENT)
Facility: HOSPITAL | Age: 54
LOS: 0 days | Discharge: HOME | End: 2024-02-13
Attending: ORTHOPAEDIC SURGERY | Admitting: ORTHOPAEDIC SURGERY

## 2024-02-12 VITALS
OXYGEN SATURATION: 98 % | HEART RATE: 66 BPM | HEIGHT: 70 IN | DIASTOLIC BLOOD PRESSURE: 74 MMHG | TEMPERATURE: 98 F | WEIGHT: 184.97 LBS | SYSTOLIC BLOOD PRESSURE: 111 MMHG | RESPIRATION RATE: 18 BRPM

## 2024-02-12 DIAGNOSIS — Z98.890 OTHER SPECIFIED POSTPROCEDURAL STATES: Chronic | ICD-10-CM

## 2024-02-12 DIAGNOSIS — S83.519A SPRAIN OF ANTERIOR CRUCIATE LIGAMENT OF UNSPECIFIED KNEE, INITIAL ENCOUNTER: Chronic | ICD-10-CM

## 2024-02-12 DIAGNOSIS — Z90.2 ACQUIRED ABSENCE OF LUNG [PART OF]: Chronic | ICD-10-CM

## 2024-02-12 DIAGNOSIS — Z90.49 ACQUIRED ABSENCE OF OTHER SPECIFIED PARTS OF DIGESTIVE TRACT: Chronic | ICD-10-CM

## 2024-02-12 LAB
ANION GAP SERPL CALC-SCNC: 6 MMOL/L — SIGNIFICANT CHANGE UP (ref 5–17)
BASOPHILS # BLD AUTO: 0.01 K/UL — SIGNIFICANT CHANGE UP (ref 0–0.2)
BASOPHILS NFR BLD AUTO: 0.1 % — SIGNIFICANT CHANGE UP (ref 0–2)
BLD GP AB SCN SERPL QL: SIGNIFICANT CHANGE UP
BUN SERPL-MCNC: 9 MG/DL — SIGNIFICANT CHANGE UP (ref 7–23)
CALCIUM SERPL-MCNC: 8.8 MG/DL — SIGNIFICANT CHANGE UP (ref 8.5–10.1)
CHLORIDE SERPL-SCNC: 114 MMOL/L — HIGH (ref 96–108)
CO2 SERPL-SCNC: 21 MMOL/L — LOW (ref 22–31)
CREAT SERPL-MCNC: 1.39 MG/DL — HIGH (ref 0.5–1.3)
EGFR: 60 ML/MIN/1.73M2 — SIGNIFICANT CHANGE UP
EOSINOPHIL # BLD AUTO: 0 K/UL — SIGNIFICANT CHANGE UP (ref 0–0.5)
EOSINOPHIL NFR BLD AUTO: 0 % — SIGNIFICANT CHANGE UP (ref 0–6)
GLUCOSE BLDC GLUCOMTR-MCNC: 104 MG/DL — HIGH (ref 70–99)
GLUCOSE SERPL-MCNC: 130 MG/DL — HIGH (ref 70–99)
HCT VFR BLD CALC: 43.2 % — SIGNIFICANT CHANGE UP (ref 39–50)
HGB BLD-MCNC: 14.6 G/DL — SIGNIFICANT CHANGE UP (ref 13–17)
IMM GRANULOCYTES NFR BLD AUTO: 0.3 % — SIGNIFICANT CHANGE UP (ref 0–0.9)
LYMPHOCYTES # BLD AUTO: 0.7 K/UL — LOW (ref 1–3.3)
LYMPHOCYTES # BLD AUTO: 5.5 % — LOW (ref 13–44)
MCHC RBC-ENTMCNC: 31.1 PG — SIGNIFICANT CHANGE UP (ref 27–34)
MCHC RBC-ENTMCNC: 33.8 G/DL — SIGNIFICANT CHANGE UP (ref 32–36)
MCV RBC AUTO: 91.9 FL — SIGNIFICANT CHANGE UP (ref 80–100)
MONOCYTES # BLD AUTO: 0.18 K/UL — SIGNIFICANT CHANGE UP (ref 0–0.9)
MONOCYTES NFR BLD AUTO: 1.4 % — LOW (ref 2–14)
NEUTROPHILS # BLD AUTO: 11.8 K/UL — HIGH (ref 1.8–7.4)
NEUTROPHILS NFR BLD AUTO: 92.7 % — HIGH (ref 43–77)
NRBC # BLD: 0 /100 WBCS — SIGNIFICANT CHANGE UP (ref 0–0)
PLATELET # BLD AUTO: 254 K/UL — SIGNIFICANT CHANGE UP (ref 150–400)
POTASSIUM SERPL-MCNC: 4.1 MMOL/L — SIGNIFICANT CHANGE UP (ref 3.5–5.3)
POTASSIUM SERPL-SCNC: 4.1 MMOL/L — SIGNIFICANT CHANGE UP (ref 3.5–5.3)
RBC # BLD: 4.7 M/UL — SIGNIFICANT CHANGE UP (ref 4.2–5.8)
RBC # FLD: 13.5 % — SIGNIFICANT CHANGE UP (ref 10.3–14.5)
SODIUM SERPL-SCNC: 141 MMOL/L — SIGNIFICANT CHANGE UP (ref 135–145)
WBC # BLD: 12.73 K/UL — HIGH (ref 3.8–10.5)
WBC # FLD AUTO: 12.73 K/UL — HIGH (ref 3.8–10.5)

## 2024-02-12 PROCEDURE — 22551 ARTHRD ANT NTRBDY CERVICAL: CPT | Mod: 62

## 2024-02-12 PROCEDURE — 22846 INSERT SPINE FIXATION DEVICE: CPT | Mod: 62

## 2024-02-12 PROCEDURE — 22853 INSJ BIOMECHANICAL DEVICE: CPT | Mod: 59,62

## 2024-02-12 PROCEDURE — 22552 ARTHRD ANT NTRBD CERVICAL EA: CPT | Mod: 62

## 2024-02-12 DEVICE — BONE WAX 2.5G NON ABSORBABLE: Type: IMPLANTABLE DEVICE | Status: FUNCTIONAL

## 2024-02-12 DEVICE — CAGE MODULUS CERV 7 DEG 6X17X14MM: Type: IMPLANTABLE DEVICE | Status: FUNCTIONAL

## 2024-02-12 DEVICE — SURGIFLO MATRIX WITH THROMBIN KIT: Type: IMPLANTABLE DEVICE | Status: FUNCTIONAL

## 2024-02-12 DEVICE — SCREW SELF DRILL VAR 4.15MM: Type: IMPLANTABLE DEVICE | Status: FUNCTIONAL

## 2024-02-12 DEVICE — SCREW MAXCESS-C 14MM: Type: IMPLANTABLE DEVICE | Status: FUNCTIONAL

## 2024-02-12 DEVICE — GRAFT BONE INFUSE KIT XS: Type: IMPLANTABLE DEVICE | Status: FUNCTIONAL

## 2024-02-12 DEVICE — PIN ACP TEMP FIX ENGAGING: Type: IMPLANTABLE DEVICE | Status: FUNCTIONAL

## 2024-02-12 DEVICE — DISTRACTION PIN 14MM (YELLOW): Type: IMPLANTABLE DEVICE | Status: FUNCTIONAL

## 2024-02-12 DEVICE — CAGE MODULUS CERV 7 DEG 6X15X12MM: Type: IMPLANTABLE DEVICE | Status: FUNCTIONAL

## 2024-02-12 DEVICE — IMPLANTABLE DEVICE: Type: IMPLANTABLE DEVICE | Status: FUNCTIONAL

## 2024-02-12 RX ORDER — HYDROMORPHONE HYDROCHLORIDE 2 MG/ML
0.5 INJECTION INTRAMUSCULAR; INTRAVENOUS; SUBCUTANEOUS
Refills: 0 | Status: DISCONTINUED | OUTPATIENT
Start: 2024-02-12 | End: 2024-02-13

## 2024-02-12 RX ORDER — MIRTAZAPINE 45 MG/1
2 TABLET, ORALLY DISINTEGRATING ORAL
Refills: 0 | DISCHARGE

## 2024-02-12 RX ORDER — ATORVASTATIN CALCIUM 80 MG/1
40 TABLET, FILM COATED ORAL AT BEDTIME
Refills: 0 | Status: DISCONTINUED | OUTPATIENT
Start: 2024-02-12 | End: 2024-02-13

## 2024-02-12 RX ORDER — OLANZAPINE 15 MG/1
15 TABLET, FILM COATED ORAL DAILY
Refills: 0 | Status: DISCONTINUED | OUTPATIENT
Start: 2024-02-12 | End: 2024-02-13

## 2024-02-12 RX ORDER — SENNA PLUS 8.6 MG/1
2 TABLET ORAL AT BEDTIME
Refills: 0 | Status: DISCONTINUED | OUTPATIENT
Start: 2024-02-12 | End: 2024-02-13

## 2024-02-12 RX ORDER — TAMSULOSIN HYDROCHLORIDE 0.4 MG/1
2 CAPSULE ORAL
Qty: 0 | Refills: 0 | DISCHARGE

## 2024-02-12 RX ORDER — SODIUM CHLORIDE 9 MG/ML
1000 INJECTION, SOLUTION INTRAVENOUS
Refills: 0 | Status: DISCONTINUED | OUTPATIENT
Start: 2024-02-12 | End: 2024-02-13

## 2024-02-12 RX ORDER — SODIUM CHLORIDE 9 MG/ML
3 INJECTION INTRAMUSCULAR; INTRAVENOUS; SUBCUTANEOUS EVERY 8 HOURS
Refills: 0 | Status: DISCONTINUED | OUTPATIENT
Start: 2024-02-12 | End: 2024-02-12

## 2024-02-12 RX ORDER — ALBUTEROL 90 UG/1
2 AEROSOL, METERED ORAL EVERY 6 HOURS
Refills: 0 | Status: DISCONTINUED | OUTPATIENT
Start: 2024-02-12 | End: 2024-02-13

## 2024-02-12 RX ORDER — OLANZAPINE 15 MG/1
15 TABLET, FILM COATED ORAL DAILY
Refills: 0 | Status: DISCONTINUED | OUTPATIENT
Start: 2024-02-12 | End: 2024-02-12

## 2024-02-12 RX ORDER — MESALAMINE 400 MG
1 TABLET, DELAYED RELEASE (ENTERIC COATED) ORAL
Refills: 0 | DISCHARGE

## 2024-02-12 RX ORDER — ASCORBIC ACID 60 MG
500 TABLET,CHEWABLE ORAL
Refills: 0 | Status: DISCONTINUED | OUTPATIENT
Start: 2024-02-12 | End: 2024-02-13

## 2024-02-12 RX ORDER — LACTULOSE 10 G/15ML
20 SOLUTION ORAL
Refills: 0 | Status: DISCONTINUED | OUTPATIENT
Start: 2024-02-12 | End: 2024-02-12

## 2024-02-12 RX ORDER — LINACLOTIDE 145 UG/1
1 CAPSULE, GELATIN COATED ORAL
Refills: 0 | DISCHARGE

## 2024-02-12 RX ORDER — ALBUTEROL 90 UG/1
2 AEROSOL, METERED ORAL
Refills: 0 | DISCHARGE

## 2024-02-12 RX ORDER — OXYCODONE HYDROCHLORIDE 5 MG/1
5 TABLET ORAL EVERY 4 HOURS
Refills: 0 | Status: DISCONTINUED | OUTPATIENT
Start: 2024-02-12 | End: 2024-02-13

## 2024-02-12 RX ORDER — ATORVASTATIN CALCIUM 80 MG/1
1 TABLET, FILM COATED ORAL
Refills: 0 | DISCHARGE

## 2024-02-12 RX ORDER — FENOFIBRATE,MICRONIZED 130 MG
1 CAPSULE ORAL
Refills: 0 | DISCHARGE

## 2024-02-12 RX ORDER — MECLIZINE HCL 12.5 MG
1 TABLET ORAL
Refills: 0 | DISCHARGE

## 2024-02-12 RX ORDER — MIRTAZAPINE 45 MG/1
7.5 TABLET, ORALLY DISINTEGRATING ORAL AT BEDTIME
Refills: 0 | Status: DISCONTINUED | OUTPATIENT
Start: 2024-02-12 | End: 2024-02-13

## 2024-02-12 RX ORDER — HYDROMORPHONE HYDROCHLORIDE 2 MG/ML
0.5 INJECTION INTRAMUSCULAR; INTRAVENOUS; SUBCUTANEOUS
Refills: 0 | Status: DISCONTINUED | OUTPATIENT
Start: 2024-02-12 | End: 2024-02-12

## 2024-02-12 RX ORDER — ONDANSETRON 8 MG/1
4 TABLET, FILM COATED ORAL ONCE
Refills: 0 | Status: DISCONTINUED | OUTPATIENT
Start: 2024-02-12 | End: 2024-02-12

## 2024-02-12 RX ORDER — HYDROMORPHONE HYDROCHLORIDE 2 MG/ML
1 INJECTION INTRAMUSCULAR; INTRAVENOUS; SUBCUTANEOUS
Refills: 0 | Status: DISCONTINUED | OUTPATIENT
Start: 2024-02-12 | End: 2024-02-12

## 2024-02-12 RX ORDER — TAMSULOSIN HYDROCHLORIDE 0.4 MG/1
0.4 CAPSULE ORAL AT BEDTIME
Refills: 0 | Status: DISCONTINUED | OUTPATIENT
Start: 2024-02-12 | End: 2024-02-13

## 2024-02-12 RX ORDER — FENOFIBRATE,MICRONIZED 130 MG
48 CAPSULE ORAL AT BEDTIME
Refills: 0 | Status: DISCONTINUED | OUTPATIENT
Start: 2024-02-12 | End: 2024-02-13

## 2024-02-12 RX ORDER — SODIUM CHLORIDE 9 MG/ML
1000 INJECTION, SOLUTION INTRAVENOUS
Refills: 0 | Status: DISCONTINUED | OUTPATIENT
Start: 2024-02-12 | End: 2024-02-12

## 2024-02-12 RX ORDER — CEFAZOLIN SODIUM 1 G
2000 VIAL (EA) INJECTION EVERY 8 HOURS
Refills: 0 | Status: COMPLETED | OUTPATIENT
Start: 2024-02-12 | End: 2024-02-13

## 2024-02-12 RX ORDER — POLYETHYLENE GLYCOL 3350 17 G/17G
17 POWDER, FOR SOLUTION ORAL DAILY
Refills: 0 | Status: DISCONTINUED | OUTPATIENT
Start: 2024-02-12 | End: 2024-02-13

## 2024-02-12 RX ORDER — ONDANSETRON 8 MG/1
4 TABLET, FILM COATED ORAL EVERY 6 HOURS
Refills: 0 | Status: DISCONTINUED | OUTPATIENT
Start: 2024-02-12 | End: 2024-02-13

## 2024-02-12 RX ORDER — UMECLIDINIUM 62.5 UG/1
1 AEROSOL, POWDER ORAL
Refills: 0 | DISCHARGE

## 2024-02-12 RX ORDER — DEXAMETHASONE 0.5 MG/5ML
8 ELIXIR ORAL EVERY 8 HOURS
Refills: 0 | Status: COMPLETED | OUTPATIENT
Start: 2024-02-12 | End: 2024-02-13

## 2024-02-12 RX ORDER — PANTOPRAZOLE SODIUM 20 MG/1
40 TABLET, DELAYED RELEASE ORAL
Refills: 0 | Status: DISCONTINUED | OUTPATIENT
Start: 2024-02-12 | End: 2024-02-13

## 2024-02-12 RX ORDER — LANOLIN ALCOHOL/MO/W.PET/CERES
3 CREAM (GRAM) TOPICAL AT BEDTIME
Refills: 0 | Status: DISCONTINUED | OUTPATIENT
Start: 2024-02-12 | End: 2024-02-13

## 2024-02-12 RX ORDER — MESALAMINE 400 MG
400 TABLET, DELAYED RELEASE (ENTERIC COATED) ORAL
Refills: 0 | Status: DISCONTINUED | OUTPATIENT
Start: 2024-02-12 | End: 2024-02-13

## 2024-02-12 RX ORDER — CYCLOBENZAPRINE HYDROCHLORIDE 10 MG/1
10 TABLET, FILM COATED ORAL EVERY 8 HOURS
Refills: 0 | Status: DISCONTINUED | OUTPATIENT
Start: 2024-02-12 | End: 2024-02-13

## 2024-02-12 RX ORDER — DEXAMETHASONE 0.5 MG/5ML
8 ELIXIR ORAL EVERY 8 HOURS
Refills: 0 | Status: DISCONTINUED | OUTPATIENT
Start: 2024-02-12 | End: 2024-02-12

## 2024-02-12 RX ORDER — BENZOCAINE AND MENTHOL 5; 1 G/100ML; G/100ML
1 LIQUID ORAL
Refills: 0 | Status: DISCONTINUED | OUTPATIENT
Start: 2024-02-12 | End: 2024-02-13

## 2024-02-12 RX ORDER — OLANZAPINE 15 MG/1
1 TABLET, FILM COATED ORAL
Refills: 0 | DISCHARGE

## 2024-02-12 RX ORDER — PROCHLORPERAZINE MALEATE 5 MG
6.25 TABLET ORAL ONCE
Refills: 0 | Status: COMPLETED | OUTPATIENT
Start: 2024-02-12 | End: 2024-02-12

## 2024-02-12 RX ORDER — ALENDRONATE SODIUM 70 MG/1
1 TABLET ORAL
Refills: 0 | DISCHARGE

## 2024-02-12 RX ORDER — ACETAMINOPHEN 500 MG
1000 TABLET ORAL ONCE
Refills: 0 | Status: COMPLETED | OUTPATIENT
Start: 2024-02-12 | End: 2024-02-13

## 2024-02-12 RX ORDER — OXYCODONE HYDROCHLORIDE 5 MG/1
10 TABLET ORAL EVERY 4 HOURS
Refills: 0 | Status: DISCONTINUED | OUTPATIENT
Start: 2024-02-12 | End: 2024-02-13

## 2024-02-12 RX ORDER — DIAZEPAM 5 MG
5 TABLET ORAL EVERY 6 HOURS
Refills: 0 | Status: DISCONTINUED | OUTPATIENT
Start: 2024-02-12 | End: 2024-02-13

## 2024-02-12 RX ADMIN — HYDROMORPHONE HYDROCHLORIDE 0.5 MILLIGRAM(S): 2 INJECTION INTRAMUSCULAR; INTRAVENOUS; SUBCUTANEOUS at 18:11

## 2024-02-12 RX ADMIN — ATORVASTATIN CALCIUM 40 MILLIGRAM(S): 80 TABLET, FILM COATED ORAL at 22:15

## 2024-02-12 RX ADMIN — HYDROMORPHONE HYDROCHLORIDE 0.5 MILLIGRAM(S): 2 INJECTION INTRAMUSCULAR; INTRAVENOUS; SUBCUTANEOUS at 17:54

## 2024-02-12 RX ADMIN — Medication 5 MILLIGRAM(S): at 20:55

## 2024-02-12 RX ADMIN — OXYCODONE HYDROCHLORIDE 10 MILLIGRAM(S): 5 TABLET ORAL at 19:50

## 2024-02-12 RX ADMIN — Medication 48 MILLIGRAM(S): at 22:15

## 2024-02-12 RX ADMIN — HYDROMORPHONE HYDROCHLORIDE 0.5 MILLIGRAM(S): 2 INJECTION INTRAMUSCULAR; INTRAVENOUS; SUBCUTANEOUS at 17:56

## 2024-02-12 RX ADMIN — CYCLOBENZAPRINE HYDROCHLORIDE 10 MILLIGRAM(S): 10 TABLET, FILM COATED ORAL at 19:39

## 2024-02-12 RX ADMIN — Medication 3 MILLIGRAM(S): at 22:15

## 2024-02-12 RX ADMIN — HYDROMORPHONE HYDROCHLORIDE 0.5 MILLIGRAM(S): 2 INJECTION INTRAMUSCULAR; INTRAVENOUS; SUBCUTANEOUS at 22:14

## 2024-02-12 RX ADMIN — Medication 100 MILLIGRAM(S): at 20:54

## 2024-02-12 RX ADMIN — TAMSULOSIN HYDROCHLORIDE 0.4 MILLIGRAM(S): 0.4 CAPSULE ORAL at 21:02

## 2024-02-12 RX ADMIN — HYDROMORPHONE HYDROCHLORIDE 0.5 MILLIGRAM(S): 2 INJECTION INTRAMUSCULAR; INTRAVENOUS; SUBCUTANEOUS at 18:09

## 2024-02-12 RX ADMIN — HYDROMORPHONE HYDROCHLORIDE 0.5 MILLIGRAM(S): 2 INJECTION INTRAMUSCULAR; INTRAVENOUS; SUBCUTANEOUS at 18:27

## 2024-02-12 RX ADMIN — Medication 101.6 MILLIGRAM(S): at 22:31

## 2024-02-12 RX ADMIN — Medication 6.25 MILLIGRAM(S): at 18:00

## 2024-02-12 RX ADMIN — ONDANSETRON 4 MILLIGRAM(S): 8 TABLET, FILM COATED ORAL at 23:53

## 2024-02-12 RX ADMIN — HYDROMORPHONE HYDROCHLORIDE 0.5 MILLIGRAM(S): 2 INJECTION INTRAMUSCULAR; INTRAVENOUS; SUBCUTANEOUS at 23:15

## 2024-02-12 RX ADMIN — OXYCODONE HYDROCHLORIDE 10 MILLIGRAM(S): 5 TABLET ORAL at 20:50

## 2024-02-12 RX ADMIN — SODIUM CHLORIDE 125 MILLILITER(S): 9 INJECTION, SOLUTION INTRAVENOUS at 17:44

## 2024-02-12 RX ADMIN — Medication 100 MILLIGRAM(S): at 22:15

## 2024-02-12 RX ADMIN — Medication 30 MILLIGRAM(S): at 22:15

## 2024-02-12 RX ADMIN — SODIUM CHLORIDE 120 MILLILITER(S): 9 INJECTION, SOLUTION INTRAVENOUS at 19:46

## 2024-02-12 RX ADMIN — SENNA PLUS 2 TABLET(S): 8.6 TABLET ORAL at 22:15

## 2024-02-12 RX ADMIN — HYDROMORPHONE HYDROCHLORIDE 0.5 MILLIGRAM(S): 2 INJECTION INTRAMUSCULAR; INTRAVENOUS; SUBCUTANEOUS at 17:44

## 2024-02-12 NOTE — CONSULT NOTE ADULT - SUBJECTIVE AND OBJECTIVE BOX
JEOVANY ACOSTA is a 53y Male s/p ANTERIOR CERVICAL DISCECTOMY FUSION C3-C-7 WITH IMAGE    ANTERIOR CERVICAL DISCECTOMY FUSION C3-C7 WITH IMAGE      w/ h/o Crohn's disease with complication, unspecified gastrointestinal tract location    Depression, unspecified depression type    Alfaro's esophagus without dysplasia    Mass of lung    COPD (chronic obstructive pulmonary disease)    Emphysema lung    Hypotension    Osteoporosis    Vertigo      denies any chest pain shortness of breath palpitation dizziness lightheadedness nausea vomiting fever or chills    No significant past surgical history    ACL (anterior cruciate ligament) tear    History of lobectomy of lung    History of prostate surgery    H/O resection of small bowel      No pertinent family history in first degree relatives    Family history of hypertension in father (Father, Mother, Grandparent)    Family history of colon cancer in father (Father)    Family history of COPD (chronic obstructive pulmonary disease) (Father)    Family history of diabetes mellitus in maternal grandmother (Mother, Grandparent)      SH: doesnot smoke or drink at this time    adhesives (Rash)  No Known Drug Allergies    acetaminophen   IVPB .. 1000 milliGRAM(s) IV Intermittent once  albuterol    90 MICROgram(s) HFA Inhaler 2 Puff(s) Inhalation every 6 hours PRN  ascorbic acid 500 milliGRAM(s) Oral two times a day  atorvastatin 40 milliGRAM(s) Oral at bedtime  benzocaine/menthol Lozenge 1 Lozenge Oral every 2 hours PRN  busPIRone 30 milliGRAM(s) Oral two times a day  ceFAZolin   IVPB 2000 milliGRAM(s) IV Intermittent every 8 hours  cloNIDine 0.1 milliGRAM(s) Oral daily  cyclobenzaprine 10 milliGRAM(s) Oral every 8 hours PRN  dexAMETHasone  IVPB 8 milliGRAM(s) IV Intermittent every 8 hours  diazepam    Tablet 5 milliGRAM(s) Oral every 6 hours PRN  fenofibrate Tablet 48 milliGRAM(s) Oral at bedtime  HYDROmorphone  Injectable 0.5 milliGRAM(s) IV Push every 3 hours PRN  lactated ringers. 1000 milliLiter(s) IV Continuous <Continuous>  melatonin 3 milliGRAM(s) Oral at bedtime  mesalamine DR Capsule 400 milliGRAM(s) Oral two times a day  mirtazapine 7.5 milliGRAM(s) Oral at bedtime  multivitamin 1 Tablet(s) Oral daily  OLANZapine 15 milliGRAM(s) Oral daily  ondansetron Injectable 4 milliGRAM(s) IV Push every 6 hours PRN  oxyCODONE    IR 5 milliGRAM(s) Oral every 4 hours PRN  oxyCODONE    IR 10 milliGRAM(s) Oral every 4 hours PRN  pantoprazole    Tablet 40 milliGRAM(s) Oral before breakfast  polyethylene glycol 3350 17 Gram(s) Oral daily  pregabalin 100 milliGRAM(s) Oral three times a day  senna 2 Tablet(s) Oral at bedtime  tamsulosin 0.4 milliGRAM(s) Oral at bedtime    T(C): 36.4 (02-12-24 @ 22:24), Max: 37.1 (02-12-24 @ 18:30)  HR: 108 (02-12-24 @ 22:24) (66 - 117)  BP: 149/89 (02-12-24 @ 22:24) (111/74 - 179/98)  RR: 18 (02-12-24 @ 22:24) (17 - 31)  SpO2: 94% (02-12-24 @ 22:24) (92% - 98%)  HEENT unremarkable  neck no JVD or bruit  heart normal S1 S2 RRR no gallops or rubs  chest clear to auscultation  abd sof nontender non distended +bs  ext no calf tenderness    A/P   DVT PX  pain control  bowel regimen   wound care as per ortho  GI PX  antiemetics prn  incentive spirometer

## 2024-02-12 NOTE — DISCHARGE NOTE PROVIDER - CARE PROVIDER_API CALL
Germán Jason  Orthopaedic Surgery  [address]  [place] [address]  Phone: [phone]  Fax: [phone]  Follow Up Time:

## 2024-02-12 NOTE — DISCHARGE NOTE PROVIDER - NSDCCPTREATMENT_GEN_ALL_CORE_FT
PRINCIPAL PROCEDURE  Procedure: Anterior cervical discectomy and fusion (ACDF) at 4 levels  Findings and Treatment: C3-7

## 2024-02-12 NOTE — BRIEF OPERATIVE NOTE - NSICDXBRIEFPROCEDURE_GEN_ALL_CORE_FT
PROCEDURES:  Anterior cervical discectomy and fusion (ACDF) at 4 levels 12-Feb-2024 07:51:19 C3-7 Sarina Hooks

## 2024-02-12 NOTE — DISCHARGE NOTE PROVIDER - NSDCMRMEDTOKEN_GEN_ALL_CORE_FT
Albuterol (Eqv-Proventil HFA) 90 mcg/inh inhalation aerosol: 2 puff(s) inhaled 4 times a day as needed for  bronchospasm  alendronate 70 mg oral tablet: 1 tab(s) orally once a week  atorvastatin 40 mg oral tablet: 1 tab(s) orally once a day  Breztri Aerosphere 160 mcg-9 mcg-4.8 mcg/inh inhalation aerosol: 2 puff(s) inhaled 2 times a day  busPIRone 15 mg oral tablet: 1 tab(s) orally every 8 hours  cloNIDine 0.1 mg oral tablet: 1 tab(s) orally  fenofibrate 54 mg oral tablet: 1 tab(s) orally once a day  Flomax 0.4 mg oral capsule: 2 cap(s) orally once a day  lactulose 10 g/15 mL oral syrup: 30 milliliter(s) orally 2 times a day  lactulose 10 g/15 mL oral syrup: 30 milliliter(s) orally 2 times a day  Lyrica 100 mg oral capsule: 1 cap(s) orally 3 times a day  meclizine 25 mg oral tablet: 1 tab(s) orally prn  OLANZapine 15 mg oral tablet: 1 tab(s) orally once a day  omeprazole 40 mg oral delayed release capsule: 1 cap(s) orally once a day  ondansetron 4 mg oral tablet: 1 tab(s) orally 2 times a day, As Needed    Albuterol (Eqv-Proventil HFA) 90 mcg/inh inhalation aerosol: 2 puff(s) inhaled 4 times a day as needed for  bronchospasm  alendronate 70 mg oral tablet: 1 tab(s) orally once a week  ascorbic acid 500 mg oral tablet: 1 tab(s) orally 2 times a day  atorvastatin 40 mg oral tablet: 1 tab(s) orally once a day  Breztri Aerosphere 160 mcg-9 mcg-4.8 mcg/inh inhalation aerosol: 2 puff(s) inhaled 2 times a day  busPIRone 30 mg oral tablet: 1 tab(s) orally 2 times a day  cloNIDine 0.1 mg oral tablet: 1 tab(s) orally once a day  fenofibrate 54 mg oral tablet: 1 tab(s) orally once a day  Flomax 0.4 mg oral capsule: 2 cap(s) orally once a day  Lialda 1.2 g oral delayed release tablet: 1 tab(s) orally 2 times a day  Lyrica 100 mg oral capsule: 1 cap(s) orally 3 times a day  meclizine 25 mg oral tablet: 1 tab(s) orally prn  Medrol Dosepak 4 mg oral tablet: 1 dose(s) orally 4 times a day MDD:6  Follow instructions on blister pack  Multiple Vitamins oral tablet: 1 tab(s) orally once a day  OLANZapine 15 mg oral tablet: 1 tab(s) orally once a day  omeprazole 40 mg oral delayed release capsule: 1 cap(s) orally once a day   Albuterol (Eqv-Proventil HFA) 90 mcg/inh inhalation aerosol: 2 puff(s) inhaled 4 times a day as needed for  bronchospasm  alendronate 70 mg oral tablet: 1 tab(s) orally once a week  ascorbic acid 500 mg oral tablet: 1 tab(s) orally 2 times a day  atorvastatin 40 mg oral tablet: 1 tab(s) orally once a day  Breztri Aerosphere 160 mcg-9 mcg-4.8 mcg/inh inhalation aerosol: 2 puff(s) inhaled 2 times a day  busPIRone 30 mg oral tablet: 1 tab(s) orally 2 times a day  cloNIDine 0.1 mg oral tablet: 1 tab(s) orally once a day  cyclobenzaprine 10 mg oral tablet: 1 tab(s) orally every 8 hours MDD: 3  fenofibrate 54 mg oral tablet: 1 tab(s) orally once a day  Flomax 0.4 mg oral capsule: 2 cap(s) orally once a day  Lialda 1.2 g oral delayed release tablet: 1 tab(s) orally 2 times a day  Lyrica 100 mg oral capsule: 1 cap(s) orally 3 times a day  meclizine 25 mg oral tablet: 1 tab(s) orally prn  Medrol Dosepak 4 mg oral tablet: 1 dose(s) orally 4 times a day MDD:6  Follow instructions on blister pack  Multiple Vitamins oral tablet: 1 tab(s) orally once a day  Narcan 4 mg/0.1 mL nasal spray: 4 milligram(s) intranasally once , repeat as necessary.   As needed. For suspected opiate overdose   Follow instructions on packet MDD: 0.2 ml  OLANZapine 15 mg oral tablet: 1 tab(s) orally once a day  omeprazole 40 mg oral delayed release capsule: 1 cap(s) orally once a day  oxyCODONE 5 mg oral tablet: 1 tab(s) orally every 4 hours as needed for  pain 1 tab for mild/moderate pain, 2 tabs for severe pain MDD: 6

## 2024-02-12 NOTE — DISCHARGE NOTE PROVIDER - NSDCFUADDINST_GEN_ALL_CORE_FT
Prineo dressing.   Keep gauze and tegaderm hospital dressing on for 3 days.   May shower 3 days after surgery and then remove the hospital gauze and tegaderm dressing.  After hospital dressing is removed you can leave the incision open to the air - it has a prineo dressing over the incision.  Prineo dressing care: May shower. May get incision wet with soap/water but no scrubbing incision or dressing. No creams or lotions to incision.   Dr. Jason will remove prineo bandage in the office at follow up.      No bending/lifting/twisting/pulling/pushing/carrying or driving. No blood thinners (not limited to but including) aspirin, motrin, alleve, naproxen, etc

## 2024-02-12 NOTE — DISCHARGE NOTE PROVIDER - HOSPITAL COURSE
53yMale with history of OA presenting for ACDF C3-7 with Dr. Jason 2/12/24. Risk and benefits of surgery were explained to the patient. The patient understood and agreed to proceed with surgery. Patient underwent the procedure with no intraoperative complications. Pt was brought in stable condition to the PACU. Once stable in PACU, pt was brought to the floor. During hospital stay pt was followed by Medicine, physical therapy, Home Care during this admission. Pt is stable for discharge to 53yMale with history of OA presenting for ACDF C3-7 with Dr. Jason 2/12/24. Risk and benefits of surgery were explained to the patient. The patient understood and agreed to proceed with surgery. Patient underwent the procedure with no intraoperative complications. Pt was brought in stable condition to the PACU. Once stable in PACU, pt was brought to the floor. During hospital stay pt was followed by Medicine, physical therapy, Home Care during this admission. Pt is stable for discharge to home on POD#1.

## 2024-02-12 NOTE — DISCHARGE NOTE PROVIDER - NSDCFUSCHEDAPPT_GEN_ALL_CORE_FT
Germán Jason  [facility]ician [facility]  [address]  Scheduled Appointment: 03/06/2024

## 2024-02-12 NOTE — DISCHARGE NOTE PROVIDER - NSDCFUADDAPPT_GEN_ALL_CORE_FT
Follow up with your surgeon in two weeks. Call for appointment.    If you need more pain medications, call your surgeon's office. For medication refills or authorizations call [phone]    Make sure to have a bowel movement by 2 days after surgery. Take stool softners and laxatives as needed.    Call and schedule a follow up appointment with your primary care physician for repeat blood work (CBC and BMP) for post hospital discharge follow-up care.    Call your surgeon if you have increased redness/pain/drainage, fever, or increased redness around the incision site. Return to ER for shortness of breath/calf tenderness.

## 2024-02-13 ENCOUNTER — TRANSCRIPTION ENCOUNTER (OUTPATIENT)
Age: 54
End: 2024-02-13

## 2024-02-13 VITALS
SYSTOLIC BLOOD PRESSURE: 127 MMHG | RESPIRATION RATE: 16 BRPM | TEMPERATURE: 98 F | HEART RATE: 73 BPM | OXYGEN SATURATION: 95 % | DIASTOLIC BLOOD PRESSURE: 83 MMHG

## 2024-02-13 LAB
ANION GAP SERPL CALC-SCNC: 6 MMOL/L — SIGNIFICANT CHANGE UP (ref 5–17)
BASOPHILS # BLD AUTO: 0.02 K/UL — SIGNIFICANT CHANGE UP (ref 0–0.2)
BASOPHILS NFR BLD AUTO: 0.1 % — SIGNIFICANT CHANGE UP (ref 0–2)
BUN SERPL-MCNC: 8 MG/DL — SIGNIFICANT CHANGE UP (ref 7–23)
CALCIUM SERPL-MCNC: 9 MG/DL — SIGNIFICANT CHANGE UP (ref 8.5–10.1)
CHLORIDE SERPL-SCNC: 107 MMOL/L — SIGNIFICANT CHANGE UP (ref 96–108)
CO2 SERPL-SCNC: 26 MMOL/L — SIGNIFICANT CHANGE UP (ref 22–31)
CREAT SERPL-MCNC: 1.11 MG/DL — SIGNIFICANT CHANGE UP (ref 0.5–1.3)
EGFR: 78 ML/MIN/1.73M2 — SIGNIFICANT CHANGE UP
EOSINOPHIL # BLD AUTO: 0 K/UL — SIGNIFICANT CHANGE UP (ref 0–0.5)
EOSINOPHIL NFR BLD AUTO: 0 % — SIGNIFICANT CHANGE UP (ref 0–6)
GLUCOSE SERPL-MCNC: 131 MG/DL — HIGH (ref 70–99)
HCT VFR BLD CALC: 42.9 % — SIGNIFICANT CHANGE UP (ref 39–50)
HGB BLD-MCNC: 14.7 G/DL — SIGNIFICANT CHANGE UP (ref 13–17)
IMM GRANULOCYTES NFR BLD AUTO: 0.3 % — SIGNIFICANT CHANGE UP (ref 0–0.9)
LYMPHOCYTES # BLD AUTO: 0.7 K/UL — LOW (ref 1–3.3)
LYMPHOCYTES # BLD AUTO: 4.2 % — LOW (ref 13–44)
MCHC RBC-ENTMCNC: 31.2 PG — SIGNIFICANT CHANGE UP (ref 27–34)
MCHC RBC-ENTMCNC: 34.3 G/DL — SIGNIFICANT CHANGE UP (ref 32–36)
MCV RBC AUTO: 91.1 FL — SIGNIFICANT CHANGE UP (ref 80–100)
MONOCYTES # BLD AUTO: 0.73 K/UL — SIGNIFICANT CHANGE UP (ref 0–0.9)
MONOCYTES NFR BLD AUTO: 4.4 % — SIGNIFICANT CHANGE UP (ref 2–14)
NEUTROPHILS # BLD AUTO: 15.01 K/UL — HIGH (ref 1.8–7.4)
NEUTROPHILS NFR BLD AUTO: 91 % — HIGH (ref 43–77)
NRBC # BLD: 0 /100 WBCS — SIGNIFICANT CHANGE UP (ref 0–0)
PLATELET # BLD AUTO: 269 K/UL — SIGNIFICANT CHANGE UP (ref 150–400)
POTASSIUM SERPL-MCNC: 3.8 MMOL/L — SIGNIFICANT CHANGE UP (ref 3.5–5.3)
POTASSIUM SERPL-SCNC: 3.8 MMOL/L — SIGNIFICANT CHANGE UP (ref 3.5–5.3)
RBC # BLD: 4.71 M/UL — SIGNIFICANT CHANGE UP (ref 4.2–5.8)
RBC # FLD: 13.6 % — SIGNIFICANT CHANGE UP (ref 10.3–14.5)
SODIUM SERPL-SCNC: 139 MMOL/L — SIGNIFICANT CHANGE UP (ref 135–145)
WBC # BLD: 16.51 K/UL — HIGH (ref 3.8–10.5)
WBC # FLD AUTO: 16.51 K/UL — HIGH (ref 3.8–10.5)

## 2024-02-13 RX ORDER — NALOXONE HYDROCHLORIDE 4 MG/.1ML
4 SPRAY NASAL
Qty: 1 | Refills: 0
Start: 2024-02-13 | End: 2024-02-13

## 2024-02-13 RX ORDER — OXYCODONE HYDROCHLORIDE 5 MG/1
0 TABLET ORAL
Qty: 50 | Refills: 0
Start: 2024-02-13

## 2024-02-13 RX ORDER — OXYCODONE HYDROCHLORIDE 5 MG/1
1 TABLET ORAL
Qty: 42 | Refills: 0
Start: 2024-02-13 | End: 2024-02-19

## 2024-02-13 RX ORDER — ASCORBIC ACID 60 MG
1 TABLET,CHEWABLE ORAL
Qty: 0 | Refills: 0 | DISCHARGE
Start: 2024-02-13

## 2024-02-13 RX ORDER — LACTULOSE 10 G/15ML
30 SOLUTION ORAL
Refills: 0 | DISCHARGE

## 2024-02-13 RX ORDER — CYCLOBENZAPRINE HYDROCHLORIDE 10 MG/1
1 TABLET, FILM COATED ORAL
Qty: 21 | Refills: 0
Start: 2024-02-13 | End: 2024-02-19

## 2024-02-13 RX ADMIN — OXYCODONE HYDROCHLORIDE 10 MILLIGRAM(S): 5 TABLET ORAL at 14:21

## 2024-02-13 RX ADMIN — Medication 30 MILLIGRAM(S): at 06:35

## 2024-02-13 RX ADMIN — Medication 100 MILLIGRAM(S): at 06:37

## 2024-02-13 RX ADMIN — Medication 101.6 MILLIGRAM(S): at 13:31

## 2024-02-13 RX ADMIN — OXYCODONE HYDROCHLORIDE 10 MILLIGRAM(S): 5 TABLET ORAL at 13:31

## 2024-02-13 RX ADMIN — Medication 100 MILLIGRAM(S): at 14:39

## 2024-02-13 RX ADMIN — Medication 100 MILLIGRAM(S): at 06:35

## 2024-02-13 RX ADMIN — Medication 400 MILLIGRAM(S): at 02:27

## 2024-02-13 RX ADMIN — Medication 500 MILLIGRAM(S): at 06:35

## 2024-02-13 RX ADMIN — OXYCODONE HYDROCHLORIDE 10 MILLIGRAM(S): 5 TABLET ORAL at 08:53

## 2024-02-13 RX ADMIN — POLYETHYLENE GLYCOL 3350 17 GRAM(S): 17 POWDER, FOR SOLUTION ORAL at 12:48

## 2024-02-13 RX ADMIN — SODIUM CHLORIDE 120 MILLILITER(S): 9 INJECTION, SOLUTION INTRAVENOUS at 06:35

## 2024-02-13 RX ADMIN — ONDANSETRON 4 MILLIGRAM(S): 8 TABLET, FILM COATED ORAL at 12:48

## 2024-02-13 RX ADMIN — ATORVASTATIN CALCIUM 40 MILLIGRAM(S): 80 TABLET, FILM COATED ORAL at 12:47

## 2024-02-13 RX ADMIN — Medication 101.6 MILLIGRAM(S): at 06:34

## 2024-02-13 RX ADMIN — Medication 0.1 MILLIGRAM(S): at 06:36

## 2024-02-13 RX ADMIN — Medication 400 MILLIGRAM(S): at 06:36

## 2024-02-13 RX ADMIN — PANTOPRAZOLE SODIUM 40 MILLIGRAM(S): 20 TABLET, DELAYED RELEASE ORAL at 06:36

## 2024-02-13 RX ADMIN — CYCLOBENZAPRINE HYDROCHLORIDE 10 MILLIGRAM(S): 10 TABLET, FILM COATED ORAL at 09:43

## 2024-02-13 RX ADMIN — ALBUTEROL 2 PUFF(S): 90 AEROSOL, METERED ORAL at 06:36

## 2024-02-13 RX ADMIN — Medication 1000 MILLIGRAM(S): at 03:25

## 2024-02-13 RX ADMIN — OLANZAPINE 15 MILLIGRAM(S): 15 TABLET, FILM COATED ORAL at 12:47

## 2024-02-13 RX ADMIN — OXYCODONE HYDROCHLORIDE 10 MILLIGRAM(S): 5 TABLET ORAL at 02:27

## 2024-02-13 RX ADMIN — Medication 1 TABLET(S): at 12:47

## 2024-02-13 RX ADMIN — OXYCODONE HYDROCHLORIDE 10 MILLIGRAM(S): 5 TABLET ORAL at 03:25

## 2024-02-13 RX ADMIN — OXYCODONE HYDROCHLORIDE 10 MILLIGRAM(S): 5 TABLET ORAL at 07:57

## 2024-02-13 NOTE — PHYSICAL THERAPY INITIAL EVALUATION ADULT - PERTINENT HX OF CURRENT PROBLEM, REHAB EVAL
· pt admitted for necl pain, s/p ACDF with image.

## 2024-02-13 NOTE — DISCHARGE NOTE NURSING/CASE MANAGEMENT/SOCIAL WORK - PATIENT PORTAL LINK FT
You can access the FollowMyHealth Patient Portal offered by [facility] by registering at the following website: [email or web address]. By joining [employer]’s FollowMyHealth portal, you will also be able to view your health information using other applications (apps) compatible with our system.

## 2024-02-13 NOTE — DISCHARGE NOTE NURSING/CASE MANAGEMENT/SOCIAL WORK - NSDCFUADDAPPT_GEN_ALL_CORE_FT

## 2024-02-13 NOTE — PHYSICAL THERAPY INITIAL EVALUATION ADULT - ADDITIONAL COMMENTS
pt lives alone in a hotel( shelter), pt PLOF is indep in ADL and transfers, pt s/p ACDF with DICK drain and aspen collar, pt educated on cervical spine precautions, pt educated on proper hand placement for transfers, pt able to amb without AD x 100ft, able to negotiate 3 x 2 steps with single HR and reciprocating gait. assisted back to chair.

## 2024-02-13 NOTE — DISCHARGE NOTE NURSING/CASE MANAGEMENT/SOCIAL WORK - NSDCPNINST_GEN_ALL_CORE
Take your medications exactly as prescribed. Having your pain under control will help increase activity, improve deep breathing and coughing and prevent complications like pneumonia and blood clots in your legs. Some of the common side effects of pain medications are nausea, vomiting, itching, rash and upset stomach. Contact your doctor if you develop these or any other unusual systoms. Eat a diet rich in fiber and drink plenty of oral fluids. Use other pain management methods like, cold and warm applications, elevation of affected body part, listening to music, watching TV, yoga, etc.  Take your medications exactly as prescribed. Having your pain under control will help increase activity, improve deep breathing and coughing and prevent complications like pneumonia and blood clots in your legs. Some of the common side effects of pain medications are nausea, vomiting, itching, rash and upset stomach. Contact your doctor if you develop these or any other unusual systoms. Eat a diet rich in fiber and drink plenty of oral fluids. Use other pain management methods like, cold and warm applications, elevation of affected body part, listening to music, watching TV, yoga, etc.Do not take any other medications unless approved by your doctor. Do not drive, operate machinery, drink alcohol, or make any important decisions while taking narcotic pain medications. Store medication in its original bottle, in a locked cabinet away from the reach of children. Dispose of any unused and  medication safely.

## 2024-02-13 NOTE — PROGRESS NOTE ADULT - SUBJECTIVE AND OBJECTIVE BOX
53yMale s/p  ACDF C3-7 POD #0  Pt seen and examined in NAD.   Pain controlled.  Pt denies any new complaints.   Pt denies CP/SOB/N/V/D/numbness/tingling/bowel or bladder dysfunction.   +DICK    Vital Signs Last 24 Hrs  T(C): 37.1 (12 Feb 2024 18:30), Max: 37.1 (12 Feb 2024 18:30)  T(F): 98.8 (12 Feb 2024 18:30), Max: 98.8 (12 Feb 2024 18:30)  HR: 107 (12 Feb 2024 18:30) (66 - 113)  BP: 133/87 (12 Feb 2024 18:30) (111/74 - 179/98)  BP(mean): --  RR: 24 (12 Feb 2024 18:30) (18 - 31)  SpO2: 96% (12 Feb 2024 18:30) (94% - 98%)    Parameters below as of 12 Feb 2024 17:35  Patient On (Oxygen Delivery Method): nasal cannula  O2 Flow (L/min): 3      PE:   General: A&Ox3, NAD  Spine: Dressing c/d/i +DICK   B/L UE: Skin intact. +ROM shoulder/elbow/wrist/fingers. +ok/thumbsup/fingercross signs.  strength: 5/5.  RP2+ NVI.   B/L LE: Skin intact. +ROM hip/knee/ankle/toes. Ankle Dorsi/plantarflexion: 5/5. Calf: soft, compressible and nontender. DP/PT 2+ NVI.                             14.6   12.73 )-----------( 254      ( 12 Feb 2024 17:36 )             43.2       02-12    141  |  114<H>  |  9   ----------------------------<  130<H>  4.1   |  21<L>  |  1.39<H>    Ca    8.8      12 Feb 2024 17:36          A/P: 53yMale s/p ACDF C3-7 POD #0  Wound care  Pain controlled  PT: WBAT: Spinal precautions  Isometric exercises  DVT ppx: SCDs  Incentive spirometry counseled   Discharge: planning   All the above discussed and understood by pt   
53yMale s/p ACDF C3-7 POD#1. Pt seen and examined in NAD. Pain controlled. Pt denies any new complaints. Pt denies CP/SOB/N/V/D/numbness/tingling/bowel or bladder dysfunction. Reports preop pain LUE and paresthesias left arm ulnar nerve distribution. Pt is RHD. TOlerating diet. Notes some new onset numbness on neck which is now isolated to the right side.   DICK 7.5/37.5    PE:   Neuro: AAOX3  Spine: [place] J collar in place.  Dressing c/d/i +DICK with scant serosanguinous, no hematoma. +decreased sensation right neck   RUE: Skin intact. +ROM shoulder/elbow/wrist/fingers. +ok/thumbsup/fingercross signs.  strength: 5/5.  RP2+ NVI.   LUE:  Skin intact. +ROM shoulder/elbow/wrist/fingers. +ok/thumbsup/fingercross signs.  strength: 5/5.  RP2+ Decreased sensation 4th and 5th digits.   B/L LE: Skin intact. +ROM hip/knee/ankle/toes. Ankle Dorsi/plantarflexion: 5/5. Calf: soft, compressible and nontender. DP/PT 2+ NVI.                             14.7   16.51 )-----------( 269      ( 13 Feb 2024 06:00 )             42.9       02-13    139  |  107  |  8   ----------------------------<  131<H>  3.8   |  26  |  1.11    Ca    9.0      13 Feb 2024 06:00          A/P: 53yMale s/p ACDF C3-7 POD#1.  DICK drain dc'd. Tip intact. Dressing changed - new dry clean dressing applied  Pain controlled - will send medrol dosepak with rx's.   PT: WBAT - spinal precautions   DVT ppx: SCDs   Wound care, Isometric exercises, incentive spirometry   Medical consult appreciated  Discharge: planning for home today  All the above discussed and understood by pt   D/W Dr Jason 
JEOVANY ACOSTA is a 53y Male s/p ANTERIOR CERVICAL DISCECTOMY FUSION C3-C-7 WITH IMAGE    ANTERIOR CERVICAL DISCECTOMY FUSION C3-C7 WITH IMAGE        denies any chest pain shortness of breath palpitation dizziness lightheadedness nausea vomiting fever or chills    T(C): 36.6 (02-13-24 @ 06:20), Max: 37.1 (02-12-24 @ 18:30)  HR: 84 (02-13-24 @ 06:20) (66 - 117)  BP: 130/81 (02-13-24 @ 06:20) (111/74 - 179/98)  RR: 18 (02-13-24 @ 06:20) (17 - 31)  SpO2: 94% (02-13-24 @ 06:20) (92% - 98%)  no jvd/bruit  s1 s2 rrr  cta  s/nt/nd  no calf tend                        14.7   16.51 )-----------( 269      ( 13 Feb 2024 06:00 )             42.9   02-13    139  |  107  |  8   ----------------------------<  131<H>  3.8   |  26  |  1.11    Ca    9.0      13 Feb 2024 06:00        cont dvt px  pain control  bowel regimen  antiemetics  incentive spirometer

## 2024-02-19 DIAGNOSIS — M50.122 CERVICAL DISC DISORDER AT C5-C6 LEVEL WITH RADICULOPATHY: ICD-10-CM

## 2024-02-19 DIAGNOSIS — J43.9 EMPHYSEMA, UNSPECIFIED: ICD-10-CM

## 2024-02-19 DIAGNOSIS — K22.70 BARRETT'S ESOPHAGUS WITHOUT DYSPLASIA: ICD-10-CM

## 2024-02-19 DIAGNOSIS — Z90.2 ACQUIRED ABSENCE OF LUNG [PART OF]: ICD-10-CM

## 2024-02-19 DIAGNOSIS — M50.123 CERVICAL DISC DISORDER AT C6-C7 LEVEL WITH RADICULOPATHY: ICD-10-CM

## 2024-02-19 DIAGNOSIS — M25.78 OSTEOPHYTE, VERTEBRAE: ICD-10-CM

## 2024-02-19 DIAGNOSIS — Z90.49 ACQUIRED ABSENCE OF OTHER SPECIFIED PARTS OF DIGESTIVE TRACT: ICD-10-CM

## 2024-02-19 DIAGNOSIS — M50.121 CERVICAL DISC DISORDER AT C4-C5 LEVEL WITH RADICULOPATHY: ICD-10-CM

## 2024-02-19 DIAGNOSIS — M50.11 CERVICAL DISC DISORDER WITH RADICULOPATHY, HIGH CERVICAL REGION: ICD-10-CM

## 2024-02-19 DIAGNOSIS — M54.12 RADICULOPATHY, CERVICAL REGION: ICD-10-CM

## 2024-02-19 DIAGNOSIS — M81.0 AGE-RELATED OSTEOPOROSIS WITHOUT CURRENT PATHOLOGICAL FRACTURE: ICD-10-CM

## 2024-02-23 DIAGNOSIS — Z98.890 OTHER SPECIFIED POSTPROCEDURAL STATES: ICD-10-CM

## 2024-02-27 RX ORDER — CYCLOBENZAPRINE HYDROCHLORIDE 5 MG/1
5 TABLET, FILM COATED ORAL
Qty: 45 | Refills: 0 | Status: ACTIVE | COMMUNITY
Start: 2024-02-27 | End: 1900-01-01

## 2024-02-27 RX ORDER — OXYCODONE 5 MG/1
5 TABLET ORAL
Qty: 45 | Refills: 0 | Status: ACTIVE | COMMUNITY
Start: 2024-02-23 | End: 1900-01-01

## 2024-03-06 ENCOUNTER — APPOINTMENT (OUTPATIENT)
Dept: ORTHOPEDIC SURGERY | Facility: CLINIC | Age: 54
End: 2024-03-06
Payer: MEDICAID

## 2024-03-06 VITALS — BODY MASS INDEX: 26.48 KG/M2 | WEIGHT: 185 LBS | HEIGHT: 70 IN

## 2024-03-06 PROCEDURE — 20974 ESTIM AID BONE HEALG N-INVAS: CPT

## 2024-03-06 PROCEDURE — 72040 X-RAY EXAM NECK SPINE 2-3 VW: CPT

## 2024-03-06 PROCEDURE — 99024 POSTOP FOLLOW-UP VISIT: CPT

## 2024-03-06 NOTE — HISTORY OF PRESENT ILLNESS
[Neck] : neck [Work related] : work related [6] : 6 [3] : 3 [Sharp] : sharp [Stabbing] : stabbing [Constant] : constant [Nothing helps with pain getting better] : Nothing helps with pain getting better [de-identified] : 2/12/24 SURGERY ACDF C3-7  MRI C Spine 10/6/23-report noted in chart Ind. Review- NF stenosis C3-C7  Pain history:  tried facet blocks in the past with no benefit _________________________ DR. RO NOTES- 6/30/23- 52M RHD Here for consult from neurologist, Dr. Frank. He has hx C4-5 hnp, LUE radicular pain, received TPI (pain mgmt Dr. Macario) 6/20/23. This relieved L arm pain. He continues to have pain posterior shoulder that radiates into shoulder blade. + cramping sensation. Reports decreased motion, good days and bad. Concerned he drops things holding and lifting things. He has L RF, SF is constant. Wakes him at night. Followed by Dr. Frank. Had EMG results LUE. His symptoms have been present 3-4 years. No injury recalled. 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 No neck/arm surgery. He did PT 3-4 years ago with relief neck and shoulder.  PMH: Crohns, BPH, no DM PSH: VATS for Lung CA RUL, prostate, L ACL SH: former smoker, no etoh, +MJ Occ: disabled ____________________________  Patient seen by non-spine partners in the past.  11/15/23-Continued neck pain as above. RHDM. Reports CA free.  3/6/24- eating solids. Neck ache. Now has tingling in the LUE, no longer has numbness.  [] : no [FreeTextEntry5] : patient is here for first post op of the neck [FreeTextEntry7] : shoulder

## 2024-03-06 NOTE — ASSESSMENT
[FreeTextEntry1] : Pt reports having Barretts esophagus and Crohns disease (no biologics) .   2/12/24 ACDF C3-C7 c/w collar, RTC 4 weeks for PT Bone stim education and fitting today

## 2024-03-06 NOTE — IMAGING
[Implants in position] : Implants in position [No loosening of hardware] : No loosening of hardware [de-identified] : CSPINE Inspection: No rash or ecchymosis or sign of infection Palpation: No spasm in traps, rhomboids, paracervicals ROM: limited in all planes Strength: 5/5 Sensation: Sensation present to light touch bilateral C5-T1 distributions, altered sensation left ulnar Reflexes: Positive Soriano's bilaterally  Bilateral Shoulders- Palpation: No tenderness to palpation ROM: Full with no pain Strength: Decent strength with rotator cuff testing Provocative maneuvers: Negative impingement testing

## 2024-04-12 ENCOUNTER — APPOINTMENT (OUTPATIENT)
Dept: ORTHOPEDIC SURGERY | Facility: CLINIC | Age: 54
End: 2024-04-12
Payer: MEDICAID

## 2024-04-12 VITALS — WEIGHT: 185 LBS | HEIGHT: 70 IN | BODY MASS INDEX: 26.48 KG/M2

## 2024-04-12 DIAGNOSIS — M75.52 BURSITIS OF LEFT SHOULDER: ICD-10-CM

## 2024-04-12 PROCEDURE — 72040 X-RAY EXAM NECK SPINE 2-3 VW: CPT

## 2024-04-12 PROCEDURE — 99024 POSTOP FOLLOW-UP VISIT: CPT

## 2024-04-12 NOTE — IMAGING
[Implants in position] : Implants in position [No loosening of hardware] : No loosening of hardware [de-identified] : CSPINE Inspection: healed incision  Palpation: No spasm in traps, rhomboids, paracervicals ROM: limited in all planes Strength: 5/5 Sensation: Sensation present to light touch bilateral C5-T1 distributions, altered sensation left ulnar Reflexes: Positive Soriano's bilaterally  L Shoulder- Palpation: Lateral tenderness to palpation ROM: Full with mild pain Strength: Decent strength with rotator cuff testing Provocative maneuvers: Positive impingement testing

## 2024-04-12 NOTE — HISTORY OF PRESENT ILLNESS
[Neck] : neck [Work related] : work related [6] : 6 [3] : 3 [Sharp] : sharp [Stabbing] : stabbing [Constant] : constant [Nothing helps with pain getting better] : Nothing helps with pain getting better [de-identified] : 2/12/24 SURGERY ACDF C3-7  MRI C Spine 10/6/23-report noted in chart Ind. Review- NF stenosis C3-C7  Pain history:  tried facet blocks in the past with no benefit _________________________ DR. RO NOTES- 6/30/23- 52M RHD Here for consult from neurologist, Dr. Frank. He has hx C4-5 hnp, LUE radicular pain, received TPI (pain mgmt Dr. Macario) 6/20/23. This relieved L arm pain. He continues to have pain posterior shoulder that radiates into shoulder blade. + cramping sensation. Reports decreased motion, good days and bad. Concerned he drops things holding and lifting things. He has L RF, SF is constant. Wakes him at night. Followed by Dr. Frank. Had EMG results LUE. His symptoms have been present 3-4 years. No injury recalled. 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 No neck/arm surgery. He did PT 3-4 years ago with relief neck and shoulder.  PMH: Crohns, BPH, no DM PSH: VATS for Lung CA RUL, prostate, L ACL SH: former smoker, no etoh, +MJ Occ: disabled ____________________________  Patient seen by non-spine partners in the past.  11/15/23-Continued neck pain as above. RHDM. Reports CA free.  3/6/24- eating solids. Neck ache. Now has tingling in the LUE, no longer has numbness.  4/12/24- eating solids, pill w/ difficulty. LUE numbness improving. No BUE pain.  [] : no [FreeTextEntry5] : patient is here for second post op of the neck [FreeTextEntry7] : shoulder

## 2024-04-12 NOTE — ASSESSMENT
[FreeTextEntry1] : Pt reports having Barretts esophagus and Crohns disease (no biologics) .   2/12/24 ACDF C3-C7 Initiate PT, including L shoulder

## 2024-05-09 NOTE — ED PROVIDER NOTE - OBJECTIVE STATEMENT
Pertinent PMH/PSH/FHx/SHx and Review of Systems contained within:    49yo M w PMH of Crohn's/?IBS, EtOH abuse, depression presents to ED c/o L sided abd pain & urinary sx.  Pt states he just returned from [place], noted pain.  Denies fever, chills, CP, SOB, vomiting, diarrhea.  Pt states he has been constipated for about 4d.  Pt also reports incr urinary frequency.  Denies urinary retention, hematuria, flank pain.  Pt has not taken any meds for sx PTA.    No fever/chills, No photophobia/eye pain/changes in vision, No ear pain/sore throat/dysphagia, No chest pain/palpitations, no SOB/cough/wheeze/stridor, +abdominal pain, no neck/back pain, no rash, no changes in neurological status/function.
hard copy, drawn during this pregnancy

## 2024-06-13 ENCOUNTER — RX RENEWAL (OUTPATIENT)
Age: 54
End: 2024-06-13

## 2024-06-13 RX ORDER — BUDESONIDE, GLYCOPYRROLATE, AND FORMOTEROL FUMARATE 160; 9; 4.8 UG/1; UG/1; UG/1
160-9-4.8 AEROSOL, METERED RESPIRATORY (INHALATION)
Qty: 10.7 | Refills: 0 | Status: ACTIVE | COMMUNITY
Start: 2023-10-04 | End: 1900-01-01

## 2024-06-14 ENCOUNTER — APPOINTMENT (OUTPATIENT)
Dept: ORTHOPEDIC SURGERY | Facility: CLINIC | Age: 54
End: 2024-06-14
Payer: MEDICAID

## 2024-06-14 VITALS — HEIGHT: 70 IN | BODY MASS INDEX: 26.48 KG/M2 | WEIGHT: 185 LBS

## 2024-06-14 DIAGNOSIS — Z98.1 ARTHRODESIS STATUS: ICD-10-CM

## 2024-06-14 PROCEDURE — 72040 X-RAY EXAM NECK SPINE 2-3 VW: CPT

## 2024-06-14 PROCEDURE — 99213 OFFICE O/P EST LOW 20 MIN: CPT

## 2024-06-14 NOTE — HISTORY OF PRESENT ILLNESS
[Neck] : neck [Work related] : work related [6] : 6 [3] : 3 [Sharp] : sharp [Stabbing] : stabbing [Constant] : constant [Nothing helps with pain getting better] : Nothing helps with pain getting better [de-identified] : 2/12/24 SURGERY ACDF C3-7  MRI C Spine 10/6/23-report noted in chart Ind. Review- NF stenosis C3-C7  Pain history:  tried facet blocks in the past with no benefit _________________________ DR. RO NOTES- 6/30/23- 52M RHD Here for consult from neurologist, Dr. Frank. He has hx C4-5 hnp, LUE radicular pain, received TPI (pain mgmt Dr. Macario) 6/20/23. This relieved L arm pain. He continues to have pain posterior shoulder that radiates into shoulder blade. + cramping sensation. Reports decreased motion, good days and bad. Concerned he drops things holding and lifting things. He has L RF, SF is constant. Wakes him at night. Followed by Dr. Frank. Had EMG results LUE. His symptoms have been present 3-4 years. No injury recalled. 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 No neck/arm surgery. He did PT 3-4 years ago with relief neck and shoulder.  PMH: Crohns, BPH, no DM PSH: VATS for Lung CA RUL, prostate, L ACL SH: former smoker, no etoh, +MJ Occ: disabled ____________________________  Patient seen by non-spine partners in the past.  11/15/23-Continued neck pain as above. RHDM. Reports CA free.  3/6/24- eating solids. Neck ache. Now has tingling in the LUE, no longer has numbness.  4/12/24- eating solids, pill w/ difficulty. LUE numbness improving. No BUE pain.  06/14/2024: Patient has been in PT, continues having difficultly swallowing. Patient continues having n/t in LUE but PT is helping. [] : no [FreeTextEntry7] : shoulder

## 2024-06-14 NOTE — ASSESSMENT
[FreeTextEntry1] : Pt reports having Barretts esophagus and Crohns disease (no biologics) .   2/12/24 ACDF C3-C7 c/w PT

## 2024-06-14 NOTE — IMAGING
[de-identified] : CSPINE Inspection: healed incision  Palpation: No spasm in traps, rhomboids, paracervicals ROM: limited in all planes Strength: 5/5 Sensation: Sensation present to light touch bilateral C5-T1 distributions, altered sensation left ulnar Reflexes: Positive Soriano's bilaterally  L Shoulder- Palpation: Lateral tenderness to palpation ROM: Full with mild pain Strength: Decent strength with rotator cuff testing Provocative maneuvers: Positive impingement testing [Implants in position] : Implants in position [No loosening of hardware] : No loosening of hardware

## 2024-07-05 NOTE — PROGRESS NOTE ADULT - ASSESSMENT
Pt no longer at [facility]     LVM with instructions on Pt's vm.     [employer] message sent.   Sabine Dinh RN on 7/5/2024 at 12:12 PM                    Message  Received: Today  Jackie Holland MD Olson, Kyle Leigh, PA-C  Cc: [facility]  He has to be at least 5 days off methimazole before the uptake and scan. Could you please make sure he is not taking it.  I saw him on 6/26/24 and had a long discussion about hyperthyroidism diagnosis and approach to treatment.    Thank you,  Jackie Holland MD   49M with multiple comorbidities presents for eval of L sided chest pain, pleuritic in nature.  CT shows 4x2.5 spiculated mass with central cavity.  Seen by pulm, will admit to floor.  r/o TB.

## 2024-07-29 PROBLEM — K22.70 BARRETT'S ESOPHAGUS WITH ESOPHAGITIS: Status: RESOLVED | Noted: 2020-01-28 | Resolved: 2024-07-29

## 2024-08-07 ENCOUNTER — RX RENEWAL (OUTPATIENT)
Age: 54
End: 2024-08-07

## 2024-08-14 ENCOUNTER — APPOINTMENT (OUTPATIENT)
Dept: RADIOLOGY | Facility: HOSPITAL | Age: 54
End: 2024-08-14

## 2024-08-16 ENCOUNTER — APPOINTMENT (OUTPATIENT)
Dept: ORTHOPEDIC SURGERY | Facility: CLINIC | Age: 54
End: 2024-08-16
Payer: MEDICAID

## 2024-08-16 DIAGNOSIS — M54.12 RADICULOPATHY, CERVICAL REGION: ICD-10-CM

## 2024-08-16 PROCEDURE — 99213 OFFICE O/P EST LOW 20 MIN: CPT

## 2024-08-16 NOTE — ASSESSMENT
[FreeTextEntry1] : Pt reports having Barretts esophagus and Crohns disease (no biologics) .   2/12/24 ACDF C3-C7 c/w PT See Dr. Robison for clavicle fracture Follow up 2 months

## 2024-08-16 NOTE — IMAGING
[de-identified] : CSPINE Inspection: healed incision  Palpation: No spasm in traps, rhomboids, paracervicals ROM: limited in all planes Strength: 5/5 Sensation: Sensation present to light touch bilateral C5-T1 distributions, altered sensation left ulnar Reflexes: Positive Soriano's bilaterally  L Shoulder- Palpation: Lateral tenderness to palpation ROM: Full with mild pain Strength: Decent strength with rotator cuff testing Provocative maneuvers: Positive impingement testing

## 2024-08-16 NOTE — HISTORY OF PRESENT ILLNESS
[Neck] : neck [Work related] : work related [6] : 6 [3] : 3 [Sharp] : sharp [Stabbing] : stabbing [Constant] : constant [Nothing helps with pain getting better] : Nothing helps with pain getting better [de-identified] : 2/12/24 SURGERY ACDF C3-7  MRI C Spine 10/6/23-report noted in chart Ind. Review- NF stenosis C3-C7  Pain history:  tried facet blocks in the past with no benefit _________________________ DR. RO NOTES- 6/30/23- 52M RHD Here for consult from neurologist, Dr. Frank. He has hx C4-5 hnp, LUE radicular pain, received TPI (pain mgmt Dr. Macario) 6/20/23. This relieved L arm pain. He continues to have pain posterior shoulder that radiates into shoulder blade. + cramping sensation. Reports decreased motion, good days and bad. Concerned he drops things holding and lifting things. He has L RF, SF is constant. Wakes him at night. Followed by Dr. Frank. Had EMG results LUE. His symptoms have been present 3-4 years. No injury recalled. 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 No neck/arm surgery. He did PT 3-4 years ago with relief neck and shoulder.  PMH: Crohns, BPH, no DM PSH: VATS for Lung CA RUL, prostate, L ACL SH: former smoker, no etoh, +MJ Occ: disabled ____________________________  Patient seen by non-spine partners in the past.  11/15/23-Continued neck pain as above. RHDM. Reports CA free.  3/6/24- eating solids. Neck ache. Now has tingling in the LUE, no longer has numbness.  4/12/24- eating solids, pill w/ difficulty. LUE numbness improving. No BUE pain.  06/14/2024: Patient has been in PT, continues having difficultly swallowing. Patient continues having n/t in LUE but PT is helping. 8/16/24-Pain has improved with PT. Patient fractured his clavicle 3 days ago. He was seen at [facility] [] : no [FreeTextEntry7] : shoulder

## 2024-08-26 NOTE — DISCHARGE NOTE NURSING/CASE MANAGEMENT/SOCIAL WORK - PATIENT PORTAL LINK FT
Pt calling back had a scan done on 8- and has been waiting for the results.  She is having anxiety waiting for the results.   You can access the FollowMyHealth Patient Portal offered by [facility] by registering at the following website: [email or web address]. By joining [facility]’s FollowMyHealth portal, you will also be able to view your health information using other applications (apps) compatible with our system.

## 2024-08-30 ENCOUNTER — APPOINTMENT (OUTPATIENT)
Dept: ORTHOPEDIC SURGERY | Facility: CLINIC | Age: 54
End: 2024-08-30

## 2024-08-30 VITALS — WEIGHT: 185 LBS | BODY MASS INDEX: 26.48 KG/M2 | HEIGHT: 70 IN

## 2024-08-30 DIAGNOSIS — S42.022K DISPLACED FRACTURE OF SHAFT OF LEFT CLAVICLE, SUBSEQUENT ENCOUNTER FOR FRACTURE WITH NONUNION: ICD-10-CM

## 2024-08-30 DIAGNOSIS — S42.009A FRACTURE OF UNSPECIFIED PART OF UNSPECIFIED CLAVICLE, INITIAL ENCOUNTER FOR CLOSED FRACTURE: ICD-10-CM

## 2024-08-30 DIAGNOSIS — S42.022A DISPLACED FRACTURE OF SHAFT OF LEFT CLAVICLE, INITIAL ENCOUNTER FOR CLOSED FRACTURE: ICD-10-CM

## 2024-08-30 PROCEDURE — 99214 OFFICE O/P EST MOD 30 MIN: CPT

## 2024-08-30 PROCEDURE — 73000 X-RAY EXAM OF COLLAR BONE: CPT | Mod: LT

## 2024-09-06 NOTE — PHYSICAL EXAM
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

## 2024-09-06 NOTE — DISCUSSION/SUMMARY
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   Patient and I discussed their symptoms. Discussed findings of today's exam and possible causes of patient's pain. Educated patient on their most probable diagnosis. Reviewed possible courses of treatment, and we collaboratively decided best course of treatment at this time will include:   1. ROMAT 2. Avoid lifting  3. CT scan of left clavicle to eval for nonunion vs fracture   Follow up after CT  Instructions: Dx / Natural History The patient was advised of the diagnosis.  The natural history of the pathology was explained in full to the patient in layman's terms.  Several different treatment options were discussed and explained in full to the patient including the risks and benefits of both surgical and non-surgical treatments.  All questions and concerns were answered.   RICE I explained to the patient that rest, ice, compression, and elevation would benefit them.  They may return to activity after follow-up or when they no longer have any pain.   NSAIDs - OTC Patient is to begin over the counter oral anti-inflammatory medications on an as needed basis, as long as there are no medical contraindications.  Patient is counseled on possible GI and blood pressure side effects.   Pain Guide Activities The patient was advised to let pain guide the gradual advancement of activities.   Icing The patient was advised to apply ice (wrapped in a towel or protective covering) to the area daily (20 minutes at a time, 2-4X/day).   All of the patient's questions were answered to His satisfaction. Diagnoses and potential treatments were reviewed. He agreed with the plan and would like to move forward with it.

## 2024-09-06 NOTE — HISTORY OF PRESENT ILLNESS
[de-identified] : 08/30/24: Here for consult from Dr Jason on the left clavicle. Patient had injury years ago and while stretching felt the area pop and now having cracking sensation.

## 2024-09-06 NOTE — HISTORY OF PRESENT ILLNESS
[de-identified] : 08/30/24: Here for consult from Dr Jason on the left clavicle. Patient had injury years ago and while stretching felt the area pop and now having cracking sensation.

## 2024-09-06 NOTE — DISCUSSION/SUMMARY
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   Patient and I discussed their symptoms. Discussed findings of today's exam and possible causes of patient's pain. Educated patient on their most probable diagnosis. Reviewed possible courses of treatment, and we collaboratively decided best course of treatment at this time will include:   1. ROMAT 2. Avoid lifting  3. CT scan of left clavicle to eval for nonunion vs fracture   Follow up after CT  Instructions: Dx / Natural History The patient was advised of the diagnosis.  The natural history of the pathology was explained in full to the patient in layman's terms.  Several different treatment options were discussed and explained in full to the patient including the risks and benefits of both surgical and non-surgical treatments.  All questions and concerns were answered.   RICE I explained to the patient that rest, ice, compression, and elevation would benefit them.  They may return to activity after follow-up or when they no longer have any pain.   NSAIDs - OTC Patient is to begin over the counter oral anti-inflammatory medications on an as needed basis, as long as there are no medical contraindications.  Patient is counseled on possible GI and blood pressure side effects.   Pain Guide Activities The patient was advised to let pain guide the gradual advancement of activities.   Icing The patient was advised to apply ice (wrapped in a towel or protective covering) to the area daily (20 minutes at a time, 2-4X/day).   All of the patient's questions were answered to His satisfaction. Diagnoses and potential treatments were reviewed. He agreed with the plan and would like to move forward with it.

## 2024-09-11 ENCOUNTER — APPOINTMENT (OUTPATIENT)
Dept: CT IMAGING | Facility: CLINIC | Age: 54
End: 2024-09-11

## 2024-09-12 ENCOUNTER — APPOINTMENT (OUTPATIENT)
Dept: CT IMAGING | Facility: CLINIC | Age: 54
End: 2024-09-12
Payer: MEDICAID

## 2024-09-12 PROCEDURE — 71250 CT THORAX DX C-: CPT | Mod: LT

## 2024-09-19 ENCOUNTER — APPOINTMENT (OUTPATIENT)
Dept: ORTHOPEDIC SURGERY | Facility: CLINIC | Age: 54
End: 2024-09-19

## 2024-09-19 DIAGNOSIS — M19.012 PRIMARY OSTEOARTHRITIS, LEFT SHOULDER: ICD-10-CM

## 2024-09-19 PROCEDURE — 99214 OFFICE O/P EST MOD 30 MIN: CPT

## 2024-09-19 NOTE — DATA REVIEWED
[CT Scan] : CT scan [Left] : left [Upper Extremities] : upper extremities [Report was reviewed and noted in the chart] : The report was reviewed and noted in the chart [I independently reviewed and interpreted images and report] : I independently reviewed and interpreted images and report [I reviewed the films/CD] : I reviewed the films/CD [FreeTextEntry1] : [facility] 9/12/24: 1. Osteopenia 2. Mild osteoarthritis of the GH joint 3. 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 0.8 x 0.3 cm well corticated osseous density in the anterior aspect of the lateral 1/3rd segment of clavicle, which may represent an old avulsed fracture fragment 5. Chronic calcific tendinosis in the subscapularis tendon

## 2024-09-19 NOTE — HISTORY OF PRESENT ILLNESS
[de-identified] : 09/19/24: Here for follow up of left clavicle. Underwent CT scan since last visit, here to review results  08/30/24: Here for consult from Dr Jason on the left clavicle. Patient had injury years ago and while stretching felt the area pop and now having cracking sensation.

## 2024-09-19 NOTE — DISCUSSION/SUMMARY
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   Patient and I discussed their symptoms. Discussed findings of today's exam and possible causes of patient's pain. Educated patient on their most probable diagnosis. Reviewed possible courses of treatment, and we collaboratively decided best course of treatment at this time will include:   1. Reviewed and discussed CT results with patient. Discussed probable dx. Discussed nonoperative vs operative treatment options. Will proceed with nonop treatment at this time.  2. Discussed use of bone stimulator to help with healing. Discussed proper use 3. If no change in 3 months, will likely discuss surgical treatment   Follow up in 6 weeks with new x-rays  Instructions: Dx / Natural History The patient was advised of the diagnosis.  The natural history of the pathology was explained in full to the patient in layman's terms.  Several different treatment options were discussed and explained in full to the patient including the risks and benefits of both surgical and non-surgical treatments.  All questions and concerns were answered.   RICE I explained to the patient that rest, ice, compression, and elevation would benefit them.  They may return to activity after follow-up or when they no longer have any pain.   NSAIDs - OTC Patient is to begin over the counter oral anti-inflammatory medications on an as needed basis, as long as there are no medical contraindications.  Patient is counseled on possible GI and blood pressure side effects.   Pain Guide Activities The patient was advised to let pain guide the gradual advancement of activities.   Icing The patient was advised to apply ice (wrapped in a towel or protective covering) to the area daily (20 minutes at a time, 2-4X/day).   All of the patient's questions were answered to His satisfaction. Diagnoses and potential treatments were reviewed. He agreed with the plan and would like to move forward with it.

## 2024-09-19 NOTE — DISCUSSION/SUMMARY
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   Patient and I discussed their symptoms. Discussed findings of today's exam and possible causes of patient's pain. Educated patient on their most probable diagnosis. Reviewed possible courses of treatment, and we collaboratively decided best course of treatment at this time will include:   1. Reviewed and discussed CT results with patient. Discussed probable dx. Discussed nonoperative vs operative treatment options. Will proceed with nonop treatment at this time.  2. Discussed use of bone stimulator to help with healing. Discussed proper use 3. If no change in 3 months, will likely discuss surgical treatment   Follow up in 6 weeks with new x-rays  Instructions: Dx / Natural History The patient was advised of the diagnosis.  The natural history of the pathology was explained in full to the patient in layman's terms.  Several different treatment options were discussed and explained in full to the patient including the risks and benefits of both surgical and non-surgical treatments.  All questions and concerns were answered.   RICE I explained to the patient that rest, ice, compression, and elevation would benefit them.  They may return to activity after follow-up or when they no longer have any pain.   NSAIDs - OTC Patient is to begin over the counter oral anti-inflammatory medications on an as needed basis, as long as there are no medical contraindications.  Patient is counseled on possible GI and blood pressure side effects.   Pain Guide Activities The patient was advised to let pain guide the gradual advancement of activities.   Icing The patient was advised to apply ice (wrapped in a towel or protective covering) to the area daily (20 minutes at a time, 2-4X/day).   All of the patient's questions were answered to His satisfaction. Diagnoses and potential treatments were reviewed. He agreed with the plan and would like to move forward with it.

## 2024-09-19 NOTE — HISTORY OF PRESENT ILLNESS
[de-identified] : 09/19/24: Here for follow up of left clavicle. Underwent CT scan since last visit, here to review results  08/30/24: Here for consult from Dr Jason on the left clavicle. Patient had injury years ago and while stretching felt the area pop and now having cracking sensation.

## 2024-10-18 ENCOUNTER — APPOINTMENT (OUTPATIENT)
Dept: ORTHOPEDIC SURGERY | Facility: CLINIC | Age: 54
End: 2024-10-18
Payer: MEDICAID

## 2024-10-18 DIAGNOSIS — Z98.1 ARTHRODESIS STATUS: ICD-10-CM

## 2024-10-18 PROCEDURE — 72040 X-RAY EXAM NECK SPINE 2-3 VW: CPT

## 2024-10-18 PROCEDURE — 99213 OFFICE O/P EST LOW 20 MIN: CPT

## 2024-10-21 ENCOUNTER — TRANSCRIPTION ENCOUNTER (OUTPATIENT)
Age: 54
End: 2024-10-21

## 2024-10-24 ENCOUNTER — RX RENEWAL (OUTPATIENT)
Age: 54
End: 2024-10-24

## 2024-10-24 ENCOUNTER — APPOINTMENT (OUTPATIENT)
Dept: ORTHOPEDIC SURGERY | Facility: CLINIC | Age: 54
End: 2024-10-24

## 2024-10-24 DIAGNOSIS — S42.022K DISPLACED FRACTURE OF SHAFT OF LEFT CLAVICLE, SUBSEQUENT ENCOUNTER FOR FRACTURE WITH NONUNION: ICD-10-CM

## 2024-10-24 PROCEDURE — 73000 X-RAY EXAM OF COLLAR BONE: CPT | Mod: LT

## 2024-10-24 PROCEDURE — 99214 OFFICE O/P EST MOD 30 MIN: CPT

## 2024-11-07 NOTE — PROGRESS NOTE ADULT - SUBJECTIVE AND OBJECTIVE BOX
Postoperative Day # 6 s/p right VATS     Patient seen and examined bedside . He appears to be very uncomfortable.  He states he has h/o intestinal problems for which he was seeing Dr. Marie. Now he c/o abdominal pain due to "heartburn".  Denies nausea and vomiting. Tolerating diet.  Flatus/BM. +  Denies chest pain, dyspnea, cough.    T(F): 98.2 (06-04-20 @ 05:00), Max: 98.9 (06-03-20 @ 17:00)  HR: 72 (06-04-20 @ 05:00) (72 - 101)  BP: 105/71 (06-04-20 @ 05:00) (102/64 - 143/83)  RR: 17 (06-04-20 @ 05:00) (17 - 20)  SpO2: 96% (06-04-20 @ 05:00) (94% - 97%)    PHYSICAL EXAM:  General: NAD  Neuro:  Alert & oriented x 3  Abdomen: soft, not distended  Chest: Dressings clean & intact. Chest clear to auscultaion.    LABS:  no new labs today          I&O's Detail      Impression: 49y Male Postoperative Day # 6 s/p right VATS   h/o Crohn's      Plan:  - continue medical f/u  - he states that he called Dr. Marie. He is supposed to be seeing pt today.  - will discuss with Dr. Marley about any further thoracic intervention. No

## 2024-11-19 ENCOUNTER — APPOINTMENT (OUTPATIENT)
Dept: PULMONOLOGY | Facility: CLINIC | Age: 54
End: 2024-11-19
Payer: MEDICAID

## 2024-11-19 VITALS
BODY MASS INDEX: 22.9 KG/M2 | DIASTOLIC BLOOD PRESSURE: 86 MMHG | SYSTOLIC BLOOD PRESSURE: 130 MMHG | HEART RATE: 96 BPM | HEIGHT: 70 IN | OXYGEN SATURATION: 97 % | WEIGHT: 160 LBS

## 2024-11-19 DIAGNOSIS — J43.9 EMPHYSEMA, UNSPECIFIED: ICD-10-CM

## 2024-11-19 DIAGNOSIS — A31.0 PULMONARY MYCOBACTERIAL INFECTION: ICD-10-CM

## 2024-11-19 PROCEDURE — 94729 DIFFUSING CAPACITY: CPT

## 2024-11-19 PROCEDURE — 94060 EVALUATION OF WHEEZING: CPT

## 2024-11-19 PROCEDURE — 99215 OFFICE O/P EST HI 40 MIN: CPT | Mod: 25

## 2024-11-19 PROCEDURE — ZZZZZ: CPT

## 2024-11-19 PROCEDURE — 94727 GAS DIL/WSHOT DETER LNG VOL: CPT

## 2024-12-12 ENCOUNTER — APPOINTMENT (OUTPATIENT)
Dept: ORTHOPEDIC SURGERY | Facility: CLINIC | Age: 54
End: 2024-12-12

## 2024-12-12 DIAGNOSIS — S42.022A DISPLACED FRACTURE OF SHAFT OF LEFT CLAVICLE, INITIAL ENCOUNTER FOR CLOSED FRACTURE: ICD-10-CM

## 2024-12-12 PROCEDURE — 73000 X-RAY EXAM OF COLLAR BONE: CPT | Mod: LT

## 2024-12-12 PROCEDURE — 99214 OFFICE O/P EST MOD 30 MIN: CPT

## 2024-12-20 ENCOUNTER — APPOINTMENT (OUTPATIENT)
Dept: ORTHOPEDIC SURGERY | Facility: CLINIC | Age: 54
End: 2024-12-20
Payer: MEDICAID

## 2024-12-20 DIAGNOSIS — M54.12 RADICULOPATHY, CERVICAL REGION: ICD-10-CM

## 2024-12-20 PROCEDURE — 99213 OFFICE O/P EST LOW 20 MIN: CPT

## 2025-02-07 ENCOUNTER — APPOINTMENT (OUTPATIENT)
Dept: ORTHOPEDIC SURGERY | Facility: CLINIC | Age: 55
End: 2025-02-07
Payer: MEDICAID

## 2025-02-07 DIAGNOSIS — Z98.1 ARTHRODESIS STATUS: ICD-10-CM

## 2025-02-07 PROCEDURE — 99213 OFFICE O/P EST LOW 20 MIN: CPT

## 2025-02-17 ENCOUNTER — APPOINTMENT (OUTPATIENT)
Dept: ORTHOPEDIC SURGERY | Facility: CLINIC | Age: 55
End: 2025-02-17
Payer: MEDICAID

## 2025-02-17 VITALS — BODY MASS INDEX: 22.9 KG/M2 | HEIGHT: 70 IN | WEIGHT: 160 LBS

## 2025-02-17 DIAGNOSIS — S42.022K DISPLACED FRACTURE OF SHAFT OF LEFT CLAVICLE, SUBSEQUENT ENCOUNTER FOR FRACTURE WITH NONUNION: ICD-10-CM

## 2025-02-17 DIAGNOSIS — S42.022A DISPLACED FRACTURE OF SHAFT OF LEFT CLAVICLE, INITIAL ENCOUNTER FOR CLOSED FRACTURE: ICD-10-CM

## 2025-02-17 PROCEDURE — 99214 OFFICE O/P EST MOD 30 MIN: CPT

## 2025-02-17 PROCEDURE — 73000 X-RAY EXAM OF COLLAR BONE: CPT | Mod: LT

## 2025-04-18 ENCOUNTER — APPOINTMENT (OUTPATIENT)
Dept: ORTHOPEDIC SURGERY | Facility: CLINIC | Age: 55
End: 2025-04-18
Payer: MEDICAID

## 2025-04-18 DIAGNOSIS — Z98.1 ARTHRODESIS STATUS: ICD-10-CM

## 2025-04-18 PROCEDURE — 99213 OFFICE O/P EST LOW 20 MIN: CPT

## 2025-05-21 ENCOUNTER — APPOINTMENT (OUTPATIENT)
Dept: PULMONOLOGY | Facility: CLINIC | Age: 55
End: 2025-05-21
Payer: MEDICAID

## 2025-05-21 VITALS
HEART RATE: 65 BPM | HEIGHT: 70 IN | OXYGEN SATURATION: 99 % | SYSTOLIC BLOOD PRESSURE: 109 MMHG | WEIGHT: 152.5 LBS | DIASTOLIC BLOOD PRESSURE: 69 MMHG | BODY MASS INDEX: 21.83 KG/M2

## 2025-05-21 DIAGNOSIS — A31.0 PULMONARY MYCOBACTERIAL INFECTION: ICD-10-CM

## 2025-05-21 DIAGNOSIS — J43.9 EMPHYSEMA, UNSPECIFIED: ICD-10-CM

## 2025-05-21 DIAGNOSIS — J98.4 OTHER DISORDERS OF LUNG: ICD-10-CM

## 2025-05-21 PROCEDURE — 94060 EVALUATION OF WHEEZING: CPT

## 2025-05-21 PROCEDURE — 99215 OFFICE O/P EST HI 40 MIN: CPT | Mod: 25

## 2025-06-19 ENCOUNTER — APPOINTMENT (OUTPATIENT)
Dept: ORTHOPEDIC SURGERY | Facility: CLINIC | Age: 55
End: 2025-06-19
Payer: MEDICAID

## 2025-06-19 VITALS — HEIGHT: 70 IN | BODY MASS INDEX: 21.76 KG/M2 | WEIGHT: 152 LBS

## 2025-06-19 PROBLEM — M25.511 CHRONIC RIGHT SHOULDER PAIN: Status: ACTIVE | Noted: 2025-06-19

## 2025-06-19 PROCEDURE — 73010 X-RAY EXAM OF SHOULDER BLADE: CPT | Mod: RT

## 2025-06-19 PROCEDURE — 73030 X-RAY EXAM OF SHOULDER: CPT | Mod: RT

## 2025-06-19 PROCEDURE — 72040 X-RAY EXAM NECK SPINE 2-3 VW: CPT

## 2025-06-19 PROCEDURE — 99214 OFFICE O/P EST MOD 30 MIN: CPT

## 2025-06-19 RX ORDER — METHYLPREDNISOLONE 4 MG/1
4 TABLET ORAL
Qty: 1 | Refills: 0 | Status: ACTIVE | COMMUNITY
Start: 2025-06-19 | End: 1900-01-01

## 2025-07-09 ENCOUNTER — APPOINTMENT (OUTPATIENT)
Dept: ORTHOPEDIC SURGERY | Facility: CLINIC | Age: 55
End: 2025-07-09
Payer: MEDICAID

## 2025-07-09 PROCEDURE — 72040 X-RAY EXAM NECK SPINE 2-3 VW: CPT

## 2025-07-09 PROCEDURE — 99213 OFFICE O/P EST LOW 20 MIN: CPT

## 2025-07-16 ENCOUNTER — APPOINTMENT (OUTPATIENT)
Dept: MRI IMAGING | Facility: CLINIC | Age: 55
End: 2025-07-16

## 2025-07-16 ENCOUNTER — APPOINTMENT (OUTPATIENT)
Dept: CT IMAGING | Facility: CLINIC | Age: 55
End: 2025-07-16
Payer: MEDICAID

## 2025-07-16 PROCEDURE — 76376 3D RENDER W/INTRP POSTPROCES: CPT

## 2025-07-16 PROCEDURE — 72125 CT NECK SPINE W/O DYE: CPT

## 2025-07-18 ENCOUNTER — APPOINTMENT (OUTPATIENT)
Dept: MRI IMAGING | Facility: CLINIC | Age: 55
End: 2025-07-18
Payer: MEDICAID

## 2025-07-18 PROCEDURE — 72141 MRI NECK SPINE W/O DYE: CPT

## 2025-08-01 ENCOUNTER — APPOINTMENT (OUTPATIENT)
Dept: ORTHOPEDIC SURGERY | Facility: CLINIC | Age: 55
End: 2025-08-01
Payer: MEDICAID

## 2025-08-01 DIAGNOSIS — Z98.1 ARTHRODESIS STATUS: ICD-10-CM

## 2025-08-01 DIAGNOSIS — M54.12 RADICULOPATHY, CERVICAL REGION: ICD-10-CM

## 2025-08-01 PROCEDURE — 99215 OFFICE O/P EST HI 40 MIN: CPT

## 2025-09-03 ENCOUNTER — APPOINTMENT (OUTPATIENT)
Dept: ORTHOPEDIC SURGERY | Facility: CLINIC | Age: 55
End: 2025-09-03
Payer: MEDICAID

## 2025-09-03 DIAGNOSIS — M54.12 RADICULOPATHY, CERVICAL REGION: ICD-10-CM

## 2025-09-03 DIAGNOSIS — Z98.1 ARTHRODESIS STATUS: ICD-10-CM

## 2025-09-03 PROCEDURE — 99213 OFFICE O/P EST LOW 20 MIN: CPT

## (undated) DEVICE — LIGHT CABLE MAS PLIF STRL

## (undated) DEVICE — ELCTR BOVIE TIP BLADE INSULATED 2.75" EDGE WITH SAFETY

## (undated) DEVICE — ELCTR STRYKER NEPTUNE SMOKE EVACUATION PENCIL (GREEN)

## (undated) DEVICE — FOLEY TRAY 14FR 5CC LF UMETER CLOSED

## (undated) DEVICE — VENODYNE/SCD SLEEVE CALF MEDIUM

## (undated) DEVICE — DRSG STERISTRIPS 0.5 X 4"

## (undated) DEVICE — FRAZIER SUCTION TIP 10FR

## (undated) DEVICE — DRAPE SHOWER CURTAIN ISOLATION

## (undated) DEVICE — DRAPE MAYO STAND 30"

## (undated) DEVICE — STAPLER SKIN MULTI DIRECTION W35

## (undated) DEVICE — PREP CHLORAPREP HI-LITE ORANGE 10.5ML

## (undated) DEVICE — MIDAS REX LEGEND MATCH HEAD FLUTED LG BORE 3.0MM X 14CM

## (undated) DEVICE — PACK CERVICAL FUSION

## (undated) DEVICE — SUT SILK 2-0 30" SH

## (undated) DEVICE — DRAIN RESERVOIR FOR JACKSON PRATT 100CC CARDINAL

## (undated) DEVICE — DRSG BIOPATCH DISK W CHG 1" W 4.0MM HOLE

## (undated) DEVICE — POSITIONER FOAM LAMINECTOMY ARM CRADLE (PINK)

## (undated) DEVICE — DRAPE INSTRUMENT POUCH 6.75" X 11"

## (undated) DEVICE — NDL SPINAL 18G X 3.5" (PINK)

## (undated) DEVICE — MIDAS REX MR7 LUBRICANT DIFFUSER CARTRIDGE

## (undated) DEVICE — Device

## (undated) DEVICE — DRAPE 3/4 SHEET W REINFORCEMENT 56X77"

## (undated) DEVICE — ELCTR BOVIE TIP BLADE INSULATED 4" EDGE

## (undated) DEVICE — PACK BASIC

## (undated) DEVICE — DRSG CURITY GAUZE SPONGE 4 X 4" 12-PLY

## (undated) DEVICE — SUT VICRYL 3-0 18" SH UNDYED (POP-OFF)

## (undated) DEVICE — GLV 8 PROTEXIS (BLUE)

## (undated) DEVICE — TUBING BIPOLAR IRRIGATOR AND CORD SET

## (undated) DEVICE — DRAPE TOWEL BLUE 17" X 24"

## (undated) DEVICE — DRAIN BLAKE 15FR BARD CHANNEL

## (undated) DEVICE — GLV 7.5 PROTEXIS (WHITE)

## (undated) DEVICE — DRSG MASTISOL

## (undated) DEVICE — DRAPE C ARM 41X140"

## (undated) DEVICE — DRSG EYE PAD OVAL STERILE

## (undated) DEVICE — FRA-ESU BOVIE FORCE TRIAD T6D04548DX: Type: DURABLE MEDICAL EQUIPMENT

## (undated) DEVICE — BIPOLAR FORCEP HENSLER BAYONET 8" X 1MM (YELLOW)

## (undated) DEVICE — WARMING BLANKET LOWER ADULT

## (undated) DEVICE — DRAPE MICROSCOPE LEICA 26" X 150"

## (undated) DEVICE — FRAZIER SUCTION TIP 12FR

## (undated) DEVICE — SUT MONOCRYL 4-0 27" PS-2 UNDYED

## (undated) DEVICE — SPONGE PEANUT AUTO COUNT

## (undated) DEVICE — DRSG TEGADERM 4X4.75"